# Patient Record
Sex: FEMALE | Race: WHITE | NOT HISPANIC OR LATINO | Employment: FULL TIME | URBAN - METROPOLITAN AREA
[De-identification: names, ages, dates, MRNs, and addresses within clinical notes are randomized per-mention and may not be internally consistent; named-entity substitution may affect disease eponyms.]

---

## 2017-01-09 ENCOUNTER — ALLSCRIPTS OFFICE VISIT (OUTPATIENT)
Dept: OTHER | Facility: OTHER | Age: 47
End: 2017-01-09

## 2017-01-09 DIAGNOSIS — E03.9 HYPOTHYROIDISM: ICD-10-CM

## 2017-01-09 DIAGNOSIS — R91.8 OTHER NONSPECIFIC ABNORMAL FINDING OF LUNG FIELD: ICD-10-CM

## 2017-01-09 DIAGNOSIS — L04.9 ACUTE LYMPHADENITIS: ICD-10-CM

## 2017-01-09 DIAGNOSIS — E11.9 TYPE 2 DIABETES MELLITUS WITHOUT COMPLICATIONS (HCC): ICD-10-CM

## 2017-01-17 ENCOUNTER — APPOINTMENT (OUTPATIENT)
Dept: LAB | Facility: HOSPITAL | Age: 47
End: 2017-01-17
Payer: COMMERCIAL

## 2017-01-17 DIAGNOSIS — E03.9 HYPOTHYROIDISM: ICD-10-CM

## 2017-01-17 DIAGNOSIS — L04.9 ACUTE LYMPHADENITIS: ICD-10-CM

## 2017-01-17 DIAGNOSIS — E11.9 TYPE 2 DIABETES MELLITUS WITHOUT COMPLICATIONS (HCC): ICD-10-CM

## 2017-01-17 LAB
ALBUMIN SERPL BCP-MCNC: 3.4 G/DL (ref 3.5–5)
ALP SERPL-CCNC: 70 U/L (ref 46–116)
ALT SERPL W P-5'-P-CCNC: 76 U/L (ref 12–78)
ANION GAP SERPL CALCULATED.3IONS-SCNC: 10 MMOL/L (ref 4–13)
AST SERPL W P-5'-P-CCNC: 79 U/L (ref 5–45)
BASOPHILS # BLD AUTO: 0.12 THOUSANDS/ΜL (ref 0–0.1)
BASOPHILS NFR BLD AUTO: 2 % (ref 0–1)
BILIRUB SERPL-MCNC: 0.23 MG/DL (ref 0.2–1)
BUN SERPL-MCNC: 14 MG/DL (ref 5–25)
CALCIUM SERPL-MCNC: 8.2 MG/DL (ref 8.3–10.1)
CHLORIDE SERPL-SCNC: 105 MMOL/L (ref 100–108)
CO2 SERPL-SCNC: 23 MMOL/L (ref 21–32)
CREAT SERPL-MCNC: 0.73 MG/DL (ref 0.6–1.3)
EOSINOPHIL # BLD AUTO: 0.44 THOUSAND/ΜL (ref 0–0.61)
EOSINOPHIL NFR BLD AUTO: 6 % (ref 0–6)
ERYTHROCYTE [DISTWIDTH] IN BLOOD BY AUTOMATED COUNT: 14.2 % (ref 11.6–15.1)
EST. AVERAGE GLUCOSE BLD GHB EST-MCNC: 131 MG/DL
GFR SERPL CREATININE-BSD FRML MDRD: >60 ML/MIN/1.73SQ M
GLUCOSE SERPL-MCNC: 122 MG/DL (ref 65–140)
HBA1C MFR BLD: 6.2 % (ref 4.2–6.3)
HCT VFR BLD AUTO: 37.3 % (ref 34.8–46.1)
HGB BLD-MCNC: 12.6 G/DL (ref 11.5–15.4)
LYMPHOCYTES # BLD AUTO: 1.93 THOUSANDS/ΜL (ref 0.6–4.47)
LYMPHOCYTES NFR BLD AUTO: 27 % (ref 14–44)
MCH RBC QN AUTO: 30.7 PG (ref 26.8–34.3)
MCHC RBC AUTO-ENTMCNC: 33.8 G/DL (ref 31.4–37.4)
MCV RBC AUTO: 91 FL (ref 82–98)
MONOCYTES # BLD AUTO: 0.65 THOUSAND/ΜL (ref 0.17–1.22)
MONOCYTES NFR BLD AUTO: 9 % (ref 4–12)
NEUTROPHILS # BLD AUTO: 3.91 THOUSANDS/ΜL (ref 1.85–7.62)
NEUTS SEG NFR BLD AUTO: 56 % (ref 43–75)
NRBC BLD AUTO-RTO: 0 /100 WBCS
PLATELET # BLD AUTO: 245 THOUSANDS/UL (ref 149–390)
PMV BLD AUTO: 9.8 FL (ref 8.9–12.7)
POTASSIUM SERPL-SCNC: 4.2 MMOL/L (ref 3.5–5.3)
PROT SERPL-MCNC: 6.8 G/DL (ref 6.4–8.2)
RBC # BLD AUTO: 4.1 MILLION/UL (ref 3.81–5.12)
SODIUM SERPL-SCNC: 138 MMOL/L (ref 136–145)
TSH SERPL DL<=0.05 MIU/L-ACNC: 2 UIU/ML (ref 0.36–3.74)
WBC # BLD AUTO: 7.05 THOUSAND/UL (ref 4.31–10.16)

## 2017-01-17 PROCEDURE — 85025 COMPLETE CBC W/AUTO DIFF WBC: CPT

## 2017-01-17 PROCEDURE — 83036 HEMOGLOBIN GLYCOSYLATED A1C: CPT

## 2017-01-17 PROCEDURE — 36415 COLL VENOUS BLD VENIPUNCTURE: CPT

## 2017-01-17 PROCEDURE — 80053 COMPREHEN METABOLIC PANEL: CPT

## 2017-01-17 PROCEDURE — 84443 ASSAY THYROID STIM HORMONE: CPT

## 2017-01-19 ENCOUNTER — GENERIC CONVERSION - ENCOUNTER (OUTPATIENT)
Dept: OTHER | Facility: OTHER | Age: 47
End: 2017-01-19

## 2017-01-23 ENCOUNTER — GENERIC CONVERSION - ENCOUNTER (OUTPATIENT)
Dept: OTHER | Facility: OTHER | Age: 47
End: 2017-01-23

## 2017-02-03 ENCOUNTER — HOSPITAL ENCOUNTER (OUTPATIENT)
Dept: RADIOLOGY | Facility: HOSPITAL | Age: 47
Discharge: HOME | End: 2017-02-03
Payer: COMMERCIAL

## 2017-02-03 DIAGNOSIS — L04.9 ACUTE LYMPHADENITIS: ICD-10-CM

## 2017-02-03 PROCEDURE — 70491 CT SOFT TISSUE NECK W/DYE: CPT

## 2017-02-03 RX ADMIN — IOHEXOL 85 ML: 350 INJECTION, SOLUTION INTRAVENOUS at 13:15

## 2017-02-06 ENCOUNTER — HOSPITAL ENCOUNTER (OUTPATIENT)
Dept: RADIOLOGY | Facility: HOSPITAL | Age: 47
Discharge: HOME | End: 2017-02-06
Payer: COMMERCIAL

## 2017-02-06 ENCOUNTER — GENERIC CONVERSION - ENCOUNTER (OUTPATIENT)
Dept: OTHER | Facility: OTHER | Age: 47
End: 2017-02-06

## 2017-02-06 ENCOUNTER — TRANSCRIBE ORDERS (OUTPATIENT)
Dept: ADMINISTRATIVE | Facility: HOSPITAL | Age: 47
End: 2017-02-06

## 2017-02-06 DIAGNOSIS — R91.8 OTHER NONSPECIFIC ABNORMAL FINDING OF LUNG FIELD: ICD-10-CM

## 2017-02-06 PROCEDURE — 71020 HB CHEST X-RAY 2VW FRONTAL&LATL: CPT

## 2017-02-07 ENCOUNTER — GENERIC CONVERSION - ENCOUNTER (OUTPATIENT)
Dept: OTHER | Facility: OTHER | Age: 47
End: 2017-02-07

## 2017-04-17 DIAGNOSIS — R74.01 NONSPECIFIC ELEVATION OF LEVELS OF TRANSAMINASE AND LACTIC ACID DEHYDROGENASE (LDH): ICD-10-CM

## 2017-04-17 DIAGNOSIS — R74.02 NONSPECIFIC ELEVATION OF LEVELS OF TRANSAMINASE AND LACTIC ACID DEHYDROGENASE (LDH): ICD-10-CM

## 2017-04-17 DIAGNOSIS — E11.9 TYPE 2 DIABETES MELLITUS WITHOUT COMPLICATIONS (HCC): ICD-10-CM

## 2017-07-13 ENCOUNTER — APPOINTMENT (OUTPATIENT)
Dept: LAB | Facility: HOSPITAL | Age: 47
End: 2017-07-13
Payer: COMMERCIAL

## 2017-07-13 ENCOUNTER — TRANSCRIBE ORDERS (OUTPATIENT)
Dept: LAB | Facility: HOSPITAL | Age: 47
End: 2017-07-13

## 2017-07-13 DIAGNOSIS — Z11.1 TUBERCULOSIS SCREENING: ICD-10-CM

## 2017-07-13 DIAGNOSIS — Z11.1 TUBERCULOSIS SCREENING: Primary | ICD-10-CM

## 2017-07-13 PROCEDURE — 86480 TB TEST CELL IMMUN MEASURE: CPT

## 2017-07-13 PROCEDURE — 36415 COLL VENOUS BLD VENIPUNCTURE: CPT

## 2017-07-16 LAB
ANNOTATION COMMENT IMP: NORMAL
GAMMA INTERFERON BACKGROUND BLD IA-ACNC: 0.06 IU/ML
M TB IFN-G BLD-IMP: NEGATIVE
M TB IFN-G CD4+ BCKGRND COR BLD-ACNC: <0.01 IU/ML
M TB IFN-G CD4+ T-CELLS BLD-ACNC: 0.05 IU/ML
MITOGEN IGNF BLD-ACNC: 9.21 IU/ML
QUANTIFERON-TB GOLD IN TUBE: NORMAL
SERVICE CMNT-IMP: NORMAL

## 2017-08-01 ENCOUNTER — TRANSCRIBE ORDERS (OUTPATIENT)
Dept: LAB | Facility: HOSPITAL | Age: 47
End: 2017-08-01

## 2017-08-01 ENCOUNTER — APPOINTMENT (OUTPATIENT)
Dept: LAB | Facility: HOSPITAL | Age: 47
End: 2017-08-01
Payer: COMMERCIAL

## 2017-08-01 DIAGNOSIS — Z00.8 HEALTH EXAMINATION IN POPULATION SURVEY: ICD-10-CM

## 2017-08-01 DIAGNOSIS — R74.01 NONSPECIFIC ELEVATION OF LEVELS OF TRANSAMINASE AND LACTIC ACID DEHYDROGENASE (LDH): ICD-10-CM

## 2017-08-01 DIAGNOSIS — Z00.8 HEALTH EXAMINATION IN POPULATION SURVEY: Primary | ICD-10-CM

## 2017-08-01 DIAGNOSIS — E11.9 TYPE 2 DIABETES MELLITUS WITHOUT COMPLICATIONS (HCC): ICD-10-CM

## 2017-08-01 DIAGNOSIS — R74.02 NONSPECIFIC ELEVATION OF LEVELS OF TRANSAMINASE AND LACTIC ACID DEHYDROGENASE (LDH): ICD-10-CM

## 2017-08-01 LAB
ALBUMIN SERPL BCP-MCNC: 3.3 G/DL (ref 3.5–5)
ALP SERPL-CCNC: 67 U/L (ref 46–116)
ALT SERPL W P-5'-P-CCNC: 35 U/L (ref 12–78)
ANION GAP SERPL CALCULATED.3IONS-SCNC: 8 MMOL/L (ref 4–13)
AST SERPL W P-5'-P-CCNC: 51 U/L (ref 5–45)
BILIRUB SERPL-MCNC: 0.42 MG/DL (ref 0.2–1)
BUN SERPL-MCNC: 16 MG/DL (ref 5–25)
CALCIUM SERPL-MCNC: 8.5 MG/DL (ref 8.3–10.1)
CHLORIDE SERPL-SCNC: 103 MMOL/L (ref 100–108)
CHOLEST SERPL-MCNC: 224 MG/DL (ref 50–200)
CO2 SERPL-SCNC: 24 MMOL/L (ref 21–32)
CREAT SERPL-MCNC: 0.84 MG/DL (ref 0.6–1.3)
EST. AVERAGE GLUCOSE BLD GHB EST-MCNC: 151 MG/DL
GFR SERPL CREATININE-BSD FRML MDRD: 84 ML/MIN/1.73SQ M
GLUCOSE P FAST SERPL-MCNC: 119 MG/DL (ref 65–99)
HBA1C MFR BLD: 6.9 % (ref 4.2–6.3)
HDLC SERPL-MCNC: 38 MG/DL (ref 40–60)
POTASSIUM SERPL-SCNC: 3.9 MMOL/L (ref 3.5–5.3)
PROT SERPL-MCNC: 6.8 G/DL (ref 6.4–8.2)
SODIUM SERPL-SCNC: 135 MMOL/L (ref 136–145)
TRIGL SERPL-MCNC: 557 MG/DL

## 2017-08-01 PROCEDURE — 36415 COLL VENOUS BLD VENIPUNCTURE: CPT

## 2017-08-01 PROCEDURE — 83036 HEMOGLOBIN GLYCOSYLATED A1C: CPT

## 2017-08-01 PROCEDURE — 80053 COMPREHEN METABOLIC PANEL: CPT

## 2017-08-01 PROCEDURE — 80061 LIPID PANEL: CPT

## 2017-08-30 ENCOUNTER — ALLSCRIPTS OFFICE VISIT (OUTPATIENT)
Dept: OTHER | Facility: OTHER | Age: 47
End: 2017-08-30

## 2017-08-30 DIAGNOSIS — R74.02 NONSPECIFIC ELEVATION OF LEVELS OF TRANSAMINASE AND LACTIC ACID DEHYDROGENASE (LDH): ICD-10-CM

## 2017-08-30 DIAGNOSIS — R74.01 NONSPECIFIC ELEVATION OF LEVELS OF TRANSAMINASE AND LACTIC ACID DEHYDROGENASE (LDH): ICD-10-CM

## 2017-09-08 ENCOUNTER — HOSPITAL ENCOUNTER (OUTPATIENT)
Dept: RADIOLOGY | Facility: HOSPITAL | Age: 47
Discharge: HOME | End: 2017-09-08
Payer: COMMERCIAL

## 2017-09-08 DIAGNOSIS — R74.01 NONSPECIFIC ELEVATION OF LEVELS OF TRANSAMINASE AND LACTIC ACID DEHYDROGENASE (LDH): ICD-10-CM

## 2017-09-08 DIAGNOSIS — R74.02 NONSPECIFIC ELEVATION OF LEVELS OF TRANSAMINASE AND LACTIC ACID DEHYDROGENASE (LDH): ICD-10-CM

## 2017-09-08 PROCEDURE — 76705 ECHO EXAM OF ABDOMEN: CPT

## 2017-09-10 ENCOUNTER — GENERIC CONVERSION - ENCOUNTER (OUTPATIENT)
Dept: OTHER | Facility: OTHER | Age: 47
End: 2017-09-10

## 2017-10-03 ENCOUNTER — HOSPITAL ENCOUNTER (INPATIENT)
Facility: HOSPITAL | Age: 47
LOS: 1 days | Discharge: HOME | DRG: 281 | End: 2017-10-05
Attending: EMERGENCY MEDICINE | Admitting: INTERNAL MEDICINE
Payer: COMMERCIAL

## 2017-10-03 ENCOUNTER — APPOINTMENT (EMERGENCY)
Dept: RADIOLOGY | Facility: HOSPITAL | Age: 47
DRG: 281 | End: 2017-10-03
Payer: COMMERCIAL

## 2017-10-03 DIAGNOSIS — R07.9 CHEST PAIN: Primary | ICD-10-CM

## 2017-10-03 DIAGNOSIS — R94.31 ST SEGMENT DEPRESSION: ICD-10-CM

## 2017-10-03 PROBLEM — K76.0 FATTY LIVER: Chronic | Status: ACTIVE | Noted: 2017-10-03

## 2017-10-03 PROBLEM — R73.9 HYPERGLYCEMIA: Status: ACTIVE | Noted: 2017-10-03

## 2017-10-03 PROBLEM — K27.9 PEPTIC ULCER DISEASE: Chronic | Status: ACTIVE | Noted: 2017-10-03

## 2017-10-03 PROBLEM — D72.829 LEUKOCYTOSIS: Status: ACTIVE | Noted: 2017-10-03

## 2017-10-03 PROBLEM — R00.0 SINUS TACHYCARDIA: Status: ACTIVE | Noted: 2017-10-03

## 2017-10-03 PROBLEM — I10 HYPERTENSION: Chronic | Status: ACTIVE | Noted: 2017-10-03

## 2017-10-03 PROBLEM — J32.9 CHRONIC SINUSITIS: Chronic | Status: ACTIVE | Noted: 2017-10-03

## 2017-10-03 LAB
ALBUMIN SERPL BCP-MCNC: 3.7 G/DL (ref 3.5–5)
ALP SERPL-CCNC: 91 U/L (ref 46–116)
ALT SERPL W P-5'-P-CCNC: 70 U/L (ref 12–78)
ANION GAP SERPL CALCULATED.3IONS-SCNC: 13 MMOL/L (ref 4–13)
AST SERPL W P-5'-P-CCNC: 70 U/L (ref 5–45)
BASOPHILS # BLD AUTO: 0.1 THOUSANDS/ΜL (ref 0–0.1)
BASOPHILS NFR BLD AUTO: 1 % (ref 0–1)
BILIRUB SERPL-MCNC: 0.22 MG/DL (ref 0.2–1)
BUN SERPL-MCNC: 14 MG/DL (ref 5–25)
CALCIUM SERPL-MCNC: 8.7 MG/DL (ref 8.3–10.1)
CHLORIDE SERPL-SCNC: 106 MMOL/L (ref 100–108)
CO2 SERPL-SCNC: 17 MMOL/L (ref 21–32)
CREAT SERPL-MCNC: 0.71 MG/DL (ref 0.6–1.3)
DEPRECATED D DIMER PPP: 266 NG/ML (FEU) (ref 0–424)
EOSINOPHIL # BLD AUTO: 0.52 THOUSAND/ΜL (ref 0–0.61)
EOSINOPHIL NFR BLD AUTO: 3 % (ref 0–6)
ERYTHROCYTE [DISTWIDTH] IN BLOOD BY AUTOMATED COUNT: 13.2 % (ref 11.6–15.1)
GFR SERPL CREATININE-BSD FRML MDRD: 102 ML/MIN/1.73SQ M
GLUCOSE SERPL-MCNC: 220 MG/DL (ref 65–140)
HCT VFR BLD AUTO: 40.3 % (ref 34.8–46.1)
HGB BLD-MCNC: 13.6 G/DL (ref 11.5–15.4)
LYMPHOCYTES # BLD AUTO: 4.42 THOUSANDS/ΜL (ref 0.6–4.47)
LYMPHOCYTES NFR BLD AUTO: 29 % (ref 14–44)
MCH RBC QN AUTO: 30.4 PG (ref 26.8–34.3)
MCHC RBC AUTO-ENTMCNC: 33.7 G/DL (ref 31.4–37.4)
MCV RBC AUTO: 90 FL (ref 82–98)
MONOCYTES # BLD AUTO: 0.76 THOUSAND/ΜL (ref 0.17–1.22)
MONOCYTES NFR BLD AUTO: 5 % (ref 4–12)
NEUTROPHILS # BLD AUTO: 9.4 THOUSANDS/ΜL (ref 1.85–7.62)
NEUTS SEG NFR BLD AUTO: 62 % (ref 43–75)
NRBC BLD AUTO-RTO: 0 /100 WBCS
PLATELET # BLD AUTO: 332 THOUSANDS/UL (ref 149–390)
PMV BLD AUTO: 9.1 FL (ref 8.9–12.7)
POTASSIUM SERPL-SCNC: 3.1 MMOL/L (ref 3.5–5.3)
PROT SERPL-MCNC: 7.8 G/DL (ref 6.4–8.2)
RBC # BLD AUTO: 4.48 MILLION/UL (ref 3.81–5.12)
SODIUM SERPL-SCNC: 136 MMOL/L (ref 136–145)
TROPONIN I SERPL-MCNC: 0.11 NG/ML
TROPONIN I SERPL-MCNC: <0.02 NG/ML
WBC # BLD AUTO: 15.25 THOUSAND/UL (ref 4.31–10.16)

## 2017-10-03 PROCEDURE — 85025 COMPLETE CBC W/AUTO DIFF WBC: CPT | Performed by: EMERGENCY MEDICINE

## 2017-10-03 PROCEDURE — 93005 ELECTROCARDIOGRAM TRACING: CPT | Performed by: EMERGENCY MEDICINE

## 2017-10-03 PROCEDURE — 96375 TX/PRO/DX INJ NEW DRUG ADDON: CPT

## 2017-10-03 PROCEDURE — 80053 COMPREHEN METABOLIC PANEL: CPT | Performed by: EMERGENCY MEDICINE

## 2017-10-03 PROCEDURE — 36415 COLL VENOUS BLD VENIPUNCTURE: CPT

## 2017-10-03 PROCEDURE — 99285 EMERGENCY DEPT VISIT HI MDM: CPT

## 2017-10-03 PROCEDURE — 84484 ASSAY OF TROPONIN QUANT: CPT | Performed by: FAMILY MEDICINE

## 2017-10-03 PROCEDURE — 85379 FIBRIN DEGRADATION QUANT: CPT | Performed by: EMERGENCY MEDICINE

## 2017-10-03 PROCEDURE — 71020 HB CHEST X-RAY 2VW FRONTAL&LATL: CPT

## 2017-10-03 PROCEDURE — 84484 ASSAY OF TROPONIN QUANT: CPT | Performed by: EMERGENCY MEDICINE

## 2017-10-03 PROCEDURE — 96374 THER/PROPH/DIAG INJ IV PUSH: CPT

## 2017-10-03 RX ORDER — LEVOTHYROXINE SODIUM 0.07 MG/1
75 TABLET ORAL
Status: DISCONTINUED | OUTPATIENT
Start: 2017-10-04 | End: 2017-10-05 | Stop reason: HOSPADM

## 2017-10-03 RX ORDER — ONDANSETRON 2 MG/ML
INJECTION INTRAMUSCULAR; INTRAVENOUS
Status: DISPENSED
Start: 2017-10-03 | End: 2017-10-04

## 2017-10-03 RX ORDER — PANTOPRAZOLE SODIUM 40 MG/1
40 TABLET, DELAYED RELEASE ORAL DAILY
Status: DISCONTINUED | OUTPATIENT
Start: 2017-10-04 | End: 2017-10-05 | Stop reason: HOSPADM

## 2017-10-03 RX ORDER — ACETAMINOPHEN 325 MG/1
650 TABLET ORAL EVERY 4 HOURS PRN
Status: DISCONTINUED | OUTPATIENT
Start: 2017-10-03 | End: 2017-10-05 | Stop reason: HOSPADM

## 2017-10-03 RX ORDER — NITROGLYCERIN 0.4 MG/1
0.4 TABLET SUBLINGUAL
Status: DISCONTINUED | OUTPATIENT
Start: 2017-10-03 | End: 2017-10-04

## 2017-10-03 RX ORDER — ONDANSETRON 2 MG/ML
4 INJECTION INTRAMUSCULAR; INTRAVENOUS ONCE
Status: COMPLETED | OUTPATIENT
Start: 2017-10-03 | End: 2017-10-03

## 2017-10-03 RX ORDER — ASPIRIN 81 MG/1
324 TABLET, CHEWABLE ORAL ONCE
Status: COMPLETED | OUTPATIENT
Start: 2017-10-03 | End: 2017-10-03

## 2017-10-03 RX ORDER — ONDANSETRON 2 MG/ML
4 INJECTION INTRAMUSCULAR; INTRAVENOUS EVERY 6 HOURS PRN
Status: DISCONTINUED | OUTPATIENT
Start: 2017-10-03 | End: 2017-10-05 | Stop reason: HOSPADM

## 2017-10-03 RX ORDER — LEVOTHYROXINE SODIUM 0.07 MG/1
75 TABLET ORAL DAILY
COMMUNITY
End: 2018-02-08 | Stop reason: SDUPTHER

## 2017-10-03 RX ORDER — FENTANYL CITRATE 50 UG/ML
50 INJECTION, SOLUTION INTRAMUSCULAR; INTRAVENOUS ONCE
Status: COMPLETED | OUTPATIENT
Start: 2017-10-03 | End: 2017-10-03

## 2017-10-03 RX ORDER — NITROGLYCERIN 0.4 MG/1
0.4 TABLET SUBLINGUAL ONCE
Status: COMPLETED | OUTPATIENT
Start: 2017-10-03 | End: 2017-10-03

## 2017-10-03 RX ORDER — PANTOPRAZOLE SODIUM 40 MG/1
40 TABLET, DELAYED RELEASE ORAL DAILY
COMMUNITY
End: 2018-02-08 | Stop reason: SDUPTHER

## 2017-10-03 RX ADMIN — NITROGLYCERIN 0.4 MG: 0.4 TABLET SUBLINGUAL at 19:35

## 2017-10-03 RX ADMIN — ASPIRIN 81 MG 324 MG: 81 TABLET ORAL at 19:32

## 2017-10-03 RX ADMIN — HYDROMORPHONE HYDROCHLORIDE 1 MG: 1 INJECTION, SOLUTION INTRAMUSCULAR; INTRAVENOUS; SUBCUTANEOUS at 20:40

## 2017-10-03 RX ADMIN — NITROGLYCERIN 1 INCH: 20 OINTMENT TOPICAL at 20:44

## 2017-10-03 RX ADMIN — ONDANSETRON 4 MG: 2 INJECTION INTRAMUSCULAR; INTRAVENOUS at 19:15

## 2017-10-03 RX ADMIN — SODIUM CHLORIDE 1000 ML: 0.9 INJECTION, SOLUTION INTRAVENOUS at 20:25

## 2017-10-03 RX ADMIN — FENTANYL CITRATE 50 MCG: 50 INJECTION INTRAMUSCULAR; INTRAVENOUS at 19:46

## 2017-10-03 NOTE — LETTER
October 5, 2017     Patient: Winnie Joe   YOB: 1970   Date of Visit: 10/3/2017       To Whom It May Concern: It is my medical opinion that Winnie Joe may return to full duty immediately with no restrictions on Monday 10/9/17  Please excuse Mrs Lucia Aguiar from work between 10/4/17 to 10/8/17 as her medical condition required inpatient hospitalization  If you have any questions or concerns, please don't hesitate to call           Sincerely,        Kenisha Rogers PA-C   [facility]   [phone]

## 2017-10-03 NOTE — ED PROVIDER NOTES
History  Chief Complaint   Patient presents with    Chest Pain     pt started with chest pain approx 20min ago  associated NV and heart palpitations  pt admits to [address] [place] [address] with dinner  66-year-old female presenting to the ER today with a chief complaint of chest pain  Patient was having drinks with friends at a local [employer] [facility] [employer] 0 5 hour ago when she had sudden onset of chest pressure  This was associated with diaphoresis with nausea and vomiting x1  Denied any radiation of the pain  Describes the pain as pressure  Describes it as 9/10 in onset and has been waxing and waning in intensity ever since  Patient came to the ER for further evaluation and treatment  Chest Pain   Associated symptoms: nausea and vomiting    Associated symptoms: no abdominal pain, no diaphoresis, no fatigue and no fever        Prior to Admission Medications   Prescriptions Last Dose Informant Patient Reported? Taking?   levothyroxine 75 mcg tablet   Yes Yes   Sig: Take 75 mcg by mouth daily   pantoprazole (PROTONIX) 40 mg tablet   Yes Yes   Sig: Take 40 mg by mouth daily      Facility-Administered Medications: None       Past Medical History:   Diagnosis Date    Disease of thyroid gland     History of stomach ulcers        History reviewed  No pertinent surgical history  History reviewed  No pertinent family history  I have reviewed and agree with the history as documented  Social History   Substance Use Topics    Smoking status: Never Smoker    Smokeless tobacco: Never Used    Alcohol use No      Comment: occasional        Review of Systems   Constitutional: Negative  Negative for appetite change, chills, diaphoresis, fatigue and fever  HENT: Negative  Eyes: Negative  Respiratory: Negative  Cardiovascular: Positive for chest pain  Gastrointestinal: Positive for nausea and vomiting  Negative for abdominal pain, blood in stool, constipation and diarrhea  Endocrine: Negative  Genitourinary: Negative for decreased urine volume, difficulty urinating, dyspareunia, dysuria, flank pain, frequency, hematuria, pelvic pain, urgency, vaginal bleeding, vaginal discharge and vaginal pain  Musculoskeletal: Negative  Skin: Negative  Allergic/Immunologic: Negative  Neurological: Negative  Psychiatric/Behavioral: Negative  All other systems reviewed and are negative  Physical Exam  ED Triage Vitals [10/03/17 1859]   Temperature Pulse Respirations Blood Pressure SpO2   (!) 96 6 °F (35 9 °C) (!) 134 18 (!) 176/83 97 %      Temp Source Heart Rate Source Patient Position - Orthostatic VS BP Location FiO2 (%)   Tympanic Monitor Sitting Left arm --      Pain Score       8           Physical Exam   Constitutional: She is oriented to person, place, and time  She appears well-developed and well-nourished  HENT:   Head: Normocephalic and atraumatic  Right Ear: External ear normal    Left Ear: External ear normal    Nose: Nose normal    Mouth/Throat: Oropharynx is clear and moist    Eyes: Conjunctivae and EOM are normal  Pupils are equal, round, and reactive to light  Neck: Normal range of motion  Neck supple  No JVD present  No tracheal deviation present  No thyromegaly present  Cardiovascular: Regular rhythm, normal heart sounds and intact distal pulses  Exam reveals no gallop and no friction rub  No murmur heard  Pulmonary/Chest: Effort normal and breath sounds normal  No stridor  No respiratory distress  She has no wheezes  She has no rales  She exhibits no tenderness  Abdominal: Soft  Bowel sounds are normal  She exhibits no distension and no mass  There is no tenderness  There is no rebound and no guarding  No hernia  Musculoskeletal: Normal range of motion  She exhibits no edema, tenderness or deformity  Lymphadenopathy:     She has no cervical adenopathy  Neurological: She is alert and oriented to person, place, and time  She has normal reflexes   She displays normal reflexes  No cranial nerve deficit  She exhibits normal muscle tone  Coordination normal    Skin: Skin is warm  No rash noted  No erythema  No pallor  Psychiatric: She has a normal mood and affect  Her behavior is normal  Judgment and thought content normal    Nursing note and vitals reviewed  ED Medications  Medications   levothyroxine tablet 75 mcg (not administered)   pantoprazole (PROTONIX) EC tablet 40 mg (not administered)   ondansetron (ZOFRAN) injection 4 mg (not administered)   nitroglycerin (NITROSTAT) SL tablet 0 4 mg (not administered)   enoxaparin (LOVENOX) subcutaneous injection 40 mg (not administered)   acetaminophen (TYLENOL) tablet 650 mg (not administered)   ondansetron (ZOFRAN) injection 4 mg ( Intravenous Canceled Entry 10/3/17 2218)   aspirin chewable tablet 324 mg (324 mg Oral Given 10/3/17 1932)   nitroglycerin (NITROSTAT) SL tablet 0 4 mg (0 4 mg Sublingual Given 10/3/17 1935)   fentanyl citrate (PF) 100 MCG/2ML 50 mcg (50 mcg Intravenous Given 10/3/17 1946)   sodium chloride 0 9 % bolus 1,000 mL (1,000 mL Intravenous New Bag 10/3/17 2025)   nitroglycerin (NITRO-BID) 2 % TD ointment 1 inch (1 inch Topical Given 10/3/17 2044)       Diagnostic Studies  Labs Reviewed   COMPREHENSIVE METABOLIC PANEL - Abnormal        Result Value Ref Range Status    Potassium 3 1 (*) 3 5 - 5 3 mmol/L Final    CO2 17 (*) 21 - 32 mmol/L Final    Glucose 220 (*) 65 - 140 mg/dL Final    Comment:   If the patient is fasting, the ADA then defines impaired fasting glucose as > 100 mg/dL and diabetes as > or equal to 123 mg/dL  Specimen collection should occur prior to Sulfasalazine administration due to the potential for falsely depressed results  Specimen collection should occur prior to Sulfapyridine administration due to the potential for falsely elevated results      AST 70 (*) 5 - 45 U/L Final    Comment:   Specimen collection should occur prior to Sulfasalazine administration due to the potential for falsely depressed results  Sodium 136  136 - 145 mmol/L Final    Chloride 106  100 - 108 mmol/L Final    Anion Gap 13  4 - 13 mmol/L Final    BUN 14  5 - 25 mg/dL Final    Creatinine 0 71  0 60 - 1 30 mg/dL Final    Comment: Standardized to IDMS reference method    Calcium 8 7  8 3 - 10 1 mg/dL Final    ALT 70  12 - 78 U/L Final    Comment:   Specimen collection should occur prior to Sulfasalazine and/or Sulfapyridine administration due to the potential for falsely depressed results  Alkaline Phosphatase 91  46 - 116 U/L Final    Total Protein 7 8  6 4 - 8 2 g/dL Final    Albumin 3 7  3 5 - 5 0 g/dL Final    Total Bilirubin 0 22  0 20 - 1 00 mg/dL Final    eGFR 102  ml/min/1 73sq m Final    Narrative:     National Kidney Disease Education Program recommendations are as follows:  GFR calculation is accurate only with a steady state creatinine  Chronic Kidney disease less than 60 ml/min/1 73 sq  meters  Kidney failure less than 15 ml/min/1 73 sq  meters     CBC AND DIFFERENTIAL - Abnormal     WBC 15 25 (*) 4 31 - 10 16 Thousand/uL Final    Neutrophils Absolute 9 40 (*) 1 85 - 7 62 Thousands/µL Final    RBC 4 48  3 81 - 5 12 Million/uL Final    Hemoglobin 13 6  11 5 - 15 4 g/dL Final    Hematocrit 40 3  34 8 - 46 1 % Final    MCV 90  82 - 98 fL Final    MCH 30 4  26 8 - 34 3 pg Final    MCHC 33 7  31 4 - 37 4 g/dL Final    RDW 13 2  11 6 - 15 1 % Final    MPV 9 1  8 9 - 12 7 fL Final    Platelets 782  250 - 390 Thousands/uL Final    nRBC 0  /100 WBCs Final    Neutrophils Relative 62  43 - 75 % Final    Lymphocytes Relative 29  14 - 44 % Final    Monocytes Relative 5  4 - 12 % Final    Eosinophils Relative 3  0 - 6 % Final    Basophils Relative 1  0 - 1 % Final    Lymphocytes Absolute 4 42  0 60 - 4 47 Thousands/µL Final    Monocytes Absolute 0 76  0 17 - 1 22 Thousand/µL Final    Eosinophils Absolute 0 52  0 00 - 0 61 Thousand/µL Final    Basophils Absolute 0 10  0 00 - 0 10 Thousands/µL Final   TROPONIN I - Normal    Troponin I <0 02  <=0 04 ng/mL Final    Narrative:     [employer] Chemistry analyzer 99% cutoff is > 0 04 ng/mL in network labs    o cTnI 99% cutoff is useful only when applied to patients in the clinical setting of myocardial ischemia  o cTnI 99% cutoff should be interpreted in the context of clinical history, ECG findings and possibly cardiac imaging to establish correct diagnosis  o cTnI 99% cutoff may be suggestive but clearly not indicative of a coronary event without the clinical setting of myocardial ischemia  D-DIMER, QUANTITATIVE - Normal    D-Dimer, Quant 266  0 - 424 ng/ml (FEU) Final    Comment:   Reference and upper limits to exclude DVT and PE are the same  Do not use to exclude if clinical symptoms are present  Pregnant women:  1st trimester:  <220 - 1060 ng/ml (FEU)  2nd trimester:  <220 - 1880 ng/ml (FEU)  3rd trimester:   238 - 3280 ng/ml (FEU)             X-ray chest 2 views   Final Result      No active pulmonary disease  Workstation performed: [id number]             Procedures  ECG 12 Lead Documentation  Date/Time: 10/3/2017 7:51 PM  Performed by: Conor Soares by: Ranjit Raza     ECG reviewed by me, the ED Provider: yes    Patient location:  ED  Previous ECG:     Previous ECG:  Compared to current    Similarity:  Changes noted  Interpretation:     Interpretation: abnormal    Rate:     ECG rate assessment: tachycardic    Rhythm:     Rhythm: sinus rhythm    Ectopy:     Ectopy: none    QRS:     QRS axis:  Normal  ST segments:     ST segments:  Abnormal    Elevation:  AVR    Depression:  II and aVF  T waves:     T waves: normal              Phone Consults  ED Phone Contact    ED Course  ED Course as of Oct 03 2316   Tue Oct 03, 2017   [id number] I paged the cardiology attending, awaiting reply     Cleopatra Garcia with Dr Shabana Weems who stated he was not concern for ischemia based on this patient's new EKG    He fell changes noted in AVR as well as 2 and AVF were related to the patient's heart rate  Suggested we continue our evaluation treat patient's pain and inform him of there any changes  We will proceed with an evaluation care to his ACS and possible pulmonary embolism  1943 Patient has continued pain despite nitroglycerin an ASA rated as 10 on a 10  Will give patient 50 mcqs of caleb    1950 Potassium: (!) 3 1   1950 Glucose: (!) 220   1955 D-DIMER QUANTITATIVE: 266   2023 Patient's pain is improved after fentanyl bolus  Unremarkable chest x-ray  Will admit patient to the hospital at this time for chest pain observation  MDM  Number of Diagnoses or Management Options  Chest pain: new and requires workup  Diagnosis management comments: 42-year-old female with a chief complaint of chest pain concerning for ACS, pulmonary embolism, arrhythmia  I have ordered a CBC, CMP, troponin level  Given patient's history of taking birth control pills and her tachycardia on arrival, I will obtain a D-dimer to rule out pulmonary embolism  If D-dimer is positive patient will receive a CTA of the chest   I will treat patient's pain with aspirin as well as sublingual nitroglycerin  Patient's 1st EKG showed ST segment elevation in AVR as well as upsloping ST segment depressions in lead 2 and AVF  I consulted the on-call cardiologist who stated he was not concern for ischemia at that time  Stated was likely related to the tachycardia  On re-evaluation, patient's pain is improved after a fentanyl bolus  I have reviewed patient's laboratory work which reveals mild hypokalemia at a level of 3 1, patient does have a leukocytosis at 15 but is finishing up a steroid burst for a bronchitis  Patient's D-dimer level is noted to be 226, making pulmonary embolism unlikely  1st troponin is 0 02  Patient continues to be tachycardic however  I will give patient a 1 L bolus of normal saline  Chest x-ray did not reveal any acute abnormality  Nonetheless given patient's recent onset of chest pain concern for ACS is still present  I informed patient that she should be observed overnight with serial troponins, EKGs and telemetry monitoring  Patient was amenable to this option  Amount and/or Complexity of Data Reviewed  Clinical lab tests: ordered and reviewed  Tests in the radiology section of CPT®: ordered and reviewed  Tests in the medicine section of CPT®: reviewed and ordered  Decide to obtain previous medical records or to obtain history from someone other than the patient: yes  Independent visualization of images, tracings, or specimens: yes    Patient Progress  Patient progress: stable    CritCare Time    Disposition  Final diagnoses:   Chest pain     ED Disposition     ED Disposition Condition Comment    Admit  Case was discussed with bola and the patient's admission status was agreed to be Admission Status: observation status to the service of Dr Ez Yu   Follow-up Information    None       Current Discharge Medication List      CONTINUE these medications which have NOT CHANGED    Details   levothyroxine 75 mcg tablet Take 75 mcg by mouth daily      pantoprazole (PROTONIX) 40 mg tablet Take 40 mg by mouth daily           No discharge procedures on file  ED Provider  Attending physically available and evaluated Gabriel Mccormick  I managed the patient along with the ED Attending      Electronically Signed by       Christophe Bazzi DO  Resident  10/03/17 0742

## 2017-10-03 NOTE — ED ATTENDING ATTESTATION
Richie Adhikari MD, saw and evaluated the patient  All available labs and X-rays were ordered by me or the resident and have been reviewed by myself  I discussed the patient with the resident / non-physician and agree with the resident's / non-physician practitioner's findings and plan as documented in the resident's / non-physician practicitioner's note, except where noted  At this point, I agree with the current assessment done in the ED  Chief Complaint   Patient presents with    Chest Pain     pt started with chest pain approx 20min ago  associated NV and heart palpitations  pt admits to [address] [place] [address] with dinner  This is a 51-year-old female presenting for evaluation of sudden onset of chest pain, heart palpitations, nausea, 1 episode of nonbloody nonbilious emesis  The patient states about 20 minutes prior to arrival she was at [address], "On the [employer]," she did have  [address]  She all the son without exertion started knows the severe pain across the front of her chest from the right to the left upper chest   It was 10/10 in intensity  It was not associated with dizziness or lightheadedness however she felt incredibly nauseous and weak  She felt short of breath  Had some right arm discomfort  Was diaphoretic at some point  She was recently on steroids xa few days, finishing course today for a URI  PMH:  - "Pre-diabetic"  - HTN: off of lisinopril x2 months because of normal control  - Ulcers  - Alcoholic fatty liver  PSH:  - ?  No smoking  Quit alcohol recently x2 weeks  No drugs  No cocaine  No recent travel  +OCP since age 16  PE:  Vitals:    10/03/17 2026 10/03/17 2030 10/03/17 2144 10/03/17 2352   BP: 144/76 144/76 136/73 142/61   Pulse: (!) 108 (!) 106 (!) 107 98   Resp: 18 20 20 18   Temp:    98 °F (36 7 °C)   TempSrc:    Oral   SpO2: 96% 95% 95% 94%   Weight:   77 1 kg (170 lb)    Height:   5' 3" (1 6 m)    General: VSS, NAD, awake, alert   Well-nourished, well-developed  Appears stated age  Speaking normally in full sentences  Head: Normocephalic, atraumatic, nontender  Eyes: PERRL, EOM-I  No diplopia  No hyphema  No subconjunctival hemorrhages  Symmetrical lids  ENT: Atraumatic external nose and ears  MMM  No malocclusion  No stridor  Normal phonation  No drooling  Normal swallowing  Neck: Symmetric, trachea midline  No JVD  CV: RRR  +S1/S2  No murmurs or gallops  Peripheral pulses +2 throughout  No chest wall tenderness  Lungs:   Unlabored No retractions  CTAB, lungs sounds equal bilateral    No tachypnea  Abd: +BS, soft, NT/ND    MSK:   FROM   Back:   No rashes  Skin: Dry, intact  Neuro: AAOx3, GCS 15, CN II-XII grossly intact  Motor grossly intact  Psychiatric/Behavioral: Appropriate mood and affect   Exam: deferred  A:  - CP  - SOB  - Diaphoresis  P:  - Asked resident to discuss with cardiology (Dr Annamarie Gray)  - Cardiac workup here  - Disposition pending workup  - 13 point ROS was performed and all are normal unless stated in the history above  - Nursing note reviewed  Vitals reviewed  - Orders placed by myself and/or advanced practitioner / resident     - Previous chart was reviewed  - No language barrier    - History obtained from patient  - There are no limitations to the history obtained  - Critical care time: Not applicable for this patient  HEART Score = [6]  [2] History = Highly / Moderately / Slightly Suspicious  [2] EKG = Significant STD / Non-specific repolarization / Normal  [1] Age = >65, 45-65, <45  [1] Risk Factors (0, 1-2, 3+): Cholesterol, HTN, DM, Cigarettes, FH, Obesity  [0] Troponin: 3+ x normal, 1-3x normal, <normal    Moderate Score (4-6 points), risk of MACE of 12-16 6%      PE risk, Wells score = [1 5]   (0) clinically suspected DVT - 3 points   (0) alternative diagnosis is less likely than PE - 3 0 points   (1 5) tachycardia - 1 5 points   (0) immobilization/surgery in previous four weeks - 1 5 points   (0) history of DVT or PE - 1 5 points   (0) hemoptysis - 1 0 points   (0) malignancy (treatment for within 6 months, palliative) - 1 0 points   Interpretation Del Kishor et al  2007 Radiology [id number]):   Score <2 0 - Low (probability 15% based on pooled data)     Final Diagnosis:  1  Chest pain    2  ST segment depression        ED Course as of Oct 04 0039   Tue Oct 03, 2017   1932 On acute  steroids WBC: (!) 15 25 2009 Troponin I: <0 02 2009 D-DIMER QUANTITATIVE: 266   2009 Likely steroid induced  Glucose: (!) 220   2009 Mild decrease  Potassium: (!) 3 1   2010 Procedure Note: EKG  Date/Time: 10/03/17   Performed by: Kumar Soares by: Kun Moe   Indications / Diagnosis: CP  ECG reviewed by me, the ED Provider: yes   The EKG demonstrates:  Rhythm: ST    Intervals: normal intervals  Axis: normal axis  QRS/Blocks: normal QRS  ST Changes: Has STD in inferior leads and GRICELDA in aVR  Likely rate related  Somewhat different from old provided  Medications   levothyroxine tablet 75 mcg (not administered)   pantoprazole (PROTONIX) EC tablet 40 mg (not administered)   ondansetron (ZOFRAN) injection 4 mg (not administered)   nitroglycerin (NITROSTAT) SL tablet 0 4 mg (not administered)   enoxaparin (LOVENOX) subcutaneous injection 40 mg (not administered)   acetaminophen (TYLENOL) tablet 650 mg (not administered)   ondansetron (ZOFRAN) injection 4 mg ( Intravenous Canceled Entry 10/3/17 2218)   aspirin chewable tablet 324 mg (324 mg Oral Given 10/3/17 1932)   nitroglycerin (NITROSTAT) SL tablet 0 4 mg (0 4 mg Sublingual Given 10/3/17 1935)   fentanyl citrate (PF) 100 MCG/2ML 50 mcg (50 mcg Intravenous Given 10/3/17 1946)   sodium chloride 0 9 % bolus 1,000 mL (0 mL Intravenous Stopped 10/3/17 2146)   nitroglycerin (NITRO-BID) 2 % TD ointment 1 inch (1 inch Topical Given 10/3/17 2044)     X-ray chest 2 views   Final Result      No active pulmonary disease           Workstation performed: [id number]           Orders Placed This Encounter   Procedures    ED ECG Documentation Only    X-ray chest 2 views    Comprehensive metabolic panel    CBC and differential    Troponin I    D-dimer, quantitative    Basic metabolic panel    CBC (With Platelets)    Troponin I    Hemoglobin A1c    TSH, 3rd generation    Troponin I    Troponin I    Diet Regular; Regular House; No Caffeine, No Chocolate    Cardiac monitoring    POCT troponin    Vital Signs per unit routine    Continuous ST Segment Monitoring    Nursing communcation ECG 12 lead with chest pain or ST segment change and notify MD  Place an ECG order if performed   Telemetry monitoring    Bed rest with bathroom privileges    Place sequential compression device    Level 1-Full Code: all life saving measures are indicated    Stress test only, exercise    ECG 12 lead    ECG 12 lead with 2nd troponin    Insert peripheral IV    Place in Observation     Labs Reviewed   COMPREHENSIVE METABOLIC PANEL - Abnormal        Result Value Ref Range Status    Potassium 3 1 (*) 3 5 - 5 3 mmol/L Final    CO2 17 (*) 21 - 32 mmol/L Final    Glucose 220 (*) 65 - 140 mg/dL Final    Comment:   If the patient is fasting, the ADA then defines impaired fasting glucose as > 100 mg/dL and diabetes as > or equal to 123 mg/dL  Specimen collection should occur prior to Sulfasalazine administration due to the potential for falsely depressed results  Specimen collection should occur prior to Sulfapyridine administration due to the potential for falsely elevated results  AST 70 (*) 5 - 45 U/L Final    Comment:   Specimen collection should occur prior to Sulfasalazine administration due to the potential for falsely depressed results       Sodium 136  136 - 145 mmol/L Final    Chloride 106  100 - 108 mmol/L Final    Anion Gap 13  4 - 13 mmol/L Final    BUN 14  5 - 25 mg/dL Final    Creatinine 0 71  0 60 - 1 30 mg/dL Final    Comment: Standardized to IDMS reference method    Calcium 8 7  8 3 - 10 1 mg/dL Final    ALT 70  12 - 78 U/L Final    Comment:   Specimen collection should occur prior to Sulfasalazine and/or Sulfapyridine administration due to the potential for falsely depressed results  Alkaline Phosphatase 91  46 - 116 U/L Final    Total Protein 7 8  6 4 - 8 2 g/dL Final    Albumin 3 7  3 5 - 5 0 g/dL Final    Total Bilirubin 0 22  0 20 - 1 00 mg/dL Final    eGFR 102  ml/min/1 73sq m Final    Narrative:     National Kidney Disease Education Program recommendations are as follows:  GFR calculation is accurate only with a steady state creatinine  Chronic Kidney disease less than 60 ml/min/1 73 sq  meters  Kidney failure less than 15 ml/min/1 73 sq  meters     CBC AND DIFFERENTIAL - Abnormal     WBC 15 25 (*) 4 31 - 10 16 Thousand/uL Final    Neutrophils Absolute 9 40 (*) 1 85 - 7 62 Thousands/µL Final    RBC 4 48  3 81 - 5 12 Million/uL Final    Hemoglobin 13 6  11 5 - 15 4 g/dL Final    Hematocrit 40 3  34 8 - 46 1 % Final    MCV 90  82 - 98 fL Final    MCH 30 4  26 8 - 34 3 pg Final    MCHC 33 7  31 4 - 37 4 g/dL Final    RDW 13 2  11 6 - 15 1 % Final    MPV 9 1  8 9 - 12 7 fL Final    Platelets 168  129 - 390 Thousands/uL Final    nRBC 0  /100 WBCs Final    Neutrophils Relative 62  43 - 75 % Final    Lymphocytes Relative 29  14 - 44 % Final    Monocytes Relative 5  4 - 12 % Final    Eosinophils Relative 3  0 - 6 % Final    Basophils Relative 1  0 - 1 % Final    Lymphocytes Absolute 4 42  0 60 - 4 47 Thousands/µL Final    Monocytes Absolute 0 76  0 17 - 1 22 Thousand/µL Final    Eosinophils Absolute 0 52  0 00 - 0 61 Thousand/µL Final    Basophils Absolute 0 10  0 00 - 0 10 Thousands/µL Final   TROPONIN I - Normal    Troponin I <0 02  <=0 04 ng/mL Final    Narrative:     [employer] Chemistry analyzer 99% cutoff is > 0 04 ng/mL in network labs    o cTnI 99% cutoff is useful only when applied to patients in the clinical setting of myocardial ischemia  o cTnI 99% cutoff should be interpreted in the context of clinical history, ECG findings and possibly cardiac imaging to establish correct diagnosis  o cTnI 99% cutoff may be suggestive but clearly not indicative of a coronary event without the clinical setting of myocardial ischemia  D-DIMER, QUANTITATIVE - Normal    D-Dimer, Quant 266  0 - 424 ng/ml (FEU) Final    Comment:   Reference and upper limits to exclude DVT and PE are the same  Do not use to exclude if clinical symptoms are present  Pregnant women:  1st trimester:  <220 - 1060 ng/ml (FEU)  2nd trimester:  <220 - 1880 ng/ml (FEU)  3rd trimester:   238 - 3280 ng/ml (FEU)           ED Disposition     ED Disposition Condition Comment    Admit  Case was discussed with bola and the patient's admission status was agreed to be Admission Status: observation status to the service of Dr Casper Nielsen   Follow-up Information    None       Current Discharge Medication List      CONTINUE these medications which have NOT CHANGED    Details   levothyroxine 75 mcg tablet Take 75 mcg by mouth daily      pantoprazole (PROTONIX) 40 mg tablet Take 40 mg by mouth daily           No discharge procedures on file  Prior to Admission Medications   Prescriptions Last Dose Informant Patient Reported? Taking?   levothyroxine 75 mcg tablet   Yes Yes   Sig: Take 75 mcg by mouth daily   pantoprazole (PROTONIX) 40 mg tablet   Yes Yes   Sig: Take 40 mg by mouth daily      Facility-Administered Medications: None       Portions of the record may have been created with voice recognition software  Occasional wrong word or "sound a like" substitutions may have occurred due to the inherent limitations of voice recognition software  Read the chart carefully and recognize, using context, where substitutions have occurred      Electronically signed by:  Iva Bond

## 2017-10-04 ENCOUNTER — APPOINTMENT (INPATIENT)
Dept: NON INVASIVE DIAGNOSTICS | Facility: HOSPITAL | Age: 47
DRG: 281 | End: 2017-10-04
Payer: COMMERCIAL

## 2017-10-04 ENCOUNTER — GENERIC CONVERSION - ENCOUNTER (OUTPATIENT)
Dept: OTHER | Facility: OTHER | Age: 47
End: 2017-10-04

## 2017-10-04 LAB
ANION GAP SERPL CALCULATED.3IONS-SCNC: 8 MMOL/L (ref 4–13)
APTT PPP: 25 SECONDS (ref 23–35)
ATRIAL RATE: 121 BPM
ATRIAL RATE: 86 BPM
ATRIAL RATE: 90 BPM
BUN SERPL-MCNC: 12 MG/DL (ref 5–25)
CALCIUM SERPL-MCNC: 8.2 MG/DL (ref 8.3–10.1)
CHLORIDE SERPL-SCNC: 107 MMOL/L (ref 100–108)
CO2 SERPL-SCNC: 23 MMOL/L (ref 21–32)
CREAT SERPL-MCNC: 0.72 MG/DL (ref 0.6–1.3)
ERYTHROCYTE [DISTWIDTH] IN BLOOD BY AUTOMATED COUNT: 13.3 % (ref 11.6–15.1)
ERYTHROCYTE [DISTWIDTH] IN BLOOD BY AUTOMATED COUNT: 13.4 % (ref 11.6–15.1)
EST. AVERAGE GLUCOSE BLD GHB EST-MCNC: 157 MG/DL
GFR SERPL CREATININE-BSD FRML MDRD: 100 ML/MIN/1.73SQ M
GLUCOSE SERPL-MCNC: 113 MG/DL (ref 65–140)
HBA1C MFR BLD: 7.1 % (ref 4.2–6.3)
HCT VFR BLD AUTO: 34 % (ref 34.8–46.1)
HCT VFR BLD AUTO: 35.9 % (ref 34.8–46.1)
HGB BLD-MCNC: 11.4 G/DL (ref 11.5–15.4)
HGB BLD-MCNC: 12 G/DL (ref 11.5–15.4)
INR PPP: 1.11 (ref 0.86–1.16)
MCH RBC QN AUTO: 30.2 PG (ref 26.8–34.3)
MCH RBC QN AUTO: 30.5 PG (ref 26.8–34.3)
MCHC RBC AUTO-ENTMCNC: 33.4 G/DL (ref 31.4–37.4)
MCHC RBC AUTO-ENTMCNC: 33.5 G/DL (ref 31.4–37.4)
MCV RBC AUTO: 90 FL (ref 82–98)
MCV RBC AUTO: 91 FL (ref 82–98)
P AXIS: 33 DEGREES
P AXIS: 45 DEGREES
P AXIS: 75 DEGREES
PLATELET # BLD AUTO: 278 THOUSANDS/UL (ref 149–390)
PLATELET # BLD AUTO: 289 THOUSANDS/UL (ref 149–390)
PMV BLD AUTO: 9 FL (ref 8.9–12.7)
PMV BLD AUTO: 9.4 FL (ref 8.9–12.7)
POTASSIUM SERPL-SCNC: 3.9 MMOL/L (ref 3.5–5.3)
PR INTERVAL: 142 MS
PR INTERVAL: 142 MS
PR INTERVAL: 144 MS
PROTHROMBIN TIME: 14.3 SECONDS (ref 12.1–14.4)
QRS AXIS: 10 DEGREES
QRS AXIS: 26 DEGREES
QRS AXIS: 70 DEGREES
QRSD INTERVAL: 92 MS
QRSD INTERVAL: 94 MS
QRSD INTERVAL: 98 MS
QT INTERVAL: 328 MS
QT INTERVAL: 374 MS
QT INTERVAL: 382 MS
QTC INTERVAL: 457 MS
QTC INTERVAL: 457 MS
QTC INTERVAL: 465 MS
RBC # BLD AUTO: 3.74 MILLION/UL (ref 3.81–5.12)
RBC # BLD AUTO: 3.98 MILLION/UL (ref 3.81–5.12)
SODIUM SERPL-SCNC: 138 MMOL/L (ref 136–145)
T WAVE AXIS: 31 DEGREES
T WAVE AXIS: 41 DEGREES
T WAVE AXIS: 43 DEGREES
TROPONIN I SERPL-MCNC: 0.07 NG/ML
TROPONIN I SERPL-MCNC: 0.07 NG/ML
TROPONIN I SERPL-MCNC: 0.11 NG/ML
TROPONIN I SERPL-MCNC: 0.12 NG/ML
TROPONIN I SERPL-MCNC: 0.13 NG/ML
TSH SERPL DL<=0.05 MIU/L-ACNC: 3.47 UIU/ML (ref 0.36–3.74)
VENTRICULAR RATE: 121 BPM
VENTRICULAR RATE: 86 BPM
VENTRICULAR RATE: 90 BPM
WBC # BLD AUTO: 12.05 THOUSAND/UL (ref 4.31–10.16)
WBC # BLD AUTO: 12.19 THOUSAND/UL (ref 4.31–10.16)

## 2017-10-04 PROCEDURE — 93005 ELECTROCARDIOGRAM TRACING: CPT | Performed by: INTERNAL MEDICINE

## 2017-10-04 PROCEDURE — 84443 ASSAY THYROID STIM HORMONE: CPT | Performed by: FAMILY MEDICINE

## 2017-10-04 PROCEDURE — 93005 ELECTROCARDIOGRAM TRACING: CPT | Performed by: FAMILY MEDICINE

## 2017-10-04 PROCEDURE — B2111ZZ FLUOROSCOPY OF MULTIPLE CORONARY ARTERIES USING LOW OSMOLAR CONTRAST: ICD-10-PCS | Performed by: INTERNAL MEDICINE

## 2017-10-04 PROCEDURE — 93458 L HRT ARTERY/VENTRICLE ANGIO: CPT | Performed by: NURSE PRACTITIONER

## 2017-10-04 PROCEDURE — C1769 GUIDE WIRE: HCPCS | Performed by: NURSE PRACTITIONER

## 2017-10-04 PROCEDURE — 84484 ASSAY OF TROPONIN QUANT: CPT | Performed by: FAMILY MEDICINE

## 2017-10-04 PROCEDURE — 85610 PROTHROMBIN TIME: CPT | Performed by: FAMILY MEDICINE

## 2017-10-04 PROCEDURE — B2151ZZ FLUOROSCOPY OF LEFT HEART USING LOW OSMOLAR CONTRAST: ICD-10-PCS | Performed by: INTERNAL MEDICINE

## 2017-10-04 PROCEDURE — 99152 MOD SED SAME PHYS/QHP 5/>YRS: CPT | Performed by: NURSE PRACTITIONER

## 2017-10-04 PROCEDURE — 93005 ELECTROCARDIOGRAM TRACING: CPT

## 2017-10-04 PROCEDURE — C1894 INTRO/SHEATH, NON-LASER: HCPCS | Performed by: NURSE PRACTITIONER

## 2017-10-04 PROCEDURE — 85027 COMPLETE CBC AUTOMATED: CPT | Performed by: FAMILY MEDICINE

## 2017-10-04 PROCEDURE — 93306 TTE W/DOPPLER COMPLETE: CPT

## 2017-10-04 PROCEDURE — 85730 THROMBOPLASTIN TIME PARTIAL: CPT | Performed by: FAMILY MEDICINE

## 2017-10-04 PROCEDURE — 99153 MOD SED SAME PHYS/QHP EA: CPT | Performed by: NURSE PRACTITIONER

## 2017-10-04 PROCEDURE — 80048 BASIC METABOLIC PNL TOTAL CA: CPT | Performed by: FAMILY MEDICINE

## 2017-10-04 PROCEDURE — 84484 ASSAY OF TROPONIN QUANT: CPT | Performed by: PHYSICIAN ASSISTANT

## 2017-10-04 PROCEDURE — 83036 HEMOGLOBIN GLYCOSYLATED A1C: CPT | Performed by: FAMILY MEDICINE

## 2017-10-04 PROCEDURE — 4A023N7 MEASUREMENT OF CARDIAC SAMPLING AND PRESSURE, LEFT HEART, PERCUTANEOUS APPROACH: ICD-10-PCS | Performed by: INTERNAL MEDICINE

## 2017-10-04 RX ORDER — HEPARIN SODIUM 1000 [USP'U]/ML
4000 INJECTION, SOLUTION INTRAVENOUS; SUBCUTANEOUS AS NEEDED
Status: DISCONTINUED | OUTPATIENT
Start: 2017-10-04 | End: 2017-10-04

## 2017-10-04 RX ORDER — MIDAZOLAM HYDROCHLORIDE 1 MG/ML
INJECTION INTRAMUSCULAR; INTRAVENOUS CODE/TRAUMA/SEDATION MEDICATION
Status: COMPLETED | OUTPATIENT
Start: 2017-10-04 | End: 2017-10-04

## 2017-10-04 RX ORDER — HEPARIN SODIUM 1000 [USP'U]/ML
2000 INJECTION, SOLUTION INTRAVENOUS; SUBCUTANEOUS AS NEEDED
Status: DISCONTINUED | OUTPATIENT
Start: 2017-10-04 | End: 2017-10-04

## 2017-10-04 RX ORDER — MORPHINE SULFATE 2 MG/ML
1 INJECTION, SOLUTION INTRAMUSCULAR; INTRAVENOUS EVERY 4 HOURS PRN
Status: DISCONTINUED | OUTPATIENT
Start: 2017-10-04 | End: 2017-10-05 | Stop reason: HOSPADM

## 2017-10-04 RX ORDER — HEPARIN SODIUM 1000 [USP'U]/ML
4000 INJECTION, SOLUTION INTRAVENOUS; SUBCUTANEOUS ONCE
Status: COMPLETED | OUTPATIENT
Start: 2017-10-04 | End: 2017-10-04

## 2017-10-04 RX ORDER — HEPARIN SODIUM 1000 [USP'U]/ML
INJECTION, SOLUTION INTRAVENOUS; SUBCUTANEOUS CODE/TRAUMA/SEDATION MEDICATION
Status: COMPLETED | OUTPATIENT
Start: 2017-10-04 | End: 2017-10-04

## 2017-10-04 RX ORDER — SODIUM CHLORIDE 9 MG/ML
150 INJECTION, SOLUTION INTRAVENOUS CONTINUOUS
Status: DISCONTINUED | OUTPATIENT
Start: 2017-10-04 | End: 2017-10-04

## 2017-10-04 RX ORDER — NITROGLYCERIN 0.4 MG/1
0.4 TABLET SUBLINGUAL
Status: DISCONTINUED | OUTPATIENT
Start: 2017-10-04 | End: 2017-10-05 | Stop reason: HOSPADM

## 2017-10-04 RX ORDER — ATORVASTATIN CALCIUM 20 MG/1
20 TABLET, FILM COATED ORAL
Status: DISCONTINUED | OUTPATIENT
Start: 2017-10-04 | End: 2017-10-05

## 2017-10-04 RX ORDER — FENTANYL CITRATE 50 UG/ML
INJECTION, SOLUTION INTRAMUSCULAR; INTRAVENOUS CODE/TRAUMA/SEDATION MEDICATION
Status: COMPLETED | OUTPATIENT
Start: 2017-10-04 | End: 2017-10-04

## 2017-10-04 RX ORDER — ASPIRIN 325 MG
325 TABLET ORAL DAILY
Status: DISCONTINUED | OUTPATIENT
Start: 2017-10-04 | End: 2017-10-04

## 2017-10-04 RX ORDER — NITROGLYCERIN 20 MG/100ML
INJECTION INTRAVENOUS CODE/TRAUMA/SEDATION MEDICATION
Status: COMPLETED | OUTPATIENT
Start: 2017-10-04 | End: 2017-10-04

## 2017-10-04 RX ORDER — SODIUM CHLORIDE 9 MG/ML
100 INJECTION, SOLUTION INTRAVENOUS CONTINUOUS
Status: DISCONTINUED | OUTPATIENT
Start: 2017-10-04 | End: 2017-10-04

## 2017-10-04 RX ORDER — VERAPAMIL HCL 2.5 MG/ML
AMPUL (ML) INTRAVENOUS CODE/TRAUMA/SEDATION MEDICATION
Status: COMPLETED | OUTPATIENT
Start: 2017-10-04 | End: 2017-10-04

## 2017-10-04 RX ORDER — HEPARIN SODIUM 10000 [USP'U]/100ML
3-20 INJECTION, SOLUTION INTRAVENOUS
Status: DISCONTINUED | OUTPATIENT
Start: 2017-10-04 | End: 2017-10-04

## 2017-10-04 RX ORDER — LIDOCAINE HYDROCHLORIDE 10 MG/ML
INJECTION, SOLUTION INFILTRATION; PERINEURAL CODE/TRAUMA/SEDATION MEDICATION
Status: COMPLETED | OUTPATIENT
Start: 2017-10-04 | End: 2017-10-04

## 2017-10-04 RX ORDER — FENOFIBRATE 48 MG/1
48 TABLET, COATED ORAL DAILY
Status: DISCONTINUED | OUTPATIENT
Start: 2017-10-04 | End: 2017-10-05 | Stop reason: HOSPADM

## 2017-10-04 RX ADMIN — METOPROLOL TARTRATE 25 MG: 25 TABLET ORAL at 21:14

## 2017-10-04 RX ADMIN — FENTANYL CITRATE 25 MCG: 50 INJECTION, SOLUTION INTRAMUSCULAR; INTRAVENOUS at 12:05

## 2017-10-04 RX ADMIN — MIDAZOLAM 1 MG: 1 INJECTION INTRAMUSCULAR; INTRAVENOUS at 12:05

## 2017-10-04 RX ADMIN — PANTOPRAZOLE SODIUM 40 MG: 40 TABLET, DELAYED RELEASE ORAL at 08:27

## 2017-10-04 RX ADMIN — LIDOCAINE HYDROCHLORIDE 1 ML: 10 INJECTION, SOLUTION INFILTRATION; PERINEURAL at 11:59

## 2017-10-04 RX ADMIN — IOHEXOL 112 ML: 350 INJECTION, SOLUTION INTRAVENOUS at 12:21

## 2017-10-04 RX ADMIN — LEVOTHYROXINE SODIUM 75 MCG: 75 TABLET ORAL at 05:16

## 2017-10-04 RX ADMIN — HEPARIN SODIUM 4000 UNITS: 1000 INJECTION, SOLUTION INTRAVENOUS; SUBCUTANEOUS at 08:24

## 2017-10-04 RX ADMIN — NITROGLYCERIN 0.4 MG: 0.4 TABLET SUBLINGUAL at 00:54

## 2017-10-04 RX ADMIN — METOPROLOL TARTRATE 25 MG: 25 TABLET ORAL at 08:27

## 2017-10-04 RX ADMIN — NITROGLYCERIN 1 INCH: 20 OINTMENT TOPICAL at 10:00

## 2017-10-04 RX ADMIN — FENOFIBRATE 48 MG: 48 TABLET ORAL at 15:58

## 2017-10-04 RX ADMIN — VERAPAMIL HYDROCHLORIDE 1.25 MG: 2.5 INJECTION, SOLUTION INTRAVENOUS at 12:03

## 2017-10-04 RX ADMIN — HEPARIN SODIUM AND DEXTROSE 12 UNITS/KG/HR: 10000; 5 INJECTION INTRAVENOUS at 08:24

## 2017-10-04 RX ADMIN — NITROGLYCERIN 0.4 MG: 0.4 TABLET SUBLINGUAL at 07:47

## 2017-10-04 RX ADMIN — HEPARIN SODIUM 4000 UNITS: 1000 INJECTION INTRAVENOUS; SUBCUTANEOUS at 12:03

## 2017-10-04 RX ADMIN — FENTANYL CITRATE 50 MCG: 50 INJECTION, SOLUTION INTRAMUSCULAR; INTRAVENOUS at 11:53

## 2017-10-04 RX ADMIN — ATORVASTATIN CALCIUM 20 MG: 20 TABLET, FILM COATED ORAL at 15:59

## 2017-10-04 RX ADMIN — FENTANYL CITRATE 50 MCG: 50 INJECTION, SOLUTION INTRAMUSCULAR; INTRAVENOUS at 11:58

## 2017-10-04 RX ADMIN — MIDAZOLAM 2 MG: 1 INJECTION INTRAMUSCULAR; INTRAVENOUS at 11:58

## 2017-10-04 RX ADMIN — SODIUM CHLORIDE 100 ML/HR: 0.9 INJECTION, SOLUTION INTRAVENOUS at 10:42

## 2017-10-04 RX ADMIN — MIDAZOLAM 2 MG: 1 INJECTION INTRAMUSCULAR; INTRAVENOUS at 11:53

## 2017-10-04 RX ADMIN — MORPHINE SULFATE 1 MG: 2 INJECTION, SOLUTION INTRAMUSCULAR; INTRAVENOUS at 08:28

## 2017-10-04 RX ADMIN — ACETAMINOPHEN 650 MG: 325 TABLET, FILM COATED ORAL at 00:54

## 2017-10-04 RX ADMIN — ASPIRIN 325 MG: 325 TABLET ORAL at 08:27

## 2017-10-04 RX ADMIN — NITROGLYCERIN 200 MCG: 20 INJECTION INTRAVENOUS at 12:02

## 2017-10-04 RX ADMIN — NITROGLYCERIN 0.4 MG: 0.4 TABLET SUBLINGUAL at 18:41

## 2017-10-04 RX ADMIN — MORPHINE SULFATE 1 MG: 2 INJECTION, SOLUTION INTRAMUSCULAR; INTRAVENOUS at 18:05

## 2017-10-04 RX ADMIN — ACETAMINOPHEN 650 MG: 325 TABLET, FILM COATED ORAL at 15:58

## 2017-10-04 NOTE — CASE MANAGEMENT
[facility] in the [facility] by Isaias Posey for 2017  Network Utilization Review Department  Phone: [phone]; Fax [phone]  ATTENTION: The Network Utilization Review Department is now centralized for our 7 Facilities  Make a note that we have a new phone and fax numbers for our Department  Please call with any questions or concerns to [phone] and carefully follow the prompts so that you are directed to the right person  All voicemails are confidential  Fax any determinations, approvals, denials, and requests for initial or continue stay review clinical to [phone]  Due to HIGH CALL volume, it would be easier if you could please send faxed requests to expedite your requests and in part, help us provide discharge notifications faster     =====================================================================    Initial Clinical Review    Admission: Date/Time/Statement: 10/03/2017 @ 2046  Orders Placed This Encounter   Procedures    Place in Observation     Standing Status:   Standing     Number of Occurrences:   1     Order Specific Question:   Admitting Physician     Answer:   Cyndie Goldberg     Order Specific Question:   Level of Care     Answer:   Med Surg [16]     Order Specific Question:   Bed Type     Answer:   Nancie [4]     ED: Date/Time/Mode of Arrival:   ED Arrival Information     Expected Arrival Acuity Means of Arrival Escorted By Service Admission Type    - 10/3/2017 18:56 Emergent Walk-In Self General Medicine Emergency    Arrival Complaint    CHEST PAIN        Chief Complaint:   Chief Complaint   Patient presents with    Chest Pain     pt started with chest pain approx 20min ago  associated NV and heart palpitations  pt admits to [facility] with dinner  History of Illness:   Jian Guzman is a 52 y o  female who presents with chest pain and palpitations    Patient's symptoms started just prior to arrival to the emergency room  She had been at [facility] and she had drunk some [place]  She states she felt her heart beating fast, she was diaphoretic, short of breath and she had chest pain across her entire anterior chest wall  Chest pain was about 10/10 in intensity  Chest pain was not related to exertion  She states arrival herself home on the way she vomited once  She had some associated right arm discomfort  She had been on steroids for some days now,  her last does was today she was taking it for her acute on chronic bronchitis  ED Vital Signs:   ED Triage Vitals [10/03/17 1859]   Temperature Pulse Respirations Blood Pressure SpO2   (!) 96 6 °F (35 9 °C) (!) 134 18 (!) 176/83 97 %      Temp Source Heart Rate Source Patient Position - Orthostatic VS BP Location FiO2 (%)   Tympanic Monitor Sitting Left arm --      Pain Score       8        Wt Readings from Last 1 Encounters:   10/03/17 77 1 kg (170 lb)     Abnormal Labs/Diagnostic Test Results:   WBC Thousand/uL 15 25*   HEMOGLOBIN g/dL 13 6   HEMATOCRIT % 40 3   PLATELETS Thousands/uL 332     SODIUM mmol/L 136   POTASSIUM mmol/L 3 1*   CHLORIDE mmol/L 106   CO2 mmol/L 17*   BUN mg/dL 14   CREATININE mg/dL 0 71   CALCIUM mg/dL 8 7   TOTAL PROTEIN g/dL 7 8   BILIRUBIN TOTAL mg/dL 0 22   ALK PHOS U/L 91   ALT U/L 70   AST U/L 70*   GLUCOSE RANDOM mg/dL 220*     Troponin I <0 02, 0 11, 0 12, 0 13,  <=0 04 ng/mL     D-Dimer, Quant 266 0 - 424 ng/ml (FEU)     Chest X: no active pulmonary disease    US Liver:   Hepatic steatosis  2   Status post cholecystectomy  3   Limited visualization of the pancreas secondary to adjacent bowel gas      ECG:  Sinus Tach    ED Treatment:   Medication Administration from 10/03/2017 1856 to 10/03/2017 2130    Date/Time Order Dose Route Action Action by Comments   10/03/2017 1915 ondansetron (ZOFRAN) injection 4 mg 4 mg Intravenous Given Jacinto García RN    10/03/2017 1932 aspirin chewable tablet 324 mg 324 mg Oral Given Jacinto García RN 10/03/2017 1935 nitroglycerin (NITROSTAT) SL tablet 0 4 mg 0 4 mg Sublingual Given Sabrina Salazar RN    10/03/2017 1946 fentanyl citrate (PF) 100 MCG/2ML 50 mcg 50 mcg Intravenous Given Sabrina Salazar RN    10/03/2017 2025 sodium chloride 0 9 % bolus 1,000 mL 1,000 mL Intravenous Given Sabrina Salazar RN    10/03/2017 2040 HYDROmorphone (DILAUDID) 1 mg/mL injection 1 mg 1 mg Intravenous Given Sabrina Salazar RN    10/03/2017 2044 nitroglycerin (NITRO-BID) 2 % TD ointment 1 inch 1 inch Topical Given Sabrina Salazar RN         Past Medical/Surgical History:   Past Medical History:   Diagnosis Date    Disease of thyroid gland     History of stomach ulcers        Admitting Diagnosis: Chest pain [R07 9]    Age/Sex: 52 y o  female    Assessment/Plan:    Chest pain  Active Problems:    Leukocytosis    Hypertension    Peptic ulcer disease    Fatty liver    Sinus tachycardia        Chest pain rule out acute coronary syndrome  So far workup is unremarkable  EKG shows sinus tachycardia  Troponin negative ( <0 02)  Will continue to trend troponin  Nitroglycerin as needed  Continue aspirin  Will get an exercise stress test     Sinus tachycardia  Could be related to cardiac versus anxiety versus dehydration versus steroid use versus thyroid abnormality  Monitor heart rate  Check TSH level     Leukocytosis  Likely due to steroids  Patient a febrile     Chronic sinusitis  Patient prescribed Medrol Dosepak  On her last dose of prednisone     Hyperglycemia  Likely due to steroid  Patient states she is prediabetic  Will get a hemoglobin A1c     Hypothyroidism  Continue levothyroxine  Check TSH level in view of sinus tachycardia     Hypokalemia:  Replete prn     Hx of GERD/PUD  Continue PPI     VTE Prophylaxis: Enoxaparin (Lovenox)  / sequential compression device   Anticipated Length of Stay:  Patient will be admitted on an Observation basis with an anticipated length of stay of  < 2 midnights     Justification for Hospital Stay:  Rule out acute coronary syndrome       Admission Orders:  Scheduled Meds:   aspirin 325 mg Oral Daily   levothyroxine 75 mcg Oral Early Morning   metoprolol tartrate 25 mg Oral Q12H [facility]   nitroglycerin 1 inch Topical BID   pantoprazole 40 mg Oral Daily     Continuous Infusions:   heparin (porcine) 3-20 Units/kg/hr (Order-Specific) Last Rate: 12 Units/kg/hr (10/04/17 0824)     PRN Meds:   acetaminophen    heparin (porcine)    heparin (porcine)    morphine injection    ondansetron    NPO sips with meds  Consult cardio:  ST segment depression, CP  TELM  Stress Test: pending

## 2017-10-04 NOTE — PLAN OF CARE
Problem: DISCHARGE PLANNING - CARE MANAGEMENT  Goal: Discharge to post-acute care or home with appropriate resources  INTERVENTIONS:  - Conduct assessment to determine patient/family and health care team treatment goals, and need for post-acute services based on payer coverage, community resources, and patient preferences, and barriers to discharge  - Address psychosocial, clinical, and financial barriers to discharge as identified in assessment in conjunction with the patient/family and health care team  - Arrange appropriate level of post-acute services according to patient's   needs and preference and payer coverage in collaboration with the physician and health care team  - Communicate with and update the patient/family, physician, and health care team regarding progress on the discharge plan  - Arrange appropriate transportation to post-acute venues  Assist patient and family with making referrals for any discharge needs, follow up care    Outcome: Progressing

## 2017-10-04 NOTE — PLAN OF CARE
CARDIOVASCULAR - ADULT     Maintains optimal cardiac output and hemodynamic stability Progressing     Absence of cardiac dysrhythmias or at baseline rhythm Progressing        DISCHARGE PLANNING     Discharge to home or other facility with appropriate resources Progressing        HEMATOLOGIC - ADULT     Maintains hematologic stability Progressing        INFECTION - ADULT     Absence of fever/infection during neutropenic period Progressing        Knowledge Deficit     Patient/family/caregiver demonstrates understanding of disease process, treatment plan, medications, and discharge instructions Progressing        METABOLIC, FLUID AND ELECTROLYTES - ADULT     Fluid balance maintained Progressing        PAIN - ADULT     Verbalizes/displays adequate comfort level or baseline comfort level Progressing        SAFETY ADULT     Patient will remain free of falls Progressing     Maintain or return to baseline ADL function Progressing     Maintain or return mobility status to optimal level Progressing

## 2017-10-04 NOTE — SIGNIFICANT EVENT
Patient continuued to have chest pain overnight  Night PA informed and placed orders for nitro  Troponin bumped up    Will d/c excersise stress test,   Start heparin drip, morphine prn, nitropaste, cardiology consult  Continue aspirin  Discussed above with nurse and patient

## 2017-10-04 NOTE — CONSULTS
Consultation - Cardiology Team One  Nicky Warren 52 y o  female MRN: [id number]  Unit/Bed#: CW2 218-02 Encounter: [id number]    Inpatient consult to Cardiology  Consult performed by: Kobi Figueroa  Consult ordered by: Soumya Solomon          Physician Requesting Consult: Tahir Guerin MD     Reason for Consult / Principal Problem:  Chest pain    History of Present Illness      HPI: Nicky Warren is a 52y o  year old female who has a history of hypertension, chronic sinusitis currently on Medrol Dosepak, hypothyroidism, GERD, fatty liver  She presented to ED on 10/3 with complaints of chest pain that started just prior to her arrival  She was in her normal state of health prior to yesterday when not eating [employer] [address] she had had [address] prior to developing sudden onset of palpitations, diaphoresis and nausea  She left the restaurant was driving home he did pull over and vomited once she then began to have anterior chest wall pain; initially the pain was very severe and sharp without radiation  Was in the emergency department her pain continued to worsen and became more pressure-like in nature  EKG showed sinus tachycardia with heart rate of 121 without ST elevation or depression  Systolic blood pressure 126  Initial troponin was 0 02  Her pain improved with SL nitroglycerin and paste  Overnight, she continues to have intermittent pain  Anterior chest wall, pressure like in nature without radiation, not positional  No further nausea or vomiting  Her troponin increased to 0 13 this AM; currently 0 11  She is now on IV heparin  Post SL nitroglycerin her pain level is 2/10 currently  She states, "my chest feels heavy"  EKGs were reviewed overnight that showed sinus rhythm without ST elevation or depression  She has remote hx of tobacco abuse  + family hx of CAD in her mother    + hx of DM and HTN in her father's side of family       Pt has hx of HTN, was on lisinopril in past but taken off by her PCP as her BP improved  She was recently diagnosed DM Type 2; no medication, she is attempting to increase exercise and modify her diet; Currently Aic 7 1  Recent lipid panel 8/1/17 showed total cholesterol 224; triglycerides 557, HDL 38; not on treatment  Review of Systems   Constitution: Negative for decreased appetite, fever, weakness and malaise/fatigue  Cardiovascular: Positive for chest pain (chest pressure 2/10) and palpitations  Negative for dyspnea on exertion, irregular heartbeat, leg swelling, near-syncope, orthopnea and syncope  Respiratory: Positive for shortness of breath (occasional with chest pain)  Negative for cough and wheezing  Musculoskeletal: Negative for back pain  Gastrointestinal: Negative for abdominal pain, nausea and vomiting  N/V resolved   Genitourinary: Negative for dysuria  Neurological: Negative for dizziness and light-headedness  Psychiatric/Behavioral: Negative for altered mental status  All other systems reviewed and are negative  Historical Information   Past Medical History:   Diagnosis Date    Disease of thyroid gland     History of stomach ulcers      History reviewed  No pertinent surgical history  History   Alcohol Use No     Comment: occasional     History   Drug Use No     History   Smoking Status    Never Smoker   Smokeless Tobacco    Never Used     Family History: History reviewed  No pertinent family history      Meds/Allergies   current meds:   Current Facility-Administered Medications   Medication Dose Route Frequency    acetaminophen (TYLENOL) tablet 650 mg  650 mg Oral Q4H PRN    aspirin tablet 325 mg  325 mg Oral Daily    heparin (porcine) 25,000 units in 250 mL infusion (premix)  3-20 Units/kg/hr (Order-Specific) Intravenous Titrated    heparin (porcine) injection 2,000 Units  2,000 Units Intravenous PRN    heparin (porcine) injection 4,000 Units  4,000 Units Intravenous PRN    levothyroxine tablet 75 mcg  75 mcg Oral Early Morning    metoprolol tartrate (LOPRESSOR) tablet 25 mg  25 mg Oral Q12H [facility]    morphine injection 1 mg  1 mg Intravenous Q4H PRN    nitroglycerin (NITRO-BID) 2 % TD ointment 1 inch  1 inch Topical BID    ondansetron (ZOFRAN) injection 4 mg  4 mg Intravenous Q6H PRN    pantoprazole (PROTONIX) EC tablet 40 mg  40 mg Oral Daily       heparin (porcine) 3-20 Units/kg/hr (Order-Specific) Last Rate: 12 Units/kg/hr (10/04/17 8162)       Allergies   Allergen Reactions    Sulfa Antibiotics      Objective   Vitals: Blood pressure 167/73, pulse 82, temperature 98 °F (36 7 °C), temperature source Oral, resp  rate 18, height 5' 3" (1 6 m), weight 77 1 kg (170 lb), SpO2 98 %  ,     Body mass index is 30 11 kg/m²  ,     Systolic (42UWL), LCN:367 , Min:136 , UMF:393     Diastolic (59CPY), DPX:45, Min:61, Max:85      Intake/Output Summary (Last 24 hours) at 10/04/17 0939  Last data filed at 10/04/17 0740   Gross per 24 hour   Intake             1480 ml   Output                0 ml   Net             1480 ml     Weight (last 2 days)     Date/Time   Weight    10/03/17 2144  77 1 (170)    10/03/17 1859  77 1 (170)            Invasive Devices     Peripheral Intravenous Line            Peripheral IV 10/03/17 Left Antecubital less than 1 day              Physical Exam   Constitutional: She is oriented to person, place, and time  No distress  Sitting up in bed in NAD   HENT:   Head: Atraumatic  Neck: No JVD present  Cardiovascular: Normal rate, regular rhythm, S1 normal and S2 normal     No murmur heard  No LE edema   Pulmonary/Chest: Effort normal and breath sounds normal  No respiratory distress  She has no wheezes  She has no rales  Abdominal: Soft  Musculoskeletal: She exhibits no edema  Neurological: She is alert and oriented to person, place, and time  Skin: Skin is warm and dry  She is not diaphoretic  Psychiatric: She has a normal mood and affect   Her behavior is normal    Nursing note and vitals reviewed      LABORATORY RESULTS:    Results from last 7 days  Lab Units 10/04/17  0810 10/04/17  0437 10/04/17  0106   TROPONIN I ng/mL 0 11* 0 13* 0 12*     CBC with diff:   Results from last 7 days  Lab Units 10/04/17  0810 10/04/17  0437 10/03/17  1916   WBC Thousand/uL 12 05* 12 19* 15 25*   HEMOGLOBIN g/dL 12 0 11 4* 13 6   HEMATOCRIT % 35 9 34 0* 40 3   MCV fL 90 91 90   PLATELETS Thousands/uL 278 289 332   MCH pg 30 2 30 5 30 4   MCHC g/dL 33 4 33 5 33 7   RDW % 13 3 13 4 13 2   MPV fL 9 0 9 4 9 1   NRBC AUTO /100 WBCs  --   --  0     CMP:  Results from last 7 days  Lab Units 10/04/17  0437 10/03/17  1916   SODIUM mmol/L 138 136   POTASSIUM mmol/L 3 9 3 1*   CHLORIDE mmol/L 107 106   CO2 mmol/L 23 17*   ANION GAP mmol/L 8 13   BUN mg/dL 12 14   CREATININE mg/dL 0 72 0 71   GLUCOSE RANDOM mg/dL 113 220*   CALCIUM mg/dL 8 2* 8 7   AST U/L  --  70*   ALT U/L  --  70   ALK PHOS U/L  --  91   TOTAL PROTEIN g/dL  --  7 8   ALBUMIN g/dL  --  3 7   BILIRUBIN TOTAL mg/dL  --  0 22   EGFR ml/min/1 73sq m 100 102     BMP:  Results from last 7 days  Lab Units 10/04/17  0437 10/03/17  1916   SODIUM mmol/L 138 136   POTASSIUM mmol/L 3 9 3 1*   CHLORIDE mmol/L 107 106   CO2 mmol/L 23 17*   BUN mg/dL 12 14   CREATININE mg/dL 0 72 0 71   GLUCOSE RANDOM mg/dL 113 220*   CALCIUM mg/dL 8 2* 8 7        Results from last 7 days  Lab Units 10/04/17  0437   HEMOGLOBIN A1C % 7 1*        Results from last 7 days  Lab Units 10/04/17  0437   TSH 3RD GENERATON uIU/mL 3 470       Results from last 7 days  Lab Units 10/04/17  0810   INR  1 11     Lipid Profile:   Lab Results   Component Value Date    CHOL 224 (H) 08/01/2017    CHOL 189 08/06/2016    CHOL 210 08/20/2015     Lab Results   Component Value Date    HDL 38 (L) 08/01/2017    HDL 50 08/06/2016    HDL 40 08/20/2015     Lab Results   Component Value Date    LDLCALC  08/01/2017      Comment:      Calculated LDL invalid, triglycerides >400 mg/dl LDLCALC 77 08/06/2016    [address] LDL- unable to 08/20/2015     Lab Results   Component Value Date    TRIG 557 (H) 08/01/2017    TRIG 310 (H) 08/06/2016    TRIG 517 08/20/2015     Imaging: I have personally reviewed pertinent reports  X-ray Chest 2 Views    Result Date: 10/3/2017  Narrative: CHEST INDICATION:  Chest pain COMPARISON:  Chest x-ray dated February 6, 2017  VIEWS:  Frontal and lateral projections IMAGES:  2 FINDINGS:     Cardiomediastinal silhouette appears unremarkable  The lungs are clear  No pneumothorax or pleural effusion  Visualized osseous structures appear within normal limits for the patient's age  Impression: No active pulmonary disease  Workstation performed: [id number]     Us Liver    Result Date: 9/9/2017  Narrative: RIGHT UPPER QUADRANT ULTRASOUND INDICATION:  Elevated LFTs  COMPARISON:  Abdominal ultrasound 7/20/2005 TECHNIQUE:   Real-time ultrasound of the right upper quadrant was performed with a curvilinear transducer with both volumetric sweeps and still imaging techniques  FINDINGS: PANCREAS:  Portions of the pancreas are obscured by bowel gas  Visualized portions of the pancreas are unremarkable  AORTA AND IVC:  Visualized portions are normal for patient age  LIVER: Size:  Within normal range  The liver measures 12 7 cm in the midclavicular line  Contour:  Surface contour is smooth  Parenchyma: There is mild diffuse increased echogenicity with smooth echotexture, without significant beam attenuation or loss of periportal echogenicity  Most consistent with mild hepatic steatosis  No evidence of suspicious mass  Limited imaging of the main portal vein shows it to be patent and hepatopedal   BILIARY: The gallbladder is surgically absent  No intrahepatic biliary dilatation  CBD measures 4 8 mm  No choledocholithiasis  KIDNEY: Right kidney measures 11 x 5 5 cm  Within normal limits  ASCITES:   None  Impression: 1  Hepatic steatosis  2   Status post cholecystectomy   3  Limited visualization of the pancreas secondary to adjacent bowel gas  Workstation performed: [id number]       EKG reviewed personally:  10/3/17  Sinus tachycardia  Rate 121    10/4/17  08:09  Sinus rhythm  No ST or T-wave abnormalities  Rate 86    Telemetry reviewed personally:   Sinus rhythm, no significant events    Assessment  Principal Problem:    Chest pain  Active Problems:    Leukocytosis    Hypertension    Peptic ulcer disease    Fatty liver    Sinus tachycardia    Chronic sinusitis    Hyperglycemia      Assessment/ Plan    NSTEMI: Likely Type 1; troponin peak 0 13  Recommend cardiac catheterization today  Pain control with nitro  Can initiate IV nitroglycerin infusion if needed  - Received ASA, on BB     - Renal function WNL   - Start statin, will need close monitoring of liver function  Check echo  Further plan pending results of catheterization  Hypertension: continue BB; hold off on ACE for now  Mixed HLD: start Lipitor 20mg daily; she has hx of elevated LFTs and fatty liver on recent US  AST currently 70; will need to monitor  Triglycerides > 500; start Tricor 48mg daily  Thank you for allowing us to participate in this patient's care  This pt will follow up with Dr Arnoldo Lynn once discharged  Counseling / Coordination of Care  Total floor / unit time spent today 45 minutes  Greater than 50% of total time was spent with the patient and / or family counseling and / or coordination of care  A description of the counseling / coordination of care: Review of history, current assessment, development of a plan  Code Status: Level 1 - Full Code    ** Please Note: Dragon 360 Dictation voice to text software may have been used in the creation of this document   **

## 2017-10-04 NOTE — MEDICAL STUDENT
MEDICAL STUDENT  Inpatient Progress Note for TRAINING ONLY  Not Part of Legal Medical Record       Progress Note - Yi Frost 52 y o  female MRN: [id number]    Unit/Bed#: -01 Encounter: [id number]      Assessment:  51 y/o female with chest pain  Plan:  1  Chest Pain  - Troponin peaked at 0 13, most recently 0 11   12 lead without ischemic changes  - Likely NSTEMI Type 1    - Pain improving    - Cardiac cath today did not show any occlusion   - Echo results pending   - D-dimer negative  - Repeat 12-lead if pain worsens/patient develops tachycardia  - Continue telemetry  2  Hyperlipidemia  - On 8/1/2017, cholesterol 224, triglycerides 557, HDL 38, LDL unable to be calculated due to very high triglycerides  - Recently started on fish oil by physician, will be starting atorvastatin 20 mg daily with dinner, fenofibrate 48 mg daily  Will need close monitoring of LFTs due to fatty liver disease  3  Hypothyroidism  - TSH 3 470  - Continue levothyroxine  4  T2DM  - Will start patient on Metformin 500 mg BID    - A1C 7 1    - Encourage continued diet/exercise  5  Leukocytosis  - 12 05 today, no obvious source of infection, down from 15 25 yesterday  - Possibly due to being on medrol dose pack due to chronic sinusitis  Afebrile  6  Hypokalemia  - Resolved  Will follow  7  History Gastric Ulcers  - Continue Protonix  Will likely be able to discontinue as patient is no longer taking NSAIDs which caused this several years ago  Subjective:   Patient reports she is feeling better, but still has 3/10 chest pressure on the left side of her chest, and it feels like she has to try harder to take a full, deep breath  States pain is much improved to what it was before, when pain was previously worsened by tachycardia (reports heart rate was in the 150s-160s when her pain was at its worst)  Denies any abdominal pain, nausea/vomiting, or leg swelling at this time        Objective: Vitals: Blood pressure 132/80, pulse 78, temperature 98 °F (36 7 °C), temperature source Oral, resp  rate 18, height 5' 3" (1 6 m), weight 77 1 kg (170 lb), SpO2 98 %  ,Body mass index is 30 11 kg/m²  Intake/Output Summary (Last 24 hours) at 10/04/17 1446  Last data filed at 10/04/17 1250   Gross per 24 hour   Intake          1693 33 ml   Output                0 ml   Net          1693 33 ml       Physical Exam:   Physical Exam   Constitutional: She is oriented to person, place, and time  She appears well-developed and well-nourished  No distress  HENT:   Head: Normocephalic  Neck: No JVD present  No tracheal deviation present  Cardiovascular: Normal rate, regular rhythm, normal heart sounds and intact distal pulses  Exam reveals no gallop and no friction rub  No murmur heard  Pulmonary/Chest: Effort normal and breath sounds normal  No stridor  No respiratory distress  She has no wheezes  She has no rales  Abdominal: Soft  Bowel sounds are normal  She exhibits no distension  There is no tenderness  There is no rebound and no guarding  Musculoskeletal: Normal range of motion  She exhibits no edema  Neurological: She is alert and oriented to person, place, and time  Skin: Skin is warm and dry  No rash noted  She is not diaphoretic  No erythema  No pallor  Psychiatric: She has a normal mood and affect  Her behavior is normal  Judgment and thought content normal           Invasive Devices     Peripheral Intravenous Line            Peripheral IV 10/03/17 Left Antecubital less than 1 day                Lab, Imaging and other studies: I have personally reviewed pertinent reports      VTE Pharmacologic Prophylaxis: Heparin  VTE Mechanical Prophylaxis: sequential compression device

## 2017-10-04 NOTE — PROGRESS NOTES
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

## 2017-10-04 NOTE — PROGRESS NOTES
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  Heparin gtt stopped  Cardiology foll  Echo done - pending read  Pt still complaining of chest pain - possibly musculoskeletal  Will continue tele    Leukocytosis:  Non specific - no e/o infection  Monitor    H/p PUD:  Cont protonix  No e/o GI bleeding for now  Hb stable    Hyperlipidemia:  Cont statin and fenofibrate      VTE Pharmacologic Prophylaxis:   Pharmacologic: Enoxaparin (Lovenox)  Mechanical VTE Prophylaxis in Place: Yes    Patient Centered Rounds: I have performed bedside rounds with nursing staff today  Discussions with Specialists or Other Care Team Provider: cardio    Education and Discussions with Family / Patient: patient and  at bedside    Time Spent for Care: 30 minutes  More than 50% of total time spent on counseling and coordination of care as described above  Current Length of Stay: 0 day(s)    Current Patient Status: Inpatient   Certification Statement: The patient will continue to require additional inpatient hospital stay due to chest pain    Discharge Plan / Estimated Discharge Date: possibly tomorrow    Code Status: Level 1 - Full Code      Subjective:   Pt seen and examined by me this after noon after the [address]  Pt complained of  Continued chest pain- left sided - heaviness - 3-4/10  Also complains of occasional palpitations      Objective:     Vitals:   Temp (24hrs), Av 5 °F (36 4 °C), Min:96 6 °F (35 9 °C), Max:98 °F (36 7 °C)    HR:  [] 80  Resp:  [18-20] 18  BP: (117-176)/(61-85) 150/82  SpO2:  [94 %-98 %] 96 %  Body mass index is 30 11 kg/m²  Input and Output Summary (last 24 hours): Intake/Output Summary (Last 24 hours) at 10/04/17 1510  Last data filed at 10/04/17 1250   Gross per 24 hour   Intake          2004 33 ml   Output                0 ml   Net          2004 33 ml       Physical Exam:     Physical Exam    Constitutional: Pt appears well-developed and well-nourished  Not in any acute distress  HENT:   Head: Normocephalic and atraumatic  Eyes: EOM are normal    Neck: Neck supple  Cardiovascular: Normal rate, regular rhythm, normal heart sounds and intact distal pulses  Exam reveals no gallop and no friction rub  No murmur heard  Pulmonary/Chest: Effort normal and breath sounds normal  No respiratory distress  Pt has no wheezes or rales  Abdominal: Soft  Bowel sounds are normal    Musculoskeletal: Normal range of motion  Neurological: alert and oriented to person, place, and time  Normal strength and sensations  Psychiatric: normal mood and affect  Additional Data:     Labs:      Results from last 7 days  Lab Units 10/04/17  0810  10/03/17  1916   WBC Thousand/uL 12 05*  < > 15 25*   HEMOGLOBIN g/dL 12 0  < > 13 6   HEMATOCRIT % 35 9  < > 40 3   PLATELETS Thousands/uL 278  < > 332   NEUTROS PCT %  --   --  62   LYMPHS PCT %  --   --  29   MONOS PCT %  --   --  5   EOS PCT %  --   --  3   < > = values in this interval not displayed      Results from last 7 days  Lab Units 10/04/17  0437 10/03/17  1916   SODIUM mmol/L 138 136   POTASSIUM mmol/L 3 9 3 1*   CHLORIDE mmol/L 107 106   CO2 mmol/L 23 17*   BUN mg/dL 12 14   CREATININE mg/dL 0 72 0 71   CALCIUM mg/dL 8 2* 8 7   TOTAL PROTEIN g/dL  --  7 8   BILIRUBIN TOTAL mg/dL  --  0 22   ALK PHOS U/L  --  91   ALT U/L  --  70   AST U/L  --  70*   GLUCOSE RANDOM mg/dL 113 220*       Results from last 7 days  Lab Units 10/04/17  0810   INR  1 11       * I Have Reviewed All Lab Data Listed Above  * Additional Pertinent Lab Tests Reviewed: [facility] Admission Reviewed    Imaging:    Imaging Reports Reviewed Today Include:   Imaging Personally Reviewed by Myself Includes:      Recent Cultures (last 7 days):           Last 24 Hours Medication List:     atorvastatin 20 mg Oral Daily With Dinner   fenofibrate 48 mg Oral Daily   levothyroxine 75 mcg Oral Early Morning   metoprolol tartrate 25 mg Oral Q12H [address]   pantoprazole 40 mg Oral Daily        Today, Patient Was Seen By: Sunshine Giron MD    ** Please Note: This note has been constructed using a voice recognition system   **

## 2017-10-04 NOTE — SOCIAL WORK
CM met with patient,explained CM role with introduction  Patient lives with  and 2 teenage children in [address] [facility] with basement with 2 [address] and full flight of stairs up to 2nd floor and down to basement  Patient independent PTA, including driving and working in [employer] at   [facility]  Patientahs no prior DME, HHC or inpatient rehab experience  Patient has a prescription plan and uses [address]  Patient has no POA, denies MH or drug and alcohol treatment  PCP is Dr Ottoniel Caldwell  Primary  is patient's  Madonna Emmanuel [phone]  CM reviewed d/c planning process including the following: identifying help at home, patient preference for d/c planning needs, Discharge Lounge, [facility] Meds to Bed program, availability of treatment team to discuss questions or concerns patient and/or family may have regarding understanding medications and recognizing signs and symptoms once discharged  CM also encouraged patient to follow up with all recommended appointments after discharge  Patient advised of importance for patient and family to participate in managing patients medical well being  CM will follow for discharge needs

## 2017-10-04 NOTE — H&P
History and Physical - [facility] Internal Medicine    Patient Information: Nicky Warren 52 y o  female MRN: [id number]  Unit/Bed#: [facility] Encounter: [id number]  Admitting Physician: Tahir Guerin MD  PCP: Valdo Souza DO  Date of Admission:  10/03/17    Assessment/Plan:    Hospital Problem List:     Principal Problem:    Chest pain  Active Problems:    Leukocytosis    Hypertension    Peptic ulcer disease    Fatty liver    Sinus tachycardia      Chest pain rule out acute coronary syndrome  So far workup is unremarkable  EKG shows sinus tachycardia  Troponin negative ( <0 02)  Will continue to trend troponin  Nitroglycerin as needed  Continue aspirin  Will get an exercise stress test    Sinus tachycardia  Could be related to cardiac versus anxiety versus dehydration versus steroid use versus thyroid abnormality  Monitor heart rate  Check TSH level    Leukocytosis  Likely due to steroids  Patient a febrile    Chronic sinusitis  Patient prescribed Medrol Dosepak  On her last dose of prednisone    Hyperglycemia  Likely due to steroid  Patient states she is prediabetic  Will get a hemoglobin A1c    Hypothyroidism  Continue levothyroxine  Check TSH level in view of sinus tachycardia    Hypokalemia:  Replete prn    Hx of GERD/PUD  Continue PPI    VTE Prophylaxis: Enoxaparin (Lovenox)  / sequential compression device   Code Status:  Full code  POLST: POLST form is not discussed and not completed at this time  Anticipated Length of Stay:  Patient will be admitted on an Observation basis with an anticipated length of stay of  < 2 midnights  Justification for Hospital Stay:  Rule out acute coronary syndrome    Total Time for Visit, including Counseling / Coordination of Care: 45 minutes  Greater than 50% of this total time spent on direct patient counseling and coordination of care  Chief Complaint:   Chest pain, palpitations      History of Present Illness:    Nicky Warren is a 52 y o  female who presents with chest pain and palpitations  Patient's symptoms started just prior to arrival to the emergency room  She had been at [facility] and she had drunk some [place]  She states she felt her heart beating fast, she was diaphoretic, short of breath and she had chest pain across her entire anterior chest wall  Chest pain was about 10/10 in intensity  Chest pain was not related to exertion  She states arrival herself home on the way she vomited once  She had some associated right arm discomfort  She had been on steroids for some days now,  her last does was today she was taking it for her acute on chronic bronchitis  Review of Systems:    Review of Systems   Constitutional: Positive for diaphoresis  Respiratory: Positive for shortness of breath  Cardiovascular: Positive for chest pain  Gastrointestinal: Positive for nausea and vomiting  All other systems reviewed and are negative  Past Medical and Surgical History:     Past Medical History:   Diagnosis Date    Disease of thyroid gland     History of stomach ulcers        History reviewed  No pertinent surgical history  Meds/Allergies:    Prior to Admission medications    Medication Sig Start Date End Date Taking? Authorizing Provider   levothyroxine 75 mcg tablet Take 75 mcg by mouth daily   Yes Historical Provider, MD   pantoprazole (PROTONIX) 40 mg tablet Take 40 mg by mouth daily   Yes Historical Provider, MD     I have reviewed home medications with patient personally  Allergies:    Allergies   Allergen Reactions    Sulfa Antibiotics        Social History:     Marital Status: /Civil Union   Occupation:   Patient Pre-hospital Living Situation:   Patient Pre-hospital Level of Mobility:   Patient Pre-hospital Diet Restrictions:   Substance Use History:   History   Alcohol Use No     Comment: occasional     History   Smoking Status    Never Smoker   Smokeless Tobacco    Never Used     History   Drug Use No       Family History:    History reviewed  No pertinent family history  Physical Exam:     Vitals:   Blood Pressure: 144/76 (10/03/17 2030)  Pulse: (!) 106 (10/03/17 2030)  Temperature: (!) 96 6 °F (35 9 °C) (10/03/17 1859)  Temp Source: Tympanic (10/03/17 1859)  Respirations: 20 (10/03/17 2030)  Weight - Scale: 77 1 kg (170 lb) (10/03/17 1859)  SpO2: 95 % (10/03/17 2030)    Physical Exam   Constitutional: She is oriented to person, place, and time  She appears well-developed and well-nourished  overweight   HENT:   Head: Normocephalic and atraumatic  Eyes: Conjunctivae and EOM are normal  Pupils are equal, round, and reactive to light  Neck: Normal range of motion  Neck supple  Cardiovascular: Normal rate, regular rhythm and normal heart sounds  Pulmonary/Chest: Effort normal and breath sounds normal  She exhibits no tenderness  Abdominal: Soft  Bowel sounds are normal    obese   Musculoskeletal: Normal range of motion  Neurological: She is alert and oriented to person, place, and time  She has normal reflexes  Skin: Skin is warm and dry  Psychiatric: She has a normal mood and affect  Her behavior is normal  Judgment and thought content normal    Vitals reviewed  Additional Data:     Lab Results: I have personally reviewed pertinent reports  Results from last 7 days  Lab Units 10/03/17  1916   WBC Thousand/uL 15 25*   HEMOGLOBIN g/dL 13 6   HEMATOCRIT % 40 3   PLATELETS Thousands/uL 332   NEUTROS PCT % 62   LYMPHS PCT % 29   MONOS PCT % 5   EOS PCT % 3       Results from last 7 days  Lab Units 10/03/17  1916   SODIUM mmol/L 136   POTASSIUM mmol/L 3 1*   CHLORIDE mmol/L 106   CO2 mmol/L 17*   BUN mg/dL 14   CREATININE mg/dL 0 71   CALCIUM mg/dL 8 7   TOTAL PROTEIN g/dL 7 8   BILIRUBIN TOTAL mg/dL 0 22   ALK PHOS U/L 91   ALT U/L 70   AST U/L 70*   GLUCOSE RANDOM mg/dL 220*           Imaging: I have personally reviewed pertinent reports        X-ray Chest 2 Views    Result Date: 10/3/2017  Narrative: CHEST INDICATION:  Chest pain COMPARISON:  Chest x-ray dated February 6, 2017  VIEWS:  Frontal and lateral projections IMAGES:  2 FINDINGS:     Cardiomediastinal silhouette appears unremarkable  The lungs are clear  No pneumothorax or pleural effusion  Visualized osseous structures appear within normal limits for the patient's age  Impression: No active pulmonary disease  Workstation performed: [id number]     Us Liver    Result Date: 9/9/2017  Narrative: RIGHT UPPER QUADRANT ULTRASOUND INDICATION:  Elevated LFTs  COMPARISON:  Abdominal ultrasound 7/20/2005 TECHNIQUE:   Real-time ultrasound of the right upper quadrant was performed with a curvilinear transducer with both volumetric sweeps and still imaging techniques  FINDINGS: PANCREAS:  Portions of the pancreas are obscured by bowel gas  Visualized portions of the pancreas are unremarkable  AORTA AND IVC:  Visualized portions are normal for patient age  LIVER: Size:  Within normal range  The liver measures 12 7 cm in the midclavicular line  Contour:  Surface contour is smooth  Parenchyma: There is mild diffuse increased echogenicity with smooth echotexture, without significant beam attenuation or loss of periportal echogenicity  Most consistent with mild hepatic steatosis  No evidence of suspicious mass  Limited imaging of the main portal vein shows it to be patent and hepatopedal   BILIARY: The gallbladder is surgically absent  No intrahepatic biliary dilatation  CBD measures 4 8 mm  No choledocholithiasis  KIDNEY: Right kidney measures 11 x 5 5 cm  Within normal limits  ASCITES:   None  Impression: 1  Hepatic steatosis  2   Status post cholecystectomy  3   Limited visualization of the pancreas secondary to adjacent bowel gas   Workstation performed: [id number]       EKG, Pathology, and Other Studies Reviewed on Admission:   · EKG:sinus tachycardia    Allscripts/EPIC Records Reviewed: Yes     ** Please Note: Dragon 360 Dictation voice to text software may have been used in the creation of this document   **

## 2017-10-04 NOTE — PROGRESS NOTES
Pt c/o non radiating chest pain, /73 & hr 82, Rocio LUJAN with [facility] paged & advised to call cardio to assess for interference with stress test  MD Pema Smith advised to give NTG and consult Nuc Med to inform of chest pain

## 2017-10-05 ENCOUNTER — GENERIC CONVERSION - ENCOUNTER (OUTPATIENT)
Dept: OTHER | Facility: OTHER | Age: 47
End: 2017-10-05

## 2017-10-05 VITALS
TEMPERATURE: 98.2 F | BODY MASS INDEX: 30.12 KG/M2 | HEART RATE: 72 BPM | WEIGHT: 170 LBS | SYSTOLIC BLOOD PRESSURE: 123 MMHG | HEIGHT: 63 IN | DIASTOLIC BLOOD PRESSURE: 65 MMHG | RESPIRATION RATE: 18 BRPM | OXYGEN SATURATION: 95 %

## 2017-10-05 LAB
ANION GAP SERPL CALCULATED.3IONS-SCNC: 8 MMOL/L (ref 4–13)
ATRIAL RATE: 82 BPM
BASOPHILS # BLD AUTO: 0.11 THOUSANDS/ΜL (ref 0–0.1)
BASOPHILS NFR BLD AUTO: 1 % (ref 0–1)
BUN SERPL-MCNC: 12 MG/DL (ref 5–25)
CALCIUM SERPL-MCNC: 8.2 MG/DL (ref 8.3–10.1)
CHLORIDE SERPL-SCNC: 106 MMOL/L (ref 100–108)
CO2 SERPL-SCNC: 24 MMOL/L (ref 21–32)
CREAT SERPL-MCNC: 0.72 MG/DL (ref 0.6–1.3)
EOSINOPHIL # BLD AUTO: 0.65 THOUSAND/ΜL (ref 0–0.61)
EOSINOPHIL NFR BLD AUTO: 7 % (ref 0–6)
ERYTHROCYTE [DISTWIDTH] IN BLOOD BY AUTOMATED COUNT: 13.5 % (ref 11.6–15.1)
GFR SERPL CREATININE-BSD FRML MDRD: 100 ML/MIN/1.73SQ M
GLUCOSE SERPL-MCNC: 120 MG/DL (ref 65–140)
HCT VFR BLD AUTO: 36.5 % (ref 34.8–46.1)
HGB BLD-MCNC: 12.5 G/DL (ref 11.5–15.4)
LYMPHOCYTES # BLD AUTO: 3.12 THOUSANDS/ΜL (ref 0.6–4.47)
LYMPHOCYTES NFR BLD AUTO: 35 % (ref 14–44)
MCH RBC QN AUTO: 30.9 PG (ref 26.8–34.3)
MCHC RBC AUTO-ENTMCNC: 34.2 G/DL (ref 31.4–37.4)
MCV RBC AUTO: 90 FL (ref 82–98)
MONOCYTES # BLD AUTO: 0.8 THOUSAND/ΜL (ref 0.17–1.22)
MONOCYTES NFR BLD AUTO: 9 % (ref 4–12)
NEUTROPHILS # BLD AUTO: 4.2 THOUSANDS/ΜL (ref 1.85–7.62)
NEUTS SEG NFR BLD AUTO: 48 % (ref 43–75)
NRBC BLD AUTO-RTO: 0 /100 WBCS
P AXIS: 40 DEGREES
PLATELET # BLD AUTO: 267 THOUSANDS/UL (ref 149–390)
PMV BLD AUTO: 8.8 FL (ref 8.9–12.7)
POTASSIUM SERPL-SCNC: 3.9 MMOL/L (ref 3.5–5.3)
PR INTERVAL: 142 MS
QRS AXIS: 4 DEGREES
QRSD INTERVAL: 94 MS
QT INTERVAL: 394 MS
QTC INTERVAL: 460 MS
RBC # BLD AUTO: 4.05 MILLION/UL (ref 3.81–5.12)
SODIUM SERPL-SCNC: 138 MMOL/L (ref 136–145)
T WAVE AXIS: 29 DEGREES
VENTRICULAR RATE: 82 BPM
WBC # BLD AUTO: 8.9 THOUSAND/UL (ref 4.31–10.16)

## 2017-10-05 PROCEDURE — 85025 COMPLETE CBC W/AUTO DIFF WBC: CPT | Performed by: NURSE PRACTITIONER

## 2017-10-05 PROCEDURE — 93005 ELECTROCARDIOGRAM TRACING: CPT | Performed by: PHYSICIAN ASSISTANT

## 2017-10-05 PROCEDURE — 80048 BASIC METABOLIC PNL TOTAL CA: CPT | Performed by: NURSE PRACTITIONER

## 2017-10-05 RX ORDER — NITROGLYCERIN 0.4 MG/1
0.4 TABLET SUBLINGUAL
Qty: 90 TABLET | Refills: 0
Start: 2017-10-05 | End: 2020-08-27 | Stop reason: SDUPTHER

## 2017-10-05 RX ORDER — FENOFIBRATE 48 MG/1
48 TABLET, COATED ORAL DAILY
Refills: 0
Start: 2017-10-06 | End: 2018-02-08 | Stop reason: SDUPTHER

## 2017-10-05 RX ADMIN — FENOFIBRATE 48 MG: 48 TABLET ORAL at 08:45

## 2017-10-05 RX ADMIN — LEVOTHYROXINE SODIUM 75 MCG: 75 TABLET ORAL at 05:29

## 2017-10-05 RX ADMIN — METOPROLOL TARTRATE 25 MG: 25 TABLET ORAL at 08:45

## 2017-10-05 RX ADMIN — MORPHINE SULFATE 1 MG: 2 INJECTION, SOLUTION INTRAMUSCULAR; INTRAVENOUS at 09:52

## 2017-10-05 RX ADMIN — NITROGLYCERIN 0.4 MG: 0.4 TABLET SUBLINGUAL at 09:38

## 2017-10-05 RX ADMIN — NITROGLYCERIN 0.4 MG: 0.4 TABLET SUBLINGUAL at 09:52

## 2017-10-05 RX ADMIN — PANTOPRAZOLE SODIUM 40 MG: 40 TABLET, DELAYED RELEASE ORAL at 08:45

## 2017-10-05 NOTE — DISCHARGE INSTRUCTIONS
1  Please see the post cardiac catheterization/ angioseal closure device instructions  No heavy lifting, greater than 10 lbs  or strenuous  activity for 48 hrs  See the post cardiac catheterization/ angioseal closure device instructions  2 Remove band aid tomorrow  Shower and wash area-wrist gently with soap and water- beginning tomorrow  Rinse and pat dry  Apply new water seal band aid  Repeat this process for 5 days  No powders, creams lotions or antibiotic ointments  for 5 days  No tub baths, hot tubs or swimming for 5 days  3 No driving for 3 days     Please call and schedule appointment with cardiology  Please follow up with PCP 1-2 weeks  Please stop statin and start tricor  Please take verapamil daily with prn nitro for chest pain  Please seek medical attention if chest pain dose not resolve after 3 doses of nitro  You can trial protonix twice daily dosing for 2 weeks to see if this also helps relieve your chest pain  Follow up with GI as outpatient for workup for esophageal spasm if your chest pain does not resolve after starting verapamil  Please seek medical attention immediately if yopu experience chest pain, shortness of breath, palpitations ,fever greater than 101 5 deg F, unilateral weakness, facial droop  Chest Pain   WHAT YOU NEED TO KNOW:   Chest pain can be caused by a range of conditions, from not serious to life-threatening  Chest pain can be a symptom of a digestive problem, such as acid reflux or a stomach ulcer  An anxiety attack or a strong emotion, such as anger, can also cause chest pain  Infection, inflammation, or a fracture in the bones or cartilage in your chest can cause pain or discomfort  Sometimes chest pain or pressure is caused by poor blood flow to your heart (angina)  Chest pain may also be caused by life-threatening conditions such as a heart attack or blood clot in your lungs     DISCHARGE INSTRUCTIONS:   Call 911 if:   · You have any of the following signs of a heart attack:    ? Squeezing, pressure, or pain in your chest that lasts longer than 5 minutes or returns   ? Discomfort or pain in your back, neck, jaw, stomach, or arm    ? Trouble breathing   ? Nausea or vomiting   ? Lightheadedness or a sudden cold sweat, especially with chest pain or trouble breathing     Seek care immediately if:   · You have chest discomfort that gets worse, even with medicine  · You cough or vomit blood  · Your bowel movements are black or bloody  · You cannot stop vomiting, or it hurts to swallow  Contact your healthcare provider if:   · You have questions or concerns about your condition or care  Medicines:   · Medicines may be given to treat the cause of your chest pain  Examples include pain medicine, anxiety medicine, or medicines to increase blood flow to your heart  · Do not take certain medicines without asking your healthcare provider first  These include NSAIDs, herbal or vitamin supplements, or hormones (estrogen or progestin)  · Take your medicine as directed  Contact your healthcare provider if you think your medicine is not helping or if you have side effects  Tell him or her if you are allergic to any medicine  Keep a list of the medicines, vitamins, and herbs you take  Include the amounts, and when and why you take them  Bring the list or the pill bottles to follow-up visits  Carry your medicine list with you in case of an emergency  Follow up with your healthcare provider within 72 hours, or as directed: You may need to return for more tests to find the cause of your chest pain  You may be referred to a specialist, such as a cardiologist or gastroenterologist  Write down your questions so you remember to ask them during your visits  Healthy living tips: The following are general healthy guidelines  If your chest pain is caused by a heart problem, your healthcare provider will give you specific guidelines to follow  · Do not smoke  Nicotine and other chemicals in cigarettes and cigars can cause lung and heart damage  Ask your healthcare provider for information if you currently smoke and need help to quit  E-cigarettes or smokeless tobacco still contain nicotine  Talk to your healthcare provider before you use these products  · Eat a variety of healthy, low-fat foods  Healthy foods include fruits, vegetables, whole-grain breads, low-fat dairy products, beans, lean meats, and fish  Ask for more information about a heart healthy diet  · Ask about activity  Your healthcare provider will tell you which activities to limit or avoid  Ask when you can drive, return to work, and have sex  Ask about the best exercise plan for you  · Maintain a healthy weight  Ask your healthcare provider how much you should weigh  Ask him or her to help you create a weight loss plan if you are overweight  © 2017 [employer]  Information is for End User's use only and may not be sold, redistributed or otherwise used for commercial purposes  All illustrations and images included in CareNotes® are the copyrighted property of [employer]  or [address]  The above information is an  only  It is not intended as medical advice for individual conditions or treatments  Talk to your doctor, nurse or pharmacist before following any medical regimen to see if it is safe and effective for you        Esophageal Spasm   WHAT YOU NEED TO KNOW:   What is esophageal spasm? Esophageal spasm is a sudden, painful tightening of your lower esophagus  Your esophagus is the tube that food and liquids pass through from your mouth to your stomach  What causes esophageal spasm? The cause of esophageal spasm is not clear  It may be caused by problems with the nerves that control how your esophagus moves when you swallow   Esophageal spasm may be common among family members   Foods that are too hot or too cold may increase how often your esophagus spasms  Spasms may also happen on their own  What are the signs and symptoms of esophageal spasm? You may have any of the following:  · Trouble when you swallow: Food may get stuck in your esophagus  · Chest pain: You may have chest pain or discomfort that starts behind your sternum (breastbone)  The pain may spread to your arms, jaw, or back  It may be mild or severe  It may also worsen when you eat  · Heartburn: This is a burning feeling in your chest or throat caused by stomach acid that rises into your throat  This may leave a bitter taste in your mouth, and it may be worse after meals or when you lie down  How is esophageal spasm diagnosed? You may receive the following tests:  · Manometry: Your healthcare provider will gently insert a tube into your throat and down into your stomach  The tube has sensors on it that measure the pressure in your esophagus  This pressure shows the strength of the spasms when you swallow  The test also shows how well food and fluids move down your esophagus when you swallow  · Endoscopy: Your healthcare provider will gently place a scope (long tube with a small camera on the end) into your throat to check for problems with the shape of your esophagus  Your healthcare provider may also check the thickness of your esophagus  Samples of your esophagus tissue may be taken and sent to a lab for tests  · X-ray with barium swallow: An x-ray of your abdomen is a picture of your stomach and esophagus  You will drink a thick liquid called barium to help your esophagus and stomach show up better on the x-ray  Follow the instructions from your healthcare provider before and after the x-ray test   How is esophageal spasm treated? With treatment, your spasms, pain, and trouble swallowing may improve  Ask your healthcare provider for more information about these and other treatments for esophageal spasms:  · Medicine:    ? Pain medicine:  This medicine helps take away or decrease pain caused by the spasms  ? Smooth muscle relaxants: This medicine may help your muscles and esophagus relax so it is easier for you to swallow  It may also decrease your pain and trouble swallowing  ? Proton pump inhibitors: This medicine may help reduce stomach acid and prevent heartburn  ? Botulinum toxin injections: This medicine is given as shots into your esophagus to relax the muscles  Your healthcare provider may use a scope as a guide for the injections      · Surgery: You may need surgery if other treatments do not improve your symptoms  ? Dilatation: Dilators to widen your esophagus are gently inserted through a scope into your esophagus  ? Myotomy: Muscles in your esophagus are cut to widen the area and allow food and liquids to move into the stomach more easily  What other treatments may my healthcare provider suggest? Ask for more information about the following:  · Biofeedback: Biofeedback is a type of therapy that helps you control how your body reacts to stress or pain  Your healthcare provider will use electrodes (wires) on different parts of your body, such as your chest, to monitor your body responses  This may help you learn ways to reduce your pain or spasms  · Relaxation therapy: Stress may cause pain, lead to illness, and slow healing  Relaxation therapy teaches you how to feel less physical and emotional stress  Deep breathing, muscle relaxation, and music are some forms of relaxation therapy  When should I contact my healthcare provider? · Your symptoms do not improve even with treatment  · You have severe pain when you swallow  · You lose weight without trying  · You have questions about your condition or care  When should I seek immediate care or call 911? · You are drooling or have trouble swallowing   · You are choking, gagging, or vomiting  · You have pain when you swallow  · You have new or worse chest pain and shortness of breath    CARE AGREEMENT:   You have the right to help plan your care  Learn about your health condition and how it may be treated  Discuss treatment options with your caregivers to decide what care you want to receive  You always have the right to refuse treatment  The above information is an  only  It is not intended as medical advice for individual conditions or treatments  Talk to your doctor, nurse or pharmacist before following any medical regimen to see if it is safe and effective for you  © 2017 [facility] Information is for End User's use only and may not be sold, redistributed or otherwise used for commercial purposes  All illustrations and images included in CareNotes® are the copyrighted property of [employer]  or [employer] Pose[employer]

## 2017-10-05 NOTE — DISCHARGE SUMMARY
Discharge Summary - [facility] Internal Medicine    Patient Information: Joanne Pickering 52 y o  female MRN: [id number]  Unit/Bed#: -01 Encounter: [id number]    Discharging Physician / Practitioner: Madeline Pena PA-C  PCP: Keri Ellis DO  Admission Date: 10/3/2017  Discharge Date: 10/05/17    Reason for Admission: chest pain and palpitations     Discharge Diagnoses:     Principal Problem:    Chest pain  Active Problems:    Leukocytosis    Hypertension    Peptic ulcer disease    Fatty liver    Sinus tachycardia    Chronic sinusitis    Hyperglycemia  Resolved Problems:    * No resolved hospital problems  *      Consultations During Hospital Stay:  · Cardiology     Procedures Performed:     · Cardiac cath no significant stenosis but 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  Significant Findings / Test Results:     · Chest pain, elevated troponins 0 02/0 11/0 12/0 13/0 11/0 07/0 07  · NSTEMI type II   · Cardiac negative for obstructive coronary artery disease  · Sinus tachycardia  · Leukocytosis  · Hyperglycemia, hemoglobin A1c 7 1%  · TSH 3 470    Incidental Findings:   ·  None    Test Results Pending at Discharge (will require follow up): · None     Outpatient Tests Requested:  · The patient follow-up with PCP, Cardiology, GI    New medications upon discharge:  · Verapamil   · tricor   · D/c statin     Complications:  None    Hospital Course:     Joanne Pickering is a 52 y o  female patient with past medical history significant for chronic sinusitis, prediabetes, hypothyroidism, GERD, who originally presented to the hospital on 10/3/2017 due to chest pain and palpitations  Patient states her symptoms began after drinking alcoholic beverage  She became diaphoretic, short of breath and chest pain persisted across her entire anterior chest wall and was 10/10 in intensity  Patient was admitted to rule out acute coronary syndrome    Patient found to have elevated troponins, likely NSTEMI I vs II and Cardiology was consulted and patient was started on heparin drip  There were no EKG changes however  Patient underwent cardiac catheterization which was negative for obstructive/nonobstructive coronary artery disease but 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, which per cardiology is unlikely to cause her symptoms  Patient had recurrent episodes of chest pain during her hospitalization  Chest pain episodes resolved with p r n  nitroglycerin and morphine  This was discussed with Cardiology on the day of discharge, and it is possible that patient's palpitations and chest discomfort are related to coronary vasospasms and she was started on verapamil  Another etiology of the patient's symptoms could be an esophageal spasm and patient was encouraged to follow up with GI and Cardiology as an outpatient  Patient expressed understanding and agrees with plan for discharge home with close outpatient follow up  Condition at Discharge: stable     Discharge Day Visit / Exam:     Subjective:  Patient seen and examined at bedside  Had chest pain this morning that was described as heaviness and squeezing sensation left the only few minutes associated with some shortness of breath  This occurred at rest and also occurred last night  Patient was given morphine and nitroglycerin and symptoms improved  EKGs have been unchanged  Patient feels much better now, denies nausea, vomiting, diarrhea, GERD/acid reflux, pain or discomfort in her chest or trouble breathing  She wishes to go home  Vitals: Blood Pressure: 123/65 (10/05/17 1000)  Pulse: 72 (10/05/17 1000)  Temperature: 98 2 °F (36 8 °C) (10/05/17 0731)  Temp Source: Oral (10/05/17 0731)  Respirations: 18 (10/05/17 0731)  Height: 5' 3" (160 cm) (10/03/17 2144)  Weight - Scale: 77 1 kg (170 lb) (10/03/17 2144)  SpO2: 95 % (10/05/17 0731)    Exam:   Physical Exam   Constitutional: She is oriented to person, place, and time   She appears well-developed and well-nourished  No distress  HENT:   Head: Normocephalic and atraumatic  Mouth/Throat: Oropharynx is clear and moist    Eyes: EOM are normal  No scleral icterus  Neck: Normal range of motion  Neck supple  Cardiovascular: Normal rate, regular rhythm and normal heart sounds  No murmur heard  Pulmonary/Chest: Effort normal and breath sounds normal  She exhibits no tenderness  Abdominal: Soft  Bowel sounds are normal    Musculoskeletal: Normal range of motion  She exhibits no edema  Neurological: She is alert and oriented to person, place, and time  Skin: Skin is warm and dry  Psychiatric: She has a normal mood and affect  Her behavior is normal    Nursing note and vitals reviewed  Discussion with Family: family at bedside     Discharge instructions/Information to patient and family:   See after visit summary for information provided to patient and family  Provisions for Follow-Up Care:  See after visit summary for information related to follow-up care and any pertinent home health orders  Disposition:     Home    For Discharges to [facility] SNF:   · Not Applicable to this Patient - Not Applicable to this Patient    Planned Readmission: none     Discharge Statement:  I spent 35 minutes discharging the patient  This time was spent on the day of discharge  I had direct contact with the patient on the day of discharge  Greater than 50% of the total time was spent examining patient, answering all patient questions, arranging and discussing plan of care with patient as well as directly providing post-discharge instructions  Additional time then spent on discharge activities  Discharge Medications:  See after visit summary for reconciled discharge medications provided to patient and family        ** Please Note: This note has been constructed using a voice recognition system **

## 2017-10-05 NOTE — PROGRESS NOTES
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 Esophageal spasm, GI etiology - consider work-up  3   ? Coronary spasm - change metoprolol to verapamil upon discharge  4  Continue Tricor - can stop statin  5  Follow-up in office - patient will call for appointment  6  Stable for discharge from cardiac perspective, discussed with SLIM    Subjective:   Patient seen and examined  No significant events overnight  Objective:     Vitals: Blood pressure 123/65, pulse 72, temperature 98 2 °F (36 8 °C), temperature source Oral, resp  rate 18, height 5' 3" (1 6 m), weight 77 1 kg (170 lb), SpO2 95 %  , Body mass index is 30 11 kg/m² , Orthostatic Blood Pressures    Flowsheet Row Most Recent Value   Blood Pressure  123/65 filed at 10/05/2017 1000   Patient Position - Orthostatic VS  Sitting filed at 10/05/2017 1000            Intake/Output Summary (Last 24 hours) at 10/05/17 1104  Last data filed at 10/04/17 2356   Gross per 24 hour   Intake          1919 33 ml   Output                0 ml   Net          1919 33 ml           Physical Exam:    GEN: Beni Pryor appears well, alert and oriented x 3, pleasant and cooperative   HEENT: pupils equal, round, and reactive to light; extraocular muscles intact  NECK: supple, no carotid bruits   HEART: regular rhythm, normal S1 and S2, no murmurs, clicks, gallops or rubs   LUNGS: clear to auscultation bilaterally; no wheezes, rales, or rhonchi   ABDOMEN: normal bowel sounds, soft, no tenderness, no distention  EXTREMITIES: peripheral pulses normal; no clubbing, cyanosis, or edema, 2+ right radial pulse  NEURO: no focal findings   SKIN: normal without suspicious lesions on exposed skin    Medications:      Current Facility-Administered Medications:     acetaminophen (TYLENOL) tablet 650 mg, 650 mg, Oral, Q4H PRN, Myra Serrano MD, 650 mg at 10/04/17 1558    atorvastatin (LIPITOR) tablet 20 mg, 20 mg, Oral, Daily With Dinner, DAVID Gonzalez, 20 mg at 10/04/17 1559    fenofibrate (TRICOR) tablet 48 mg, 48 mg, Oral, Daily, DAVID Gonzalez, 48 mg at 10/05/17 0845    levothyroxine tablet 75 mcg, 75 mcg, Oral, Early Morning, Myra Serrano MD, 75 mcg at 10/05/17 0529    metoprolol tartrate (LOPRESSOR) tablet 25 mg, 25 mg, Oral, Q12H [address], Gabriela Giron MD, 25 mg at 10/05/17 0845    morphine injection 1 mg, 1 mg, Intravenous, Q4H PRN, Myra Serrano MD, 1 mg at 10/05/17 0952    nitroglycerin (NITROSTAT) SL tablet 0 4 mg, 0 4 mg, Sublingual, Q5 Min PRN, Joanie Jaquez PA-C, 0 4 mg at 10/05/17 0952    ondansetron (ZOFRAN) injection 4 mg, 4 mg, Intravenous, Q6H PRN, Gabriela Giron MD    pantoprazole (PROTONIX) EC tablet 40 mg, 40 mg, Oral, Daily, Myra Serrano MD, 40 mg at 10/05/17 0845     Labs & Results:      Results from last 7 days  Lab Units 10/04/17  2213 10/04/17  1846 10/04/17  0810   TROPONIN I ng/mL 0 07* 0 07* 0 11*       Results from last 7 days  Lab Units 10/05/17  0530 10/04/17  0810 10/04/17  0437   WBC Thousand/uL 8 90 12  05* 12 19*   HEMOGLOBIN g/dL 12 5 12 0 11 4*   HEMATOCRIT % 36 5 35 9 34 0*   PLATELETS Thousands/uL 267 278 289           Results from last 7 days  Lab Units 10/05/17  0530 10/04/17  0437 10/03/17  1916   SODIUM mmol/L 138 138 136   POTASSIUM mmol/L 3 9 3 9 3 1*   CHLORIDE mmol/L 106 107 106   CO2 mmol/L 24 23 17*   BUN mg/dL 12 12 14   CREATININE mg/dL 0 72 0 72 0 71   CALCIUM mg/dL 8 2* 8 2* 8 7   TOTAL PROTEIN g/dL  --   --  7 8   BILIRUBIN TOTAL mg/dL  --   --  0 22   ALK PHOS U/L  --   --  91   ALT U/L  --   --  70   AST U/L --   --  70*   GLUCOSE RANDOM mg/dL 120 113 220*       Results from last 7 days  Lab Units 10/04/17  0810   INR  1 11   PTT seconds 25             Counseling / Coordination of Care  Total floor / unit time spent today 20 minutes  Greater than 50% of total time was spent with the patient and / or family counseling and / or coordination of care

## 2017-10-05 NOTE — PROGRESS NOTES
Pt  C/o chest pain and mild SOB, VSS  Nitro administered x2, morphine sulfate administered as well  Pt  Reported relieved after interventions  Slim aware, Cardiology paged    Will continue to monitor

## 2017-10-05 NOTE — CASE MANAGEMENT
[facility] in the [facility] by Isaias Posey for 2017  Network Utilization Review Department  Phone: [phone]; Fax [phone]  ATTENTION: The Network Utilization Review Department is now centralized for our 7 Facilities  Make a note that we have a new phone and fax numbers for our Department  Please call with any questions or concerns to [phone] and carefully follow the prompts so that you are directed to the right person  All voicemails are confidential  Fax any determinations, approvals, denials, and requests for initial or continue stay review clinical to [phone]  Due to HIGH CALL volume, it would be easier if you could please send faxed requests to expedite your requests and in part, help us provide discharge notifications faster     =====================================================================    Initial Clinical Review    Admission: Date/Time/Statement: Observation 10/3 and Changed to Inpatient on 10/4 @ 1103    Orders Placed This Encounter   Procedures    Inpatient Admission     Standing Status:   Standing     Number of Occurrences:   1     Order Specific Question:   Admitting Physician     Answer:   Patti Kaur [[id number]]     Order Specific Question:   Level of Care     Answer:   Med Surg [[id number]]     Order Specific Question:   Estimated length of stay     Answer:   More than 2 Midnights     Order Specific Question:   Certification     Answer:   I certify that inpatient services are medically necessary for this patient for a duration of greater than two midnights  See H&P and MD Progress Notes for additional information about the patient's course of treatment       ED: Date/Time/Mode of Arrival:   ED Arrival Information     Expected Arrival Acuity Means of Arrival Escorted By Service Admission Type    - 10/3/2017 18:56 Emergent Walk-In Self General Medicine Emergency    Arrival Complaint    CHEST PAIN        Chief Complaint: Chief Complaint   Patient presents with    Chest Pain     pt started with chest pain approx 20min ago  associated NV and heart palpitations  pt admits to 1 hannah with dinner  History of Illness:   Mario Hyman is a 52 y o  female who presents with chest pain and palpitations  Patient's symptoms started just prior to arrival to the emergency room  She had been at [facility] and she had drunk some Hannah  She states she felt her heart beating fast, she was diaphoretic, short of breath and she had chest pain across her entire anterior chest wall  Chest pain was about 10/10 in intensity  Chest pain was not related to exertion  She states arrival herself home on the way she vomited once  She had some associated right arm discomfort  She had been on steroids for some days now,  her last does was today she was taking it for her acute on chronic bronchitis  ED Vital Signs:   ED Triage Vitals [10/03/17 1859]   Temperature Pulse Respirations Blood Pressure SpO2   (!) 96 6 °F (35 9 °C) (!) 134 18 (!) 176/83 97 %      Temp Source Heart Rate Source Patient Position - Orthostatic VS BP Location FiO2 (%)   Tympanic Monitor Sitting Left arm --      Pain Score       8          Wt Readings from Last 1 Encounters:   10/03/17 77 1 kg (170 lb)     Abnormal Labs/Diagnostic Test Results:   WBC Thousand/uL 15 25*   HEMOGLOBIN g/dL 13 6   HEMATOCRIT % 40 3   PLATELETS Thousands/uL 332     SODIUM mmol/L 136   POTASSIUM mmol/L 3 1*   CHLORIDE mmol/L 106   CO2 mmol/L 17*   BUN mg/dL 14   CREATININE mg/dL 0 71   CALCIUM mg/dL 8 7   TOTAL PROTEIN g/dL 7 8   BILIRUBIN TOTAL mg/dL 0 22   ALK PHOS U/L 91   ALT U/L 70   AST U/L 70*   GLUCOSE RANDOM mg/dL 220*     Troponin I <0 02, 0 11, 0 12, 0 13,  <=0 04 ng/mL     D-Dimer, Quant 266 0 - 424 ng/ml (FEU)     Chest X: no active pulmonary disease    US Liver:   Hepatic steatosis  2   Status post cholecystectomy   3   Limited visualization of the pancreas secondary to adjacent bowel gas      ECG:  Sinus Tach    ED Treatment:   Medication Administration from 10/03/2017 1856 to 10/03/2017 2130    Date/Time Order Dose Route Action   10/03/2017 1915 ondansetron (ZOFRAN) injection 4 mg 4 mg Intravenous Given   10/03/2017 1932 aspirin chewable tablet 324 mg 324 mg Oral Given   10/03/2017 1935 nitroglycerin (NITROSTAT) SL tablet 0 4 mg 0 4 mg Sublingual Given   10/03/2017 1946 fentanyl citrate (PF) 100 MCG/2ML 50 mcg 50 mcg Intravenous Given   10/03/2017 2025 sodium chloride 0 9 % bolus 1,000 mL 1,000 mL Intravenous Given   10/03/2017 2040 HYDROmorphone (DILAUDID) 1 mg/mL injection 1 mg 1 mg Intravenous Given   10/03/2017 2044 nitroglycerin (NITRO-BID) 2 % TD ointment 1 inch 1 inch Topical Given     Past Medical/Surgical History:   Past Medical History:   Diagnosis Date    Disease of thyroid gland     History of stomach ulcers      Admitting Diagnosis: Chest pain [R07 9]    Age/Sex: 52 y o  female    Assessment/Plan:    Chest pain  Active Problems:    Leukocytosis    Hypertension    Peptic ulcer disease    Fatty liver    Sinus tachycardia      Chest pain rule out acute coronary syndrome  So far workup is unremarkable  EKG shows sinus tachycardia  Troponin negative ( <0 02)  Will continue to trend troponin  Nitroglycerin as needed  Continue aspirin  Will get an exercise stress test     Sinus tachycardia  Could be related to cardiac versus anxiety versus dehydration versus steroid use versus thyroid abnormality  Monitor heart rate  Check TSH level     Leukocytosis  Likely due to steroids  Patient a febrile     Chronic sinusitis  Patient prescribed Medrol Dosepak  On her last dose of prednisone     Hyperglycemia  Likely due to steroid  Patient states she is prediabetic  Will get a hemoglobin A1c     Hypothyroidism  Continue levothyroxine  Check TSH level in view of sinus tachycardia     Hypokalemia:  Replete prn     Hx of GERD/PUD  Continue PPI     VTE Prophylaxis: Enoxaparin (Lovenox)  / sequential compression device   Anticipated Length of Stay:  Patient will be admitted on an Observation basis with an anticipated length of stay of  < 2 midnights  Justification for Hospital Stay:  Rule out acute coronary syndrome       Admission Orders:  Scheduled Meds:     atorvastatin 20 mg Oral Daily With Dinner   fenofibrate 48 mg Oral Daily   levothyroxine 75 mcg Oral Early Morning   metoprolol tartrate 25 mg Oral Q12H COLEMAN   pantoprazole 40 mg Oral Daily     Continuous Infusions:    PRN Meds:   acetaminophen    morphine injection    nitroglycerin    ondansetron    NPO sips with meds  Consult cardio:  ST segment depression, CP  TELM  Stress Test: pending    -----------------------------------------------------------------------------------------------------  10/4 Physician progress notes:  Assessment:     Principal Problem:    Chest pain  Active Problems:    Leukocytosis    Hypertension    Peptic ulcer disease    Fatty liver    Sinus tachycardia    Chronic sinusitis    Hyperglycemia      Plan:     Chest pain:  Atypical  S/p LHC - no significant stenosis - has anomalous origin of the circumflex form RCA  Heparin gtt stopped  Cardiology foll  Echo done - pending read  Pt still complaining of chest pain - possibly musculoskeletal  Will continue tele     Leukocytosis:  Non specific - no e/o infection  Monitor     H/p PUD:  Cont protonix  No e/o GI bleeding for now  Hb stable     Hyperlipidemia:  Cont statin and fenofibrate      VTE Pharmacologic Prophylaxis:   Pharmacologic: Enoxaparin (Lovenox)  Mechanical VTE Prophylaxis in Place:  Yes     Current Length of Stay: 0 day(s)     Current Patient Status: Inpatient   Certification Statement: The patient will continue to require additional inpatient hospital stay due to chest pain    ----------------------------------------------------------------------------------------------------------------------------------    10/4 Additional Info   Patient complaining of chest pain, heaviness/squeezing feeling that lasted a few minutes  Felt like she was unable to take a deep breath  She was given morphine which she stated helped  NAD  Heart RRR  Lungs CTA B/L  Patient had cardiac catheterization today which showed no significant stenosis and anomalous origin of the circumflex form RCA  EKG obtained and reviewed  Unremarkable  No changes from prior  Will order nitro PRN  Check troponin now  Continue telemetry     ----------------------------------------------------------------------------------------------------------------------------    10/5 Nursing notes:  Pt  C/o chest pain and mild SOB, VSS  Nitro administered x2, morphine sulfate administered as well  Pt  Reported relieved after interventions  Slim aware, Cardiology paged    Will continue to monitor  Labs:  Updated: 10/04/17 1930       Troponin I 0 07 (H) <=0 04 ng/mL      Updated: 10/04/17 0819     WBC 12 05 (H) 4 31 - 10 16 Thousand/uL     PRN Meds Sorted by Name   for Sharmin Perez as of 10/05/17 1111       Medications 09/30 10/01 10/02 10/03 10/04 10/05   acetaminophen (TYLENOL) tablet 650 mg  Dose: 650 mg Freq: Every 4 hours PRN Route: PO  PRN Reason: headaches  Start: 10/03/17 2159        0054     1558         fentanyl citrate (PF) 100 MCG/2ML  Freq: Code/trauma/sedation medication Route: IV  Start: 10/04/17 1153 End: 10/04/17 1205        1153     1158     1205         heparin (porcine) injection  Freq: Code/trauma/sedation medication  Start: 10/04/17 1203 End: 10/04/17 1203        1203         HYDROmorphone (DILAUDID) 1 mg/mL injection 1 mg  Dose: 1 mg Freq: As needed Route: IV  PRN Reasons: moderate pain,severe pain  Start: 10/03/17 2033 End: 10/03/17 2208    Admin Instructions:   High alert medication   LOOK ALIKE SOUND ALIKE MED       2040 2208-D/C'd        iohexol (OMNIPAQUE) 350 MG/ML injection (SINGLE-DOSE)  Freq: Code/trauma/sedation medication Route: IV  Start: 10/04/17 1221 End: 10/04/17 1221 1221         lidocaine (XYLOCAINE) 1 % injection  Freq: Code/trauma/sedation medication  Start: 10/04/17 1159 End: 10/04/17 1159        1159 [C]         midazolam (VERSED) injection  Freq: Code/trauma/sedation medication  Start: 10/04/17 1153 End: 10/04/17 1205        1153     1158     1205         morphine injection 1 mg  Dose: 1 mg Freq: Every 4 hours PRN Route: IV  PRN Reason: severe pain  Start: 10/04/17 0751    Admin Instructions:   High alert medication  LOOK ALIKE SOUND ALIKE [facility]        2559     [id number]      [id number]        nitroglycerin (NITROSTAT) SL tablet 0 4 mg  Dose: 0 4 mg Freq: Every 5 minutes PRN Route: SL  PRN Reason: chest pain  Start: 10/04/17 1837    Admin Instructions:   May administer up to 3 doses, then call physician          Alexandro Barry     [id number]

## 2017-10-06 ENCOUNTER — GENERIC CONVERSION - ENCOUNTER (OUTPATIENT)
Dept: OTHER | Facility: OTHER | Age: 47
End: 2017-10-06

## 2017-10-06 LAB
ATRIAL RATE: 69 BPM
P AXIS: 30 DEGREES
PR INTERVAL: 158 MS
QRS AXIS: 3 DEGREES
QRSD INTERVAL: 90 MS
QT INTERVAL: 398 MS
QTC INTERVAL: 426 MS
T WAVE AXIS: 19 DEGREES
VENTRICULAR RATE: 69 BPM

## 2017-10-12 ENCOUNTER — GENERIC CONVERSION - ENCOUNTER (OUTPATIENT)
Dept: FAMILY MEDICINE CLINIC | Facility: CLINIC | Age: 47
End: 2017-10-12

## 2017-10-12 DIAGNOSIS — I21.4 NON-ST ELEVATION (NSTEMI) MYOCARDIAL INFARCTION (HCC): ICD-10-CM

## 2017-10-12 DIAGNOSIS — I20.1 ANGINA PECTORIS WITH DOCUMENTED SPASM (HCC): ICD-10-CM

## 2017-10-20 ENCOUNTER — ALLSCRIPTS OFFICE VISIT (OUTPATIENT)
Dept: OTHER | Facility: OTHER | Age: 47
End: 2017-10-20

## 2017-10-21 NOTE — PROGRESS NOTES
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)    Plan  Anomalous coronary artery origin    · Metoprolol Succinate ER 25 MG Oral Tablet Extended Release 24 Hour; Take 1  tablet twice daily   Rx By: Ambar Loo; Dispense: 0 Days ; #:60 Tablet Extended Release 24 Hour; Refill: 5;For: Anomalous coronary artery origin; LEONARDO = N; Verified Transmission to [facility]; Last Updated By: System, SureScripts; 10/20/2017 3:10:51 PM   · Follow-up visit in 1 month Evaluation and Treatment  Follow-up  Status: Complete  Done:  [id number]   Ordered; For: Anomalous coronary artery origin; Ordered By: Ambar Loo Performed:  Due: 69UIX5258; Last Updated By: Florian Agosto; 10/20/2017 3:10:42 PM  Coronary vasospasm    · Isosorbide Mononitrate ER 30 MG Oral Tablet Extended Release 24 Hour   Rx By: Simone Tejada; Dispense: 30 Days ; #:30 Tablet Extended Release 24 Hour; Refill: 6;For: Coronary vasospasm; LEONARDO = N; Record; Last Updated By: Ambar Loo; 10/20/2017 3:09:33 PM  NSTEMI (non-ST elevated myocardial infarction)    · Metoprolol Tartrate 50 MG Oral Tablet; Take 1 tablet, 1 hour prior to procedure   Rx By: Simone Tejada; Dispense: 0 Days ; #:1 Tablet; Refill: 0;For: NSTEMI (non-ST elevated myocardial infarction); LEONARDO = N; Verified Transmission to [facility]; Last Updated By: System, SureScripts; 10/16/2017 2:12:11 PM    Discussion/Summary  Cardiology Discussion Summary Free Text Note Form [facility]:   Admission for chest pain  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  Change Imdur to metoprolol succinate and will see how she does  Counseling Documentation With Imm: The patient was counseled regarding diagnostic results,-- instructions for management,-- risk factor reductions,-- impressions  total time of encounter was 25 minutes-- and-- 15 minutes was spent counseling        Chief Complaint  Chief Complaint Free Text Note Form: Patient is here for a followup  Patient complaints of chest pain, sob and palpitations  History of Present Illness  Cardiology [facility] Free Text Note Form [facility]: Followup for recent hospitalization  with chest discomfort and palpitations  troponin was elevated  Cardiac cath revealed anomalous left circumflex   She has had several episodes of chest pain since then  She took SL several times since then  BP and HR have been elevated  Review of Systems  Cardiology Female ROS:     Cardiac: No complaints of chest pain, no palpitations, no fainting  Skin: No complaints of nonhealing sores or skin rash  Genitourinary: No complaints of recurrent urinary tract infections, frequent urination at night, difficult urination, blood in urine, kidney stones, loss of bladder control, kidney problems, denies any birth control or hormone replacement, is not post menopausal, not currently pregnant  Psychological: No complaints of feeling depressed, anxiety, panic attacks, or difficulty concentrating  General: No complaints of trouble sleeping, lack of energy, fatigue, appetite changes, weight changes, fever, frequent infections, or night sweats  Respiratory: No complaints of shortness of breath, cough with sputum, or wheezing  HEENT: No complaints of serious problems, hearing problems, nose problems, throat problems, or snoring  Gastrointestinal: No complaints of liver problems, nausea, vomiting, heartburn, constipation, bloody stools, diarrhea, problems swallowing, adbominal pain, or rectal bleeding  Hematologic: No complaints of bleeding disorders, anemia, blood clots, or excessive brusing  Neurological: No complaints of numbness, tingling, dizziness, weakness, seizures, headaches, syncope or fainting, AM fatigue, daytime sleepiness, no witnessed apnea episodes  Musculoskeletal: No complaints of arthritis, back pain, or painfull swelling  ROS Reviewed:   ROS reviewed        Active Problems  Problems 1  Anemia (285 9) (D64 9)   2  Anxiety (300 00) (F41 9)   3  Cervical cancer screening (V76 2) (Z12 4)   4  Combined hyperlipidemia (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 2) (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)   5  Coronary vasospasm (413 1) (I20 1)   6  Diabetes mellitus (250 00) (E11 9)   7  Elevated AST (SGOT) (790 4) (R74 0)   8  Ground glass opacity present on imaging of lung (793 19) (R91 8)   9  Hypertension (401 9) (I10)   10  Hypothyroid (244 9) (E03 9)   11  NSTEMI (non-ST elevated myocardial infarction) (410 70) (I21 4)    Past Medical History  Problems    1  History of Acute lymphadenitis (683) (L04 9)   2  History of Blocked eustachian tube (381 60) (H68 109)   3  History of Blood pressure elevated (401 9) (I10)   4  History of Diphtheria-tetanus-pertussis (DTP) vaccination (V06 1) (Z23)   5  History of Encounter for routine gynecological examination (V72 31) (Z01 419)   6  History of Encounter for routine pelvic examination (V72 31) (Z01 419)   7  History of Encounter for screening mammogram for breast cancer (V76 12) (Z12 31)   8  History of Encounter for screening mammogram for malignant neoplasm of breast   (V76 12) (Z12 31)   9  History of Encounter for wound care (V58 89) (Z51 89)   10  History of Flu vaccine need (V04 81) (Z23)   11  History of acute bacterial sinusitis (V12 69) (Z87 09)   12  History of acute bronchitis (V12 69) (Z87 09)   13  History of acute sinusitis (V12 69) (Z87 09)   14  History of acute sinusitis (V12 69) (Z87 09)   15  History of coronary vasospasm (V12 59) (Z86 79)   16  History of esophagitis (V12 79) (Z87 19)   17  History of Hashimoto thyroiditis (V12 29) (Z86 39)   18  History of irritable bowel syndrome (V12 79) (Z87 19)   19  Denied: History of substance abuse   20  History of Pain of right breast (611 71) (N64 4)  Active Problems And Past Medical History Reviewed: The active problems and past medical history were reviewed and updated today  Surgical History  Problems    1   History of  Section  Surgical History Reviewed: The surgical history was reviewed and updated today  Family History  Mother    1  Family history of    2  Family history of pancreatic cancer (V16 0) (Z80 0)   3  Denied: Family history of substance abuse   4  Denied: Family history of Mental health problem  Father    5  Family history of Diabetes (250 00) (E11 9)   6  Denied: Family history of substance abuse   7  Family history of High blood pressure (401 9) (I10)   8  Denied: Family history of Mental health problem  Child    9  No pertinent family history  Family History Reviewed: The family history was reviewed and updated today  Social History  Problems    · Alcohol use (V49 89) (Z78 9)   · Being A Social Drinker   · Birth Control Method - Oral Contraceptives   · Feels safe at home   · Never A Smoker   · Social alcohol use (Z78 9)  Social History Reviewed: The social history was reviewed and updated today  Current Meds   1  Bacitracin 500 UNIT/GM External Ointment; 2% ointment   Use as directed; Therapy: 36END8260 to (Last Rx:2017)  Requested for: 29OHI5814 Ordered   2  Dicyclomine HCl - 20 MG Oral Tablet; take 1 tablet by mouth twice daily; Therapy: 86OPV1860 to (Magali Ferrell)  Requested for: 54Imt3056; Last   Rx:50Tso1861 Ordered   3  Escitalopram Oxalate 20 MG Oral Tablet; Take 1 tablet daily; Therapy: 79WQR1214 to (Last Rx:44Cdu9357)  Requested for: 68Nde7906 Ordered   4  Fenofibrate 145 MG Oral Tablet; Take 1 tablet daily; Therapy: 77SKR0586 to (Concepción Mendez)  Requested for: 10QNW6304; Last   Rx:2017 Ordered   5  Fluticasone Propionate 50 MCG/ACT Nasal Suspension; 2 sprays each nostril daily; Therapy: 02AIB7842 to (Last Rx:2017)  Requested for: 29UJK3007 Ordered   6  Isosorbide Mononitrate ER 30 MG Oral Tablet Extended Release 24 Hour; Take 1 tablet   daily; Therapy: 04UJF7177 to (Evaluate:36Gun4700); Last Rx:2017 Ordered   7   Levothyroxine Sodium 75 MCG Oral Tablet; take one tablet by mouth every day; Therapy: 71GEA9108 to (Evaluate:08Iat7207)  Requested for: 88VIN0114; Last   Rx:19Jun2017 Ordered   8  Low-Ogestrel 0 3-30 MG-MCG Oral Tablet; TAKE 1 TABLET DAILY AS DIRECTED; Therapy: 38RVM9336 to (Evaluate:15Jun2016)  Requested for: 14JCH2617; Last   Rx:33Hsp9749 Ordered   9  Nitroglycerin 0 4 MG Sublingual Tablet Sublingual; DISSOLVE 1 TABLET UNDER THE   TONGUE AS NEEDED FOR CHEST PAIN;   Therapy: 74LLM4206 to (Dayana Casanova)  Requested for: 34AXY1828; Last   Rx:05Oct2017 Ordered   10  Omega-3-acid Ethyl Esters 1 GM Oral Capsule; take 4 capsules daily; Therapy: 83Hzm1987 to (Evaluate:65Ysi6860)  Requested for: 12Jbe1155; Last    Rx:41Ocb4723 Ordered   11  Pantoprazole Sodium 40 MG Oral Tablet Delayed Release; take one tablet by mouth    every day; Therapy: 14ECJ9816 to (Samuel Kuhn)  Requested for: 93Ihy2031; Last    Rx:57Hhs4873 Ordered   12  TraMADol HCl - 50 MG Oral Tablet; TAKE 1 TABLET EVERY 12 HOURS as needed; Therapy: 98YVQ0004 to (Evaluate:11Nov2017)  Requested for: 77FDQ7446; Last    Rx:12Oct2017 Ordered   13  Verapamil HCl  MG Oral Capsule Extended Release 24 Hour; TAKE 1 CAPSULE    BY MOUTH EVERY MORNING BEFORE BREAKFAST; Therapy: 52XBZ5485 to (Guillermo Brewer)  Requested for: 83ICM6622; Last    Rx:05Oct2017 Ordered  Medication List Reviewed: The medication list was reviewed and updated today  Allergies  Medication    1  Bactrim TABS   2  Erythromycin Base TABS   3  Flagyl CAPS    Vitals  Vital Signs    Recorded: 77ZYP8838 02:28PM   Heart Rate 91   Systolic 075, RUE, Sitting   Diastolic 88, RUE, Sitting   Height 5 ft 3 in   Weight 177 lb    BMI Calculated 31 35   BSA Calculated 1 84     Physical Exam    Constitutional   General appearance: No acute distress, well appearing and well nourished  Eyes   Conjunctiva and Sclera examination: Conjunctiva pink, sclera anicteric      Ears, Nose, Mouth, and Throat - Oropharynx: Clear, nares are clear, mucous membranes are moist    Neck   Neck and thyroid: Normal, supple, trachea midline, no thyromegaly  Pulmonary   Respiratory effort: No increased work of breathing or signs of respiratory distress  Auscultation of lungs: Clear to auscultation, no rales, no rhonchi, no wheezing, good air movement  Cardiovascular   Auscultation of heart: Normal rate and rhythm, normal S1 and S2, no murmurs  Carotid pulses: Normal, 2+ bilaterally  Peripheral vascular exam: Normal pulses throughout, no tenderness, erythema or swelling  Pedal pulses: Normal, 2+ bilaterally  Examination of extremities for edema and/or varicosities: Normal     Abdomen   Abdomen: Non-tender and no distention  Liver and spleen: No hepatomegaly or splenomegaly  Musculoskeletal Gait and station: Normal gait  -- Digits and nails: Normal without clubbing or cyanosis  -- Inspection/palpation of joints, bones, and muscles: Normal, ROM normal     Skin - Skin and subcutaneous tissue: Normal without rashes or lesions  Skin is warm and well perfused, normal turgor  Neurologic - Cranial nerves: II - XII intact  -- Speech: Normal     Psychiatric - Orientation to person, place, and time: Normal -- Mood and affect: Normal       Health Management  Cervical cancer screening   (every) THINPREP PAP; every 5 years; Next Due: 18FOJ9335; Overdue  History of Diphtheria-tetanus-pertussis (DTP) vaccination   Adacel 5-2-15 5 LF-MCG/0 5 Intramuscular Suspension; every 10 years; Next Due: 02Ywn5991; Overdue  History of Flu vaccine need   Influenza; every 1 year; Next Due: 84PXI2648; Overdue    Future Appointments    Date/Time Provider Specialty Site   11/21/2017 03:00 PM LELIA Garcia   Cardiology [facility]     Signatures   Electronically signed by : LELIA Mena ; Oct 20 2017  3:50PM EST                       (Author)

## 2017-11-21 ENCOUNTER — ALLSCRIPTS OFFICE VISIT (OUTPATIENT)
Dept: OTHER | Facility: OTHER | Age: 47
End: 2017-11-21

## 2018-01-10 NOTE — CONSULTS
Chief Complaint  Patient is here for a followup  Patient complaints of chest pain, sob and palpitations  History of Present Illness  Followup for recent hospitalization    Presented with chest discomfort and palpitations  troponin was elevated  Cardiac cath revealed anomalous left circumflex   She has had several episodes of chest pain since then  She took SL several times since then  BP and HR have been elevated  Review of Systems      Cardiac: No complaints of chest pain, no palpitations, no fainting  Skin: No complaints of nonhealing sores or skin rash  Genitourinary: No complaints of recurrent urinary tract infections, frequent urination at night, difficult urination, blood in urine, kidney stones, loss of bladder control, kidney problems, denies any birth control or hormone replacement, is not post menopausal, not currently pregnant  Psychological: No complaints of feeling depressed, anxiety, panic attacks, or difficulty concentrating  General: No complaints of trouble sleeping, lack of energy, fatigue, appetite changes, weight changes, fever, frequent infections, or night sweats  Respiratory: No complaints of shortness of breath, cough with sputum, or wheezing  HEENT: No complaints of serious problems, hearing problems, nose problems, throat problems, or snoring  Gastrointestinal: No complaints of liver problems, nausea, vomiting, heartburn, constipation, bloody stools, diarrhea, problems swallowing, adbominal pain, or rectal bleeding  Hematologic: No complaints of bleeding disorders, anemia, blood clots, or excessive brusing  Neurological: No complaints of numbness, tingling, dizziness, weakness, seizures, headaches, syncope or fainting, AM fatigue, daytime sleepiness, no witnessed apnea episodes  Musculoskeletal: No complaints of arthritis, back pain, or painfull swelling  ROS reviewed  Active Problems    1  Anemia (285 9) (D64 9)   2  Anxiety (300 00) (F41 9)   3  Cervical cancer screening (V76 2) (Z12 4)   4  Combined hyperlipidemia (272 2) (E78 2)   5  Coronary vasospasm (413 1) (I20 1)   6  Diabetes mellitus (250 00) (E11 9)   7  Elevated AST (SGOT) (790 4) (R74 0)   8  Ground glass opacity present on imaging of lung (793 19) (R91 8)   9  Hypertension (401 9) (I10)   10  Hypothyroid (244 9) (E03 9)   11  NSTEMI (non-ST elevated myocardial infarction) (410 70) (I21 4)    Past Medical History    · History of Acute lymphadenitis (683) (L04 9)   · History of Blocked eustachian tube (381 60) (H68 109)   · History of Blood pressure elevated (401 9) (I10)   · History of Diphtheria-tetanus-pertussis (DTP) vaccination (V06 1) (Z23)   · History of Encounter for routine gynecological examination (V72 31) (Z01 419)   · History of Encounter for routine pelvic examination (V72 31) (Z01 419)   · History of Encounter for screening mammogram for breast cancer (V76 12) (Z12 31)   · History of Encounter for screening mammogram for malignant neoplasm of breast  (V76 12) (Z12 31)   · History of Encounter for wound care (V58 89) (Z51 89)   · History of Flu vaccine need (V04 81) (Z23)   · History of acute bacterial sinusitis (V12 69) (Z87 09)   · History of acute bronchitis (V12 69) (Z87 09)   · History of acute sinusitis (V12 69) (Z87 09)   · History of acute sinusitis (V12 69) (Z87 09)   · History of coronary vasospasm (V12 59) (Z86 79)   · History of esophagitis (V12 79) (Z87 19)   · History of Hashimoto thyroiditis (V12 29) (Z86 39)   · History of irritable bowel syndrome (V12 79) (Z87 19)   · Denied: History of substance abuse   · History of Pain of right breast (611 71) (N64 4)    The active problems and past medical history were reviewed and updated today  Surgical History    · History of  Section    The surgical history was reviewed and updated today         Family History    · Family history of    · Family history of pancreatic cancer (V16 0) (Z80 0)   · Denied: Family history of substance abuse   · Denied: Family history of Mental health problem    · Family history of Diabetes (250 00) (E11 9)   · Denied: Family history of substance abuse   · Family history of High blood pressure (401 9) (I10)   · Denied: Family history of Mental health problem    · No pertinent family history    The family history was reviewed and updated today  Social History    · Alcohol use (V49 89) (Z78 9)   · Being A Social Drinker   · Birth Control Method - Oral Contraceptives   · Feels safe at home   · Never A Smoker   · Social alcohol use (Z78 9)  The social history was reviewed and updated today  Current Meds   1  Bacitracin 500 UNIT/GM External Ointment; 2% ointment   Use as directed; Therapy: 15DYF7394 to (Last Rx:14Mar2017)  Requested for: 92IXQ9598 Ordered   2  Dicyclomine HCl - 20 MG Oral Tablet; take 1 tablet by mouth twice daily; Therapy: 46ZYY1081 to (Halle Holley)  Requested for: 17Yrw2813; Last   Rx:97Lnn5439 Ordered   3  Escitalopram Oxalate 20 MG Oral Tablet; Take 1 tablet daily; Therapy: 93UCJ7171 to (Last Rx:39Brp9991)  Requested for: 15Svx2755 Ordered   4  Fenofibrate 145 MG Oral Tablet; Take 1 tablet daily; Therapy: 64QTE7459 to (Danny Rubin)  Requested for: 43MBG3552; Last   Rx:05Oct2017 Ordered   5  Fluticasone Propionate 50 MCG/ACT Nasal Suspension; 2 sprays each nostril daily; Therapy: 09ELR2510 to (Last Rx:10Mar2017)  Requested for: 89IPA6640 Ordered   6  Isosorbide Mononitrate ER 30 MG Oral Tablet Extended Release 24 Hour; Take 1 tablet   daily; Therapy: 75NKX9513 to (Evaluate:36Okc0186); Last Rx:12Oct2017 Ordered   7  Levothyroxine Sodium 75 MCG Oral Tablet; take one tablet by mouth every day; Therapy: 31RKQ2787 to (Evaluate:86Twq4551)  Requested for: 39RGO4786; Last   Rx:19Jun2017 Ordered   8  Low-Ogestrel 0 3-30 MG-MCG Oral Tablet; TAKE 1 TABLET DAILY AS DIRECTED;    Therapy: 32MKJ8265 to (Evaluate:15Jun2016)  Requested for: 63XLK9714; Last   Rx:43Ldb3483 Ordered   9  Nitroglycerin 0 4 MG Sublingual Tablet Sublingual; DISSOLVE 1 TABLET UNDER THE   TONGUE AS NEEDED FOR CHEST PAIN;   Therapy: 34EUF4641 to (95 099819)  Requested for: 32EUM4040; Last   Rx:54Lax8228 Ordered   10  Omega-3-acid Ethyl Esters 1 GM Oral Capsule; take 4 capsules daily; Therapy: 52Yao4191 to (Evaluate:17Efh2148)  Requested for: 82Xyj6963; Last    Rx:40Swl3900 Ordered   11  Pantoprazole Sodium 40 MG Oral Tablet Delayed Release; take one tablet by mouth    every day; Therapy: 25MKN0900 to (Jude Citizen)  Requested for: 79Dnb5641; Last    Rx:47Bus0928 Ordered   12  TraMADol HCl - 50 MG Oral Tablet; TAKE 1 TABLET EVERY 12 HOURS as needed; Therapy: 75NMR4650 to (Evaluate:67Qig0327)  Requested for: 65ZSN5628; Last    Rx:07Yef7186 Ordered   13  Verapamil HCl  MG Oral Capsule Extended Release 24 Hour; TAKE 1 CAPSULE    BY MOUTH EVERY MORNING BEFORE BREAKFAST; Therapy: 61EXT7474 to (Darius Dsouza)  Requested for: 88QXH6113; Last    Rx:68Doe5966 Ordered    The medication list was reviewed and updated today  Allergies    1  Bactrim TABS   2  Erythromycin Base TABS   3  Flagyl CAPS    Vitals   Recorded: 36GQN5475 02:28PM   Heart Rate 91   Systolic 432, RUE, Sitting   Diastolic 88, RUE, Sitting   Height 5 ft 3 in   Weight 177 lb    BMI Calculated 31 35   BSA Calculated 1 84     Physical Exam    Constitutional   General appearance: No acute distress, well appearing and well nourished  Eyes   Conjunctiva and Sclera examination: Conjunctiva pink, sclera anicteric  Ears, Nose, Mouth, and Throat - Oropharynx: Clear, nares are clear, mucous membranes are moist    Neck   Neck and thyroid: Normal, supple, trachea midline, no thyromegaly  Pulmonary   Respiratory effort: No increased work of breathing or signs of respiratory distress  Auscultation of lungs: Clear to auscultation, no rales, no rhonchi, no wheezing, good air movement  Cardiovascular   Auscultation of heart: Normal rate and rhythm, normal S1 and S2, no murmurs  Carotid pulses: Normal, 2+ bilaterally  Peripheral vascular exam: Normal pulses throughout, no tenderness, erythema or swelling  Pedal pulses: Normal, 2+ bilaterally  Examination of extremities for edema and/or varicosities: Normal     Abdomen   Abdomen: Non-tender and no distention  Liver and spleen: No hepatomegaly or splenomegaly  Musculoskeletal Gait and station: Normal gait  Digits and nails: Normal without clubbing or cyanosis  Inspection/palpation of joints, bones, and muscles: Normal, ROM normal     Skin - Skin and subcutaneous tissue: Normal without rashes or lesions  Skin is warm and well perfused, normal turgor  Neurologic - Cranial nerves: II - XII intact  Speech: Normal     Psychiatric - Orientation to person, place, and time: Normal  Mood and affect: Normal       Assessment    1  Heart palpitations (785 1) (R00 2)   2  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)    Plan  Anomalous coronary artery origin    · Metoprolol Succinate ER 25 MG Oral Tablet Extended Release 24 Hour; Take 1  tablet twice daily   Rx By: Mylene Pringle; Dispense: 0 Days ; #:60 Tablet Extended Release 24 Hour; Refill: 5; For: Anomalous coronary artery origin; LEONARDO = N; Verified Transmission to [address]; Last Updated By: SystemAdvisity; 10/20/2017 3:10:51 PM   · Follow-up visit in 1 month Evaluation and Treatment  Follow-up  Status: Complete  Done:  49CMB1006   Ordered; For: Anomalous coronary artery origin; Ordered By: Mylene Pringle Performed:  Due: 31JJT2420; Last Updated By: Katlin Cordero; 10/20/2017 3:10:42 PM  Coronary vasospasm    · Isosorbide Mononitrate ER 30 MG Oral Tablet Extended Release 24 Hour   Rx By: Todd Burt; Dispense: 30 Days ; #:30 Tablet Extended Release 24 Hour; Refill: 6; For: Coronary vasospasm; LEONARDO = N; Record;  Last Updated By: Mylene Pringle; 10/20/2017 3:09:33 PM  NSTEMI (non-ST elevated myocardial infarction)    · Metoprolol Tartrate 50 MG Oral Tablet; Take 1 tablet, 1 hour prior to procedure   Rx By: Jennifer Trujillo; Dispense: 0 Days ; #:1 Tablet; Refill: 0; For: NSTEMI (non-ST elevated myocardial infarction); LEONARDO = N; Verified Transmission to [facility]; Last Updated By: System, Casmul; 10/16/2017 2:12:11 PM    Discussion/Summary    Admission for chest pain  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

## 2018-01-10 NOTE — MISCELLANEOUS
Message  i spoke w/ pt    1) will change keflex to levaquin   if fevers, cervical node persists, will send for CT neck,   2) her labs showed ALT 79, normal tsh, a1c 6 2%, was 6 8)  discuss ETOH reduction   recheck labs (cmp/a1c) in 3 mos      Signatures   Electronically signed by :  Jeannie Leonard DO; Jan 19 2017  8:07PM EST                       (Author)

## 2018-01-12 NOTE — MISCELLANEOUS
Message  i spoke w// pt regarding the results of her CT neck, which showed no soft tissue abnormalities of the neck  but did show ?groundglass opacities of b/l upper lung fields  pt is asymptomatic  Priscilla Bonds   will send for CXR for clarification  Signatures   Electronically signed by :  Jennifer Nino DO; Feb 6 2017  8:00AM EST                       (Author)

## 2018-01-12 NOTE — RESULT NOTES
Verified Results  * XR CHEST PA & LATERAL 40MBP9348 11:59AM Everardo Kumar Order Number: [id number]     Test Name Result Flag Reference   XR CHEST PA & LATERAL (Report)     CHEST - DUAL ENERGY     INDICATION: CT neck demonstrates biapical groundglass opacities     COMPARISON: CT neck February 3, 2017; chest x-ray August 16, 2005     VIEWS: PA (including soft tissue/bone algorithms) and lateral projections; 5 images     FINDINGS:        Cardiomediastinal silhouette appears unremarkable  The lungs are clear  No pneumothorax or pleural effusion  Visualized osseous structures appear within normal limits for the patient's age  Right upper quadrant clips status post cholecystectomy  IMPRESSION:     Normal examination         Workstation performed: [id number]     Signed by:   Isaiah King MD   2/7/17

## 2018-01-13 VITALS
BODY MASS INDEX: 30.29 KG/M2 | WEIGHT: 177.44 LBS | DIASTOLIC BLOOD PRESSURE: 82 MMHG | HEIGHT: 64 IN | OXYGEN SATURATION: 98 % | HEART RATE: 101 BPM | TEMPERATURE: 99.4 F | SYSTOLIC BLOOD PRESSURE: 130 MMHG | RESPIRATION RATE: 18 BRPM

## 2018-01-13 VITALS
SYSTOLIC BLOOD PRESSURE: 142 MMHG | WEIGHT: 177 LBS | HEIGHT: 63 IN | BODY MASS INDEX: 31.36 KG/M2 | HEART RATE: 91 BPM | DIASTOLIC BLOOD PRESSURE: 88 MMHG

## 2018-01-14 VITALS
TEMPERATURE: 98.5 F | HEART RATE: 94 BPM | WEIGHT: 177.56 LBS | SYSTOLIC BLOOD PRESSURE: 142 MMHG | DIASTOLIC BLOOD PRESSURE: 104 MMHG | OXYGEN SATURATION: 98 % | HEIGHT: 63 IN | BODY MASS INDEX: 31.46 KG/M2 | RESPIRATION RATE: 16 BRPM

## 2018-01-14 VITALS
HEIGHT: 63 IN | SYSTOLIC BLOOD PRESSURE: 134 MMHG | TEMPERATURE: 97.6 F | OXYGEN SATURATION: 97 % | DIASTOLIC BLOOD PRESSURE: 78 MMHG | HEART RATE: 60 BPM | RESPIRATION RATE: 17 BRPM | WEIGHT: 177 LBS | BODY MASS INDEX: 31.36 KG/M2

## 2018-01-16 NOTE — CONSULTS
Chief Complaint  Patient is here for a follow up  History of Present Illness  Followup for anomalous circumflex    doing better on metoprolol succinate  one episode of chest discomfort a week ago  She is back to working out  otherwise no dyspnea, no palpitations  Review of Systems      Cardiac: No complaints of chest pain, no palpitations, no fainting  Skin: No complaints of nonhealing sores or skin rash  Genitourinary: No complaints of recurrent urinary tract infections, frequent urination at night, difficult urination, blood in urine, kidney stones, loss of bladder control, kidney problems, denies any birth control or hormone replacement, is not post menopausal, not currently pregnant  Psychological: No complaints of feeling depressed, anxiety, panic attacks, or difficulty concentrating  General: No complaints of trouble sleeping, lack of energy, fatigue, appetite changes, weight changes, fever, frequent infections, or night sweats  Respiratory: No complaints of shortness of breath, cough with sputum, or wheezing  HEENT: No complaints of serious problems, hearing problems, nose problems, throat problems, or snoring  Gastrointestinal: No complaints of liver problems, nausea, vomiting, heartburn, constipation, bloody stools, diarrhea, problems swallowing, adbominal pain, or rectal bleeding  Hematologic: No complaints of bleeding disorders, anemia, blood clots, or excessive brusing  Neurological: No complaints of numbness, tingling, dizziness, weakness, seizures, headaches, syncope or fainting, AM fatigue, daytime sleepiness, no witnessed apnea episodes  Musculoskeletal: No complaints of arthritis, back pain, or painfull swelling  ROS reviewed  Active Problems    1  Anemia (285 9) (D64 9)   2  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)   3  Anxiety (300 00) (F41 9)   4  Cervical cancer screening (V76 2) (Z12 4)   5  Combined hyperlipidemia (272 2) (E78 2)   6   Coronary vasospasm (413 1) (I20 1)   7  Diabetes mellitus (250 00) (E11 9)   8  Elevated AST (SGOT) (790 4) (R74 0)   9  GERD without esophagitis (530 81) (K21 9)   10  Ground glass opacity present on imaging of lung (793 19) (R91 8)   11  Heart palpitations (785 1) (R00 2)   12  Hypertension (401 9) (I10)   13  Hypothyroid (244 9) (E03 9)   14  NSTEMI (non-ST elevated myocardial infarction) (410 70) (I21 4)    Past Medical History    · History of Acute lymphadenitis (683) (L04 9)   · History of Blocked eustachian tube (381 60) (H68 109)   · History of Blood pressure elevated (401 9) (I10)   · History of Diphtheria-tetanus-pertussis (DTP) vaccination (V06 1) (Z23)   · History of Encounter for routine gynecological examination (V72 31) (Z01 419)   · History of Encounter for routine pelvic examination (V72 31) (Z01 419)   · History of Encounter for screening mammogram for breast cancer (V76 12) (Z12 31)   · History of Encounter for screening mammogram for malignant neoplasm of breast  (V76 12) (Z12 31)   · History of Encounter for wound care (V58 89) (Z51 89)   · History of Flu vaccine need (V04 81) (Z23)   · History of acute bacterial sinusitis (V12 69) (Z87 09)   · History of acute bronchitis (V12 69) (Z87 09)   · History of acute sinusitis (V12 69) (Z87 09)   · History of acute sinusitis (V12 69) (Z87 09)   · History of coronary vasospasm (V12 59) (Z86 79)   · History of esophagitis (V12 79) (Z87 19)   · History of Hashimoto thyroiditis (V12 29) (Z86 39)   · History of irritable bowel syndrome (V12 79) (Z87 19)   · Denied: History of substance abuse   · History of Pain of right breast (611 71) (N64 4)    The active problems and past medical history were reviewed and updated today  Surgical History    · History of  Section    The surgical history was reviewed and updated today         Family History    · Family history of    · Family history of pancreatic cancer (V16 0) (Z80 0)   · Denied: Family history of substance abuse   · Denied: Family history of Mental health problem    · Family history of Diabetes (250 00) (E11 9)   · Denied: Family history of substance abuse   · Family history of High blood pressure (401 9) (I10)   · Denied: Family history of Mental health problem    · No pertinent family history    The family history was reviewed and updated today  Social History    · Alcohol use (V49 89) (Z78 9)   · Being A Social Drinker   · Birth Control Method - Oral Contraceptives   · Feels safe at home   · Never A Smoker   · Social alcohol use (Z78 9)  The social history was reviewed and updated today  Current Meds   1  Bacitracin 500 UNIT/GM External Ointment; 2% ointment   Use as directed; Therapy: 53GTC8605 to (Last Rx:14Mar2017)  Requested for: 06VVB9057 Ordered   2  Dicyclomine HCl - 20 MG Oral Tablet; take 1 tablet by mouth twice daily; Therapy: 02HWN5315 to (Zari Ren)  Requested for: 47Hks0195; Last   Rx:97Zbb2479 Ordered   3  Escitalopram Oxalate 20 MG Oral Tablet; Take 1 tablet daily; Therapy: 56FVY2250 to (Last Rx:51Ags5198)  Requested for: 81Lqc8593 Ordered   4  Fenofibrate 145 MG Oral Tablet; Take 1 tablet daily; Therapy: 05NLW7040 to (Pamela Go)  Requested for: 27XFL5622; Last   Rx:05Oct2017 Ordered   5  Fluticasone Propionate 50 MCG/ACT Nasal Suspension; 2 sprays each nostril daily; Therapy: 45MCZ0364 to (Last Rx:10Mar2017)  Requested for: 42JLF2312 Ordered   6  Levothyroxine Sodium 75 MCG Oral Tablet; take one tablet by mouth every day; Therapy: 56OIX6811 to (Evaluate:08Frh0145)  Requested for: 85PQY6370; Last   Rx:19Jun2017 Ordered   7  Low-Ogestrel 0 3-30 MG-MCG Oral Tablet; TAKE 1 TABLET DAILY AS DIRECTED; Therapy: 23OXF9984 to (Evaluate:15Jun2016)  Requested for: 43NWD8155; Last   Rx:63Dth1329 Ordered   8  Metoprolol Succinate ER 25 MG Oral Tablet Extended Release 24 Hour; Take 1 tablet   twice daily;    Therapy: 32EYE6282 to (Last Rx:20Oct2017)  Requested for: 37RKY3403 Ordered   9  Metoprolol Tartrate 50 MG Oral Tablet; Take 1 tablet, 1 hour prior to procedure; Therapy: 89DHO9965 to (Last Rx:16Oct2017)  Requested for: 16Oct2017 Ordered   10  Nitroglycerin 0 4 MG Sublingual Tablet Sublingual; DISSOLVE 1 TABLET UNDER THE    TONGUE AS NEEDED FOR CHEST PAIN;    Therapy: 08BVW1903 to [facility])  Requested for: 45NSX7059; Last    Rx:05Oct2017 Ordered   11  Omega-3-acid Ethyl Esters 1 GM Oral Capsule; take 4 capsules daily; Therapy: 89Qfs7233 to (Evaluate:50Spi1423)  Requested for: 76Szn3950; Last    Rx:40Jso6773 Ordered   12  Pantoprazole Sodium 40 MG Oral Tablet Delayed Release; take one tablet by mouth    every day; Therapy: 97POG1065 to (Last Rx:21Nov2017)  Requested for: 21Nov2017 Ordered   13  TraMADol HCl - 50 MG Oral Tablet; TAKE 1 TABLET EVERY 12 HOURS as needed; Therapy: 09FQL2613 to (Evaluate:11Nov2017)  Requested for: 78AWD0351; Last    Rx:12Oct2017 Ordered   15  Verapamil HCl  MG Oral Capsule Extended Release 24 Hour; TAKE 1 CAPSULE    BY MOUTH EVERY MORNING BEFORE BREAKFAST; Therapy: 11NUV7726 to ([facility])  Requested for: 48RIZ2062; Last    Rx:05Oct2017 Ordered    The medication list was reviewed and updated today  Allergies    1  Bactrim TABS   2  Erythromycin Base TABS   3  Flagyl CAPS    Vitals   Recorded: 21Nov2017 03:09PM   Heart Rate 74    Systolic 547, RUE, Sitting    Diastolic 74, RUE, Sitting    Height 5 ft 3 in    Patient Refused Weight Yes Yes     Physical Exam    Constitutional   General appearance: No acute distress, well appearing and well nourished  Eyes   Conjunctiva and Sclera examination: Conjunctiva pink, sclera anicteric  Ears, Nose, Mouth, and Throat - Oropharynx: Clear, nares are clear, mucous membranes are moist    Neck   Neck and thyroid: Normal, supple, trachea midline, no thyromegaly  Pulmonary   Respiratory effort: No increased work of breathing or signs of respiratory distress  Auscultation of lungs: Clear to auscultation, no rales, no rhonchi, no wheezing, good air movement  Cardiovascular   Auscultation of heart: Normal rate and rhythm, normal S1 and S2, no murmurs  Carotid pulses: Normal, 2+ bilaterally  Peripheral vascular exam: Normal pulses throughout, no tenderness, erythema or swelling  Pedal pulses: Normal, 2+ bilaterally  Examination of extremities for edema and/or varicosities: Normal     Abdomen   Abdomen: Non-tender and no distention  Liver and spleen: No hepatomegaly or splenomegaly  Musculoskeletal Gait and station: Normal gait  Digits and nails: Normal without clubbing or cyanosis  Inspection/palpation of joints, bones, and muscles: Normal, ROM normal     Skin - Skin and subcutaneous tissue: Normal without rashes or lesions  Skin is warm and well perfused, normal turgor  Neurologic - Cranial nerves: II - XII intact  Speech: Normal     Psychiatric - Orientation to person, place, and time: Normal  Mood and affect: Normal       Assessment    1  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)    Plan  Anomalous coronary artery origin    · Metoprolol Succinate ER 25 MG Oral Tablet Extended Release 24 Hour; Take 1  tablet twice daily   Rx By: Zoraida Campos; Dispense: 90 Days ; #:180 Tablet Extended Release 24 Hour; Refill: 3; For: Anomalous coronary artery origin; LEONARDO = N; Verified Transmission to [address]; Last Updated By: SystemAllurent; 11/21/2017 3:38:01 PM   · Follow-up visit in 6 months Evaluation and Treatment  Follow-up  Status: Complete   Done: 13NOW0739   Ordered; For: Anomalous coronary artery origin; Ordered By: Zoraida Campos Performed:  Due: 39ILD7601; Last Updated By: Neo Donis; 11/21/2017 3:37:49 PM  Combined hyperlipidemia, PMH: History of coronary vasospasm    · Fenofibrate 145 MG Oral Tablet (Tricor); Take 1 tablet daily   Rx By: Zoraida Campos; Dispense: 90 Days ; #:90 Tablet;  Refill: 3; For: Combined hyperlipidemia, PMH: History of coronary vasospasm; LEONARDO = N; Verified Transmission to [address]; Last Updated By: System, SureScripts; 11/21/2017 3:38:01 PM  PMH: History of coronary vasospasm    · Verapamil HCl  MG Oral Capsule Extended Release 24 Hour; TAKE 1  CAPSULE BY MOUTH EVERY MORNING BEFORE BREAKFAST   Rx By: Tadeo Cabrera; Dispense: 90 Days ; #:90 Capsule Extended Release 24 Hour; Refill: 3; For: PMH: History of coronary vasospasm; LEONARDO = N; Verified Transmission to [address]; Last Updated By: System, SureScript[employer]; 11/21/2017 3:38:00 PM    Discussion/Summary    Admission for chest pain  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

## 2018-01-18 NOTE — RESULT NOTES
Message  Ultrasound of abdomen showed fatty liver    Patient notified  Continue lifestyle changes  We'll continue to monitor LFTs  Verified Results  US LIVER [id number] Modality Order Number: [id number]    - Patient Instructions: To schedule this appointment, please contact Central Scheduling at [phone]  Test Name Result Flag Reference   US LIVER (Report)     RIGHT UPPER QUADRANT ULTRASOUND     INDICATION: Elevated LFTs  COMPARISON: Abdominal ultrasound 7/20/2005     TECHNIQUE:  Real-time ultrasound of the right upper quadrant was performed with a curvilinear transducer with both volumetric sweeps and still imaging techniques  FINDINGS:     PANCREAS: Portions of the pancreas are obscured by bowel gas  Visualized portions of the pancreas are unremarkable  AORTA AND IVC: Visualized portions are normal for patient age  LIVER:   Size: Within normal range  The liver measures 12 7 cm in the midclavicular line  Contour: Surface contour is smooth  Parenchyma: There is mild diffuse increased echogenicity with smooth echotexture, without significant beam attenuation or loss of periportal echogenicity  Most consistent with mild hepatic steatosis  No evidence of suspicious mass  Limited imaging of the main portal vein shows it to be patent and hepatopedal       BILIARY:   The gallbladder is surgically absent  No intrahepatic biliary dilatation  CBD measures 4 8 mm  No choledocholithiasis  KIDNEY:    Right kidney measures 11 x 5 5 cm  Within normal limits  ASCITES:  None  IMPRESSION:   1  Hepatic steatosis  2  Status post cholecystectomy  3  Limited visualization of the pancreas secondary to adjacent bowel gas  Workstation performed: [id number]     Signed by:   Ari Ramos MD   9/9/17       Signatures   Electronically signed by :  Tamela Kohler DO; Sep 10 2017 11:44AM EST                       (Author)

## 2018-01-22 VITALS — DIASTOLIC BLOOD PRESSURE: 74 MMHG | HEIGHT: 63 IN | HEART RATE: 74 BPM | SYSTOLIC BLOOD PRESSURE: 120 MMHG

## 2018-01-22 VITALS — DIASTOLIC BLOOD PRESSURE: 84 MMHG | SYSTOLIC BLOOD PRESSURE: 140 MMHG

## 2018-01-31 DIAGNOSIS — J01.90 ACUTE SINUSITIS, RECURRENCE NOT SPECIFIED, UNSPECIFIED LOCATION: Primary | ICD-10-CM

## 2018-01-31 RX ORDER — TRAMADOL HYDROCHLORIDE 50 MG/1
50 TABLET ORAL EVERY 12 HOURS PRN
Qty: 30 TABLET | Refills: 0 | OUTPATIENT
Start: 2018-01-31 | End: 2018-03-19 | Stop reason: SDUPTHER

## 2018-01-31 RX ORDER — TRAMADOL HYDROCHLORIDE 50 MG/1
1 TABLET ORAL EVERY 12 HOURS PRN
COMMUNITY
Start: 2014-07-31 | End: 2018-01-31 | Stop reason: SDUPTHER

## 2018-02-02 ENCOUNTER — TRANSCRIBE ORDERS (OUTPATIENT)
Dept: ADMINISTRATIVE | Facility: HOSPITAL | Age: 48
End: 2018-02-02

## 2018-02-02 ENCOUNTER — LAB (OUTPATIENT)
Dept: LAB | Facility: HOSPITAL | Age: 48
End: 2018-02-02
Payer: COMMERCIAL

## 2018-02-02 DIAGNOSIS — E11.9 DIABETES MELLITUS WITHOUT COMPLICATION (HCC): ICD-10-CM

## 2018-02-02 DIAGNOSIS — R74.02 NONSPECIFIC ELEVATION OF LEVELS OF TRANSAMINASE OR LACTIC ACID DEHYDROGENASE (LDH): ICD-10-CM

## 2018-02-02 DIAGNOSIS — R74.01 NONSPECIFIC ELEVATION OF LEVELS OF TRANSAMINASE OR LACTIC ACID DEHYDROGENASE (LDH): ICD-10-CM

## 2018-02-02 DIAGNOSIS — E78.2 MIXED HYPERLIPIDEMIA: Primary | ICD-10-CM

## 2018-02-02 DIAGNOSIS — E78.2 MIXED HYPERLIPIDEMIA: ICD-10-CM

## 2018-02-02 LAB
ALBUMIN SERPL BCP-MCNC: 3.7 G/DL (ref 3.5–5)
ALP SERPL-CCNC: 63 U/L (ref 46–116)
ALT SERPL W P-5'-P-CCNC: 55 U/L (ref 12–78)
ANION GAP SERPL CALCULATED.3IONS-SCNC: 10 MMOL/L (ref 4–13)
AST SERPL W P-5'-P-CCNC: 83 U/L (ref 5–45)
BILIRUB SERPL-MCNC: 0.26 MG/DL (ref 0.2–1)
BUN SERPL-MCNC: 14 MG/DL (ref 5–25)
CALCIUM SERPL-MCNC: 8.6 MG/DL (ref 8.3–10.1)
CHLORIDE SERPL-SCNC: 101 MMOL/L (ref 100–108)
CHOLEST SERPL-MCNC: 212 MG/DL (ref 50–200)
CO2 SERPL-SCNC: 25 MMOL/L (ref 21–32)
CREAT SERPL-MCNC: 0.82 MG/DL (ref 0.6–1.3)
EST. AVERAGE GLUCOSE BLD GHB EST-MCNC: 166 MG/DL
GFR SERPL CREATININE-BSD FRML MDRD: 85 ML/MIN/1.73SQ M
GLUCOSE P FAST SERPL-MCNC: 143 MG/DL (ref 65–99)
HBA1C MFR BLD: 7.4 % (ref 4.2–6.3)
HDLC SERPL-MCNC: 39 MG/DL (ref 40–60)
LDLC SERPL CALC-MCNC: 117 MG/DL (ref 0–100)
POTASSIUM SERPL-SCNC: 4.4 MMOL/L (ref 3.5–5.3)
PROT SERPL-MCNC: 7.3 G/DL (ref 6.4–8.2)
SODIUM SERPL-SCNC: 136 MMOL/L (ref 136–145)
TRIGL SERPL-MCNC: 279 MG/DL

## 2018-02-02 PROCEDURE — 36415 COLL VENOUS BLD VENIPUNCTURE: CPT

## 2018-02-02 PROCEDURE — 80053 COMPREHEN METABOLIC PANEL: CPT

## 2018-02-02 PROCEDURE — 80061 LIPID PANEL: CPT

## 2018-02-02 PROCEDURE — 83036 HEMOGLOBIN GLYCOSYLATED A1C: CPT

## 2018-02-07 PROBLEM — R91.8 GROUND GLASS OPACITY PRESENT ON IMAGING OF LUNG: Status: ACTIVE | Noted: 2017-02-06

## 2018-02-07 PROBLEM — K21.9 GERD WITHOUT ESOPHAGITIS: Status: ACTIVE | Noted: 2017-11-21

## 2018-02-07 PROBLEM — E11.9 DIABETES MELLITUS (HCC): Status: ACTIVE | Noted: 2017-01-09

## 2018-02-07 PROBLEM — Q24.5 ANOMALOUS CORONARY ARTERY ORIGIN: Status: ACTIVE | Noted: 2017-10-20

## 2018-02-07 PROBLEM — R00.2 HEART PALPITATIONS: Status: ACTIVE | Noted: 2017-10-20

## 2018-02-07 PROBLEM — R74.01 ELEVATED AST (SGOT): Status: ACTIVE | Noted: 2017-01-19

## 2018-02-07 PROBLEM — I20.1 CORONARY VASOSPASM (HCC): Status: ACTIVE | Noted: 2017-10-12

## 2018-02-07 PROBLEM — I21.4 NSTEMI (NON-ST ELEVATED MYOCARDIAL INFARCTION) (HCC): Status: ACTIVE | Noted: 2017-10-11

## 2018-02-07 RX ORDER — METOPROLOL SUCCINATE 25 MG/1
1 TABLET, EXTENDED RELEASE ORAL 2 TIMES DAILY
COMMUNITY
Start: 2017-10-20 | End: 2018-12-10 | Stop reason: SDUPTHER

## 2018-02-07 RX ORDER — DICYCLOMINE HCL 20 MG
1 TABLET ORAL EVERY 6 HOURS PRN
COMMUNITY
Start: 2012-10-15 | End: 2018-07-18 | Stop reason: SDUPTHER

## 2018-02-07 RX ORDER — ESCITALOPRAM OXALATE 20 MG/1
10 TABLET ORAL DAILY
COMMUNITY
Start: 2013-07-23 | End: 2018-11-02 | Stop reason: SDUPTHER

## 2018-02-07 RX ORDER — FLUTICASONE PROPIONATE 50 MCG
2 SPRAY, SUSPENSION (ML) NASAL 2 TIMES DAILY PRN
COMMUNITY
Start: 2014-01-05 | End: 2020-07-09 | Stop reason: ALTCHOICE

## 2018-02-07 RX ORDER — LEVOTHYROXINE SODIUM 0.07 MG/1
1 TABLET ORAL DAILY
COMMUNITY
Start: 2014-02-06 | End: 2018-05-09 | Stop reason: SDUPTHER

## 2018-02-07 RX ORDER — PANTOPRAZOLE SODIUM 40 MG/1
1 TABLET, DELAYED RELEASE ORAL DAILY
COMMUNITY
Start: 2012-11-13 | End: 2018-05-27 | Stop reason: SDUPTHER

## 2018-02-07 RX ORDER — FENOFIBRATE 145 MG/1
1 TABLET, COATED ORAL DAILY
COMMUNITY
Start: 2017-10-05 | End: 2018-03-19 | Stop reason: SDUPTHER

## 2018-02-07 RX ORDER — VERAPAMIL HYDROCHLORIDE 120 MG/1
CAPSULE, EXTENDED RELEASE ORAL
COMMUNITY
Start: 2017-10-05 | End: 2018-03-19 | Stop reason: ALTCHOICE

## 2018-02-08 ENCOUNTER — OFFICE VISIT (OUTPATIENT)
Dept: FAMILY MEDICINE CLINIC | Facility: CLINIC | Age: 48
End: 2018-02-08
Payer: COMMERCIAL

## 2018-02-08 VITALS
BODY MASS INDEX: 32.81 KG/M2 | DIASTOLIC BLOOD PRESSURE: 84 MMHG | HEART RATE: 86 BPM | RESPIRATION RATE: 17 BRPM | WEIGHT: 185.19 LBS | HEIGHT: 63 IN | OXYGEN SATURATION: 98 % | TEMPERATURE: 98.2 F | SYSTOLIC BLOOD PRESSURE: 130 MMHG

## 2018-02-08 DIAGNOSIS — E11.8 TYPE 2 DIABETES MELLITUS WITH COMPLICATION, WITHOUT LONG-TERM CURRENT USE OF INSULIN (HCC): Primary | ICD-10-CM

## 2018-02-08 DIAGNOSIS — Q24.5 ANOMALOUS CORONARY ARTERY ORIGIN: ICD-10-CM

## 2018-02-08 DIAGNOSIS — E06.3 HYPOTHYROIDISM DUE TO HASHIMOTO'S THYROIDITIS: ICD-10-CM

## 2018-02-08 DIAGNOSIS — E03.8 HYPOTHYROIDISM DUE TO HASHIMOTO'S THYROIDITIS: ICD-10-CM

## 2018-02-08 DIAGNOSIS — K76.0 FATTY LIVER: Chronic | ICD-10-CM

## 2018-02-08 DIAGNOSIS — R74.01 ELEVATED AST (SGOT): ICD-10-CM

## 2018-02-08 DIAGNOSIS — E78.2 COMBINED HYPERLIPIDEMIA: ICD-10-CM

## 2018-02-08 PROCEDURE — 99214 OFFICE O/P EST MOD 30 MIN: CPT | Performed by: FAMILY MEDICINE

## 2018-02-08 RX ORDER — NITROGLYCERIN 0.4 MG/1
1 TABLET SUBLINGUAL
COMMUNITY
Start: 2017-10-05 | End: 2018-02-08 | Stop reason: SDUPTHER

## 2018-02-08 RX ORDER — OMEGA-3-ACID ETHYL ESTERS 1 G/1
4 CAPSULE, LIQUID FILLED ORAL DAILY
Qty: 360 CAPSULE | Refills: 3 | Status: SHIPPED | OUTPATIENT
Start: 2018-02-08 | End: 2019-01-27 | Stop reason: SDUPTHER

## 2018-02-08 RX ORDER — OMEGA-3-ACID ETHYL ESTERS 1 G/1
4 CAPSULE, LIQUID FILLED ORAL DAILY
COMMUNITY
Start: 2017-08-30 | End: 2018-02-08 | Stop reason: SDUPTHER

## 2018-02-08 NOTE — ASSESSMENT & PLAN NOTE
AST currently 83, was 59  Recent ultrasound of liver showed fatty infiltration  Patient has cut down on alcohol, only drinking a few per month  Recommend continue diet, exercise, and weight loss    Will continue to follow

## 2018-02-08 NOTE — ASSESSMENT & PLAN NOTE
With fatty infiltration of liver  Cholesterol 212/117, triglycerides 279 (were 557)  Have improved since last blood draw  Continue fenofibrate 145 along with prescription Omega 3, 4 capsules daily    Will continue to monitor

## 2018-02-08 NOTE — PROGRESS NOTES
Assessment/Plan:    Diabetes mellitus (HCC)  A1c now 7 4%, was 7 1%  I would recommend starting medication at this time  Rx given for metformin 500 mg b i d   Continue diet and exercise  Repeat labs in 3 months followed by appointment    Combined hyperlipidemia  With fatty infiltration of liver  Cholesterol 212/117, triglycerides 279 (were 557)  Have improved since last blood draw  Continue fenofibrate 145 along with prescription Omega 3, 4 capsules daily  Will continue to monitor    Hypothyroid  Doing well on levothyroxine 75 mcg daily  Will recheck level in 3 months    Anomalous coronary artery origin  Cardiology consult letter is were reviewed  Continue metoprolol  Continue follow-up with Dr River Royal  Elevated AST (SGOT)  AST currently 83, was 59  Recent ultrasound of liver showed fatty infiltration  Patient has cut down on alcohol, only drinking a few per month  Recommend continue diet, exercise, and weight loss  Will continue to follow        Diagnoses and all orders for this visit:    Type 2 diabetes mellitus with complication, without long-term current use of insulin ([address] )  -     Hemoglobin A1c; Future  -     metFORMIN (GLUCOPHAGE) 500 mg tablet; 1 tab daily x 7 days, then 1 tab BID    Fatty liver  -     Comprehensive metabolic panel; Future    Elevated AST (SGOT)  -     Comprehensive metabolic panel; Future    Hypothyroidism due to Hashimoto's thyroiditis  -     TSH, 3rd generation with T4 reflex; Future    Combined hyperlipidemia  -     omega-3-acid ethyl esters (LOVAZA) 1 g capsule; Take 4 capsules (4 g total) by mouth daily    Anomalous coronary artery origin    Other orders  -     dicyclomine (BENTYL) 20 mg tablet; Take 1 tablet by mouth 2 (two) times a day  -     escitalopram (LEXAPRO) 20 mg tablet;  Take 1 tablet by mouth daily  -     fluticasone (FLONASE) 50 mcg/act nasal spray; 2 sprays into each nostril daily  -     norgestrel-ethinyl estradiol (LOW-OGESTREL) 0 3 mg-30 mcg per tablet; Take 1 tablet by mouth daily  -     metoprolol succinate (TOPROL-XL) 25 mg 24 hr tablet; Take 1 tablet by mouth 2 (two) times a day  -     verapamil (VERELAN PM) 120 MG 24 hr capsule; Take by mouth  -     fenofibrate (TRICOR) 145 mg tablet; Take 1 tablet by mouth daily  -     levothyroxine 75 mcg tablet; Take 1 tablet by mouth daily  -     pantoprazole (PROTONIX) 40 mg tablet; Take 1 tablet by mouth daily  -     Discontinue: omega-3-acid ethyl esters (LOVAZA) 1 g capsule; Take 4 capsules by mouth daily  -     Discontinue: nitroglycerin (NITROSTAT) 0 4 mg SL tablet; Place 1 tablet under the tongue          Vitals:    02/08/18 1622   BP: 130/84   Pulse: 86   Resp: 17   Temp: 98 2 °F (36 8 °C)   SpO2: 98%     Fasting blood work in 3 months followed by appointment (cmp,a1c,tsh)  Subjective:      Patient ID: Preston Jimenez is a 52 y o  female  Patient presents for recheck of chronic medical problems today  Karan Button has been trying to watch her diet  She has cut back on alcohol, and only has a few drinks per month  She is doing well on all prescribed medications without side effects  She had labs drawn on February 2nd        The following portions of the patient's history were reviewed and updated as appropriate: allergies, current medications, past family history, past medical history, past social history, past surgical history and problem list     Review of Systems   Respiratory: Negative  Cardiovascular: Negative  Gastrointestinal: Negative  Genitourinary: Negative  Objective:     Physical Exam   Cardiovascular: Normal rate, regular rhythm, normal heart sounds and intact distal pulses  Carotids: no bruits  Ext: no edema   Pulmonary/Chest: Effort normal  No respiratory distress  She has no wheezes  She has no rales  Psychiatric: She has a normal mood and affect   Her behavior is normal  Thought content normal

## 2018-02-08 NOTE — ASSESSMENT & PLAN NOTE
Cardiology consult letter is were reviewed  Continue metoprolol  Continue follow-up with Dr Keegan Dinh

## 2018-02-08 NOTE — ASSESSMENT & PLAN NOTE
A1c now 7 4%, was 7 1%  I would recommend starting medication at this time  Rx given for metformin 500 mg b i d   Continue diet and exercise    Repeat labs in 3 months followed by appointment

## 2018-03-19 DIAGNOSIS — E78.00 HYPERCHOLESTEREMIA: Primary | ICD-10-CM

## 2018-03-19 DIAGNOSIS — J01.90 ACUTE SINUSITIS, RECURRENCE NOT SPECIFIED, UNSPECIFIED LOCATION: ICD-10-CM

## 2018-03-19 DIAGNOSIS — R07.9 CHEST PAIN: Primary | ICD-10-CM

## 2018-03-19 RX ORDER — AMLODIPINE BESYLATE 2.5 MG/1
2.5 TABLET ORAL DAILY
Qty: 30 TABLET | Refills: 5 | Status: SHIPPED | OUTPATIENT
Start: 2018-03-19 | End: 2018-06-07 | Stop reason: SDUPTHER

## 2018-03-19 RX ORDER — TRAMADOL HYDROCHLORIDE 50 MG/1
50 TABLET ORAL EVERY 12 HOURS PRN
Qty: 60 TABLET | Refills: 0 | OUTPATIENT
Start: 2018-03-19 | End: 2018-04-25 | Stop reason: SDUPTHER

## 2018-03-19 RX ORDER — FENOFIBRATE 145 MG/1
145 TABLET, COATED ORAL DAILY
Qty: 90 TABLET | Refills: 0 | Status: SHIPPED | OUTPATIENT
Start: 2018-03-19 | End: 2018-06-30 | Stop reason: SDUPTHER

## 2018-04-10 ENCOUNTER — APPOINTMENT (OUTPATIENT)
Dept: LAB | Facility: HOSPITAL | Age: 48
End: 2018-04-10

## 2018-04-10 ENCOUNTER — TRANSCRIBE ORDERS (OUTPATIENT)
Dept: ADMINISTRATIVE | Facility: HOSPITAL | Age: 48
End: 2018-04-10

## 2018-04-10 DIAGNOSIS — Z11.1 SCREENING EXAMINATION FOR PULMONARY TUBERCULOSIS: ICD-10-CM

## 2018-04-10 DIAGNOSIS — Z11.1 SCREENING EXAMINATION FOR PULMONARY TUBERCULOSIS: Primary | ICD-10-CM

## 2018-04-10 PROCEDURE — 86480 TB TEST CELL IMMUN MEASURE: CPT

## 2018-04-10 PROCEDURE — 36415 COLL VENOUS BLD VENIPUNCTURE: CPT

## 2018-04-12 LAB
ANNOTATION COMMENT IMP: NORMAL
GAMMA INTERFERON BACKGROUND BLD IA-ACNC: 0.03 IU/ML
M TB IFN-G BLD-IMP: NEGATIVE
M TB IFN-G CD4+ BCKGRND COR BLD-ACNC: 0 IU/ML
M TB IFN-G CD4+ T-CELLS BLD-ACNC: 0.03 IU/ML
MITOGEN IGNF BLD-ACNC: 5.86 IU/ML
QUANTIFERON-TB GOLD IN TUBE: NORMAL
SERVICE CMNT-IMP: NORMAL

## 2018-04-25 ENCOUNTER — TELEPHONE (OUTPATIENT)
Dept: FAMILY MEDICINE CLINIC | Facility: CLINIC | Age: 48
End: 2018-04-25

## 2018-04-25 DIAGNOSIS — J01.90 ACUTE SINUSITIS, RECURRENCE NOT SPECIFIED, UNSPECIFIED LOCATION: ICD-10-CM

## 2018-04-26 RX ORDER — TRAMADOL HYDROCHLORIDE 50 MG/1
TABLET ORAL
Qty: 60 TABLET | Refills: 0 | OUTPATIENT
Start: 2018-04-26 | End: 2018-06-14 | Stop reason: SDUPTHER

## 2018-05-02 ENCOUNTER — OFFICE VISIT (OUTPATIENT)
Dept: CARDIOLOGY CLINIC | Facility: CLINIC | Age: 48
End: 2018-05-02
Payer: COMMERCIAL

## 2018-05-02 VITALS
OXYGEN SATURATION: 98 % | DIASTOLIC BLOOD PRESSURE: 76 MMHG | WEIGHT: 181 LBS | SYSTOLIC BLOOD PRESSURE: 108 MMHG | HEART RATE: 73 BPM | BODY MASS INDEX: 32.07 KG/M2 | HEIGHT: 63 IN

## 2018-05-02 DIAGNOSIS — Q24.5 ANOMALOUS CORONARY ARTERY ORIGIN: Primary | ICD-10-CM

## 2018-05-02 PROCEDURE — 99214 OFFICE O/P EST MOD 30 MIN: CPT | Performed by: INTERNAL MEDICINE

## 2018-05-02 NOTE — PROGRESS NOTES
Cardiology Follow Up    Mario Hyman  1970  [id number]  HEART & VASCULAR [facility]  [address] [place] [address] [address]    No diagnosis found  Interval History: Followup for chest pain    Doing well  No chest pain, no dyspnea, no palpitations  Doing well on amlodipine  Problem List     Chest pain    Leukocytosis    Hypertension (Chronic)    Peptic ulcer disease (Chronic)    Fatty liver (Chronic)    Sinus tachycardia    Chronic sinusitis (Chronic)    Hyperglycemia    Anemia    Anomalous coronary artery origin    Anxiety    Combined hyperlipidemia    Coronary vasospasm (HCC)    Diabetes mellitus (HCC)    Elevated AST (SGOT)    GERD without esophagitis    Ground glass opacity present on imaging of lung    Heart palpitations    Hypothyroid    NSTEMI (non-ST elevated myocardial infarction) ([facility] [address] )        Past Medical History:   Diagnosis Date    Fatty liver     History of stomach ulcers     Hypothyroidism     Pre-diabetes      Social History     Social History    Marital status: /Civil Union     Spouse name: N/A    Number of children: N/A    Years of education: N/A     Occupational History    Not on file       Social History Main Topics    Smoking status: Former Smoker     Packs/day: 0 50     Years: 4 00     Types: Cigarettes     Quit date: 02/2016    Smokeless tobacco: Never Used    Alcohol use Yes      Comment: occasional    Drug use: No    Sexual activity: Yes     Partners: Male     Other Topics Concern    Not on file     Social History Narrative    No narrative on file      Family History   Problem Relation Age of Onset    Pancreatic cancer Mother     Hypertension Father     Diabetes Maternal Grandmother     Diabetes Paternal Grandmother     Heart disease Paternal Grandmother      Past Surgical History:   Procedure Laterality Date    CHOLECYSTECTOMY      SINUS SURGERY         Current Outpatient Prescriptions:     amLODIPine (NORVASC) 2 5 mg tablet, Take 1 tablet (2 5 mg total) by mouth daily, Disp: 30 tablet, Rfl: 5    dicyclomine (BENTYL) 20 mg tablet, Take 1 tablet by mouth 2 (two) times a day, Disp: , Rfl:     escitalopram (LEXAPRO) 20 mg tablet, Take 10 mg by mouth  , Disp: , Rfl:     fenofibrate (TRICOR) 145 mg tablet, Take 1 tablet (145 mg total) by mouth daily, Disp: 90 tablet, Rfl: 0    fluticasone (FLONASE) 50 mcg/act nasal spray, 2 sprays into each nostril daily, Disp: , Rfl:     levothyroxine 75 mcg tablet, Take 1 tablet by mouth daily, Disp: , Rfl:     metFORMIN (GLUCOPHAGE) 500 mg tablet, 1 tab daily x 7 days, then 1 tab BID, Disp: 60 tablet, Rfl: 3    metoprolol succinate (TOPROL-XL) 25 mg 24 hr tablet, Take 1 tablet by mouth 2 (two) times a day, Disp: , Rfl:     nitroglycerin (NITROSTAT) 0 4 mg SL tablet, Place 1 tablet under the tongue every 5 (five) minutes as needed for chest pain, Disp: 90 tablet, Rfl: 0    norgestrel-ethinyl estradiol (LOW-OGESTREL) 0 3 mg-30 mcg per tablet, Take 1 tablet by mouth daily, Disp: , Rfl:     omega-3-acid ethyl esters (LOVAZA) 1 g capsule, Take 4 capsules (4 g total) by mouth daily, Disp: 360 capsule, Rfl: 3    pantoprazole (PROTONIX) 40 mg tablet, Take 1 tablet by mouth daily, Disp: , Rfl:     traMADol (ULTRAM) 50 mg tablet, TAKE ONE TABLET BY MOUTH EVERY 12 HOURS AS NEEDED FOR PAIN, Disp: 60 tablet, Rfl: 0  Allergies   Allergen Reactions    Sulfamethoxazole-Trimethoprim Shortness Of Breath     Reaction Date: [id number];     Erythromycin      Reaction Date: [id number];     Metronidazole     Sulfa Antibiotics        Labs:     Chemistry        Component Value Date/Time     02/02/2018 0940     09/02/2015 1020    K 4 4 02/02/2018 0940    K 3 8 09/02/2015 1020     02/02/2018 0940     09/02/2015 1020    CO2 25 02/02/2018 0940    CO2 24 3 09/02/2015 1020    BUN 14 02/02/2018 0940    BUN 13 09/02/2015 1020    CREATININE 0 82 02/02/2018 0940    CREATININE 0 79 09/02/2015 1020        Component Value Date/Time    CALCIUM 8 6 02/02/2018 0940    CALCIUM 9 1 09/02/2015 1020    ALKPHOS 63 02/02/2018 0940    ALKPHOS 78 09/02/2015 1020    AST 83 (H) 02/02/2018 0940    AST 37 09/02/2015 1020    ALT 55 02/02/2018 0940    ALT 62 09/02/2015 1020    BILITOT 0 26 02/02/2018 0940    BILITOT 0 38 09/02/2015 1020            Lab Results   Component Value Date    CHOL 212 (H) 02/02/2018    CHOL 224 (H) 08/01/2017    CHOL 189 08/06/2016     Lab Results   Component Value Date    HDL 39 (L) 02/02/2018    HDL 38 (L) 08/01/2017    HDL 50 08/06/2016     Lab Results   Component Value Date    LDLCALC 117 (H) 02/02/2018    LDLCALC  08/01/2017      Comment:      Calculated LDL invalid, triglycerides >400 mg/dl    LDLCALC 77 08/06/2016     Lab Results   Component Value Date    TRIG 279 (H) 02/02/2018    TRIG 557 (H) 08/01/2017    TRIG 310 (H) 08/06/2016     No components found for: CHOLHDL    Imaging: No results found  Review of Systems   Constitution: Negative  HENT: Negative  Eyes: Negative  Cardiovascular: Negative  Respiratory: Negative  Endocrine: Negative  Hematologic/Lymphatic: Negative  Skin: Negative  Musculoskeletal: Negative  Gastrointestinal: Negative  Genitourinary: Negative  Neurological: Negative  Psychiatric/Behavioral: Negative  Allergic/Immunologic: Negative  Vitals:    05/02/18 1522   BP: 108/76   Pulse: 73   SpO2: 98%           Physical Exam   Constitutional: She is oriented to person, place, and time  No distress  HENT:   Mouth/Throat: No oropharyngeal exudate  Eyes: No scleral icterus  Neck: No JVD present  Cardiovascular: Normal rate and regular rhythm  No murmur heard  Pulmonary/Chest: Effort normal and breath sounds normal  No respiratory distress  She has no wheezes  Abdominal: Soft  Bowel sounds are normal  She exhibits no distension  There is no tenderness   There is no rebound  Musculoskeletal: She exhibits no edema  Neurological: She is alert and oriented to person, place, and time  Skin: Skin is warm and dry  She is not diaphoretic  Psychiatric: She has a normal mood and affect  Her behavior is normal        Discussion/Summary:    Anomalous circumflex coronary artery/ Coronary microvascular disease  She is doing much better on amlodipine rather than verapamil  Continue remainder of medical therapy  The patient was counseled regarding diagnostic results, instructions for management, risk factor reductions, impressions  total time of encounter was 25 minutes and 15 minutes was spent counseling

## 2018-05-08 ENCOUNTER — APPOINTMENT (OUTPATIENT)
Dept: LAB | Facility: CLINIC | Age: 48
End: 2018-05-08
Payer: COMMERCIAL

## 2018-05-08 DIAGNOSIS — E11.8 TYPE 2 DIABETES MELLITUS WITH COMPLICATION, WITHOUT LONG-TERM CURRENT USE OF INSULIN (HCC): ICD-10-CM

## 2018-05-08 DIAGNOSIS — K76.0 FATTY LIVER: Chronic | ICD-10-CM

## 2018-05-08 DIAGNOSIS — E06.3 HYPOTHYROIDISM DUE TO HASHIMOTO'S THYROIDITIS: ICD-10-CM

## 2018-05-08 DIAGNOSIS — E03.8 HYPOTHYROIDISM DUE TO HASHIMOTO'S THYROIDITIS: ICD-10-CM

## 2018-05-08 DIAGNOSIS — R74.01 ELEVATED AST (SGOT): ICD-10-CM

## 2018-05-08 LAB
ALBUMIN SERPL BCP-MCNC: 3.6 G/DL (ref 3.5–5)
ALP SERPL-CCNC: 51 U/L (ref 46–116)
ALT SERPL W P-5'-P-CCNC: 65 U/L (ref 12–78)
ANION GAP SERPL CALCULATED.3IONS-SCNC: 10 MMOL/L (ref 4–13)
AST SERPL W P-5'-P-CCNC: 62 U/L (ref 5–45)
BILIRUB SERPL-MCNC: 0.2 MG/DL (ref 0.2–1)
BUN SERPL-MCNC: 13 MG/DL (ref 5–25)
CALCIUM SERPL-MCNC: 8.8 MG/DL (ref 8.3–10.1)
CHLORIDE SERPL-SCNC: 102 MMOL/L (ref 100–108)
CO2 SERPL-SCNC: 25 MMOL/L (ref 21–32)
CREAT SERPL-MCNC: 0.95 MG/DL (ref 0.6–1.3)
EST. AVERAGE GLUCOSE BLD GHB EST-MCNC: 154 MG/DL
GFR SERPL CREATININE-BSD FRML MDRD: 72 ML/MIN/1.73SQ M
GLUCOSE P FAST SERPL-MCNC: 114 MG/DL (ref 65–99)
HBA1C MFR BLD: 7 % (ref 4.2–6.3)
POTASSIUM SERPL-SCNC: 4.4 MMOL/L (ref 3.5–5.3)
PROT SERPL-MCNC: 7 G/DL (ref 6.4–8.2)
SODIUM SERPL-SCNC: 137 MMOL/L (ref 136–145)
T4 FREE SERPL-MCNC: 1.08 NG/DL (ref 0.76–1.46)
TSH SERPL DL<=0.05 MIU/L-ACNC: 4.86 UIU/ML (ref 0.36–3.74)

## 2018-05-08 PROCEDURE — 84443 ASSAY THYROID STIM HORMONE: CPT

## 2018-05-08 PROCEDURE — 80053 COMPREHEN METABOLIC PANEL: CPT

## 2018-05-08 PROCEDURE — 84439 ASSAY OF FREE THYROXINE: CPT

## 2018-05-08 PROCEDURE — 83036 HEMOGLOBIN GLYCOSYLATED A1C: CPT

## 2018-05-08 PROCEDURE — 36415 COLL VENOUS BLD VENIPUNCTURE: CPT

## 2018-05-09 ENCOUNTER — OFFICE VISIT (OUTPATIENT)
Dept: FAMILY MEDICINE CLINIC | Facility: CLINIC | Age: 48
End: 2018-05-09
Payer: COMMERCIAL

## 2018-05-09 VITALS
BODY MASS INDEX: 32.89 KG/M2 | RESPIRATION RATE: 18 BRPM | HEART RATE: 77 BPM | DIASTOLIC BLOOD PRESSURE: 68 MMHG | OXYGEN SATURATION: 97 % | TEMPERATURE: 98.2 F | WEIGHT: 178.7 LBS | SYSTOLIC BLOOD PRESSURE: 122 MMHG | HEIGHT: 62 IN

## 2018-05-09 DIAGNOSIS — E06.3 HYPOTHYROIDISM DUE TO HASHIMOTO'S THYROIDITIS: ICD-10-CM

## 2018-05-09 DIAGNOSIS — E03.8 HYPOTHYROIDISM DUE TO HASHIMOTO'S THYROIDITIS: ICD-10-CM

## 2018-05-09 DIAGNOSIS — E78.2 COMBINED HYPERLIPIDEMIA: ICD-10-CM

## 2018-05-09 DIAGNOSIS — Q24.5 ANOMALOUS CORONARY ARTERY ORIGIN: ICD-10-CM

## 2018-05-09 DIAGNOSIS — E11.8 TYPE 2 DIABETES MELLITUS WITH COMPLICATION, WITHOUT LONG-TERM CURRENT USE OF INSULIN (HCC): Primary | ICD-10-CM

## 2018-05-09 DIAGNOSIS — R74.01 ELEVATED AST (SGOT): ICD-10-CM

## 2018-05-09 PROCEDURE — 99214 OFFICE O/P EST MOD 30 MIN: CPT | Performed by: FAMILY MEDICINE

## 2018-05-09 PROCEDURE — 3045F PR MOST RECENT HEMOGLOBIN A1C LEVEL 7.0-9.0%: CPT | Performed by: FAMILY MEDICINE

## 2018-05-09 RX ORDER — LEVOTHYROXINE SODIUM 0.1 MG/1
TABLET ORAL
Qty: 90 TABLET | Refills: 1 | Status: SHIPPED | OUTPATIENT
Start: 2018-05-09 | End: 2018-11-11 | Stop reason: SDUPTHER

## 2018-05-09 NOTE — ASSESSMENT & PLAN NOTE
AST is now 62, was 83  Recent ultrasound confirmed fatty liver  Patient has cut down alcohol consumption to 1-2 drinks per week    Will continue to follow

## 2018-05-09 NOTE — ASSESSMENT & PLAN NOTE
Diabetic control has improved since starting metformin at last visit (500 mg b i d)  A1c is now 7 0%, was 7 4%  Patient has also been watching her diet and exercising  She has lost approximately 3 lb since last visit  Recommend continue same dosage of metformin    Will recheck labs in 3 months

## 2018-05-09 NOTE — ASSESSMENT & PLAN NOTE
Doing well on fenofibrate 145 along with Rx Omega 3    Will recheck lipids prior to next visit in 3 months

## 2018-05-09 NOTE — PROGRESS NOTES
Assessment/Plan:    Diabetes mellitus ([facility] [address] )  Diabetic control has improved since starting metformin at last visit (500 mg b i d)  A1c is now 7 0%, was 7 4%  Patient has also been watching her diet and exercising  She has lost approximately 3 lb since last visit  Recommend continue same dosage of metformin  Will recheck labs in 3 months    Elevated AST (SGOT)  AST is now 62, was 83  Recent ultrasound confirmed fatty liver  Patient has cut down alcohol consumption to 1-2 drinks per week  Will continue to follow    Hypothyroid  TSH 4 86  Will increase levothyroxine from 75 to 100 mcg daily  Will repeat level in 3 months    Combined hyperlipidemia  Doing well on fenofibrate 145 along with Rx Omega 3  Will recheck lipids prior to next visit in 3 months    Anomalous coronary artery origin  Patient is still experiencing some chest pains  Was started now on amlodipine 2 5 mg daily, along with nitroglycerin  Recommend continue regular follow-up with her cardiologist, Dr Germania Avalos       Diagnoses and all orders for this visit:    Type 2 diabetes mellitus with complication, without long-term current use of insulin (HCC)  -     metFORMIN (GLUCOPHAGE) 500 mg tablet; 1 tab BID  -     Comprehensive metabolic panel; Future  -     HEMOGLOBIN A1C W/ EAG ESTIMATION; Future    Elevated AST (SGOT)    Anomalous coronary artery origin    Hypothyroidism due to Hashimoto's thyroiditis  -     levothyroxine 100 mcg tablet; 1 tab daily  -     TSH, 3rd generation with T4 reflex; Future    Combined hyperlipidemia  -     Lipid Panel with Direct LDL reflex; Future        Rx given for fasting blood work in 3 months followed by appointment  If levels have improved, will change visits to every 6 months      Subjective:      Patient ID: Sammy Bhakta is a 52 y o  female  Patient presents for recheck of chronic medical problems today  She is feeling well since starting metformin 500 mg b i d  few months ago    She has also been watching her diet and exercising more  She has cut down her alcohol consumption to approximately 1-2 drinks per week  Still takes levothyroxine regularly for her thyroid condition  Patient had labs drawn on May 8th        The following portions of the patient's history were reviewed and updated as appropriate: allergies, current medications, past family history, past medical history, past social history, past surgical history and problem list     Review of Systems   Respiratory: Negative  Cardiovascular: Negative  Gastrointestinal: Negative  Genitourinary: Negative  Objective:      /68   Pulse 77   Temp 98 2 °F (36 8 °C) (Tympanic)   Resp 18   Ht 5' 2 21" (1 58 m)   Wt 81 1 kg (178 lb 11 2 oz)   SpO2 97%   Breastfeeding? No   BMI 32 47 kg/m²          Physical Exam   Cardiovascular: Normal rate, regular rhythm, normal heart sounds and intact distal pulses  Carotids: no bruits  Ext: no edema   Pulmonary/Chest: Effort normal  No respiratory distress  She has no wheezes  She has no rales  Psychiatric: She has a normal mood and affect   Her behavior is normal  Thought content normal

## 2018-05-09 NOTE — ASSESSMENT & PLAN NOTE
Patient is still experiencing some chest pains  Was started now on amlodipine 2 5 mg daily, along with nitroglycerin    Recommend continue regular follow-up with her cardiologist, Dr Branden Kebede

## 2018-05-27 DIAGNOSIS — K21.9 GASTROESOPHAGEAL REFLUX DISEASE WITHOUT ESOPHAGITIS: Primary | ICD-10-CM

## 2018-05-27 RX ORDER — PANTOPRAZOLE SODIUM 40 MG/1
TABLET, DELAYED RELEASE ORAL
Qty: 90 TABLET | Refills: 1 | Status: SHIPPED | OUTPATIENT
Start: 2018-05-27 | End: 2018-08-13 | Stop reason: SDUPTHER

## 2018-06-02 ENCOUNTER — APPOINTMENT (EMERGENCY)
Dept: RADIOLOGY | Facility: HOSPITAL | Age: 48
End: 2018-06-02
Payer: COMMERCIAL

## 2018-06-02 ENCOUNTER — HOSPITAL ENCOUNTER (OUTPATIENT)
Facility: HOSPITAL | Age: 48
Setting detail: OBSERVATION
Discharge: HOME | End: 2018-06-03
Attending: EMERGENCY MEDICINE | Admitting: HOSPITALIST
Payer: COMMERCIAL

## 2018-06-02 DIAGNOSIS — I20.1 CORONARY VASOSPASM (HCC): ICD-10-CM

## 2018-06-02 DIAGNOSIS — R07.9 CHEST PAIN: Primary | ICD-10-CM

## 2018-06-02 DIAGNOSIS — Q24.5 ANOMALOUS CORONARY ARTERY ORIGIN: ICD-10-CM

## 2018-06-02 LAB
ALBUMIN SERPL BCP-MCNC: 3.8 G/DL (ref 3.5–5)
ALP SERPL-CCNC: 60 U/L (ref 46–116)
ALT SERPL W P-5'-P-CCNC: 65 U/L (ref 12–78)
ANION GAP SERPL CALCULATED.3IONS-SCNC: 6 MMOL/L (ref 4–13)
AST SERPL W P-5'-P-CCNC: 72 U/L (ref 5–45)
ATRIAL RATE: 72 BPM
BASOPHILS # BLD AUTO: 0.09 THOUSANDS/ΜL (ref 0–0.1)
BASOPHILS NFR BLD AUTO: 1 % (ref 0–1)
BILIRUB SERPL-MCNC: 0.25 MG/DL (ref 0.2–1)
BUN SERPL-MCNC: 13 MG/DL (ref 5–25)
CALCIUM SERPL-MCNC: 9.6 MG/DL (ref 8.3–10.1)
CHLORIDE SERPL-SCNC: 104 MMOL/L (ref 100–108)
CO2 SERPL-SCNC: 27 MMOL/L (ref 21–32)
CREAT SERPL-MCNC: 0.83 MG/DL (ref 0.6–1.3)
EOSINOPHIL # BLD AUTO: 0.42 THOUSAND/ΜL (ref 0–0.61)
EOSINOPHIL NFR BLD AUTO: 5 % (ref 0–6)
ERYTHROCYTE [DISTWIDTH] IN BLOOD BY AUTOMATED COUNT: 13.1 % (ref 11.6–15.1)
GFR SERPL CREATININE-BSD FRML MDRD: 84 ML/MIN/1.73SQ M
GLUCOSE SERPL-MCNC: 104 MG/DL (ref 65–140)
HCT VFR BLD AUTO: 37.7 % (ref 34.8–46.1)
HGB BLD-MCNC: 12.6 G/DL (ref 11.5–15.4)
IMM GRANULOCYTES # BLD AUTO: 0.01 THOUSAND/UL (ref 0–0.2)
IMM GRANULOCYTES NFR BLD AUTO: 0 % (ref 0–2)
LYMPHOCYTES # BLD AUTO: 2.42 THOUSANDS/ΜL (ref 0.6–4.47)
LYMPHOCYTES NFR BLD AUTO: 31 % (ref 14–44)
MCH RBC QN AUTO: 30.7 PG (ref 26.8–34.3)
MCHC RBC AUTO-ENTMCNC: 33.4 G/DL (ref 31.4–37.4)
MCV RBC AUTO: 92 FL (ref 82–98)
MONOCYTES # BLD AUTO: 0.72 THOUSAND/ΜL (ref 0.17–1.22)
MONOCYTES NFR BLD AUTO: 9 % (ref 4–12)
NEUTROPHILS # BLD AUTO: 4.28 THOUSANDS/ΜL (ref 1.85–7.62)
NEUTS SEG NFR BLD AUTO: 54 % (ref 43–75)
NRBC BLD AUTO-RTO: 0 /100 WBCS
P AXIS: 51 DEGREES
PLATELET # BLD AUTO: 272 THOUSANDS/UL (ref 149–390)
PMV BLD AUTO: 9.3 FL (ref 8.9–12.7)
POTASSIUM SERPL-SCNC: 4.1 MMOL/L (ref 3.5–5.3)
PR INTERVAL: 148 MS
PROT SERPL-MCNC: 7.7 G/DL (ref 6.4–8.2)
QRS AXIS: 38 DEGREES
QRSD INTERVAL: 82 MS
QT INTERVAL: 388 MS
QTC INTERVAL: 424 MS
RBC # BLD AUTO: 4.1 MILLION/UL (ref 3.81–5.12)
SODIUM SERPL-SCNC: 137 MMOL/L (ref 136–145)
T WAVE AXIS: 52 DEGREES
TROPONIN I SERPL-MCNC: <0.02 NG/ML
TROPONIN I SERPL-MCNC: <0.02 NG/ML
VENTRICULAR RATE: 72 BPM
WBC # BLD AUTO: 7.94 THOUSAND/UL (ref 4.31–10.16)

## 2018-06-02 PROCEDURE — 93010 ELECTROCARDIOGRAM REPORT: CPT | Performed by: INTERNAL MEDICINE

## 2018-06-02 PROCEDURE — 93005 ELECTROCARDIOGRAM TRACING: CPT

## 2018-06-02 PROCEDURE — 80053 COMPREHEN METABOLIC PANEL: CPT | Performed by: EMERGENCY MEDICINE

## 2018-06-02 PROCEDURE — 71046 X-RAY EXAM CHEST 2 VIEWS: CPT

## 2018-06-02 PROCEDURE — 99285 EMERGENCY DEPT VISIT HI MDM: CPT

## 2018-06-02 PROCEDURE — 84484 ASSAY OF TROPONIN QUANT: CPT | Performed by: HOSPITALIST

## 2018-06-02 PROCEDURE — 99219 PR INITIAL OBSERVATION CARE/DAY 50 MINUTES: CPT | Performed by: HOSPITALIST

## 2018-06-02 PROCEDURE — 84484 ASSAY OF TROPONIN QUANT: CPT | Performed by: EMERGENCY MEDICINE

## 2018-06-02 PROCEDURE — 36415 COLL VENOUS BLD VENIPUNCTURE: CPT

## 2018-06-02 PROCEDURE — 96374 THER/PROPH/DIAG INJ IV PUSH: CPT

## 2018-06-02 PROCEDURE — 96375 TX/PRO/DX INJ NEW DRUG ADDON: CPT

## 2018-06-02 PROCEDURE — 85025 COMPLETE CBC W/AUTO DIFF WBC: CPT | Performed by: EMERGENCY MEDICINE

## 2018-06-02 RX ORDER — ONDANSETRON 2 MG/ML
4 INJECTION INTRAMUSCULAR; INTRAVENOUS ONCE
Status: COMPLETED | OUTPATIENT
Start: 2018-06-02 | End: 2018-06-02

## 2018-06-02 RX ORDER — LEVOTHYROXINE SODIUM 0.1 MG/1
100 TABLET ORAL
Status: DISCONTINUED | OUTPATIENT
Start: 2018-06-03 | End: 2018-06-03 | Stop reason: HOSPADM

## 2018-06-02 RX ORDER — AMLODIPINE BESYLATE 2.5 MG/1
2.5 TABLET ORAL DAILY
Status: DISCONTINUED | OUTPATIENT
Start: 2018-06-03 | End: 2018-06-03 | Stop reason: HOSPADM

## 2018-06-02 RX ORDER — PANTOPRAZOLE SODIUM 40 MG/1
40 TABLET, DELAYED RELEASE ORAL DAILY
Status: DISCONTINUED | OUTPATIENT
Start: 2018-06-03 | End: 2018-06-03 | Stop reason: HOSPADM

## 2018-06-02 RX ORDER — NITROGLYCERIN 0.4 MG/1
0.4 TABLET SUBLINGUAL
Status: DISCONTINUED | OUTPATIENT
Start: 2018-06-02 | End: 2018-06-03 | Stop reason: HOSPADM

## 2018-06-02 RX ORDER — KETOROLAC TROMETHAMINE 30 MG/ML
15 INJECTION, SOLUTION INTRAMUSCULAR; INTRAVENOUS ONCE
Status: COMPLETED | OUTPATIENT
Start: 2018-06-02 | End: 2018-06-02

## 2018-06-02 RX ORDER — FLUTICASONE PROPIONATE 50 MCG
2 SPRAY, SUSPENSION (ML) NASAL 2 TIMES DAILY PRN
Status: DISCONTINUED | OUTPATIENT
Start: 2018-06-02 | End: 2018-06-03 | Stop reason: HOSPADM

## 2018-06-02 RX ORDER — RANOLAZINE 500 MG/1
500 TABLET, EXTENDED RELEASE ORAL EVERY 12 HOURS SCHEDULED
Status: DISCONTINUED | OUTPATIENT
Start: 2018-06-02 | End: 2018-06-03

## 2018-06-02 RX ORDER — CHLORAL HYDRATE 500 MG
1000 CAPSULE ORAL DAILY
Status: DISCONTINUED | OUTPATIENT
Start: 2018-06-03 | End: 2018-06-03 | Stop reason: HOSPADM

## 2018-06-02 RX ORDER — DICYCLOMINE HCL 20 MG
20 TABLET ORAL EVERY 6 HOURS PRN
Status: DISCONTINUED | OUTPATIENT
Start: 2018-06-02 | End: 2018-06-03 | Stop reason: HOSPADM

## 2018-06-02 RX ORDER — ESCITALOPRAM OXALATE 10 MG/1
10 TABLET ORAL DAILY
Status: DISCONTINUED | OUTPATIENT
Start: 2018-06-03 | End: 2018-06-03 | Stop reason: HOSPADM

## 2018-06-02 RX ORDER — METOPROLOL SUCCINATE 25 MG/1
25 TABLET, EXTENDED RELEASE ORAL 2 TIMES DAILY
Status: DISCONTINUED | OUTPATIENT
Start: 2018-06-02 | End: 2018-06-03 | Stop reason: HOSPADM

## 2018-06-02 RX ORDER — FENOFIBRATE 145 MG/1
145 TABLET, COATED ORAL DAILY
Status: DISCONTINUED | OUTPATIENT
Start: 2018-06-03 | End: 2018-06-03 | Stop reason: HOSPADM

## 2018-06-02 RX ORDER — ONDANSETRON 2 MG/ML
4 INJECTION INTRAMUSCULAR; INTRAVENOUS EVERY 6 HOURS PRN
Status: DISCONTINUED | OUTPATIENT
Start: 2018-06-02 | End: 2018-06-03 | Stop reason: HOSPADM

## 2018-06-02 RX ORDER — ASPIRIN 81 MG/1
243 TABLET, CHEWABLE ORAL ONCE
Status: COMPLETED | OUTPATIENT
Start: 2018-06-02 | End: 2018-06-02

## 2018-06-02 RX ADMIN — ONDANSETRON 4 MG: 2 INJECTION INTRAMUSCULAR; INTRAVENOUS at 17:09

## 2018-06-02 RX ADMIN — ASPIRIN 81 MG 243 MG: 81 TABLET ORAL at 17:09

## 2018-06-02 RX ADMIN — METOPROLOL TARTRATE 25 MG: 25 TABLET, FILM COATED ORAL at 18:05

## 2018-06-02 RX ADMIN — NITROGLYCERIN 0.5 INCH: 20 OINTMENT TOPICAL at 17:11

## 2018-06-02 RX ADMIN — KETOROLAC TROMETHAMINE 15 MG: 30 INJECTION, SOLUTION INTRAMUSCULAR at 18:12

## 2018-06-02 RX ADMIN — RANOLAZINE 500 MG: 500 TABLET, FILM COATED, EXTENDED RELEASE ORAL at 22:25

## 2018-06-02 NOTE — ED PROVIDER NOTES
History  Chief Complaint   Patient presents with    Chest Pain     Pt hx of NSTEMI 10/2017  Pt states she was reading a book today and started with cp, pt took nitro as directed by cardiologist, pt states it dulls the pain but then the pain comes back  A 40-year-old female with past history of diabetes, hypothyroidism, GERD and coronary vasospasms; presents with left-sided chest pain  Pain began approximately 1 hour ago, while patient was at rest reading a book  Pain is described as sharp and squeezing in nature  Pain is nonradiating  Patient has also developed nausea but denies vomiting  Patient denies associated fever, chills, headache, cough, shortness of breath, abdominal pain, vomiting, peripheral edema and rashes  Patient states she did take several doses of sublingual nitro, the first 3 doses were every 5 minutes and the final dose was 20 minutes later which was under the instruction of Dr Gopi Bangura  Patient states that the pain will reside for a brief period of time before returning  Patient was instructed by Dr Gopi Bangura that if her symptoms did not improve to come to the ER for further evaluation  Of note, patient was admitted in October 2017 for chest pain and found to have elevated troponins  Patient did have a cardiac catheterization at that time which showed no acute blockages  Patient's pain was felt to be secondary to coronary vasospasm A/P:  Chest pain, with a history of NSTEMI and coronary vasospasms  Patient does currently complain of left-sided chest pain  Benign physical exam   EKG with nonspecific ST changes  Will obtain cardiac workup  Will provide nitro paste and Zofran  Will also complete a full dose of aspirin  History provided by:  Patient and medical records      Prior to Admission Medications   Prescriptions Last Dose Informant Patient Reported? Taking?    amLODIPine (NORVASC) 2 5 mg tablet  Self No No   Sig: Take 1 tablet (2 5 mg total) by mouth daily   dicyclomine (BENTYL) 20 mg tablet  Self Yes No   Sig: Take 1 tablet by mouth every 6 (six) hours as needed     escitalopram (LEXAPRO) 20 mg tablet  Self Yes No   Sig: Take 10 mg by mouth     fenofibrate (TRICOR) 145 mg tablet  Self No No   Sig: Take 1 tablet (145 mg total) by mouth daily   fluticasone (FLONASE) 50 mcg/act nasal spray  Self Yes No   Si sprays into each nostril 2 (two) times a day as needed     levothyroxine 100 mcg tablet  Self No No   Si tab daily   metFORMIN (GLUCOPHAGE) 500 mg tablet  Self No No   Si tab BID   metoprolol succinate (TOPROL-XL) 25 mg 24 hr tablet  Self Yes No   Sig: Take 1 tablet by mouth 2 (two) times a day   nitroglycerin (NITROSTAT) 0 4 mg SL tablet  Self No No   Sig: Place 1 tablet under the tongue every 5 (five) minutes as needed for chest pain   norgestrel-ethinyl estradiol (LOW-OGESTREL) 0 3 mg-30 mcg per tablet  Self Yes No   Sig: Take 1 tablet by mouth daily   omega-3-acid ethyl esters (LOVAZA) 1 g capsule  Self No No   Sig: Take 4 capsules (4 g total) by mouth daily   pantoprazole (PROTONIX) 40 mg tablet  Self No No   Sig: take one tablet by mouth every day   traMADol (ULTRAM) 50 mg tablet  Self No No   Sig: TAKE ONE TABLET BY MOUTH EVERY 12 HOURS AS NEEDED FOR PAIN      Facility-Administered Medications: None       Past Medical History:   Diagnosis Date    Fatty liver     History of stomach ulcers     Hypothyroidism     Pre-diabetes        Past Surgical History:   Procedure Laterality Date    CHOLECYSTECTOMY      SINUS SURGERY         Family History   Problem Relation Age of Onset    Pancreatic cancer Mother     Hypertension Father     Diabetes Maternal Grandmother     Diabetes Paternal Grandmother     Heart disease Paternal Grandmother      I have reviewed and agree with the history as documented      Social History   Substance Use Topics    Smoking status: Former Smoker     Packs/day: 0 50     Years: 4 00     Types: Cigarettes     Quit date: 2016   [facility] Smokeless tobacco: Never Used    Alcohol use Yes      Comment: occasional        Review of Systems   Cardiovascular: Positive for chest pain  Gastrointestinal: Positive for nausea  All other systems reviewed and are negative        Physical Exam  ED Triage Vitals   Temperature Pulse Respirations Blood Pressure SpO2   06/02/18 1627 06/02/18 1627 06/02/18 1627 06/02/18 1627 06/02/18 1627   98 6 °F (37 °C) 82 18 133/68 96 %      Temp Source Heart Rate Source Patient Position - Orthostatic VS BP Location FiO2 (%)   06/02/18 1627 06/02/18 1815 06/02/18 1730 06/02/18 1730 --   Oral Monitor Lying Right arm       Pain Score       06/02/18 1627       8           Orthostatic Vital Signs  Vitals:    06/02/18 1815 06/02/18 1830 06/02/18 1915 06/02/18 2007   BP: 135/65 129/63 128/58 140/73   Pulse: 70 63 72 68   Patient Position - Orthostatic VS: Lying Lying Lying Sitting       Physical Exam   General Appearance: alert and oriented, nad, non toxic appearing  Skin:  Warm, dry, intact  HEENT: atraumatic, normocephalic  Neck: Supple, trachea midline  Cardiac: RRR; no murmurs, rub, gallops  Pulmonary: lungs CTAB; no wheezes, rales, rhonchi; chest wall nontender  Gastrointestinal: abdomen soft, nontender, nondistended; no guarding or rebound tenderness; good bowel sounds, no mass or bruits  Extremities:  no pedal edema, 2+ pulses; no calf tenderness, no clubbing, no cyanosis  Neuro:  no focal motor or sensory deficits, CN 2-12 grossly intact  Psych:  Normal mood and affect, normal judgement and insight      ED Medications  Medications   ranolazine (RANEXA) 12 hr tablet 500 mg (not administered)   amLODIPine (NORVASC) tablet 2 5 mg (not administered)   dicyclomine (BENTYL) tablet 20 mg (not administered)   escitalopram (LEXAPRO) tablet 10 mg (not administered)   fenofibrate (TRICOR) tablet 145 mg (not administered)   fluticasone (FLONASE) 50 mcg/act nasal spray 2 spray (not administered)   levothyroxine tablet 100 mcg (not administered)   metFORMIN (GLUCOPHAGE) tablet 500 mg (not administered)   metoprolol succinate (TOPROL-XL) 24 hr tablet 25 mg (not administered)   nitroglycerin (NITROSTAT) SL tablet 0 4 mg (not administered)   norgestrel-ethinyl estradiol (LO/OVRAL) 0 3 mg-30 mcg per tablet 1 tablet (not administered)   fish oil capsule 1,000 mg (not administered)   pantoprazole (PROTONIX) EC tablet 40 mg (not administered)   ondansetron (ZOFRAN) injection 4 mg (not administered)   enoxaparin (LOVENOX) subcutaneous injection 40 mg (not administered)   ondansetron (ZOFRAN) injection 4 mg (4 mg Intravenous Given 6/2/18 1709)   nitroglycerin (NITRO-BID) 2 % TD ointment 0 5 inch (0 5 inches Topical Given 6/2/18 1711)   aspirin chewable tablet 243 mg (243 mg Oral Given 6/2/18 1709)   metoprolol tartrate (LOPRESSOR) tablet 25 mg (25 mg Oral Given 6/2/18 1805)   ketorolac (TORADOL) injection 15 mg (15 mg Intravenous Given 6/2/18 1812)       Diagnostic Studies  Results Reviewed     Procedure Component Value Units Date/Time    Troponin I [[id number]] Collected:  06/02/18 2043    Lab Status: In process Specimen:  Blood from Arm, Left Updated:  06/02/18 2050    Troponin I [[id number]]     Lab Status:  No result Specimen:  Blood     Troponin I [[id number]]  (Normal) Collected:  06/02/18 1643    Lab Status:  Final result Specimen:  Blood from Arm, Right Updated:  06/02/18 1717     Troponin I <0 02 ng/mL     Narrative:         [employer] Chemistry analyzer 99% cutoff is > 0 04 ng/mL in network labs    o cTnI 99% cutoff is useful only when applied to patients in the clinical setting of myocardial ischemia  o cTnI 99% cutoff should be interpreted in the context of clinical history, ECG findings and possibly cardiac imaging to establish correct diagnosis  o cTnI 99% cutoff may be suggestive but clearly not indicative of a coronary event without the clinical setting of myocardial ischemia      Comprehensive metabolic panel [[id number]]  (Abnormal) Collected: 06/02/18 1643    Lab Status:  Final result Specimen:  Blood from Arm, Right Updated:  06/02/18 1716     Sodium 137 mmol/L      Potassium 4 1 mmol/L      Chloride 104 mmol/L      CO2 27 mmol/L      Anion Gap 6 mmol/L      BUN 13 mg/dL      Creatinine 0 83 mg/dL      Glucose 104 mg/dL      Calcium 9 6 mg/dL      AST 72 (H) U/L      ALT 65 U/L      Alkaline Phosphatase 60 U/L      Total Protein 7 7 g/dL      Albumin 3 8 g/dL      Total Bilirubin 0 25 mg/dL      eGFR 84 ml/min/1 73sq m     Narrative:         National Kidney Disease Education Program recommendations are as follows:  GFR calculation is accurate only with a steady state creatinine  Chronic Kidney disease less than 60 ml/min/1 73 sq  meters  Kidney failure less than 15 ml/min/1 73 sq  meters  CBC and differential [[id number]] Collected:  06/02/18 1643    Lab Status:  Final result Specimen:  Blood from Arm, Right Updated:  06/02/18 1650     WBC 7 94 Thousand/uL      RBC 4 10 Million/uL      Hemoglobin 12 6 g/dL      Hematocrit 37 7 %      MCV 92 fL      MCH 30 7 pg      MCHC 33 4 g/dL      RDW 13 1 %      MPV 9 3 fL      Platelets 584 Thousands/uL      nRBC 0 /100 WBCs      Neutrophils Relative 54 %      Immat GRANS % 0 %      Lymphocytes Relative 31 %      Monocytes Relative 9 %      Eosinophils Relative 5 %      Basophils Relative 1 %      Neutrophils Absolute 4 28 Thousands/µL      Immature Grans Absolute 0 01 Thousand/uL      Lymphocytes Absolute 2 42 Thousands/µL      Monocytes Absolute 0 72 Thousand/µL      Eosinophils Absolute 0 42 Thousand/µL      Basophils Absolute 0 09 Thousands/µL                  X-ray chest 2 views   ED Interpretation by Yon Nieto DO (06/02 1730)   No acute disease      Final Result by Joe Mukherjee MD (06/02 1726)      No acute cardiopulmonary disease              Workstation performed: [id number]               Procedures  Procedures   ECG 12 Lead Documentation  Date/Time: today/date: 6/2/2018  Performed by: Tiesha Yoon Carlee    ECG reviewed by me, the ED Provider: yes    Patient location:  ED   Previous ECG:  Compared to current, no change   Rate:  72  ECG rate assessment: normal    Rhythm: sinus rhythm    Ectopy:  none    QRS axis:  Normal  Intervals: normal   Q waves: None   ST segments:  Nonspecific changes  T waves: normal      Impression: NSR with nonspecific ST changes          Phone Consults  ED Phone Contact    ED Course  ED Course as of Jun 02 2053   Sat Jun 02, 2018   1759 Pt complains of persistent pain  Pt does report being due for a dose of lopressor, will admit and likely admit for further evaluation  1836 Pt feeling somewhat improved following lopressor and toradol  Will admit for further evaluation of chest pain            HEART Risk Score      Most Recent Value   History  1 Filed at: 06/02/2018 1858   ECG  1 Filed at: 06/02/2018 1858   Age  1 Filed at: 06/02/2018 1858   Risk Factors  1 Filed at: 06/02/2018 1858   Troponin  0 Filed at: 06/02/2018 1858   Heart Score Risk Calculator   History  1 Filed at: 06/02/2018 1858   ECG  1 Filed at: 06/02/2018 1858   Age  1 Filed at: 06/02/2018 1858   Risk Factors  1 Filed at: 06/02/2018 1858   Troponin  0 Filed at: 06/02/2018 1858   HEART Score  4 Filed at: 06/02/2018 1858   HEART Score  4 Filed at: 06/02/2018 1858                            MDM  CritCare Time    Disposition  Final diagnoses:   Chest pain     Time reflects when diagnosis was documented in both MDM as applicable and the Disposition within this note     Time User Action Codes Description Comment    6/2/2018  6:58 PM Jennifer [address] [R07 9] Chest pain     6/2/2018  7:35 PM Kristin Chavira Add [Q24 5] Anomalous coronary artery origin     6/2/2018  7:35 PM Kristin Chavira Modify [Q24 5] Anomalous coronary artery origin     6/2/2018  7:35 PM Cait, [address] [facility] [I20 1] Coronary vasospasm ([facility] [address] )     6/2/2018  7:35 PM Kristin Chavira Modify [I20 1] Coronary vasospasm [facility])       ED Disposition ED Disposition Condition Comment    Admit  Case was discussed with Ashely Hill and the patient's admission status was agreed to be Admission Status: observation status to the service of Dr Ashely Hill           Follow-up Information    None         Current Discharge Medication List      CONTINUE these medications which have NOT CHANGED    Details   amLODIPine (NORVASC) 2 5 mg tablet Take 1 tablet (2 5 mg total) by mouth daily  Qty: 30 tablet, Refills: 5    Associated Diagnoses: Chest pain      dicyclomine (BENTYL) 20 mg tablet Take 1 tablet by mouth every 6 (six) hours as needed        escitalopram (LEXAPRO) 20 mg tablet Take 10 mg by mouth        fenofibrate (TRICOR) 145 mg tablet Take 1 tablet (145 mg total) by mouth daily  Qty: 90 tablet, Refills: 0    Associated Diagnoses: Hypercholesteremia      fluticasone (FLONASE) 50 mcg/act nasal spray 2 sprays into each nostril 2 (two) times a day as needed        levothyroxine 100 mcg tablet 1 tab daily  Qty: 90 tablet, Refills: 1    Associated Diagnoses: Hypothyroidism due to Hashimoto's thyroiditis      metFORMIN (GLUCOPHAGE) 500 mg tablet 1 tab BID  Qty: 180 tablet, Refills: 1    Associated Diagnoses: Type 2 diabetes mellitus with complication, without long-term current use of insulin (HCC)      metoprolol succinate (TOPROL-XL) 25 mg 24 hr tablet Take 1 tablet by mouth 2 (two) times a day      nitroglycerin (NITROSTAT) 0 4 mg SL tablet Place 1 tablet under the tongue every 5 (five) minutes as needed for chest pain  Qty: 90 tablet, Refills: 0      norgestrel-ethinyl estradiol (LOW-OGESTREL) 0 3 mg-30 mcg per tablet Take 1 tablet by mouth daily      omega-3-acid ethyl esters (LOVAZA) 1 g capsule Take 4 capsules (4 g total) by mouth daily  Qty: 360 capsule, Refills: 3    Associated Diagnoses: Combined hyperlipidemia      pantoprazole (PROTONIX) 40 mg tablet take one tablet by mouth every day  Qty: 90 tablet, Refills: 1    Associated Diagnoses: Gastroesophageal reflux disease without esophagitis      traMADol (ULTRAM) 50 mg tablet TAKE ONE TABLET BY MOUTH EVERY 12 HOURS AS NEEDED FOR PAIN  Qty: 60 tablet, Refills: 0    Associated Diagnoses: Acute sinusitis, recurrence not specified, unspecified location           No discharge procedures on file  ED Provider  Attending physically available and evaluated Marcelo Cardozo I managed the patient along with the ED Attending      Electronically Signed by         Harry Goel DO  06/02/18 9245

## 2018-06-02 NOTE — ED ATTENDING ATTESTATION
Lori Tate MD, saw and evaluated the patient  I have discussed the patient with the resident/non-physician practitioner and agree with the resident's/non-physician practitioner's findings, Plan of Care, and MDM as documented in the resident's/non-physician practitioner's note, except where noted  All available labs and Radiology studies were reviewed  At this point I agree with the current assessment done in the Emergency Department  I have conducted an independent evaluation of this patient a history and physical is as follows:    Pt has histoyrfo coronary vasospasm Pt was at rest started with l sided squeezing took nitro with some relief but returned Pt called cardiologist who recommended ntg 4th time It did not get better so  Came to ED  pt had neg cath in November    PE alert heart regular lungs clear abdomen soft nondistended nontender chest wall nontender extremities an 80 MDM:  Will do cardiac workup treat patient with nitro and beta-blockers admit to hospital  Critical Care Time  CritCare Time    Procedures

## 2018-06-02 NOTE — ASSESSMENT & PLAN NOTE
· A1c last month improved to 7 from from 7 4 with metformin  · Continue home metformin 500 Twice a day

## 2018-06-02 NOTE — ASSESSMENT & PLAN NOTE
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

## 2018-06-02 NOTE — H&P
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  · Follow-up troponins, 1st negative, EKG on admission normal  · S/p 4 sublingual nitroglycerins at home with recurrence of symptoms  · s/p aspirin, Toprol-XL, nitropaste and Toradol in the emergency room - with some improvement in symptoms  · For now will continue amlodipine 2 5 mg daily, since blood pressures being low normal to borderline who instead of adding CC be or oral nitrate will add Ranexa 500 mg Twice a day  · Will consult Cardiology and follow up their recommendations into the etiology and management of the pain        Hypothyroid   Assessment & Plan    · Recent TSH increased to 4 8  · Levothyroxine increased from     Recheck TSH in next 2-4 weeks        Diabetes mellitus (HCC)   Assessment & Plan    · A1c last month improved to 7 from from 7 4 with metformin  · Continue home metformin 500 Twice a day        Coronary vasospasm (HCC)   Assessment & Plan    · As above with chest pain        Combined hyperlipidemia   Assessment & Plan    · Continue fenofibrate  · Check lipid panel, not checked since February        Anomalous coronary artery origin   Assessment & Plan    · 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        Peptic ulcer disease   Assessment & Plan    History of bleeding gastric ulcer several years ago  Continue Protonix Hypertension   Assessment & Plan    Continue home Toprol XL 25 Twice a day, amlodipine 2 5 mg daily  The blood pressures in 110s to 120s            History and Physical - [facility]    Patient Information: Rafita Mendosa 52 y o  female MRN: [id number]  Unit/Bed#: ED 06 Encounter: [id number]  Admitting Physician: Cristian Gambino MD  PCP: Justen Blackwell DO  Date of Admission:  06/02/18    Assessment/Plan:    Hospital Problem List:     Principal Problem:    Chest pain  Active Problems:    Hypertension    Peptic ulcer disease    Fatty liver    Anomalous coronary artery origin    Combined hyperlipidemia    Coronary vasospasm (HCC)    Diabetes mellitus (HCC)    Elevated AST (SGOT)    Hypothyroid      VTE Prophylaxis: Enoxaparin (Lovenox)  / sequential compression device   Code Status: Full code  POLST: There is no POLST form on file for this patient (pre-hospital)    Anticipated Length of Stay:  Patient will be admitted on an Observation basis with an anticipated length of stay of  < 2 midnights  Justification for Hospital Stay: chest pain    Total Time for Visit, including Counseling / Coordination of Care: 45 minutes  Greater than 50% of this total time spent on direct patient counseling and coordination of care  Chief Complaint:   Chest pain    History of Present Illness:    Rafita Mendosa is a 52 y o  female who presents with chest pain  She describes as left-sided squeezing like sensation associated with nausea and diaphoresis, not associated shortness of breath palpitations or dizziness  It started when she was sitting and reading not related to exertion  Given her past history to this she took sublingual nitros at 5 minute intervals x3  With them the she had some improvement in symptoms with immediate recurrence  Hence she called her Cardiologist Dr Venice Tamayo - who advised her to wait for 20 min & then repeat SL nitro which she did but her pain continued hence came here   She has h/o Chest pain that happened first in Oct 2017 - when she had mild increase in troponin to 0 1 - underwent cardiac catheterization which showed normal coronaries with an was origin of left circumflex, 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  At the time the etiology of her chest pain was thought to be coronary vasospasm versus esophageal visit spasm  She was placed on oral verapamil to help with her spasms  Subsequently outpatient follow-up visits she was changed from verapamil to oral amlodipine 2 5 mg daily her after which symptoms had completely resolved until today when she had recurrence  Has history of hypertension, diabetes with A1c 7 4 that improved to 7 0 with metformin, combined hyperlipidemia, hypothyroidism for which levothyroxine was recently increased for elevated TSH    Review of Systems:    Review of Systems   Constitutional: Negative for activity change, appetite change, chills and fever  HENT: Negative for congestion, rhinorrhea and sore throat  Respiratory: Negative for cough, shortness of breath and wheezing  Cardiovascular: Positive for chest pain  Negative for palpitations and leg swelling  Gastrointestinal: Negative for abdominal pain, constipation, diarrhea, nausea and vomiting  Genitourinary: Negative for difficulty urinating and dysuria  Neurological: Negative for dizziness and light-headedness  All other systems reviewed and are negative  Past Medical and Surgical History:     Past Medical History:   Diagnosis Date    Fatty liver     History of stomach ulcers     Hypothyroidism     Pre-diabetes        Past Surgical History:   Procedure Laterality Date    CHOLECYSTECTOMY      SINUS SURGERY         Meds/Allergies:    Prior to Admission medications    Medication Sig Start Date End Date Taking?  Authorizing Provider   amLODIPine (NORVASC) 2 5 mg tablet Take 1 tablet (2 5 mg total) by mouth daily 3/19/18   Yuri Her MD   dicyclomine (BENTYL) 20 mg tablet Take 1 tablet by mouth every 6 (six) hours as needed   10/15/12   Historical Provider, MD   escitalopram (LEXAPRO) 20 mg tablet Take 10 mg by mouth   7/23/13   Historical Provider, MD   fenofibrate (TRICOR) 145 mg tablet Take 1 tablet (145 mg total) by mouth daily 3/19/18   Josesito Miranda,    fluticasone [facility]) 50 mcg/act nasal spray 2 sprays into each nostril 2 (two) times a day as needed   1/5/14   Historical Provider, MD   levothyroxine 100 mcg tablet 1 tab daily 5/9/18   Josesito Miranda, DO   metFORMIN (GLUCOPHAGE) 500 mg tablet 1 tab BID 5/9/18   Josesito Miranda, DO   metoprolol succinate (TOPROL-XL) 25 mg 24 hr tablet Take 1 tablet by mouth 2 (two) times a day 10/20/17   Historical Provider, MD   nitroglycerin (NITROSTAT) 0 4 mg SL tablet Place 1 tablet under the tongue every 5 (five) minutes as needed for chest pain 10/5/17   Nelida Finn MD   norgestrel-ethinyl estradiol (LOW-OGESTREL) 0 3 mg-30 mcg per tablet Take 1 tablet by mouth daily 7/15/15   Historical Provider, MD   omega-3-acid ethyl esters (LOVAZA) 1 g capsule Take 4 capsules (4 g total) by mouth daily 2/8/18   Josesito Miranda, DO   pantoprazole (PROTONIX) 40 mg tablet take one tablet by mouth every day 5/27/18   Josesito Miranda, DO   traMADol (ULTRAM) 50 mg tablet TAKE ONE TABLET BY MOUTH EVERY 12 HOURS AS NEEDED FOR PAIN 4/26/18   Josesito Miranda, DO     I have reviewed home medications with patient personally  Allergies:    Allergies   Allergen Reactions    Sulfamethoxazole-Trimethoprim Shortness Of Breath     Reaction Date: [id number];     Erythromycin      Reaction Date: [id number];     Metronidazole     Sulfa Antibiotics        Social History:     Marital Status: /Civil Union   Occupation: works in [employer]  Patient Pre-hospital Living Situation: family  Patient Pre-hospital Level of Mobility: active  Patient Pre-hospital Diet Restrictions: diabetic  Substance Use History:   History   Alcohol Use    Yes     Comment: occasional     History Smoking Status    Former Smoker    Packs/day: 0 50    Years: 4 00    Types: Cigarettes    Quit date: 02/2016   Smokeless Tobacco    Never Used     History   Drug Use No       Family History:    non-contributory    Physical Exam:     Vitals:   Blood Pressure: 128/58 (06/02/18 1915)  Pulse: 72 (06/02/18 1915)  Temperature: 98 6 °F (37 °C) (06/02/18 1627)  Temp Source: Oral (06/02/18 1627)  Respirations: 18 (06/02/18 1915)  Weight - Scale: 77 1 kg (170 lb) (06/02/18 1627)  SpO2: 96 % (06/02/18 1915)    Physical Exam   Constitutional: She is oriented to person, place, and time  She appears well-developed and well-nourished  No distress  HENT:   Head: Normocephalic and atraumatic  Mouth/Throat: Oropharynx is clear and moist    Eyes: Conjunctivae are normal  No scleral icterus  Cardiovascular: Normal rate, regular rhythm and normal heart sounds  Exam reveals no gallop and no friction rub  No murmur heard  Pulmonary/Chest: Effort normal and breath sounds normal  No respiratory distress  She has no wheezes  She has no rales  Abdominal: Soft  She exhibits no distension  There is no tenderness  There is no rebound and no guarding  Musculoskeletal: She exhibits no edema  Neurological: She is alert and oriented to person, place, and time  Skin: She is not diaphoretic  Additional Data:     Lab Results: I have personally reviewed pertinent reports          Results from last 7 days  Lab Units 06/02/18  1643   WBC Thousand/uL 7 94   HEMOGLOBIN g/dL 12 6   HEMATOCRIT % 37 7   PLATELETS Thousands/uL 272   NEUTROS PCT % 54   LYMPHS PCT % 31   MONOS PCT % 9   EOS PCT % 5       Results from last 7 days  Lab Units 06/02/18  1643   SODIUM mmol/L 137   POTASSIUM mmol/L 4 1   CHLORIDE mmol/L 104   CO2 mmol/L 27   BUN mg/dL 13   CREATININE mg/dL 0 83   CALCIUM mg/dL 9 6   TOTAL PROTEIN g/dL 7 7   BILIRUBIN TOTAL mg/dL 0 25   ALK PHOS U/L 60   ALT U/L 65   AST U/L 72*   GLUCOSE RANDOM mg/dL 104 Imaging: I have personally reviewed pertinent reports  X-ray Chest 2 Views    Result Date: 6/2/2018  Narrative: CHEST INDICATION:   chest pain  COMPARISON:  10/3/2017 EXAM PERFORMED/VIEWS:  XR CHEST PA & LATERAL  The frontal view was performed utilizing dual energy radiographic technique  Images: 4 FINDINGS: Cardiomediastinal silhouette appears unremarkable  The lungs are clear  No pneumothorax or pleural effusion  Mild right hemidiaphragm elevation is a stable finding  Osseous structures appear within normal limits for patient age  Impression: No acute cardiopulmonary disease  Workstation performed: [id number]       EKG, Pathology, and Other Studies Reviewed on Admission:   · EKG: NSR    Allscripts / Epic Records Reviewed: Yes     ** Please Note: This note has been constructed using a voice recognition system   **

## 2018-06-02 NOTE — ASSESSMENT & PLAN NOTE
Continue home Toprol XL 25 Twice a day, amlodipine 2 5 mg daily  The blood pressures in 110s to 120s

## 2018-06-03 VITALS
TEMPERATURE: 98.7 F | SYSTOLIC BLOOD PRESSURE: 116 MMHG | OXYGEN SATURATION: 94 % | DIASTOLIC BLOOD PRESSURE: 55 MMHG | HEIGHT: 63 IN | RESPIRATION RATE: 20 BRPM | BODY MASS INDEX: 31.91 KG/M2 | HEART RATE: 78 BPM | WEIGHT: 180.12 LBS

## 2018-06-03 LAB
CHOLEST SERPL-MCNC: 169 MG/DL (ref 50–200)
HDLC SERPL-MCNC: 30 MG/DL (ref 40–60)
LDLC SERPL CALC-MCNC: 73 MG/DL (ref 0–100)
TRIGL SERPL-MCNC: 331 MG/DL
TROPONIN I SERPL-MCNC: <0.02 NG/ML

## 2018-06-03 PROCEDURE — 99244 OFF/OP CNSLTJ NEW/EST MOD 40: CPT | Performed by: INTERNAL MEDICINE

## 2018-06-03 PROCEDURE — 80061 LIPID PANEL: CPT | Performed by: HOSPITALIST

## 2018-06-03 PROCEDURE — 99217 PR OBSERVATION CARE DISCHARGE MANAGEMENT: CPT | Performed by: PHYSICIAN ASSISTANT

## 2018-06-03 RX ORDER — ISOSORBIDE MONONITRATE 30 MG/1
30 TABLET, EXTENDED RELEASE ORAL DAILY
Status: DISCONTINUED | OUTPATIENT
Start: 2018-06-03 | End: 2018-06-03 | Stop reason: HOSPADM

## 2018-06-03 RX ORDER — ISOSORBIDE MONONITRATE 30 MG/1
30 TABLET, EXTENDED RELEASE ORAL DAILY
Qty: 30 TABLET | Refills: 0 | Status: SHIPPED | OUTPATIENT
Start: 2018-06-03 | End: 2018-06-07 | Stop reason: SINTOL

## 2018-06-03 RX ORDER — CHLORAL HYDRATE 500 MG
1000 CAPSULE ORAL 2 TIMES DAILY
COMMUNITY
End: 2018-06-07 | Stop reason: SDUPTHER

## 2018-06-03 RX ADMIN — NITROGLYCERIN 0.4 MG: 0.4 TABLET SUBLINGUAL at 00:41

## 2018-06-03 RX ADMIN — METFORMIN HYDROCHLORIDE 500 MG: 500 TABLET, FILM COATED ORAL at 08:55

## 2018-06-03 RX ADMIN — NITROGLYCERIN 0.4 MG: 0.4 TABLET SUBLINGUAL at 00:46

## 2018-06-03 RX ADMIN — RANOLAZINE 500 MG: 500 TABLET, FILM COATED, EXTENDED RELEASE ORAL at 08:55

## 2018-06-03 RX ADMIN — ESCITALOPRAM OXALATE 10 MG: 10 TABLET ORAL at 08:55

## 2018-06-03 RX ADMIN — METOPROLOL SUCCINATE 25 MG: 25 TABLET, EXTENDED RELEASE ORAL at 08:54

## 2018-06-03 RX ADMIN — ONDANSETRON 4 MG: 2 INJECTION INTRAMUSCULAR; INTRAVENOUS at 00:39

## 2018-06-03 RX ADMIN — NITROGLYCERIN 0.4 MG: 0.4 TABLET SUBLINGUAL at 00:35

## 2018-06-03 RX ADMIN — LEVOTHYROXINE SODIUM 100 MCG: 100 TABLET ORAL at 05:04

## 2018-06-03 RX ADMIN — ENOXAPARIN SODIUM 40 MG: 40 INJECTION SUBCUTANEOUS at 08:56

## 2018-06-03 RX ADMIN — NORGESTREL AND ETHINYL ESTRADIOL 1 TABLET: KIT at 08:56

## 2018-06-03 RX ADMIN — Medication 1000 MG: at 08:54

## 2018-06-03 RX ADMIN — FENOFIBRATE 145 MG: 145 TABLET ORAL at 08:56

## 2018-06-03 RX ADMIN — AMLODIPINE BESYLATE 2.5 MG: 2.5 TABLET ORAL at 08:55

## 2018-06-03 RX ADMIN — ISOSORBIDE MONONITRATE 30 MG: 30 TABLET, EXTENDED RELEASE ORAL at 11:48

## 2018-06-03 RX ADMIN — PANTOPRAZOLE SODIUM 40 MG: 40 TABLET, DELAYED RELEASE ORAL at 05:04

## 2018-06-03 NOTE — ASSESSMENT & PLAN NOTE
· Recent TSH increased to 4 856  · Levothyroxine was increased from 75 mcg to 100 mcg  · Outpatient follow-up with PCP   Obtain repeat TSH in next 2-4 weeks

## 2018-06-03 NOTE — ASSESSMENT & PLAN NOTE
· Hg A1c last month improved to 7 from from 7 4 with metformin  · Continue home metformin 500mg bid

## 2018-06-03 NOTE — CONSULTS
Cardiology   Marc Daniel 52 y o  female MRN: [id number]  Unit/Bed#: CW2 210-01 Encounter: [id number]      Reason for Consult / Principal Problem:  Chest pain    Physician Requesting Consult: Jeet Franklin MD    Cardiologist:  Dr Arturo Mcfarland  Office cardiologist:  Dr Nirali Fairbanks  PCP: Dr Ashley Ramos    Assessment/Plan:  Chest pain secondary to coronary spasm -  continue amlodipine could consider up titrating it to 5 mg daily  Currently started on Ranexa 500 mg b i d   Do  not recommend any further invasive testing  Initiate walking program   Troponins are centrally negative  No EKG changes  History of peptic ulcers-  Encouraged patient to see GI  for evaluation of possible esophageal spasm  Diabetes type 2 - continue metformin  Hypertriglyceridemia - continue fenofibrate  Hyperlipidemia- consider adding at 81 mg of aspirin as well as low-dose statin therapy    HPI: Marc Daniel 52y o  year old female with history of known coronary artery spasm,  diabetes type 2,  hypertriglyceridemia,  hyperlipidemia and history peptic ulcer  The patient had NSTEMI type 2 in October 2017 secondary to coronary spasms  She underwent coronary catheterization which revealed no true left main artery  Circumflex was an anomalous origin from the RCA  Lad originated from left sinus  The RCA was normal size and was a dominant vessel giving rise to PDA  There is no no obstructive coronary disease identified  She was initially discharged home on verapamil but continued to have who chest pain due to the coronary spasm  She was changed to amlodipine 2 5 mg in the office by Dr Nirali Fairbanks  Yesterday the patient was reading a book and developed chest pain which she described as sharp and squeezing in nature  The pain was not radiating  She did have some mild nausea associated with it  She did take 3 doses of sublingual nitroglycerin without relief    She then called Dr Nirali Fairbanks and he instructed her to wait 20 minutes and then take another nitro but decided to come to the emergency room  Her EKG shows normal sinus rhythm nonspecific T-wave changes  S transdermal nitro patch was applied  Patient has been pain-free pain-free since admission  She would has been started on Ranexa 500 mg b i d     Family History:   Family History   Problem Relation Age of Onset    Pancreatic cancer Mother     Hypertension Father     Diabetes Maternal Grandmother     Diabetes Paternal Grandmother     Heart disease Paternal Grandmother      Historical Information   Past Medical History:   Diagnosis Date    Fatty liver     History of stomach ulcers     Hypothyroidism     Pre-diabetes      Past Surgical History:   Procedure Laterality Date    CHOLECYSTECTOMY      SINUS SURGERY       Social History   History   Alcohol Use    Yes     Comment: occasional     History   Drug Use No     History   Smoking Status    Former Smoker    Packs/day: 0 50    Years: 4 00    Types: Cigarettes    Quit date: 02/2016   Smokeless Tobacco    Never Used     Family History:   Family History   Problem Relation Age of Onset    Pancreatic cancer Mother     Hypertension Father     Diabetes Maternal Grandmother     Diabetes Paternal Grandmother     Heart disease Paternal Grandmother            Review of Systems:  Review of Systems   Constitutional: Negative  HENT: Negative  Eyes: Negative  Respiratory: Positive for chest tightness  Cardiovascular: Positive for chest pain  Gastrointestinal: Negative  Endocrine: Negative  Genitourinary: Negative  Musculoskeletal: Negative  Skin: Negative  Allergic/Immunologic: Negative  Hematological: Negative  Psychiatric/Behavioral: Negative          Scheduled Meds:  Current Facility-Administered Medications:  amLODIPine 2 5 mg Oral Daily Kristin Chavira MD   dicyclomine 20 mg Oral Q6H PRN Kristin Chavira MD   enoxaparin 40 mg Subcutaneous Daily Kristin Chavira MD   escitalopram 10 mg Oral Daily Kristin Tova Brewer MD   fenofibrate 145 mg Oral Daily John Thapa MD   fish oil 1,000 mg Oral Daily Kristin Chavira MD   fluticasone 2 spray Each Nare BID PRN John Thapa MD   levothyroxine 100 mcg Oral Early Morning John Thapa MD   metFORMIN 500 mg Oral BID With Meals Kristin Chavira MD   metoprolol succinate 25 mg Oral BID John Thapa MD   nitroglycerin 0 4 mg Sublingual Q5 Min PRN John Thapa MD   norgestrel-ethinyl estradiol 1 tablet Oral Daily Kristin Chavira MD   ondansetron 4 mg Intravenous Q6H PRN John Thapa MD   pantoprazole 40 mg Oral Daily John Thapa MD   ranolazine 500 mg Oral Q12H [address] Kristin Chavira MD     Continuous Infusions:   PRN Meds: dicyclomine    fluticasone    nitroglycerin    ondansetron  PTA meds:   Prior to Admission Medications   Prescriptions Last Dose Informant Patient Reported? Taking?    Omega-3 Fatty Acids (FISH OIL) 1,000 mg   Yes Yes   Sig: Take 1,000 mg by mouth 2 (two) times a day   amLODIPine (NORVASC) 2 5 mg tablet  Self No No   Sig: Take 1 tablet (2 5 mg total) by mouth daily   dicyclomine (BENTYL) 20 mg tablet  Self Yes No   Sig: Take 1 tablet by mouth every 6 (six) hours as needed     escitalopram (LEXAPRO) 20 mg tablet  Self Yes No   Sig: Take 10 mg by mouth     fenofibrate (TRICOR) 145 mg tablet  Self No No   Sig: Take 1 tablet (145 mg total) by mouth daily   fluticasone (FLONASE) 50 mcg/act nasal spray  Self Yes No   Si sprays into each nostril 2 (two) times a day as needed     levothyroxine 100 mcg tablet  Self No No   Si tab daily   metFORMIN (GLUCOPHAGE) 500 mg tablet  Self No No   Si tab BID   metoprolol succinate (TOPROL-XL) 25 mg 24 hr tablet  Self Yes No   Sig: Take 1 tablet by mouth 2 (two) times a day   nitroglycerin (NITROSTAT) 0 4 mg SL tablet  Self No No   Sig: Place 1 tablet under the tongue every 5 (five) minutes as needed for chest pain   norgestrel-ethinyl estradiol (LOW-OGESTREL) 0 3 mg-30 mcg per tablet  Self Yes No   Sig: Take 1 tablet by mouth daily   omega-3-acid ethyl esters (LOVAZA) 1 g capsule  Self No No   Sig: Take 4 capsules (4 g total) by mouth daily   pantoprazole (PROTONIX) 40 mg tablet  Self No No   Sig: take one tablet by mouth every day   traMADol (ULTRAM) 50 mg tablet  Self No No   Sig: TAKE ONE TABLET BY MOUTH EVERY 12 HOURS AS NEEDED FOR PAIN      Facility-Administered Medications: None       Allergies   Allergen Reactions    Sulfamethoxazole-Trimethoprim Shortness Of Breath     Reaction Date: [id number];     Erythromycin      Reaction Date: [id number];     Metronidazole     Sulfa Antibiotics        Objective   Vitals: Blood pressure 126/58, pulse 69, temperature 98 7 °F (37 1 °C), temperature source Oral, resp   rate 20, height 5' 3" (1 6 m), weight 81 7 kg (180 lb 1 9 oz), last menstrual period 05/02/2018, SpO2 94 %, not currently breastfeeding , Body mass index is 31 91 kg/m² , Orthostatic Blood Pressures      Most Recent Value   Blood Pressure  126/58 filed at 06/03/2018 0700   Patient Position - Orthostatic VS  Lying filed at 06/03/2018 0700            Intake/Output Summary (Last 24 hours) at 06/03/18 0941  Last data filed at 06/03/18 0309   Gross per 24 hour   Intake              680 ml   Output                0 ml   Net              680 ml       Invasive Devices     Peripheral Intravenous Line            Peripheral IV 06/02/18 Right Antecubital less than 1 day                Physical Exam:  Physical Exam      Lab Results:      Recent Results (from the past 24 hour(s))   ECG 12 lead    Collection Time: 06/02/18  4:33 PM   Result Value Ref Range    Ventricular Rate 72 BPM    Atrial Rate 72 BPM    OK Interval 148 ms    QRSD Interval 82 ms    QT Interval 388 ms    QTC Interval 424 ms    P Axis 51 degrees    QRS Axis 38 degrees    T Wave Axis 52 degrees   Comprehensive metabolic panel    Collection Time: 06/02/18  4:43 PM   Result Value Ref Range    Sodium 137 136 - 145 mmol/L    Potassium 4 1 3 5 - 5 3 mmol/L    Chloride 104 100 - 108 mmol/L    CO2 27 21 - 32 mmol/L    Anion Gap 6 4 - 13 mmol/L    BUN 13 5 - 25 mg/dL    Creatinine 0 83 0 60 - 1 30 mg/dL    Glucose 104 65 - 140 mg/dL    Calcium 9 6 8 3 - 10 1 mg/dL    AST 72 (H) 5 - 45 U/L    ALT 65 12 - 78 U/L    Alkaline Phosphatase 60 46 - 116 U/L    Total Protein 7 7 6 4 - 8 2 g/dL    Albumin 3 8 3 5 - 5 0 g/dL    Total Bilirubin 0 25 0 20 - 1 00 mg/dL    eGFR 84 ml/min/1 73sq m   CBC and differential    Collection Time: 06/02/18  4:43 PM   Result Value Ref Range    WBC 7 94 4 31 - 10 16 Thousand/uL    RBC 4 10 3 81 - 5 12 Million/uL    Hemoglobin 12 6 11 5 - 15 4 g/dL    Hematocrit 37 7 34 8 - 46 1 %    MCV 92 82 - 98 fL    MCH 30 7 26 8 - 34 3 pg    MCHC 33 4 31 4 - 37 4 g/dL    RDW 13 1 11 6 - 15 1 %    MPV 9 3 8 9 - 12 7 fL    Platelets 921 501 - 830 Thousands/uL    nRBC 0 /100 WBCs    Neutrophils Relative 54 43 - 75 %    Immat GRANS % 0 0 - 2 %    Lymphocytes Relative 31 14 - 44 %    Monocytes Relative 9 4 - 12 %    Eosinophils Relative 5 0 - 6 %    Basophils Relative 1 0 - 1 %    Neutrophils Absolute 4 28 1 85 - 7 62 Thousands/µL    Immature Grans Absolute 0 01 0 00 - 0 20 Thousand/uL    Lymphocytes Absolute 2 42 0 60 - 4 47 Thousands/µL    Monocytes Absolute 0 72 0 17 - 1 22 Thousand/µL    Eosinophils Absolute 0 42 0 00 - 0 61 Thousand/µL    Basophils Absolute 0 09 0 00 - 0 10 Thousands/µL   Troponin I    Collection Time: 06/02/18  4:43 PM   Result Value Ref Range    Troponin I <0 02 <=0 04 ng/mL   Troponin I    Collection Time: 06/02/18  8:43 PM   Result Value Ref Range    Troponin I <0 02 <=0 04 ng/mL   Troponin I    Collection Time: 06/02/18 11:39 PM   Result Value Ref Range    Troponin I <0 02 <=0 04 ng/mL   Lipid Panel with Direct LDL reflex    Collection Time: 06/03/18  5:16 AM   Result Value Ref Range    Cholesterol 169 50 - 200 mg/dL    Triglycerides 331 (H) <=150 mg/dL    HDL, Direct 30 (L) 40 - 60 mg/dL    LDL Calculated 73 0 - 100 mg/dL   ]      Imaging: I have personally reviewed pertinent reports  EKG:  Sinus rhythm with nonspecific ST wave changes  CHEST X-RAY:  No active disease  ECHO: 10/17- ejection fraction 55%, no significant valvular heart disease    Code Status:  Full code      Counseling / Coordination of Care  Total floor / unit time spent today 45 minutes  Greater than 50% of total time was spent with the patient and / or family counseling and / or coordination of care  Patient Was Seen By: DAVID Babb, Acute Care Nurse Practitioner      ** Please Note: Fluency Direct Dictation voice to text software may have been used in the creation of this document   **

## 2018-06-03 NOTE — PLAN OF CARE
Problem: CARDIOVASCULAR - ADULT  Goal: Maintains optimal cardiac output and hemodynamic stability  INTERVENTIONS:  - Monitor I/O, vital signs and rhythm  - Monitor for S/S and trends of decreased cardiac output i e  bleeding, hypotension  - Administer and titrate ordered vasoactive medications to optimize hemodynamic stability  - Assess quality of pulses, skin color and temperature  - Assess for signs of decreased coronary artery perfusion - ex  Angina  - Instruct patient to report change in severity of symptoms   Outcome: Progressing    Goal: Absence of cardiac dysrhythmias or at baseline rhythm  INTERVENTIONS:  - Continuous cardiac monitoring, monitor vital signs, obtain 12 lead EKG if indicated  - Administer antiarrhythmic and heart rate control medications as ordered  - Monitor electrolytes and administer replacement therapy as ordered   Outcome: Progressing      Problem: PAIN - ADULT  Goal: Verbalizes/displays adequate comfort level or baseline comfort level  Interventions:  - Encourage patient to monitor pain and request assistance  - Assess pain using appropriate pain scale  - Administer analgesics based on type and severity of pain and evaluate response  - Implement non-pharmacological measures as appropriate and evaluate response  - Consider cultural and social influences on pain and pain management  - Notify physician/advanced practitioner if interventions unsuccessful or patient reports new pain   Outcome: Progressing      Problem: SAFETY ADULT  Goal: Patient will remain free of falls  INTERVENTIONS:  - Assess patient frequently for physical needs  -  Identify cognitive and physical deficits and behaviors that affect risk of falls    -  [place] fall precautions as indicated by assessment   - Educate patient/family on patient safety including physical limitations  - Instruct patient to call for assistance with activity based on assessment  - Modify environment to reduce risk of injury  - Consider OT/PT consult to assist with strengthening/mobility   Outcome: Progressing      Problem: DISCHARGE PLANNING  Goal: Discharge to home or other facility with appropriate resources  INTERVENTIONS:  - Identify barriers to discharge w/patient and caregiver  - Arrange for needed discharge resources and transportation as appropriate  - Identify discharge learning needs (meds, wound care, etc )  - Arrange for interpretive services to assist at discharge as needed  - Refer to Case Management Department for coordinating discharge planning if the patient needs post-hospital services based on physician/advanced practitioner order or complex needs related to functional status, cognitive ability, or social support system   Outcome: Progressing      Problem: Knowledge Deficit  Goal: Patient/family/caregiver demonstrates understanding of disease process, treatment plan, medications, and discharge instructions  Complete learning assessment and assess knowledge base    Interventions:  - Provide teaching at level of understanding  - Provide teaching via preferred learning methods   Outcome: Progressing

## 2018-06-03 NOTE — CASE MANAGEMENT
Initial Clinical Review    Thank you,  [address] [facility] in the [facility] by Isaias Posey for 2017  Network Utilization Review Department  Phone: [phone]; Fax [phone]  ATTENTION: The Network Utilization Review Department is now centralized for our 7 Facilities  Make a note that we have a new phone and fax numbers for our Department  Please call with any questions or concerns to [phone] and carefully follow the prompts so that you are directed to the right person  All voicemails are confidential  Fax any determinations, approvals, denials, and requests for initial or continue stay review clinical to [phone]  Due to HIGH CALL volume, it would be easier if you could please send faxed requests to expedite your requests and in part, help us provide discharge notifications faster  Admission: Date/Time/Statement: 06/02/2018 @ 1859 --OBSERVATION    Orders Placed This Encounter   Procedures    Place in Observation (expected length of stay for this patient is less than two midnights)     Standing Status:   Standing     Number of Occurrences:   1     Order Specific Question:   Admitting Physician     Answer:   Luna Mancilla [[id number]]     Order Specific Question:   Level of Care     Answer:   Med Surg [16]         ED: Date/Time/Mode of Arrival:   ED Arrival Information     Expected Arrival Acuity Means of Arrival Escorted By Service Admission Type    - 6/2/2018 16:24 Emergent Walk-In Self General Medicine Emergency    Arrival Complaint    Chest Pain, Hx of MI          Chief Complaint:   Chief Complaint   Patient presents with    Chest Pain     Pt hx of NSTEMI 10/2017  Pt states she was reading a book today and started with cp, pt took nitro as directed by cardiologist, pt states it dulls the pain but then the pain comes back  History of Illness: 52 y o  female who presents with chest pain   She describes as left-sided squeezing like sensation associated with nausea and diaphoresis, (-) SOB, palpitations, or dizziness  It started when she was sitting and reading not related to exertion  Given her past history to this she took sublingual nitros at 5 minute intervals x3, had some improvement in symptoms with immediate recurrence  She called her Cardiologist Dr Mookie Lipscomb - who advised her to wait for 20 min & then repeat SL nitro which she did but her pain continued so she came here  She has h/o Chest pain that happened first in Oct 2017 - when she had mild increase in troponin to 0 1 - underwent cardiac catheterization which showed normal coronaries with an was origin of left circumflex, 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  At the time the etiology of her chest pain was thought to be coronary vasospasm versus esophageal visit spasm  She was placed on oral verapamil to help with her spasms  Subsequently outpatient follow-up visits she was changed from verapamil to oral amlodipine 2 5 mg daily her after which symptoms had completely resolved until today when she had recurrence    ED Vital Signs:   ED Triage Vitals   Temperature Pulse Respirations Blood Pressure SpO2   06/02/18 1627 06/02/18 1627 06/02/18 1627 06/02/18 1627 06/02/18 1627   98 6 °F (37 °C) 82 18 133/68 96 %      Temp Source Heart Rate Source Patient Position - Orthostatic VS BP Location FiO2 (%)   06/02/18 1627 06/02/18 1815 06/02/18 1730 06/02/18 1730 --   Oral Monitor Lying Right arm       Pain Score       06/02/18 1627       8        Wt Readings from Last 1 Encounters:   06/02/18 81 7 kg (180 lb 1 9 oz)       Vital Signs (abnormal):  WNL    Abnormal Labs/Diagnostic Test Results: CBC, BMP -- WNL  CXR -- No acute cardiopulmonary disease  EKG -- NSR    ED Treatment:   Medication Administration from 06/02/2018 1624 to 06/02/2018 2005       Date/Time Order Dose Route Action Action by Comments     06/02/2018 1706 ondansetron (ZOFRAN) injection 4 mg 4 mg Intravenous Given Heidi Gillis, RN 06/02/2018 1711 nitroglycerin (NITRO-BID) 2 % TD ointment 0 5 inch 0 5 inch Topical Given Jennifer Jarvis RN      06/02/2018 1709 aspirin chewable tablet 243 mg 243 mg Oral Given Jennifer Jarvis RN      06/02/2018 1805 metoprolol tartrate (LOPRESSOR) tablet 25 mg 25 mg Oral Given Jennifer Jarvis RN      06/02/2018 1812 ketorolac (TORADOL) injection 15 mg 15 mg Intravenous Given Jennifer Jarvis RN           Past Medical/Surgical History:    Active Ambulatory Problems     Diagnosis Date Noted    Chest pain 10/03/2017    Leukocytosis 10/03/2017    Hypertension 10/03/2017    Peptic ulcer disease 10/03/2017    Fatty liver 10/03/2017    Sinus tachycardia 10/03/2017    Chronic sinusitis 10/03/2017    Hyperglycemia 10/03/2017    Anemia 03/25/2014    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    Anxiety 07/23/2013    Combined hyperlipidemia 08/27/2015    Coronary vasospasm ([facility] [address] ) 10/12/2017    Diabetes mellitus ([facility] [address] ) 01/09/2017    Elevated AST (SGOT) 01/19/2017    GERD without esophagitis 11/21/2017    Ground glass opacity present on imaging of lung 02/06/2017    Heart palpitations 10/20/2017    Hypothyroid 08/27/2015    NSTEMI (non-ST elevated myocardial infarction) ([facility] [address] ) 10/11/2017     Past Medical History:   Diagnosis Date    Fatty liver     History of stomach ulcers     Hypothyroidism     Pre-diabetes        Admitting Diagnosis: Chest pain [R07 9]  Anomalous coronary artery origin [Q24 5]  Coronary vasospasm (HCC) [I20 1]    Age/Sex: 52 y o  female    Assessment/Plan:          * Chest pain   Assessment & Plan     · Symptoms similar to her previous chest pain when she was suspected to have coronary vasospasm  · Current suspicion is secondary to coronary vasospasms versus possibility of esophageal spasms as well  · Status post cardiac catheterization in October 2017 which did not show any obstructive CAD but showed 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  · Follow-up troponins, 1st negative, EKG on admission normal  · S/p 4 sublingual nitroglycerins at home with recurrence of symptoms  · s/p aspirin, Toprol-XL, nitropaste and Toradol in the emergency room - with some improvement in symptoms  · For now will continue amlodipine 2 5 mg daily, since blood pressures being low normal to borderline who instead of adding CC be or oral nitrate will add Ranexa 500 mg Twice a day  · Will consult Cardiology and follow up their recommendations into the etiology and management of the pain          Hypothyroid   Assessment & Plan     · Recent TSH increased to 4 8  · Levothyroxine increased from   Recheck TSH in next 2-4 weeks          Diabetes mellitus (HCC)   Assessment & Plan     · A1c last month improved to 7 from from 7 4 with metformin  · Continue home metformin 500 Twice a day          Coronary vasospasm (HCC)   Assessment & Plan     · As above with chest pain          Combined hyperlipidemia   Assessment & Plan     · Continue fenofibrate  · Check lipid panel, not checked since February          Anomalous coronary artery origin   Assessment & Plan     · 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          Peptic ulcer disease   Assessment & Plan     History of bleeding gastric ulcer several years ago  Continue Protonix          Hypertension   Assessment & Plan     Continue home Toprol XL 25 Twice a day, amlodipine 2 5 mg daily  The blood pressures in 110s to 120s        VTE Prophylaxis: Enoxaparin (Lovenox)  / sequential compression device   Code Status: Full code  POLST: There is no POLST form on file for this patient (pre-hospital)     Anticipated Length of Stay:  Patient will be admitted on an Observation basis with an anticipated length of stay of  < 2 midnights     Justification for Hospital Stay: chest pain      Admission Orders:  M/S/Tele unit  Telem  Serial troponins  Repeat EKG  Cons carb diet  Up & oob as tolerated  SCD's  Consult cardiology    Scheduled Meds:   Current Facility-Administered Medications:  amLODIPine 2 5 mg Oral Daily Kritsin Chavira MD   dicyclomine 20 mg Oral Q6H PRN Kristin Chavira MD   enoxaparin 40 mg Subcutaneous Daily Gee Santos MD   escitalopram 10 mg Oral Daily Kristin Chavira MD   fenofibrate 145 mg Oral Daily Gee Santos MD   fish oil 1,000 mg Oral Daily Kristin Chavira MD   fluticasone 2 spray Each Nare BID PRN Gee Santos MD   levothyroxine 100 mcg Oral Early Morning Gee Santos MD   metFORMIN 500 mg Oral BID With Meals Kristin Chavira MD   metoprolol succinate 25 mg Oral BID Gee Santos MD   nitroglycerin 0 4 mg Sublingual Q5 Min PRN Gee Santos MD   norgestrel-ethinyl estradiol 1 tablet Oral Daily Kristin Chavira MD   ondansetron 4 mg Intravenous Q6H PRN Gee Santos MD   pantoprazole 40 mg Oral Daily Gee Santos MD   ranolazine 500 mg Oral Q12H [facility] Kristin Chavira MD     Continuous Infusions:    PRN Meds: dicyclomine    fluticasone    nitroglycerin    ondansetron

## 2018-06-03 NOTE — ASSESSMENT & PLAN NOTE
· History of bleeding gastric ulcer several years ago  · Continue Protonix  · Recommend outpatient follow-up with gastroenterology for possible esophageal spasms

## 2018-06-03 NOTE — DISCHARGE SUMMARY
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  · EKG on admission, normal sinus rhythm, nonspecific T wave abnormality   · Chest pain has improved since presentation  · Troponin x 3: < 0 02  · Cardiology consulted  Recommending starting patient on Imdur 30 mg daily  · Outpatient follow-up with cardiologist, Dr Germania Avalos  Hypothyroid   Assessment & Plan    · Recent TSH increased to 4 856  · Levothyroxine was increased from 75 mcg to 100 mcg  · Outpatient follow-up with PCP  Obtain repeat TSH in next 2-4 weeks        Diabetes mellitus ([facility] [address] )   Assessment & Plan    · Hg A1c last month improved to 7 from from 7 4 with metformin  · Continue home metformin 500mg bid  Coronary vasospasm [facility])   Assessment & Plan    · Cardiology consulted  Recommending Imdur 30 mg daily  · Outpatient follow-up with cardiologist          Combined hyperlipidemia   Assessment & Plan    · Continue fenofibrate and Lovaza  · , HDl 30, LDL 73    · Consider adding statin  Anomalous coronary artery origin   Assessment & Plan    · 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        Peptic ulcer disease   Assessment & Plan    · History of bleeding gastric ulcer several years ago  · Continue Protonix  · Recommend outpatient follow-up with gastroenterology for possible esophageal spasms  Hypertension   Assessment & Plan    · BP acceptable     · Continue home Toprol XL 25mg bid, amlodipine 2 5 mg daily          Discharging Physician / Practitioner: Min Angel PA-C  PCP: Aliza Rolon DO  Admission Date:   Admission Orders     Ordered        06/02/18 1859  Place in Observation (expected length of stay for this patient is less than two midnights)  Once             Discharge Date: 06/03/18    Resolved Problems  Date Reviewed: 6/3/2018    None          Consultations During Hospital Stay:  · Cardiology  Procedures Performed:     · None    Significant Findings / Test Results:     · Chest x-ray: no acute cardiopulmonary disease  · Troponin x 3: < 0 02  · Lipid panel: cholesterol 169, , HDL 30, LDL 73    Incidental Findings:   · None    Test Results Pending at Discharge (will require follow up): · None     Outpatient Tests Requested:  · Outpatient follow-up with PCP  · Outpatient follow-up with cardiologist, Dr Nolan Lopez  · Outpatient follow-up with gastroenterology  Complications:  None    Reason for Admission: chest pain    Hospital Course:     Darwin Pandya is a 52 y o  female patient with a history of coronary artery spasm, type 2 diabetes, hyperlipidemia, hypertriglyceridemia and PUD who originally presented to the hospital on 6/2/2018 due to chest pain  Patient presented with left-sided chest pain reportedly described as a squeezing sensation with associated nausea and diaphoresis which began while sitting and reading  Patient took 3 SL nitro without relief of her pain  She contacted her cardiologist who recommended waiting 20 minutes and taking another nitro but her pain persisted therefore she presented to the ED  On arrival to the hospital, patient's EKG showed normal sinus rhythm, nonspecific T-wave abnormality and chest x-ray showed no acute findings  Initial troponin was negative  Nitro patch was placed  Patient was admitted for observation and cardiology was consulted  Cardiology recommended starting the patient on Imdur 30 mg daily   Patient's chest pain improved during her hospitalization  Case was discussed with cardiology on day of discharge and the patient was cleared for discharge with outpatient follow-up with her cardiologist, Dr Promise Mustafa  Patient is to follow-up with gastroenterology as an outpatient given her history of PUD and possible esophageal spasms  She was also instructed to follow-up with her PCP  Please see above list of diagnoses and related plan for additional information  Condition at Discharge: stable     Discharge Day Visit / Exam:     Subjective:  Patient sitting up in bed,  at bedside  Reports overall improvement since her chest pain since presentation  She denies SOB,  nausea/ vomiting or dizziness/ lightheadedness  Vitals: Blood Pressure: 116/55 (06/03/18 1044)  Pulse: 78 (06/03/18 1044)  Temperature: 98 7 °F (37 1 °C) (06/03/18 0700)  Temp Source: Oral (06/03/18 0700)  Respirations: 20 (06/03/18 0700)  Height: 5' 3" (160 cm) (06/02/18 2007)  Weight - Scale: 81 7 kg (180 lb 1 9 oz) (06/02/18 2007)  SpO2: 94 % (06/03/18 0700)  Exam:   Physical Exam   Constitutional: She is oriented to person, place, and time  No distress  HENT:   Head: Normocephalic and atraumatic  Eyes: Conjunctivae are normal    Cardiovascular: Normal rate, regular rhythm and normal heart sounds  Pulmonary/Chest: Effort normal and breath sounds normal  No respiratory distress  Abdominal: Soft  Bowel sounds are normal  There is no tenderness  Musculoskeletal: She exhibits no edema  Neurological: She is alert and oriented to person, place, and time  Skin: Skin is warm  She is not diaphoretic  Psychiatric: She has a normal mood and affect  Nursing note and vitals reviewed  Discussion with Family: patient's  at bedside  Discharge instructions/Information to patient and family:   See after visit summary for information provided to patient and family        Provisions for Follow-Up Care:  See after visit summary for information related to follow-up care and any pertinent home health orders  Disposition:     Home    For Discharges to [address] [facility]:   · Not Applicable to this Patient - Not Applicable to this Patient    Planned Readmission: none     Discharge Statement:  I spent 35 minutes discharging the patient  This time was spent on the day of discharge  I had direct contact with the patient on the day of discharge  Greater than 50% of the total time was spent examining patient, answering all patient questions, arranging and discussing plan of care with patient as well as directly providing post-discharge instructions  Additional time then spent on discharge activities  Discharge Medications:  See after visit summary for reconciled discharge medications provided to patient and family        ** Please Note: This note has been constructed using a voice recognition system **

## 2018-06-03 NOTE — DISCHARGE INSTRUCTIONS
Chest Pain   WHAT YOU NEED TO KNOW:   Chest pain can be caused by a range of conditions, from not serious to life-threatening  Chest pain can be a symptom of a digestive problem, such as acid reflux or a stomach ulcer  An anxiety attack or a strong emotion, such as anger, can also cause chest pain  Infection, inflammation, or a fracture in the bones or cartilage in your chest can cause pain or discomfort  Sometimes chest pain or pressure is caused by poor blood flow to your heart (angina)  Chest pain may also be caused by life-threatening conditions such as a heart attack or blood clot in your lungs  DISCHARGE INSTRUCTIONS:   Call 911 if:   · You have any of the following signs of a heart attack:      ¨ Squeezing, pressure, or pain in your chest that lasts longer than 5 minutes or returns    ¨ Discomfort or pain in your back, neck, jaw, stomach, or arm     ¨ Trouble breathing    ¨ Nausea or vomiting    ¨ Lightheadedness or a sudden cold sweat, especially with chest pain or trouble breathing    Seek care immediately if:   · You have chest discomfort that gets worse, even with medicine  · You cough or vomit blood  · Your bowel movements are black or bloody  · You cannot stop vomiting, or it hurts to swallow  Contact your healthcare provider if:   · You have questions or concerns about your condition or care  Medicines:   · Medicines  may be given to treat the cause of your chest pain  Examples include pain medicine, anxiety medicine, or medicines to increase blood flow to your heart  · Do not take certain medicines without asking your healthcare provider first   These include NSAIDs, herbal or vitamin supplements, or hormones (estrogen or progestin)  · Take your medicine as directed  Contact your healthcare provider if you think your medicine is not helping or if you have side effects  Tell him or her if you are allergic to any medicine   Keep a list of the medicines, vitamins, and herbs you take  Include the amounts, and when and why you take them  Bring the list or the pill bottles to follow-up visits  Carry your medicine list with you in case of an emergency  Follow up with your healthcare provider within 72 hours, or as directed: You may need to return for more tests to find the cause of your chest pain  You may be referred to a specialist, such as a cardiologist or gastroenterologist  Write down your questions so you remember to ask them during your visits  Healthy living tips: The following are general healthy guidelines  If your chest pain is caused by a heart problem, your healthcare provider will give you specific guidelines to follow  · Do not smoke  Nicotine and other chemicals in cigarettes and cigars can cause lung and heart damage  Ask your healthcare provider for information if you currently smoke and need help to quit  E-cigarettes or smokeless tobacco still contain nicotine  Talk to your healthcare provider before you use these products  · Eat a variety of healthy, low-fat foods  Healthy foods include fruits, vegetables, whole-grain breads, low-fat dairy products, beans, lean meats, and fish  Ask for more information about a heart healthy diet  · Ask about activity  Your healthcare provider will tell you which activities to limit or avoid  Ask when you can drive, return to work, and have sex  Ask about the best exercise plan for you  · Maintain a healthy weight  Ask your healthcare provider how much you should weigh  Ask him or her to help you create a weight loss plan if you are overweight  © 2017 [employer] Information is for End User's use only and may not be sold, redistributed or otherwise used for commercial purposes  All illustrations and images included in CareNotes® are the copyrighted property of [employer]  or Isaias Posey  The above information is an  only   It is not intended as medical advice for individual conditions or treatments  Talk to your doctor, nurse or pharmacist before following any medical regimen to see if it is safe and effective for you

## 2018-06-03 NOTE — ASSESSMENT & PLAN NOTE
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

## 2018-06-07 ENCOUNTER — OFFICE VISIT (OUTPATIENT)
Dept: CARDIOLOGY CLINIC | Facility: CLINIC | Age: 48
End: 2018-06-07
Payer: COMMERCIAL

## 2018-06-07 VITALS
DIASTOLIC BLOOD PRESSURE: 80 MMHG | BODY MASS INDEX: 31.02 KG/M2 | HEART RATE: 82 BPM | OXYGEN SATURATION: 98 % | SYSTOLIC BLOOD PRESSURE: 110 MMHG | HEIGHT: 63 IN | WEIGHT: 175.1 LBS

## 2018-06-07 DIAGNOSIS — R07.89 OTHER CHEST PAIN: ICD-10-CM

## 2018-06-07 PROCEDURE — 1111F DSCHRG MED/CURRENT MED MERGE: CPT | Performed by: INTERNAL MEDICINE

## 2018-06-07 PROCEDURE — 99214 OFFICE O/P EST MOD 30 MIN: CPT | Performed by: INTERNAL MEDICINE

## 2018-06-07 RX ORDER — AMLODIPINE BESYLATE 5 MG/1
5 TABLET ORAL DAILY
Qty: 30 TABLET | Refills: 5 | Status: SHIPPED | OUTPATIENT
Start: 2018-06-07 | End: 2018-12-14 | Stop reason: SDUPTHER

## 2018-06-07 RX ORDER — OMEPRAZOLE 20 MG/1
20 CAPSULE, DELAYED RELEASE ORAL DAILY
Qty: 30 CAPSULE | Refills: 5 | Status: SHIPPED | OUTPATIENT
Start: 2018-06-07 | End: 2018-11-17 | Stop reason: SDUPTHER

## 2018-06-07 NOTE — PROGRESS NOTES
Cardiology Follow Up    Nicky Warren  1970  [id number]  HEART & VASCULAR [facility]  [address] [place] [address] [address]    No diagnosis found  Interval History: Followup for chest pain    Recent ER visit for chest pain  Workup unremarkable  Still has some intermittent mild chest discomfort that is random  She has not required nitroglycerin  Problem List     Chest pain    Leukocytosis    Hypertension (Chronic)    Peptic ulcer disease (Chronic)    Fatty liver (Chronic)    Sinus tachycardia    Chronic sinusitis (Chronic)    Hyperglycemia    Anemia    Anomalous coronary artery origin    Anxiety    Combined hyperlipidemia    Coronary vasospasm (HCC)    Diabetes mellitus (HCC)    Elevated AST (SGOT)    GERD without esophagitis    Ground glass opacity present on imaging of lung    Heart palpitations    Hypothyroid    NSTEMI (non-ST elevated myocardial infarction) ([facility] [address] )        Past Medical History:   Diagnosis Date    Fatty liver     History of stomach ulcers     Hypothyroidism     Pre-diabetes      Social History     Social History    Marital status: /Civil Union     Spouse name: N/A    Number of children: N/A    Years of education: N/A     Occupational History    Not on file       Social History Main Topics    Smoking status: Former Smoker     Packs/day: 0 50     Years: 4 00     Types: Cigarettes     Quit date: 02/2016    Smokeless tobacco: Never Used    Alcohol use Yes      Comment: occasional    Drug use: Yes     Types: Methylphenidate    Sexual activity: Yes     Partners: Male     Other Topics Concern    Not on file     Social History Narrative    No narrative on file      Family History   Problem Relation Age of Onset    Pancreatic cancer Mother     Hypertension Father     Diabetes Maternal Grandmother     Diabetes Paternal Grandmother     Heart disease Paternal Grandmother      Past Surgical History: Procedure Laterality Date    CHOLECYSTECTOMY      SINUS SURGERY         Current Outpatient Prescriptions:     amLODIPine (NORVASC) 2 5 mg tablet, Take 1 tablet (2 5 mg total) by mouth daily, Disp: 30 tablet, Rfl: 5    dicyclomine (BENTYL) 20 mg tablet, Take 1 tablet by mouth every 6 (six) hours as needed  , Disp: , Rfl:     escitalopram (LEXAPRO) 20 mg tablet, Take 10 mg by mouth daily  , Disp: , Rfl:     fenofibrate (TRICOR) 145 mg tablet, Take 1 tablet (145 mg total) by mouth daily, Disp: 90 tablet, Rfl: 0    fluticasone (FLONASE) 50 mcg/act nasal spray, 2 sprays into each nostril 2 (two) times a day as needed  , Disp: , Rfl:     levothyroxine 100 mcg tablet, 1 tab daily, Disp: 90 tablet, Rfl: 1    metFORMIN (GLUCOPHAGE) 500 mg tablet, 1 tab BID, Disp: 180 tablet, Rfl: 1    metoprolol succinate (TOPROL-XL) 25 mg 24 hr tablet, Take 1 tablet by mouth 2 (two) times a day, Disp: , Rfl:     nitroglycerin (NITROSTAT) 0 4 mg SL tablet, Place 1 tablet under the tongue every 5 (five) minutes as needed for chest pain, Disp: 90 tablet, Rfl: 0    norgestrel-ethinyl estradiol (LOW-OGESTREL) 0 3 mg-30 mcg per tablet, Take 1 tablet by mouth daily, Disp: , Rfl:     omega-3-acid ethyl esters (LOVAZA) 1 g capsule, Take 4 capsules (4 g total) by mouth daily, Disp: 360 capsule, Rfl: 3    pantoprazole (PROTONIX) 40 mg tablet, take one tablet by mouth every day, Disp: 90 tablet, Rfl: 1    traMADol (ULTRAM) 50 mg tablet, TAKE ONE TABLET BY MOUTH EVERY 12 HOURS AS NEEDED FOR PAIN, Disp: 60 tablet, Rfl: 0  Allergies   Allergen Reactions    Sulfamethoxazole-Trimethoprim Shortness Of Breath     Reaction Date: [id number];     Erythromycin      Reaction Date: [id number];     Metronidazole     Sulfa Antibiotics        Labs:     Chemistry        Component Value Date/Time     06/02/2018 1643     09/02/2015 1020    K 4 1 06/02/2018 1643    K 3 8 09/02/2015 1020     06/02/2018 1643     09/02/2015 1020 CO2 27 06/02/2018 1643    CO2 24 3 09/02/2015 1020    BUN 13 06/02/2018 1643    BUN 13 09/02/2015 1020    CREATININE 0 83 06/02/2018 1643    CREATININE 0 79 09/02/2015 1020        Component Value Date/Time    CALCIUM 9 6 06/02/2018 1643    CALCIUM 9 1 09/02/2015 1020    ALKPHOS 60 06/02/2018 1643    ALKPHOS 78 09/02/2015 1020    AST 72 (H) 06/02/2018 1643    AST 37 09/02/2015 1020    ALT 65 06/02/2018 1643    ALT 62 09/02/2015 1020    BILITOT 0 25 06/02/2018 1643    BILITOT 0 38 09/02/2015 1020            Lab Results   Component Value Date    CHOL 169 06/03/2018    CHOL 212 (H) 02/02/2018    CHOL 224 (H) 08/01/2017     Lab Results   Component Value Date    HDL 30 (L) 06/03/2018    HDL 39 (L) 02/02/2018    HDL 38 (L) 08/01/2017     Lab Results   Component Value Date    LDLCALC 73 06/03/2018    LDLCALC 117 (H) 02/02/2018    [address]  08/01/2017      Comment:      Calculated LDL invalid, triglycerides >400 mg/dl     Lab Results   Component Value Date    TRIG 331 (H) 06/03/2018    TRIG 279 (H) 02/02/2018    TRIG 557 (H) 08/01/2017     No components found for: CHOLHDL    Imaging: X-ray Chest 2 Views    Result Date: 6/2/2018  Narrative: CHEST INDICATION:   chest pain  COMPARISON:  10/3/2017 EXAM PERFORMED/VIEWS:  XR CHEST PA & LATERAL  The frontal view was performed utilizing dual energy radiographic technique  Images: 4 FINDINGS: Cardiomediastinal silhouette appears unremarkable  The lungs are clear  No pneumothorax or pleural effusion  Mild right hemidiaphragm elevation is a stable finding  Osseous structures appear within normal limits for patient age  Impression: No acute cardiopulmonary disease  Workstation performed: [id number]           Review of Systems   Constitution: Negative  HENT: Negative  Eyes: Negative  Cardiovascular: Positive for chest pain  Respiratory: Negative  Endocrine: Negative  Hematologic/Lymphatic: Negative  Skin: Negative  Musculoskeletal: Negative  Gastrointestinal: Negative  Genitourinary: Negative  Neurological: Negative  Psychiatric/Behavioral: Negative  Allergic/Immunologic: Negative  Vitals:    06/07/18 1032   BP: 110/80   Pulse: 82   SpO2: 98%           Physical Exam   Constitutional: She is oriented to person, place, and time  No distress  HENT:   Mouth/Throat: No oropharyngeal exudate  Eyes: No scleral icterus  Neck: No JVD present  Cardiovascular: Normal rate and regular rhythm  No murmur heard  Pulmonary/Chest: Effort normal and breath sounds normal  No respiratory distress  She has no wheezes  She has no rales  Abdominal: Soft  Bowel sounds are normal  She exhibits no distension  There is no tenderness  There is no rebound  Musculoskeletal: She exhibits no edema  Neurological: She is alert and oriented to person, place, and time  No cranial nerve deficit  Skin: Skin is warm and dry  She is not diaphoretic  Psychiatric: She has a normal mood and affect  Discussion/Summary:    Chest Discomfort: 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

## 2018-06-14 ENCOUNTER — OFFICE VISIT (OUTPATIENT)
Dept: FAMILY MEDICINE CLINIC | Facility: CLINIC | Age: 48
End: 2018-06-14
Payer: COMMERCIAL

## 2018-06-14 ENCOUNTER — TRANSITIONAL CARE MANAGEMENT (OUTPATIENT)
Dept: FAMILY MEDICINE CLINIC | Facility: CLINIC | Age: 48
End: 2018-06-14

## 2018-06-14 VITALS
BODY MASS INDEX: 31.76 KG/M2 | DIASTOLIC BLOOD PRESSURE: 68 MMHG | HEIGHT: 63 IN | WEIGHT: 179.25 LBS | TEMPERATURE: 98.8 F | OXYGEN SATURATION: 97 % | SYSTOLIC BLOOD PRESSURE: 124 MMHG | RESPIRATION RATE: 18 BRPM | HEART RATE: 72 BPM

## 2018-06-14 DIAGNOSIS — J01.90 ACUTE SINUSITIS, RECURRENCE NOT SPECIFIED, UNSPECIFIED LOCATION: ICD-10-CM

## 2018-06-14 DIAGNOSIS — K21.9 GERD WITHOUT ESOPHAGITIS: ICD-10-CM

## 2018-06-14 DIAGNOSIS — R07.9 CHEST PAIN, UNSPECIFIED TYPE: Primary | ICD-10-CM

## 2018-06-14 PROCEDURE — 99495 TRANSJ CARE MGMT MOD F2F 14D: CPT | Performed by: FAMILY MEDICINE

## 2018-06-14 RX ORDER — TRAMADOL HYDROCHLORIDE 50 MG/1
50 TABLET ORAL EVERY 12 HOURS PRN
Qty: 60 TABLET | Refills: 0 | Status: SHIPPED | OUTPATIENT
Start: 2018-06-14 | End: 2018-07-18 | Stop reason: SDUPTHER

## 2018-06-14 NOTE — ASSESSMENT & PLAN NOTE
This is a transition of care management visit for patient was admitted to [facility] from June 2nd to June 3rd due to persistent chest pains that were current at rest at home  Patient had taken 3-4 sublingual nitroglycerin without benefit  She was subsequently evaluated in the emergency room and admitted overnight as a precaution     Testing done in hospital was negative  She was diagnosed with coronary vasospasm  Her amlodipine was increased to 5 mg daily and it was recommended that she take her nitroglycerin as soon as she develops symptoms in the future  Currently she is feeling well without any chest pain or shortness of breath  Recommend follow-up with her cardiologist as directed, Dr Venice Tamayo

## 2018-06-14 NOTE — PROGRESS NOTES
Assessment/Plan:    Chest pain  This is a transition of care management visit for patient was admitted to [facility] from June 2nd to June 3rd due to persistent chest pains that were current at rest at home  Patient had taken 3-4 sublingual nitroglycerin without benefit  She was subsequently evaluated in the emergency room and admitted overnight as a precaution     Testing done in hospital was negative  She was diagnosed with coronary vasospasm  Her amlodipine was increased to 5 mg daily and it was recommended that she take her nitroglycerin as soon as she develops symptoms in the future  Currently she is feeling well without any chest pain or shortness of breath  Recommend follow-up with her cardiologist as directed, Dr Wilberto Kenney  Diagnoses and all orders for this visit:    Chest pain, unspecified type    Acute sinusitis, recurrence not specified, unspecified location  -     traMADol (ULTRAM) 50 mg tablet; Take 1 tablet (50 mg total) by mouth every 12 (twelve) hours as needed for moderate pain    GERD without esophagitis      KEEP NEXT REGULARLY SCHEDULED APPT, FBW PRIOR    Subjective:   Date and time hospital follow up call was made:  6/14/2018  4:38 PM  Hospital care reviewed:  Records reviewed  Patient was hopsitalized at:  [facility]  Date of admission:  6/2/18  Date of discharge:  6/3/18  Diagnosis:  Chest pain  Disposition:  Home  Were the patients medicaitons reviewed and updated:  Yes  Current symptoms:  None  Post hospital issues:  None  Scheduled for follow up?:  Yes  Did you obtain your prescribed medications:  Yes  Do you need help managing your perscriptions or medications:  No  Is transportation to your appointments needed:  No  I have advised the patient to call PCP with any new or worsening symptoms (please type in name along with any credentials):  Paulo Santana MA        Patient ID: Jian Guzman is a 52 y o  female       This is a transition of care management visit for patient was admitted to [facility] from June 2nd to June 3rd due to persistent chest pains that were current at rest at home  Patient had taken 3-4 sublingual nitroglycerin without benefit  She was subsequently evaluated in the emergency room and admitted overnight as a precaution     Testing done in hospital was negative  She was diagnosed with coronary vasospasm  Her amlodipine was increased to 5 mg daily and it was recommended that she take her nitroglycerin as soon as she develops symptoms in the future  Currently she is feeling well without any chest pain or shortness of breath  The following portions of the patient's history were reviewed and updated as appropriate: allergies, current medications, past family history, past medical history, past social history, past surgical history and problem list     Review of Systems   Respiratory: Negative  Cardiovascular: Negative  Gastrointestinal: Negative  Genitourinary: Negative  Objective:      /68   Pulse 72   Temp 98 8 °F (37 1 °C) (Tympanic)   Resp 18   Ht 5' 3" (1 6 m)   Wt 81 3 kg (179 lb 4 oz)   LMP 05/28/2018   SpO2 97%   Breastfeeding? No   BMI 31 75 kg/m²          Physical Exam   Cardiovascular: Normal rate, regular rhythm, normal heart sounds and intact distal pulses  Carotids: no bruits  Ext: no edema   Pulmonary/Chest: Effort normal  No respiratory distress  She has no wheezes  She has no rales  Psychiatric: She has a normal mood and affect   Her behavior is normal  Thought content normal

## 2018-06-18 ENCOUNTER — OFFICE VISIT (OUTPATIENT)
Dept: GASTROENTEROLOGY | Facility: CLINIC | Age: 48
End: 2018-06-18
Payer: COMMERCIAL

## 2018-06-18 VITALS
HEIGHT: 63 IN | HEART RATE: 83 BPM | SYSTOLIC BLOOD PRESSURE: 106 MMHG | DIASTOLIC BLOOD PRESSURE: 68 MMHG | WEIGHT: 179.6 LBS | BODY MASS INDEX: 31.82 KG/M2 | TEMPERATURE: 97.9 F

## 2018-06-18 DIAGNOSIS — R07.9 CHEST PAIN DUE TO GERD: Primary | ICD-10-CM

## 2018-06-18 DIAGNOSIS — K21.9 CHEST PAIN DUE TO GERD: Primary | ICD-10-CM

## 2018-06-18 DIAGNOSIS — R79.89 ELEVATED LFTS: ICD-10-CM

## 2018-06-18 PROCEDURE — 99244 OFF/OP CNSLTJ NEW/EST MOD 40: CPT | Performed by: INTERNAL MEDICINE

## 2018-06-18 RX ORDER — ASPIRIN 81 MG/1
81 TABLET ORAL DAILY
COMMUNITY
End: 2020-06-10 | Stop reason: ALTCHOICE

## 2018-06-18 NOTE — PROGRESS NOTES
[facility] - Outpatient Consultation  Grace Argueta 52 y o  female MRN: [id number]  Encounter: [id number]          ASSESSMENT AND PLAN:      1  Chest pain this is likely secondary to coronary spasm -she is being managed by Cardiology team currently on amlodipine and nitroglycerin  We discussed possible GI etiologies including esophageal spasm from reflux disease or esophageal motility disorder  She has no other reflux symptoms  I will schedule her for EGD to assess for esophagitis and also hiatal hernia  If her EGD is negative esophageal manometric study can be considered but since she is already on calcium channel blockers and nitroglycerin I do not think doing manometric can change her management  Because of her heavy Motrin use we will also rule out peptic ulcer disease  2   Elevated LFT-likely secondary to alcoholic liver disease and underlying GIANG  Counseled the patient on abstinence from drinking  Continue to monitor liver function test every 3-6 months  Serology ordered for hepatitis B and C  She will follow up with us on as-needed basis  ______________________________________________________________________    HPI:  68-year-old female with history of coronary artery spasm, diabetes and non ST elevation MI in October 2017 here for further evaluation of her chest pain  Patient had extensive cardiac workup including 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  She was being managed by Cardiology team   She was told her chest pain/coronary spasm is 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  She is here to discuss if pain is of GI etiology  Her pain is intermittent mostly located on the left side  She denies epigastric pain, regurgitation or other reflux symptoms  Patient is on pantoprazole 40 mg daily    She has been on pantoprazole for several years because of her history of peptic ulcer disease diagnosed several years ago  She also takes Motrin 300 mg to the 3 times a day for sinus related headache  She denies melena, hematemesis or hematochezia  She had EGD and colonoscopy several years ago     She is currently on amlodipine and also requires nitroglycerin as needed for chest pain  She reports long history of heavy drinking but recently cut down her alcohol intake to 1-2 drinks per week  She has mildly elevated AST to 72  The rest of liver function tests are normal  I reviewed her ultrasound which showed fatty liver disease  REVIEW OF SYSTEMS:    CONSTITUTIONAL: Denies any fever, chills, rigors, and weight loss  HEENT: No earache or tinnitus  Denies hearing loss or visual disturbances  CARDIOVASCULAR: No chest pain or palpitations  RESPIRATORY: Denies any cough, hemoptysis, shortness of breath or dyspnea on exertion  GASTROINTESTINAL: As noted in the History of Present Illness  GENITOURINARY: No problems with urination  Denies any hematuria or dysuria  NEUROLOGIC: No dizziness or vertigo, denies headaches  MUSCULOSKELETAL: Denies any muscle or joint pain  SKIN: Denies skin rashes or itching  ENDOCRINE: Denies excessive thirst  Denies intolerance to heat or cold  PSYCHOSOCIAL: Denies depression or anxiety  Denies any recent memory loss         Historical Information   Past Medical History:   Diagnosis Date    Fatty liver     History of stomach ulcers     Hypothyroidism     Pre-diabetes      Past Surgical History:   Procedure Laterality Date    CARDIAC CATHETERIZATION      CHOLECYSTECTOMY      COLONOSCOPY      SINUS SURGERY       Social History   History   Alcohol Use    Yes     Comment: occasional     History   Drug Use No     History   Smoking Status    Former Smoker    Packs/day: 0 50    Years: 4 00    Types: Cigarettes    Quit date: 02/2016   Smokeless Tobacco    Never Used     Family History   Problem Relation Age of Onset    Pancreatic cancer Mother  Hypertension Father     Diabetes Maternal Grandmother     Diabetes Paternal Grandmother     Heart disease Paternal Grandmother        Meds/Allergies       Current Outpatient Prescriptions:     amLODIPine (NORVASC) 5 mg tablet    aspirin (ECOTRIN LOW STRENGTH) 81 mg EC tablet    dicyclomine (BENTYL) 20 mg tablet    escitalopram (LEXAPRO) 20 mg tablet    fenofibrate (TRICOR) 145 mg tablet    fluticasone (FLONASE) 50 mcg/act nasal spray    levothyroxine 100 mcg tablet    metFORMIN (GLUCOPHAGE) 500 mg tablet    metoprolol succinate (TOPROL-XL) 25 mg 24 hr tablet    nitroglycerin (NITROSTAT) 0 4 mg SL tablet    norgestrel-ethinyl estradiol (LOW-OGESTREL) 0 3 mg-30 mcg per tablet    omega-3-acid ethyl esters (LOVAZA) 1 g capsule    omeprazole (PriLOSEC) 20 mg delayed release capsule    pantoprazole (PROTONIX) 40 mg tablet    traMADol (ULTRAM) 50 mg tablet    Allergies   Allergen Reactions    Sulfamethoxazole-Trimethoprim Shortness Of Breath     Reaction Date: [id number];     Sulfa Antibiotics     Erythromycin      Reaction Date: [id number];     Metronidazole            Objective     Blood pressure 106/68, pulse 83, temperature 97 9 °F (36 6 °C), temperature source Tympanic, height 5' 3" (1 6 m), weight 81 5 kg (179 lb 9 6 oz), last menstrual period 05/28/2018, not currently breastfeeding  Body mass index is 31 81 kg/m²  PHYSICAL EXAM:      General Appearance:   Alert, cooperative, no distress   HEENT:   Normocephalic, atraumatic, anicteric      Neck:  Supple, symmetrical, trachea midline   Lungs:   Clear to auscultation bilaterally; no rales, rhonchi or wheezing; respirations unlabored    Heart[de-identified]   Regular rate and rhythm; no murmur, rub, or gallop     Abdomen:   Soft, non-tender, non-distended; normal bowel sounds; no masses, no organomegaly    Genitalia:   Deferred    Rectal:   Deferred    Extremities:  No cyanosis, clubbing or edema    Pulses:  2+ and symmetric    Skin:  No jaundice, rashes, or lesions    Lymph nodes:  No palpable cervical lymphadenopathy        Lab Results:   No visits with results within 1 Day(s) from this visit     Latest known visit with results is:   Admission on 06/02/2018, Discharged on 06/03/2018   Component Date Value    Sodium 06/02/2018 137     Potassium 06/02/2018 4 1     Chloride 06/02/2018 104     CO2 06/02/2018 27     Anion Gap 06/02/2018 6     BUN 06/02/2018 13     Creatinine 06/02/2018 0 83     Glucose 06/02/2018 104     Calcium 06/02/2018 9 6     AST 06/02/2018 72*    ALT 06/02/2018 65     Alkaline Phosphatase 06/02/2018 60     Total Protein 06/02/2018 7 7     Albumin 06/02/2018 3 8     Total Bilirubin 06/02/2018 0 25     eGFR 06/02/2018 84     WBC 06/02/2018 7 94     RBC 06/02/2018 4 10     Hemoglobin 06/02/2018 12 6     Hematocrit 06/02/2018 37 7     MCV 06/02/2018 92     MCH 06/02/2018 30 7     MCHC 06/02/2018 33 4     RDW 06/02/2018 13 1     MPV 06/02/2018 9 3     Platelets 08/49/9280 272     nRBC 06/02/2018 0     Neutrophils Relative 06/02/2018 54     Immat GRANS % 06/02/2018 0     Lymphocytes Relative 06/02/2018 31     Monocytes Relative 06/02/2018 9     Eosinophils Relative 06/02/2018 5     Basophils Relative 06/02/2018 1     Neutrophils Absolute 06/02/2018 4 28     Immature Grans Absolute 06/02/2018 0 01     Lymphocytes Absolute 06/02/2018 2 42     Monocytes Absolute 06/02/2018 0 72     Eosinophils Absolute 06/02/2018 0 42     Basophils Absolute 06/02/2018 0 09     Troponin I 06/02/2018 <0 02     Troponin I 06/02/2018 <0 02     Ventricular Rate 06/02/2018 72     Atrial Rate 06/02/2018 72     NC Interval 06/02/2018 148     QRSD Interval 06/02/2018 82     QT Interval 06/02/2018 388     QTC Interval 06/02/2018 424     P Axis 06/02/2018 51     QRS Axis 06/02/2018 38     T Wave Axis 06/02/2018 52     Troponin I 06/02/2018 <0 02     Cholesterol 06/03/2018 169     Triglycerides 06/03/2018 331*    HDL, Direct 06/03/2018 30*    LDL Calculated 06/03/2018 73          Radiology Results:   X-ray Chest 2 Views    Result Date: 6/2/2018  Narrative: CHEST INDICATION:   chest pain  COMPARISON:  10/3/2017 EXAM PERFORMED/VIEWS:  XR CHEST PA & LATERAL  The frontal view was performed utilizing dual energy radiographic technique  Images: 4 FINDINGS: Cardiomediastinal silhouette appears unremarkable  The lungs are clear  No pneumothorax or pleural effusion  Mild right hemidiaphragm elevation is a stable finding  Osseous structures appear within normal limits for patient age  Impression: No acute cardiopulmonary disease  Workstation performed: [id number]     RIGHT UPPER QUADRANT ULTRASOUND     INDICATION:  Elevated LFTs      COMPARISON:  Abdominal ultrasound 7/20/2005     TECHNIQUE:   Real-time ultrasound of the right upper quadrant was performed with a curvilinear transducer with both volumetric sweeps and still imaging techniques      FINDINGS:     PANCREAS:  Portions of the pancreas are obscured by bowel gas  Visualized portions of the pancreas are unremarkable       AORTA AND IVC:  Visualized portions are normal for patient age      LIVER:  Size:  Within normal range  The liver measures 12 7 cm in the midclavicular line  Contour:  Surface contour is smooth  Parenchyma: There is mild diffuse increased echogenicity with smooth echotexture, without significant beam attenuation or loss of periportal echogenicity  Most consistent with mild hepatic steatosis  No evidence of suspicious mass  Limited imaging of the main portal vein shows it to be patent and hepatopedal      BILIARY:  The gallbladder is surgically absent  No intrahepatic biliary dilatation  CBD measures 4 8 mm  No choledocholithiasis      KIDNEY:   Right kidney measures 11 x 5 5 cm  Within normal limits      ASCITES:   None      IMPRESSION:  1  Hepatic steatosis  2   Status post cholecystectomy    3   Limited visualization of the pancreas secondary to adjacent bowel gas

## 2018-06-18 NOTE — LETTER
June 18, 2018     Anam Baires DO  [address]    Patient: Qiana Jennings   YOB: 1970   Date of Visit: 6/18/2018       Dear Dr Tivis Nyhan:    Thank you for referring Qiana Jennings to me for evaluation  Below are my notes for this consultation  If you have questions, please do not hesitate to call me  I look forward to following your patient along with you  Sincerely,        Eric Ortiz MD        CC: MD Eric Abraham MD  6/18/2018  3:57 PM  Sign at close encounter  [facility] - Outpatient Consultation  Qiana Jennings 52 y o  female MRN: [id number]  Encounter: [id number]          ASSESSMENT AND PLAN:      1  Chest pain this is likely secondary to coronary spasm -she is being managed by Cardiology team currently on amlodipine and nitroglycerin  We discussed possible GI etiologies including esophageal spasm from reflux disease or esophageal motility disorder  She has no other reflux symptoms  I will schedule her for EGD to assess for esophagitis and also hiatal hernia  If her EGD is negative esophageal manometric study can be considered but since she is already on calcium channel blockers and nitroglycerin I do not think doing manometric can change her management  Because of her heavy Motrin use we will also rule out peptic ulcer disease  2   Elevated LFT-likely secondary to alcoholic liver disease and underlying GIANG  Counseled the patient on abstinence from drinking  Continue to monitor liver function test every 3-6 months  Serology ordered for hepatitis B and C  She will follow up with us on as-needed basis  ______________________________________________________________________    HPI:  75-year-old female with history of coronary artery spasm, diabetes and non ST elevation MI in October 2017 here for further evaluation of her chest pain   Patient had extensive cardiac workup including Spoke with patient's son, Nani Clark, in regards to need for central catheter placement  Reviewed risks of placing catheter, including bleeding, infection and possible pneumothorax or pneumomediastinum  Discussed that these risks are slightly increased and more serious given cirrhosis and mild respiratory decompensation due to tense ascites  Discussed use of sterile technique and image guidance in assistance of placing line  Son gives consent to place catheter by available IR physician  Will update paper chart with consent      Mariajose Ornelas 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  She was being managed by Cardiology team   She was told her chest pain/coronary spasm is 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  She is here to discuss if pain is of GI etiology  Her pain is intermittent mostly located on the left side  She denies epigastric pain, regurgitation or other reflux symptoms  Patient is on pantoprazole 40 mg daily  She has been on pantoprazole for several years because of her history of peptic ulcer disease diagnosed several years ago  She also takes Motrin 300 mg to the 3 times a day for sinus related headache  She denies melena, hematemesis or hematochezia  She had EGD and colonoscopy several years ago     She is currently on amlodipine and also requires nitroglycerin as needed for chest pain  She reports long history of heavy drinking but recently cut down her alcohol intake to 1-2 drinks per week  She has mildly elevated AST to 72  The rest of liver function tests are normal  I reviewed her ultrasound which showed fatty liver disease  REVIEW OF SYSTEMS:    CONSTITUTIONAL: Denies any fever, chills, rigors, and weight loss  HEENT: No earache or tinnitus  Denies hearing loss or visual disturbances  CARDIOVASCULAR: No chest pain or palpitations  RESPIRATORY: Denies any cough, hemoptysis, shortness of breath or dyspnea on exertion  GASTROINTESTINAL: As noted in the History of Present Illness  GENITOURINARY: No problems with urination  Denies any hematuria or dysuria  NEUROLOGIC: No dizziness or vertigo, denies headaches  MUSCULOSKELETAL: Denies any muscle or joint pain  SKIN: Denies skin rashes or itching  ENDOCRINE: Denies excessive thirst  Denies intolerance to heat or cold  PSYCHOSOCIAL: Denies depression or anxiety  Denies any recent memory loss         Historical Information   Past Medical History:   Diagnosis Date    Fatty liver     History of stomach ulcers     Hypothyroidism     Pre-diabetes      Past Surgical History:   Procedure Laterality Date    CARDIAC CATHETERIZATION      CHOLECYSTECTOMY      COLONOSCOPY      SINUS SURGERY       Social History   History   Alcohol Use    Yes     Comment: occasional     History   Drug Use No     History   Smoking Status    Former Smoker    Packs/day: 0 50    Years: 4 00    Types: Cigarettes    Quit date: 02/2016   Smokeless Tobacco    Never Used     Family History   Problem Relation Age of Onset    Pancreatic cancer Mother     Hypertension Father     Diabetes Maternal Grandmother     Diabetes Paternal Grandmother     Heart disease Paternal Grandmother        Meds/Allergies       Current Outpatient Prescriptions:     amLODIPine (NORVASC) 5 mg tablet    aspirin (ECOTRIN LOW STRENGTH) 81 mg EC tablet    dicyclomine (BENTYL) 20 mg tablet    escitalopram (LEXAPRO) 20 mg tablet    fenofibrate (TRICOR) 145 mg tablet    fluticasone (FLONASE) 50 mcg/act nasal spray    levothyroxine 100 mcg tablet    metFORMIN (GLUCOPHAGE) 500 mg tablet    metoprolol succinate (TOPROL-XL) 25 mg 24 hr tablet    nitroglycerin (NITROSTAT) 0 4 mg SL tablet    norgestrel-ethinyl estradiol (LOW-OGESTREL) 0 3 mg-30 mcg per tablet    omega-3-acid ethyl esters (LOVAZA) 1 g capsule    omeprazole (PriLOSEC) 20 mg delayed release capsule    pantoprazole (PROTONIX) 40 mg tablet    traMADol (ULTRAM) 50 mg tablet    Allergies   Allergen Reactions    Sulfamethoxazole-Trimethoprim Shortness Of Breath     Reaction Date: [id number];     Sulfa Antibiotics     Erythromycin      Reaction Date: [id number];     Metronidazole            Objective     Blood pressure 106/68, pulse 83, temperature 97 9 °F (36 6 °C), temperature source Tympanic, height 5' 3" (1 6 m), weight 81 5 kg (179 lb 9 6 oz), last menstrual period 05/28/2018, not currently breastfeeding  Body mass index is 31 81 kg/m²          PHYSICAL EXAM:      General Appearance:   Alert, cooperative, no distress   HEENT:   Normocephalic, atraumatic, anicteric      Neck:  Supple, symmetrical, trachea midline   Lungs:   Clear to auscultation bilaterally; no rales, rhonchi or wheezing; respirations unlabored    Heart[de-identified]   Regular rate and rhythm; no murmur, rub, or gallop  Abdomen:   Soft, non-tender, non-distended; normal bowel sounds; no masses, no organomegaly    Genitalia:   Deferred    Rectal:   Deferred    Extremities:  No cyanosis, clubbing or edema    Pulses:  2+ and symmetric    Skin:  No jaundice, rashes, or lesions    Lymph nodes:  No palpable cervical lymphadenopathy        Lab Results:   No visits with results within 1 Day(s) from this visit     Latest known visit with results is:   Admission on 06/02/2018, Discharged on 06/03/2018   Component Date Value    Sodium 06/02/2018 137     Potassium 06/02/2018 4 1     Chloride 06/02/2018 104     CO2 06/02/2018 27     Anion Gap 06/02/2018 6     BUN 06/02/2018 13     Creatinine 06/02/2018 0 83     Glucose 06/02/2018 104     Calcium 06/02/2018 9 6     AST 06/02/2018 72*    ALT 06/02/2018 65     Alkaline Phosphatase 06/02/2018 60     Total Protein 06/02/2018 7 7     Albumin 06/02/2018 3 8     Total Bilirubin 06/02/2018 0 25     eGFR 06/02/2018 84     WBC 06/02/2018 7 94     RBC 06/02/2018 4 10     Hemoglobin 06/02/2018 12 6     Hematocrit 06/02/2018 37 7     MCV 06/02/2018 92     MCH 06/02/2018 30 7     MCHC 06/02/2018 33 4     RDW 06/02/2018 13 1     MPV 06/02/2018 9 3     Platelets 21/40/1344 272     nRBC 06/02/2018 0     Neutrophils Relative 06/02/2018 54     Immat GRANS % 06/02/2018 0     Lymphocytes Relative 06/02/2018 31     Monocytes Relative 06/02/2018 9     Eosinophils Relative 06/02/2018 5     Basophils Relative 06/02/2018 1     Neutrophils Absolute 06/02/2018 4 28     Immature Grans Absolute 06/02/2018 0 01     Lymphocytes Absolute 06/02/2018 2 42     Monocytes Absolute 06/02/2018 0 72     Eosinophils Absolute 06/02/2018 0 42     Basophils Absolute 06/02/2018 0 09     Troponin I 06/02/2018 <0 02     Troponin I 06/02/2018 <0 02     Ventricular Rate 06/02/2018 72     Atrial Rate 06/02/2018 72     MI Interval 06/02/2018 148     QRSD Interval 06/02/2018 82     QT Interval 06/02/2018 388     QTC Interval 06/02/2018 424     P Axis 06/02/2018 51     QRS Axis 06/02/2018 38     T Wave Axis 06/02/2018 52     Troponin I 06/02/2018 <0 02     Cholesterol 06/03/2018 169     Triglycerides 06/03/2018 331*    HDL, Direct 06/03/2018 30*    LDL Calculated 06/03/2018 73          Radiology Results:   X-ray Chest 2 Views    Result Date: 6/2/2018  Narrative: CHEST INDICATION:   chest pain  COMPARISON:  10/3/2017 EXAM PERFORMED/VIEWS:  XR CHEST PA & LATERAL  The frontal view was performed utilizing dual energy radiographic technique  Images: 4 FINDINGS: Cardiomediastinal silhouette appears unremarkable  The lungs are clear  No pneumothorax or pleural effusion  Mild right hemidiaphragm elevation is a stable finding  Osseous structures appear within normal limits for patient age  Impression: No acute cardiopulmonary disease  Workstation performed: [id number]     RIGHT UPPER QUADRANT ULTRASOUND     INDICATION:  Elevated LFTs      COMPARISON:  Abdominal ultrasound 7/20/2005     TECHNIQUE:   Real-time ultrasound of the right upper quadrant was performed with a curvilinear transducer with both volumetric sweeps and still imaging techniques      FINDINGS:     PANCREAS:  Portions of the pancreas are obscured by bowel gas  Visualized portions of the pancreas are unremarkable       AORTA AND IVC:  Visualized portions are normal for patient age      LIVER:  Size:  Within normal range  The liver measures 12 7 cm in the midclavicular line  Contour:  Surface contour is smooth  Parenchyma:   There is mild diffuse increased echogenicity with smooth echotexture, without significant beam attenuation or loss of periportal echogenicity  Most consistent with mild hepatic steatosis  No evidence of suspicious mass  Limited imaging of the main portal vein shows it to be patent and hepatopedal      BILIARY:  The gallbladder is surgically absent  No intrahepatic biliary dilatation  CBD measures 4 8 mm  No choledocholithiasis      KIDNEY:   Right kidney measures 11 x 5 5 cm  Within normal limits      ASCITES:   None      IMPRESSION:  1  Hepatic steatosis  2   Status post cholecystectomy  3   Limited visualization of the pancreas secondary to adjacent bowel gas

## 2018-06-22 ENCOUNTER — APPOINTMENT (OUTPATIENT)
Dept: LAB | Facility: CLINIC | Age: 48
End: 2018-06-22
Payer: COMMERCIAL

## 2018-06-22 DIAGNOSIS — R79.89 ELEVATED LFTS: ICD-10-CM

## 2018-06-22 PROCEDURE — 36415 COLL VENOUS BLD VENIPUNCTURE: CPT

## 2018-06-22 PROCEDURE — 87340 HEPATITIS B SURFACE AG IA: CPT

## 2018-06-22 PROCEDURE — 86706 HEP B SURFACE ANTIBODY: CPT

## 2018-06-22 PROCEDURE — 86704 HEP B CORE ANTIBODY TOTAL: CPT

## 2018-06-22 PROCEDURE — 86803 HEPATITIS C AB TEST: CPT

## 2018-06-23 LAB
HBV CORE AB SER QL: NORMAL
HBV SURFACE AB SER-ACNC: >1000 MIU/ML
HBV SURFACE AG SER QL: NORMAL
HCV AB SER QL: NORMAL

## 2018-06-30 DIAGNOSIS — E78.00 HYPERCHOLESTEREMIA: ICD-10-CM

## 2018-07-01 RX ORDER — FENOFIBRATE 145 MG/1
TABLET, COATED ORAL
Qty: 90 TABLET | Refills: 0 | Status: SHIPPED | OUTPATIENT
Start: 2018-07-01 | End: 2018-12-26 | Stop reason: SDUPTHER

## 2018-07-18 DIAGNOSIS — J01.90 ACUTE SINUSITIS, RECURRENCE NOT SPECIFIED, UNSPECIFIED LOCATION: ICD-10-CM

## 2018-07-18 DIAGNOSIS — K58.9 IRRITABLE BOWEL SYNDROME WITHOUT DIARRHEA: Primary | ICD-10-CM

## 2018-07-18 RX ORDER — DICYCLOMINE HCL 20 MG
20 TABLET ORAL EVERY 6 HOURS PRN
Qty: 60 TABLET | Refills: 0 | Status: SHIPPED | OUTPATIENT
Start: 2018-07-18 | End: 2018-10-23 | Stop reason: SDUPTHER

## 2018-07-18 RX ORDER — TRAMADOL HYDROCHLORIDE 50 MG/1
50 TABLET ORAL EVERY 12 HOURS PRN
Qty: 60 TABLET | Refills: 0 | Status: SHIPPED | OUTPATIENT
Start: 2018-07-18 | End: 2018-08-23 | Stop reason: SDUPTHER

## 2018-08-13 DIAGNOSIS — K21.9 GASTROESOPHAGEAL REFLUX DISEASE WITHOUT ESOPHAGITIS: ICD-10-CM

## 2018-08-13 RX ORDER — PANTOPRAZOLE SODIUM 40 MG/1
40 TABLET, DELAYED RELEASE ORAL DAILY
Qty: 90 TABLET | Refills: 1 | Status: SHIPPED | OUTPATIENT
Start: 2018-08-13 | End: 2019-02-19 | Stop reason: SDUPTHER

## 2018-08-23 ENCOUNTER — ANESTHESIA EVENT (OUTPATIENT)
Dept: GASTROENTEROLOGY | Facility: HOSPITAL | Age: 48
End: 2018-08-23
Payer: COMMERCIAL

## 2018-08-23 DIAGNOSIS — J01.90 ACUTE SINUSITIS, RECURRENCE NOT SPECIFIED, UNSPECIFIED LOCATION: ICD-10-CM

## 2018-08-23 RX ORDER — SODIUM CHLORIDE 0.9 % (FLUSH) 0.9 %
SYRINGE (ML) INJECTION
Qty: 1000 ML | Refills: 5 | Status: SHIPPED | OUTPATIENT
Start: 2018-08-23 | End: 2018-09-08

## 2018-08-23 RX ORDER — TRAMADOL HYDROCHLORIDE 50 MG/1
50 TABLET ORAL EVERY 12 HOURS PRN
Qty: 60 TABLET | Refills: 1 | Status: SHIPPED | OUTPATIENT
Start: 2018-08-23 | End: 2018-10-23 | Stop reason: SDUPTHER

## 2018-08-23 NOTE — ANESTHESIA PREPROCEDURE EVALUATION
Review of Systems/Medical History  Patient summary reviewed  Chart reviewed  No history of anesthetic complications     Cardiovascular  Hyperlipidemia, Hypertension , Past MI (Coronary vasospasm) , CAD ,    Pulmonary  Smoker ex-smoker  ,        GI/Hepatic    PUD, GERD , Liver disease (Fatty liver) ,             Endo/Other  Diabetes (Prediabetes) , History of thyroid disease (Hashimoto's) , hypothyroidism,      GYN       Hematology  Anemia ,     Musculoskeletal       Neurology   Psychology   Anxiety,              Physical Exam    Airway    Mallampati score: II  TM Distance: >3 FB       Dental   No notable dental hx     Cardiovascular  Rhythm: regular,     Pulmonary  Breath sounds clear to auscultation,     Other Findings        Anesthesia Plan  ASA Score- 2     Anesthesia Type- IV sedation with anesthesia with ASA Monitors  Additional Monitors:   Airway Plan:         Plan Factors-    Induction- intravenous  Postoperative Plan-     Informed Consent- Anesthetic plan and risks discussed with patient  I personally reviewed this patient with the CRNA  Discussed and agreed on the Anesthesia Plan with the CRNA  Tiana Mejia

## 2018-08-24 ENCOUNTER — ANESTHESIA (OUTPATIENT)
Dept: GASTROENTEROLOGY | Facility: HOSPITAL | Age: 48
End: 2018-08-24
Payer: COMMERCIAL

## 2018-08-24 ENCOUNTER — HOSPITAL ENCOUNTER (OUTPATIENT)
Facility: HOSPITAL | Age: 48
Setting detail: OUTPATIENT SURGERY
Discharge: HOME | End: 2018-08-24
Attending: INTERNAL MEDICINE | Admitting: INTERNAL MEDICINE
Payer: COMMERCIAL

## 2018-08-24 VITALS
WEIGHT: 175 LBS | RESPIRATION RATE: 16 BRPM | TEMPERATURE: 98 F | DIASTOLIC BLOOD PRESSURE: 55 MMHG | SYSTOLIC BLOOD PRESSURE: 108 MMHG | HEIGHT: 63 IN | HEART RATE: 70 BPM | BODY MASS INDEX: 31.01 KG/M2 | OXYGEN SATURATION: 98 %

## 2018-08-24 DIAGNOSIS — R07.9 CHEST PAIN DUE TO GERD: ICD-10-CM

## 2018-08-24 DIAGNOSIS — K21.9 CHEST PAIN DUE TO GERD: ICD-10-CM

## 2018-08-24 PROCEDURE — 88342 IMHCHEM/IMCYTCHM 1ST ANTB: CPT | Performed by: PATHOLOGY

## 2018-08-24 PROCEDURE — 88305 TISSUE EXAM BY PATHOLOGIST: CPT | Performed by: PATHOLOGY

## 2018-08-24 PROCEDURE — 43239 EGD BIOPSY SINGLE/MULTIPLE: CPT | Performed by: INTERNAL MEDICINE

## 2018-08-24 RX ORDER — LIDOCAINE HYDROCHLORIDE 10 MG/ML
INJECTION, SOLUTION EPIDURAL; INFILTRATION; INTRACAUDAL; PERINEURAL AS NEEDED
Status: DISCONTINUED | OUTPATIENT
Start: 2018-08-24 | End: 2018-08-24 | Stop reason: SURG

## 2018-08-24 RX ORDER — PROPOFOL 10 MG/ML
INJECTION, EMULSION INTRAVENOUS AS NEEDED
Status: DISCONTINUED | OUTPATIENT
Start: 2018-08-24 | End: 2018-08-24 | Stop reason: SURG

## 2018-08-24 RX ORDER — SODIUM CHLORIDE 9 MG/ML
50 INJECTION, SOLUTION INTRAVENOUS CONTINUOUS
Status: DISCONTINUED | OUTPATIENT
Start: 2018-08-24 | End: 2018-08-24 | Stop reason: HOSPADM

## 2018-08-24 RX ORDER — PROPOFOL 10 MG/ML
INJECTION, EMULSION INTRAVENOUS CONTINUOUS PRN
Status: DISCONTINUED | OUTPATIENT
Start: 2018-08-24 | End: 2018-08-24 | Stop reason: SURG

## 2018-08-24 RX ADMIN — PROPOFOL 180 MCG/KG/MIN: 10 INJECTION, EMULSION INTRAVENOUS at 11:13

## 2018-08-24 RX ADMIN — SODIUM CHLORIDE 50 ML/HR: 0.9 INJECTION, SOLUTION INTRAVENOUS at 10:27

## 2018-08-24 RX ADMIN — LIDOCAINE HYDROCHLORIDE 100 MG: 10 INJECTION, SOLUTION EPIDURAL; INFILTRATION; INTRACAUDAL; PERINEURAL at 11:12

## 2018-08-24 RX ADMIN — PROPOFOL 100 MG: 10 INJECTION, EMULSION INTRAVENOUS at 11:13

## 2018-08-24 NOTE — H&P
History and Physical -  [facility]  Sammy Bhakta 50 y o  female MRN: [id number]    HPI: Sammy Bhakta is a 50y o  year old female who presents for EGD  She has atypical chest pain      History of coronary spasm/MI  Review of Systems    Historical Information   Past Medical History:   Diagnosis Date    Diabetes mellitus ([place] [address] )     Borderline    Elevated blood pressure reading     Last Assessed:  8/27/15    Fatty liver     Hashimoto's thyroiditis     Last Assessed:  14    History of stomach ulcers     Hypertension     Hypothyroidism     Irritable bowel syndrome     Last Assessed:  3/25/14    Liver disease     elevated enzymes    Myocardial infarction ([place] [address] )     2017    Pre-diabetes      Past Surgical History:   Procedure Laterality Date    ANGIOPLASTY      CARDIAC CATHETERIZATION       SECTION      CHOLECYSTECTOMY      COLONOSCOPY      SINUS SURGERY       Social History   History   Alcohol Use    Yes     Comment: occasional, Social drinker     History   Drug Use No     History   Smoking Status    Former Smoker    Packs/day: 0 50    Years: 4 00    Types: Cigarettes    Quit date: 2016   Smokeless Tobacco    Never Used     Comment: Per [employer]:  Never a smoker     Family History   Problem Relation Age of Onset    Pancreatic cancer Mother     Hypertension Father     Diabetes Father     Diabetes Maternal Grandmother     Diabetes Paternal Grandmother     Heart disease Paternal Grandmother        Meds/Allergies     Prescriptions Prior to Admission   Medication    amLODIPine (NORVASC) 5 mg tablet    levothyroxine 100 mcg tablet    metoprolol succinate (TOPROL-XL) 25 mg 24 hr tablet    aspirin (ECOTRIN LOW STRENGTH) 81 mg EC tablet    dicyclomine (BENTYL) 20 mg tablet    escitalopram (LEXAPRO) 20 mg tablet    fenofibrate (TRICOR) 145 mg tablet    fluticasone (FLONASE) 50 mcg/act nasal spray    metFORMIN (GLUCOPHAGE) 500 mg tablet    mupirocin (BACTROBAN) 2 % ointment    nitroglycerin (NITROSTAT) 0 4 mg SL tablet    norgestrel-ethinyl estradiol (LOW-OGESTREL) 0 3 mg-30 mcg per tablet    omega-3-acid ethyl esters (LOVAZA) 1 g capsule    omeprazole (PriLOSEC) 20 mg delayed release capsule    pantoprazole (PROTONIX) 40 mg tablet    Sodium Chloride Flush (SALINE FLUSH) 0 9 % SOLN    traMADol (ULTRAM) 50 mg tablet       Allergies   Allergen Reactions    Sulfamethoxazole-Trimethoprim Shortness Of Breath     Reaction Date: [id number];     Sulfa Antibiotics     Erythromycin      Reaction Date: [id number];     Metronidazole        Objective     Blood pressure 117/56, pulse 83, temperature 98 °F (36 7 °C), temperature source Oral, resp  rate 18, height 5' 3" (1 6 m), weight 79 4 kg (175 lb), SpO2 95 %, not currently breastfeeding        PHYSICAL EXAM    Gen: NAD  CV: RRR  CHEST: Clear  ABD: soft, NT/ND  EXT: no edema  Neuro: AAO      ASSESSMENT/PLAN:  This is a 50y o  year old female here for EGD for evaluation of a typical chest     PLAN:   Procedure:  EGD

## 2018-08-24 NOTE — OP NOTE
**** GI/ENDOSCOPY REPORT ****     PATIENT NAME: Tristan Carvajal - VISIT ID:  Patient ID: [id number]   YOB: 1970     INTRODUCTION: Esophagogastroduodenoscopy - A 50 female patient presents   for an outpatient Esophagogastroduodenoscopy at [address] [place] [address]  INDICATIONS: Chest pain  CONSENT: The benefits, risks, and alternatives to the procedure were   discussed and informed consent was obtained from the patient  PREPARATION:  EKG, pulse, pulse oximetry and blood pressure were monitored   throughout the procedure  ASA Classification: Class 2 - Patient has mild   to moderate systemic disturbance that may or may not be related to the   disorder requiring surgery  Airway Assessment Classification: Airway class   2 - Visualization of the soft palate, fauces and uvula  Mallampati   Classification: Class 2 - Faucial pillars, soft palate visible  MEDICATIONS: Anesthesia-check records     PROCEDURE:  The endoscope was passed without difficulty through the mouth   under direct visualization and advanced to the 2nd portion of the   duodenum  The scope was withdrawn and the mucosa was carefully examined  FINDINGS:   Esophagus: The esophagus appeared to be normal  No hiatal   hernia  Stomach: Erosive gastritis was found in the antrum  Two cold   forceps biopsies was taken  A single polyp (measuring 2 mm in size) was   found in the body of the stomach  The polyp was completely removed by   polypectomy with cold biopsy forceps  The polyp was retrieved  Duodenum:   The duodenal bulb, 2nd portion of the duodenum, and major papilla appeared   to be normal      COMPLICATIONS: There were no complications  IMPRESSIONS: Normal esophagus  Erosive gastritis found in the antrum  Two   biopsies taken  A polyp found in the body of the stomach  Polypectomy was   performed  Normal duodenal bulb, 2nd portion of the duodenum, and major   papilla       RECOMMENDATIONS: Anti-reflux measures: Raise the head of the bed 4 to 6   inches  Avoid smoking  Avoid excess coffee, tea or other caffeinated   beverages  Avoid garments that fit tightly through the abdomen  Avoid   eating before bed  Follow up biopsy Continue pantoprazole 40 mg po daily  ESTIMATED BLOOD LOSS:     PATHOLOGY SPECIMENS: Two cold forceps biopsies taken  Associated finding:   Gastritis  Polypectomy performed, using, cold biopsy forceps  PROCEDURE CODES:     ICD-9 Codes: 786 50 Chest pain, unspecified 535 40 Other specified   gastritides, without mention of hemorrhage 211 1 Benign neoplasm of stomach     ICD-10 Codes: R07 9 Chest pain, unspecified K29 Gastritis and duodenitis   Y83 3 Neoplasm of uncertain behavior of stomach     PERFORMED BY: LELIA Li  on 08/24/2018  Version 1, electronically signed by LELIA Issa  on 08/24/2018   at 11:36

## 2018-08-24 NOTE — ANESTHESIA POSTPROCEDURE EVALUATION
Post-Op Assessment Note      CV Status:  Stable    Mental Status:  Alert and awake    Hydration Status:  Euvolemic    PONV Controlled:  Controlled    Airway Patency:  Patent    Post Op Vitals Reviewed: Yes          Staff: CRNA           /55 (08/24/18 1128)    Temp      Pulse 72 (08/24/18 1128)   Resp 16 (08/24/18 1128)    SpO2 95 % (08/24/18 1128)

## 2018-08-28 ENCOUNTER — APPOINTMENT (OUTPATIENT)
Dept: LAB | Facility: HOSPITAL | Age: 48
End: 2018-08-28
Payer: COMMERCIAL

## 2018-08-28 DIAGNOSIS — E11.8 TYPE 2 DIABETES MELLITUS WITH COMPLICATION, WITHOUT LONG-TERM CURRENT USE OF INSULIN (HCC): ICD-10-CM

## 2018-08-28 DIAGNOSIS — E03.8 HYPOTHYROIDISM DUE TO HASHIMOTO'S THYROIDITIS: ICD-10-CM

## 2018-08-28 DIAGNOSIS — E78.2 COMBINED HYPERLIPIDEMIA: ICD-10-CM

## 2018-08-28 DIAGNOSIS — E06.3 HYPOTHYROIDISM DUE TO HASHIMOTO'S THYROIDITIS: ICD-10-CM

## 2018-08-28 LAB
ALBUMIN SERPL BCP-MCNC: 3.7 G/DL (ref 3.5–5)
ALP SERPL-CCNC: 58 U/L (ref 46–116)
ALT SERPL W P-5'-P-CCNC: 72 U/L (ref 12–78)
ANION GAP SERPL CALCULATED.3IONS-SCNC: 9 MMOL/L (ref 4–13)
AST SERPL W P-5'-P-CCNC: 65 U/L (ref 5–45)
BILIRUB SERPL-MCNC: 0.2 MG/DL (ref 0.2–1)
BUN SERPL-MCNC: 10 MG/DL (ref 5–25)
CALCIUM SERPL-MCNC: 8.8 MG/DL (ref 8.3–10.1)
CHLORIDE SERPL-SCNC: 101 MMOL/L (ref 100–108)
CHOLEST SERPL-MCNC: 217 MG/DL (ref 50–200)
CO2 SERPL-SCNC: 26 MMOL/L (ref 21–32)
CREAT SERPL-MCNC: 0.82 MG/DL (ref 0.6–1.3)
EST. AVERAGE GLUCOSE BLD GHB EST-MCNC: 146 MG/DL
GFR SERPL CREATININE-BSD FRML MDRD: 85 ML/MIN/1.73SQ M
GLUCOSE P FAST SERPL-MCNC: 125 MG/DL (ref 65–99)
HBA1C MFR BLD: 6.7 % (ref 4.2–6.3)
HDLC SERPL-MCNC: 33 MG/DL (ref 40–60)
LDLC SERPL CALC-MCNC: 128 MG/DL (ref 0–100)
POTASSIUM SERPL-SCNC: 4.4 MMOL/L (ref 3.5–5.3)
PROT SERPL-MCNC: 7.4 G/DL (ref 6.4–8.2)
SODIUM SERPL-SCNC: 136 MMOL/L (ref 136–145)
TRIGL SERPL-MCNC: 280 MG/DL
TSH SERPL DL<=0.05 MIU/L-ACNC: 2.03 UIU/ML (ref 0.36–3.74)

## 2018-08-28 PROCEDURE — 80053 COMPREHEN METABOLIC PANEL: CPT

## 2018-08-28 PROCEDURE — 80061 LIPID PANEL: CPT

## 2018-08-28 PROCEDURE — 36415 COLL VENOUS BLD VENIPUNCTURE: CPT

## 2018-08-28 PROCEDURE — 83036 HEMOGLOBIN GLYCOSYLATED A1C: CPT

## 2018-08-28 PROCEDURE — 84443 ASSAY THYROID STIM HORMONE: CPT

## 2018-08-29 ENCOUNTER — OFFICE VISIT (OUTPATIENT)
Dept: FAMILY MEDICINE CLINIC | Facility: CLINIC | Age: 48
End: 2018-08-29
Payer: COMMERCIAL

## 2018-08-29 VITALS
DIASTOLIC BLOOD PRESSURE: 90 MMHG | HEIGHT: 63 IN | SYSTOLIC BLOOD PRESSURE: 118 MMHG | WEIGHT: 175.5 LBS | BODY MASS INDEX: 31.1 KG/M2 | RESPIRATION RATE: 16 BRPM | HEART RATE: 91 BPM | TEMPERATURE: 97 F | OXYGEN SATURATION: 98 %

## 2018-08-29 DIAGNOSIS — E11.8 TYPE 2 DIABETES MELLITUS WITH COMPLICATION, WITHOUT LONG-TERM CURRENT USE OF INSULIN (HCC): ICD-10-CM

## 2018-08-29 DIAGNOSIS — E78.2 COMBINED HYPERLIPIDEMIA: ICD-10-CM

## 2018-08-29 DIAGNOSIS — R74.01 ELEVATED AST (SGOT): ICD-10-CM

## 2018-08-29 DIAGNOSIS — Z12.39 SCREENING FOR BREAST CANCER: Primary | ICD-10-CM

## 2018-08-29 DIAGNOSIS — K21.9 GERD WITHOUT ESOPHAGITIS: ICD-10-CM

## 2018-08-29 DIAGNOSIS — E03.9 HYPOTHYROIDISM, UNSPECIFIED TYPE: ICD-10-CM

## 2018-08-29 LAB
LEFT EYE DIABETIC RETINOPATHY: NORMAL
LEFT EYE IMAGE QUALITY: NORMAL
RIGHT EYE DIABETIC RETINOPATHY: NORMAL
RIGHT EYE IMAGE QUALITY: NORMAL
SEVERITY (EYE EXAM): NORMAL

## 2018-08-29 PROCEDURE — 3072F LOW RISK FOR RETINOPATHY: CPT | Performed by: FAMILY MEDICINE

## 2018-08-29 PROCEDURE — 92250 FUNDUS PHOTOGRAPHY W/I&R: CPT | Performed by: FAMILY MEDICINE

## 2018-08-29 PROCEDURE — 99214 OFFICE O/P EST MOD 30 MIN: CPT | Performed by: FAMILY MEDICINE

## 2018-08-29 NOTE — ASSESSMENT & PLAN NOTE
Cholesterol 217/128, triglycerides 280 (was 331)  A doing reasonably well on fenofibrate 145 and prescription Omega 3  Will check recheck labs in 6 months    If LDL is not close to 70, consider low-dose statin

## 2018-08-29 NOTE — ASSESSMENT & PLAN NOTE
Lab Results   Component Value Date    HGBA1C 6 7 (H) 08/28/2018       No results for input(s): POCGLU in the last 72 hours  Blood Sugar Average: Last 72 hrs:   A1c 6 7%, was 7 0%  Doing better on metformin 500 b i d   Continue diet and exercise  Will check IRIS today    Will check microalbumin prior to next visit

## 2018-08-29 NOTE — ASSESSMENT & PLAN NOTE
AST 65, was 72  Overall improving with decreased alcohol consumption  Patient currently drinking 1-2 beers per week    Will continue to follow

## 2018-08-29 NOTE — PROGRESS NOTES
Assessment/Plan:    GERD without esophagitis  Recent EGD done  Doing well on protonix 40 mg    Combined hyperlipidemia   Cholesterol 217/128, triglycerides 280 (was 331)  A doing reasonably well on fenofibrate 145 and prescription Omega 3  Will check recheck labs in 6 months  If LDL is not close to 70, consider low-dose statin    Diabetes mellitus ([facility] [address] )  Lab Results   Component Value Date    HGBA1C 6 7 (H) 08/28/2018       No results for input(s): POCGLU in the last 72 hours  Blood Sugar Average: Last 72 hrs:   A1c 6 7%, was 7 0%  Doing better on metformin 500 b i d   Continue diet and exercise  Will check IRIS today  Will check microalbumin prior to next visit    Elevated AST (SGOT)   AST 65, was 72  Overall improving with decreased alcohol consumption  Patient currently drinking 1-2 beers per week  Will continue to follow    Hypothyroid   TSH normal   Doing well on levothyroxine 100 mcg daily  Will continue to monitor       Diagnoses and all orders for this visit:    Screening for breast cancer  -     Mammo screening bilateral w 3d & cad; Future    Type 2 diabetes mellitus with complication, without long-term current use of insulin (HCC)  -     Microalbumin / creatinine urine ratio; Future  -     Comprehensive metabolic panel; Future  -     Hemoglobin A1C; Future  -     POCT diabetic eye exam    Elevated AST (SGOT)    Combined hyperlipidemia  -     Comprehensive metabolic panel; Future  -     Lipid Panel with Direct LDL reflex; Future    Hypothyroidism, unspecified type  -     TSH, 3rd generation with Free T4 reflex; Future    GERD without esophagitis         6 MONTHS, FASTING BLOOD WORK PRIOR AT [facility]    Subjective:      Patient ID: Gabriel Mccormick is a 50 y o  female  Patient presents for recheck of chronic medical problems today  Overall she is feeling well  She continues to slowly / gradually improve her diet and exercise  She is doing well on metformin for diabetes    Doing well on levothyroxine for hypothyroid  Doing well on fenofibrate for hyperlipidemia  Patient had labs drawn on August 28th        The following portions of the patient's history were reviewed and updated as appropriate: allergies, current medications, past family history, past medical history, past social history, past surgical history and problem list     Review of Systems   Respiratory: Negative  Cardiovascular: Negative  Gastrointestinal: Negative  Genitourinary: Negative            Objective:      /90 (BP Location: Left arm, Patient Position: Sitting, Cuff Size: Adult)   Pulse 91   Temp (!) 97 °F (36 1 °C) (Tympanic)   Resp 16   Ht 5' 3" (1 6 m)   Wt 79 6 kg (175 lb 8 oz)   LMP  (LMP Unknown)   SpO2 98%   BMI 31 09 kg/m²          Physical Exam

## 2018-09-08 ENCOUNTER — HOSPITAL ENCOUNTER (EMERGENCY)
Facility: HOSPITAL | Age: 48
Discharge: HOME | End: 2018-09-08
Attending: EMERGENCY MEDICINE
Payer: COMMERCIAL

## 2018-09-08 ENCOUNTER — APPOINTMENT (EMERGENCY)
Dept: CT IMAGING | Facility: HOSPITAL | Age: 48
End: 2018-09-08
Payer: COMMERCIAL

## 2018-09-08 VITALS
DIASTOLIC BLOOD PRESSURE: 72 MMHG | TEMPERATURE: 98.2 F | WEIGHT: 175.49 LBS | HEART RATE: 83 BPM | BODY MASS INDEX: 31.09 KG/M2 | SYSTOLIC BLOOD PRESSURE: 173 MMHG | RESPIRATION RATE: 16 BRPM | OXYGEN SATURATION: 99 %

## 2018-09-08 DIAGNOSIS — T85.848A: Primary | ICD-10-CM

## 2018-09-08 LAB
ALBUMIN SERPL BCP-MCNC: 3.7 G/DL (ref 3.5–5)
ALP SERPL-CCNC: 49 U/L (ref 46–116)
ALT SERPL W P-5'-P-CCNC: 51 U/L (ref 12–78)
ANION GAP SERPL CALCULATED.3IONS-SCNC: 9 MMOL/L (ref 4–13)
AST SERPL W P-5'-P-CCNC: 44 U/L (ref 5–45)
BASOPHILS # BLD AUTO: 0.08 THOUSANDS/ΜL (ref 0–0.1)
BASOPHILS NFR BLD AUTO: 1 % (ref 0–1)
BILIRUB SERPL-MCNC: 0.13 MG/DL (ref 0.2–1)
BUN SERPL-MCNC: 10 MG/DL (ref 5–25)
CALCIUM SERPL-MCNC: 9.1 MG/DL (ref 8.3–10.1)
CHLORIDE SERPL-SCNC: 104 MMOL/L (ref 100–108)
CO2 SERPL-SCNC: 25 MMOL/L (ref 21–32)
CREAT SERPL-MCNC: 0.86 MG/DL (ref 0.6–1.3)
EOSINOPHIL # BLD AUTO: 0.31 THOUSAND/ΜL (ref 0–0.61)
EOSINOPHIL NFR BLD AUTO: 5 % (ref 0–6)
ERYTHROCYTE [DISTWIDTH] IN BLOOD BY AUTOMATED COUNT: 13.2 % (ref 11.6–15.1)
GFR SERPL CREATININE-BSD FRML MDRD: 80 ML/MIN/1.73SQ M
GLUCOSE SERPL-MCNC: 112 MG/DL (ref 65–140)
HCT VFR BLD AUTO: 35.8 % (ref 34.8–46.1)
HGB BLD-MCNC: 12 G/DL (ref 11.5–15.4)
IMM GRANULOCYTES # BLD AUTO: 0.02 THOUSAND/UL (ref 0–0.2)
IMM GRANULOCYTES NFR BLD AUTO: 0 % (ref 0–2)
LYMPHOCYTES # BLD AUTO: 1.94 THOUSANDS/ΜL (ref 0.6–4.47)
LYMPHOCYTES NFR BLD AUTO: 31 % (ref 14–44)
MCH RBC QN AUTO: 30.8 PG (ref 26.8–34.3)
MCHC RBC AUTO-ENTMCNC: 33.5 G/DL (ref 31.4–37.4)
MCV RBC AUTO: 92 FL (ref 82–98)
MONOCYTES # BLD AUTO: 0.56 THOUSAND/ΜL (ref 0.17–1.22)
MONOCYTES NFR BLD AUTO: 9 % (ref 4–12)
NEUTROPHILS # BLD AUTO: 3.44 THOUSANDS/ΜL (ref 1.85–7.62)
NEUTS SEG NFR BLD AUTO: 54 % (ref 43–75)
NRBC BLD AUTO-RTO: 0 /100 WBCS
PLATELET # BLD AUTO: 273 THOUSANDS/UL (ref 149–390)
PMV BLD AUTO: 9.4 FL (ref 8.9–12.7)
POTASSIUM SERPL-SCNC: 4.3 MMOL/L (ref 3.5–5.3)
PROT SERPL-MCNC: 7.4 G/DL (ref 6.4–8.2)
RBC # BLD AUTO: 3.9 MILLION/UL (ref 3.81–5.12)
SODIUM SERPL-SCNC: 138 MMOL/L (ref 136–145)
WBC # BLD AUTO: 6.35 THOUSAND/UL (ref 4.31–10.16)

## 2018-09-08 PROCEDURE — 36415 COLL VENOUS BLD VENIPUNCTURE: CPT | Performed by: EMERGENCY MEDICINE

## 2018-09-08 PROCEDURE — 80053 COMPREHEN METABOLIC PANEL: CPT | Performed by: EMERGENCY MEDICINE

## 2018-09-08 PROCEDURE — 85025 COMPLETE CBC W/AUTO DIFF WBC: CPT | Performed by: EMERGENCY MEDICINE

## 2018-09-08 PROCEDURE — 99284 EMERGENCY DEPT VISIT MOD MDM: CPT

## 2018-09-08 PROCEDURE — 70487 CT MAXILLOFACIAL W/DYE: CPT

## 2018-09-08 RX ORDER — AMOXICILLIN 500 MG/1
500 TABLET, FILM COATED ORAL 3 TIMES DAILY
COMMUNITY
End: 2018-09-26 | Stop reason: ALTCHOICE

## 2018-09-08 RX ADMIN — IOHEXOL 85 ML: 350 INJECTION, SOLUTION INTRAVENOUS at 11:49

## 2018-09-08 NOTE — DISCHARGE INSTRUCTIONS
Toothache   WHAT YOU NEED TO KNOW:   A toothache is pain that is caused by irritation of the nerves in the center of your tooth  The irritation may be caused by several problems, such as a cavity, an infection, a cracked tooth, or gum disease  It is very important to follow up with your dentist so the cause of your toothache can be diagnosed and treated  This can help prevent more serious problems  DISCHARGE INSTRUCTIONS:   Medicines: You may  need any of the following:  · NSAIDs  decrease swelling and pain  This medicine can be bought with or without a doctor's order  This medicine can cause stomach bleeding or kidney problems in certain people  If you take blood thinner medicine, always ask your healthcare provider if NSAIDs are safe for you  Always read the medicine label and follow the directions on it before using this medicine  · Acetaminophen  decreases pain  It is available without a doctor's order  Ask how much to take and how often to take it  Follow directions  Acetaminophen can cause liver damage if not taken correctly  · Pain medicine  may be given as a pill or as medicine that you put directly on your tooth or gums  Do not wait until the pain is severe before you take this medicine  · Antibiotics  help fight or prevent an infection caused by bacteria  Take them as directed  · Take your medicine as directed  Contact your healthcare provider if you think your medicine is not helping or if you have side effects  Tell him of her if you are allergic to any medicine  Keep a list of the medicines, vitamins, and herbs you take  Include the amounts, and when and why you take them  Bring the list or the pill bottles to follow-up visits  Carry your medicine list with you in case of an emergency  Follow up with your dentist as directed: You may be referred to a dental surgeon  Write down your questions so you remember to ask them during your visits     Self-care:   · Rinse your mouth with warm salt water 4 times a day or as directed  · You may need to eat soft foods to help relieve pain caused by chewing  Contact your dentist if:   · You have questions or concerns about your condition or care  Return to the emergency department if:   · You have trouble breathing  · You have swelling in your face or neck  · You have a fever and chills  · You have trouble speaking or swallowing  · You have trouble opening or closing your mouth  © 2017 [employer] Information is for End User's use only and may not be sold, redistributed or otherwise used for commercial purposes  All illustrations and images included in CareNotes® are the copyrighted property of A [employer]  or Isaias Pose[employer]  The above information is an  only  It is not intended as medical advice for individual conditions or treatments  Talk to your doctor, nurse or pharmacist before following any medical regimen to see if it is safe and effective for you

## 2018-09-08 NOTE — ED PROVIDER NOTES
History  Chief Complaint   Patient presents with    Facial Swelling     Has swelling of the right side of the face, on an antibiotic for dental problem  History provided by:  Patient   used: No    Dental Pain   Location:  Upper  Upper teeth location:  7/RU lateral incisor  Quality:  Localized  Severity:  Mild  Onset quality:  Gradual  Timing:  Constant  Progression:  Worsening  Chronicity:  New  Previous work-up:  Dental exam  Relieved by:  Nothing  Worsened by: Hot food/drink and cold food/drink  Ineffective treatments: abx  Associated symptoms: no fever        Prior to Admission Medications   Prescriptions Last Dose Informant Patient Reported? Taking?    amLODIPine (NORVASC) 5 mg tablet  Self No Yes   Sig: Take 1 tablet (5 mg total) by mouth daily   aspirin (ECOTRIN LOW STRENGTH) 81 mg EC tablet  Self Yes Yes   Sig: Take 81 mg by mouth daily   dicyclomine (BENTYL) 20 mg tablet   No Yes   Sig: Take 1 tablet (20 mg total) by mouth every 6 (six) hours as needed (every 6 hours)   escitalopram (LEXAPRO) 20 mg tablet  Self Yes Yes   Sig: Take 10 mg by mouth daily     fenofibrate (TRICOR) 145 mg tablet   No Yes   Sig: TAKE 1 TABLET BY MOUTH DAILY   fluticasone (FLONASE) 50 mcg/act nasal spray  Self Yes Yes   Si sprays into each nostril 2 (two) times a day as needed     levothyroxine 100 mcg tablet  Self No Yes   Si tab daily   metFORMIN (GLUCOPHAGE) 500 mg tablet  Self No Yes   Si tab BID   metoprolol succinate (TOPROL-XL) 25 mg 24 hr tablet  Self Yes Yes   Sig: Take 1 tablet by mouth 2 (two) times a day   mupirocin (BACTROBAN) 2 % ointment   No Yes   Sig: Apply topically 3 (three) times a day   nitroglycerin (NITROSTAT) 0 4 mg SL tablet  Self No Yes   Sig: Place 1 tablet under the tongue every 5 (five) minutes as needed for chest pain   omega-3-acid ethyl esters (LOVAZA) 1 g capsule  Self No Yes   Sig: Take 4 capsules (4 g total) by mouth daily   omeprazole (PriLOSEC) 20 mg delayed release capsule  Self No Yes   Sig: Take 1 capsule (20 mg total) by mouth daily   pantoprazole (PROTONIX) 40 mg tablet   No Yes   Sig: Take 1 tablet (40 mg total) by mouth daily   traMADol (ULTRAM) 50 mg tablet   No Yes   Sig: Take 1 tablet (50 mg total) by mouth every 12 (twelve) hours as needed for moderate pain      Facility-Administered Medications: None       Past Medical History:   Diagnosis Date    Diabetes mellitus ([facility] )     Borderline    Elevated blood pressure reading     Last Assessed:  8/27/15    Fatty liver     Hashimoto's thyroiditis     Last Assessed:  14    History of stomach ulcers     Hypertension     Hypothyroidism     Irritable bowel syndrome     Last Assessed:  3/25/14    Liver disease     elevated enzymes    Myocardial infarction ([facility] )     2017    Pre-diabetes        Past Surgical History:   Procedure Laterality Date    ANGIOPLASTY      CARDIAC CATHETERIZATION       SECTION      CHOLECYSTECTOMY      COLONOSCOPY      [place] ESOPHAGOGASTRODUODENOSCOPY TRANSORAL DIAGNOSTIC N/A 2018    Procedure: ESOPHAGOGASTRODUODENOSCOPY (EGD); Surgeon: Cesar Partida MD;  [facility]; Service: Gastroenterology    SINUS SURGERY         Family History   Problem Relation Age of Onset    Pancreatic cancer Mother     Hypertension Father     Diabetes Father     Diabetes Maternal Grandmother     Diabetes Paternal Grandmother     Heart disease Paternal Grandmother      I have reviewed and agree with the history as documented  Social History   Substance Use Topics    Smoking status: Former Smoker     Packs/day: 0 50     Years: 4 00     Types: Cigarettes     Quit date: 2016    Smokeless tobacco: Never Used      Comment: Per [employer]:  Never a smoker    Alcohol use Yes      Comment: occasional, Social drinker        Review of Systems   Constitutional: Negative for fatigue and fever  HENT: Negative for sore throat and voice change      Eyes: Negative for pain and visual disturbance  Respiratory: Negative for cough, chest tightness and shortness of breath  Cardiovascular: Negative for chest pain  Gastrointestinal: Negative for abdominal pain and diarrhea  Endocrine: Negative for polyphagia and polyuria  Genitourinary: Negative for difficulty urinating, dysuria, flank pain, frequency and urgency  Musculoskeletal: Negative for back pain, gait problem and neck stiffness  Skin: Negative for color change and rash  Allergic/Immunologic: Negative for immunocompromised state  Neurological: Negative for dizziness, syncope, speech difficulty, light-headedness and numbness  Hematological: Does not bruise/bleed easily  Psychiatric/Behavioral: Negative for confusion and decreased concentration  Physical Exam  Physical Exam   Constitutional: She is oriented to person, place, and time  She appears well-developed and well-nourished  HENT:   Head: Normocephalic and atraumatic  No facial swelling or erythema appreciated  Multiple dental caries, no localized fluctuance or drainage  Eyes: Pupils are equal, round, and reactive to light  Conjunctivae and EOM are normal  No scleral icterus  Neck: Normal range of motion  Neck supple  No tracheal deviation present  Cardiovascular: Normal rate and regular rhythm  Pulmonary/Chest: Effort normal and breath sounds normal  No respiratory distress  Abdominal: Soft  She exhibits no distension  There is no tenderness  There is no rebound and no guarding  Musculoskeletal: Normal range of motion  She exhibits no tenderness or deformity  Lymphadenopathy:     She has no cervical adenopathy  Neurological: She is alert and oriented to person, place, and time  No gross focal sensory or motor deficits   Skin: Skin is warm and dry  No rash noted  She is not diaphoretic  Psychiatric: She has a normal mood and affect  Vitals reviewed        Vital Signs  ED Triage Vitals   Temperature Pulse Respirations Blood Pressure SpO2   09/08/18 0935 09/08/18 0935 09/08/18 0935 09/08/18 0935 09/08/18 0935   98 2 °F (36 8 °C) 74 16 126/58 98 %      Temp Source Heart Rate Source Patient Position - Orthostatic VS BP Location FiO2 (%)   09/08/18 0935 09/08/18 1141 09/08/18 0935 09/08/18 0935 --   Oral Monitor Sitting Right arm       Pain Score       09/08/18 0935       6           Vitals:    09/08/18 0935 09/08/18 1141   BP: 126/58 (!) 173/72   Pulse: 74 83   Patient Position - Orthostatic VS: Sitting Sitting       Visual Acuity      ED Medications  Medications   iohexol (OMNIPAQUE) 350 MG/ML injection (MULTI-DOSE) 85 mL (85 mL Intravenous Given 9/8/18 1149)       Diagnostic Studies  Results Reviewed     Procedure Component Value Units Date/Time    Comprehensive metabolic panel [[id number]]  (Abnormal) Collected:  09/08/18 1049    Lab Status:  Final result Specimen:  Blood from Arm, Right Updated:  09/08/18 1123     Sodium 138 mmol/L      Potassium 4 3 mmol/L      Chloride 104 mmol/L      CO2 25 mmol/L      ANION GAP 9 mmol/L      BUN 10 mg/dL      Creatinine 0 86 mg/dL      Glucose 112 mg/dL      Calcium 9 1 mg/dL      AST 44 U/L      ALT 51 U/L      Alkaline Phosphatase 49 U/L      Total Protein 7 4 g/dL      Albumin 3 7 g/dL      Total Bilirubin 0 13 (L) mg/dL      eGFR 80 ml/min/1 73sq m     Narrative:         National Kidney Disease Education Program recommendations are as follows:  GFR calculation is accurate only with a steady state creatinine  Chronic Kidney disease less than 60 ml/min/1 73 sq  meters  Kidney failure less than 15 ml/min/1 73 sq  meters      CBC and differential [[id number]] Collected:  09/08/18 1049    Lab Status:  Final result Specimen:  Blood from Arm, Right Updated:  09/08/18 1105     WBC 6 35 Thousand/uL      RBC 3 90 Million/uL      Hemoglobin 12 0 g/dL      Hematocrit 35 8 %      MCV 92 fL      MCH 30 8 pg      MCHC 33 5 g/dL      RDW 13 2 %      MPV 9 4 fL      Platelets 087 Thousands/uL      nRBC 0 /100 WBCs      Neutrophils Relative 54 %      Immat GRANS % 0 %      Lymphocytes Relative 31 %      Monocytes Relative 9 %      Eosinophils Relative 5 %      Basophils Relative 1 %      Neutrophils Absolute 3 44 Thousands/µL      Immature Grans Absolute 0 02 Thousand/uL      Lymphocytes Absolute 1 94 Thousands/µL      Monocytes Absolute 0 56 Thousand/µL      Eosinophils Absolute 0 31 Thousand/µL      Basophils Absolute 0 08 Thousands/µL                  CT facial bones with contrast   Final Result by Jackie Latham MD (09/08 1224)      Unremarkable CT of the facial soft tissues  Workstation performed: [id number]                    Procedures  Procedures       Phone Contacts  ED Phone Contact    ED Course                               MDM  Number of Diagnoses or Management Options  Dental implant pain: new and requires workup  Diagnosis management comments: 49 yo F c/o right sided facial swelling worse over the past day or so  Is currently being treated by her dentist for suspected periapical abscess, is currently on abx  Pain has not changed and pt denies fevers/chills, n/v  She is concerned that the swelling is getting worse  Vitals are normal in ED all labs are unremarkable  I personably do not appreciate significant edema on my exam  There are no facial or intra oral cellulitic changes  CT is unremarkable  Will recommend continuation of abx therapy, f/u with dentistry          Amount and/or Complexity of Data Reviewed  Clinical lab tests: reviewed and ordered  Tests in the radiology section of CPT®: reviewed and ordered  Review and summarize past medical records: yes  Independent visualization of images, tracings, or specimens: yes      CritCare Time    Disposition  Final diagnoses:   Dental implant pain     Time reflects when diagnosis was documented in both MDM as applicable and the Disposition within this note     Time User Action Codes Description Comment    9/8/2018  1:04 PM Laura Serra Add [T41 [id number]] Dental implant pain       ED Disposition     ED Disposition Condition Comment    Discharge  Bailey Babcock discharge to home/self care  Condition at discharge: Good        Follow-up Information     Follow up With Specialties Details Why Contact Info    dentist   Please follow-up with your dentist as already scheduled  If you have new or worsening symptoms please call your doctor or return to the emergency department             Discharge Medication List as of 9/8/2018  1:05 PM      CONTINUE these medications which have NOT CHANGED    Details   amLODIPine (NORVASC) 5 mg tablet Take 1 tablet (5 mg total) by mouth daily, Starting Thu 6/7/2018, Normal      aspirin (ECOTRIN LOW STRENGTH) 81 mg EC tablet Take 81 mg by mouth daily, Historical Med      dicyclomine (BENTYL) 20 mg tablet Take 1 tablet (20 mg total) by mouth every 6 (six) hours as needed (every 6 hours), Starting Wed 7/18/2018, Normal      escitalopram (LEXAPRO) 20 mg tablet Take 10 mg by mouth daily  , Starting Tue 7/23/2013, Historical Med      fenofibrate (TRICOR) 145 mg tablet TAKE 1 TABLET BY MOUTH DAILY, Normal      fluticasone (FLONASE) 50 mcg/act nasal spray 2 sprays into each nostril 2 (two) times a day as needed  , Starting Sun 1/5/2014, Historical Med      levothyroxine 100 mcg tablet 1 tab daily, Normal      metFORMIN (GLUCOPHAGE) 500 mg tablet 1 tab BID, Normal      metoprolol succinate (TOPROL-XL) 25 mg 24 hr tablet Take 1 tablet by mouth 2 (two) times a day, Starting Fri 10/20/2017, Historical Med      mupirocin (BACTROBAN) 2 % ointment Apply topically 3 (three) times a day, Starting Thu 8/23/2018, Normal      nitroglycerin (NITROSTAT) 0 4 mg SL tablet Place 1 tablet under the tongue every 5 (five) minutes as needed for chest pain, Starting Thu 10/5/2017, No Print      omega-3-acid ethyl esters (LOVAZA) 1 g capsule Take 4 capsules (4 g total) by mouth daily, Starting Thu 2/8/2018, Normal      omeprazole (PriLOSEC) 20 mg delayed release capsule Take 1 capsule (20 mg total) by mouth daily, Starting Thu 6/7/2018, Normal      pantoprazole (PROTONIX) 40 mg tablet Take 1 tablet (40 mg total) by mouth daily, Starting Mon 8/13/2018, Normal      traMADol (ULTRAM) 50 mg tablet Take 1 tablet (50 mg total) by mouth every 12 (twelve) hours as needed for moderate pain, Starting Thu 8/23/2018, Normal      amoxicillin (AMOXIL) 500 MG tablet Take 500 mg by mouth 3 (three) times a day, Historical Med      norgestrel-ethinyl estradiol (LOW-OGESTREL) 0 3 mg-30 mcg per tablet Take 1 tablet by mouth daily, Starting Wed 7/15/2015, Historical Med           No discharge procedures on file      ED Provider  Electronically Signed by           Rory Epperson MD  10/02/18 [id number]

## 2018-09-26 ENCOUNTER — OFFICE VISIT (OUTPATIENT)
Dept: OBGYN CLINIC | Facility: MEDICAL CENTER | Age: 48
End: 2018-09-26
Payer: COMMERCIAL

## 2018-09-26 VITALS
WEIGHT: 182 LBS | BODY MASS INDEX: 32.25 KG/M2 | SYSTOLIC BLOOD PRESSURE: 118 MMHG | HEIGHT: 63 IN | DIASTOLIC BLOOD PRESSURE: 78 MMHG

## 2018-09-26 DIAGNOSIS — B37.9 CANDIDIASIS: ICD-10-CM

## 2018-09-26 DIAGNOSIS — Z11.51 SCREENING FOR HUMAN PAPILLOMAVIRUS (HPV): ICD-10-CM

## 2018-09-26 DIAGNOSIS — Z01.419 ENCOUNTER FOR ROUTINE GYNECOLOGICAL EXAMINATION WITH PAPANICOLAOU SMEAR OF CERVIX: Primary | ICD-10-CM

## 2018-09-26 PROCEDURE — 99396 PREV VISIT EST AGE 40-64: CPT | Performed by: OBSTETRICS & GYNECOLOGY

## 2018-09-26 PROCEDURE — G0145 SCR C/V CYTO,THINLAYER,RESCR: HCPCS | Performed by: OBSTETRICS & GYNECOLOGY

## 2018-09-26 PROCEDURE — 87624 HPV HI-RISK TYP POOLED RSLT: CPT | Performed by: OBSTETRICS & GYNECOLOGY

## 2018-09-26 RX ORDER — CLINDAMYCIN HYDROCHLORIDE 150 MG/1
CAPSULE ORAL
COMMUNITY
Start: 2018-09-12 | End: 2019-08-13

## 2018-09-26 RX ORDER — FLUCONAZOLE 150 MG/1
150 TABLET ORAL ONCE
Qty: 1 TABLET | Refills: 1 | Status: SHIPPED | OUTPATIENT
Start: 2018-09-26 | End: 2018-09-26

## 2018-09-26 RX ORDER — FLUCONAZOLE 150 MG/1
TABLET ORAL
COMMUNITY
Start: 2018-09-12 | End: 2018-09-26 | Stop reason: SDUPTHER

## 2018-09-26 NOTE — PROGRESS NOTES
ASSESSMENT & PLAN: Morton was seen today for gynecologic exam     Diagnoses and all orders for this visit:    Encounter for routine gynecological examination with Papanicolaou smear of cervix  -     Liquid-based pap, screening    Candidiasis  -     fluconazole (DIFLUCAN) 150 mg tablet; Take 1 tablet (150 mg total) by mouth once for 1 dose  -     norgestrel-ethinyl estradiol (LOW-OGESTREL) 0 3 mg-30 mcg per tablet; Take 1 tablet by mouth daily    Screening for human papillomavirus (HPV)  -     Liquid-based pap, screening    Discussion/Summary:  Interim history taken since last preventive exam; pt  Has had cardiovascular and G I  Issues; still on the pill with minimal menses; will heed pap and HPV    1  Routine well woman exam done today  2  Pap and HPV:  The patient's pap is not up to date  Pap and cotesting was done today  Current ASCCP Guidelines reviewed  3   Mammogram was ordered  4  The following were reviewed in today's visit: breast self exam   5  F/u 1 year      CC:  Annual Gynecologic Examination    HPI: Kumar Young is a 50 y o  No obstetric history on file  who presents for annual gynecologic examination  She has the following concerns:  none    Health Maintenance:    Patient describes her health as good  Patient has weight concerns  She exercises 7 days per week with work  She does wear her seatbelt routinely  She does perform regular monthly self breast exams  She does feel safe at home  Patients does not follow a  diet        Her pap: 2015  Last mammogram: 2017    Patient Active Problem List   Diagnosis    Chest pain    Leukocytosis    Hypertension    Peptic ulcer disease    Fatty liver    Sinus tachycardia    Chronic sinusitis    Hyperglycemia    Anemia    Anomalous coronary artery origin    Anxiety    Combined hyperlipidemia    Coronary vasospasm (HCC)    Diabetes mellitus (HCC)    Elevated AST (SGOT)    GERD without esophagitis    Ground glass opacity present on imaging of lung    Heart palpitations    Hypothyroid    [facility]STEMI [facility]non-ST elevated myocardial infarction) [facility] )    Chest pain due to GERD       Past Medical History:   Diagnosis Date    Diabetes mellitus ([facility] )     Borderline    Elevated blood pressure reading     Last Assessed:  8/27/15    Fatty liver     Hashimoto's thyroiditis     Last Assessed:  14    History of stomach ulcers     Hypertension     Hypothyroidism     Irritable bowel syndrome     Last Assessed:  3/25/14    Liver disease     elevated enzymes    Myocardial infarction ([place] [address] )     2017    Pre-diabetes        Past Surgical History:   Procedure Laterality Date    ANGIOPLASTY      CARDIAC CATHETERIZATION       SECTION      CHOLECYSTECTOMY      COLONOSCOPY      [place] ESOPHAGOGASTRODUODENOSCOPY TRANSORAL DIAGNOSTIC N/A 2018    Procedure: ESOPHAGOGASTRODUODENOSCOPY (EGD); Surgeon: Garry Carrington MD;  [facility]; Service: Gastroenterology    SINUS SURGERY         Past OB/Gyn History:  Pt does not have menstrual issues,   History of sexually transmitted infection: No   History of abnormal pap smears: No    Patient is currently sexually active  monogamous   The current method of family planning is OCP (estrogen/progesterone)  Family History   Problem Relation Age of Onset    Pancreatic cancer Mother     Hypertension Father     Diabetes Father     Diabetes Maternal Grandmother     Diabetes Paternal Grandmother     Heart disease Paternal Grandmother        Social History:  Social History     Social History    Marital status: /Civil Union     Spouse name: N/A    Number of children: N/A    Years of education: N/A     Occupational History    Not on file       Social History Main Topics    Smoking status: Former Smoker     Packs/day: 0 50     Years: 4 00     Types: Cigarettes     Quit date: 2016    Smokeless tobacco: Never Used      Comment: Per [employer]:  Never a smoker    Alcohol use Yes Comment: occasional, Social drinker    Drug use: No    Sexual activity: Yes     Partners: Male     Birth control/ protection: Pill     Other Topics Concern    Not on file     Social History Narrative    Feels safe at home     Presently lives with family    Patient is currently employed     Allergies   Allergen Reactions    Sulfa Antibiotics Shortness Of Breath    Erythromycin      Reaction Date: [id number];     Metronidazole          Current Outpatient Prescriptions:     amLODIPine (NORVASC) 5 mg tablet, Take 1 tablet (5 mg total) by mouth daily, Disp: 30 tablet, Rfl: 5    aspirin (ECOTRIN LOW STRENGTH) 81 mg EC tablet, Take 81 mg by mouth daily, Disp: , Rfl:     clindamycin (CLEOCIN) 150 mg capsule, , Disp: , Rfl:     dicyclomine (BENTYL) 20 mg tablet, Take 1 tablet (20 mg total) by mouth every 6 (six) hours as needed (every 6 hours), Disp: 60 tablet, Rfl: 0    escitalopram (LEXAPRO) 20 mg tablet, Take 10 mg by mouth daily  , Disp: , Rfl:     fenofibrate (TRICOR) 145 mg tablet, TAKE 1 TABLET BY MOUTH DAILY, Disp: 90 tablet, Rfl: 0    fluconazole (DIFLUCAN) 150 mg tablet, Take 1 tablet (150 mg total) by mouth once for 1 dose, Disp: 1 tablet, Rfl: 1    fluticasone (FLONASE) 50 mcg/act nasal spray, 2 sprays into each nostril 2 (two) times a day as needed  , Disp: , Rfl:     levothyroxine 100 mcg tablet, 1 tab daily, Disp: 90 tablet, Rfl: 1    metFORMIN (GLUCOPHAGE) 500 mg tablet, 1 tab BID, Disp: 180 tablet, Rfl: 1    metoprolol succinate (TOPROL-XL) 25 mg 24 hr tablet, Take 1 tablet by mouth 2 (two) times a day, Disp: , Rfl:     mupirocin (BACTROBAN) 2 % ointment, Apply topically 3 (three) times a day, Disp: 22 g, Rfl: 5    nitroglycerin (NITROSTAT) 0 4 mg SL tablet, Place 1 tablet under the tongue every 5 (five) minutes as needed for chest pain, Disp: 90 tablet, Rfl: 0    norgestrel-ethinyl estradiol (LOW-OGESTREL) 0 3 mg-30 mcg per tablet, Take 1 tablet by mouth daily, Disp: 90 tablet, Rfl: 3   omega-3-acid ethyl esters (LOVAZA) 1 g capsule, Take 4 capsules (4 g total) by mouth daily, Disp: 360 capsule, Rfl: 3    omeprazole (PriLOSEC) 20 mg delayed release capsule, Take 1 capsule (20 mg total) by mouth daily, Disp: 30 capsule, Rfl: 5    pantoprazole (PROTONIX) 40 mg tablet, Take 1 tablet (40 mg total) by mouth daily, Disp: 90 tablet, Rfl: 1    traMADol (ULTRAM) 50 mg tablet, Take 1 tablet (50 mg total) by mouth every 12 (twelve) hours as needed for moderate pain, Disp: 60 tablet, Rfl: 1      Review of Systems  Constitutional :no fever, feels well, no tiredness, no recent weight gain or loss  ENT: no ear ache, no loss of hearing, no nosebleeds or nasal discharge, no sore throat or hoarseness  Cardiovascular: no complaints of slow or fast heart beat, no chest pain, no palpitations, no leg claudication or lower extremity edema  Respiratory: no complaints of shortness of shortness of breath, no REAGAN  Breasts:no complaints of breast pain, breast lump, or nipple discharge  Gastrointestinal: no complaints of abdominal pain, constipation, nausea, vomiting, or diarrhea or bloody stools  Genitourinary : no complaints of dysuria, incontinence, pelvic pain, no dysmenorrhea, vaginal discharge or abnormal vaginal bleeding and as noted in HPI  Musculoskeletal: no complaints of arthralgia, no myalgia, no joint swelling or stiffness, no limb pain or swelling  Integumentary: no complaints of skin rash or lesion, itching or dry skin  Neurological: no complaints of headache, no confusion, no numbness or tingling, no dizziness or fainting    Physical Exam:     LMP  (LMP Unknown)   Breastfeeding? No     General: appears stated age, cooperative, alert normal mood and affect   Psychiatric oriented to person, place and time  Mood and affect normal   Neck: normal, supple,trachea midline, no masses    Thyroid: normal, no thyromegaly   Heart: regular rate and rhythm, S1, S2 normal, no murmur, click, rub or gallop   Lungs: clear to auscultation bilaterally, no increased work of breathing or signs of respiratory distress   Breasts: normal, no dimpling or skin changes noted   Abdomen: soft, non-tender, without masses or organomegaly   Vulva: normal , no lesions   Vagina: normal , no lesions or dryness   Urethra: normal   Urethal meatus normal   Bladder Normal, soft, non-tender and no prolapse or masses appreciated   Cervix: normal, no palpable masses ; ec and c pap and HPV culture done   Uterus: normal , non-tender, not enlarged, no palpable masses   Adnexa: normal, non-tender without fullness or masses;hemoccult neg  Lymphatic Palpation of lymph nodes in neck, axilla, groin and/or other locations: no lymphadenopathy or masses noted   Skin Normal skin turgor and no rashes    Palpation of skin and subcutaneous tissue normal

## 2018-09-28 LAB
HPV HR 12 DNA CVX QL NAA+PROBE: NEGATIVE
HPV16 DNA CVX QL NAA+PROBE: NEGATIVE
HPV18 DNA CVX QL NAA+PROBE: NEGATIVE

## 2018-10-01 LAB
LAB AP GYN PRIMARY INTERPRETATION: NORMAL
Lab: NORMAL

## 2018-10-23 DIAGNOSIS — J01.90 ACUTE SINUSITIS, RECURRENCE NOT SPECIFIED, UNSPECIFIED LOCATION: ICD-10-CM

## 2018-10-23 DIAGNOSIS — K58.9 IRRITABLE BOWEL SYNDROME WITHOUT DIARRHEA: ICD-10-CM

## 2018-10-23 RX ORDER — DICYCLOMINE HCL 20 MG
20 TABLET ORAL EVERY 6 HOURS PRN
Qty: 60 TABLET | Refills: 0 | Status: SHIPPED | OUTPATIENT
Start: 2018-10-23 | End: 2019-03-05 | Stop reason: SDUPTHER

## 2018-10-23 RX ORDER — TRAMADOL HYDROCHLORIDE 50 MG/1
50 TABLET ORAL EVERY 12 HOURS PRN
Qty: 60 TABLET | Refills: 0 | Status: SHIPPED | OUTPATIENT
Start: 2018-10-23 | End: 2018-12-13 | Stop reason: SDUPTHER

## 2018-11-02 DIAGNOSIS — F32.A DEPRESSION, UNSPECIFIED DEPRESSION TYPE: Primary | ICD-10-CM

## 2018-11-02 RX ORDER — ESCITALOPRAM OXALATE 20 MG/1
10 TABLET ORAL DAILY
Qty: 90 TABLET | Refills: 1 | Status: SHIPPED | OUTPATIENT
Start: 2018-11-02 | End: 2019-03-28 | Stop reason: SDUPTHER

## 2018-11-11 DIAGNOSIS — E03.8 HYPOTHYROIDISM DUE TO HASHIMOTO'S THYROIDITIS: ICD-10-CM

## 2018-11-11 DIAGNOSIS — E06.3 HYPOTHYROIDISM DUE TO HASHIMOTO'S THYROIDITIS: ICD-10-CM

## 2018-11-11 RX ORDER — LEVOTHYROXINE SODIUM 0.1 MG/1
TABLET ORAL
Qty: 90 TABLET | Refills: 0 | Status: SHIPPED | OUTPATIENT
Start: 2018-11-11 | End: 2019-01-27 | Stop reason: SDUPTHER

## 2018-11-13 DIAGNOSIS — E11.8 TYPE 2 DIABETES MELLITUS WITH COMPLICATION, WITHOUT LONG-TERM CURRENT USE OF INSULIN (HCC): ICD-10-CM

## 2018-11-17 DIAGNOSIS — R07.89 OTHER CHEST PAIN: ICD-10-CM

## 2018-11-19 RX ORDER — OMEPRAZOLE 20 MG/1
CAPSULE, DELAYED RELEASE ORAL
Qty: 90 CAPSULE | Refills: 0 | Status: SHIPPED | OUTPATIENT
Start: 2018-11-19 | End: 2019-06-04

## 2018-12-10 DIAGNOSIS — R07.9 CHEST PAIN: Primary | ICD-10-CM

## 2018-12-11 RX ORDER — METOPROLOL SUCCINATE 25 MG/1
TABLET, EXTENDED RELEASE ORAL
Qty: 180 TABLET | Refills: 0 | Status: SHIPPED | OUTPATIENT
Start: 2018-12-11 | End: 2019-01-27 | Stop reason: SDUPTHER

## 2018-12-13 DIAGNOSIS — J01.90 ACUTE SINUSITIS, RECURRENCE NOT SPECIFIED, UNSPECIFIED LOCATION: ICD-10-CM

## 2018-12-13 RX ORDER — TRAMADOL HYDROCHLORIDE 50 MG/1
50 TABLET ORAL EVERY 12 HOURS PRN
Qty: 60 TABLET | Refills: 0 | Status: SHIPPED | OUTPATIENT
Start: 2018-12-13 | End: 2019-02-05 | Stop reason: SDUPTHER

## 2018-12-14 DIAGNOSIS — R07.89 OTHER CHEST PAIN: ICD-10-CM

## 2018-12-16 RX ORDER — AMLODIPINE BESYLATE 5 MG/1
5 TABLET ORAL DAILY
Qty: 90 TABLET | Refills: 3 | OUTPATIENT
Start: 2018-12-16 | End: 2018-12-19 | Stop reason: SDUPTHER

## 2018-12-17 ENCOUNTER — OFFICE VISIT (OUTPATIENT)
Dept: CARDIOLOGY CLINIC | Facility: CLINIC | Age: 48
End: 2018-12-17
Payer: COMMERCIAL

## 2018-12-17 VITALS
HEART RATE: 76 BPM | DIASTOLIC BLOOD PRESSURE: 60 MMHG | SYSTOLIC BLOOD PRESSURE: 110 MMHG | BODY MASS INDEX: 32.24 KG/M2 | HEIGHT: 63 IN | OXYGEN SATURATION: 98 %

## 2018-12-17 DIAGNOSIS — R07.89 OTHER CHEST PAIN: Primary | ICD-10-CM

## 2018-12-17 PROCEDURE — 99214 OFFICE O/P EST MOD 30 MIN: CPT | Performed by: INTERNAL MEDICINE

## 2018-12-17 NOTE — PROGRESS NOTES
Cardiology Follow Up    Amanda Park  1970  [id number]  [address] [facility]  [phone]    No diagnosis found  Interval History: Followup for anomalous coronary artery  She has minor chest discomfort  No dyspnea or palps  Problem List     Chest pain    Leukocytosis    Hypertension (Chronic)    Peptic ulcer disease (Chronic)    Fatty liver (Chronic)    Sinus tachycardia    Chronic sinusitis (Chronic)    Hyperglycemia    Anemia    Anomalous coronary artery origin    Anxiety    Combined hyperlipidemia    Coronary vasospasm (HCC)    Diabetes mellitus ([facility] )    Lab Results   Component Value Date    HGBA1C 6 7 (H) 08/28/2018       No results for input(s): POCGLU in the last 72 hours  Blood Sugar Average: Last 72 hrs:          Elevated AST (SGOT)    GERD without esophagitis    Ground glass opacity present on imaging of lung    Heart palpitations    Hypothyroid    NSTEMI (non-ST elevated myocardial infarction) ([facility] )    Chest pain due to GERD    Overview Signed 6/18/2018  5:05 PM by Jeff Curiel MD     Added automatically from request for surgery [id number]             Past Medical History:   Diagnosis Date    Diabetes mellitus ([facility] )     Borderline    Elevated blood pressure reading     Last Assessed:  8/27/15    Fatty liver     Hashimoto's thyroiditis     Last Assessed:  8/19/14    History of stomach ulcers     Hypertension     Hypothyroidism     Irritable bowel syndrome     Last Assessed:  3/25/14    Liver disease     elevated enzymes    Myocardial infarction [facility])     2017    Pre-diabetes      Social History     Social History    Marital status: /Civil Union     Spouse name: N/A    Number of children: N/A    Years of education: N/A     Occupational History    Not on file       Social History Main Topics    Smoking status: Former Smoker     Packs/day: 0 50 Years: 4 00     Types: Cigarettes     Quit date: 2016    Smokeless tobacco: Never Used      Comment: Per [employer]:  Never a smoker    Alcohol use Yes      Comment: occasional, Social drinker    Drug use: No    Sexual activity: Yes     Partners: Male     Birth control/ protection: Pill     Other Topics Concern    Not on file     Social History Narrative    Feels safe at home      Family History   Problem Relation Age of Onset    Pancreatic cancer Mother     Hypertension Father     Diabetes Father     Diabetes Maternal Grandmother     Diabetes Paternal Grandmother     Heart disease Paternal Grandmother      Past Surgical History:   Procedure Laterality Date    ANGIOPLASTY      CARDIAC CATHETERIZATION       SECTION      CHOLECYSTECTOMY      COLONOSCOPY      [place] ESOPHAGOGASTRODUODENOSCOPY TRANSORAL DIAGNOSTIC N/A 2018    Procedure: ESOPHAGOGASTRODUODENOSCOPY (EGD); Surgeon: Benjamin Stallings MD;  [facility]: [facility];   Service: Gastroenterology    SINUS SURGERY         Current Outpatient Prescriptions:     amLODIPine (NORVASC) 5 mg tablet, Take 1 tablet (5 mg total) by mouth daily, Disp: 90 tablet, Rfl: 3    aspirin (ECOTRIN LOW STRENGTH) 81 mg EC tablet, Take 81 mg by mouth daily, Disp: , Rfl:     dicyclomine (BENTYL) 20 mg tablet, Take 1 tablet (20 mg total) by mouth every 6 (six) hours as needed (every 6 hours), Disp: 60 tablet, Rfl: 0    escitalopram (LEXAPRO) 20 mg tablet, Take 0 5 tablets (10 mg total) by mouth daily, Disp: 90 tablet, Rfl: 1    fenofibrate (TRICOR) 145 mg tablet, TAKE 1 TABLET BY MOUTH DAILY, Disp: 90 tablet, Rfl: 0    fluticasone (FLONASE) 50 mcg/act nasal spray, 2 sprays into each nostril 2 (two) times a day as needed  , Disp: , Rfl:     levothyroxine 100 mcg tablet, TAKE 1 TABLET DAILY, Disp: 90 tablet, Rfl: 0    metFORMIN (GLUCOPHAGE) 500 mg tablet, 1 tab BID, Disp: 180 tablet, Rfl: 0    metoprolol succinate (TOPROL-XL) 25 mg 24 hr tablet, TAKE 1 TABLET TWICE A DAY, Disp: 180 tablet, Rfl: 0    mupirocin (BACTROBAN) 2 % ointment, Apply topically 3 (three) times a day, Disp: 22 g, Rfl: 5    nitroglycerin (NITROSTAT) 0 4 mg SL tablet, Place 1 tablet under the tongue every 5 (five) minutes as needed for chest pain, Disp: 90 tablet, Rfl: 0    norgestrel-ethinyl estradiol (LOW-OGESTREL) 0 3 mg-30 mcg per tablet, Take 1 tablet by mouth daily, Disp: 90 tablet, Rfl: 3    omega-3-acid ethyl esters (LOVAZA) 1 g capsule, Take 4 capsules (4 g total) by mouth daily, Disp: 360 capsule, Rfl: 3    omeprazole (PriLOSEC) 20 mg delayed release capsule, TAKE ONE CAPSULE BY MOUTH EVERY DAY, Disp: 90 capsule, Rfl: 0    pantoprazole (PROTONIX) 40 mg tablet, Take 1 tablet (40 mg total) by mouth daily, Disp: 90 tablet, Rfl: 1    traMADol (ULTRAM) 50 mg tablet, Take 1 tablet (50 mg total) by mouth every 12 (twelve) hours as needed for moderate pain, Disp: 60 tablet, Rfl: 0    clindamycin (CLEOCIN) 150 mg capsule, , Disp: , Rfl:   Allergies   Allergen Reactions    Sulfa Antibiotics Shortness Of Breath    Erythromycin      Reaction Date: [id number];     Metronidazole        Labs:     Chemistry        Component Value Date/Time     09/02/2015 1020    K 4 3 09/08/2018 1049    K 3 8 09/02/2015 1020     09/08/2018 1049     09/02/2015 1020    CO2 25 09/08/2018 1049    CO2 24 3 09/02/2015 1020    BUN 10 09/08/2018 1049    BUN 13 09/02/2015 1020    CREATININE 0 86 09/08/2018 1049    CREATININE 0 79 09/02/2015 1020        Component Value Date/Time    CALCIUM 9 1 09/08/2018 1049    CALCIUM 9 1 09/02/2015 1020    ALKPHOS 49 09/08/2018 1049    ALKPHOS 78 09/02/2015 1020    AST 44 09/08/2018 1049    AST 37 09/02/2015 1020    ALT 51 09/08/2018 1049    ALT 62 09/02/2015 1020    BILITOT 0 38 09/02/2015 1020            Lab Results   Component Value Date    CHOL 210 08/20/2015    CHOL 179 07/19/2014    CHOL 197 12/05/2013     Lab Results   Component Value Date    HDL 33 (L) 08/28/2018    HDL 30 (L) 06/03/2018    HDL 39 (L) 02/02/2018     Lab Results   Component Value Date    LDLCALC 128 (H) 08/28/2018    LDLCALC 73 06/03/2018    LDLCALC 117 (H) 02/02/2018     Lab Results   Component Value Date    TRIG 280 (H) 08/28/2018    TRIG 331 (H) 06/03/2018    TRIG 279 (H) 02/02/2018     No results found for: CHOLHDL    Imaging: No results found  Review of Systems   Constitution: Negative  HENT: Negative  Eyes: Negative  Cardiovascular: Negative  Respiratory: Negative  Endocrine: Negative  Hematologic/Lymphatic: Negative  Skin: Negative  Musculoskeletal: Negative  Gastrointestinal: Negative  Genitourinary: Negative  Neurological: Negative  Psychiatric/Behavioral: Negative  Allergic/Immunologic: Negative  Vitals:    12/17/18 0920   BP: 110/60   Pulse: 76   SpO2: 98%           Physical Exam   Constitutional: She is oriented to person, place, and time  No distress  HENT:   Mouth/Throat: No oropharyngeal exudate  Eyes: No scleral icterus  Neck: No JVD present  Cardiovascular: Normal rate and regular rhythm  No murmur heard  Pulmonary/Chest: Effort normal and breath sounds normal  No respiratory distress  She has no wheezes  She has no rales  Abdominal: Soft  Bowel sounds are normal  She exhibits no distension  There is no tenderness  There is no rebound  Musculoskeletal: She exhibits no edema  Neurological: She is alert and oriented to person, place, and time  Skin: Skin is warm and dry  She is not diaphoretic  Psychiatric: She has a normal mood and affect  Her behavior is normal        Discussion/Summary:    Chest Discomfort: Minimal symptoms  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

## 2018-12-19 DIAGNOSIS — R07.89 OTHER CHEST PAIN: ICD-10-CM

## 2018-12-20 RX ORDER — AMLODIPINE BESYLATE 5 MG/1
5 TABLET ORAL DAILY
Qty: 90 TABLET | Refills: 3 | Status: SHIPPED | OUTPATIENT
Start: 2018-12-20 | End: 2019-06-18 | Stop reason: SDUPTHER

## 2018-12-26 DIAGNOSIS — E78.00 HYPERCHOLESTEREMIA: ICD-10-CM

## 2018-12-27 RX ORDER — FENOFIBRATE 145 MG/1
TABLET, COATED ORAL
Qty: 90 TABLET | Refills: 0 | Status: SHIPPED | OUTPATIENT
Start: 2018-12-27 | End: 2019-04-22 | Stop reason: SDUPTHER

## 2019-01-27 DIAGNOSIS — R07.9 CHEST PAIN: ICD-10-CM

## 2019-01-27 DIAGNOSIS — E06.3 HYPOTHYROIDISM DUE TO HASHIMOTO'S THYROIDITIS: ICD-10-CM

## 2019-01-27 DIAGNOSIS — E78.2 COMBINED HYPERLIPIDEMIA: ICD-10-CM

## 2019-01-27 DIAGNOSIS — E03.8 HYPOTHYROIDISM DUE TO HASHIMOTO'S THYROIDITIS: ICD-10-CM

## 2019-01-28 DIAGNOSIS — E11.8 TYPE 2 DIABETES MELLITUS WITH COMPLICATION, WITHOUT LONG-TERM CURRENT USE OF INSULIN (HCC): ICD-10-CM

## 2019-01-28 RX ORDER — METOPROLOL SUCCINATE 25 MG/1
TABLET, EXTENDED RELEASE ORAL
Qty: 180 TABLET | Refills: 0 | Status: SHIPPED | OUTPATIENT
Start: 2019-01-28 | End: 2019-02-15 | Stop reason: SDUPTHER

## 2019-01-29 RX ORDER — OMEGA-3-ACID ETHYL ESTERS 1 G/1
CAPSULE, LIQUID FILLED ORAL
Qty: 360 CAPSULE | Refills: 0 | Status: SHIPPED | OUTPATIENT
Start: 2019-01-29 | End: 2019-08-05 | Stop reason: SDUPTHER

## 2019-01-29 RX ORDER — LEVOTHYROXINE SODIUM 0.1 MG/1
TABLET ORAL
Qty: 90 TABLET | Refills: 0 | Status: SHIPPED | OUTPATIENT
Start: 2019-01-29 | End: 2019-04-02

## 2019-02-05 DIAGNOSIS — J01.90 ACUTE SINUSITIS, RECURRENCE NOT SPECIFIED, UNSPECIFIED LOCATION: ICD-10-CM

## 2019-02-05 RX ORDER — TRAMADOL HYDROCHLORIDE 50 MG/1
50 TABLET ORAL EVERY 12 HOURS PRN
Qty: 60 TABLET | Refills: 0 | Status: SHIPPED | OUTPATIENT
Start: 2019-02-05 | End: 2019-04-02 | Stop reason: SDUPTHER

## 2019-02-06 ENCOUNTER — TELEPHONE (OUTPATIENT)
Dept: FAMILY MEDICINE CLINIC | Facility: CLINIC | Age: 49
End: 2019-02-06

## 2019-02-06 NOTE — TELEPHONE ENCOUNTER
I RECEIVED A PRIOR AUTH REQUEST FOR TRAMADOL  SHE HAS MEGAN WHICH REQUIRES A NARC CONTRACT AND A URINE DRUG AND A ALCOHOL SCREENING  DO YOU WANT AN APPT?

## 2019-02-15 DIAGNOSIS — R07.9 CHEST PAIN: ICD-10-CM

## 2019-02-15 RX ORDER — METOPROLOL SUCCINATE 25 MG/1
TABLET, EXTENDED RELEASE ORAL
Qty: 36 TABLET | Refills: 0 | Status: SHIPPED | OUTPATIENT
Start: 2019-02-15 | End: 2019-05-15 | Stop reason: SDUPTHER

## 2019-02-19 ENCOUNTER — TELEPHONE (OUTPATIENT)
Dept: FAMILY MEDICINE CLINIC | Facility: CLINIC | Age: 49
End: 2019-02-19

## 2019-02-19 DIAGNOSIS — K21.9 GASTROESOPHAGEAL REFLUX DISEASE WITHOUT ESOPHAGITIS: ICD-10-CM

## 2019-02-19 RX ORDER — PANTOPRAZOLE SODIUM 40 MG/1
40 TABLET, DELAYED RELEASE ORAL DAILY
Qty: 90 TABLET | Refills: 1 | Status: SHIPPED | OUTPATIENT
Start: 2019-02-19 | End: 2019-08-05 | Stop reason: SDUPTHER

## 2019-02-19 NOTE — TELEPHONE ENCOUNTER
THE [facility] informing pt that she is due for 6m check up with FBW prior (consent ok)  Informed pt before next refill she will need to be seen

## 2019-03-03 DIAGNOSIS — K21.9 GASTROESOPHAGEAL REFLUX DISEASE WITHOUT ESOPHAGITIS: Primary | ICD-10-CM

## 2019-03-04 RX ORDER — OMEPRAZOLE 20 MG/1
CAPSULE, DELAYED RELEASE ORAL
Qty: 90 CAPSULE | Refills: 0 | Status: SHIPPED | OUTPATIENT
Start: 2019-03-04 | End: 2019-04-02 | Stop reason: SDUPTHER

## 2019-03-05 DIAGNOSIS — K58.9 IRRITABLE BOWEL SYNDROME WITHOUT DIARRHEA: ICD-10-CM

## 2019-03-05 RX ORDER — DICYCLOMINE HCL 20 MG
20 TABLET ORAL EVERY 6 HOURS PRN
Qty: 60 TABLET | Refills: 0 | Status: SHIPPED | OUTPATIENT
Start: 2019-03-05 | End: 2019-05-30 | Stop reason: SDUPTHER

## 2019-03-20 ENCOUNTER — TRANSCRIBE ORDERS (OUTPATIENT)
Dept: LAB | Facility: CLINIC | Age: 49
End: 2019-03-20

## 2019-03-20 ENCOUNTER — APPOINTMENT (OUTPATIENT)
Dept: LAB | Facility: CLINIC | Age: 49
End: 2019-03-20
Payer: COMMERCIAL

## 2019-03-20 DIAGNOSIS — E78.2 COMBINED HYPERLIPIDEMIA: ICD-10-CM

## 2019-03-20 DIAGNOSIS — E03.9 HYPOTHYROIDISM, UNSPECIFIED TYPE: ICD-10-CM

## 2019-03-20 DIAGNOSIS — E11.8 TYPE 2 DIABETES MELLITUS WITH COMPLICATION, WITHOUT LONG-TERM CURRENT USE OF INSULIN (HCC): ICD-10-CM

## 2019-03-20 LAB
ALBUMIN SERPL BCP-MCNC: 3.6 G/DL (ref 3.5–5)
ALP SERPL-CCNC: 51 U/L (ref 46–116)
ALT SERPL W P-5'-P-CCNC: 69 U/L (ref 12–78)
ANION GAP SERPL CALCULATED.3IONS-SCNC: 13 MMOL/L (ref 4–13)
AST SERPL W P-5'-P-CCNC: 86 U/L (ref 5–45)
BILIRUB SERPL-MCNC: 0.2 MG/DL (ref 0.2–1)
BUN SERPL-MCNC: 17 MG/DL (ref 5–25)
CALCIUM SERPL-MCNC: 9.1 MG/DL (ref 8.3–10.1)
CHLORIDE SERPL-SCNC: 101 MMOL/L (ref 100–108)
CHOLEST SERPL-MCNC: 206 MG/DL (ref 50–200)
CO2 SERPL-SCNC: 21 MMOL/L (ref 21–32)
CREAT SERPL-MCNC: 0.87 MG/DL (ref 0.6–1.3)
EST. AVERAGE GLUCOSE BLD GHB EST-MCNC: 154 MG/DL
GFR SERPL CREATININE-BSD FRML MDRD: 79 ML/MIN/1.73SQ M
GLUCOSE P FAST SERPL-MCNC: 137 MG/DL (ref 65–99)
HBA1C MFR BLD: 7 % (ref 4.2–6.3)
HDLC SERPL-MCNC: 28 MG/DL (ref 40–60)
LDLC SERPL CALC-MCNC: 102 MG/DL (ref 0–100)
POTASSIUM SERPL-SCNC: 4.1 MMOL/L (ref 3.5–5.3)
PROT SERPL-MCNC: 7.2 G/DL (ref 6.4–8.2)
SODIUM SERPL-SCNC: 135 MMOL/L (ref 136–145)
T4 FREE SERPL-MCNC: 1.07 NG/DL (ref 0.76–1.46)
TRIGL SERPL-MCNC: 379 MG/DL
TSH SERPL DL<=0.05 MIU/L-ACNC: 4.51 UIU/ML (ref 0.36–3.74)

## 2019-03-20 PROCEDURE — 36415 COLL VENOUS BLD VENIPUNCTURE: CPT

## 2019-03-20 PROCEDURE — 84443 ASSAY THYROID STIM HORMONE: CPT

## 2019-03-20 PROCEDURE — 80053 COMPREHEN METABOLIC PANEL: CPT

## 2019-03-20 PROCEDURE — 84439 ASSAY OF FREE THYROXINE: CPT

## 2019-03-20 PROCEDURE — 80061 LIPID PANEL: CPT

## 2019-03-20 PROCEDURE — 83036 HEMOGLOBIN GLYCOSYLATED A1C: CPT

## 2019-03-28 DIAGNOSIS — F32.A DEPRESSION, UNSPECIFIED DEPRESSION TYPE: ICD-10-CM

## 2019-03-28 RX ORDER — ESCITALOPRAM OXALATE 20 MG/1
TABLET ORAL
Qty: 45 TABLET | Refills: 0 | Status: SHIPPED | OUTPATIENT
Start: 2019-03-28 | End: 2019-05-20 | Stop reason: SDUPTHER

## 2019-04-02 ENCOUNTER — OFFICE VISIT (OUTPATIENT)
Dept: FAMILY MEDICINE CLINIC | Facility: CLINIC | Age: 49
End: 2019-04-02
Payer: COMMERCIAL

## 2019-04-02 VITALS
OXYGEN SATURATION: 97 % | HEART RATE: 88 BPM | TEMPERATURE: 98.6 F | BODY MASS INDEX: 32.61 KG/M2 | DIASTOLIC BLOOD PRESSURE: 82 MMHG | WEIGHT: 177.2 LBS | HEIGHT: 62 IN | SYSTOLIC BLOOD PRESSURE: 128 MMHG | RESPIRATION RATE: 18 BRPM

## 2019-04-02 DIAGNOSIS — E03.8 HYPOTHYROIDISM DUE TO HASHIMOTO'S THYROIDITIS: ICD-10-CM

## 2019-04-02 DIAGNOSIS — E78.2 COMBINED HYPERLIPIDEMIA: ICD-10-CM

## 2019-04-02 DIAGNOSIS — E06.3 HYPOTHYROIDISM DUE TO HASHIMOTO'S THYROIDITIS: ICD-10-CM

## 2019-04-02 DIAGNOSIS — E11.8 TYPE 2 DIABETES MELLITUS WITH COMPLICATION, WITHOUT LONG-TERM CURRENT USE OF INSULIN (HCC): Primary | ICD-10-CM

## 2019-04-02 DIAGNOSIS — R74.01 ELEVATED AST (SGOT): ICD-10-CM

## 2019-04-02 DIAGNOSIS — E03.9 HYPOTHYROIDISM, UNSPECIFIED TYPE: ICD-10-CM

## 2019-04-02 DIAGNOSIS — K21.9 GERD WITHOUT ESOPHAGITIS: ICD-10-CM

## 2019-04-02 DIAGNOSIS — J01.90 ACUTE SINUSITIS, RECURRENCE NOT SPECIFIED, UNSPECIFIED LOCATION: ICD-10-CM

## 2019-04-02 PROCEDURE — 1036F TOBACCO NON-USER: CPT | Performed by: FAMILY MEDICINE

## 2019-04-02 PROCEDURE — 3008F BODY MASS INDEX DOCD: CPT | Performed by: FAMILY MEDICINE

## 2019-04-02 PROCEDURE — 99214 OFFICE O/P EST MOD 30 MIN: CPT | Performed by: FAMILY MEDICINE

## 2019-04-02 RX ORDER — TRAMADOL HYDROCHLORIDE 50 MG/1
50 TABLET ORAL EVERY 12 HOURS PRN
Qty: 60 TABLET | Refills: 0 | Status: SHIPPED | OUTPATIENT
Start: 2019-04-02 | End: 2019-05-20 | Stop reason: SDUPTHER

## 2019-04-02 RX ORDER — LEVOTHYROXINE SODIUM 0.12 MG/1
125 TABLET ORAL DAILY
Qty: 90 TABLET | Refills: 1 | Status: SHIPPED | OUTPATIENT
Start: 2019-04-02 | End: 2019-06-15

## 2019-04-22 DIAGNOSIS — E78.00 HYPERCHOLESTEREMIA: ICD-10-CM

## 2019-04-22 RX ORDER — FENOFIBRATE 145 MG/1
TABLET, COATED ORAL
Qty: 90 TABLET | Refills: 0 | Status: SHIPPED | OUTPATIENT
Start: 2019-04-22 | End: 2019-07-22 | Stop reason: SDUPTHER

## 2019-05-15 DIAGNOSIS — R07.9 CHEST PAIN: ICD-10-CM

## 2019-05-16 RX ORDER — METOPROLOL SUCCINATE 25 MG/1
TABLET, EXTENDED RELEASE ORAL
Qty: 180 TABLET | Refills: 0 | Status: SHIPPED | OUTPATIENT
Start: 2019-05-16 | End: 2019-08-05 | Stop reason: SDUPTHER

## 2019-05-20 DIAGNOSIS — F32.A DEPRESSION, UNSPECIFIED DEPRESSION TYPE: ICD-10-CM

## 2019-05-20 DIAGNOSIS — J01.90 ACUTE SINUSITIS, RECURRENCE NOT SPECIFIED, UNSPECIFIED LOCATION: ICD-10-CM

## 2019-05-20 RX ORDER — ESCITALOPRAM OXALATE 20 MG/1
TABLET ORAL
Qty: 45 TABLET | Refills: 0 | Status: SHIPPED | OUTPATIENT
Start: 2019-05-20 | End: 2019-05-20 | Stop reason: SDUPTHER

## 2019-05-20 RX ORDER — TRAMADOL HYDROCHLORIDE 50 MG/1
50 TABLET ORAL EVERY 12 HOURS PRN
Qty: 60 TABLET | Refills: 0 | Status: SHIPPED | OUTPATIENT
Start: 2019-05-20 | End: 2019-06-18 | Stop reason: SDUPTHER

## 2019-05-20 RX ORDER — ESCITALOPRAM OXALATE 20 MG/1
20 TABLET ORAL DAILY
Qty: 90 TABLET | Refills: 1 | Status: SHIPPED | OUTPATIENT
Start: 2019-05-20 | End: 2019-11-11 | Stop reason: SDUPTHER

## 2019-05-30 DIAGNOSIS — K58.9 IRRITABLE BOWEL SYNDROME WITHOUT DIARRHEA: ICD-10-CM

## 2019-05-30 RX ORDER — DICYCLOMINE HCL 20 MG
20 TABLET ORAL EVERY 6 HOURS PRN
Qty: 60 TABLET | Refills: 0 | Status: SHIPPED | OUTPATIENT
Start: 2019-05-30 | End: 2019-08-05 | Stop reason: SDUPTHER

## 2019-06-02 DIAGNOSIS — K21.9 GERD WITHOUT ESOPHAGITIS: Primary | ICD-10-CM

## 2019-06-03 RX ORDER — OMEPRAZOLE 20 MG/1
CAPSULE, DELAYED RELEASE ORAL
Qty: 90 CAPSULE | Refills: 0 | Status: SHIPPED | OUTPATIENT
Start: 2019-06-03 | End: 2019-12-02 | Stop reason: SDUPTHER

## 2019-06-04 ENCOUNTER — OFFICE VISIT (OUTPATIENT)
Dept: OBGYN CLINIC | Facility: MEDICAL CENTER | Age: 49
End: 2019-06-04
Payer: COMMERCIAL

## 2019-06-04 VITALS
WEIGHT: 168 LBS | BODY MASS INDEX: 29.77 KG/M2 | DIASTOLIC BLOOD PRESSURE: 68 MMHG | SYSTOLIC BLOOD PRESSURE: 110 MMHG | HEIGHT: 63 IN

## 2019-06-04 DIAGNOSIS — N93.9 ABNORMAL UTERINE BLEEDING (AUB): Primary | ICD-10-CM

## 2019-06-04 LAB — SL AMB POCT HGB: 11.2

## 2019-06-04 PROCEDURE — 99213 OFFICE O/P EST LOW 20 MIN: CPT | Performed by: OBSTETRICS & GYNECOLOGY

## 2019-06-04 PROCEDURE — 85018 HEMOGLOBIN: CPT | Performed by: OBSTETRICS & GYNECOLOGY

## 2019-06-10 DIAGNOSIS — N30.00 ACUTE CYSTITIS WITHOUT HEMATURIA: Primary | ICD-10-CM

## 2019-06-10 RX ORDER — CIPROFLOXACIN 250 MG/1
TABLET, FILM COATED ORAL
Qty: 10 TABLET | Refills: 0 | Status: SHIPPED | OUTPATIENT
Start: 2019-06-10 | End: 2019-06-15

## 2019-06-11 ENCOUNTER — HOSPITAL ENCOUNTER (OUTPATIENT)
Dept: ULTRASOUND IMAGING | Facility: HOSPITAL | Age: 49
Discharge: HOME | End: 2019-06-11
Attending: OBSTETRICS & GYNECOLOGY
Payer: COMMERCIAL

## 2019-06-11 DIAGNOSIS — N93.9 ABNORMAL UTERINE BLEEDING (AUB): ICD-10-CM

## 2019-06-11 PROCEDURE — 76856 US EXAM PELVIC COMPLETE: CPT

## 2019-06-11 PROCEDURE — 76830 TRANSVAGINAL US NON-OB: CPT

## 2019-06-14 ENCOUNTER — OFFICE VISIT (OUTPATIENT)
Dept: FAMILY MEDICINE CLINIC | Facility: CLINIC | Age: 49
End: 2019-06-14
Payer: COMMERCIAL

## 2019-06-14 ENCOUNTER — APPOINTMENT (OUTPATIENT)
Dept: LAB | Facility: CLINIC | Age: 49
End: 2019-06-14
Payer: COMMERCIAL

## 2019-06-14 VITALS
DIASTOLIC BLOOD PRESSURE: 82 MMHG | OXYGEN SATURATION: 97 % | HEART RATE: 86 BPM | BODY MASS INDEX: 32.5 KG/M2 | TEMPERATURE: 98.4 F | SYSTOLIC BLOOD PRESSURE: 126 MMHG | RESPIRATION RATE: 16 BRPM | WEIGHT: 176.6 LBS | HEIGHT: 62 IN

## 2019-06-14 DIAGNOSIS — N39.0 URINARY TRACT INFECTION WITHOUT HEMATURIA, SITE UNSPECIFIED: ICD-10-CM

## 2019-06-14 DIAGNOSIS — E03.9 HYPOTHYROIDISM, UNSPECIFIED TYPE: ICD-10-CM

## 2019-06-14 DIAGNOSIS — N93.8 DYSFUNCTIONAL UTERINE BLEEDING: ICD-10-CM

## 2019-06-14 DIAGNOSIS — E03.9 HYPOTHYROIDISM, UNSPECIFIED TYPE: Primary | ICD-10-CM

## 2019-06-14 LAB
T4 FREE SERPL-MCNC: 1.46 NG/DL (ref 0.76–1.46)
TSH SERPL DL<=0.05 MIU/L-ACNC: 0.07 UIU/ML (ref 0.36–3.74)

## 2019-06-14 PROCEDURE — 1036F TOBACCO NON-USER: CPT | Performed by: FAMILY MEDICINE

## 2019-06-14 PROCEDURE — 84443 ASSAY THYROID STIM HORMONE: CPT

## 2019-06-14 PROCEDURE — 3008F BODY MASS INDEX DOCD: CPT | Performed by: FAMILY MEDICINE

## 2019-06-14 PROCEDURE — 99214 OFFICE O/P EST MOD 30 MIN: CPT | Performed by: FAMILY MEDICINE

## 2019-06-14 PROCEDURE — 36415 COLL VENOUS BLD VENIPUNCTURE: CPT

## 2019-06-14 PROCEDURE — 84439 ASSAY OF FREE THYROXINE: CPT

## 2019-06-15 DIAGNOSIS — E06.3 HYPOTHYROIDISM DUE TO HASHIMOTO'S THYROIDITIS: ICD-10-CM

## 2019-06-15 DIAGNOSIS — E03.8 HYPOTHYROIDISM DUE TO HASHIMOTO'S THYROIDITIS: ICD-10-CM

## 2019-06-15 RX ORDER — LEVOTHYROXINE SODIUM 0.07 MG/1
75 TABLET ORAL DAILY
Qty: 90 TABLET | Refills: 0 | Status: SHIPPED | OUTPATIENT
Start: 2019-06-15 | End: 2019-08-30

## 2019-06-18 ENCOUNTER — TELEPHONE (OUTPATIENT)
Dept: OBGYN CLINIC | Facility: MEDICAL CENTER | Age: 49
End: 2019-06-18

## 2019-06-18 DIAGNOSIS — J01.90 ACUTE SINUSITIS, RECURRENCE NOT SPECIFIED, UNSPECIFIED LOCATION: ICD-10-CM

## 2019-06-18 DIAGNOSIS — R07.89 OTHER CHEST PAIN: ICD-10-CM

## 2019-06-18 RX ORDER — TRAMADOL HYDROCHLORIDE 50 MG/1
50 TABLET ORAL EVERY 12 HOURS PRN
Qty: 60 TABLET | Refills: 0 | Status: SHIPPED | OUTPATIENT
Start: 2019-06-18 | End: 2019-07-22 | Stop reason: SDUPTHER

## 2019-06-19 RX ORDER — AMLODIPINE BESYLATE 5 MG/1
5 TABLET ORAL DAILY
Qty: 90 TABLET | Refills: 1 | Status: SHIPPED | OUTPATIENT
Start: 2019-06-19 | End: 2019-11-04 | Stop reason: SDUPTHER

## 2019-07-02 ENCOUNTER — TELEPHONE (OUTPATIENT)
Dept: OBGYN CLINIC | Facility: MEDICAL CENTER | Age: 49
End: 2019-07-02

## 2019-07-02 NOTE — TELEPHONE ENCOUNTER
I called and spoke with patient about recent pelvic u  s ; may need to repeat in 2-3 months if symptomatic

## 2019-07-08 DIAGNOSIS — N83.202 LEFT OVARIAN CYST: Primary | ICD-10-CM

## 2019-07-22 DIAGNOSIS — B37.9 CANDIDIASIS: ICD-10-CM

## 2019-07-22 DIAGNOSIS — J01.90 ACUTE SINUSITIS, RECURRENCE NOT SPECIFIED, UNSPECIFIED LOCATION: ICD-10-CM

## 2019-07-22 DIAGNOSIS — E78.00 HYPERCHOLESTEREMIA: ICD-10-CM

## 2019-07-22 RX ORDER — FENOFIBRATE 145 MG/1
145 TABLET, COATED ORAL DAILY
Qty: 90 TABLET | Refills: 1 | Status: SHIPPED | OUTPATIENT
Start: 2019-07-22 | End: 2019-12-02 | Stop reason: SDUPTHER

## 2019-07-22 RX ORDER — TRAMADOL HYDROCHLORIDE 50 MG/1
50 TABLET ORAL EVERY 12 HOURS PRN
Qty: 60 TABLET | Refills: 0 | Status: SHIPPED | OUTPATIENT
Start: 2019-07-22 | End: 2019-08-19 | Stop reason: SDUPTHER

## 2019-07-24 ENCOUNTER — HOSPITAL ENCOUNTER (OUTPATIENT)
Dept: RADIOLOGY | Facility: HOSPITAL | Age: 49
Discharge: HOME | End: 2019-07-24
Payer: COMMERCIAL

## 2019-07-24 DIAGNOSIS — N83.202 LEFT OVARIAN CYST: ICD-10-CM

## 2019-07-24 DIAGNOSIS — N83.202 CYST OF LEFT OVARY: Primary | ICD-10-CM

## 2019-07-24 PROCEDURE — 76856 US EXAM PELVIC COMPLETE: CPT

## 2019-07-24 PROCEDURE — 76830 TRANSVAGINAL US NON-OB: CPT

## 2019-07-26 ENCOUNTER — HOSPITAL ENCOUNTER (OUTPATIENT)
Dept: MRI IMAGING | Facility: HOSPITAL | Age: 49
Discharge: HOME | End: 2019-07-26
Payer: COMMERCIAL

## 2019-07-26 DIAGNOSIS — N83.202 CYST OF LEFT OVARY: ICD-10-CM

## 2019-07-26 DIAGNOSIS — N83.202 LEFT OVARIAN CYST: Primary | ICD-10-CM

## 2019-07-26 PROCEDURE — 72195 MRI PELVIS W/O DYE: CPT

## 2019-07-29 ENCOUNTER — CONSULT (OUTPATIENT)
Dept: GYNECOLOGIC ONCOLOGY | Facility: CLINIC | Age: 49
End: 2019-07-29
Payer: COMMERCIAL

## 2019-07-29 ENCOUNTER — PREP FOR PROCEDURE (OUTPATIENT)
Dept: GYNECOLOGIC ONCOLOGY | Facility: CLINIC | Age: 49
End: 2019-07-29

## 2019-07-29 VITALS
RESPIRATION RATE: 16 BRPM | TEMPERATURE: 98.5 F | WEIGHT: 176 LBS | DIASTOLIC BLOOD PRESSURE: 86 MMHG | HEART RATE: 75 BPM | HEIGHT: 62 IN | BODY MASS INDEX: 32.39 KG/M2 | SYSTOLIC BLOOD PRESSURE: 118 MMHG

## 2019-07-29 DIAGNOSIS — N83.202 LEFT OVARIAN CYST: ICD-10-CM

## 2019-07-29 DIAGNOSIS — N93.8 DYSFUNCTIONAL UTERINE BLEEDING: Primary | ICD-10-CM

## 2019-07-29 PROCEDURE — 99204 OFFICE O/P NEW MOD 45 MIN: CPT | Performed by: OBSTETRICS & GYNECOLOGY

## 2019-07-29 PROCEDURE — 88305 TISSUE EXAM BY PATHOLOGIST: CPT | Performed by: PATHOLOGY

## 2019-07-29 RX ORDER — ACETAMINOPHEN 325 MG/1
975 TABLET ORAL ONCE
Status: CANCELLED | OUTPATIENT
Start: 2019-07-29 | End: 2019-07-29

## 2019-07-29 RX ORDER — CEFAZOLIN SODIUM 1 G/50ML
1000 SOLUTION INTRAVENOUS ONCE
Status: CANCELLED | OUTPATIENT
Start: 2019-07-29 | End: 2019-07-29

## 2019-07-29 RX ORDER — HEPARIN SODIUM 5000 [USP'U]/ML
5000 INJECTION, SOLUTION INTRAVENOUS; SUBCUTANEOUS
Status: CANCELLED | OUTPATIENT
Start: 2019-07-30 | End: 2019-07-31

## 2019-07-29 RX ORDER — SODIUM CHLORIDE, SODIUM LACTATE, POTASSIUM CHLORIDE, CALCIUM CHLORIDE 600; 310; 30; 20 MG/100ML; MG/100ML; MG/100ML; MG/100ML
125 INJECTION, SOLUTION INTRAVENOUS CONTINUOUS
Status: CANCELLED | OUTPATIENT
Start: 2019-07-29

## 2019-07-29 NOTE — PATIENT INSTRUCTIONS
1  Nothing to eat or drink after midnight prior to the operation  You are allowed clear liquids until 3 hours prior to the scheduled start time of surgery  No milk products or solid food for 8 hours prior to surgery  2   Please avoid ibuprofen, aspirin, fish oil for 7 days prior to surgery  3  Please take your pantoprazole, metoprolol, amlodipine, levothyroxine the morning of surgery with a sip of water if you normally take these medications in the morning  4  Please hold metformin for 24 hours prior to surgery

## 2019-07-29 NOTE — LETTER
July 30, 2019     Mimi Leslie, [address] [facility] [address]  [address]    Patient: Lisette Toribio   YOB: 1970   Date of Visit: 7/29/2019       Dear Dr Katelyn Domínguez:    Thank you for referring Lisette Toribio to me for evaluation  Below are the relevant portions of my H&P  If you have questions, please do not hesitate to call me  I look forward to following Yuliana Garner along with you  Sincerely,        Lily Willard MD        CC: No Recipients  Lily Willard MD  7/30/2019  6:45 AM  Signed  Assessment/Plan:    Problem List Items Addressed This Visit        Genitourinary    Left ovarian cyst    Relevant Orders    Case request operating room: HYSTERECTOMY LAPAROSCOPIC TOTAL ([address]), left oophorectomy (Completed)    Type and screen    Comprehensive metabolic panel    CBC and differential    Protime-INR    HEMOGLOBIN A1C W/ EAG ESTIMATION    TSH    EKG 12 lead    XR chest pa & lateral    Tissue Exam    Tissue Exam       Other    Dysfunctional uterine bleeding - Primary     49-year-old with a history of NSTEMI, negative cardiac catheterization who presents with abnormal uterine bleeding while on oral contraceptive therapy, 2 8 x 3 1 cm left ovarian cyst   Her performance status is 0   1  Follow-up results of endometrial biopsy  2  I discussed the risks and benefits of total laparoscopic hysterectomy, bilateral salpingectomy, left oophorectomy with possible conversion to exploratory laparotomy and ovarian cancer staging including omentectomy, peritoneal biopsies, lymphadenectomy in the event that malignancy is identified at the time surgery  She understands the risks and benefits of the operation agrees to proceed as outlined  Consent was obtained by me  3  Preoperative cardiac risk assessment             Relevant Orders    Case request operating room: HYSTERECTOMY LAPAROSCOPIC TOTAL [facility]), left oophorectomy (Completed)    Type and screen    Comprehensive metabolic panel    CBC and differential    Protime-INR    HEMOGLOBIN A1C W/ EAG ESTIMATION    TSH    EKG 12 lead    XR chest pa & lateral    Tissue Exam    Tissue Exam              CHIEF COMPLAINT:  Left ovarian cyst, abnormal uterine bleeding        Patient ID: Jian Guzman is a 50 y o  female  17-year-old para 2 with 2 previous  sections presents with abnormal uterine bleeding and a left ovarian cyst   She states that she has been taking oral contraceptive therapy since approximately   She had a NSTEMI likely secondary to coronary vasospasm as her cardiac catheterization was clean  She blood the whole month of May  Recent TSH is 0 073  Pelvic ultrasound on 2019 revealed a 1 5 x 2 3 cm left ovarian cyst   Pelvic ultrasound performed on 2019 revealed the uterus to measure 8 3 x 4 1 cm with a 5 mm endometrial thickness and normal ovaries  As the left ovarian cyst appeared to resolve, MRI of the pelvis was ordered on 2019 and a left ovarian cyst was re-demonstrated measuring 2 8 x 3 1 cm  She is here to discuss treatment options for her abnormal uterine bleeding and left ovarian cyst         Review of Systems   Constitutional: Negative for activity change and unexpected weight change  HENT: Negative  Eyes: Negative  Respiratory: Negative  Cardiovascular: Negative  Gastrointestinal: Negative for abdominal distention and abdominal pain  Endocrine: Negative  Genitourinary: Negative for pelvic pain and vaginal bleeding  Musculoskeletal: Negative  Skin: Negative  Allergic/Immunologic: Negative  Neurological: Negative  Hematological: Negative  Psychiatric/Behavioral: Negative          Current Outpatient Medications   Medication Sig Dispense Refill    amLODIPine (NORVASC) 5 mg tablet Take 1 tablet (5 mg total) by mouth daily 90 tablet 1    aspirin (ECOTRIN LOW STRENGTH) 81 mg EC tablet Take 81 mg by mouth daily      clindamycin (CLEOCIN) 150 mg capsule       dicyclomine (BENTYL) 20 mg tablet Take 1 tablet (20 mg total) by mouth every 6 (six) hours as needed (every 6 hours) 60 tablet 0    escitalopram (LEXAPRO) 20 mg tablet Take 1 tablet (20 mg total) by mouth daily 90 tablet 1    fenofibrate (TRICOR) 145 mg tablet Take 1 tablet (145 mg total) by mouth daily 90 tablet 1    fluticasone (FLONASE) 50 mcg/act nasal spray 2 sprays into each nostril 2 (two) times a day as needed        levothyroxine 75 mcg tablet Take 1 tablet (75 mcg total) by mouth daily 90 tablet 0    metFORMIN (GLUCOPHAGE) 500 mg tablet 1 tab  tablet 2    metoprolol succinate (TOPROL-XL) 25 mg 24 hr tablet TAKE 1 TABLET TWICE A  tablet 0    mupirocin (BACTROBAN) 2 % ointment Apply topically 3 (three) times a day 22 g 5    nitroglycerin (NITROSTAT) 0 4 mg SL tablet Place 1 tablet under the tongue every 5 (five) minutes as needed for chest pain 90 tablet 0    norgestrel-ethinyl estradiol (LOW-OGESTREL) 0 3 mg-30 mcg per tablet Take 1 tablet by mouth daily 90 tablet 0    omega-3-acid ethyl esters (LOVAZA) 1 g capsule TAKE 4 CAPSULES BY MOUTH DAILY 360 capsule 0    omeprazole (PriLOSEC) 20 mg delayed release capsule TAKE ONE CAPSULE BY MOUTH EVERY DAY 90 capsule 0    pantoprazole (PROTONIX) 40 mg tablet Take 1 tablet (40 mg total) by mouth daily 90 tablet 1    traMADol (ULTRAM) 50 mg tablet Take 1 tablet (50 mg total) by mouth every 12 (twelve) hours as needed for moderate pain 60 tablet 0     No current facility-administered medications for this visit          Allergies   Allergen Reactions    Sulfa Antibiotics Shortness Of Breath    Erythromycin      Reaction Date: [id number];     Metronidazole        Past Medical History:   Diagnosis Date    [facility] mellitus ([facility] )     Borderline    Elevated blood pressure reading     Last Assessed:  8/27/15    Fatty liver     Hashimoto's thyroiditis     Last Assessed:  8/19/14    History of stomach ulcers     Hypertension     Hypothyroidism     Irritable bowel syndrome     Last Assessed:  3/25/14    Liver disease     elevated enzymes    Myocardial infarction [facility])     2017    Pre-diabetes        Past Surgical History:   Procedure Laterality Date    ANGIOPLASTY      CARDIAC CATHETERIZATION       SECTION      CHOLECYSTECTOMY      COLONOSCOPY      IN ESOPHAGOGASTRODUODENOSCOPY TRANSORAL DIAGNOSTIC N/A 2018    Procedure: ESOPHAGOGASTRODUODENOSCOPY (EGD); Surgeon: Camryn Poon MD;  [facility]; Service: Gastroenterology    SINUS SURGERY         OB History        2    Para        Term                AB        Living   2       SAB        TAB        Ectopic        Multiple        Live Births                     Family History   Problem Relation Age of Onset    Pancreatic cancer Mother     Hypertension Father     Diabetes Father     Diabetes Maternal Grandmother     Diabetes Paternal Grandmother     Heart disease Paternal Grandmother        The following portions of the patient's history were reviewed and updated as appropriate: allergies, current medications, past family history, past medical history, past social history, past surgical history and problem list       Objective:    Blood pressure 118/86, pulse 75, temperature 98 5 °F (36 9 °C), temperature source Oral, resp  rate 16, height 5' 2" (1 575 m), weight 79 8 kg (176 lb), not currently breastfeeding  Body mass index is 32 19 kg/m²  Physical Exam   Constitutional: She is oriented to person, place, and time  She appears well-developed and well-nourished  No distress  HENT:   Head: Normocephalic and atraumatic  Neck: Normal range of motion  Neck supple  No thyromegaly present  Cardiovascular: Normal rate, regular rhythm and normal heart sounds  Pulmonary/Chest: Effort normal and breath sounds normal    Abdominal: Soft  She exhibits no distension and no mass  There is no tenderness  There is no rebound and no guarding  No hernia  Genitourinary:   Genitourinary Comments: The external female genitalia is normal  The bartholin's, uretheral and skenes glands are normal  The urethral meatus is normal (midline with no lesions)  Anus without fissure or lesion  Speculum exam reveals vagina without lesion or discharge  Cervix is normal appearing without visible lesions  No bleeding  No significant cystocele or rectocele noted  Bimanual exam notes a uterus with normal contour, mobility  No tenderness  Adnexa without masses or tenderness  Bladder is without fullness, mass or tenderness  Musculoskeletal: She exhibits no edema  Lymphadenopathy:     She has no cervical adenopathy  Neurological: She is alert and oriented to person, place, and time  Skin: Skin is warm and dry  She is not diaphoretic  Psychiatric: She has a normal mood and affect  Her behavior is normal  Judgment and thought content normal      Endometrial biopsy  Date/Time: 7/30/2019 6:44 AM  Performed by: Noe Vu MD  Authorized by: Noe Vu MD     Consent:     Consent obtained:  Verbal    Consent given by:  Patient    Procedural risks discussed:  Bleeding    Patient questions answered: yes    Indication:     Indications: Other disorder of menstruation and other abnormal bleeding from female genital tract    Procedure:     Procedure: endometrial biopsy with Pipelle      A bivalve speculum was placed in the vagina: yes      The cervix was dilated: yes      Uterus sounded: yes      Uterus sound depth (cm):  9    Specimen collected: specimen collected and sent to pathology      Patient tolerated procedure well with no complications: yes    Findings:     Uterus size:  6-8 weeks    Cervix: stenotic    Comments: There was a friable layer of tissue over the cervical os which was removed and sent off as cervical biopsy in addition to the endometrial biopsy  Hemostasis was achieved using Monsel's solution

## 2019-07-29 NOTE — LETTER
July 30, 2019     [address] [facility]    Patient: Mick Sees   YOB: 1970   Date of Visit: 7/29/2019       Dear Dr Cornelio Barrera: Thank you for referring Mick Harris to me for evaluation  Below are the relevant portions of my H&P  If you have questions, please do not hesitate to call me  I look forward to following Chen Albright along with you  Sincerely,        Brayden Soni MD        CC: No Recipients  Brayden Soni MD  7/30/2019  6:45 AM  Signed  Assessment/Plan:    Problem List Items Addressed This Visit        Genitourinary    Left ovarian cyst    Relevant Orders    Case request operating room: HYSTERECTOMY LAPAROSCOPIC TOTAL ([address]), left oophorectomy (Completed)    Type and screen    Comprehensive metabolic panel    CBC and differential    Protime-INR    HEMOGLOBIN A1C W/ EAG ESTIMATION    TSH    EKG 12 lead    XR chest pa & lateral    Tissue Exam    Tissue Exam       Other    Dysfunctional uterine bleeding - Primary     51-year-old with a history of NSTEMI, negative cardiac catheterization who presents with abnormal uterine bleeding while on oral contraceptive therapy, 2 8 x 3 1 cm left ovarian cyst   Her performance status is 0   1  Follow-up results of endometrial biopsy  2  I discussed the risks and benefits of total laparoscopic hysterectomy, bilateral salpingectomy, left oophorectomy with possible conversion to exploratory laparotomy and ovarian cancer staging including omentectomy, peritoneal biopsies, lymphadenectomy in the event that malignancy is identified at the time surgery  She understands the risks and benefits of the operation agrees to proceed as outlined  Consent was obtained by me  3  Preoperative cardiac risk assessment             Relevant Orders    Case request operating room: HYSTERECTOMY LAPAROSCOPIC TOTAL [facility]), left oophorectomy (Completed)    Type and screen    Comprehensive metabolic panel    CBC and differential    Protime-INR    HEMOGLOBIN A1C W/ EAG ESTIMATION    TSH    EKG 12 lead    XR chest pa & lateral    Tissue Exam    Tissue Exam              CHIEF COMPLAINT:  Left ovarian cyst, abnormal uterine bleeding        Patient ID: Ruben Pina is a 50 y o  female  55-year-old para 2 with 2 previous  sections presents with abnormal uterine bleeding and a left ovarian cyst   She states that she has been taking oral contraceptive therapy since approximately   She had a NSTEMI likely secondary to coronary vasospasm as her cardiac catheterization was clean  She blood the whole month of May  Recent TSH is 0 073  Pelvic ultrasound on 2019 revealed a 1 5 x 2 3 cm left ovarian cyst   Pelvic ultrasound performed on 2019 revealed the uterus to measure 8 3 x 4 1 cm with a 5 mm endometrial thickness and normal ovaries  As the left ovarian cyst appeared to resolve, MRI of the pelvis was ordered on 2019 and a left ovarian cyst was re-demonstrated measuring 2 8 x 3 1 cm  She is here to discuss treatment options for her abnormal uterine bleeding and left ovarian cyst         Review of Systems   Constitutional: Negative for activity change and unexpected weight change  HENT: Negative  Eyes: Negative  Respiratory: Negative  Cardiovascular: Negative  Gastrointestinal: Negative for abdominal distention and abdominal pain  Endocrine: Negative  Genitourinary: Negative for pelvic pain and vaginal bleeding  Musculoskeletal: Negative  Skin: Negative  Allergic/Immunologic: Negative  Neurological: Negative  Hematological: Negative  Psychiatric/Behavioral: Negative          Current Outpatient Medications   Medication Sig Dispense Refill    amLODIPine (NORVASC) 5 mg tablet Take 1 tablet (5 mg total) by mouth daily 90 tablet 1    aspirin (ECOTRIN LOW STRENGTH) 81 mg EC tablet Take 81 mg by mouth daily      clindamycin (CLEOCIN) 150 mg capsule       dicyclomine (BENTYL) 20 mg tablet Take 1 tablet (20 mg total) by mouth every 6 (six) hours as needed (every 6 hours) 60 tablet 0    escitalopram (LEXAPRO) 20 mg tablet Take 1 tablet (20 mg total) by mouth daily 90 tablet 1    fenofibrate (TRICOR) 145 mg tablet Take 1 tablet (145 mg total) by mouth daily 90 tablet 1    fluticasone (FLONASE) 50 mcg/act nasal spray 2 sprays into each nostril 2 (two) times a day as needed        levothyroxine 75 mcg tablet Take 1 tablet (75 mcg total) by mouth daily 90 tablet 0    metFORMIN (GLUCOPHAGE) 500 mg tablet 1 tab  tablet 2    metoprolol succinate (TOPROL-XL) 25 mg 24 hr tablet TAKE 1 TABLET TWICE A  tablet 0    mupirocin (BACTROBAN) 2 % ointment Apply topically 3 (three) times a day 22 g 5    nitroglycerin (NITROSTAT) 0 4 mg SL tablet Place 1 tablet under the tongue every 5 (five) minutes as needed for chest pain 90 tablet 0    norgestrel-ethinyl estradiol (LOW-OGESTREL) 0 3 mg-30 mcg per tablet Take 1 tablet by mouth daily 90 tablet 0    omega-3-acid ethyl esters (LOVAZA) 1 g capsule TAKE 4 CAPSULES BY MOUTH DAILY 360 capsule 0    omeprazole (PriLOSEC) 20 mg delayed release capsule TAKE ONE CAPSULE BY MOUTH EVERY DAY 90 capsule 0    pantoprazole (PROTONIX) 40 mg tablet Take 1 tablet (40 mg total) by mouth daily 90 tablet 1    traMADol (ULTRAM) 50 mg tablet Take 1 tablet (50 mg total) by mouth every 12 (twelve) hours as needed for moderate pain 60 tablet 0     No current facility-administered medications for this visit          Allergies   Allergen Reactions    Sulfa Antibiotics Shortness Of Breath    Erythromycin      Reaction Date: [id number];     Metronidazole        Past Medical History:   Diagnosis Date    Diabetes mellitus ([facility] [address] )     Borderline    Elevated blood pressure reading     Last Assessed:  8/27/15    Fatty liver     Hashimoto's thyroiditis     Last Assessed:  8/19/14    History of stomach ulcers     Hypertension     Hypothyroidism     Irritable bowel syndrome     Last Assessed:  3/25/14    Liver disease     elevated enzymes    Myocardial infarction [facility])     2017    Pre-diabetes        Past Surgical History:   Procedure Laterality Date    ANGIOPLASTY      CARDIAC CATHETERIZATION       SECTION      CHOLECYSTECTOMY      COLONOSCOPY      SD ESOPHAGOGASTRODUODENOSCOPY TRANSORAL DIAGNOSTIC N/A 2018    Procedure: ESOPHAGOGASTRODUODENOSCOPY (EGD); Surgeon: Cleo Weaver MD;  [facility]; Service: Gastroenterology    SINUS SURGERY         OB History        2    Para        Term                AB        Living   2       SAB        TAB        Ectopic        Multiple        Live Births                     Family History   Problem Relation Age of Onset    Pancreatic cancer Mother     Hypertension Father     Diabetes Father     Diabetes Maternal Grandmother     Diabetes Paternal Grandmother     Heart disease Paternal Grandmother        The following portions of the patient's history were reviewed and updated as appropriate: allergies, current medications, past family history, past medical history, past social history, past surgical history and problem list       Objective:    Blood pressure 118/86, pulse 75, temperature 98 5 °F (36 9 °C), temperature source Oral, resp  rate 16, height 5' 2" (1 575 m), weight 79 8 kg (176 lb), not currently breastfeeding  Body mass index is 32 19 kg/m²  Physical Exam   Constitutional: She is oriented to person, place, and time  She appears well-developed and well-nourished  No distress  HENT:   Head: Normocephalic and atraumatic  Neck: Normal range of motion  Neck supple  No thyromegaly present  Cardiovascular: Normal rate, regular rhythm and normal heart sounds  Pulmonary/Chest: Effort normal and breath sounds normal    Abdominal: Soft  She exhibits no distension and no mass  There is no tenderness  There is no rebound and no guarding  No hernia  Genitourinary:   Genitourinary Comments: The external female genitalia is normal  The bartholin's, uretheral and skenes glands are normal  The urethral meatus is normal (midline with no lesions)  Anus without fissure or lesion  Speculum exam reveals vagina without lesion or discharge  Cervix is normal appearing without visible lesions  No bleeding  No significant cystocele or rectocele noted  Bimanual exam notes a uterus with normal contour, mobility  No tenderness  Adnexa without masses or tenderness  Bladder is without fullness, mass or tenderness  Musculoskeletal: She exhibits no edema  Lymphadenopathy:     She has no cervical adenopathy  Neurological: She is alert and oriented to person, place, and time  Skin: Skin is warm and dry  She is not diaphoretic  Psychiatric: She has a normal mood and affect  Her behavior is normal  Judgment and thought content normal      Endometrial biopsy  Date/Time: 7/30/2019 6:44 AM  Performed by: Noe Vu MD  Authorized by: Noe Vu MD     Consent:     Consent obtained:  Verbal    Consent given by:  Patient    Procedural risks discussed:  Bleeding    Patient questions answered: yes    Indication:     Indications: Other disorder of menstruation and other abnormal bleeding from female genital tract    Procedure:     Procedure: endometrial biopsy with Pipelle      A bivalve speculum was placed in the vagina: yes      The cervix was dilated: yes      Uterus sounded: yes      Uterus sound depth (cm):  9    Specimen collected: specimen collected and sent to pathology      Patient tolerated procedure well with no complications: yes    Findings:     Uterus size:  6-8 weeks    Cervix: stenotic    Comments: There was a friable layer of tissue over the cervical os which was removed and sent off as cervical biopsy in addition to the endometrial biopsy  Hemostasis was achieved using Monsel's solution

## 2019-07-30 PROCEDURE — 58100 BIOPSY OF UTERUS LINING: CPT | Performed by: OBSTETRICS & GYNECOLOGY

## 2019-07-30 NOTE — ASSESSMENT & PLAN NOTE
29-year-old with a history of NSTEMI, negative cardiac catheterization who presents with abnormal uterine bleeding while on oral contraceptive therapy, 2 8 x 3 1 cm left ovarian cyst   Her performance status is 0   1  Follow-up results of endometrial biopsy  2  I discussed the risks and benefits of total laparoscopic hysterectomy, bilateral salpingectomy, left oophorectomy with possible conversion to exploratory laparotomy and ovarian cancer staging including omentectomy, peritoneal biopsies, lymphadenectomy in the event that malignancy is identified at the time surgery  She understands the risks and benefits of the operation agrees to proceed as outlined  Consent was obtained by me  3  Preoperative cardiac risk assessment

## 2019-07-30 NOTE — H&P
Assessment/Plan:    Problem List Items Addressed This Visit        Genitourinary    Left ovarian cyst    Relevant Orders    Case request operating room: HYSTERECTOMY LAPAROSCOPIC TOTAL ([address]), left oophorectomy (Completed)    Type and screen    Comprehensive metabolic panel    CBC and differential    Protime-INR    HEMOGLOBIN A1C W/ EAG ESTIMATION    TSH    EKG 12 lead    XR chest pa & lateral    Tissue Exam    Tissue Exam       Other    Dysfunctional uterine bleeding - Primary     12-year-old with a history of NSTEMI, negative cardiac catheterization who presents with abnormal uterine bleeding while on oral contraceptive therapy, 2 8 x 3 1 cm left ovarian cyst   Her performance status is 0   1  Follow-up results of endometrial biopsy  2  I discussed the risks and benefits of total laparoscopic hysterectomy, bilateral salpingectomy, left oophorectomy with possible conversion to exploratory laparotomy and ovarian cancer staging including omentectomy, peritoneal biopsies, lymphadenectomy in the event that malignancy is identified at the time surgery  She understands the risks and benefits of the operation agrees to proceed as outlined  Consent was obtained by me  3  Preoperative cardiac risk assessment  Relevant Orders    Case request operating room: HYSTERECTOMY LAPAROSCOPIC TOTAL ([address]), left oophorectomy (Completed)    Type and screen    Comprehensive metabolic panel    CBC and differential    Protime-INR    HEMOGLOBIN A1C W/ EAG ESTIMATION    TSH    EKG 12 lead    XR chest pa & lateral    Tissue Exam    Tissue Exam              CHIEF COMPLAINT:  Left ovarian cyst, abnormal uterine bleeding        Patient ID: Mallory Vo is a 50 y o  female  12-year-old para 2 with 2 previous  sections presents with abnormal uterine bleeding and a left ovarian cyst   She states that she has been taking oral contraceptive therapy since approximately 2017   She had a NSTEMI likely secondary to coronary vasospasm as her cardiac catheterization was clean  She blood the whole month of May  Recent TSH is 0 073  Pelvic ultrasound on 6/11/2019 revealed a 1 5 x 2 3 cm left ovarian cyst   Pelvic ultrasound performed on 7/24/2019 revealed the uterus to measure 8 3 x 4 1 cm with a 5 mm endometrial thickness and normal ovaries  As the left ovarian cyst appeared to resolve, MRI of the pelvis was ordered on 7/26/2019 and a left ovarian cyst was re-demonstrated measuring 2 8 x 3 1 cm  She is here to discuss treatment options for her abnormal uterine bleeding and left ovarian cyst         Review of Systems   Constitutional: Negative for activity change and unexpected weight change  HENT: Negative  Eyes: Negative  Respiratory: Negative  Cardiovascular: Negative  Gastrointestinal: Negative for abdominal distention and abdominal pain  Endocrine: Negative  Genitourinary: Negative for pelvic pain and vaginal bleeding  Musculoskeletal: Negative  Skin: Negative  Allergic/Immunologic: Negative  Neurological: Negative  Hematological: Negative  Psychiatric/Behavioral: Negative          Current Outpatient Medications   Medication Sig Dispense Refill    amLODIPine (NORVASC) 5 mg tablet Take 1 tablet (5 mg total) by mouth daily 90 tablet 1    aspirin (ECOTRIN LOW STRENGTH) 81 mg EC tablet Take 81 mg by mouth daily      clindamycin (CLEOCIN) 150 mg capsule       dicyclomine (BENTYL) 20 mg tablet Take 1 tablet (20 mg total) by mouth every 6 (six) hours as needed (every 6 hours) 60 tablet 0    escitalopram (LEXAPRO) 20 mg tablet Take 1 tablet (20 mg total) by mouth daily 90 tablet 1    fenofibrate (TRICOR) 145 mg tablet Take 1 tablet (145 mg total) by mouth daily 90 tablet 1    fluticasone (FLONASE) 50 mcg/act nasal spray 2 sprays into each nostril 2 (two) times a day as needed        levothyroxine 75 mcg tablet Take 1 tablet (75 mcg total) by mouth daily 90 tablet 0    metFORMIN (GLUCOPHAGE) 500 mg tablet 1 tab  tablet 2    metoprolol succinate (TOPROL-XL) 25 mg 24 hr tablet TAKE 1 TABLET TWICE A  tablet 0    mupirocin (BACTROBAN) 2 % ointment Apply topically 3 (three) times a day 22 g 5    nitroglycerin (NITROSTAT) 0 4 mg SL tablet Place 1 tablet under the tongue every 5 (five) minutes as needed for chest pain 90 tablet 0    norgestrel-ethinyl estradiol (LOW-OGESTREL) 0 3 mg-30 mcg per tablet Take 1 tablet by mouth daily 90 tablet 0    omega-3-acid ethyl esters (LOVAZA) 1 g capsule TAKE 4 CAPSULES BY MOUTH DAILY 360 capsule 0    omeprazole (PriLOSEC) 20 mg delayed release capsule TAKE ONE CAPSULE BY MOUTH EVERY DAY 90 capsule 0    pantoprazole (PROTONIX) 40 mg tablet Take 1 tablet (40 mg total) by mouth daily 90 tablet 1    traMADol (ULTRAM) 50 mg tablet Take 1 tablet (50 mg total) by mouth every 12 (twelve) hours as needed for moderate pain 60 tablet 0     No current facility-administered medications for this visit  Allergies   Allergen Reactions    Sulfa Antibiotics Shortness Of Breath    Erythromycin      Reaction Date: ;     Metronidazole        Past Medical History:   Diagnosis Date    Diabetes mellitus ([address] )     Borderline    Elevated blood pressure reading     Last Assessed:  8/27/15    Fatty liver     Hashimoto's thyroiditis     Last Assessed:  14    History of stomach ulcers     Hypertension     Hypothyroidism     Irritable bowel syndrome     Last Assessed:  3/25/14    Liver disease     elevated enzymes    Myocardial infarction ([facility] [address] )         Pre-diabetes        Past Surgical History:   Procedure Laterality Date    ANGIOPLASTY      CARDIAC CATHETERIZATION       SECTION      CHOLECYSTECTOMY      COLONOSCOPY      AR ESOPHAGOGASTRODUODENOSCOPY TRANSORAL DIAGNOSTIC N/A 2018    Procedure: ESOPHAGOGASTRODUODENOSCOPY (EGD); Surgeon: Lester Bosch MD;  [facility];   Service: Gastroenterology    SINUS SURGERY OB History        2    Para        Term                AB        Living   2       SAB        TAB        Ectopic        Multiple        Live Births                     Family History   Problem Relation Age of Onset    Pancreatic cancer Mother     Hypertension Father     Diabetes Father     Diabetes Maternal Grandmother     Diabetes Paternal Grandmother     Heart disease Paternal Grandmother        The following portions of the patient's history were reviewed and updated as appropriate: allergies, current medications, past family history, past medical history, past social history, past surgical history and problem list       Objective:    Blood pressure 118/86, pulse 75, temperature 98 5 °F (36 9 °C), temperature source Oral, resp  rate 16, height 5' 2" (1 575 m), weight 79 8 kg (176 lb), not currently breastfeeding  Body mass index is 32 19 kg/m²  Physical Exam   Constitutional: She is oriented to person, place, and time  She appears well-developed and well-nourished  No distress  HENT:   Head: Normocephalic and atraumatic  Neck: Normal range of motion  Neck supple  No thyromegaly present  Cardiovascular: Normal rate, regular rhythm and normal heart sounds  Pulmonary/Chest: Effort normal and breath sounds normal    Abdominal: Soft  She exhibits no distension and no mass  There is no tenderness  There is no rebound and no guarding  No hernia  Genitourinary:   Genitourinary Comments: The external female genitalia is normal  The bartholin's, uretheral and skenes glands are normal  The urethral meatus is normal (midline with no lesions)  Anus without fissure or lesion  Speculum exam reveals vagina without lesion or discharge  Cervix is normal appearing without visible lesions  No bleeding  No significant cystocele or rectocele noted  Bimanual exam notes a uterus with normal contour, mobility  No tenderness  Adnexa without masses or tenderness   Bladder is without fullness, mass or tenderness  Musculoskeletal: She exhibits no edema  Lymphadenopathy:     She has no cervical adenopathy  Neurological: She is alert and oriented to person, place, and time  Skin: Skin is warm and dry  She is not diaphoretic  Psychiatric: She has a normal mood and affect  Her behavior is normal  Judgment and thought content normal      Endometrial biopsy  Date/Time: 7/30/2019 6:44 AM  Performed by: Noe Vu MD  Authorized by: Noe Vu MD     Consent:     Consent obtained:  Verbal    Consent given by:  Patient    Procedural risks discussed:  Bleeding    Patient questions answered: yes    Indication:     Indications: Other disorder of menstruation and other abnormal bleeding from female genital tract    Procedure:     Procedure: endometrial biopsy with Pipelle      A bivalve speculum was placed in the vagina: yes      The cervix was dilated: yes      Uterus sounded: yes      Uterus sound depth (cm):  9    Specimen collected: specimen collected and sent to pathology      Patient tolerated procedure well with no complications: yes    Findings:     Uterus size:  6-8 weeks    Cervix: stenotic    Comments: There was a friable layer of tissue over the cervical os which was removed and sent off as cervical biopsy in addition to the endometrial biopsy  Hemostasis was achieved using Monsel's solution

## 2019-08-01 ENCOUNTER — OFFICE VISIT (OUTPATIENT)
Dept: CARDIOLOGY CLINIC | Facility: CLINIC | Age: 49
End: 2019-08-01
Payer: COMMERCIAL

## 2019-08-01 VITALS
DIASTOLIC BLOOD PRESSURE: 92 MMHG | HEART RATE: 74 BPM | WEIGHT: 176.9 LBS | HEIGHT: 62 IN | SYSTOLIC BLOOD PRESSURE: 138 MMHG | BODY MASS INDEX: 32.55 KG/M2

## 2019-08-01 DIAGNOSIS — I21.4 NSTEMI (NON-ST ELEVATED MYOCARDIAL INFARCTION) (HCC): Primary | ICD-10-CM

## 2019-08-01 PROCEDURE — 93000 ELECTROCARDIOGRAM COMPLETE: CPT | Performed by: INTERNAL MEDICINE

## 2019-08-01 PROCEDURE — 99214 OFFICE O/P EST MOD 30 MIN: CPT | Performed by: INTERNAL MEDICINE

## 2019-08-01 NOTE — PROGRESS NOTES
Cardiology Follow Up    Newton Sees  1970  [id number]  [address] [facility] [place] [address] [address] [facility]  [phone]    1  NSTEMI (non-ST elevated myocardial infarction) ([facility] )  POCT ECG       Interval History: Followup chest pain, anomalous coronary anatomy    Doing well  No chest pain, no dyspnea no palpitations  Problem List     Chest pain    Leukocytosis    Hypertension (Chronic)    Peptic ulcer disease (Chronic)    Fatty liver (Chronic)    Sinus tachycardia    Chronic sinusitis (Chronic)    Hyperglycemia    Anemia    Anomalous coronary artery origin    Anxiety    Combined hyperlipidemia    Coronary vasospasm (HCC)    Diabetes mellitus ([facility] )    Lab Results   Component Value Date    HGBA1C 7 0 (H) 03/20/2019       No results for input(s): POCGLU in the last 72 hours      Blood Sugar Average: Last 72 hrs:          Elevated AST (SGOT)    GERD without esophagitis    Ground glass opacity present on imaging of lung    Heart palpitations    Hypothyroid    NSTEMI (non-ST elevated myocardial infarction) ([facility] )    Chest pain due to GERD    Overview Signed 6/18/2018  5:05 PM by Tameka Browne MD     Added automatically from request for surgery [id number]         Dysfunctional uterine bleeding    Urinary tract infection without hematuria    Left ovarian cyst        Past Medical History:   Diagnosis Date    Diabetes mellitus ([place] [address] )     Borderline    Elevated blood pressure reading     Last Assessed:  8/27/15    Fatty liver     Hashimoto's thyroiditis     Last Assessed:  8/19/14    History of stomach ulcers     Hypertension     Hypothyroidism     Irritable bowel syndrome     Last Assessed:  3/25/14    Liver disease     elevated enzymes    Myocardial infarction [facility])     2017    Pre-diabetes      Social History     Socioeconomic History    Marital status: /Civil Union     Spouse name: Not on file    Number of children: Not on file    Years of education: Not on file    Highest education level: Not on file   Occupational History    Not on file   Social Needs    Financial resource strain: Not on file    Food insecurity:     Worry: Not on file     Inability: Not on file    Transportation needs:     Medical: Not on file     Non-medical: Not on file   Tobacco Use    Smoking status: Former Smoker     Packs/day: 0 50     Years: 4 00     Pack years: 2 00     Types: Cigarettes     Last attempt to quit: 2016     Years since quitting: 3 4    Smokeless tobacco: Never Used    Tobacco comment: Per [employer]:  Never a smoker   Substance and Sexual Activity    Alcohol use: Yes     Comment: occasional, Social drinker    Drug use: No    Sexual activity: Yes     Partners: Male     Birth control/protection: Pill   Lifestyle    Physical activity:     Days per week: Not on file     Minutes per session: Not on file    Stress: Not on file   Relationships    Social connections:     Talks on phone: Not on file     Gets together: Not on file     Attends Anabaptist service: Not on file     Active member of club or organization: Not on file     Attends meetings of clubs or organizations: Not on file     Relationship status: Not on file    Intimate partner violence:     Fear of current or ex partner: Not on file     Emotionally abused: Not on file     Physically abused: Not on file     Forced sexual activity: Not on file   Other Topics Concern    Not on file   Social History Narrative    Feels safe at home      Family History   Problem Relation Age of Onset    Pancreatic cancer Mother     Hypertension Father     Diabetes Father     Diabetes Maternal Grandmother     Diabetes Paternal Grandmother     Heart disease Paternal Grandmother      Past Surgical History:   Procedure Laterality Date    ANGIOPLASTY      CARDIAC CATHETERIZATION       SECTION      CHOLECYSTECTOMY      COLONOSCOPY      MT ESOPHAGOGASTRODUODENOSCOPY TRANSORAL DIAGNOSTIC N/A 8/24/2018    Procedure: ESOPHAGOGASTRODUODENOSCOPY (EGD); Surgeon: Jr Osman MD;  Location: [facility];   Service: Gastroenterology    SINUS SURGERY         Current Outpatient Medications:     amLODIPine (NORVASC) 5 mg tablet, Take 1 tablet (5 mg total) by mouth daily, Disp: 90 tablet, Rfl: 1    aspirin (ECOTRIN LOW STRENGTH) 81 mg EC tablet, Take 81 mg by mouth daily, Disp: , Rfl:     escitalopram (LEXAPRO) 20 mg tablet, Take 1 tablet (20 mg total) by mouth daily, Disp: 90 tablet, Rfl: 1    fenofibrate (TRICOR) 145 mg tablet, Take 1 tablet (145 mg total) by mouth daily, Disp: 90 tablet, Rfl: 1    levothyroxine 75 mcg tablet, Take 1 tablet (75 mcg total) by mouth daily, Disp: 90 tablet, Rfl: 0    metFORMIN (GLUCOPHAGE) 500 mg tablet, 1 tab BID, Disp: 180 tablet, Rfl: 2    metoprolol succinate (TOPROL-XL) 25 mg 24 hr tablet, TAKE 1 TABLET TWICE A DAY, Disp: 180 tablet, Rfl: 0    norgestrel-ethinyl estradiol (LOW-OGESTREL) 0 3 mg-30 mcg per tablet, Take 1 tablet by mouth daily, Disp: 90 tablet, Rfl: 0    omega-3-acid ethyl esters (LOVAZA) 1 g capsule, TAKE 4 CAPSULES BY MOUTH DAILY, Disp: 360 capsule, Rfl: 0    omeprazole (PriLOSEC) 20 mg delayed release capsule, TAKE ONE CAPSULE BY MOUTH EVERY DAY, Disp: 90 capsule, Rfl: 0    pantoprazole (PROTONIX) 40 mg tablet, Take 1 tablet (40 mg total) by mouth daily, Disp: 90 tablet, Rfl: 1    clindamycin (CLEOCIN) 150 mg capsule, , Disp: , Rfl:     dicyclomine (BENTYL) 20 mg tablet, Take 1 tablet (20 mg total) by mouth every 6 (six) hours as needed (every 6 hours) (Patient not taking: Reported on 8/1/2019), Disp: 60 tablet, Rfl: 0    fluticasone (FLONASE) 50 mcg/act nasal spray, 2 sprays into each nostril 2 (two) times a day as needed  , Disp: , Rfl:     mupirocin (BACTROBAN) 2 % ointment, Apply topically 3 (three) times a day (Patient not taking: Reported on 8/1/2019), Disp: 22 g, Rfl: 5    nitroglycerin (NITROSTAT) 0 4 mg SL tablet, Place 1 tablet under the tongue every 5 (five) minutes as needed for chest pain (Patient not taking: Reported on 8/1/2019), Disp: 90 tablet, Rfl: 0    traMADol (ULTRAM) 50 mg tablet, Take 1 tablet (50 mg total) by mouth every 12 (twelve) hours as needed for moderate pain (Patient not taking: Reported on 8/1/2019), Disp: 60 tablet, Rfl: 0  Allergies   Allergen Reactions    Sulfa Antibiotics Shortness Of Breath    Erythromycin      Reaction Date: [id number];     Metronidazole        Labs:     Chemistry        Component Value Date/Time     09/02/2015 1020    K 4 1 03/20/2019 0804    K 3 8 09/02/2015 1020     03/20/2019 0804     09/02/2015 1020    CO2 21 03/20/2019 0804    CO2 24 3 09/02/2015 1020    BUN 17 03/20/2019 0804    BUN 13 09/02/2015 1020    CREATININE 0 87 03/20/2019 0804    CREATININE 0 79 09/02/2015 1020        Component Value Date/Time    CALCIUM 9 1 03/20/2019 0804    CALCIUM 9 1 09/02/2015 1020    ALKPHOS 51 03/20/2019 0804    ALKPHOS 78 09/02/2015 1020    AST 86 (H) 03/20/2019 0804    AST 37 09/02/2015 1020    ALT 69 03/20/2019 0804    ALT 62 09/02/2015 1020    BILITOT 0 38 09/02/2015 1020            Lab Results   Component Value Date    CHOL 210 08/20/2015    CHOL 179 07/19/2014    CHOL 197 12/05/2013     Lab Results   Component Value Date    HDL 28 (L) 03/20/2019    HDL 33 (L) 08/28/2018    HDL 30 (L) 06/03/2018     Lab Results   Component Value Date    LDLCALC 102 (H) 03/20/2019    LDLCALC 128 (H) 08/28/2018    LDLCALC 73 06/03/2018     Lab Results   Component Value Date    TRIG 379 (H) 03/20/2019    TRIG 280 (H) 08/28/2018    TRIG 331 (H) 06/03/2018     No results found for: CHOLHDL    Imaging: Us Pelvis Complete W Transvaginal    Result Date: 7/24/2019  Narrative: PELVIC ULTRASOUND, COMPLETE INDICATION:  50years old  [id number]: Unspecified ovarian cyst, left side   COMPARISON: June 11, 2019 TECHNIQUE:   Transabdominal pelvic ultrasound was performed in sagittal and transverse planes with a curvilinear transducer  Additional transvaginal imaging was performed to better evaluate the endometrium and ovaries  Imaging included volumetric sweeps as well as traditional still imaging technique  FINDINGS: UTERUS: The uterus is anteverted in position, measuring 8 3 x 3 3 x 4 1 cm  Volume = 59 mL  Contour and echotexture appear normal  The cervix shows no suspicious abnormality  ENDOMETRIUM:  Normal caliber of 5 mm  Homogeneous and normal in appearance  OVARIES/ADNEXA: Right ovary: Only visible transabdominally measuring 1 5 x 1 6 x 1 4 cm  No suspicious right ovarian abnormality  Doppler flow within normal limits  Left ovary:  Left ovary not identified  No suspicious adnexal mass or loculated collections  There is no free fluid  Impression: Unremarkable uterus, endometrium, and right ovary  The previously abnormal left ovary was not identified on this exam  Given the indeterminate cyst which was found on the previous exam: Recommend MRI imaging of the pelvis  Workstation performed: [id number]     Mri Pelvis Female Wo Contrast    Result Date: 7/26/2019  Narrative: MRI OF THE PELVIS WITHOUT CONTRAST (GYNECOLOGIC) INDICATION: COMPARISON: Ultrasound June 11, 2019 and July 24, 2019 TECHNIQUE: The following pulse sequences were obtained:  Axial T1, axial and sagittal T2, coronal T2 with fat saturation, pre-contrast axial T1 with fat saturation  IV Contrast:  None FINDINGS: UTERUS: Junctional zone:  Normal in thickness  Myometrium:  Normal  Endometrium: Normal thickness without mass  ADNEXA:  Right: Ovary normal in size and morphology  It to is located in the iliac fossa making transvaginal ultrasound visualization inadequate  No adnexal mass identified  No evidence of hydrosalpinx  Left: Left ovary is located in the iliac fossa along the psoas muscle which makes it visible on ultrasound only by transabdominal means    It is occupied almost entirely by a dominant cyst which is 2 8 x 2 1 x 3 1 cm  This cyst has a single thin incomplete marginal septation along its lateral wall without appreciable nodularity or wall thickening  Cannot further characterize without the benefit of contrast, but based on noncontrast appearance, is of low suspicion and can be safely followed  Suggest follow-up pelvic ultrasound in 6 months targeted specifically to transabdominal imaging in this region  No evidence of hydrosalpinx  BLADDER: Normal  PELVIC CAVITY:  No lymphadenopathy  BOWEL:  Unremarkable MRI appearance  OSSEOUS STRUCTURES:   No osseous destruction  Round well-defined nodule seen within the midline sacrum which is hyperintense on T1 and T2-weighted sequences and loses signal on fat-suppressed imaging, most consistent with hemangioma  VASCULAR STRUCTURES:  Visualized vasculature is normal  PELVIC WALL:  Normal      Impression: Minimally complex left ovarian cyst, low suspicion appearance on this unenhanced exam   Suggest ultrasound follow-up in 6 months targeted specifically to transabdominal imaging in the left iliac fossa  Workstation performed: [id number]       EKG: NSR  Normal ECG  Review of Systems   Constitution: Negative  HENT: Negative  Eyes: Negative  Cardiovascular: Negative  Respiratory: Negative  Endocrine: Negative  Hematologic/Lymphatic: Negative  Skin: Negative  Musculoskeletal: Negative  Gastrointestinal: Negative  Genitourinary: Negative  Neurological: Negative  Psychiatric/Behavioral: Negative  Allergic/Immunologic: Negative  Vitals:    08/01/19 0758   BP: 138/92   Pulse: 74           Physical Exam   Constitutional: She is oriented to person, place, and time  No distress  HENT:   Mouth/Throat: No oropharyngeal exudate  Eyes: No scleral icterus  Neck: No JVD present  Cardiovascular: Normal rate and regular rhythm  Exam reveals no gallop and no friction rub  No murmur heard    Pulmonary/Chest: Effort normal and breath sounds normal  No stridor  No respiratory distress  She has no wheezes  Abdominal: Soft  Bowel sounds are normal  She exhibits no distension  There is no tenderness  There is no guarding  Musculoskeletal: She exhibits no edema  Neurological: She is alert and oriented to person, place, and time  Skin: Skin is warm and dry  She is not diaphoretic  Discussion/Summary:    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

## 2019-08-05 DIAGNOSIS — K58.9 IRRITABLE BOWEL SYNDROME WITHOUT DIARRHEA: ICD-10-CM

## 2019-08-05 DIAGNOSIS — K21.9 GASTROESOPHAGEAL REFLUX DISEASE WITHOUT ESOPHAGITIS: ICD-10-CM

## 2019-08-05 DIAGNOSIS — R07.9 CHEST PAIN: ICD-10-CM

## 2019-08-05 DIAGNOSIS — E78.2 COMBINED HYPERLIPIDEMIA: ICD-10-CM

## 2019-08-05 RX ORDER — METOPROLOL SUCCINATE 25 MG/1
25 TABLET, EXTENDED RELEASE ORAL 2 TIMES DAILY
Qty: 180 TABLET | Refills: 3 | Status: SHIPPED | OUTPATIENT
Start: 2019-08-05 | End: 2019-11-04 | Stop reason: SDUPTHER

## 2019-08-06 RX ORDER — DICYCLOMINE HCL 20 MG
20 TABLET ORAL EVERY 6 HOURS PRN
Qty: 60 TABLET | Refills: 0 | Status: SHIPPED | OUTPATIENT
Start: 2019-08-06 | End: 2019-10-12 | Stop reason: SDUPTHER

## 2019-08-06 RX ORDER — OMEGA-3-ACID ETHYL ESTERS 1 G/1
4 CAPSULE, LIQUID FILLED ORAL DAILY
Qty: 360 CAPSULE | Refills: 0 | Status: SHIPPED | OUTPATIENT
Start: 2019-08-06 | End: 2019-12-13 | Stop reason: SDUPTHER

## 2019-08-06 RX ORDER — PANTOPRAZOLE SODIUM 40 MG/1
40 TABLET, DELAYED RELEASE ORAL DAILY
Qty: 90 TABLET | Refills: 0 | Status: SHIPPED | OUTPATIENT
Start: 2019-08-06 | End: 2019-09-07 | Stop reason: HOSPADM

## 2019-08-13 NOTE — PRE-PROCEDURE INSTRUCTIONS
Pre-Surgery Instructions:   Medication Instructions    amLODIPine (NORVASC) 5 mg tablet Instructed patient per Anesthesia Guidelines   aspirin (ECOTRIN LOW STRENGTH) 81 mg EC tablet Patient was instructed by Physician and understands   escitalopram (LEXAPRO) 20 mg tablet Instructed patient per Anesthesia Guidelines   fenofibrate (TRICOR) 145 mg tablet Instructed patient per Anesthesia Guidelines   levothyroxine 75 mcg tablet Instructed patient per Anesthesia Guidelines   metFORMIN (GLUCOPHAGE) 500 mg tablet Instructed patient per Anesthesia Guidelines   metoprolol succinate (TOPROL-XL) 25 mg 24 hr tablet Instructed patient per Anesthesia Guidelines   norgestrel-ethinyl estradiol (LOW-OGESTREL) 0 3 mg-30 mcg per tablet Instructed patient per Anesthesia Guidelines   omega-3-acid ethyl esters (LOVAZA) 1 g capsule Instructed patient per Anesthesia Guidelines   omeprazole (PriLOSEC) 20 mg delayed release capsule Instructed patient per Anesthesia Guidelines   pantoprazole (PROTONIX) 40 mg tablet Instructed patient per Anesthesia Guidelines  Antidepressant Med Class     Continue to take this medication on your normal schedule  If this is an oral medication and you take it in the morning, then you may take this medicine with a sip of water  ASA Med Class: Aspirin     Should be discontinued at least one week prior to planned operation, unless specifically stated otherwise by surgical service  Your Surgeon may have patient stop taking aspirin up to a week before surgery if having intracranial, middle ear, posterior eye, spine surgery or prostate surgery  [Patients taking aspirin for coronary stents should be reviewed by an anesthesiologist in the optimization clinic    Please do not discontinue aspirin in patients with coronary stents unless given specific permission to do so by the cardiologist who prescribed medication ]   If your surgeon approves please continue to take this medication on your normal schedule  You may take this medication on the morning of your surgery with a sip of water  Beta blocker Med Class     Continue to take this heart medication on your normal schedule  If this is an oral medication and you take it in the morning, then you may take this medicine with a sip of water  Calcium Channel Blocker Med Class     Continue to take this heart medication on your normal schedule  If this is an oral medication and you take it in the morning, then you may take this medicine with a sip of water  Insulin Med Class     Pre-Surgery/Procedure Instructions for Adult Patients who Take Medicine for Diabetes or to Control their Blood Sugar     Day Before Surgery/Procedure  Use the directions based on the type of medicine you take for your diabetes  1  If you are having a procedure that does not require a bowel prep:  ? Pre-Mixed Insulin (Intermediate Acting: Humalog 75/25, Humulin 70/30  Novolog 70/30, Regular Insulin)  § Take ½ your regular dose the evening before your procedure  ? Rapid/Fast Acting Insulin/Long Acting Insulin (Humalog U200, NovoLog, Apidra, Lantus, Levemir, [place] Labella, David)  § Take your FULL regular dose the day before procedure  ? Oral Diabetic Medicines including Glipizide/Glimepiride/Glucotrol (sulfonylurea)  § Take your regular dose with dinner the evening before your procedure  2  If you are having a procedure (e g  Colonoscopy) that requires a bowel prep and you are allowed to have at least a clear liquid diet:  ? Pre-Mixed Insulin (Intermediate Acting: Humalog 75/25, Humulin 70/30, Novolog 70/30, Regular Insulin)  § Take ½ your regular dose the evening before your procedure  ? Rapid/Fast Acting Insulin (Humalog U200, NovoLog, Apidra, Fiasp)  § Take ½ your regular dose the evening before your procedure  ? Long Acting Insulin (Lantus, Levemir, [place] Labella)  § Take your FULL regular dose the day before procedure    ? Oral Glipizide/Glimepiride/Glucotrol (sulfonylurea)  § Take ½ your regular dose the evening before your procedure  ? Oral Diabetic Medicines that are NOT Glipizide/Glimepiride/Glucotrol  § Take your regular dose with dinner in the evening before your procedure      Day of Surgery/Procedure  · Long Acting Insulin (Lantus, Levemir, Nisha Peabody)  ? If you usually take your Long-Acting Insulin in the morning, take the full dose as scheduled  · With the exception of the morning Long-Acting Insulin noted above, DO NOT take ANY diabetic medicine on the day of your procedure unless you were instructed by the doctor who manages your diabetic medicines  · Continue to check your blood sugars  · If you have an insulin pump then consult with your Endocrinologist for instructions  · If you cannot see your Endocrinologist, on the day of the procedure set your insulin pump to your basal rate only  Please bring your insulin pump supplies to the hospital      This Educational material has been approved by the Patient Education Advisory Committee  Date prepared: 1/17/2018          Expiration date: 1/17/2019        Approval Number:                     Nitroglycerin Med Class     Continue to take your nitroglyerin as directed by your prescribing Physician and notify your Physician and Anesthesiologist if you have needed to increase the frequency or dosage  NonStatin Med Class     Continue to take this medication on your normal schedule  If this is an oral medication and you take it in the morning, then you may take this medicine with a sip of water  Opioid Med Class     Continue to take this medication on your normal schedule  If this is an oral medication and you take it in the morning, then you may take this medicine with a sip of water  Thyroxine Med Class     Continue to take this medication on your normal schedule    If this is an oral medication and you take it in the morning, then you may take this medicine with a sip of water  Pre op instructions reviewed with patient at hysterectomy class on 8/13  Meds bathing and hospital instructions received with understanding at this time

## 2019-08-16 DIAGNOSIS — J01.90 ACUTE SINUSITIS, RECURRENCE NOT SPECIFIED, UNSPECIFIED LOCATION: ICD-10-CM

## 2019-08-16 RX ORDER — TRAMADOL HYDROCHLORIDE 50 MG/1
50 TABLET ORAL EVERY 12 HOURS PRN
Qty: 60 TABLET | Refills: 0 | Status: CANCELLED | OUTPATIENT
Start: 2019-08-16

## 2019-08-19 DIAGNOSIS — J01.90 ACUTE SINUSITIS, RECURRENCE NOT SPECIFIED, UNSPECIFIED LOCATION: ICD-10-CM

## 2019-08-19 RX ORDER — TRAMADOL HYDROCHLORIDE 50 MG/1
50 TABLET ORAL EVERY 12 HOURS PRN
Qty: 60 TABLET | Refills: 0 | Status: CANCELLED | OUTPATIENT
Start: 2019-08-19

## 2019-08-19 RX ORDER — TRAMADOL HYDROCHLORIDE 50 MG/1
50 TABLET ORAL EVERY 12 HOURS PRN
Qty: 60 TABLET | Refills: 0 | Status: SHIPPED | OUTPATIENT
Start: 2019-08-19 | End: 2019-09-11 | Stop reason: SDUPTHER

## 2019-08-26 ENCOUNTER — APPOINTMENT (OUTPATIENT)
Dept: LAB | Facility: CLINIC | Age: 49
End: 2019-08-26
Payer: COMMERCIAL

## 2019-08-26 DIAGNOSIS — N93.8 DYSFUNCTIONAL UTERINE BLEEDING: ICD-10-CM

## 2019-08-26 DIAGNOSIS — N83.202 LEFT OVARIAN CYST: ICD-10-CM

## 2019-08-26 LAB
ABO GROUP BLD: NORMAL
ALBUMIN SERPL BCP-MCNC: 3.7 G/DL (ref 3.5–5)
ALP SERPL-CCNC: 48 U/L (ref 46–116)
ALT SERPL W P-5'-P-CCNC: 50 U/L (ref 12–78)
ANION GAP SERPL CALCULATED.3IONS-SCNC: 12 MMOL/L (ref 4–13)
AST SERPL W P-5'-P-CCNC: 60 U/L (ref 5–45)
BASOPHILS # BLD AUTO: 0.1 THOUSANDS/ΜL (ref 0–0.1)
BASOPHILS NFR BLD AUTO: 1 % (ref 0–1)
BILIRUB SERPL-MCNC: 0.3 MG/DL (ref 0.2–1)
BLD GP AB SCN SERPL QL: NEGATIVE
BUN SERPL-MCNC: 10 MG/DL (ref 5–25)
CALCIUM SERPL-MCNC: 8.6 MG/DL (ref 8.3–10.1)
CHLORIDE SERPL-SCNC: 101 MMOL/L (ref 100–108)
CO2 SERPL-SCNC: 23 MMOL/L (ref 21–32)
CREAT SERPL-MCNC: 0.92 MG/DL (ref 0.6–1.3)
EOSINOPHIL # BLD AUTO: 0.27 THOUSAND/ΜL (ref 0–0.61)
EOSINOPHIL NFR BLD AUTO: 4 % (ref 0–6)
ERYTHROCYTE [DISTWIDTH] IN BLOOD BY AUTOMATED COUNT: 14.6 % (ref 11.6–15.1)
EST. AVERAGE GLUCOSE BLD GHB EST-MCNC: 171 MG/DL
GFR SERPL CREATININE-BSD FRML MDRD: 73 ML/MIN/1.73SQ M
GLUCOSE P FAST SERPL-MCNC: 153 MG/DL (ref 65–99)
HBA1C MFR BLD: 7.6 % (ref 4.2–6.3)
HCT VFR BLD AUTO: 35.9 % (ref 34.8–46.1)
HGB BLD-MCNC: 11.1 G/DL (ref 11.5–15.4)
IMM GRANULOCYTES # BLD AUTO: 0.01 THOUSAND/UL (ref 0–0.2)
IMM GRANULOCYTES NFR BLD AUTO: 0 % (ref 0–2)
INR PPP: 1.08 (ref 0.84–1.19)
LYMPHOCYTES # BLD AUTO: 2.52 THOUSANDS/ΜL (ref 0.6–4.47)
LYMPHOCYTES NFR BLD AUTO: 33 % (ref 14–44)
MCH RBC QN AUTO: 26.2 PG (ref 26.8–34.3)
MCHC RBC AUTO-ENTMCNC: 30.9 G/DL (ref 31.4–37.4)
MCV RBC AUTO: 85 FL (ref 82–98)
MONOCYTES # BLD AUTO: 0.64 THOUSAND/ΜL (ref 0.17–1.22)
MONOCYTES NFR BLD AUTO: 9 % (ref 4–12)
NEUTROPHILS # BLD AUTO: 4 THOUSANDS/ΜL (ref 1.85–7.62)
NEUTS SEG NFR BLD AUTO: 53 % (ref 43–75)
NRBC BLD AUTO-RTO: 0 /100 WBCS
PLATELET # BLD AUTO: 293 THOUSANDS/UL (ref 149–390)
PMV BLD AUTO: 9.5 FL (ref 8.9–12.7)
POTASSIUM SERPL-SCNC: 4.1 MMOL/L (ref 3.5–5.3)
PROT SERPL-MCNC: 7.4 G/DL (ref 6.4–8.2)
PROTHROMBIN TIME: 13.4 SECONDS (ref 11.6–14.5)
RBC # BLD AUTO: 4.23 MILLION/UL (ref 3.81–5.12)
RH BLD: POSITIVE
SODIUM SERPL-SCNC: 136 MMOL/L (ref 136–145)
SPECIMEN EXPIRATION DATE: NORMAL
TSH SERPL DL<=0.05 MIU/L-ACNC: 6.15 UIU/ML (ref 0.36–3.74)
WBC # BLD AUTO: 7.54 THOUSAND/UL (ref 4.31–10.16)

## 2019-08-26 PROCEDURE — 84443 ASSAY THYROID STIM HORMONE: CPT

## 2019-08-26 PROCEDURE — 85025 COMPLETE CBC W/AUTO DIFF WBC: CPT

## 2019-08-26 PROCEDURE — 85610 PROTHROMBIN TIME: CPT

## 2019-08-26 PROCEDURE — 86850 RBC ANTIBODY SCREEN: CPT

## 2019-08-26 PROCEDURE — 86900 BLOOD TYPING SEROLOGIC ABO: CPT

## 2019-08-26 PROCEDURE — 83036 HEMOGLOBIN GLYCOSYLATED A1C: CPT

## 2019-08-26 PROCEDURE — 36415 COLL VENOUS BLD VENIPUNCTURE: CPT

## 2019-08-26 PROCEDURE — 86901 BLOOD TYPING SEROLOGIC RH(D): CPT

## 2019-08-26 PROCEDURE — 80053 COMPREHEN METABOLIC PANEL: CPT

## 2019-08-29 PROBLEM — R73.9 HYPERGLYCEMIA: Status: RESOLVED | Noted: 2017-10-03 | Resolved: 2019-08-29

## 2019-08-30 ENCOUNTER — OFFICE VISIT (OUTPATIENT)
Dept: FAMILY MEDICINE CLINIC | Facility: CLINIC | Age: 49
End: 2019-08-30
Payer: COMMERCIAL

## 2019-08-30 VITALS
TEMPERATURE: 99.1 F | BODY MASS INDEX: 32.65 KG/M2 | DIASTOLIC BLOOD PRESSURE: 78 MMHG | RESPIRATION RATE: 17 BRPM | HEART RATE: 80 BPM | OXYGEN SATURATION: 97 % | WEIGHT: 177.4 LBS | HEIGHT: 62 IN | SYSTOLIC BLOOD PRESSURE: 124 MMHG

## 2019-08-30 DIAGNOSIS — E11.8 TYPE 2 DIABETES MELLITUS WITH COMPLICATION, WITHOUT LONG-TERM CURRENT USE OF INSULIN (HCC): Primary | ICD-10-CM

## 2019-08-30 DIAGNOSIS — E78.2 COMBINED HYPERLIPIDEMIA: ICD-10-CM

## 2019-08-30 DIAGNOSIS — E03.8 HYPOTHYROIDISM DUE TO HASHIMOTO'S THYROIDITIS: ICD-10-CM

## 2019-08-30 DIAGNOSIS — E06.3 HYPOTHYROIDISM DUE TO HASHIMOTO'S THYROIDITIS: ICD-10-CM

## 2019-08-30 DIAGNOSIS — E03.9 HYPOTHYROIDISM, UNSPECIFIED TYPE: ICD-10-CM

## 2019-08-30 DIAGNOSIS — I21.4 NSTEMI (NON-ST ELEVATED MYOCARDIAL INFARCTION) (HCC): ICD-10-CM

## 2019-08-30 DIAGNOSIS — R74.01 ELEVATED AST (SGOT): ICD-10-CM

## 2019-08-30 PROCEDURE — 99214 OFFICE O/P EST MOD 30 MIN: CPT | Performed by: FAMILY MEDICINE

## 2019-08-30 RX ORDER — LEVOTHYROXINE SODIUM 0.1 MG/1
100 TABLET ORAL DAILY
Qty: 90 TABLET | Refills: 1 | Status: SHIPPED | OUTPATIENT
Start: 2019-08-30 | End: 2019-12-02 | Stop reason: SDUPTHER

## 2019-08-30 NOTE — ASSESSMENT & PLAN NOTE
Patient is stable without chest pain or shortness of breath    Continue regular follow-up with her cardiologist, Dr Quan Landeros

## 2019-08-30 NOTE — ASSESSMENT & PLAN NOTE
AST improving, now 60 (was 86)  Ultrasound from September 2017 showed fatty liver  Patient states that she rarely drinks alcohol any more  Will continue to monitor    If levels worsen, will refer to GI

## 2019-08-30 NOTE — ASSESSMENT & PLAN NOTE
Lab Results   Component Value Date    HGBA1C 7 6 (H) 08/26/2019       No results for input(s): POCGLU in the last 72 hours  Blood Sugar Average: Last 72 hrs:   A1c 7 6%, was 7 0%  Patient states that her diet is not as good as it should be in she has not been exercising very much  She is compliant with her metformin 500 b i d     Will increase to 1500 mg daily  If still suboptimal, consider adding Januvia

## 2019-08-30 NOTE — PROGRESS NOTES
[facility]      NAME: Jian Guzman  AGE: 52 y o  SEX: female  : 1970   MRN: [id number]    DATE: 2019  TIME: 9:01 AM    Assessment and Plan     Problem List Items Addressed This Visit     Combined hyperlipidemia     Doing well on fenofibrate 145  Will check labs prior to next appointment  Patient may need low-dose statin         Relevant Orders    Lipid Panel with Direct LDL reflex    Diabetes mellitus ([facility] [address] ) - Primary     Lab Results   Component Value Date    HGBA1C 7 6 (H) 2019       No results for input(s): POCGLU in the last 72 hours  Blood Sugar Average: Last 72 hrs:   A1c 7 6%, was 7 0%  Patient states that her diet is not as good as it should be in she has not been exercising very much  She is compliant with her metformin 500 b i d     Will increase to 1500 mg daily  If still suboptimal, consider adding Januvia  Relevant Medications    metFORMIN (GLUCOPHAGE) 500 mg tablet    Other Relevant Orders    Hemoglobin A1C    Microalbumin / creatinine urine ratio    Elevated AST (SGOT)     AST improving, now 60 (was 86)  Ultrasound from 2017 showed fatty liver  Patient states that she rarely drinks alcohol any more  Will continue to monitor  If levels worsen, will refer to GI         Relevant Orders    Comprehensive metabolic panel    Hypothyroid     TSH 6 146  Recommend increasing levothyroxine from 75 to 100 mcg daily         Relevant Medications    levothyroxine 100 mcg tablet    Other Relevant Orders    TSH, 3rd generation with Free T4 reflex    NSTEMI (non-ST elevated myocardial infarction) [facility])     Patient is stable without chest pain or shortness of breath    Continue regular follow-up with her cardiologist, Dr Wilberto Kenney                   Return to office in:  4 months, fasting blood work prior    Chief Complaint     Chief Complaint   Patient presents with    Follow-up     3M & BW Review       History of Present Illness     Patient presents for recheck chronic medical problems today  Overall she is feeling well  She will be having hysterectomy done on September 26th  Otherwise  She rarely drinks alcohol any more  Her diet is not as good as it should be and she has not been exercising often  She is compliant with all her medications without side effects  The following portions of the patient's history were reviewed and updated as appropriate: allergies, current medications, past family history, past medical history, past social history, past surgical history and problem list     Review of Systems   Review of Systems   Respiratory: Negative  Cardiovascular: Negative  Gastrointestinal: Negative  Genitourinary: Negative  Active Problem List     Patient Active Problem List   Diagnosis    Chest pain    Leukocytosis    Hypertension    Peptic ulcer disease    Fatty liver    Sinus tachycardia    Chronic sinusitis    Anemia    Anomalous coronary artery origin    Anxiety    Combined hyperlipidemia    Coronary vasospasm (HCC)    Diabetes mellitus (HCC)    Elevated AST (SGOT)    GERD without esophagitis    Ground glass opacity present on imaging of lung    Heart palpitations    Hypothyroid    NSTEMI (non-ST elevated myocardial infarction) (HCC)    Chest pain due to GERD    Dysfunctional uterine bleeding    Urinary tract infection without hematuria    Left ovarian cyst       Objective   /78 (BP Location: Left arm, Patient Position: Sitting, Cuff Size: Standard)   Pulse 80   Temp 99 1 °F (37 3 °C) (Tympanic)   Resp 17   Ht 5' 2" (1 575 m)   Wt 80 5 kg (177 lb 6 4 oz)   SpO2 97%   BMI 32 45 kg/m²     Physical Exam   Cardiovascular: Normal rate, regular rhythm, normal heart sounds and intact distal pulses  Carotids: no bruits  Ext: no edema   Pulmonary/Chest: Effort normal  No respiratory distress  She has no wheezes  She has no rales  Psychiatric: She has a normal mood and affect   Her behavior is normal  Thought content normal        Pertinent Laboratory/Diagnostic Studies:  Labs from August 26th    Current Medications     Current Outpatient Medications:     amLODIPine (NORVASC) 5 mg tablet, Take 1 tablet (5 mg total) by mouth daily, Disp: 90 tablet, Rfl: 1    aspirin (ECOTRIN LOW STRENGTH) 81 mg EC tablet, Take 81 mg by mouth daily, Disp: , Rfl:     dicyclomine (BENTYL) 20 mg tablet, Take 1 tablet (20 mg total) by mouth every 6 (six) hours as needed (every 6 hours), Disp: 60 tablet, Rfl: 0    escitalopram (LEXAPRO) 20 mg tablet, Take 1 tablet (20 mg total) by mouth daily, Disp: 90 tablet, Rfl: 1    fenofibrate (TRICOR) 145 mg tablet, Take 1 tablet (145 mg total) by mouth daily, Disp: 90 tablet, Rfl: 1    fluticasone (FLONASE) 50 mcg/act nasal spray, 2 sprays into each nostril 2 (two) times a day as needed  , Disp: , Rfl:     levothyroxine 100 mcg tablet, Take 1 tablet (100 mcg total) by mouth daily, Disp: 90 tablet, Rfl: 1    metFORMIN (GLUCOPHAGE) 500 mg tablet, 1 tab AM, 2 PM, Disp: 270 tablet, Rfl: 1    metoprolol succinate (TOPROL-XL) 25 mg 24 hr tablet, Take 1 tablet (25 mg total) by mouth 2 (two) times a day, Disp: 180 tablet, Rfl: 3    mupirocin (BACTROBAN) 2 % ointment, Apply topically 3 (three) times a day, Disp: 22 g, Rfl: 5    nitroglycerin (NITROSTAT) 0 4 mg SL tablet, Place 1 tablet under the tongue every 5 (five) minutes as needed for chest pain, Disp: 90 tablet, Rfl: 0    norgestrel-ethinyl estradiol (LOW-OGESTREL) 0 3 mg-30 mcg per tablet, Take 1 tablet by mouth daily, Disp: 90 tablet, Rfl: 0    omega-3-acid ethyl esters (LOVAZA) 1 g capsule, Take 4 capsules (4 g total) by mouth daily, Disp: 360 capsule, Rfl: 0    omeprazole (PriLOSEC) 20 mg delayed release capsule, TAKE ONE CAPSULE BY MOUTH EVERY DAY, Disp: 90 capsule, Rfl: 0    pantoprazole (PROTONIX) 40 mg tablet, Take 1 tablet (40 mg total) by mouth daily, Disp: 90 tablet, Rfl: 0    traMADol (ULTRAM) 50 mg tablet, Take 1 tablet (50 mg total) by mouth every 12 (twelve) hours as needed for moderate pain, Disp: 60 tablet, Rfl: 0    Health Maintenance     Health Maintenance   Topic Date Due    Pneumococcal Vaccine: Pediatrics (0 to 5 Years) and At-Risk Patients (6 to 59 Years) (1 of 1 - PPSV23) 08/13/1976    URINE MICROALBUMIN  08/13/1980    BMI: Followup Plan  08/13/1988    HEPATITIS B VACCINES (1 of 3 - Risk 3-dose series) 08/13/1989    DTaP,Tdap,and Td Vaccines (1 - Tdap) 08/13/1991    Diabetic Foot Exam  09/30/2019 (Originally 5/9/2019)    MAMMOGRAM  09/30/2019 (Originally 7/20/2016)    DM Eye Exam  09/30/2019 (Originally 8/29/2019)   [address]  10/28/2019 (Originally 7/1/2019)    HEMOGLOBIN A1C  02/26/2020    BMI: Adult  08/30/2020    PAP SMEAR  09/26/2023    Pneumococcal Vaccine: 65+ Years (1 of 2 - PCV13) 08/13/2035    Hepatitis C Screening  Completed     Immunization History   Administered Date(s) Administered    H1N1, All Formulations 11/05/2009    Influenza TIV (IM) 09/26/2013       DO Carlos Manuel Maddox [facility]

## 2019-08-30 NOTE — ASSESSMENT & PLAN NOTE
Doing well on fenofibrate 145  Will check labs prior to next appointment    Patient may need low-dose statin

## 2019-09-02 DIAGNOSIS — K21.9 GERD WITHOUT ESOPHAGITIS: Primary | ICD-10-CM

## 2019-09-03 ENCOUNTER — TRANSCRIBE ORDERS (OUTPATIENT)
Dept: RADIOLOGY | Facility: HOSPITAL | Age: 49
End: 2019-09-03

## 2019-09-03 ENCOUNTER — HOSPITAL ENCOUNTER (OUTPATIENT)
Dept: RADIOLOGY | Facility: HOSPITAL | Age: 49
Discharge: HOME | End: 2019-09-03
Attending: OBSTETRICS & GYNECOLOGY
Payer: COMMERCIAL

## 2019-09-03 DIAGNOSIS — N93.8 DYSFUNCTIONAL UTERINE BLEEDING: ICD-10-CM

## 2019-09-03 DIAGNOSIS — N83.202 LEFT OVARIAN CYST: ICD-10-CM

## 2019-09-03 PROCEDURE — 71046 X-RAY EXAM CHEST 2 VIEWS: CPT

## 2019-09-03 RX ORDER — OMEPRAZOLE 20 MG/1
CAPSULE, DELAYED RELEASE ORAL
Qty: 90 CAPSULE | Refills: 0 | Status: ON HOLD | OUTPATIENT
Start: 2019-09-03 | End: 2019-09-06

## 2019-09-05 ENCOUNTER — ANESTHESIA EVENT (OUTPATIENT)
Dept: PERIOP | Facility: HOSPITAL | Age: 49
End: 2019-09-05
Payer: COMMERCIAL

## 2019-09-05 NOTE — ANESTHESIA PREPROCEDURE EVALUATION
Review of Systems/Medical History  Patient summary reviewed  Chart reviewed  No history of anesthetic complications     Cardiovascular  Hyperlipidemia, Hypertension , Past MI (Coronary vasospasm) , CAD ,    Pulmonary  Smoker ex-smoker  ,        GI/Hepatic    PUD, GERD , Liver disease (Fatty liver) ,             Endo/Other  Diabetes (Prediabetes, Hgb A1c 7 6%) type 2 , History of thyroid disease (Hashimoto's) , hypothyroidism,      GYN       Hematology  Anemia ,     Musculoskeletal       Neurology   Psychology   Anxiety, Depression ,              Physical Exam    Airway    Mallampati score: II  TM Distance: >3 FB  Neck ROM: full     Dental       Cardiovascular      Pulmonary      Other Findings        Anesthesia Plan  ASA Score- 2     Anesthesia Type- general with ASA Monitors  Additional Monitors:   Airway Plan: ETT  Comment: TAP Block for post op pain if conversion to open procedure        Plan Factors-    Induction- intravenous  Postoperative Plan-     Informed Consent- Anesthetic plan and risks discussed with patient  I personally reviewed this patient with the CRNA  Discussed and agreed on the Anesthesia Plan with the CRNA  Nicole Holman

## 2019-09-06 ENCOUNTER — HOSPITAL ENCOUNTER (OUTPATIENT)
Facility: HOSPITAL | Age: 49
Discharge: HOME | End: 2019-09-07
Attending: OBSTETRICS & GYNECOLOGY | Admitting: OBSTETRICS & GYNECOLOGY
Payer: COMMERCIAL

## 2019-09-06 ENCOUNTER — ANESTHESIA (OUTPATIENT)
Dept: PERIOP | Facility: HOSPITAL | Age: 49
End: 2019-09-06
Payer: COMMERCIAL

## 2019-09-06 DIAGNOSIS — N93.8 DYSFUNCTIONAL UTERINE BLEEDING: ICD-10-CM

## 2019-09-06 DIAGNOSIS — N83.202 LEFT OVARIAN CYST: Primary | ICD-10-CM

## 2019-09-06 PROBLEM — R07.9 CHEST PAIN: Status: RESOLVED | Noted: 2017-10-03 | Resolved: 2019-09-06

## 2019-09-06 PROBLEM — N39.0 URINARY TRACT INFECTION WITHOUT HEMATURIA: Status: RESOLVED | Noted: 2019-06-14 | Resolved: 2019-09-06

## 2019-09-06 PROBLEM — J32.9 CHRONIC SINUSITIS: Chronic | Status: RESOLVED | Noted: 2017-10-03 | Resolved: 2019-09-06

## 2019-09-06 PROBLEM — D72.829 LEUKOCYTOSIS: Status: RESOLVED | Noted: 2017-10-03 | Resolved: 2019-09-06

## 2019-09-06 LAB
ERYTHROCYTE [DISTWIDTH] IN BLOOD BY AUTOMATED COUNT: 14.6 % (ref 11.6–15.1)
EXT PREGNANCY TEST URINE: NEGATIVE
EXT. CONTROL: NORMAL
GLUCOSE SERPL-MCNC: 121 MG/DL (ref 65–140)
GLUCOSE SERPL-MCNC: 141 MG/DL (ref 65–140)
GLUCOSE SERPL-MCNC: 143 MG/DL (ref 65–140)
GLUCOSE SERPL-MCNC: 305 MG/DL (ref 65–140)
HCT VFR BLD AUTO: 33.3 % (ref 34.8–46.1)
HGB BLD-MCNC: 10.6 G/DL (ref 11.5–15.4)
MCH RBC QN AUTO: 26.4 PG (ref 26.8–34.3)
MCHC RBC AUTO-ENTMCNC: 31.8 G/DL (ref 31.4–37.4)
MCV RBC AUTO: 83 FL (ref 82–98)
PLATELET # BLD AUTO: 277 THOUSANDS/UL (ref 149–390)
PMV BLD AUTO: 9.3 FL (ref 8.9–12.7)
RBC # BLD AUTO: 4.02 MILLION/UL (ref 3.81–5.12)
WBC # BLD AUTO: 13.69 THOUSAND/UL (ref 4.31–10.16)

## 2019-09-06 PROCEDURE — 88307 TISSUE EXAM BY PATHOLOGIST: CPT | Performed by: PATHOLOGY

## 2019-09-06 PROCEDURE — 82948 REAGENT STRIP/BLOOD GLUCOSE: CPT

## 2019-09-06 PROCEDURE — 58571 TLH W/T/O 250 G OR LESS: CPT | Performed by: OBSTETRICS & GYNECOLOGY

## 2019-09-06 PROCEDURE — 81025 URINE PREGNANCY TEST: CPT | Performed by: OBSTETRICS & GYNECOLOGY

## 2019-09-06 PROCEDURE — NC001 PR NO CHARGE: Performed by: OBSTETRICS & GYNECOLOGY

## 2019-09-06 PROCEDURE — 93005 ELECTROCARDIOGRAM TRACING: CPT

## 2019-09-06 PROCEDURE — 85027 COMPLETE CBC AUTOMATED: CPT | Performed by: STUDENT IN AN ORGANIZED HEALTH CARE EDUCATION/TRAINING PROGRAM

## 2019-09-06 RX ORDER — PANTOPRAZOLE SODIUM 20 MG/1
20 TABLET, DELAYED RELEASE ORAL
Status: DISCONTINUED | OUTPATIENT
Start: 2019-09-07 | End: 2019-09-06

## 2019-09-06 RX ORDER — ONDANSETRON 2 MG/ML
4 INJECTION INTRAMUSCULAR; INTRAVENOUS EVERY 6 HOURS PRN
Status: DISCONTINUED | OUTPATIENT
Start: 2019-09-06 | End: 2019-09-07 | Stop reason: HOSPADM

## 2019-09-06 RX ORDER — OXYCODONE HYDROCHLORIDE 5 MG/1
5 TABLET ORAL EVERY 4 HOURS PRN
Qty: 15 TABLET | Refills: 0 | Status: SHIPPED | OUTPATIENT
Start: 2019-09-06 | End: 2019-09-16

## 2019-09-06 RX ORDER — LEVOTHYROXINE SODIUM 0.1 MG/1
100 TABLET ORAL
Status: DISCONTINUED | OUTPATIENT
Start: 2019-09-07 | End: 2019-09-07 | Stop reason: HOSPADM

## 2019-09-06 RX ORDER — NEOSTIGMINE METHYLSULFATE 1 MG/ML
INJECTION INTRAVENOUS AS NEEDED
Status: DISCONTINUED | OUTPATIENT
Start: 2019-09-06 | End: 2019-09-06 | Stop reason: SURG

## 2019-09-06 RX ORDER — OXYCODONE HYDROCHLORIDE 5 MG/1
5 TABLET ORAL EVERY 4 HOURS PRN
Status: DISCONTINUED | OUTPATIENT
Start: 2019-09-06 | End: 2019-09-07 | Stop reason: HOSPADM

## 2019-09-06 RX ORDER — HEPARIN SODIUM 5000 [USP'U]/ML
5000 INJECTION, SOLUTION INTRAVENOUS; SUBCUTANEOUS
Status: COMPLETED | OUTPATIENT
Start: 2019-09-06 | End: 2019-09-06

## 2019-09-06 RX ORDER — HYDROMORPHONE HCL/PF 1 MG/ML
0.5 SYRINGE (ML) INJECTION
Status: DISCONTINUED | OUTPATIENT
Start: 2019-09-06 | End: 2019-09-06 | Stop reason: HOSPADM

## 2019-09-06 RX ORDER — SODIUM CHLORIDE, SODIUM LACTATE, POTASSIUM CHLORIDE, CALCIUM CHLORIDE 600; 310; 30; 20 MG/100ML; MG/100ML; MG/100ML; MG/100ML
125 INJECTION, SOLUTION INTRAVENOUS CONTINUOUS
Status: DISCONTINUED | OUTPATIENT
Start: 2019-09-06 | End: 2019-09-06 | Stop reason: SDUPTHER

## 2019-09-06 RX ORDER — DICYCLOMINE HCL 20 MG
20 TABLET ORAL EVERY 6 HOURS PRN
Status: DISCONTINUED | OUTPATIENT
Start: 2019-09-06 | End: 2019-09-07 | Stop reason: HOSPADM

## 2019-09-06 RX ORDER — ESCITALOPRAM OXALATE 20 MG/1
20 TABLET ORAL DAILY
Status: DISCONTINUED | OUTPATIENT
Start: 2019-09-07 | End: 2019-09-07 | Stop reason: HOSPADM

## 2019-09-06 RX ORDER — AMLODIPINE BESYLATE 5 MG/1
5 TABLET ORAL DAILY
Status: DISCONTINUED | OUTPATIENT
Start: 2019-09-07 | End: 2019-09-07 | Stop reason: HOSPADM

## 2019-09-06 RX ORDER — ACETAMINOPHEN 325 MG/1
975 TABLET ORAL ONCE
Status: COMPLETED | OUTPATIENT
Start: 2019-09-06 | End: 2019-09-06

## 2019-09-06 RX ORDER — SODIUM CHLORIDE 9 MG/ML
INJECTION, SOLUTION INTRAVENOUS AS NEEDED
Status: DISCONTINUED | OUTPATIENT
Start: 2019-09-06 | End: 2019-09-06 | Stop reason: HOSPADM

## 2019-09-06 RX ORDER — PANTOPRAZOLE SODIUM 40 MG/1
40 TABLET, DELAYED RELEASE ORAL DAILY
Status: DISCONTINUED | OUTPATIENT
Start: 2019-09-07 | End: 2019-09-07 | Stop reason: HOSPADM

## 2019-09-06 RX ORDER — METOPROLOL SUCCINATE 25 MG/1
25 TABLET, EXTENDED RELEASE ORAL DAILY
Status: DISCONTINUED | OUTPATIENT
Start: 2019-09-07 | End: 2019-09-06

## 2019-09-06 RX ORDER — ONDANSETRON 2 MG/ML
4 INJECTION INTRAMUSCULAR; INTRAVENOUS ONCE AS NEEDED
Status: DISCONTINUED | OUTPATIENT
Start: 2019-09-06 | End: 2019-09-06 | Stop reason: HOSPADM

## 2019-09-06 RX ORDER — MIDAZOLAM HYDROCHLORIDE 1 MG/ML
INJECTION INTRAMUSCULAR; INTRAVENOUS AS NEEDED
Status: DISCONTINUED | OUTPATIENT
Start: 2019-09-06 | End: 2019-09-06 | Stop reason: SURG

## 2019-09-06 RX ORDER — METOCLOPRAMIDE HYDROCHLORIDE 5 MG/ML
10 INJECTION INTRAMUSCULAR; INTRAVENOUS ONCE
Status: COMPLETED | OUTPATIENT
Start: 2019-09-06 | End: 2019-09-06

## 2019-09-06 RX ORDER — SUCCINYLCHOLINE/SOD CL,ISO/PF 100 MG/5ML
SYRINGE (ML) INTRAVENOUS AS NEEDED
Status: DISCONTINUED | OUTPATIENT
Start: 2019-09-06 | End: 2019-09-06 | Stop reason: SURG

## 2019-09-06 RX ORDER — TRAMADOL HYDROCHLORIDE 50 MG/1
50 TABLET ORAL EVERY 12 HOURS PRN
Status: DISCONTINUED | OUTPATIENT
Start: 2019-09-06 | End: 2019-09-06

## 2019-09-06 RX ORDER — IBUPROFEN 600 MG/1
600 TABLET ORAL EVERY 6 HOURS PRN
Status: DISCONTINUED | OUTPATIENT
Start: 2019-09-06 | End: 2019-09-07 | Stop reason: HOSPADM

## 2019-09-06 RX ORDER — ROCURONIUM BROMIDE 10 MG/ML
INJECTION, SOLUTION INTRAVENOUS AS NEEDED
Status: DISCONTINUED | OUTPATIENT
Start: 2019-09-06 | End: 2019-09-06 | Stop reason: SURG

## 2019-09-06 RX ORDER — CALCIUM CARBONATE 200(500)MG
1000 TABLET,CHEWABLE ORAL DAILY PRN
Status: DISCONTINUED | OUTPATIENT
Start: 2019-09-06 | End: 2019-09-07 | Stop reason: HOSPADM

## 2019-09-06 RX ORDER — PROPOFOL 10 MG/ML
INJECTION, EMULSION INTRAVENOUS AS NEEDED
Status: DISCONTINUED | OUTPATIENT
Start: 2019-09-06 | End: 2019-09-06 | Stop reason: SURG

## 2019-09-06 RX ORDER — CEFAZOLIN SODIUM 1 G/50ML
1000 SOLUTION INTRAVENOUS ONCE
Status: COMPLETED | OUTPATIENT
Start: 2019-09-06 | End: 2019-09-06

## 2019-09-06 RX ORDER — METOCLOPRAMIDE HYDROCHLORIDE 5 MG/ML
10 INJECTION INTRAMUSCULAR; INTRAVENOUS ONCE AS NEEDED
Status: DISCONTINUED | OUTPATIENT
Start: 2019-09-06 | End: 2019-09-06 | Stop reason: HOSPADM

## 2019-09-06 RX ORDER — DEXTROSE, SODIUM CHLORIDE, AND POTASSIUM CHLORIDE 5; .45; .15 G/100ML; G/100ML; G/100ML
125 INJECTION INTRAVENOUS CONTINUOUS
Status: DISCONTINUED | OUTPATIENT
Start: 2019-09-06 | End: 2019-09-06

## 2019-09-06 RX ORDER — SODIUM CHLORIDE, SODIUM LACTATE, POTASSIUM CHLORIDE, CALCIUM CHLORIDE 600; 310; 30; 20 MG/100ML; MG/100ML; MG/100ML; MG/100ML
125 INJECTION, SOLUTION INTRAVENOUS CONTINUOUS
Status: DISCONTINUED | OUTPATIENT
Start: 2019-09-06 | End: 2019-09-06

## 2019-09-06 RX ORDER — SODIUM CHLORIDE 9 MG/ML
INJECTION, SOLUTION INTRAVENOUS CONTINUOUS PRN
Status: DISCONTINUED | OUTPATIENT
Start: 2019-09-06 | End: 2019-09-06 | Stop reason: SURG

## 2019-09-06 RX ORDER — BUPIVACAINE HYDROCHLORIDE 2.5 MG/ML
INJECTION, SOLUTION INFILTRATION; PERINEURAL AS NEEDED
Status: DISCONTINUED | OUTPATIENT
Start: 2019-09-06 | End: 2019-09-06 | Stop reason: HOSPADM

## 2019-09-06 RX ORDER — HYDROMORPHONE HCL/PF 1 MG/ML
0.5 SYRINGE (ML) INJECTION EVERY 4 HOURS PRN
Status: DISCONTINUED | OUTPATIENT
Start: 2019-09-06 | End: 2019-09-07 | Stop reason: HOSPADM

## 2019-09-06 RX ORDER — HYDROMORPHONE HYDROCHLORIDE 2 MG/ML
INJECTION, SOLUTION INTRAMUSCULAR; INTRAVENOUS; SUBCUTANEOUS AS NEEDED
Status: DISCONTINUED | OUTPATIENT
Start: 2019-09-06 | End: 2019-09-06 | Stop reason: SURG

## 2019-09-06 RX ORDER — ACETAMINOPHEN 325 MG/1
975 TABLET ORAL EVERY 8 HOURS SCHEDULED
Status: DISCONTINUED | OUTPATIENT
Start: 2019-09-06 | End: 2019-09-07 | Stop reason: HOSPADM

## 2019-09-06 RX ORDER — GLYCOPYRROLATE 0.2 MG/ML
INJECTION INTRAMUSCULAR; INTRAVENOUS AS NEEDED
Status: DISCONTINUED | OUTPATIENT
Start: 2019-09-06 | End: 2019-09-06 | Stop reason: SURG

## 2019-09-06 RX ORDER — FENTANYL CITRATE/PF 50 MCG/ML
50 SYRINGE (ML) INJECTION
Status: DISCONTINUED | OUTPATIENT
Start: 2019-09-06 | End: 2019-09-06 | Stop reason: HOSPADM

## 2019-09-06 RX ORDER — ONDANSETRON 2 MG/ML
INJECTION INTRAMUSCULAR; INTRAVENOUS AS NEEDED
Status: DISCONTINUED | OUTPATIENT
Start: 2019-09-06 | End: 2019-09-06 | Stop reason: SURG

## 2019-09-06 RX ORDER — LIDOCAINE HYDROCHLORIDE 10 MG/ML
INJECTION, SOLUTION INFILTRATION; PERINEURAL AS NEEDED
Status: DISCONTINUED | OUTPATIENT
Start: 2019-09-06 | End: 2019-09-06 | Stop reason: SURG

## 2019-09-06 RX ORDER — FENTANYL CITRATE 50 UG/ML
INJECTION, SOLUTION INTRAMUSCULAR; INTRAVENOUS AS NEEDED
Status: DISCONTINUED | OUTPATIENT
Start: 2019-09-06 | End: 2019-09-06 | Stop reason: SURG

## 2019-09-06 RX ADMIN — MIDAZOLAM 2 MG: 1 INJECTION INTRAMUSCULAR; INTRAVENOUS at 08:04

## 2019-09-06 RX ADMIN — HEPARIN SODIUM 5000 UNITS: 5000 INJECTION INTRAVENOUS; SUBCUTANEOUS at 07:40

## 2019-09-06 RX ADMIN — HYDROMORPHONE HYDROCHLORIDE 0.5 MG: 1 INJECTION, SOLUTION INTRAMUSCULAR; INTRAVENOUS; SUBCUTANEOUS at 23:23

## 2019-09-06 RX ADMIN — ROCURONIUM BROMIDE 10 MG: 10 INJECTION INTRAVENOUS at 09:01

## 2019-09-06 RX ADMIN — ACETAMINOPHEN 975 MG: 325 TABLET ORAL at 16:03

## 2019-09-06 RX ADMIN — ACETAMINOPHEN 975 MG: 325 TABLET ORAL at 21:08

## 2019-09-06 RX ADMIN — GLYCOPYRROLATE 0.3 MG: 0.2 INJECTION, SOLUTION INTRAMUSCULAR; INTRAVENOUS at 11:27

## 2019-09-06 RX ADMIN — CEFAZOLIN SODIUM 2000 MG: 1 SOLUTION INTRAVENOUS at 08:14

## 2019-09-06 RX ADMIN — ROCURONIUM BROMIDE 20 MG: 10 INJECTION INTRAVENOUS at 10:12

## 2019-09-06 RX ADMIN — METOPROLOL TARTRATE 25 MG: 25 TABLET, FILM COATED ORAL at 21:08

## 2019-09-06 RX ADMIN — OXYCODONE HYDROCHLORIDE 5 MG: 5 TABLET ORAL at 21:08

## 2019-09-06 RX ADMIN — SODIUM CHLORIDE: 0.9 INJECTION, SOLUTION INTRAVENOUS at 09:53

## 2019-09-06 RX ADMIN — FENTANYL CITRATE 50 MCG: 50 INJECTION INTRAMUSCULAR; INTRAVENOUS at 11:56

## 2019-09-06 RX ADMIN — PHENYLEPHRINE HYDROCHLORIDE 200 MCG: 10 INJECTION INTRAVENOUS at 08:44

## 2019-09-06 RX ADMIN — DEXTROSE, SODIUM CHLORIDE, AND POTASSIUM CHLORIDE 125 ML/HR: 5; .45; .15 INJECTION INTRAVENOUS at 12:31

## 2019-09-06 RX ADMIN — FENTANYL CITRATE 50 MCG: 50 INJECTION INTRAMUSCULAR; INTRAVENOUS at 12:01

## 2019-09-06 RX ADMIN — FENTANYL CITRATE 50 MCG: 50 INJECTION, SOLUTION INTRAMUSCULAR; INTRAVENOUS at 08:14

## 2019-09-06 RX ADMIN — LIDOCAINE HYDROCHLORIDE 100 MG: 10 INJECTION, SOLUTION INFILTRATION; PERINEURAL at 08:17

## 2019-09-06 RX ADMIN — METOCLOPRAMIDE 10 MG: 5 INJECTION, SOLUTION INTRAMUSCULAR; INTRAVENOUS at 18:47

## 2019-09-06 RX ADMIN — ACETAMINOPHEN 975 MG: 325 TABLET ORAL at 06:42

## 2019-09-06 RX ADMIN — GLYCOPYRROLATE 0.3 MG: 0.2 INJECTION, SOLUTION INTRAMUSCULAR; INTRAVENOUS at 11:28

## 2019-09-06 RX ADMIN — PHENYLEPHRINE HYDROCHLORIDE 100 MCG: 10 INJECTION INTRAVENOUS at 08:21

## 2019-09-06 RX ADMIN — HYDROMORPHONE HYDROCHLORIDE 0.5 MG: 1 INJECTION, SOLUTION INTRAMUSCULAR; INTRAVENOUS; SUBCUTANEOUS at 12:23

## 2019-09-06 RX ADMIN — METFORMIN HYDROCHLORIDE 1000 MG: 500 TABLET ORAL at 17:39

## 2019-09-06 RX ADMIN — PHENYLEPHRINE HYDROCHLORIDE 100 MCG: 10 INJECTION INTRAVENOUS at 08:36

## 2019-09-06 RX ADMIN — TRAMADOL HYDROCHLORIDE 50 MG: 50 TABLET, FILM COATED ORAL at 14:19

## 2019-09-06 RX ADMIN — SODIUM CHLORIDE, SODIUM LACTATE, POTASSIUM CHLORIDE, AND CALCIUM CHLORIDE: .6; .31; .03; .02 INJECTION, SOLUTION INTRAVENOUS at 07:10

## 2019-09-06 RX ADMIN — ONDANSETRON 4 MG: 2 INJECTION INTRAMUSCULAR; INTRAVENOUS at 16:37

## 2019-09-06 RX ADMIN — PHENYLEPHRINE HYDROCHLORIDE 200 MCG: 10 INJECTION INTRAVENOUS at 08:33

## 2019-09-06 RX ADMIN — ROCURONIUM BROMIDE 10 MG: 10 INJECTION INTRAVENOUS at 08:17

## 2019-09-06 RX ADMIN — HYDROMORPHONE HYDROCHLORIDE 0.5 MG: 2 INJECTION, SOLUTION INTRAMUSCULAR; INTRAVENOUS; SUBCUTANEOUS at 10:20

## 2019-09-06 RX ADMIN — INSULIN HUMAN 5 UNITS: 100 INJECTION, SOLUTION PARENTERAL at 09:04

## 2019-09-06 RX ADMIN — HYDROMORPHONE HYDROCHLORIDE 0.5 MG: 1 INJECTION, SOLUTION INTRAMUSCULAR; INTRAVENOUS; SUBCUTANEOUS at 13:04

## 2019-09-06 RX ADMIN — NEOSTIGMINE METHYLSULFATE 1.5 MG: 1 INJECTION, SOLUTION INTRAVENOUS at 11:28

## 2019-09-06 RX ADMIN — METFORMIN HYDROCHLORIDE 500 MG: 500 TABLET ORAL at 15:16

## 2019-09-06 RX ADMIN — ONDANSETRON 4 MG: 2 INJECTION INTRAMUSCULAR; INTRAVENOUS at 18:36

## 2019-09-06 RX ADMIN — Medication 220 MG: at 08:17

## 2019-09-06 RX ADMIN — SODIUM CHLORIDE: 0.9 INJECTION, SOLUTION INTRAVENOUS at 08:21

## 2019-09-06 RX ADMIN — NEOSTIGMINE METHYLSULFATE 1.5 MG: 1 INJECTION, SOLUTION INTRAVENOUS at 11:27

## 2019-09-06 RX ADMIN — PHENYLEPHRINE HYDROCHLORIDE 100 MCG: 10 INJECTION INTRAVENOUS at 09:32

## 2019-09-06 RX ADMIN — FENTANYL CITRATE 50 MCG: 50 INJECTION, SOLUTION INTRAMUSCULAR; INTRAVENOUS at 08:17

## 2019-09-06 RX ADMIN — PHENYLEPHRINE HYDROCHLORIDE 100 MCG: 10 INJECTION INTRAVENOUS at 11:19

## 2019-09-06 RX ADMIN — PROPOFOL 200 MG: 10 INJECTION, EMULSION INTRAVENOUS at 08:17

## 2019-09-06 RX ADMIN — HYDROMORPHONE HYDROCHLORIDE 0.5 MG: 1 INJECTION, SOLUTION INTRAMUSCULAR; INTRAVENOUS; SUBCUTANEOUS at 12:36

## 2019-09-06 RX ADMIN — ONDANSETRON 4 MG: 2 INJECTION INTRAMUSCULAR; INTRAVENOUS at 11:22

## 2019-09-06 RX ADMIN — OXYCODONE HYDROCHLORIDE 5 MG: 5 TABLET ORAL at 16:03

## 2019-09-06 NOTE — PERIOPERATIVE NURSING NOTE
Resps unlabored  VSS  Medicating for pain   in to see patient  Tolerating ice chips  3 trochar sites intact with hystocryl  No vag drainage noted at this time

## 2019-09-06 NOTE — DISCHARGE INSTRUCTIONS
[facility]  Pardeep Palomo, and Tamia Waldrop   [phone]    Hysterectomy Discharge Instructions    WHAT YOU NEED TO KNOW:   A hysterectomy is surgery to remove your uterus  Your ovaries, fallopian tubes, cervix, or part of your vagina may also need to be removed  The organs and tissue that will be removed depends on your medical condition  After a hysterectomy, you will not be able to become pregnant  If your ovaries are removed, you will go through menopause if you have not already  DISCHARGE INSTRUCTIONS:   Contact your doctor at the number above if:   · You have a fever over 101o  · You have nausea or are vomiting that does not improve after a light meal    · Your pain is getting worse, even after you take medicine  · You feel pain or burning when you urinate, or you have trouble urinating  · You have pus or a foul-smelling odor coming from your vagina  · Your wound is red, swollen, or draining pus  · You see new or an increased amount of bright red blood coming from your vagina or your incisions  · You have questions or concerns about your condition or care  Seek care immediately:   · Your arm or leg feels warm, tender, and painful  It may look swollen and red  · You have increasing abdominal or pelvic pain  · You have heavy vaginal bleeding that fills 1 or more sanitary pads in 1 hour  Call 911 for any of the following:   · You feel lightheaded, short of breath, and have chest pain  · You cough up blood  Medicines: You may need any of the following:  · Prescription medicine may be given  You may receive a prescription for pain medication or be advised to use over the counter (OTC) pain medication such as acetaminophen (Tylenol) or ibuprofen (Advil, Motrin)  Ask your healthcare provider how to take this medicine safely  · NSAIDs , such as ibuprofen, help decrease swelling, pain, and fever   NSAIDs can cause stomach bleeding or kidney problems in certain people  If you take blood thinner medicine, always ask your healthcare provider if NSAIDs are safe for you  Always read the medicine label and follow directions  · Stool softeners help treat or prevent constipation  · Take your medicine as directed  Contact your healthcare provider if you think your medicine is not helping or if you have side effects  Tell him or her if you are allergic to any medicine  Keep a list of the medicines, vitamins, and herbs you take  Include the amounts, and when and why you take them  Bring the list or the pill bottles to follow-up visits  Carry your medicine list with you in case of an emergency  Activity:   · Rest as needed  Get up and move around as directed to help prevent blood clots  Start with short walks and slowly increase the distance every day  Limit the number of times you climb stairs to 2 times each day for the first week  Plan most of your daily activities on one level of your home  · Do not lift objects heavier than 10 pounds for 6 weeks  Avoid strenuous activity for 2 weeks  · Do not strain during bowel movements  High-fiber foods and extra liquids can help you prevent constipation  Examples of high-fiber foods are fruit and bran  Prune juice and water are good liquids to drink  · Do not have sex, use tampons, or douche for up to 8 weeks  You may shower as soon as the day after surgery  Tub baths can be taken starting 2 weeks after surgery  Do not go into pools or hot tubs until cleared by your doctor  · Ask when it is safe for you to drive  It is generally safe to drive after 2 weeks and when no longer taking prescription pain medication  · Ask when you may return to work and to other regular activities  Wound care: Care for your abdominal incisions as directed  Carefully wash around the wound with soap and water   If you have Hibiclens or medicated soap that you were instructed to use before surgery, you may use that to wash with for up to 2 days after surgery  If not, any mild non-scented, non-abrasive soap is safe  It is okay to let the soap and water run over your incision  Do not scrub your incision  Dry the area and put on new, clean bandages as directed  Change your bandages when they get wet or dirty  If you have strips of medical tape, let them fall off on their own  It may take 7 to 14 days for them to fall off  Check your incision every day for redness, swelling, or pus  Deep breathing: Take deep breaths and cough 10 times each hour  This will decrease your risk for a lung infection  Take a deep breath and hold it for as long as you can  Let the air out and then cough strongly  Deep breaths help open your airway  You may be given an incentive spirometer to help you take deep breaths  Put the plastic piece in your mouth and take a slow, deep breath, then let the air out and cough  Repeat these steps 10 times every hour  Get support: This surgery may be life-changing for you and your family  You will no longer be able to get pregnant  Sudden changes in the levels of your hormones may occur and cause mood swings and depression  You may feel angry, sad, or frightened, or cry frequently and unexpectedly  These feelings are normal  Talk to your healthcare provider about where you can get support  You can also ask if hormone replacement medicine is right for you  Follow up with your healthcare provider or gynecologist as directed: You may need to return to have stitches removed, and for other tests  Write down your questions so you remember to ask them during your visits  © 2017 [employer] Information is for End User's use only and may not be sold, redistributed or otherwise used for commercial purposes  All illustrations and images included in CareNotes® are the copyrighted property of A D A M [employer] Inc  or Isai[employer]ose[employer]    The above information is an educational aid only  It is not intended as medical advice for individual conditions or treatments  Talk to your doctor, nurse or pharmacist before following any medical regimen to see if it is safe and effective for you

## 2019-09-06 NOTE — DISCHARGE SUMMARY
Discharge Summary   Lalo Arenas MRN: [id number]  Unit/Bed#: OR POOL Encounter: [id number]      Admission Date: 9/6/2019     Discharge Date: 09/06/19    Attending: Valente Trevino MD    Principal Diagnosis: Dysfunctional uterine bleeding [N93 8]  Left ovarian cyst [N83 202]    Secondary Diagnosis: ***    Procedures: {delivery outcome:61408}    Hospital course: Patient ***    Lab Results:   Lab Results   Component Value Date    WBC 7 54 08/26/2019    HGB 11 1 (L) 08/26/2019    HCT 35 9 08/26/2019    MCV 85 08/26/2019     08/26/2019     Lab Results   Component Value Date    GLUCOSE 107 09/02/2015    CALCIUM 8 6 08/26/2019     09/02/2015    K 4 1 08/26/2019    CO2 23 08/26/2019     08/26/2019    BUN 10 08/26/2019    CREATININE 0 92 08/26/2019     Lab Results   Component Value Date/Time    POCGLU 141 (H) 09/06/2019 10:22 AM    POCGLU 305 (H) 09/06/2019 07:00 AM     Lab Results   Component Value Date    PTT 25 10/04/2017     Lab Results   Component Value Date    INR 1 08 08/26/2019    INR 1 11 10/04/2017    PROTIME 13 4 08/26/2019    PROTIME 14 3 70/34/0867       Complications: none apparent    Condition at discharge: stable     Discharge instructions/Information to patient and family:   See After Visit Summary for information provided to patient and family  Provisions for Follow-Up Care:  See After Visit Summary for information related to follow-up care and any pertinent home health orders  Disposition: See After Visit Summary for discharge disposition information  Planned Readmission: No    Discharge Medications: For a complete list of the patient's medications, please refer to her med rec      Margaret Cuellar DO  OB/GYN, PGY3  9/6/2019, 11:41 AM

## 2019-09-06 NOTE — OP NOTE
OPERATIVE REPORT  PATIENT NAME: Mallory Vo    :  1970  MRN: [id number]  Pt Location: BE OR ROOM 09    SURGERY DATE: 2019    Surgeon(s) and Role:     * Donna Mcneil MD - Primary     * Ebonie Valero DO - Lavinia Dsouza MD - Martinez Youngblood MD - Assisting    Preop Diagnosis:  Dysfunctional uterine bleeding [N93 8]  Left ovarian cyst [N83 202]    Post-Op Diagnosis Codes: * Dysfunctional uterine bleeding [N93 8]     * Left ovarian cyst [N83 202]    Procedure(s) (LRB):  TOTAL LAPAROSCOPIC HYSTERECTOMY, BILATERAL SALPINGECTOMY, LEFT OOPHORECTOMY (N/A)  CYSTOSCOPY (N/A)    Specimen(s):  ID Type Source Tests Collected by Time Destination   1 : with cervix,fallopian tubes, and left ovary Tissue Uterus TISSUE EXAM Donna Mcneil MD 2019 1018        Estimated Blood Loss:   Minimal    Drains:  Urethral Catheter Non-latex 16 Fr  (Active)   Number of days: 0       Anesthesia Type:   General    Operative Indications:  Dysfunctional uterine bleeding [N93 8]  Left ovarian cyst [G60886]  43-year-old with abnormal uterine bleeding despite oral contraceptive therapy, 3 cm left ovarian cyst   She has a surgical history notable for 2 previous  sections  She presented for definitive operative management  Operative Findings:  1  Exam under anesthesia revealed a grossly normal cervix  The uterus was markedly anteverted  2  On laparoscopy, there was no evidence of upper abdominal disease  There were minimal adhesions present from the omentum to the anterior abdominal wall and the omentum to the vesicouterine fold  3  Cystoscopy with bilateral jets and no evidence of bladder injury  Complications:   None    Procedure and Technique:  After informed consent was obtained, the patient was taken to the operating room where general endotracheal anesthesia was administered without incident    She was then prepped and draped in normal sterile fashion in the low dorsal lithotomy position  Exam under anesthesia was then performed with findings noted as above  A speculum was placed in patient's vagina  The anterior lip of the cervix was grasped with single-tooth tenaculum  The uterus sounded to 8 cm  The cervix was dilated with Thao Peek dilators  A 0 Vicryl stay suture was placed at 9:00 a m  On the cervix  Attempt was made to place the TRAN with koh cup but due to the location of the cervix, it was difficult to place the cup therefore, the cup was removed and a TRAN was placed  It was secured using the 0 Vicryl suture  A Gonsalez catheter was then placed  Attention was then turned to the abdomen  0 25% Marcaine was used to infiltrate the skin prior to placement of any trocar  The 1st insertion site was at the supraumbilical fold  A 5 mm skin incision was made using 11 blade scalpel and through this incision was placed a 5 mm trocar under direct visualization into the abdominal cavity  The abdomen is then insufflated to 15 mmHg using CO2 gas  Findings on laparoscopy are noted as above  Additional trocars then placed under direct visualization  A 5 mm trocar was placed in left lower quadrant and a 5 mm trocar was placed in the right lower quadrant  Adhesions between the omentum and the anterior abdominal wall as well as the omentum and the vesicouterine fold on the right side were taken down  The ureters were able to be identified coursing normally within the medial leaf of the broad ligaments bilaterally  The left fallopian tube was grasped and elevated  The infundibulopelvic ligament on left side was then cauterized and transected using EnSeal   The round ligament on left side was then cauterized and transected and the vesicovaginal space was opened  On the right side, the fallopian tube was grasped distally and elevated  The EnSeal bipolar cautery device was used to cauterize and transect the mesosalpinx    The fallopian tube was ultimately removed and placed in the cul-de-sac  The right round ligament was cauterized and transected  The retroperitoneal space was opened  The utero-ovarian ligament on the right side was then skeletonized, cauterized and transected  The vesicovaginal space was then developed further and it was noted that the bladder was densely adherent to the isthmus of the cervix  Using monopolar cautery and blunt dissection, a plane was identified on the isthmus of the cervix and the bladder was able to be taken down below the level of the external os of the cervix  The bilateral uterine vessels were then skeletonized  They were cauterized and transected with EnSeal   The cardinal ligaments were then cauterized and transected  A circumferential colpotomy was then performed using monopolar cautery  The uterus, cervix, left tube and ovary, right fallopian tube were then removed transvaginally  The vaginal apex was then closed using running 2-0 stratafix suture with good hemostasis noted  An additional stitch was necessary in the midline  There was a small amount of bleeding noted in the right parametrial which was controlled using a FloSeal application  The pelvis was irrigated  The pressure in the pelvis was brought down to 3 mmHg without evidence of bleeding from any site  The gas was then increased and the ports were then removed under direct visualization  The gas was removed  The skin was then closed at all sites using 4-0 Monocryl followed by histoacryl  The Gonsalez catheter was removed  The bladder was insufflated with 300 cc of normal saline using the suction irrigation device  The 5 mm laparoscoped was then used as a cystoscope with findings noted as above  The Gonsalez catheter was then replaced  The vagina was inspected  There was no evidence of bleeding identified  The patient was then awakened and transferred to the recovery room in stable condition  All instrument counts correct x2 for procedure  No complications  Estimated blood loss is less than 50 mL      I was present for the entire procedure    Patient Disposition:  PACU     SIGNATURE: Jeanine Cherry MD  DATE: September 6, 2019  TIME: 11:37 AM

## 2019-09-06 NOTE — H&P
Assessment/Plan:  59-year-old with a history of NSTEMI, negative cardiac catheterization with abnormal uterine bleeding while on oral contraceptive therapy, 3 1 cm left ovarian cyst   Preoperative endometrial biopsy on 2019 was benign  Cervical biopsy was also benign  Her performance status is 0   1  Plan for total laparoscopic hysterectomy, bilateral salpingectomy, left oophorectomy with possible exploratory laparotomy and all other indicated procedures  2  Will plan to admit for observation postoperatively due to her history of NSTEMI and obtain postoperative EKG  Patient ID: Mallory Vo is a 52 y o  female  59-year-old presents for surgery  No interval change in her medications or medical history        Review of Systems    Current Facility-Administered Medications   Medication Dose Route Frequency Provider Last Rate Last Dose    ceFAZolin (ANCEF) IVPB (premix) 1,000 mg  1,000 mg Intravenous Once Donna Mcneil MD        lactated ringers infusion  125 mL/hr Intravenous Continuous Lisa Gibson MD           Allergies   Allergen Reactions    Sulfa Antibiotics Shortness Of Breath    Erythromycin      Reaction Date: ;     Metronidazole        Past Medical History:   Diagnosis Date    [facility]     Borderline    DUB (dysfunctional uterine bleeding)     Elevated blood pressure reading     Last Assessed:  8/27/15    Fatty liver     Hashimoto's thyroiditis     Last Assessed:  14    History of stomach ulcers     Hypertension     Hypothyroidism     Irritable bowel syndrome     Last Assessed:  3/25/14    Liver disease     elevated enzymes    [facility]     2017    Ovarian cyst     left    Pre-diabetes        Past Surgical History:   Procedure Laterality Date    ANGIOPLASTY      CARDIAC CATHETERIZATION       SECTION      CHOLECYSTECTOMY      COLONOSCOPY      MT ESOPHAGOGASTRODUODENOSCOPY TRANSORAL DIAGNOSTIC N/A 2018    Procedure: ESOPHAGOGASTRODUODENOSCOPY (EGD); Surgeon: Camryn Poon MD;  Location: [facility]; Service: Gastroenterology    SINUS SURGERY         OB History        2    Para        Term                AB        Living   2       SAB        TAB        Ectopic        Multiple        Live Births                     Family History   Problem Relation Age of Onset    Pancreatic cancer Mother     Hypertension Father     Diabetes Father     Diabetes Maternal Grandmother     Diabetes Paternal Grandmother     Heart disease Paternal Grandmother        The following portions of the patient's history were reviewed and updated as appropriate: allergies, current medications, past family history, past medical history, past social history, past surgical history and problem list       Objective:    Blood pressure 132/63, pulse 83, temperature 98 7 °F (37 1 °C), temperature source Tympanic, resp  rate 18, height 5' 2" (1 575 m), weight 79 8 kg (176 lb), SpO2 97 %, not currently breastfeeding  Body mass index is 32 19 kg/m²  Physical Exam   Constitutional: She is oriented to person, place, and time  She appears well-developed and well-nourished  No distress  Cardiovascular: Normal rate, regular rhythm and normal heart sounds  Pulmonary/Chest: Effort normal and breath sounds normal    Neurological: She is alert and oriented to person, place, and time  Skin: She is not diaphoretic  Psychiatric: She has a normal mood and affect   Her behavior is normal  Judgment and thought content normal          No results found for:   Lab Results   Component Value Date    WBC 7 54 2019    HGB 11 1 (L) 2019    HCT 35 9 2019    MCV 85 2019     2019     Lab Results   Component Value Date     2015    K 4 1 2019     2019    CO2 23 2019    ANIONGAP 11 2015    BUN 10 2019    CREATININE 0 92 2019    GLUCOSE 107 2015 GLUF 153 (H) 08/26/2019    CALCIUM 8 6 08/26/2019    AST 60 (H) 08/26/2019    ALT 50 08/26/2019    ALKPHOS 48 08/26/2019    PROT 7 2 09/02/2015    BILITOT 0 38 09/02/2015    EGFR 73 08/26/2019

## 2019-09-06 NOTE — ANESTHESIA POSTPROCEDURE EVALUATION
Post-Op Assessment Note    CV Status:  Stable  Pain Score: 2    Pain management: adequate     Mental Status:  Alert, awake and sleepy   Hydration Status:  Euvolemic and stable   PONV Controlled:  None   Airway Patency:  Patent   Post Op Vitals Reviewed: Yes      Staff: CRNA           /60 (09/06/19 1145)    Temp      Pulse (!) 113(Simultaneous filing   User may not have seen previous data ) (09/06/19 1145)   Resp 22 (09/06/19 1145)    SpO2 97 % (09/06/19 1145)

## 2019-09-07 VITALS
HEIGHT: 62 IN | DIASTOLIC BLOOD PRESSURE: 66 MMHG | WEIGHT: 176 LBS | SYSTOLIC BLOOD PRESSURE: 114 MMHG | OXYGEN SATURATION: 95 % | RESPIRATION RATE: 16 BRPM | BODY MASS INDEX: 32.39 KG/M2 | HEART RATE: 78 BPM | TEMPERATURE: 98.8 F

## 2019-09-07 LAB
ANION GAP SERPL CALCULATED.3IONS-SCNC: 8 MMOL/L (ref 4–13)
BASOPHILS # BLD AUTO: 0.09 THOUSANDS/ΜL (ref 0–0.1)
BASOPHILS NFR BLD AUTO: 1 % (ref 0–1)
BUN SERPL-MCNC: 6 MG/DL (ref 5–25)
CALCIUM SERPL-MCNC: 8.6 MG/DL (ref 8.3–10.1)
CHLORIDE SERPL-SCNC: 106 MMOL/L (ref 100–108)
CO2 SERPL-SCNC: 26 MMOL/L (ref 21–32)
CREAT SERPL-MCNC: 0.81 MG/DL (ref 0.6–1.3)
EOSINOPHIL # BLD AUTO: 0.33 THOUSAND/ΜL (ref 0–0.61)
EOSINOPHIL NFR BLD AUTO: 3 % (ref 0–6)
ERYTHROCYTE [DISTWIDTH] IN BLOOD BY AUTOMATED COUNT: 14.5 % (ref 11.6–15.1)
GFR SERPL CREATININE-BSD FRML MDRD: 86 ML/MIN/1.73SQ M
GLUCOSE SERPL-MCNC: 123 MG/DL (ref 65–140)
GLUCOSE SERPL-MCNC: 137 MG/DL (ref 65–140)
GLUCOSE SERPL-MCNC: 140 MG/DL (ref 65–140)
HCT VFR BLD AUTO: 31.5 % (ref 34.8–46.1)
HGB BLD-MCNC: 10 G/DL (ref 11.5–15.4)
IMM GRANULOCYTES # BLD AUTO: 0.02 THOUSAND/UL (ref 0–0.2)
IMM GRANULOCYTES NFR BLD AUTO: 0 % (ref 0–2)
LYMPHOCYTES # BLD AUTO: 3.05 THOUSANDS/ΜL (ref 0.6–4.47)
LYMPHOCYTES NFR BLD AUTO: 31 % (ref 14–44)
MCH RBC QN AUTO: 26.2 PG (ref 26.8–34.3)
MCHC RBC AUTO-ENTMCNC: 31.7 G/DL (ref 31.4–37.4)
MCV RBC AUTO: 83 FL (ref 82–98)
MONOCYTES # BLD AUTO: 0.69 THOUSAND/ΜL (ref 0.17–1.22)
MONOCYTES NFR BLD AUTO: 7 % (ref 4–12)
NEUTROPHILS # BLD AUTO: 5.52 THOUSANDS/ΜL (ref 1.85–7.62)
NEUTS SEG NFR BLD AUTO: 58 % (ref 43–75)
NRBC BLD AUTO-RTO: 0 /100 WBCS
PLATELET # BLD AUTO: 252 THOUSANDS/UL (ref 149–390)
PMV BLD AUTO: 9.3 FL (ref 8.9–12.7)
POTASSIUM SERPL-SCNC: 4 MMOL/L (ref 3.5–5.3)
RBC # BLD AUTO: 3.81 MILLION/UL (ref 3.81–5.12)
SODIUM SERPL-SCNC: 140 MMOL/L (ref 136–145)
WBC # BLD AUTO: 9.7 THOUSAND/UL (ref 4.31–10.16)

## 2019-09-07 PROCEDURE — 80048 BASIC METABOLIC PNL TOTAL CA: CPT | Performed by: OBSTETRICS & GYNECOLOGY

## 2019-09-07 PROCEDURE — 82948 REAGENT STRIP/BLOOD GLUCOSE: CPT

## 2019-09-07 PROCEDURE — 85025 COMPLETE CBC W/AUTO DIFF WBC: CPT | Performed by: OBSTETRICS & GYNECOLOGY

## 2019-09-07 PROCEDURE — 99024 POSTOP FOLLOW-UP VISIT: CPT | Performed by: OBSTETRICS & GYNECOLOGY

## 2019-09-07 RX ORDER — IBUPROFEN 600 MG/1
600 TABLET ORAL EVERY 6 HOURS PRN
Qty: 30 TABLET | Refills: 0 | Status: SHIPPED | OUTPATIENT
Start: 2019-09-07 | End: 2020-03-04 | Stop reason: ALTCHOICE

## 2019-09-07 RX ORDER — ACETAMINOPHEN 325 MG/1
975 TABLET ORAL EVERY 8 HOURS SCHEDULED
Qty: 30 TABLET | Refills: 0 | Status: SHIPPED | OUTPATIENT
Start: 2019-09-07 | End: 2020-06-10 | Stop reason: ALTCHOICE

## 2019-09-07 RX ADMIN — OXYCODONE HYDROCHLORIDE 5 MG: 5 TABLET ORAL at 10:02

## 2019-09-07 RX ADMIN — ACETAMINOPHEN 975 MG: 325 TABLET ORAL at 06:03

## 2019-09-07 RX ADMIN — HYDROMORPHONE HYDROCHLORIDE 0.5 MG: 1 INJECTION, SOLUTION INTRAMUSCULAR; INTRAVENOUS; SUBCUTANEOUS at 04:42

## 2019-09-07 RX ADMIN — OXYCODONE HYDROCHLORIDE 5 MG: 5 TABLET ORAL at 06:06

## 2019-09-07 RX ADMIN — LEVOTHYROXINE SODIUM 100 MCG: 100 TABLET ORAL at 06:03

## 2019-09-07 RX ADMIN — ESCITALOPRAM OXALATE 20 MG: 20 TABLET ORAL at 09:57

## 2019-09-07 RX ADMIN — METFORMIN HYDROCHLORIDE 500 MG: 500 TABLET ORAL at 09:56

## 2019-09-07 RX ADMIN — AMLODIPINE BESYLATE 5 MG: 5 TABLET ORAL at 09:57

## 2019-09-07 RX ADMIN — PANTOPRAZOLE SODIUM 40 MG: 40 TABLET, DELAYED RELEASE ORAL at 06:03

## 2019-09-07 RX ADMIN — METOPROLOL TARTRATE 25 MG: 25 TABLET, FILM COATED ORAL at 09:56

## 2019-09-07 RX ADMIN — OXYCODONE HYDROCHLORIDE 5 MG: 5 TABLET ORAL at 02:10

## 2019-09-07 NOTE — PLAN OF CARE
Problem: PAIN - ADULT  Goal: Verbalizes/displays adequate comfort level or baseline comfort level  Description  Interventions:  - Encourage patient to monitor pain and request assistance  - Assess pain using appropriate pain scale  - Administer analgesics based on type and severity of pain and evaluate response  - Implement non-pharmacological measures as appropriate and evaluate response  - Consider cultural and social influences on pain and pain management  - Notify physician/advanced practitioner if interventions unsuccessful or patient reports new pain  Outcome: Progressing     Problem: INFECTION - ADULT  Goal: Absence or prevention of progression during hospitalization  Description  INTERVENTIONS:  - Assess and monitor for signs and symptoms of infection  - Monitor lab/diagnostic results  - Monitor all insertion sites, i e  indwelling lines, tubes, and drains  - Monitor endotracheal if appropriate and nasal secretions for changes in amount and color  - [place] appropriate cooling/warming therapies per order  - Administer medications as ordered  - Instruct and encourage patient and family to use good hand hygiene technique  - Identify and instruct in appropriate isolation precautions for identified infection/condition  Outcome: Progressing  Goal: Absence of fever/infection during neutropenic period  Description  INTERVENTIONS:  - Monitor WBC    Outcome: Progressing     Problem: SAFETY ADULT  Goal: Patient will remain free of falls  Description  INTERVENTIONS:  - Assess patient frequently for physical needs  -  Identify cognitive and physical deficits and behaviors that affect risk of falls    -  [place] fall precautions as indicated by assessment   - Educate patient/family on patient safety including physical limitations  - Instruct patient to call for assistance with activity based on assessment  - Modify environment to reduce risk of injury  - Consider OT/PT consult to assist with strengthening/mobility  Outcome: Progressing  Goal: Maintain or return to baseline ADL function  Description  INTERVENTIONS:  -  Assess patient's ability to carry out ADLs; assess patient's baseline for ADL function and identify physical deficits which impact ability to perform ADLs (bathing, care of mouth/teeth, toileting, grooming, dressing, etc )  - Assess/evaluate cause of self-care deficits   - Assess range of motion  - Assess patient's mobility; develop plan if impaired  - Assess patient's need for assistive devices and provide as appropriate  - Encourage maximum independence but intervene and supervise when necessary  - Involve family in performance of ADLs  - Assess for home care needs following discharge   - Consider OT consult to assist with ADL evaluation and planning for discharge  - Provide patient education as appropriate  Outcome: Progressing  Goal: Maintain or return mobility status to optimal level  Description  INTERVENTIONS:  - Assess patient's baseline mobility status (ambulation, transfers, stairs, etc )    - Identify cognitive and physical deficits and behaviors that affect mobility  - Identify mobility aids required to assist with transfers and/or ambulation (gait belt, sit-to-stand, lift, walker, cane, etc )  - [place] fall precautions as indicated by assessment  - Record patient progress and toleration of activity level on Mobility SBAR; progress patient to next Phase/Stage  - Instruct patient to call for assistance with activity based on assessment  - Consider rehabilitation consult to assist with strengthening/weightbearing, etc   Outcome: Progressing     Problem: DISCHARGE PLANNING  Goal: Discharge to home or other facility with appropriate resources  Description  INTERVENTIONS:  - Identify barriers to discharge w/patient and caregiver  - Arrange for needed discharge resources and transportation as appropriate  - Identify discharge learning needs (meds, wound care, etc )  - Arrange for interpretive services to assist at discharge as needed  - Refer to Case Management Department for coordinating discharge planning if the patient needs post-hospital services based on physician/advanced practitioner order or complex needs related to functional status, cognitive ability, or social support system  Outcome: Progressing     Problem: Knowledge Deficit  Goal: Patient/family/caregiver demonstrates understanding of disease process, treatment plan, medications, and discharge instructions  Description  Complete learning assessment and assess knowledge base    Interventions:  - Provide teaching at level of understanding  - Provide teaching via preferred learning methods  Outcome: Progressing     Problem: GENITOURINARY - ADULT  Goal: Maintains or returns to baseline urinary function  Description  INTERVENTIONS:  - Assess urinary function  - Encourage oral fluids to ensure adequate hydration if ordered  - Administer IV fluids as ordered to ensure adequate hydration  - Administer ordered medications as needed  - Offer frequent toileting  - Follow urinary retention protocol if ordered  Outcome: Progressing  Goal: Absence of urinary retention  Description  INTERVENTIONS:  - Assess patients ability to void and empty bladder  - Monitor I/O  - Bladder scan as needed  - Discuss with physician/AP medications to alleviate retention as needed  - Discuss catheterization for long term situations as appropriate  Outcome: Progressing  Goal: Urinary catheter remains patent  Description  INTERVENTIONS:  - Assess patency of urinary catheter  - If patient has a chronic caro, consider changing catheter if non-functioning  - Follow guidelines for intermittent irrigation of non-functioning urinary catheter  Outcome: Progressing     Problem: METABOLIC, FLUID AND ELECTROLYTES - ADULT  Goal: Electrolytes maintained within normal limits  Description  INTERVENTIONS:  - Monitor labs and assess patient for signs and symptoms of electrolyte imbalances  - Administer electrolyte replacement as ordered  - Monitor response to electrolyte replacements, including repeat lab results as appropriate  - Instruct patient on fluid and nutrition as appropriate  Outcome: Progressing  Goal: Fluid balance maintained  Description  INTERVENTIONS:  - Monitor labs   - Monitor I/O and WT  - Instruct patient on fluid and nutrition as appropriate  - Assess for signs & symptoms of volume excess or deficit  Outcome: Progressing  Goal: Glucose maintained within target range  Description  INTERVENTIONS:  - Monitor Blood Glucose as ordered  - Assess for signs and symptoms of hyperglycemia and hypoglycemia  - Administer ordered medications to maintain glucose within target range  - Assess nutritional intake and initiate nutrition service referral as needed  Outcome: Progressing

## 2019-09-07 NOTE — PROGRESS NOTES
Reviewed discharge  Instructions and medications with pt  Pt  Does take Protonix and Prilosec daily per order of Dr Edson Dominguez  She is going to question that at her next visit  Scripts given to pt  For Tylenol, Motrin and Roxycodone  D/c'd pt's  IV sites  Constance removed  Pt  Left via ambulatory with

## 2019-09-07 NOTE — PROGRESS NOTES
GYN-ONC Progress Note  [facility]  [id number]  PPHP [id number]/PPHP [phone]  9/7/2019  6:46 AM    ASSESS: 52year old with abnormal uterine bleeding and left ovarian cyst, s/p TLH, LSO, RS    PLAN:    1  AUB, Left Ovarian Cyst: s/p surgery, follow-up final pathology, follow-up voiding trial     2  Hx NSTEMI, Tachycardia: EKG sinus tachycardia postop, continue home metoprolol 25 mg BID    3  HTN: home amlodipine and metoprolol    4  Hashimotos: home levothyroxine    5  T2DM: metformin 500 mg AM, 1000 mg PM     Dispo: anticipate discharge later today    PPx: SCDs  FEN: regular diet, replete lytes prn, D5 1/2NS + 20KCL  Pain mgmnt: Tylenol, home tramandol, oxy, dilaudid BKT  Invasive lines:   Code status: full   ____________________    SUBJECTIVE  History of Present Illness:  Overnight: intermittent nausea overnight, improved with zofran and reglan  Pain: 7/10 discomfort at abdominal incision sites  Tolerating Oral Intake: yes   Voiding: adequate urine output overnight  Flatus: no  Bowel Movement: no  Ambulating: yes  Vaginal Bleeding: no  Pelvic Pain: no  Chest Pain: no  Shortness of Breath: no  Leg Pain/Discomfort: no    OBJECTIVE  Vitals  Temp:  [98 3 °F (36 8 °C)-100 7 °F (38 2 °C)] 98 3 °F (36 8 °C)  HR:  [] 88  Resp:  [10-22] 18  BP: (111-130)/(46-67) 111/67       Intake/Output Summary (Last 24 hours) at 9/7/2019 0646  Last data filed at 9/7/2019 0458  Gross per 24 hour   Intake 2620 ml   Output 3350 ml   Net -730 ml       Physical Exam:   Physical Exam   Constitutional: She appears well-developed and well-nourished  No distress  Cardiovascular: Normal rate, regular rhythm, normal heart sounds and intact distal pulses  Pulmonary/Chest: Effort normal and breath sounds normal  No stridor  No respiratory distress  Abdominal: Soft  Bowel sounds are normal  She exhibits no distension  There is no tenderness  Abdominal trocar incisions clean, dry, and intact    Skin: Skin is warm and dry   She is not diaphoretic  Psychiatric: She has a normal mood and affect   Her behavior is normal            Labs:   Recent Results (from the past 72 hour(s))   Fingerstick Glucose (POCT)    Collection Time: 09/06/19  7:00 AM   Result Value Ref Range    POC Glucose 305 (H) 65 - 140 mg/dl   Urine pregnancy test-Holding area only    Collection Time: 09/06/19  7:04 AM   Result Value Ref Range    EXT Preg Test, Ur Negative Negative    Control Valid Valid   Fingerstick Glucose (POCT)    Collection Time: 09/06/19 10:22 AM   Result Value Ref Range    POC Glucose 141 (H) 65 - 140 mg/dl   Fingerstick Glucose (POCT)    Collection Time: 09/06/19 11:45 AM   Result Value Ref Range    POC Glucose 143 (H) 65 - 140 mg/dl   Fingerstick Glucose (POCT)    Collection Time: 09/06/19  5:38 PM   Result Value Ref Range    POC Glucose 121 65 - 140 mg/dl   CBC    Collection Time: 09/06/19  5:43 PM   Result Value Ref Range    WBC 13 69 (H) 4 31 - 10 16 Thousand/uL    RBC 4 02 3 81 - 5 12 Million/uL    Hemoglobin 10 6 (L) 11 5 - 15 4 g/dL    Hematocrit 33 3 (L) 34 8 - 46 1 %    MCV 83 82 - 98 fL    MCH 26 4 (L) 26 8 - 34 3 pg    MCHC 31 8 31 4 - 37 4 g/dL    RDW 14 6 11 6 - 15 1 %    Platelets 738 535 - 080 Thousands/uL    MPV 9 3 8 9 - 12 7 fL   CBC and differential    Collection Time: 09/07/19  4:56 AM   Result Value Ref Range    WBC 9 70 4 31 - 10 16 Thousand/uL    RBC 3 81 3 81 - 5 12 Million/uL    Hemoglobin 10 0 (L) 11 5 - 15 4 g/dL    Hematocrit 31 5 (L) 34 8 - 46 1 %    MCV 83 82 - 98 fL    MCH 26 2 (L) 26 8 - 34 3 pg    MCHC 31 7 31 4 - 37 4 g/dL    RDW 14 5 11 6 - 15 1 %    MPV 9 3 8 9 - 12 7 fL    Platelets 750 538 - 342 Thousands/uL    nRBC 0 /100 WBCs    Neutrophils Relative 58 43 - 75 %    Immat GRANS % 0 0 - 2 %    Lymphocytes Relative 31 14 - 44 %    Monocytes Relative 7 4 - 12 %    Eosinophils Relative 3 0 - 6 %    Basophils Relative 1 0 - 1 %    Neutrophils Absolute 5 52 1 85 - 7 62 Thousands/µL    Immature Grans Absolute 0 02 0 00 - 0 20 Thousand/uL    Lymphocytes Absolute 3 05 0 60 - 4 47 Thousands/µL    Monocytes Absolute 0 69 0 17 - 1 22 Thousand/µL    Eosinophils Absolute 0 33 0 00 - 0 61 Thousand/µL    Basophils Absolute 0 09 0 00 - 0 10 Thousands/µL   Basic metabolic panel    Collection Time: 09/07/19  4:56 AM   Result Value Ref Range    Sodium 140 136 - 145 mmol/L    Potassium 4 0 3 5 - 5 3 mmol/L    Chloride 106 100 - 108 mmol/L    CO2 26 21 - 32 mmol/L    ANION GAP 8 4 - 13 mmol/L    BUN 6 5 - 25 mg/dL    Creatinine 0 81 0 60 - 1 30 mg/dL    Glucose 137 65 - 140 mg/dL    Calcium 8 6 8 3 - 10 1 mg/dL    eGFR 86 ml/min/1 73sq m       Meds:  Scheduled Meds:  Current Facility-Administered Medications:  acetaminophen 975 mg Oral Q8H [address] Arpita Taylor MD   amLODIPine 5 mg Oral Daily [facility], DO   calcium carbonate 1,000 mg Oral Daily PRN [facility], DO   dicyclomine 20 mg Oral Q6H PRN [facility], DO   escitalopram 20 mg Oral Daily [facility], DO   heparin 5000 units in sodium chloride 0 9% 500 mL irrigation  Irrigation Once Lily Willard MD   HYDROmorphone 0 5 mg Intravenous Q4H PRN Arpita Taylor MD   ibuprofen 600 mg Oral Q6H PRN Arpita Taylor MD   levothyroxine 100 mcg Oral Early Morning [facility], DO   metFORMIN 1,000 mg Oral Daily With Dinner [facility], DO   metFORMIN 500 mg Oral Daily With Breakfast [facility], DO   metoprolol tartrate 25 mg Oral Q12H [address] [facility], DO   ondansetron 4 mg Intravenous Q6H PRN Arpita Taylor MD   oxyCODONE 5 mg Oral Q4H PRN Arpita Taylor MD   pantoprazole 40 mg Oral Daily [facility], DO     Continuous Infusions:   PRN Meds: calcium carbonate    dicyclomine    HYDROmorphone    ibuprofen    ondansetron    oxyCODONE    Imaging Studies: I have personally reviewed pertinent reports  EKG, Pathology, Microbiology, and Other Studies: I have personally reviewed pertinent reports        __________________    Signature / Title: Fanny Robertson DO, Ob/Gyn, PGY-3  Date: 9/7/2019  Time: 6:46 AM

## 2019-09-09 ENCOUNTER — TELEPHONE (OUTPATIENT)
Dept: GYNECOLOGIC ONCOLOGY | Facility: CLINIC | Age: 49
End: 2019-09-09

## 2019-09-09 LAB
ATRIAL RATE: 104 BPM
P AXIS: 67 DEGREES
PR INTERVAL: 167 MS
QRS AXIS: 65 DEGREES
QRSD INTERVAL: 96 MS
QT INTERVAL: 371 MS
QTC INTERVAL: 488 MS
T WAVE AXIS: 65 DEGREES
VENTRICULAR RATE: 104 BPM

## 2019-09-09 PROCEDURE — 93010 ELECTROCARDIOGRAM REPORT: CPT | Performed by: INTERNAL MEDICINE

## 2019-09-11 DIAGNOSIS — J01.90 ACUTE SINUSITIS, RECURRENCE NOT SPECIFIED, UNSPECIFIED LOCATION: ICD-10-CM

## 2019-09-11 RX ORDER — TRAMADOL HYDROCHLORIDE 50 MG/1
TABLET ORAL
Qty: 90 TABLET | Refills: 0 | Status: SHIPPED | OUTPATIENT
Start: 2019-09-11 | End: 2019-10-07 | Stop reason: SDUPTHER

## 2019-09-12 ENCOUNTER — TELEPHONE (OUTPATIENT)
Dept: OTHER | Facility: HOSPITAL | Age: 49
End: 2019-09-12

## 2019-09-24 ENCOUNTER — TELEPHONE (OUTPATIENT)
Dept: OBGYN CLINIC | Facility: MEDICAL CENTER | Age: 49
End: 2019-09-24

## 2019-09-24 ENCOUNTER — OFFICE VISIT (OUTPATIENT)
Dept: GYNECOLOGIC ONCOLOGY | Facility: CLINIC | Age: 49
End: 2019-09-24

## 2019-09-24 VITALS
TEMPERATURE: 98.4 F | BODY MASS INDEX: 32.02 KG/M2 | DIASTOLIC BLOOD PRESSURE: 76 MMHG | HEIGHT: 62 IN | SYSTOLIC BLOOD PRESSURE: 130 MMHG | WEIGHT: 174 LBS | HEART RATE: 83 BPM

## 2019-09-24 DIAGNOSIS — N83.202 LEFT OVARIAN CYST: Primary | ICD-10-CM

## 2019-09-24 PROCEDURE — 99024 POSTOP FOLLOW-UP VISIT: CPT | Performed by: OBSTETRICS & GYNECOLOGY

## 2019-09-24 NOTE — LETTER
September 24, 2019     [address]    Patient: Brodie Alonzo   YOB: 1970   Date of Visit: 9/24/2019       Dear Dr Geoffrey Rivas: Thank you for referring Brodie Alonzo to me for evaluation  Below are my notes for this consultation  If you have questions, please do not hesitate to call me  I look forward to following your patient along with you  Sincerely,        Arelis Vo MD        CC: DO Arelis Scott MD  9/24/2019 10:59 AM  Sign at close encounter  Assessment/Plan:    Problem List Items Addressed This Visit        Endocrine    Left ovarian cyst - Primary     49-year-old status post total laparoscopic hysterectomy, bilateral salpingectomy, left oophorectomy for a mucinous cystadenoma of the left ovary  There was no evidence of malignancy or dysplasia  She is recovering well from surgery  Her performance status is 0   1  I discussed ongoing activity limitations  2  Return in 4 weeks for postoperative pelvic examination                  CHIEF COMPLAINT:  Postoperative evaluation           Patient ID: Brodie Alonzo is a 52 y o  female  49-year-old returns for postoperative discussion  She underwent total laparoscopic hysterectomy, bilateral salpingectomy and left oophorectomy for a left ovarian mass that was consistent with a mucinous cystadenoma  There is no other evidence of malignancy or dysplasia  The procedure was performed on 9/6/2019  She is ambulatory  She is voiding spontaneously  She has a vaginal discharge with occasional vaginal spotting  No fevers  She does not require narcotics        The following portions of the patient's history were reviewed and updated as appropriate: allergies, current medications, past family history, past medical history, past social history, past surgical history and problem list     Review of Systems      Current Outpatient Medications:     acetaminophen (TYLENOL) 325 mg tablet, Take 3 tablets (975 mg total) by mouth every 8 (eight) hours, Disp: 30 tablet, Rfl: 0    amLODIPine (NORVASC) 5 mg tablet, Take 1 tablet (5 mg total) by mouth daily, Disp: 90 tablet, Rfl: 1    aspirin (ECOTRIN LOW STRENGTH) 81 mg EC tablet, Take 81 mg by mouth daily, Disp: , Rfl:     dicyclomine (BENTYL) 20 mg tablet, Take 1 tablet (20 mg total) by mouth every 6 (six) hours as needed (every 6 hours), Disp: 60 tablet, Rfl: 0    escitalopram (LEXAPRO) 20 mg tablet, Take 1 tablet (20 mg total) by mouth daily, Disp: 90 tablet, Rfl: 1    fenofibrate (TRICOR) 145 mg tablet, Take 1 tablet (145 mg total) by mouth daily, Disp: 90 tablet, Rfl: 1    fluticasone (FLONASE) 50 mcg/act nasal spray, 2 sprays into each nostril 2 (two) times a day as needed  , Disp: , Rfl:     ibuprofen (MOTRIN) 600 mg tablet, Take 1 tablet (600 mg total) by mouth every 6 (six) hours as needed for mild pain, moderate pain, fever or headaches, Disp: 30 tablet, Rfl: 0    levothyroxine 100 mcg tablet, Take 1 tablet (100 mcg total) by mouth daily, Disp: 90 tablet, Rfl: 1    metFORMIN (GLUCOPHAGE) 500 mg tablet, 1 tab AM, 2 PM, Disp: 270 tablet, Rfl: 1    metoprolol succinate (TOPROL-XL) 25 mg 24 hr tablet, Take 1 tablet (25 mg total) by mouth 2 (two) times a day, Disp: 180 tablet, Rfl: 3    omega-3-acid ethyl esters (LOVAZA) 1 g capsule, Take 4 capsules (4 g total) by mouth daily, Disp: 360 capsule, Rfl: 0    omeprazole (PriLOSEC) 20 mg delayed release capsule, TAKE ONE CAPSULE BY MOUTH EVERY DAY, Disp: 90 capsule, Rfl: 0    traMADol (ULTRAM) 50 mg tablet, 1-1 1/2 tabs q 8 prn, Disp: 90 tablet, Rfl: 0    mupirocin (BACTROBAN) 2 % ointment, Apply topically 3 (three) times a day (Patient not taking: Reported on 9/24/2019), Disp: 22 g, Rfl: 5    nitroglycerin (NITROSTAT) 0 4 mg SL tablet, Place 1 tablet under the tongue every 5 (five) minutes as needed for chest pain (Patient not taking: Reported on 9/24/2019), Disp: 90 tablet, Rfl: 0    Objective:    Blood pressure 130/76, pulse 83, temperature 98 4 °F (36 9 °C), temperature source Oral, height 5' 2" (1 575 m), weight 78 9 kg (174 lb), not currently breastfeeding  Body mass index is 31 83 kg/m²  Body surface area is 1 8 meters squared  Physical Exam   Constitutional: She appears well-developed and well-nourished  Abdominal: Soft  She exhibits no distension  There is no tenderness  There is no rebound and no guarding     Skin:   Incisions are clean, dry, and intact

## 2019-09-24 NOTE — ASSESSMENT & PLAN NOTE
44-year-old status post total laparoscopic hysterectomy, bilateral salpingectomy, left oophorectomy for a mucinous cystadenoma of the left ovary  There was no evidence of malignancy or dysplasia  She is recovering well from surgery  Her performance status is 0   1  I discussed ongoing activity limitations  2   Return in 4 weeks for postoperative pelvic examination

## 2019-09-24 NOTE — PROGRESS NOTES
Assessment/Plan:    Problem List Items Addressed This Visit        Endocrine    Left ovarian cyst - Primary     51-year-old status post total laparoscopic hysterectomy, bilateral salpingectomy, left oophorectomy for a mucinous cystadenoma of the left ovary  There was no evidence of malignancy or dysplasia  She is recovering well from surgery  Her performance status is 0   1  I discussed ongoing activity limitations  2  Return in 4 weeks for postoperative pelvic examination                  CHIEF COMPLAINT:  Postoperative evaluation           Patient ID: Edgar Mcdowell is a 52 y o  female  51-year-old returns for postoperative discussion  She underwent total laparoscopic hysterectomy, bilateral salpingectomy and left oophorectomy for a left ovarian mass that was consistent with a mucinous cystadenoma  There is no other evidence of malignancy or dysplasia  The procedure was performed on 9/6/2019  She is ambulatory  She is voiding spontaneously  She has a vaginal discharge with occasional vaginal spotting  No fevers  She does not require narcotics        The following portions of the patient's history were reviewed and updated as appropriate: allergies, current medications, past family history, past medical history, past social history, past surgical history and problem list     Review of Systems      Current Outpatient Medications:     acetaminophen (TYLENOL) 325 mg tablet, Take 3 tablets (975 mg total) by mouth every 8 (eight) hours, Disp: 30 tablet, Rfl: 0    amLODIPine (NORVASC) 5 mg tablet, Take 1 tablet (5 mg total) by mouth daily, Disp: 90 tablet, Rfl: 1    aspirin (ECOTRIN LOW STRENGTH) 81 mg EC tablet, Take 81 mg by mouth daily, Disp: , Rfl:     dicyclomine (BENTYL) 20 mg tablet, Take 1 tablet (20 mg total) by mouth every 6 (six) hours as needed (every 6 hours), Disp: 60 tablet, Rfl: 0    escitalopram (LEXAPRO) 20 mg tablet, Take 1 tablet (20 mg total) by mouth daily, Disp: 90 tablet, Rfl: 1    fenofibrate (TRICOR) 145 mg tablet, Take 1 tablet (145 mg total) by mouth daily, Disp: 90 tablet, Rfl: 1    fluticasone (FLONASE) 50 mcg/act nasal spray, 2 sprays into each nostril 2 (two) times a day as needed  , Disp: , Rfl:     ibuprofen (MOTRIN) 600 mg tablet, Take 1 tablet (600 mg total) by mouth every 6 (six) hours as needed for mild pain, moderate pain, fever or headaches, Disp: 30 tablet, Rfl: 0    levothyroxine 100 mcg tablet, Take 1 tablet (100 mcg total) by mouth daily, Disp: 90 tablet, Rfl: 1    metFORMIN (GLUCOPHAGE) 500 mg tablet, 1 tab AM, 2 PM, Disp: 270 tablet, Rfl: 1    metoprolol succinate (TOPROL-XL) 25 mg 24 hr tablet, Take 1 tablet (25 mg total) by mouth 2 (two) times a day, Disp: 180 tablet, Rfl: 3    omega-3-acid ethyl esters (LOVAZA) 1 g capsule, Take 4 capsules (4 g total) by mouth daily, Disp: 360 capsule, Rfl: 0    omeprazole (PriLOSEC) 20 mg delayed release capsule, TAKE ONE CAPSULE BY MOUTH EVERY DAY, Disp: 90 capsule, Rfl: 0    traMADol (ULTRAM) 50 mg tablet, 1-1 1/2 tabs q 8 prn, Disp: 90 tablet, Rfl: 0    mupirocin (BACTROBAN) 2 % ointment, Apply topically 3 (three) times a day (Patient not taking: Reported on 9/24/2019), Disp: 22 g, Rfl: 5    nitroglycerin (NITROSTAT) 0 4 mg SL tablet, Place 1 tablet under the tongue every 5 (five) minutes as needed for chest pain (Patient not taking: Reported on 9/24/2019), Disp: 90 tablet, Rfl: 0    Objective:    Blood pressure 130/76, pulse 83, temperature 98 4 °F (36 9 °C), temperature source Oral, height 5' 2" (1 575 m), weight 78 9 kg (174 lb), not currently breastfeeding  Body mass index is 31 83 kg/m²  Body surface area is 1 8 meters squared  Physical Exam   Constitutional: She appears well-developed and well-nourished  Abdominal: Soft  She exhibits no distension  There is no tenderness  There is no rebound and no guarding     Skin:   Incisions are clean, dry, and intact

## 2019-10-07 DIAGNOSIS — J01.90 ACUTE SINUSITIS, RECURRENCE NOT SPECIFIED, UNSPECIFIED LOCATION: ICD-10-CM

## 2019-10-07 RX ORDER — TRAMADOL HYDROCHLORIDE 50 MG/1
50 TABLET ORAL EVERY 8 HOURS PRN
Qty: 60 TABLET | Refills: 0 | Status: SHIPPED | OUTPATIENT
Start: 2019-10-07 | End: 2019-11-04 | Stop reason: SDUPTHER

## 2019-10-07 RX ORDER — TRAMADOL HYDROCHLORIDE 50 MG/1
TABLET ORAL
Qty: 90 TABLET | Refills: 0 | Status: CANCELLED | OUTPATIENT
Start: 2019-10-07

## 2019-10-12 DIAGNOSIS — K58.9 IRRITABLE BOWEL SYNDROME WITHOUT DIARRHEA: ICD-10-CM

## 2019-10-12 RX ORDER — DICYCLOMINE HCL 20 MG
20 TABLET ORAL EVERY 6 HOURS PRN
Qty: 60 TABLET | Refills: 0 | Status: SHIPPED | OUTPATIENT
Start: 2019-10-12 | End: 2019-12-27 | Stop reason: SDUPTHER

## 2019-10-22 ENCOUNTER — OFFICE VISIT (OUTPATIENT)
Dept: GYNECOLOGIC ONCOLOGY | Facility: CLINIC | Age: 49
End: 2019-10-22

## 2019-10-22 VITALS
SYSTOLIC BLOOD PRESSURE: 124 MMHG | TEMPERATURE: 98.4 F | BODY MASS INDEX: 32.65 KG/M2 | DIASTOLIC BLOOD PRESSURE: 82 MMHG | HEIGHT: 62 IN | HEART RATE: 74 BPM | WEIGHT: 177.4 LBS

## 2019-10-22 DIAGNOSIS — N95.1 MENOPAUSAL SYMPTOMS: Primary | ICD-10-CM

## 2019-10-22 DIAGNOSIS — Z90.710 S/P LAPAROSCOPIC HYSTERECTOMY: ICD-10-CM

## 2019-10-22 PROCEDURE — 99024 POSTOP FOLLOW-UP VISIT: CPT | Performed by: OBSTETRICS & GYNECOLOGY

## 2019-10-22 NOTE — ASSESSMENT & PLAN NOTE
Hot flashes, some emotional lability  I discussed some of the risks and benefits of hormone replacement therapy  She will hold off for now

## 2019-10-22 NOTE — PROGRESS NOTES
Assessment/Plan:    Problem List Items Addressed This Visit        Other    Menopausal symptoms - Primary     Hot flashes, some emotional lability  I discussed some of the risks and benefits of hormone replacement therapy  She will hold off for now  S/P laparoscopic hysterectomy     52year-old status post total laparoscopic hysterectomy, bilateral salpingectomy, left oophorectomy on 9/6/2019  She is recovering well from surgery  She has delayed healing at the vaginal apex  Performance status is 0   1  Return in 3 weeks for vaginal apex examination  2  I discussed ongoing activity limitations  CHIEF COMPLAINT:  Postoperative evaluation, hot flashes      Problem:  Cancer Staging  No matching staging information was found for the patient  Previous therapy:   No history exists  Patient ID: Sunshine Lane is a 52 y o  female  26-year-old returns for postoperative evaluation  She has vaginal spotting with activity  She does not require narcotic pain medication  She is ambulatory  She is tolerating regular diet  She has normal bowel and bladder function  Occasional crampy pains  She has hot flashes and some emotional lability  No other interval change in her medications or medical history since her last visit        The following portions of the patient's history were reviewed and updated as appropriate: allergies, current medications, past family history, past medical history, past social history, past surgical history and problem list     Review of Systems    Current Outpatient Medications   Medication Sig Dispense Refill    acetaminophen (TYLENOL) 325 mg tablet Take 3 tablets (975 mg total) by mouth every 8 (eight) hours 30 tablet 0    amLODIPine (NORVASC) 5 mg tablet Take 1 tablet (5 mg total) by mouth daily 90 tablet 1    aspirin (ECOTRIN LOW STRENGTH) 81 mg EC tablet Take 81 mg by mouth daily      dicyclomine (BENTYL) 20 mg tablet Take 1 tablet (20 mg total) by mouth every 6 (six) hours as needed (every 6 hours) 60 tablet 0    escitalopram (LEXAPRO) 20 mg tablet Take 1 tablet (20 mg total) by mouth daily 90 tablet 1    fenofibrate (TRICOR) 145 mg tablet Take 1 tablet (145 mg total) by mouth daily 90 tablet 1    fluticasone (FLONASE) 50 mcg/act nasal spray 2 sprays into each nostril 2 (two) times a day as needed        ibuprofen (MOTRIN) 600 mg tablet Take 1 tablet (600 mg total) by mouth every 6 (six) hours as needed for mild pain, moderate pain, fever or headaches 30 tablet 0    levothyroxine 100 mcg tablet Take 1 tablet (100 mcg total) by mouth daily 90 tablet 1    metFORMIN (GLUCOPHAGE) 500 mg tablet 1 tab AM, 2  tablet 1    metoprolol succinate (TOPROL-XL) 25 mg 24 hr tablet Take 1 tablet (25 mg total) by mouth 2 (two) times a day 180 tablet 3    mupirocin (BACTROBAN) 2 % ointment Apply topically 3 (three) times a day (Patient not taking: Reported on 9/24/2019) 22 g 5    nitroglycerin (NITROSTAT) 0 4 mg SL tablet Place 1 tablet under the tongue every 5 (five) minutes as needed for chest pain (Patient not taking: Reported on 9/24/2019) 90 tablet 0    omega-3-acid ethyl esters (LOVAZA) 1 g capsule Take 4 capsules (4 g total) by mouth daily 360 capsule 0    omeprazole (PriLOSEC) 20 mg delayed release capsule TAKE ONE CAPSULE BY MOUTH EVERY DAY 90 capsule 0    traMADol (ULTRAM) 50 mg tablet Take 1 tablet (50 mg total) by mouth every 8 (eight) hours as needed for moderate pain 60 tablet 0     No current facility-administered medications for this visit  Objective:    Blood pressure 124/82, pulse 74, temperature 98 4 °F (36 9 °C), temperature source Oral, height 5' 2" (1 575 m), weight 80 5 kg (177 lb 6 4 oz), not currently breastfeeding  Body mass index is 32 45 kg/m²  Body surface area is 1 82 meters squared  Physical Exam   Constitutional: She appears well-developed and well-nourished  Abdominal: Soft  She exhibits no distension   There is no tenderness  There is no rebound and no guarding  Genitourinary:   Genitourinary Comments: Vaginal apex healing well  Mobile  There was a small amount of dark blood present at the apex  No active bleeding  No obvious granulation tissue present     Skin:   Incisions clean, dry, intact

## 2019-10-22 NOTE — ASSESSMENT & PLAN NOTE
70-year-old status post total laparoscopic hysterectomy, bilateral salpingectomy, left oophorectomy on 9/6/2019  She is recovering well from surgery  She has delayed healing at the vaginal apex  Performance status is 0   1  Return in 3 weeks for vaginal apex examination  2  I discussed ongoing activity limitations

## 2019-11-01 DIAGNOSIS — N95.1 SYMPTOMATIC MENOPAUSAL OR FEMALE CLIMACTERIC STATES: Primary | ICD-10-CM

## 2019-11-04 DIAGNOSIS — R07.89 OTHER CHEST PAIN: ICD-10-CM

## 2019-11-04 DIAGNOSIS — J01.90 ACUTE SINUSITIS, RECURRENCE NOT SPECIFIED, UNSPECIFIED LOCATION: ICD-10-CM

## 2019-11-04 DIAGNOSIS — R07.9 CHEST PAIN: ICD-10-CM

## 2019-11-04 RX ORDER — TRAMADOL HYDROCHLORIDE 50 MG/1
50 TABLET ORAL EVERY 8 HOURS PRN
Qty: 60 TABLET | Refills: 0 | Status: CANCELLED | OUTPATIENT
Start: 2019-11-04

## 2019-11-04 RX ORDER — AMLODIPINE BESYLATE 5 MG/1
5 TABLET ORAL DAILY
Qty: 90 TABLET | Refills: 2 | Status: SHIPPED | OUTPATIENT
Start: 2019-11-04 | End: 2019-12-02 | Stop reason: SDUPTHER

## 2019-11-04 RX ORDER — METOPROLOL SUCCINATE 25 MG/1
25 TABLET, EXTENDED RELEASE ORAL 2 TIMES DAILY
Qty: 180 TABLET | Refills: 2 | Status: SHIPPED | OUTPATIENT
Start: 2019-11-04 | End: 2020-01-30 | Stop reason: SDUPTHER

## 2019-11-04 RX ORDER — TRAMADOL HYDROCHLORIDE 50 MG/1
50 TABLET ORAL EVERY 8 HOURS PRN
Qty: 60 TABLET | Refills: 0 | Status: SHIPPED | OUTPATIENT
Start: 2019-11-04 | End: 2019-12-02 | Stop reason: SDUPTHER

## 2019-11-11 ENCOUNTER — TELEPHONE (OUTPATIENT)
Dept: FAMILY MEDICINE CLINIC | Facility: CLINIC | Age: 49
End: 2019-11-11

## 2019-11-11 DIAGNOSIS — F32.A DEPRESSION, UNSPECIFIED DEPRESSION TYPE: ICD-10-CM

## 2019-11-11 RX ORDER — ESCITALOPRAM OXALATE 20 MG/1
20 TABLET ORAL DAILY
Qty: 90 TABLET | Refills: 1 | Status: SHIPPED | OUTPATIENT
Start: 2019-11-11 | End: 2020-01-06 | Stop reason: SDUPTHER

## 2019-11-11 NOTE — TELEPHONE ENCOUNTER
[facility] came thru [employer]    Pantoprazole sod dr 40 mg tab  Take 1 tab daily   #90  [facility]    Escitalopram 20 mg tab  Take 1 tab daily  #90  [facility]

## 2019-11-14 ENCOUNTER — OFFICE VISIT (OUTPATIENT)
Dept: GYNECOLOGIC ONCOLOGY | Facility: CLINIC | Age: 49
End: 2019-11-14

## 2019-11-14 VITALS
SYSTOLIC BLOOD PRESSURE: 138 MMHG | HEIGHT: 62 IN | HEART RATE: 74 BPM | DIASTOLIC BLOOD PRESSURE: 90 MMHG | TEMPERATURE: 97.7 F | BODY MASS INDEX: 32.57 KG/M2 | WEIGHT: 177 LBS

## 2019-11-14 DIAGNOSIS — Z90.710 S/P LAPAROSCOPIC HYSTERECTOMY: Primary | ICD-10-CM

## 2019-11-14 PROCEDURE — 99024 POSTOP FOLLOW-UP VISIT: CPT | Performed by: OBSTETRICS & GYNECOLOGY

## 2019-11-14 NOTE — LETTER
November 14, 2019     Patient: Raman Macias   YOB: 1970   Date of Visit: 11/14/2019       To Whom it May Concern:    Raman Macias is under my professional care  She was seen in my office on 11/14/2019  She may return to work on 12/01/19 with no restrictions       If you have any questions or concerns, please don't hesitate to call           Sincerely,          Wily Yo MD

## 2019-11-14 NOTE — PROGRESS NOTES
Assessment/Plan:    Problem List Items Addressed This Visit        Other    S/P laparoscopic hysterectomy - Primary       42-year-old status post total laparoscopic hysterectomy, bilateral salpingectomy, left oophorectomy for benign disease  She is recovering well from surgery  Performance status is 0     1  I discussed ongoing activity limitations  2  She will continue follow-up with her gynecologist as scheduled  She was encouraged to call the office with any additional questions or concerns  CHIEF COMPLAINT:  Postoperative evaluation           Patient ID: Svitlana Lehman is a 52 y o  female   42-year-old returns for postoperative evaluation  She has no complaints  No interval change in her medications or medical history since her last visit  She is able to perform her activities of daily living without difficulty        The following portions of the patient's history were reviewed and updated as appropriate: allergies, current medications, past family history, past medical history, past social history, past surgical history and problem list     Review of Systems      Current Outpatient Medications:     acetaminophen (TYLENOL) 325 mg tablet, Take 3 tablets (975 mg total) by mouth every 8 (eight) hours, Disp: 30 tablet, Rfl: 0    amLODIPine (NORVASC) 5 mg tablet, Take 1 tablet (5 mg total) by mouth daily, Disp: 90 tablet, Rfl: 2    aspirin (ECOTRIN LOW STRENGTH) 81 mg EC tablet, Take 81 mg by mouth daily, Disp: , Rfl:     dicyclomine (BENTYL) 20 mg tablet, Take 1 tablet (20 mg total) by mouth every 6 (six) hours as needed (every 6 hours), Disp: 60 tablet, Rfl: 0    escitalopram (LEXAPRO) 20 mg tablet, Take 1 tablet (20 mg total) by mouth daily, Disp: 90 tablet, Rfl: 1    fenofibrate (TRICOR) 145 mg tablet, Take 1 tablet (145 mg total) by mouth daily, Disp: 90 tablet, Rfl: 1    fluticasone (FLONASE) 50 mcg/act nasal spray, 2 sprays into each nostril 2 (two) times a day as needed  , Disp: , Rfl:     ibuprofen (MOTRIN) 600 mg tablet, Take 1 tablet (600 mg total) by mouth every 6 (six) hours as needed for mild pain, moderate pain, fever or headaches, Disp: 30 tablet, Rfl: 0    levothyroxine 100 mcg tablet, Take 1 tablet (100 mcg total) by mouth daily, Disp: 90 tablet, Rfl: 1    metFORMIN (GLUCOPHAGE) 500 mg tablet, 1 tab AM, 2 PM, Disp: 270 tablet, Rfl: 1    metoprolol succinate (TOPROL-XL) 25 mg 24 hr tablet, Take 1 tablet (25 mg total) by mouth 2 (two) times a day, Disp: 180 tablet, Rfl: 2    mupirocin (BACTROBAN) 2 % ointment, Apply topically 3 (three) times a day (Patient not taking: Reported on 9/24/2019), Disp: 22 g, Rfl: 5    nitroglycerin (NITROSTAT) 0 4 mg SL tablet, Place 1 tablet under the tongue every 5 (five) minutes as needed for chest pain (Patient not taking: Reported on 9/24/2019), Disp: 90 tablet, Rfl: 0    omega-3-acid ethyl esters (LOVAZA) 1 g capsule, Take 4 capsules (4 g total) by mouth daily, Disp: 360 capsule, Rfl: 0    omeprazole (PriLOSEC) 20 mg delayed release capsule, TAKE ONE CAPSULE BY MOUTH EVERY DAY, Disp: 90 capsule, Rfl: 0    traMADol (ULTRAM) 50 mg tablet, Take 1 tablet (50 mg total) by mouth every 8 (eight) hours as needed for moderate pain, Disp: 60 tablet, Rfl: 0    Objective:    Blood pressure 138/90, pulse 74, temperature 97 7 °F (36 5 °C), temperature source Tympanic, height 5' 2" (1 575 m), weight 80 3 kg (177 lb), not currently breastfeeding  Body mass index is 32 37 kg/m²  Body surface area is 1 81 meters squared  Physical Exam   Constitutional: She appears well-developed and well-nourished  Abdominal: Soft  She exhibits no distension  There is no tenderness  There is no rebound and no guarding  Genitourinary:   Genitourinary Comments: Vaginal apex healing well  Mobile      Skin:   Incisions clean, dry, intact

## 2019-11-14 NOTE — ASSESSMENT & PLAN NOTE
51-year-old status post total laparoscopic hysterectomy, bilateral salpingectomy, left oophorectomy for benign disease  She is recovering well from surgery  Performance status is 0     1  I discussed ongoing activity limitations  2  She will continue follow-up with her gynecologist as scheduled  She was encouraged to call the office with any additional questions or concerns

## 2019-11-14 NOTE — LETTER
November 14, 2019 Sunny Lo, [address] [facility] [address]  [address]    Patient: Naif Christensen   YOB: 1970   Date of Visit: 11/14/2019       Dear Dr Elijah Soni:    Thank you for referring Naif Christensen to me for evaluation  Below are my notes for this consultation  If you have questions, please do not hesitate to call me  I look forward to following your patient along with you  Sincerely,        Chapin Yoon MD        CC: Worley Hamman, DO Herschel Penton, MD  11/14/2019  8:59 AM  Sign at close encounter  Assessment/Plan:    Problem List Items Addressed This Visit        Other    S/P laparoscopic hysterectomy - Primary       31-year-old status post total laparoscopic hysterectomy, bilateral salpingectomy, left oophorectomy for benign disease  She is recovering well from surgery  Performance status is 0     1  I discussed ongoing activity limitations  2  She will continue follow-up with her gynecologist as scheduled  She was encouraged to call the office with any additional questions or concerns  CHIEF COMPLAINT:  Postoperative evaluation           Patient ID: Naif Christensen is a 52 y o  female   31-year-old returns for postoperative evaluation  She has no complaints  No interval change in her medications or medical history since her last visit  She is able to perform her activities of daily living without difficulty        The following portions of the patient's history were reviewed and updated as appropriate: allergies, current medications, past family history, past medical history, past social history, past surgical history and problem list     Review of Systems      Current Outpatient Medications:     acetaminophen (TYLENOL) 325 mg tablet, Take 3 tablets (975 mg total) by mouth every 8 (eight) hours, Disp: 30 tablet, Rfl: 0    amLODIPine (NORVASC) 5 mg tablet, Take 1 tablet (5 mg total) by mouth daily, Disp: 90 tablet, Rfl: 2    aspirin (ECOTRIN LOW STRENGTH) 81 mg EC tablet, Take 81 mg by mouth daily, Disp: , Rfl:     dicyclomine (BENTYL) 20 mg tablet, Take 1 tablet (20 mg total) by mouth every 6 (six) hours as needed (every 6 hours), Disp: 60 tablet, Rfl: 0    escitalopram (LEXAPRO) 20 mg tablet, Take 1 tablet (20 mg total) by mouth daily, Disp: 90 tablet, Rfl: 1    fenofibrate (TRICOR) 145 mg tablet, Take 1 tablet (145 mg total) by mouth daily, Disp: 90 tablet, Rfl: 1    fluticasone (FLONASE) 50 mcg/act nasal spray, 2 sprays into each nostril 2 (two) times a day as needed  , Disp: , Rfl:     ibuprofen (MOTRIN) 600 mg tablet, Take 1 tablet (600 mg total) by mouth every 6 (six) hours as needed for mild pain, moderate pain, fever or headaches, Disp: 30 tablet, Rfl: 0    levothyroxine 100 mcg tablet, Take 1 tablet (100 mcg total) by mouth daily, Disp: 90 tablet, Rfl: 1    metFORMIN (GLUCOPHAGE) 500 mg tablet, 1 tab AM, 2 PM, Disp: 270 tablet, Rfl: 1    metoprolol succinate (TOPROL-XL) 25 mg 24 hr tablet, Take 1 tablet (25 mg total) by mouth 2 (two) times a day, Disp: 180 tablet, Rfl: 2    mupirocin (BACTROBAN) 2 % ointment, Apply topically 3 (three) times a day (Patient not taking: Reported on 9/24/2019), Disp: 22 g, Rfl: 5    nitroglycerin (NITROSTAT) 0 4 mg SL tablet, Place 1 tablet under the tongue every 5 (five) minutes as needed for chest pain (Patient not taking: Reported on 9/24/2019), Disp: 90 tablet, Rfl: 0    omega-3-acid ethyl esters (LOVAZA) 1 g capsule, Take 4 capsules (4 g total) by mouth daily, Disp: 360 capsule, Rfl: 0    omeprazole (PriLOSEC) 20 mg delayed release capsule, TAKE ONE CAPSULE BY MOUTH EVERY DAY, Disp: 90 capsule, Rfl: 0    traMADol (ULTRAM) 50 mg tablet, Take 1 tablet (50 mg total) by mouth every 8 (eight) hours as needed for moderate pain, Disp: 60 tablet, Rfl: 0    Objective:    Blood pressure 138/90, pulse 74, temperature 97 7 °F (36 5 °C), temperature source Tympanic, height 5' 2" (1 575 m), weight 80 3 kg (177 lb), not currently breastfeeding  Body mass index is 32 37 kg/m²  Body surface area is 1 81 meters squared  Physical Exam   Constitutional: She appears well-developed and well-nourished  Abdominal: Soft  She exhibits no distension  There is no tenderness  There is no rebound and no guarding  Genitourinary:   Genitourinary Comments: Vaginal apex healing well  Mobile      Skin:   Incisions clean, dry, intact

## 2019-12-02 DIAGNOSIS — J01.90 ACUTE SINUSITIS, RECURRENCE NOT SPECIFIED, UNSPECIFIED LOCATION: ICD-10-CM

## 2019-12-02 DIAGNOSIS — E11.8 TYPE 2 DIABETES MELLITUS WITH COMPLICATION, WITHOUT LONG-TERM CURRENT USE OF INSULIN (HCC): ICD-10-CM

## 2019-12-02 DIAGNOSIS — K21.9 GERD WITHOUT ESOPHAGITIS: ICD-10-CM

## 2019-12-02 DIAGNOSIS — E78.00 HYPERCHOLESTEREMIA: ICD-10-CM

## 2019-12-02 DIAGNOSIS — E03.8 HYPOTHYROIDISM DUE TO HASHIMOTO'S THYROIDITIS: ICD-10-CM

## 2019-12-02 DIAGNOSIS — R07.89 OTHER CHEST PAIN: ICD-10-CM

## 2019-12-02 DIAGNOSIS — E06.3 HYPOTHYROIDISM DUE TO HASHIMOTO'S THYROIDITIS: ICD-10-CM

## 2019-12-02 RX ORDER — TRAMADOL HYDROCHLORIDE 50 MG/1
50 TABLET ORAL EVERY 8 HOURS PRN
Qty: 60 TABLET | Refills: 0 | Status: CANCELLED | OUTPATIENT
Start: 2019-12-02

## 2019-12-02 RX ORDER — LEVOTHYROXINE SODIUM 0.1 MG/1
100 TABLET ORAL DAILY
Qty: 90 TABLET | Refills: 1 | Status: SHIPPED | OUTPATIENT
Start: 2019-12-02 | End: 2020-02-24 | Stop reason: SDUPTHER

## 2019-12-02 RX ORDER — LEVOTHYROXINE SODIUM 0.1 MG/1
100 TABLET ORAL DAILY
Qty: 90 TABLET | Refills: 0 | Status: CANCELLED | OUTPATIENT
Start: 2019-12-02

## 2019-12-02 RX ORDER — FENOFIBRATE 145 MG/1
145 TABLET, COATED ORAL DAILY
Qty: 90 TABLET | Refills: 0 | Status: CANCELLED | OUTPATIENT
Start: 2019-12-02

## 2019-12-02 RX ORDER — FENOFIBRATE 145 MG/1
145 TABLET, COATED ORAL DAILY
Qty: 90 TABLET | Refills: 1 | Status: SHIPPED | OUTPATIENT
Start: 2019-12-02 | End: 2020-02-24 | Stop reason: SDUPTHER

## 2019-12-02 RX ORDER — TRAMADOL HYDROCHLORIDE 50 MG/1
50 TABLET ORAL EVERY 8 HOURS PRN
Qty: 60 TABLET | Refills: 0 | Status: SHIPPED | OUTPATIENT
Start: 2019-12-02 | End: 2019-12-27 | Stop reason: SDUPTHER

## 2019-12-03 RX ORDER — OMEPRAZOLE 20 MG/1
20 CAPSULE, DELAYED RELEASE ORAL DAILY
Qty: 90 CAPSULE | Refills: 0 | Status: SHIPPED | OUTPATIENT
Start: 2019-12-03 | End: 2020-02-24 | Stop reason: SDUPTHER

## 2019-12-03 RX ORDER — AMLODIPINE BESYLATE 5 MG/1
5 TABLET ORAL DAILY
Qty: 90 TABLET | Refills: 0 | Status: SHIPPED | OUTPATIENT
Start: 2019-12-03 | End: 2020-02-24 | Stop reason: SDUPTHER

## 2019-12-13 DIAGNOSIS — E78.2 COMBINED HYPERLIPIDEMIA: ICD-10-CM

## 2019-12-16 RX ORDER — OMEGA-3-ACID ETHYL ESTERS 1 G/1
4 CAPSULE, LIQUID FILLED ORAL DAILY
Qty: 360 CAPSULE | Refills: 1 | Status: SHIPPED | OUTPATIENT
Start: 2019-12-16 | End: 2020-03-19 | Stop reason: SDUPTHER

## 2019-12-27 DIAGNOSIS — J01.90 ACUTE SINUSITIS, RECURRENCE NOT SPECIFIED, UNSPECIFIED LOCATION: ICD-10-CM

## 2019-12-27 DIAGNOSIS — K58.9 IRRITABLE BOWEL SYNDROME WITHOUT DIARRHEA: ICD-10-CM

## 2019-12-28 RX ORDER — TRAMADOL HYDROCHLORIDE 50 MG/1
50 TABLET ORAL EVERY 8 HOURS PRN
Qty: 60 TABLET | Refills: 0 | Status: SHIPPED | OUTPATIENT
Start: 2019-12-28 | End: 2020-01-30 | Stop reason: SDUPTHER

## 2019-12-30 RX ORDER — DICYCLOMINE HCL 20 MG
20 TABLET ORAL EVERY 6 HOURS PRN
Qty: 60 TABLET | Refills: 0 | Status: SHIPPED | OUTPATIENT
Start: 2019-12-30 | End: 2020-03-19 | Stop reason: SDUPTHER

## 2019-12-30 RX ORDER — TRAMADOL HYDROCHLORIDE 50 MG/1
50 TABLET ORAL EVERY 8 HOURS PRN
Qty: 60 TABLET | Refills: 0 | Status: SHIPPED | OUTPATIENT
Start: 2019-12-30 | End: 2020-02-24 | Stop reason: SDUPTHER

## 2020-01-06 DIAGNOSIS — F32.A DEPRESSION, UNSPECIFIED DEPRESSION TYPE: ICD-10-CM

## 2020-01-06 RX ORDER — ESCITALOPRAM OXALATE 20 MG/1
20 TABLET ORAL DAILY
Qty: 90 TABLET | Refills: 0 | Status: SHIPPED | OUTPATIENT
Start: 2020-01-06 | End: 2020-03-19 | Stop reason: SDUPTHER

## 2020-01-16 ENCOUNTER — TELEPHONE (OUTPATIENT)
Dept: FAMILY MEDICINE CLINIC | Facility: CLINIC | Age: 50
End: 2020-01-16

## 2020-01-17 ENCOUNTER — TELEPHONE (OUTPATIENT)
Dept: FAMILY MEDICINE CLINIC | Facility: CLINIC | Age: 50
End: 2020-01-17

## 2020-01-29 DIAGNOSIS — R07.9 CHEST PAIN: ICD-10-CM

## 2020-01-30 DIAGNOSIS — J01.90 ACUTE SINUSITIS, RECURRENCE NOT SPECIFIED, UNSPECIFIED LOCATION: ICD-10-CM

## 2020-01-30 DIAGNOSIS — R07.9 CHEST PAIN: ICD-10-CM

## 2020-01-30 RX ORDER — METOPROLOL SUCCINATE 25 MG/1
25 TABLET, EXTENDED RELEASE ORAL 2 TIMES DAILY
Qty: 180 TABLET | Refills: 2 | Status: SHIPPED | OUTPATIENT
Start: 2020-01-30 | End: 2020-03-04

## 2020-01-30 RX ORDER — METOPROLOL SUCCINATE 25 MG/1
25 TABLET, EXTENDED RELEASE ORAL 2 TIMES DAILY
Qty: 180 TABLET | Refills: 2 | Status: SHIPPED | OUTPATIENT
Start: 2020-01-30 | End: 2020-02-24 | Stop reason: SDUPTHER

## 2020-01-31 RX ORDER — TRAMADOL HYDROCHLORIDE 50 MG/1
50 TABLET ORAL EVERY 8 HOURS PRN
Qty: 60 TABLET | Refills: 0 | Status: SHIPPED | OUTPATIENT
Start: 2020-01-31 | End: 2020-02-24 | Stop reason: SDUPTHER

## 2020-02-24 ENCOUNTER — OFFICE VISIT (OUTPATIENT)
Dept: CARDIOLOGY CLINIC | Facility: CLINIC | Age: 50
End: 2020-02-24
Payer: COMMERCIAL

## 2020-02-24 VITALS
HEIGHT: 62 IN | SYSTOLIC BLOOD PRESSURE: 110 MMHG | WEIGHT: 169 LBS | DIASTOLIC BLOOD PRESSURE: 70 MMHG | HEART RATE: 74 BPM | BODY MASS INDEX: 31.1 KG/M2

## 2020-02-24 DIAGNOSIS — E78.00 HYPERCHOLESTEREMIA: ICD-10-CM

## 2020-02-24 DIAGNOSIS — E03.8 HYPOTHYROIDISM DUE TO HASHIMOTO'S THYROIDITIS: ICD-10-CM

## 2020-02-24 DIAGNOSIS — K21.9 GERD WITHOUT ESOPHAGITIS: ICD-10-CM

## 2020-02-24 DIAGNOSIS — R07.89 OTHER CHEST PAIN: ICD-10-CM

## 2020-02-24 DIAGNOSIS — E11.8 TYPE 2 DIABETES MELLITUS WITH COMPLICATION, WITHOUT LONG-TERM CURRENT USE OF INSULIN (HCC): ICD-10-CM

## 2020-02-24 DIAGNOSIS — E06.3 HYPOTHYROIDISM DUE TO HASHIMOTO'S THYROIDITIS: ICD-10-CM

## 2020-02-24 DIAGNOSIS — Q24.5 ANOMALOUS CORONARY ARTERY ORIGIN: Primary | ICD-10-CM

## 2020-02-24 DIAGNOSIS — J01.90 ACUTE SINUSITIS, RECURRENCE NOT SPECIFIED, UNSPECIFIED LOCATION: ICD-10-CM

## 2020-02-24 PROCEDURE — 1036F TOBACCO NON-USER: CPT | Performed by: INTERNAL MEDICINE

## 2020-02-24 PROCEDURE — 3078F DIAST BP <80 MM HG: CPT | Performed by: INTERNAL MEDICINE

## 2020-02-24 PROCEDURE — 99214 OFFICE O/P EST MOD 30 MIN: CPT | Performed by: INTERNAL MEDICINE

## 2020-02-24 PROCEDURE — 3008F BODY MASS INDEX DOCD: CPT | Performed by: INTERNAL MEDICINE

## 2020-02-24 PROCEDURE — 3074F SYST BP LT 130 MM HG: CPT | Performed by: INTERNAL MEDICINE

## 2020-02-24 RX ORDER — FENOFIBRATE 145 MG/1
145 TABLET, COATED ORAL DAILY
Qty: 90 TABLET | Refills: 0 | Status: SHIPPED | OUTPATIENT
Start: 2020-02-24 | End: 2020-05-03 | Stop reason: SDUPTHER

## 2020-02-24 RX ORDER — OMEPRAZOLE 20 MG/1
20 CAPSULE, DELAYED RELEASE ORAL DAILY
Qty: 90 CAPSULE | Refills: 3 | Status: SHIPPED | OUTPATIENT
Start: 2020-02-24 | End: 2020-03-04

## 2020-02-24 RX ORDER — TRAMADOL HYDROCHLORIDE 50 MG/1
50 TABLET ORAL EVERY 8 HOURS PRN
Qty: 60 TABLET | Refills: 0 | Status: SHIPPED | OUTPATIENT
Start: 2020-02-24 | End: 2020-03-19 | Stop reason: SDUPTHER

## 2020-02-24 RX ORDER — LEVOTHYROXINE SODIUM 0.1 MG/1
100 TABLET ORAL DAILY
Qty: 90 TABLET | Refills: 0 | Status: SHIPPED | OUTPATIENT
Start: 2020-02-24 | End: 2020-03-04

## 2020-02-24 RX ORDER — AMLODIPINE BESYLATE 5 MG/1
5 TABLET ORAL DAILY
Qty: 90 TABLET | Refills: 3 | Status: SHIPPED | OUTPATIENT
Start: 2020-02-24 | End: 2020-05-03 | Stop reason: SDUPTHER

## 2020-02-24 NOTE — PROGRESS NOTES
Cardiology Follow Up    Cassie Cuevas  1970  [id number]  [address] [facility] [place] [id number] [facility]  [phone]    1  Anomalous coronary artery origin         Interval History: Followup for anomalous coronary artery  She is doing well       Problem List     Hypertension (Chronic)    Peptic ulcer disease (Chronic)    Fatty liver (Chronic)    Sinus tachycardia    Chronic sinusitis (Chronic)    Anemia    Anomalous coronary artery origin    Anxiety    Combined hyperlipidemia    Coronary vasospasm (HCC)    Diabetes mellitus (HCC)      Lab Results   Component Value Date    HGBA1C 7 6 (H) 08/26/2019         Elevated AST (SGOT)    GERD without esophagitis    Ground glass opacity present on imaging of lung    Heart palpitations    Hypothyroid    NSTEMI (non-ST elevated myocardial infarction) ([facility] [address] )    Chest pain due to GERD    Overview Signed 6/18/2018  5:05 PM by Dora Blair MD     Added automatically from request for surgery [id number]         Dysfunctional uterine bleeding    Left ovarian cyst    Menopausal symptoms    S/P laparoscopic hysterectomy        Past Medical History:   Diagnosis Date    Diabetes mellitus ([facility] [address] )     Borderline    DUB (dysfunctional uterine bleeding)     Elevated blood pressure reading     Last Assessed:  8/27/15    Fatty liver     Hashimoto's thyroiditis     Last Assessed:  8/19/14    History of stomach ulcers     Hypertension     Hypothyroidism     Irritable bowel syndrome     Last Assessed:  3/25/14    Liver disease     elevated enzymes    Myocardial infarction [facility])     2017    Ovarian cyst     left    Pre-diabetes      Social History     Socioeconomic History    Marital status: /Civil Union     Spouse name: Not on file    Number of children: Not on file    Years of education: Not on file    Highest education level: Not on file   Occupational History    Not on file Social Needs    Financial resource strain: Not on file    Food insecurity:     Worry: Not on file     Inability: Not on file    Transportation needs:     Medical: Not on file     Non-medical: Not on file   Tobacco Use    Smoking status: Former Smoker     Packs/day: 0 50     Years: 4 00     Pack years: 2 00     Types: Cigarettes     Last attempt to quit: 2016     Years since quittin 0    Smokeless tobacco: Never Used    Tobacco comment: 2016   Substance and Sexual Activity    Alcohol use: Yes     Frequency: Monthly or less     Comment: occasional, Social drinker    Drug use: No    Sexual activity: Yes     Partners: Male     Birth control/protection: Pill   Lifestyle    Physical activity:     Days per week: Not on file     Minutes per session: Not on file    Stress: Not on file   Relationships    Social connections:     Talks on phone: Not on file     Gets together: Not on file     Attends Jewish service: Not on file     Active member of club or organization: Not on file     Attends meetings of clubs or organizations: Not on file     Relationship status: Not on file    Intimate partner violence:     Fear of current or ex partner: Not on file     Emotionally abused: Not on file     Physically abused: Not on file     Forced sexual activity: Not on file   Other Topics Concern    Not on file   Social History Narrative    Feels safe at home      Family History   Problem Relation Age of Onset    Pancreatic cancer Mother     Hypertension Father     Diabetes Father     Diabetes Maternal Grandmother     Diabetes Paternal Grandmother     Heart disease Paternal Grandmother      Past Surgical History:   Procedure Laterality Date    ANGIOPLASTY      CARDIAC CATHETERIZATION       SECTION      CHOLECYSTECTOMY      COLONOSCOPY      CYSTOSCOPY N/A 2019    Procedure: CYSTOSCOPY;  Surgeon: Ford Levy MD;  [facility];  Service: Gynecology Oncology    IA ESOPHAGOGASTRODUODENOSCOPY TRANSORAL DIAGNOSTIC N/A 8/24/2018    Procedure: ESOPHAGOGASTRODUODENOSCOPY (EGD); Surgeon: Dana Garcia MD;  Location: [facility];   Service: Gastroenterology    ME LAPAROSCOPY W TOT HYSTERECTUTERUS <=[address] TUBE/OVARY N/A 9/6/2019    Procedure: TOTAL LAPAROSCOPIC HYSTERECTOMY, BILATERAL SALPINGECTOMY, LEFT OOPHORECTOMY;  Surgeon: Karina Wagner MD;  [facility];  Service: Gynecology Oncology    SINUS SURGERY         Current Outpatient Medications:     acetaminophen (TYLENOL) 325 mg tablet, Take 3 tablets (975 mg total) by mouth every 8 (eight) hours, Disp: 30 tablet, Rfl: 0    amLODIPine (NORVASC) 5 mg tablet, Take 1 tablet (5 mg total) by mouth daily, Disp: 90 tablet, Rfl: 0    aspirin (ECOTRIN LOW STRENGTH) 81 mg EC tablet, Take 81 mg by mouth daily, Disp: , Rfl:     dicyclomine (BENTYL) 20 mg tablet, Take 1 tablet (20 mg total) by mouth every 6 (six) hours as needed (every 6 hours), Disp: 60 tablet, Rfl: 0    escitalopram (LEXAPRO) 20 mg tablet, Take 1 tablet (20 mg total) by mouth daily, Disp: 90 tablet, Rfl: 0    fenofibrate (TRICOR) 145 mg tablet, Take 1 tablet (145 mg total) by mouth daily, Disp: 90 tablet, Rfl: 1    fluticasone (FLONASE) 50 mcg/act nasal spray, 2 sprays into each nostril 2 (two) times a day as needed  , Disp: , Rfl:     levothyroxine 100 mcg tablet, Take 1 tablet (100 mcg total) by mouth daily, Disp: 90 tablet, Rfl: 1    metFORMIN (GLUCOPHAGE) 500 mg tablet, 1 tab AM, 2 PM, Disp: 270 tablet, Rfl: 1    metoprolol succinate (TOPROL-XL) 25 mg 24 hr tablet, Take 1 tablet (25 mg total) by mouth 2 (two) times a day, Disp: 180 tablet, Rfl: 2    mupirocin (BACTROBAN) 2 % ointment, Apply topically 3 (three) times a day, Disp: 22 g, Rfl: 5    nitroglycerin (NITROSTAT) 0 4 mg SL tablet, Place 1 tablet under the tongue every 5 (five) minutes as needed for chest pain, Disp: 90 tablet, Rfl: 0    omega-3-acid ethyl esters (LOVAZA) 1 g capsule, Take 4 capsules (4 g total) by mouth daily, Disp: 360 capsule, Rfl: 1    omeprazole (PriLOSEC) 20 mg delayed release capsule, Take 1 capsule (20 mg total) by mouth daily, Disp: 90 capsule, Rfl: 0    traMADol (ULTRAM) 50 mg tablet, Take 1 tablet (50 mg total) by mouth every 8 (eight) hours as needed for moderate pain, Disp: 60 tablet, Rfl: 0    ibuprofen (MOTRIN) 600 mg tablet, Take 1 tablet (600 mg total) by mouth every 6 (six) hours as needed for mild pain, moderate pain, fever or headaches (Patient not taking: Reported on 2/24/2020), Disp: 30 tablet, Rfl: 0  Allergies   Allergen Reactions    Sulfa Antibiotics Shortness Of Breath    Erythromycin      Reaction Date: [id number];     Metronidazole        Labs:     Chemistry        Component Value Date/Time     09/02/2015 1020    K 4 0 09/07/2019 0456    K 3 8 09/02/2015 1020     09/07/2019 0456     09/02/2015 1020    CO2 26 09/07/2019 0456    CO2 24 3 09/02/2015 1020    BUN 6 09/07/2019 0456    BUN 13 09/02/2015 1020    CREATININE 0 81 09/07/2019 0456    CREATININE 0 79 09/02/2015 1020        Component Value Date/Time    CALCIUM 8 6 09/07/2019 0456    CALCIUM 9 1 09/02/2015 1020    ALKPHOS 48 08/26/2019 0829    ALKPHOS 78 09/02/2015 1020    AST 60 (H) 08/26/2019 0829    AST 37 09/02/2015 1020    ALT 50 08/26/2019 0829    ALT 62 09/02/2015 1020    BILITOT 0 38 09/02/2015 1020            Lab Results   Component Value Date    CHOL 210 08/20/2015    CHOL 179 07/19/2014    CHOL 197 12/05/2013     Lab Results   Component Value Date    HDL 28 (L) 03/20/2019    HDL 33 (L) 08/28/2018    HDL 30 (L) 06/03/2018     Lab Results   Component Value Date    LDLCALC 102 (H) 03/20/2019    LDLCALC 128 (H) 08/28/2018    LDLCALC 73 06/03/2018     Lab Results   Component Value Date    TRIG 379 (H) 03/20/2019    TRIG 280 (H) 08/28/2018    TRIG 331 (H) 06/03/2018     No results found for: CHOLHDL    Imaging: No results found          Review of Systems   Constitution: Negative  HENT: Negative  Eyes: Negative  Cardiovascular: Negative  Respiratory: Negative  Endocrine: Negative  Hematologic/Lymphatic: Negative  Skin: Negative  Musculoskeletal: Negative  Gastrointestinal: Negative  Genitourinary: Negative  Neurological: Negative  Psychiatric/Behavioral: Negative  Allergic/Immunologic: Negative  Vitals:    02/24/20 0931   BP: 110/70   Pulse: 74           Physical Exam   Constitutional: She is oriented to person, place, and time  No distress  HENT:   Mouth/Throat: No oropharyngeal exudate  Eyes: No scleral icterus  Cardiovascular: Normal rate and regular rhythm  Exam reveals no friction rub  No murmur heard  Pulmonary/Chest: Effort normal and breath sounds normal  No stridor  No respiratory distress  She has no wheezes  Abdominal: Soft  Bowel sounds are normal  She exhibits no distension  There is no tenderness  There is no guarding  Musculoskeletal: She exhibits no edema  Neurological: She is alert and oriented to person, place, and time  Skin: Skin is warm and dry  She is not diaphoretic  Psychiatric: She has a normal mood and affect  Her behavior is normal        Discussion/Summary:    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

## 2020-02-29 ENCOUNTER — APPOINTMENT (OUTPATIENT)
Dept: LAB | Facility: MEDICAL CENTER | Age: 50
End: 2020-02-29
Payer: COMMERCIAL

## 2020-02-29 DIAGNOSIS — R74.01 ELEVATED AST (SGOT): ICD-10-CM

## 2020-02-29 DIAGNOSIS — E78.2 COMBINED HYPERLIPIDEMIA: ICD-10-CM

## 2020-02-29 DIAGNOSIS — E11.8 TYPE 2 DIABETES MELLITUS WITH COMPLICATION, WITHOUT LONG-TERM CURRENT USE OF INSULIN (HCC): ICD-10-CM

## 2020-02-29 DIAGNOSIS — E03.9 HYPOTHYROIDISM, UNSPECIFIED TYPE: ICD-10-CM

## 2020-02-29 DIAGNOSIS — N95.1 SYMPTOMATIC MENOPAUSAL OR FEMALE CLIMACTERIC STATES: ICD-10-CM

## 2020-02-29 LAB
ALBUMIN SERPL BCP-MCNC: 4.2 G/DL (ref 3.5–5)
ALP SERPL-CCNC: 97 U/L (ref 46–116)
ALT SERPL W P-5'-P-CCNC: 110 U/L (ref 12–78)
ANION GAP SERPL CALCULATED.3IONS-SCNC: 6 MMOL/L (ref 4–13)
AST SERPL W P-5'-P-CCNC: 140 U/L (ref 5–45)
BILIRUB SERPL-MCNC: 0.32 MG/DL (ref 0.2–1)
BUN SERPL-MCNC: 12 MG/DL (ref 5–25)
CALCIUM SERPL-MCNC: 9.3 MG/DL (ref 8.3–10.1)
CHLORIDE SERPL-SCNC: 101 MMOL/L (ref 100–108)
CHOLEST SERPL-MCNC: 206 MG/DL (ref 50–200)
CO2 SERPL-SCNC: 27 MMOL/L (ref 21–32)
CREAT SERPL-MCNC: 0.77 MG/DL (ref 0.6–1.3)
CREAT UR-MCNC: 67.2 MG/DL
EST. AVERAGE GLUCOSE BLD GHB EST-MCNC: 194 MG/DL
FSH SERPL-ACNC: 28.3 MIU/ML
GFR SERPL CREATININE-BSD FRML MDRD: 91 ML/MIN/1.73SQ M
GLUCOSE P FAST SERPL-MCNC: 149 MG/DL (ref 65–99)
HBA1C MFR BLD: 8.4 %
HDLC SERPL-MCNC: 28 MG/DL
LDLC SERPL CALC-MCNC: 132 MG/DL (ref 0–100)
MICROALBUMIN UR-MCNC: <5 MG/L (ref 0–20)
MICROALBUMIN/CREAT 24H UR: <7 MG/G CREATININE (ref 0–30)
POTASSIUM SERPL-SCNC: 4.2 MMOL/L (ref 3.5–5.3)
PROT SERPL-MCNC: 7.7 G/DL (ref 6.4–8.2)
SODIUM SERPL-SCNC: 134 MMOL/L (ref 136–145)
T4 FREE SERPL-MCNC: 1.26 NG/DL (ref 0.76–1.46)
TRIGL SERPL-MCNC: 232 MG/DL
TSH SERPL DL<=0.05 MIU/L-ACNC: 5.66 UIU/ML (ref 0.36–3.74)

## 2020-02-29 PROCEDURE — 80061 LIPID PANEL: CPT

## 2020-02-29 PROCEDURE — 80053 COMPREHEN METABOLIC PANEL: CPT

## 2020-02-29 PROCEDURE — 84443 ASSAY THYROID STIM HORMONE: CPT

## 2020-02-29 PROCEDURE — 84439 ASSAY OF FREE THYROXINE: CPT

## 2020-02-29 PROCEDURE — 83001 ASSAY OF GONADOTROPIN (FSH): CPT

## 2020-02-29 PROCEDURE — 82570 ASSAY OF URINE CREATININE: CPT

## 2020-02-29 PROCEDURE — 82043 UR ALBUMIN QUANTITATIVE: CPT

## 2020-02-29 PROCEDURE — 36415 COLL VENOUS BLD VENIPUNCTURE: CPT

## 2020-02-29 PROCEDURE — 83036 HEMOGLOBIN GLYCOSYLATED A1C: CPT

## 2020-03-04 ENCOUNTER — OFFICE VISIT (OUTPATIENT)
Dept: FAMILY MEDICINE CLINIC | Facility: CLINIC | Age: 50
End: 2020-03-04
Payer: COMMERCIAL

## 2020-03-04 VITALS
RESPIRATION RATE: 17 BRPM | SYSTOLIC BLOOD PRESSURE: 132 MMHG | OXYGEN SATURATION: 98 % | WEIGHT: 170.5 LBS | TEMPERATURE: 97 F | BODY MASS INDEX: 31.18 KG/M2 | DIASTOLIC BLOOD PRESSURE: 80 MMHG | HEART RATE: 84 BPM

## 2020-03-04 DIAGNOSIS — E06.3 HYPOTHYROIDISM DUE TO HASHIMOTO'S THYROIDITIS: ICD-10-CM

## 2020-03-04 DIAGNOSIS — E78.2 COMBINED HYPERLIPIDEMIA: ICD-10-CM

## 2020-03-04 DIAGNOSIS — Z12.31 ENCOUNTER FOR SCREENING MAMMOGRAM FOR BREAST CANCER: ICD-10-CM

## 2020-03-04 DIAGNOSIS — E03.8 HYPOTHYROIDISM DUE TO HASHIMOTO'S THYROIDITIS: ICD-10-CM

## 2020-03-04 DIAGNOSIS — E11.9 TYPE 2 DIABETES MELLITUS WITHOUT COMPLICATION, WITHOUT LONG-TERM CURRENT USE OF INSULIN (HCC): Primary | ICD-10-CM

## 2020-03-04 DIAGNOSIS — K21.9 GERD WITHOUT ESOPHAGITIS: ICD-10-CM

## 2020-03-04 DIAGNOSIS — R74.01 ELEVATED AST (SGOT): ICD-10-CM

## 2020-03-04 DIAGNOSIS — E03.9 HYPOTHYROIDISM, UNSPECIFIED TYPE: ICD-10-CM

## 2020-03-04 PROCEDURE — 99214 OFFICE O/P EST MOD 30 MIN: CPT | Performed by: FAMILY MEDICINE

## 2020-03-04 PROCEDURE — 3052F HG A1C>EQUAL 8.0%<EQUAL 9.0%: CPT | Performed by: FAMILY MEDICINE

## 2020-03-04 PROCEDURE — 3075F SYST BP GE 130 - 139MM HG: CPT | Performed by: FAMILY MEDICINE

## 2020-03-04 PROCEDURE — 1036F TOBACCO NON-USER: CPT | Performed by: FAMILY MEDICINE

## 2020-03-04 PROCEDURE — 3079F DIAST BP 80-89 MM HG: CPT | Performed by: FAMILY MEDICINE

## 2020-03-04 RX ORDER — LEVOTHYROXINE SODIUM 112 UG/1
112 TABLET ORAL DAILY
Qty: 90 TABLET | Refills: 1 | Status: SHIPPED | OUTPATIENT
Start: 2020-03-04 | End: 2020-05-26 | Stop reason: SDUPTHER

## 2020-03-04 RX ORDER — PANTOPRAZOLE SODIUM 40 MG/1
40 TABLET, DELAYED RELEASE ORAL
Qty: 90 TABLET | Refills: 1 | Status: SHIPPED | OUTPATIENT
Start: 2020-03-04 | End: 2020-05-26 | Stop reason: SDUPTHER

## 2020-03-04 NOTE — PROGRESS NOTES
[facility]      NAME: Fahad Gomez  AGE: 52 y o  SEX: female  : 1970   MRN: [id number]    DATE: 3/4/2020  TIME: 4:10 PM    Assessment and Plan     Problem List Items Addressed This Visit     Combined hyperlipidemia      Cholesterol 206/132  Continue fenofibrate 145  Will hold off on statin for now due to patient's recent improvement in lifestyle  She has been exercising regularly and watching her diet  She has lost approximately 15 lb  Relevant Orders    Lipid Panel with Direct LDL reflex    Diabetes mellitus ([facility] [address] ) - Primary       Lab Results   Component Value Date    HGBA1C 8 4 (H) 2020     A1c now 8 4%, was 7 6%  Patient is compliant with metformin 1500 mg daily  She has been watching her diet and exercising  She has lost approximately 15 lb  Unfortunately her diabetic control has worsened  Will change to Janumet  b i d     Will also check fasting insulin level in near future  Will call with results  Possible refer to endocrinology  Recheck labs in 3 months followed by appointment         Relevant Medications    sitaGLIPtin-metFORMIN (JANUMET)  MG per tablet    Other Relevant Orders    Comprehensive metabolic panel    Hemoglobin A1C    TSH, 3rd generation with Free T4 reflex    Insulin, fasting    Elevated AST (SGOT)      History of elevated transaminases  Currently ,   These are higher than usual   Patient has been watching her diet  She has lost weight  Hepatitis serologies were negative  May be iatrogenic ( due to tramadol or fenofibrate)  But in light of increasing values, recommend referral to Gastroenterology ( patient sees Dr Danette Ospina  Next month)         Relevant Orders    Comprehensive metabolic panel    GERD without esophagitis      Will switch omeprazole back to pantoprazole  Patient does better on this particular medication           Relevant Medications    pantoprazole (PROTONIX) 40 mg tablet    Hypothyroid TSH has improved  Was 6 14, now 5 6  Will again increase levothyroxine to 112 mcg daily  Will recheck labs prior to next appointment         Relevant Medications    levothyroxine 112 mcg tablet      Other Visit Diagnoses     Encounter for screening mammogram for breast cancer        Relevant Orders    Mammo screening bilateral w 3d & cad              Return to office in:  Will call with fasting insulin level  otherwise, recheck labs 3 months ( CMP, A1c, TSH ) followed by appointment    Chief Complaint     Chief Complaint   Patient presents with    Follow-up     6m check up to chronic conditions and to discuss blood work from 2/29/20       History of Present Illness       Patient presents for recheck of chronic medical problems today  Overall she is feeling well  She has been exercising, watching her diet, not drinking  She has lost approximately 15 lb in the past few months  Patient is compliant with all prescribed medications without side effects  Patient had labs drawn on February 29th      The following portions of the patient's history were reviewed and updated as appropriate: allergies, current medications, past family history, past medical history, past social history, past surgical history and problem list     Review of Systems   Review of Systems   Respiratory: Negative  Cardiovascular: Negative  Gastrointestinal: Negative  Genitourinary: Negative          Active Problem List     Patient Active Problem List   Diagnosis    Hypertension    Peptic ulcer disease    Fatty liver    Sinus tachycardia    Chronic sinusitis    Anemia    Anomalous coronary artery origin    Anxiety    Combined hyperlipidemia    Coronary vasospasm (HCC)    Diabetes mellitus (HCC)    Elevated AST (SGOT)    GERD without esophagitis    Ground glass opacity present on imaging of lung    Heart palpitations    Hypothyroid    NSTEMI (non-ST elevated myocardial infarction) ([facility] [address] )    Chest pain due to GERD  Dysfunctional uterine bleeding    Left ovarian cyst    Menopausal symptoms    S/P laparoscopic hysterectomy       Objective   /80 (BP Location: Left arm, Patient Position: Sitting, Cuff Size: Adult)   Pulse 84   Temp (!) 97 °F (36 1 °C) (Tympanic)   Resp 17   Wt 77 3 kg (170 lb 8 oz)   LMP  (LMP Unknown)   SpO2 98%   Breastfeeding No   BMI 31 18 kg/m²     Physical Exam   Cardiovascular: Normal rate, regular rhythm, normal heart sounds and intact distal pulses  Carotids: no bruits  Ext: no edema   Pulmonary/Chest: Effort normal  No respiratory distress  She has no wheezes  She has no rales  Psychiatric: She has a normal mood and affect   Her behavior is normal  Thought content normal        Pertinent Laboratory/Diagnostic Studies:   lab results reviewed    Current Medications     Current Outpatient Medications:     acetaminophen (TYLENOL) 325 mg tablet, Take 3 tablets (975 mg total) by mouth every 8 (eight) hours, Disp: 30 tablet, Rfl: 0    amLODIPine (NORVASC) 5 mg tablet, Take 1 tablet (5 mg total) by mouth daily, Disp: 90 tablet, Rfl: 3    aspirin (ECOTRIN LOW STRENGTH) 81 mg EC tablet, Take 81 mg by mouth daily, Disp: , Rfl:     dicyclomine (BENTYL) 20 mg tablet, Take 1 tablet (20 mg total) by mouth every 6 (six) hours as needed (every 6 hours), Disp: 60 tablet, Rfl: 0    escitalopram (LEXAPRO) 20 mg tablet, Take 1 tablet (20 mg total) by mouth daily, Disp: 90 tablet, Rfl: 0    fenofibrate (TRICOR) 145 mg tablet, Take 1 tablet (145 mg total) by mouth daily, Disp: 90 tablet, Rfl: 0    fluticasone (FLONASE) 50 mcg/act nasal spray, 2 sprays into each nostril 2 (two) times a day as needed  , Disp: , Rfl:     levothyroxine 112 mcg tablet, Take 1 tablet (112 mcg total) by mouth daily, Disp: 90 tablet, Rfl: 1    metFORMIN (GLUCOPHAGE) 500 mg tablet, 1 tab AM, 2 PM, Disp: 270 tablet, Rfl: 0    mupirocin (BACTROBAN) 2 % ointment, Apply topically 3 (three) times a day, Disp: 22 g, Rfl: 5    nitroglycerin (NITROSTAT) 0 4 mg SL tablet, Place 1 tablet under the tongue every 5 (five) minutes as needed for chest pain, Disp: 90 tablet, Rfl: 0    omega-3-acid ethyl esters (LOVAZA) 1 g capsule, Take 4 capsules (4 g total) by mouth daily, Disp: 360 capsule, Rfl: 1    traMADol (ULTRAM) 50 mg tablet, Take 1 tablet (50 mg total) by mouth every 8 (eight) hours as needed for moderate pain, Disp: 60 tablet, Rfl: 0    pantoprazole (PROTONIX) 40 mg tablet, Take 1 tablet (40 mg total) by mouth daily before breakfast, Disp: 90 tablet, Rfl: 1    sitaGLIPtin-metFORMIN (JANUMET)  MG per tablet, Take 1 tablet by mouth 2 (two) times a day with meals, Disp: 180 tablet, Rfl: 1    Health Maintenance     Health Maintenance   Topic Date Due    Pneumococcal Vaccine: Pediatrics (0 to 5 Years) and At-Risk Patients (6 to 59 Years) (1 of 1 - PPSV23) 08/13/1976    BMI: Followup Plan  08/13/1988    MAMMOGRAM  07/20/2016    Diabetic Foot Exam  05/09/2019    DM Eye Exam  08/29/2019    Annual Physical  09/26/2019    HIV Screening  03/05/2020 (Originally 8/13/1985)    HEMOGLOBIN A1C  08/29/2020    URINE MICROALBUMIN  02/28/2021    Depression Screening PHQ  03/04/2021    BMI: Adult  03/04/2021    DTaP,Tdap,and Td Vaccines (2 - Td) 03/04/2025    Pneumococcal Vaccine: 65+ Years (1 of 2 - PCV13) 08/13/2035    Hepatitis C Screening  Completed    Influenza Vaccine  Completed    HIB Vaccine  Aged Out    Hepatitis B Vaccine  Aged Out    IPV Vaccine  Aged Out    Hepatitis A Vaccine  Aged Out    Meningococcal ACWY Vaccine  Aged Out    HPV Vaccine  Aged Out     Immunization History   Administered Date(s) Administered    H1N1, All Formulations 11/05/2009    INFLUENZA 11/01/2019    Influenza TIV (IM) 09/26/2013    Tdap 03/04/2015       Theresa Silveira DO  [facility]

## 2020-03-04 NOTE — ASSESSMENT & PLAN NOTE
Cholesterol 206/132  Continue fenofibrate 145  Will hold off on statin for now due to patient's recent improvement in lifestyle  She has been exercising regularly and watching her diet  She has lost approximately 15 lb

## 2020-03-04 NOTE — ASSESSMENT & PLAN NOTE
History of elevated transaminases  Currently ,   These are higher than usual   Patient has been watching her diet  She has lost weight  Hepatitis serologies were negative  May be iatrogenic ( due to tramadol or fenofibrate)    But in light of increasing values, recommend referral to Gastroenterology ( patient sees Dr Boston Cummings  Next month)

## 2020-03-04 NOTE — ASSESSMENT & PLAN NOTE
Lab Results   Component Value Date    HGBA1C 8 4 (H) 02/29/2020     A1c now 8 4%, was 7 6%  Patient is compliant with metformin 1500 mg daily  She has been watching her diet and exercising  She has lost approximately 15 lb  Unfortunately her diabetic control has worsened  Will change to Janumet 50/1000 b i d     Will also check fasting insulin level in near future  Will call with results  Possible refer to endocrinology    Recheck labs in 3 months followed by appointment

## 2020-03-18 ENCOUNTER — TELEPHONE (OUTPATIENT)
Dept: FAMILY MEDICINE CLINIC | Facility: CLINIC | Age: 50
End: 2020-03-18

## 2020-03-18 DIAGNOSIS — E11.9 TYPE 2 DIABETES MELLITUS WITHOUT COMPLICATION, WITHOUT LONG-TERM CURRENT USE OF INSULIN (HCC): Primary | ICD-10-CM

## 2020-03-18 NOTE — TELEPHONE ENCOUNTER
I DID SEE THERE WAS A PRIOR AUTH REQUEST THAT IS JUST SCANNED IN HER CHART, WHICH I NEVER RECEIVED  I LOOKED UP THE [address] [place] [address] AND IT SAY'S INVOKAMET IS A TIER 3 AND IT DOES NOT SAY IT NEEDS A PA,  IT IS FORMULARY  IF YOU WANT TO SEND THAT YOU CAN  IF IT NEEDS AN APPROVAL WE CAN DO IT THEN   I DON'T THINK IT WILL NEED A PA  Andrae León IS NOT EVEN LISTED

## 2020-03-18 NOTE — PROGRESS NOTES
Janumet not covered under patient's formulary  Will switch to invokamet 50/1000 bid    Patient instructed to call if further problems

## 2020-03-19 DIAGNOSIS — F32.A DEPRESSION, UNSPECIFIED DEPRESSION TYPE: ICD-10-CM

## 2020-03-19 DIAGNOSIS — K58.9 IRRITABLE BOWEL SYNDROME WITHOUT DIARRHEA: ICD-10-CM

## 2020-03-19 DIAGNOSIS — E78.2 COMBINED HYPERLIPIDEMIA: ICD-10-CM

## 2020-03-19 DIAGNOSIS — J01.90 ACUTE SINUSITIS, RECURRENCE NOT SPECIFIED, UNSPECIFIED LOCATION: ICD-10-CM

## 2020-03-19 RX ORDER — TRAMADOL HYDROCHLORIDE 50 MG/1
50 TABLET ORAL EVERY 8 HOURS PRN
Qty: 60 TABLET | Refills: 0 | Status: SHIPPED | OUTPATIENT
Start: 2020-03-19 | End: 2020-04-10 | Stop reason: SDUPTHER

## 2020-03-19 RX ORDER — OMEGA-3-ACID ETHYL ESTERS 1 G/1
4 CAPSULE, LIQUID FILLED ORAL DAILY
Qty: 360 CAPSULE | Refills: 0 | Status: SHIPPED | OUTPATIENT
Start: 2020-03-19 | End: 2020-07-04 | Stop reason: SDUPTHER

## 2020-03-19 RX ORDER — DICYCLOMINE HCL 20 MG
20 TABLET ORAL EVERY 6 HOURS PRN
Qty: 60 TABLET | Refills: 0 | Status: SHIPPED | OUTPATIENT
Start: 2020-03-19 | End: 2020-06-14 | Stop reason: SDUPTHER

## 2020-03-19 RX ORDER — ESCITALOPRAM OXALATE 20 MG/1
20 TABLET ORAL DAILY
Qty: 90 TABLET | Refills: 0 | Status: SHIPPED | OUTPATIENT
Start: 2020-03-19 | End: 2020-07-01 | Stop reason: SDUPTHER

## 2020-04-08 ENCOUNTER — TELEMEDICINE (OUTPATIENT)
Dept: FAMILY MEDICINE CLINIC | Facility: CLINIC | Age: 50
End: 2020-04-08
Payer: COMMERCIAL

## 2020-04-08 DIAGNOSIS — T88.7XXA MEDICATION SIDE EFFECT: ICD-10-CM

## 2020-04-08 DIAGNOSIS — B37.3 VAGINA, CANDIDIASIS: ICD-10-CM

## 2020-04-08 DIAGNOSIS — E11.9 TYPE 2 DIABETES MELLITUS WITHOUT COMPLICATION, WITHOUT LONG-TERM CURRENT USE OF INSULIN (HCC): Primary | ICD-10-CM

## 2020-04-08 PROCEDURE — 99213 OFFICE O/P EST LOW 20 MIN: CPT | Performed by: FAMILY MEDICINE

## 2020-04-08 RX ORDER — FLUCONAZOLE 150 MG/1
150 TABLET ORAL ONCE
Qty: 1 TABLET | Refills: 1 | Status: SHIPPED | OUTPATIENT
Start: 2020-04-08 | End: 2020-04-08

## 2020-04-09 ENCOUNTER — TELEPHONE (OUTPATIENT)
Dept: FAMILY MEDICINE CLINIC | Facility: CLINIC | Age: 50
End: 2020-04-09

## 2020-04-09 DIAGNOSIS — E11.9 TYPE 2 DIABETES MELLITUS WITHOUT COMPLICATION, WITHOUT LONG-TERM CURRENT USE OF INSULIN (HCC): Primary | ICD-10-CM

## 2020-04-10 DIAGNOSIS — J01.90 ACUTE SINUSITIS, RECURRENCE NOT SPECIFIED, UNSPECIFIED LOCATION: ICD-10-CM

## 2020-04-10 RX ORDER — TRAMADOL HYDROCHLORIDE 50 MG/1
50 TABLET ORAL EVERY 8 HOURS PRN
Qty: 60 TABLET | Refills: 0 | Status: SHIPPED | OUTPATIENT
Start: 2020-04-10 | End: 2020-05-03 | Stop reason: SDUPTHER

## 2020-04-28 ENCOUNTER — TELEMEDICINE (OUTPATIENT)
Dept: GASTROENTEROLOGY | Facility: CLINIC | Age: 50
End: 2020-04-28
Payer: COMMERCIAL

## 2020-04-28 VITALS — BODY MASS INDEX: 30.18 KG/M2 | WEIGHT: 164 LBS | HEIGHT: 62 IN

## 2020-04-28 DIAGNOSIS — K21.9 GASTROESOPHAGEAL REFLUX DISEASE WITHOUT ESOPHAGITIS: ICD-10-CM

## 2020-04-28 DIAGNOSIS — K21.9 CHEST PAIN DUE TO GERD: ICD-10-CM

## 2020-04-28 DIAGNOSIS — R07.9 CHEST PAIN DUE TO GERD: ICD-10-CM

## 2020-04-28 DIAGNOSIS — K76.0 FATTY LIVER: Primary | Chronic | ICD-10-CM

## 2020-04-28 PROCEDURE — 3008F BODY MASS INDEX DOCD: CPT | Performed by: INTERNAL MEDICINE

## 2020-04-28 PROCEDURE — 1036F TOBACCO NON-USER: CPT | Performed by: INTERNAL MEDICINE

## 2020-04-28 PROCEDURE — 3052F HG A1C>EQUAL 8.0%<EQUAL 9.0%: CPT | Performed by: INTERNAL MEDICINE

## 2020-04-28 PROCEDURE — 99214 OFFICE O/P EST MOD 30 MIN: CPT | Performed by: INTERNAL MEDICINE

## 2020-04-28 PROCEDURE — 3079F DIAST BP 80-89 MM HG: CPT | Performed by: INTERNAL MEDICINE

## 2020-04-28 PROCEDURE — 3075F SYST BP GE 130 - 139MM HG: CPT | Performed by: INTERNAL MEDICINE

## 2020-04-29 ENCOUNTER — APPOINTMENT (OUTPATIENT)
Dept: LAB | Facility: CLINIC | Age: 50
End: 2020-04-29
Payer: COMMERCIAL

## 2020-04-29 DIAGNOSIS — K76.0 FATTY LIVER: ICD-10-CM

## 2020-04-29 LAB
ALBUMIN SERPL BCP-MCNC: 4.1 G/DL (ref 3.5–5)
ALP SERPL-CCNC: 97 U/L (ref 46–116)
ALT SERPL W P-5'-P-CCNC: 118 U/L (ref 12–78)
ANION GAP SERPL CALCULATED.3IONS-SCNC: 9 MMOL/L (ref 4–13)
AST SERPL W P-5'-P-CCNC: 128 U/L (ref 5–45)
BILIRUB SERPL-MCNC: 0.15 MG/DL (ref 0.2–1)
BUN SERPL-MCNC: 9 MG/DL (ref 5–25)
CALCIUM SERPL-MCNC: 9 MG/DL (ref 8.3–10.1)
CHLORIDE SERPL-SCNC: 101 MMOL/L (ref 100–108)
CO2 SERPL-SCNC: 26 MMOL/L (ref 21–32)
CREAT SERPL-MCNC: 0.87 MG/DL (ref 0.6–1.3)
FERRITIN SERPL-MCNC: 29 NG/ML (ref 8–388)
GFR SERPL CREATININE-BSD FRML MDRD: 78 ML/MIN/1.73SQ M
GLUCOSE SERPL-MCNC: 175 MG/DL (ref 65–140)
HAV AB SER QL IA: NORMAL
HBV CORE AB SER QL: NORMAL
HBV CORE IGM SER QL: NORMAL
HBV SURFACE AB SER-ACNC: >1000 MIU/ML
HBV SURFACE AG SER QL: NORMAL
HCV AB SER QL: NORMAL
IGA SERPL-MCNC: 293 MG/DL (ref 70–400)
IGG SERPL-MCNC: 683 MG/DL (ref 700–1600)
IGM SERPL-MCNC: 185 MG/DL (ref 40–230)
INR PPP: 1.18 (ref 0.84–1.19)
IRON SATN MFR SERPL: 5 %
IRON SERPL-MCNC: 33 UG/DL (ref 50–170)
POTASSIUM SERPL-SCNC: 4.5 MMOL/L (ref 3.5–5.3)
PROT SERPL-MCNC: 7.7 G/DL (ref 6.4–8.2)
PROTHROMBIN TIME: 14.4 SECONDS (ref 11.6–14.5)
SODIUM SERPL-SCNC: 136 MMOL/L (ref 136–145)
TIBC SERPL-MCNC: 648 UG/DL (ref 250–450)

## 2020-04-29 PROCEDURE — 86255 FLUORESCENT ANTIBODY SCREEN: CPT

## 2020-04-29 PROCEDURE — 86706 HEP B SURFACE ANTIBODY: CPT

## 2020-04-29 PROCEDURE — 86256 FLUORESCENT ANTIBODY TITER: CPT

## 2020-04-29 PROCEDURE — 83540 ASSAY OF IRON: CPT

## 2020-04-29 PROCEDURE — 83550 IRON BINDING TEST: CPT

## 2020-04-29 PROCEDURE — 86235 NUCLEAR ANTIGEN ANTIBODY: CPT

## 2020-04-29 PROCEDURE — 82390 ASSAY OF CERULOPLASMIN: CPT

## 2020-04-29 PROCEDURE — 86038 ANTINUCLEAR ANTIBODIES: CPT

## 2020-04-29 PROCEDURE — 86803 HEPATITIS C AB TEST: CPT

## 2020-04-29 PROCEDURE — 86376 MICROSOMAL ANTIBODY EACH: CPT

## 2020-04-29 PROCEDURE — 83516 IMMUNOASSAY NONANTIBODY: CPT

## 2020-04-29 PROCEDURE — 85610 PROTHROMBIN TIME: CPT

## 2020-04-29 PROCEDURE — 86708 HEPATITIS A ANTIBODY: CPT

## 2020-04-29 PROCEDURE — 80053 COMPREHEN METABOLIC PANEL: CPT

## 2020-04-29 PROCEDURE — 82103 ALPHA-1-ANTITRYPSIN TOTAL: CPT

## 2020-04-29 PROCEDURE — 82784 ASSAY IGA/IGD/IGG/IGM EACH: CPT

## 2020-04-29 PROCEDURE — 86704 HEP B CORE ANTIBODY TOTAL: CPT

## 2020-04-29 PROCEDURE — 87340 HEPATITIS B SURFACE AG IA: CPT

## 2020-04-29 PROCEDURE — 36415 COLL VENOUS BLD VENIPUNCTURE: CPT

## 2020-04-29 PROCEDURE — 86705 HEP B CORE ANTIBODY IGM: CPT

## 2020-04-29 PROCEDURE — 82728 ASSAY OF FERRITIN: CPT

## 2020-04-30 LAB
A1AT SERPL-MCNC: 100 MG/DL (ref 101–187)
ACTIN IGG SERPL-ACNC: 3 UNITS (ref 0–19)
CERULOPLASMIN SERPL-MCNC: 23.9 MG/DL (ref 19–39)
ENDOMYSIUM IGA SER QL: NEGATIVE
GLIADIN PEPTIDE IGA SER-ACNC: 7 UNITS (ref 0–19)
GLIADIN PEPTIDE IGG SER-ACNC: 2 UNITS (ref 0–19)
IGA SERPL-MCNC: 343 MG/DL (ref 87–352)
LKM-1 AB SER-ACNC: <20.1 UNITS (ref 0–20)
MITOCHONDRIA M2 IGG SER-ACNC: <20 UNITS (ref 0–20)
TTG IGA SER-ACNC: <2 U/ML (ref 0–3)
TTG IGG SER-ACNC: <2 U/ML (ref 0–5)

## 2020-05-01 LAB — RYE IGE QN: NEGATIVE

## 2020-05-03 DIAGNOSIS — E78.00 HYPERCHOLESTEREMIA: ICD-10-CM

## 2020-05-03 DIAGNOSIS — J01.90 ACUTE SINUSITIS, RECURRENCE NOT SPECIFIED, UNSPECIFIED LOCATION: ICD-10-CM

## 2020-05-03 DIAGNOSIS — R07.89 OTHER CHEST PAIN: ICD-10-CM

## 2020-05-04 RX ORDER — TRAMADOL HYDROCHLORIDE 50 MG/1
50 TABLET ORAL EVERY 8 HOURS PRN
Qty: 60 TABLET | Refills: 0 | Status: SHIPPED | OUTPATIENT
Start: 2020-05-04 | End: 2020-05-26 | Stop reason: SDUPTHER

## 2020-05-04 RX ORDER — AMLODIPINE BESYLATE 5 MG/1
5 TABLET ORAL DAILY
Qty: 90 TABLET | Refills: 2 | Status: SHIPPED | OUTPATIENT
Start: 2020-05-04 | End: 2020-08-18 | Stop reason: SDUPTHER

## 2020-05-04 RX ORDER — FENOFIBRATE 145 MG/1
145 TABLET, COATED ORAL DAILY
Qty: 90 TABLET | Refills: 0 | Status: SHIPPED | OUTPATIENT
Start: 2020-05-04 | End: 2020-08-08 | Stop reason: SDUPTHER

## 2020-05-05 ENCOUNTER — TELEPHONE (OUTPATIENT)
Dept: GASTROENTEROLOGY | Facility: CLINIC | Age: 50
End: 2020-05-05

## 2020-05-13 ENCOUNTER — HOSPITAL ENCOUNTER (OUTPATIENT)
Dept: RADIOLOGY | Facility: HOSPITAL | Age: 50
Discharge: HOME | End: 2020-05-13
Attending: INTERNAL MEDICINE
Payer: COMMERCIAL

## 2020-05-13 DIAGNOSIS — K76.0 FATTY LIVER: Chronic | ICD-10-CM

## 2020-05-13 DIAGNOSIS — K76.0 FATTY LIVER: ICD-10-CM

## 2020-05-13 PROCEDURE — 76705 ECHO EXAM OF ABDOMEN: CPT

## 2020-05-13 PROCEDURE — 76981 USE PARENCHYMA: CPT

## 2020-05-14 DIAGNOSIS — E11.9 TYPE 2 DIABETES MELLITUS WITHOUT COMPLICATION, WITHOUT LONG-TERM CURRENT USE OF INSULIN (HCC): ICD-10-CM

## 2020-05-20 ENCOUNTER — TELEMEDICINE (OUTPATIENT)
Dept: GASTROENTEROLOGY | Facility: CLINIC | Age: 50
End: 2020-05-20
Payer: COMMERCIAL

## 2020-05-20 DIAGNOSIS — K21.9 GASTROESOPHAGEAL REFLUX DISEASE WITHOUT ESOPHAGITIS: ICD-10-CM

## 2020-05-20 DIAGNOSIS — Z12.11 COLON CANCER SCREENING: ICD-10-CM

## 2020-05-20 DIAGNOSIS — Z20.822 ENCOUNTER FOR LABORATORY TESTING FOR COVID-19 VIRUS: ICD-10-CM

## 2020-05-20 DIAGNOSIS — K21.9 CHEST PAIN DUE TO GERD: Primary | ICD-10-CM

## 2020-05-20 DIAGNOSIS — R07.9 CHEST PAIN DUE TO GERD: Primary | ICD-10-CM

## 2020-05-20 DIAGNOSIS — K76.0 FATTY LIVER: Chronic | ICD-10-CM

## 2020-05-20 PROBLEM — K27.9 PEPTIC ULCER DISEASE: Chronic | Status: RESOLVED | Noted: 2017-10-03 | Resolved: 2020-05-20

## 2020-05-20 PROCEDURE — 99442 PR PHYS/QHP TELEPHONE EVALUATION 11-20 MIN: CPT | Performed by: INTERNAL MEDICINE

## 2020-05-21 ENCOUNTER — TELEPHONE (OUTPATIENT)
Dept: GASTROENTEROLOGY | Facility: CLINIC | Age: 50
End: 2020-05-21

## 2020-05-26 ENCOUNTER — TELEPHONE (OUTPATIENT)
Dept: RADIOLOGY | Facility: HOSPITAL | Age: 50
End: 2020-05-26

## 2020-05-26 DIAGNOSIS — K21.9 GERD WITHOUT ESOPHAGITIS: ICD-10-CM

## 2020-05-26 DIAGNOSIS — J01.90 ACUTE SINUSITIS, RECURRENCE NOT SPECIFIED, UNSPECIFIED LOCATION: ICD-10-CM

## 2020-05-26 DIAGNOSIS — E03.8 HYPOTHYROIDISM DUE TO HASHIMOTO'S THYROIDITIS: ICD-10-CM

## 2020-05-26 DIAGNOSIS — E06.3 HYPOTHYROIDISM DUE TO HASHIMOTO'S THYROIDITIS: ICD-10-CM

## 2020-05-26 RX ORDER — TRAMADOL HYDROCHLORIDE 50 MG/1
50 TABLET ORAL EVERY 8 HOURS PRN
Qty: 60 TABLET | Refills: 0 | Status: SHIPPED | OUTPATIENT
Start: 2020-05-26 | End: 2020-06-15 | Stop reason: SDUPTHER

## 2020-05-26 RX ORDER — PANTOPRAZOLE SODIUM 40 MG/1
40 TABLET, DELAYED RELEASE ORAL
Qty: 90 TABLET | Refills: 0 | Status: SHIPPED | OUTPATIENT
Start: 2020-05-26 | End: 2020-12-15 | Stop reason: SDUPTHER

## 2020-05-26 RX ORDER — LEVOTHYROXINE SODIUM 112 UG/1
112 TABLET ORAL DAILY
Qty: 90 TABLET | Refills: 0 | Status: SHIPPED | OUTPATIENT
Start: 2020-05-26 | End: 2020-08-18 | Stop reason: SDUPTHER

## 2020-05-26 RX ORDER — SODIUM CHLORIDE 9 MG/ML
75 INJECTION, SOLUTION INTRAVENOUS CONTINUOUS
Status: CANCELLED | OUTPATIENT
Start: 2020-05-26

## 2020-05-28 ENCOUNTER — TELEPHONE (OUTPATIENT)
Dept: INPATIENT UNIT | Facility: HOSPITAL | Age: 50
End: 2020-05-28

## 2020-05-29 ENCOUNTER — HOSPITAL ENCOUNTER (OUTPATIENT)
Dept: RADIOLOGY | Facility: HOSPITAL | Age: 50
Discharge: HOME | End: 2020-05-29
Attending: RADIOLOGY | Admitting: RADIOLOGY
Payer: COMMERCIAL

## 2020-05-29 VITALS
WEIGHT: 162 LBS | RESPIRATION RATE: 16 BRPM | HEART RATE: 80 BPM | BODY MASS INDEX: 28.7 KG/M2 | OXYGEN SATURATION: 94 % | TEMPERATURE: 98.7 F | DIASTOLIC BLOOD PRESSURE: 50 MMHG | SYSTOLIC BLOOD PRESSURE: 92 MMHG | HEIGHT: 63 IN

## 2020-05-29 DIAGNOSIS — K76.0 FATTY LIVER: ICD-10-CM

## 2020-05-29 PROCEDURE — 76942 ECHO GUIDE FOR BIOPSY: CPT | Performed by: RADIOLOGY

## 2020-05-29 PROCEDURE — 88307 TISSUE EXAM BY PATHOLOGIST: CPT | Performed by: PATHOLOGY

## 2020-05-29 PROCEDURE — 99152 MOD SED SAME PHYS/QHP 5/>YRS: CPT | Performed by: RADIOLOGY

## 2020-05-29 PROCEDURE — 88313 SPECIAL STAINS GROUP 2: CPT | Performed by: PATHOLOGY

## 2020-05-29 PROCEDURE — 47000 NEEDLE BIOPSY OF LIVER PERQ: CPT

## 2020-05-29 PROCEDURE — 47000 NEEDLE BIOPSY OF LIVER PERQ: CPT | Performed by: RADIOLOGY

## 2020-05-29 PROCEDURE — 76942 ECHO GUIDE FOR BIOPSY: CPT

## 2020-05-29 RX ORDER — FENTANYL CITRATE 50 UG/ML
INJECTION, SOLUTION INTRAMUSCULAR; INTRAVENOUS CODE/TRAUMA/SEDATION MEDICATION
Status: COMPLETED | OUTPATIENT
Start: 2020-05-29 | End: 2020-05-29

## 2020-05-29 RX ORDER — SODIUM CHLORIDE 9 MG/ML
75 INJECTION, SOLUTION INTRAVENOUS CONTINUOUS
Status: DISCONTINUED | OUTPATIENT
Start: 2020-05-29 | End: 2020-05-29 | Stop reason: HOSPADM

## 2020-05-29 RX ORDER — MIDAZOLAM HYDROCHLORIDE 2 MG/2ML
INJECTION, SOLUTION INTRAMUSCULAR; INTRAVENOUS CODE/TRAUMA/SEDATION MEDICATION
Status: COMPLETED | OUTPATIENT
Start: 2020-05-29 | End: 2020-05-29

## 2020-05-29 RX ORDER — OXYCODONE HYDROCHLORIDE AND ACETAMINOPHEN 5; 325 MG/1; MG/1
1 TABLET ORAL EVERY 4 HOURS PRN
Status: DISCONTINUED | OUTPATIENT
Start: 2020-05-29 | End: 2020-05-29 | Stop reason: HOSPADM

## 2020-05-29 RX ADMIN — FENTANYL CITRATE 50 MCG: 50 INJECTION, SOLUTION INTRAMUSCULAR; INTRAVENOUS at 08:34

## 2020-05-29 RX ADMIN — MIDAZOLAM 2 MG: 1 INJECTION INTRAMUSCULAR; INTRAVENOUS at 08:12

## 2020-05-29 RX ADMIN — FENTANYL CITRATE 50 MCG: 50 INJECTION, SOLUTION INTRAMUSCULAR; INTRAVENOUS at 08:12

## 2020-05-29 RX ADMIN — SODIUM CHLORIDE 75 ML/HR: 0.9 INJECTION, SOLUTION INTRAVENOUS at 07:31

## 2020-05-29 RX ADMIN — MIDAZOLAM 1 MG: 1 INJECTION INTRAMUSCULAR; INTRAVENOUS at 08:34

## 2020-05-29 RX ADMIN — FENTANYL CITRATE 50 MCG: 50 INJECTION, SOLUTION INTRAMUSCULAR; INTRAVENOUS at 08:26

## 2020-05-29 RX ADMIN — MIDAZOLAM 1 MG: 1 INJECTION INTRAMUSCULAR; INTRAVENOUS at 08:26

## 2020-06-04 ENCOUNTER — APPOINTMENT (OUTPATIENT)
Dept: LAB | Facility: CLINIC | Age: 50
End: 2020-06-04
Payer: COMMERCIAL

## 2020-06-04 DIAGNOSIS — E11.9 TYPE 2 DIABETES MELLITUS WITHOUT COMPLICATION, WITHOUT LONG-TERM CURRENT USE OF INSULIN (HCC): Primary | ICD-10-CM

## 2020-06-04 DIAGNOSIS — E78.2 COMBINED HYPERLIPIDEMIA: ICD-10-CM

## 2020-06-04 DIAGNOSIS — E11.9 TYPE 2 DIABETES MELLITUS WITHOUT COMPLICATION, WITHOUT LONG-TERM CURRENT USE OF INSULIN (HCC): ICD-10-CM

## 2020-06-04 DIAGNOSIS — K76.0 FATTY LIVER: Primary | Chronic | ICD-10-CM

## 2020-06-04 DIAGNOSIS — R74.01 ELEVATED AST (SGOT): ICD-10-CM

## 2020-06-04 LAB
ALBUMIN SERPL BCP-MCNC: 4 G/DL (ref 3.5–5)
ALP SERPL-CCNC: 89 U/L (ref 46–116)
ALT SERPL W P-5'-P-CCNC: 136 U/L (ref 12–78)
ANION GAP SERPL CALCULATED.3IONS-SCNC: 10 MMOL/L (ref 4–13)
AST SERPL W P-5'-P-CCNC: 131 U/L (ref 5–45)
BILIRUB SERPL-MCNC: 0.16 MG/DL (ref 0.2–1)
BUN SERPL-MCNC: 12 MG/DL (ref 5–25)
CALCIUM SERPL-MCNC: 9.1 MG/DL (ref 8.3–10.1)
CHLORIDE SERPL-SCNC: 102 MMOL/L (ref 100–108)
CHOLEST SERPL-MCNC: 185 MG/DL (ref 50–200)
CO2 SERPL-SCNC: 24 MMOL/L (ref 21–32)
CREAT SERPL-MCNC: 0.84 MG/DL (ref 0.6–1.3)
EST. AVERAGE GLUCOSE BLD GHB EST-MCNC: 183 MG/DL
GFR SERPL CREATININE-BSD FRML MDRD: 82 ML/MIN/1.73SQ M
GLUCOSE P FAST SERPL-MCNC: 178 MG/DL (ref 65–99)
HBA1C MFR BLD: 8 %
HDLC SERPL-MCNC: 36 MG/DL
LDLC SERPL CALC-MCNC: 115 MG/DL (ref 0–100)
POTASSIUM SERPL-SCNC: 4.5 MMOL/L (ref 3.5–5.3)
PROT SERPL-MCNC: 7.7 G/DL (ref 6.4–8.2)
SODIUM SERPL-SCNC: 136 MMOL/L (ref 136–145)
TRIGL SERPL-MCNC: 168 MG/DL
TSH SERPL DL<=0.05 MIU/L-ACNC: 1.12 UIU/ML (ref 0.36–3.74)

## 2020-06-04 PROCEDURE — 36415 COLL VENOUS BLD VENIPUNCTURE: CPT

## 2020-06-04 PROCEDURE — 84443 ASSAY THYROID STIM HORMONE: CPT

## 2020-06-04 PROCEDURE — 83036 HEMOGLOBIN GLYCOSYLATED A1C: CPT

## 2020-06-04 PROCEDURE — 80061 LIPID PANEL: CPT

## 2020-06-04 PROCEDURE — 80053 COMPREHEN METABOLIC PANEL: CPT

## 2020-06-05 ENCOUNTER — TELEMEDICINE (OUTPATIENT)
Dept: FAMILY MEDICINE CLINIC | Facility: CLINIC | Age: 50
End: 2020-06-05
Payer: COMMERCIAL

## 2020-06-05 ENCOUNTER — CLINICAL SUPPORT (OUTPATIENT)
Dept: FAMILY MEDICINE CLINIC | Facility: CLINIC | Age: 50
End: 2020-06-05
Payer: COMMERCIAL

## 2020-06-05 DIAGNOSIS — I21.4 NSTEMI (NON-ST ELEVATED MYOCARDIAL INFARCTION) (HCC): ICD-10-CM

## 2020-06-05 DIAGNOSIS — E03.9 HYPOTHYROIDISM, UNSPECIFIED TYPE: ICD-10-CM

## 2020-06-05 DIAGNOSIS — E11.9 TYPE 2 DIABETES MELLITUS WITHOUT COMPLICATION, WITHOUT LONG-TERM CURRENT USE OF INSULIN (HCC): Primary | ICD-10-CM

## 2020-06-05 DIAGNOSIS — K75.81 NASH (NONALCOHOLIC STEATOHEPATITIS): ICD-10-CM

## 2020-06-05 DIAGNOSIS — K21.9 GASTROESOPHAGEAL REFLUX DISEASE WITHOUT ESOPHAGITIS: ICD-10-CM

## 2020-06-05 DIAGNOSIS — E78.2 COMBINED HYPERLIPIDEMIA: ICD-10-CM

## 2020-06-05 DIAGNOSIS — R74.01 ELEVATED AST (SGOT): ICD-10-CM

## 2020-06-05 DIAGNOSIS — Z23 NEED FOR VACCINATION: Primary | ICD-10-CM

## 2020-06-05 PROCEDURE — 99214 OFFICE O/P EST MOD 30 MIN: CPT | Performed by: FAMILY MEDICINE

## 2020-06-05 PROCEDURE — 90632 HEPA VACCINE ADULT IM: CPT | Performed by: FAMILY MEDICINE

## 2020-06-05 PROCEDURE — 90471 IMMUNIZATION ADMIN: CPT | Performed by: FAMILY MEDICINE

## 2020-06-10 ENCOUNTER — TELEPHONE (OUTPATIENT)
Dept: ENDOCRINOLOGY | Facility: HOSPITAL | Age: 50
End: 2020-06-10

## 2020-06-10 ENCOUNTER — TELEMEDICINE (OUTPATIENT)
Dept: ENDOCRINOLOGY | Facility: HOSPITAL | Age: 50
End: 2020-06-10
Payer: COMMERCIAL

## 2020-06-10 DIAGNOSIS — E11.65 TYPE 2 DIABETES MELLITUS WITH HYPERGLYCEMIA, WITHOUT LONG-TERM CURRENT USE OF INSULIN (HCC): ICD-10-CM

## 2020-06-10 DIAGNOSIS — E11.9 TYPE 2 DIABETES MELLITUS WITHOUT COMPLICATION, WITHOUT LONG-TERM CURRENT USE OF INSULIN (HCC): Primary | ICD-10-CM

## 2020-06-10 DIAGNOSIS — I10 ESSENTIAL HYPERTENSION: Chronic | ICD-10-CM

## 2020-06-10 DIAGNOSIS — E03.9 ACQUIRED HYPOTHYROIDISM: ICD-10-CM

## 2020-06-10 DIAGNOSIS — E78.2 COMBINED HYPERLIPIDEMIA: ICD-10-CM

## 2020-06-10 DIAGNOSIS — E11.65 TYPE 2 DIABETES MELLITUS WITH HYPERGLYCEMIA, WITHOUT LONG-TERM CURRENT USE OF INSULIN (HCC): Primary | ICD-10-CM

## 2020-06-10 PROBLEM — T88.7XXA MEDICATION SIDE EFFECT: Status: RESOLVED | Noted: 2020-04-08 | Resolved: 2020-06-10

## 2020-06-10 PROBLEM — R00.2 HEART PALPITATIONS: Status: RESOLVED | Noted: 2017-10-20 | Resolved: 2020-06-10

## 2020-06-10 PROCEDURE — 99202 OFFICE O/P NEW SF 15 MIN: CPT | Performed by: INTERNAL MEDICINE

## 2020-06-10 RX ORDER — METOPROLOL SUCCINATE 25 MG/1
25 TABLET, EXTENDED RELEASE ORAL 2 TIMES DAILY
COMMUNITY
Start: 2020-05-15 | End: 2020-11-19 | Stop reason: SDUPTHER

## 2020-06-10 RX ORDER — LANCETS 33 GAUGE
EACH MISCELLANEOUS
Qty: 200 EACH | Refills: 6 | Status: SHIPPED | OUTPATIENT
Start: 2020-06-10

## 2020-06-10 RX ORDER — MELATONIN/PYRIDOXINE HCL (B6) 10 MG-10MG
TABLET,IMMED, EXTENDED RELEASE, BIPHASIC ORAL
COMMUNITY
End: 2021-07-01

## 2020-06-10 RX ORDER — BLOOD SUGAR DIAGNOSTIC
STRIP MISCELLANEOUS
Qty: 200 EACH | Refills: 6 | Status: SHIPPED | OUTPATIENT
Start: 2020-06-10 | End: 2021-08-19 | Stop reason: SDUPTHER

## 2020-06-10 RX ORDER — MAGNESIUM OXIDE/MAG AA CHELATE 300 MG
600 CAPSULE ORAL DAILY
COMMUNITY

## 2020-06-10 RX ORDER — BLOOD-GLUCOSE METER
EACH MISCELLANEOUS
Qty: 1 KIT | Refills: 0 | Status: SHIPPED | OUTPATIENT
Start: 2020-06-10

## 2020-06-14 DIAGNOSIS — K58.9 IRRITABLE BOWEL SYNDROME WITHOUT DIARRHEA: ICD-10-CM

## 2020-06-14 DIAGNOSIS — E11.9 TYPE 2 DIABETES MELLITUS WITHOUT COMPLICATION, WITHOUT LONG-TERM CURRENT USE OF INSULIN (HCC): ICD-10-CM

## 2020-06-15 DIAGNOSIS — J01.90 ACUTE SINUSITIS, RECURRENCE NOT SPECIFIED, UNSPECIFIED LOCATION: ICD-10-CM

## 2020-06-15 RX ORDER — TRAMADOL HYDROCHLORIDE 50 MG/1
50 TABLET ORAL EVERY 8 HOURS PRN
Qty: 60 TABLET | Refills: 0 | Status: SHIPPED | OUTPATIENT
Start: 2020-06-15 | End: 2020-07-04 | Stop reason: SDUPTHER

## 2020-06-15 RX ORDER — TRAMADOL HYDROCHLORIDE 50 MG/1
50 TABLET ORAL EVERY 8 HOURS PRN
Qty: 60 TABLET | Refills: 0 | Status: CANCELLED | OUTPATIENT
Start: 2020-06-15

## 2020-06-15 RX ORDER — DICYCLOMINE HCL 20 MG
20 TABLET ORAL EVERY 6 HOURS PRN
Qty: 60 TABLET | Refills: 0 | Status: SHIPPED | OUTPATIENT
Start: 2020-06-15 | End: 2020-07-28 | Stop reason: SDUPTHER

## 2020-06-22 ENCOUNTER — PREP FOR PROCEDURE (OUTPATIENT)
Dept: GASTROENTEROLOGY | Facility: CLINIC | Age: 50
End: 2020-06-22

## 2020-06-22 DIAGNOSIS — Z20.822 ENCOUNTER FOR LABORATORY TESTING FOR COVID-19 VIRUS: Primary | ICD-10-CM

## 2020-07-01 DIAGNOSIS — F32.A DEPRESSION, UNSPECIFIED DEPRESSION TYPE: ICD-10-CM

## 2020-07-02 RX ORDER — ESCITALOPRAM OXALATE 20 MG/1
20 TABLET ORAL DAILY
Qty: 90 TABLET | Refills: 0 | Status: SHIPPED | OUTPATIENT
Start: 2020-07-02 | End: 2020-09-30 | Stop reason: SDUPTHER

## 2020-07-03 DIAGNOSIS — Z20.822 ENCOUNTER FOR LABORATORY TESTING FOR COVID-19 VIRUS: ICD-10-CM

## 2020-07-03 PROCEDURE — U0003 INFECTIOUS AGENT DETECTION BY NUCLEIC ACID (DNA OR RNA); SEVERE ACUTE RESPIRATORY SYNDROME CORONAVIRUS 2 (SARS-COV-2) (CORONAVIRUS DISEASE [COVID-19]), AMPLIFIED PROBE TECHNIQUE, MAKING USE OF HIGH THROUGHPUT TECHNOLOGIES AS DESCRIBED BY CMS-2020-01-R: HCPCS | Performed by: INTERNAL MEDICINE

## 2020-07-04 DIAGNOSIS — J01.90 ACUTE SINUSITIS, RECURRENCE NOT SPECIFIED, UNSPECIFIED LOCATION: ICD-10-CM

## 2020-07-04 DIAGNOSIS — E78.2 COMBINED HYPERLIPIDEMIA: ICD-10-CM

## 2020-07-04 LAB — INPATIENT: NORMAL

## 2020-07-06 RX ORDER — TRAMADOL HYDROCHLORIDE 50 MG/1
50 TABLET ORAL EVERY 8 HOURS PRN
Qty: 60 TABLET | Refills: 0 | Status: SHIPPED | OUTPATIENT
Start: 2020-07-06 | End: 2020-07-28 | Stop reason: SDUPTHER

## 2020-07-06 RX ORDER — OMEGA-3-ACID ETHYL ESTERS 1 G/1
4 CAPSULE, LIQUID FILLED ORAL DAILY
Qty: 360 CAPSULE | Refills: 0 | Status: SHIPPED | OUTPATIENT
Start: 2020-07-06 | End: 2020-10-12 | Stop reason: SDUPTHER

## 2020-07-09 ENCOUNTER — HOSPITAL ENCOUNTER (OUTPATIENT)
Dept: GASTROENTEROLOGY | Facility: HOSPITAL | Age: 50
Setting detail: OUTPATIENT SURGERY
Discharge: HOME | End: 2020-07-09
Attending: INTERNAL MEDICINE | Admitting: INTERNAL MEDICINE
Payer: COMMERCIAL

## 2020-07-09 ENCOUNTER — ANESTHESIA (OUTPATIENT)
Dept: GASTROENTEROLOGY | Facility: HOSPITAL | Age: 50
End: 2020-07-09

## 2020-07-09 ENCOUNTER — ANESTHESIA EVENT (OUTPATIENT)
Dept: GASTROENTEROLOGY | Facility: HOSPITAL | Age: 50
End: 2020-07-09

## 2020-07-09 VITALS
DIASTOLIC BLOOD PRESSURE: 51 MMHG | WEIGHT: 162 LBS | SYSTOLIC BLOOD PRESSURE: 97 MMHG | TEMPERATURE: 98.5 F | HEART RATE: 80 BPM | OXYGEN SATURATION: 96 % | HEIGHT: 63 IN | BODY MASS INDEX: 28.7 KG/M2 | RESPIRATION RATE: 18 BRPM

## 2020-07-09 DIAGNOSIS — Z12.11 COLON CANCER SCREENING: ICD-10-CM

## 2020-07-09 PROCEDURE — G0121 COLON CA SCRN NOT HI RSK IND: HCPCS | Performed by: INTERNAL MEDICINE

## 2020-07-09 RX ORDER — SODIUM CHLORIDE 9 MG/ML
125 INJECTION, SOLUTION INTRAVENOUS CONTINUOUS
Status: CANCELLED | OUTPATIENT
Start: 2020-07-09

## 2020-07-09 RX ORDER — SODIUM CHLORIDE 9 MG/ML
INJECTION, SOLUTION INTRAVENOUS CONTINUOUS PRN
Status: DISCONTINUED | OUTPATIENT
Start: 2020-07-09 | End: 2020-07-09 | Stop reason: SURG

## 2020-07-09 RX ORDER — PROPOFOL 10 MG/ML
INJECTION, EMULSION INTRAVENOUS CONTINUOUS PRN
Status: DISCONTINUED | OUTPATIENT
Start: 2020-07-09 | End: 2020-07-09 | Stop reason: SURG

## 2020-07-09 RX ORDER — PROPOFOL 10 MG/ML
INJECTION, EMULSION INTRAVENOUS AS NEEDED
Status: DISCONTINUED | OUTPATIENT
Start: 2020-07-09 | End: 2020-07-09 | Stop reason: SURG

## 2020-07-09 RX ADMIN — PROPOFOL 80 MG: 10 INJECTION, EMULSION INTRAVENOUS at 08:54

## 2020-07-09 RX ADMIN — SODIUM CHLORIDE: 0.9 INJECTION, SOLUTION INTRAVENOUS at 08:50

## 2020-07-09 RX ADMIN — PROPOFOL 120 MCG/KG/MIN: 10 INJECTION, EMULSION INTRAVENOUS at 08:54

## 2020-07-09 RX ADMIN — PROPOFOL 20 MG: 10 INJECTION, EMULSION INTRAVENOUS at 08:56

## 2020-07-09 RX ADMIN — PROPOFOL 20 MG: 10 INJECTION, EMULSION INTRAVENOUS at 09:04

## 2020-07-09 NOTE — ANESTHESIA POSTPROCEDURE EVALUATION
Post-Op Assessment Note    CV Status:  Stable    Pain management: adequate     Mental Status:  Lethargic and sleepy   Hydration Status:  Stable   PONV Controlled:  None   Airway Patency:  Patent   Post Op Vitals Reviewed: Yes      Staff: CRNA           BP 95/59 (07/09/20 0922)    Temp      Pulse 76 (07/09/20 0922)   Resp 18 (07/09/20 0922)    SpO2 99 % (07/09/20 0922)

## 2020-07-09 NOTE — ANESTHESIA PREPROCEDURE EVALUATION
Review of Systems/Medical History  Patient summary reviewed  Chart reviewed      Cardiovascular  EKG reviewed, Exercise tolerance (METS): >4,  Hyperlipidemia, Hypertension controlled, Past MI > 6 months,   Comment: Sinus tachycardia  Nonspecific T wave abnormality  Abnormal ECG  No previous ECGs available  Confirmed by Pop Han ([id number]) on 2019 5:11:51 PM,  Pulmonary  Negative pulmonary ROS        GI/Hepatic    Liver disease , Bowel prep            Endo/Other  Diabetes well controlled type 2 Oral agent,      GYN       Hematology  Anemia ,     Musculoskeletal  Negative musculoskeletal ROS        Neurology  Negative neurology ROS      Psychology   Anxiety,              Physical Exam    Airway    Mallampati score: II  TM Distance: >3 FB  Neck ROM: full     Dental   No notable dental hx     Cardiovascular  Cardiovascular exam normal    Pulmonary  Pulmonary exam normal     Other Findings        Anesthesia Plan  ASA Score- 3     Anesthesia Type- IV sedation with anesthesia with ASA Monitors  Additional Monitors:   Airway Plan:     Comment: [address]  [facility], [address] [facility]  [phone]     Transthoracic Echocardiogram  2D, M-mode, Doppler, and Color Doppler     Study date:  04-Oct-2017     Patient: Whitney Lehman  MR number: [id number]  Account number: [de-identified]  : 1970  Age: 52 years  Gender: Female  Status: Inpatient  Location: Bedside  Height: 63 in  Weight: 170 lb  BP: 144/ 76 mmHg     Indications: MI     Diagnoses: I21 3 - ST elevation (STEMI) myocardial infarction of unspecified site     Sonographer:  ARIEL Luke  Primary Physician:  Ariadna Michel DO  Referring Physician: DAVID Roche  Group:  [facility]  Interpreting Physician:  Gerda Isaac MD     SUMMARY     LEFT VENTRICLE:  Size was normal   Systolic function was normal  Ejection fraction was estimated to be 55 %    Wall thickness was normal   Left ventricular diastolic function parameters were normal      RIGHT VENTRICLE:  The size was normal   Systolic function was normal      MITRAL VALVE:  There was trace regurgitation      TRICUSPID VALVE:  There was trace regurgitation      HISTORY: PRIOR HISTORY: Chest pain, Leukocytosis, Tachycardia     PROCEDURE: The procedure was performed at the bedside  This was a routine study  The transthoracic approach was used  The study included complete 2D imaging, M-mode, complete spectral Doppler, and color Doppler  The heart rate was 86 bpm,  at the start of the study  Images were obtained from the parasternal, apical, subcostal, and suprasternal notch acoustic windows  Image quality was adequate      LEFT VENTRICLE: Size was normal  Systolic function was normal  Ejection fraction was estimated to be 55 %  There were no regional wall motion abnormalities  Wall thickness was normal  DOPPLER: Left ventricular diastolic function parameters  were normal      RIGHT VENTRICLE: The size was normal  Systolic function was normal  Wall thickness was normal      LEFT ATRIUM: Size was normal      RIGHT ATRIUM: Size was normal      MITRAL VALVE: Valve structure was normal  There was normal leaflet separation  DOPPLER: The transmitral velocity was within the normal range  There was no evidence for stenosis  There was trace regurgitation      AORTIC VALVE: The valve was trileaflet  Leaflets exhibited normal thickness and normal cuspal separation  DOPPLER: Transaortic velocity was within the normal range  There was no evidence for stenosis  There was no regurgitation      TRICUSPID VALVE: The valve structure was normal  There was normal leaflet separation  DOPPLER: The transtricuspid velocity was within the normal range  There was no evidence for stenosis  There was trace regurgitation  Pulmonary artery  systolic pressure was within the normal range  Estimated peak PA pressure was 27 mmHg   The tricuspid jet envelope definition was inadequate for estimation of RV systolic pressure  There are no indirect findings (abnormal RV volume or  geometry, altered pulmonary flow velocity profile, or leftward septal displacement) which would suggest moderate or severe pulmonary hypertension      PULMONIC VALVE: Leaflets exhibited normal thickness, no calcification, and normal cuspal separation  DOPPLER: The transpulmonic velocity was within the normal range  There was no regurgitation      PERICARDIUM: There was no pericardial effusion  The pericardium was normal in appearance      AORTA: The root exhibited normal size      SYSTEMIC VEINS: IVC: The inferior vena cava was normal in size and course  Respirophasic changes were normal      SYSTEM MEASUREMENT TABLES     2D  %FS: 44 15 %  Ao Diam: 2 77 cm  EDV(Teich): 70 75 ml  EF(Teich): 75 92 %  ESV(Teich): 17 03 ml  IVSd: 0 99 cm  LA Area: 12 08 cm2  LA Diam: 3 14 cm  LVEDV MOD A4C: 63 46 ml  LVEF MOD A4C: 62 23 %  LVESV MOD A4C: 23 97 ml  LVIDd: 4 02 cm  LVIDs: 2 24 cm  LVLd A4C: 7 65 cm  LVLs A4C: 6 57 cm  LVPWd: 0 94 cm  RA Area: 10 94 cm2  RVIDd: 2 45 cm  SV MOD A4C: 39 49 ml  SV(Teich): 53 71 ml     CW  TR Vmax: 2 23 m/s  TR maxP 97 mmHg     MM  TAPSE: 2 05 cm     PW  MV A Bahman: 0 64 m/s  MV Dec Burnet: 4 28 m/s2  MV DecT: 192 9 ms  MV E Bahman: 0 83 m/s  MV E/A Ratio: 1 29  MV PHT: 55 94 ms  MVA By PHT: 3 93 cm2     [employer] Accredited Echocardiography Laboratory     Prepared and electronically signed by     Gerda Isaac MD      Plan Factors-  Patient did not smoke on day of surgery  Induction- intravenous  Postoperative Plan-     Informed Consent- Anesthetic plan and risks discussed with patient  I personally reviewed this patient with the CRNA  Discussed and agreed on the Anesthesia Plan with the CRNA  Beto Avina

## 2020-07-09 NOTE — H&P
History and Physical - [facility]  Reyna Guerin 52 y o  female MRN: [id number]    HPI: Reyna Guerin is a 52y o  year old female who presents for colon cancer screening      Review of Systems    Historical Information   Past Medical History:   Diagnosis Date    Diabetes mellitus ([address] )     Borderline    DUB (dysfunctional uterine bleeding)     Fatty liver     Hashimoto's thyroiditis     Last Assessed:  14    History of stomach ulcers     Hypertension     Hypothyroidism     Irritable bowel syndrome     Last Assessed:  3/25/14    Liver disease     elevated enzymes    [facility])     2017    Ovarian cyst     left     Past Surgical History:   Procedure Laterality Date    ANGIOPLASTY      CARDIAC CATHETERIZATION       SECTION      X 2    CHOLECYSTECTOMY      COLONOSCOPY      CYSTOSCOPY N/A 2019    Procedure: CYSTOSCOPY;  Surgeon: Marilou Valenzuela MD;  [facility];  Service: Gynecology Oncology    IR IMAGE GUIDED BIOPSY/ASPIRATION LIVER  2020    [place] ESOPHAGOGASTRODUODENOSCOPY TRANSORAL DIAGNOSTIC N/A 2018    Procedure: ESOPHAGOGASTRODUODENOSCOPY (EGD); Surgeon: Cullen Artis MD;  Location[facility];   Service: Gastroenterology    [place] LAPAROSCOPY W TOT HYSTERECTUTERUS <=[address] TUBE/OVARY N/A 2019    Procedure: TOTAL LAPAROSCOPIC HYSTERECTOMY, BILATERAL SALPINGECTOMY, LEFT OOPHORECTOMY;  Surgeon: Marilou Valenzuela MD;  [facility];  Service: Gynecology Oncology    SINUS SURGERY      TONSILLECTOMY      UPPER GASTROINTESTINAL ENDOSCOPY       Social History   Social History     Substance and Sexual Activity   Alcohol Use Not Currently    Frequency: Monthly or less    Comment: occasional, Social drinker     Social History     Substance and Sexual Activity   Drug Use No     Social History     Tobacco Use   Smoking Status Former Smoker    Packs/day: 0 50    Years: 4 00    Pack years: 2 00    Types: Cigarettes  Last attempt to quit: 2016    Years since quittin 4   Smokeless Tobacco Never Used   Tobacco Comment    2016     Family History   Problem Relation Age of Onset    Pancreatic cancer Mother     Hypertension Father     Diabetes type II Father     Diabetes Maternal Grandmother     Diabetes Paternal Grandmother     Heart disease Paternal Grandmother     Hypothyroidism Paternal Grandmother     Diabetes type II Brother     Hypothyroidism Paternal Uncle     Lung disease Daughter         asthma tendencies    No Known Problems Brother     No Known Problems Son        Meds/Allergies       (Not in a hospital admission)    Allergies   Allergen Reactions    Sulfa Antibiotics Shortness Of Breath    Invokana [Canagliflozin]      Yeast infection    Erythromycin      Reaction Date: ;     Metronidazole        Objective     /67   Pulse 82   Temp 98 5 °F (36 9 °C) (Oral)   Resp 18   Ht 5' 3" (1 6 m)   Wt 73 5 kg (162 lb)   LMP  (LMP Unknown)   SpO2 98%   BMI 28 70 kg/m²       PHYSICAL EXAM    Gen: NAD  CV: RRR  CHEST: Clear  ABD: soft, NT/ND  EXT: no edema  Neuro: AAO      ASSESSMENT/PLAN:  This is a 52y o  year old female here for colonoscopy for colon cancer screening  Risks and benefit discussed  Risks include but not limited to infection of bleeding call before, missed lesion      PLAN:   Procedure:  Colonoscopy with biopsy and possible polypectomy

## 2020-07-12 LAB — SARS-COV-2 RNA SPEC QL NAA+PROBE: NOT DETECTED

## 2020-07-15 DIAGNOSIS — E11.9 TYPE 2 DIABETES MELLITUS WITHOUT COMPLICATION, WITHOUT LONG-TERM CURRENT USE OF INSULIN (HCC): ICD-10-CM

## 2020-07-28 DIAGNOSIS — K58.9 IRRITABLE BOWEL SYNDROME WITHOUT DIARRHEA: ICD-10-CM

## 2020-07-28 DIAGNOSIS — J01.90 ACUTE SINUSITIS, RECURRENCE NOT SPECIFIED, UNSPECIFIED LOCATION: ICD-10-CM

## 2020-07-29 DIAGNOSIS — K58.9 IRRITABLE BOWEL SYNDROME WITHOUT DIARRHEA: ICD-10-CM

## 2020-07-29 DIAGNOSIS — J01.90 ACUTE SINUSITIS, RECURRENCE NOT SPECIFIED, UNSPECIFIED LOCATION: ICD-10-CM

## 2020-07-29 RX ORDER — TRAMADOL HYDROCHLORIDE 50 MG/1
50 TABLET ORAL EVERY 8 HOURS PRN
Qty: 60 TABLET | Refills: 0 | Status: SHIPPED | OUTPATIENT
Start: 2020-07-29 | End: 2020-09-14 | Stop reason: ALTCHOICE

## 2020-07-29 RX ORDER — TRAMADOL HYDROCHLORIDE 50 MG/1
50 TABLET ORAL EVERY 8 HOURS PRN
Qty: 60 TABLET | Refills: 0 | Status: SHIPPED | OUTPATIENT
Start: 2020-07-29 | End: 2020-08-18 | Stop reason: SDUPTHER

## 2020-07-29 RX ORDER — DICYCLOMINE HCL 20 MG
20 TABLET ORAL EVERY 6 HOURS PRN
Qty: 60 TABLET | Refills: 0 | Status: SHIPPED | OUTPATIENT
Start: 2020-07-29 | End: 2020-09-14 | Stop reason: ALTCHOICE

## 2020-07-29 RX ORDER — DICYCLOMINE HCL 20 MG
20 TABLET ORAL EVERY 6 HOURS PRN
Qty: 60 TABLET | Refills: 0 | Status: SHIPPED | OUTPATIENT
Start: 2020-07-29 | End: 2020-11-13 | Stop reason: SDUPTHER

## 2020-07-29 NOTE — TELEPHONE ENCOUNTER
Last OV 6/5/20 (virtual)  Per PDMP Tramadol last filled 7/6/20 #60  Prescription states we are giving this to her for sinusitis? She normally receives #60 for a 20 day supply  Please adjust if you would like to change this to a 30 day supply    Next OV 9/16/20

## 2020-08-08 DIAGNOSIS — E11.9 TYPE 2 DIABETES MELLITUS WITHOUT COMPLICATION, WITHOUT LONG-TERM CURRENT USE OF INSULIN (HCC): ICD-10-CM

## 2020-08-08 DIAGNOSIS — E78.00 HYPERCHOLESTEREMIA: ICD-10-CM

## 2020-08-10 RX ORDER — LINAGLIPTIN AND METFORMIN HYDROCHLORIDE 2.5; 85 MG/1; MG/1
1 TABLET, FILM COATED ORAL 2 TIMES DAILY
Qty: 180 TABLET | Refills: 0 | Status: SHIPPED | OUTPATIENT
Start: 2020-08-10 | End: 2020-09-08 | Stop reason: SDUPTHER

## 2020-08-10 RX ORDER — FENOFIBRATE 145 MG/1
145 TABLET, COATED ORAL DAILY
Qty: 90 TABLET | Refills: 0 | Status: SHIPPED | OUTPATIENT
Start: 2020-08-10 | End: 2020-09-10 | Stop reason: ALTCHOICE

## 2020-08-18 DIAGNOSIS — E06.3 HYPOTHYROIDISM DUE TO HASHIMOTO'S THYROIDITIS: ICD-10-CM

## 2020-08-18 DIAGNOSIS — J01.90 ACUTE SINUSITIS, RECURRENCE NOT SPECIFIED, UNSPECIFIED LOCATION: ICD-10-CM

## 2020-08-18 DIAGNOSIS — R07.89 OTHER CHEST PAIN: ICD-10-CM

## 2020-08-18 DIAGNOSIS — E03.8 HYPOTHYROIDISM DUE TO HASHIMOTO'S THYROIDITIS: ICD-10-CM

## 2020-08-18 RX ORDER — AMLODIPINE BESYLATE 5 MG/1
5 TABLET ORAL DAILY
Qty: 90 TABLET | Refills: 1 | Status: SHIPPED | OUTPATIENT
Start: 2020-08-18 | End: 2020-11-27 | Stop reason: SDUPTHER

## 2020-08-18 RX ORDER — LEVOTHYROXINE SODIUM 112 UG/1
112 TABLET ORAL DAILY
Qty: 90 TABLET | Refills: 0 | Status: SHIPPED | OUTPATIENT
Start: 2020-08-18 | End: 2020-11-13 | Stop reason: SDUPTHER

## 2020-08-18 RX ORDER — TRAMADOL HYDROCHLORIDE 50 MG/1
50 TABLET ORAL EVERY 8 HOURS PRN
Qty: 60 TABLET | Refills: 0 | Status: SHIPPED | OUTPATIENT
Start: 2020-08-18 | End: 2020-09-08 | Stop reason: SDUPTHER

## 2020-08-27 DIAGNOSIS — R07.9 CHEST PAIN, UNSPECIFIED TYPE: Primary | ICD-10-CM

## 2020-08-27 RX ORDER — NITROGLYCERIN 0.4 MG/1
0.4 TABLET SUBLINGUAL
Qty: 90 TABLET | Refills: 0 | Status: SHIPPED | OUTPATIENT
Start: 2020-08-27 | End: 2021-08-30 | Stop reason: SDUPTHER

## 2020-08-27 NOTE — TELEPHONE ENCOUNTER
Under the direction/instructions of a tiger text from Dr Von Law, pt is to have a refill of Nitro       90 tablets being sent to [address] [place], [place] [address]

## 2020-09-08 DIAGNOSIS — J01.90 ACUTE SINUSITIS, RECURRENCE NOT SPECIFIED, UNSPECIFIED LOCATION: ICD-10-CM

## 2020-09-08 DIAGNOSIS — E11.9 TYPE 2 DIABETES MELLITUS WITHOUT COMPLICATION, WITHOUT LONG-TERM CURRENT USE OF INSULIN (HCC): ICD-10-CM

## 2020-09-09 DIAGNOSIS — J01.90 ACUTE SINUSITIS, RECURRENCE NOT SPECIFIED, UNSPECIFIED LOCATION: ICD-10-CM

## 2020-09-09 DIAGNOSIS — E11.9 TYPE 2 DIABETES MELLITUS WITHOUT COMPLICATION, WITHOUT LONG-TERM CURRENT USE OF INSULIN (HCC): ICD-10-CM

## 2020-09-09 RX ORDER — TRAMADOL HYDROCHLORIDE 50 MG/1
50 TABLET ORAL EVERY 8 HOURS PRN
Qty: 60 TABLET | Refills: 0 | Status: SHIPPED | OUTPATIENT
Start: 2020-09-09 | End: 2020-10-12 | Stop reason: SDUPTHER

## 2020-09-09 RX ORDER — TRAMADOL HYDROCHLORIDE 50 MG/1
50 TABLET ORAL EVERY 8 HOURS PRN
Qty: 60 TABLET | Refills: 0 | OUTPATIENT
Start: 2020-09-09

## 2020-09-09 RX ORDER — LINAGLIPTIN AND METFORMIN HYDROCHLORIDE 2.5; 85 MG/1; MG/1
1 TABLET, FILM COATED ORAL 2 TIMES DAILY
Qty: 180 TABLET | Refills: 0 | Status: SHIPPED | OUTPATIENT
Start: 2020-09-09 | End: 2020-09-14 | Stop reason: SDUPTHER

## 2020-09-09 RX ORDER — LINAGLIPTIN AND METFORMIN HYDROCHLORIDE 2.5; 85 MG/1; MG/1
1 TABLET, FILM COATED ORAL 2 TIMES DAILY
Qty: 180 TABLET | Refills: 0 | Status: SHIPPED | OUTPATIENT
Start: 2020-09-09 | End: 2020-10-12 | Stop reason: SDUPTHER

## 2020-09-10 ENCOUNTER — TELEMEDICINE (OUTPATIENT)
Dept: CARDIOLOGY CLINIC | Facility: CLINIC | Age: 50
End: 2020-09-10
Payer: COMMERCIAL

## 2020-09-10 DIAGNOSIS — Q24.5 ANOMALOUS CORONARY ARTERY ORIGIN: Primary | ICD-10-CM

## 2020-09-10 DIAGNOSIS — E78.5 DYSLIPIDEMIA: ICD-10-CM

## 2020-09-10 PROCEDURE — 99213 OFFICE O/P EST LOW 20 MIN: CPT | Performed by: INTERNAL MEDICINE

## 2020-09-10 NOTE — PROGRESS NOTES
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  Chest Pain Hypertriglyceridemia HTN    She is doing well  Continue with current medical therapy  Her BP is well controlled  Problem List Items Addressed This Visit     None                Reason for visit is   Chief Complaint   Patient presents with    Virtual Brief Visit        Encounter provider Fermin Sierra MD    Provider located at [address] [place] [address] [place] [address]    Recent Visits  No visits were found meeting these conditions  Showing recent visits within past 7 days and meeting all other requirements     Today's Visits  Date Type Provider Dept   09/10/20 Telemedicine Fermin Sierra, [facility] today's visits and meeting all other requirements     Future Appointments  No visits were found meeting these conditions  Showing future appointments within next 150 days and meeting all other requirements        After connecting through telephone and patient was informed that this is not a secure, HIPAA-complaint platform  She agrees to proceed  , the patient was identified by name and date of birth  Jesus Olivas was informed that this is a telemedicine visit and that the visit is being conducted through telephone and patient was informed that this is not a secure, HIPAA-complaint platform  She agrees to proceed     My office door was closed  No one else was in the room  She acknowledged consent and understanding of privacy and security of the platform  The patient has agreed to participate and understands she can discontinue the visit at any time  Patient is aware this is a billable service  Subjective    Jesus Olivas is a 48 y o  female followup dyslipidemia, anomalous coronary     She had one episode of CP  Otherwise she has had no symptoms  She has no dyspnea and she has a healthy diet and continues to lose weight   Her medications were reviewed  We discontinued fenofibrate  Past Medical History:   Diagnosis Date    Diabetes mellitus ([address] )     Borderline    DUB (dysfunctional uterine bleeding)     Fatty liver     Hashimoto's thyroiditis     Last Assessed:  14    History of stomach ulcers     Hypertension     Hypothyroidism     Irritable bowel syndrome     Last Assessed:  3/25/14    Liver disease     elevated enzymes    [facility] infarction [facility])     2017    Ovarian cyst     left       Past Surgical History:   Procedure Laterality Date    ANGIOPLASTY      CARDIAC CATHETERIZATION       SECTION      X 2    CHOLECYSTECTOMY      COLONOSCOPY      CYSTOSCOPY N/A 2019    Procedure: CYSTOSCOPY;  Surgeon: Мария Rojas MD;  [facility];  Service: Gynecology Oncology    IR BIOPSY LIVER MASS  2020    [place] ESOPHAGOGASTRODUODENOSCOPY TRANSORAL DIAGNOSTIC N/A 2018    Procedure: ESOPHAGOGASTRODUODENOSCOPY (EGD); Surgeon: Maryam Gee MD;  [facility];   Service: Gastroenterology    [place] LAPAROSCOPY W TOT HYSTERECTUTERUS <=[address] TUBE/OVARY N/A 2019    Procedure: TOTAL LAPAROSCOPIC HYSTERECTOMY, BILATERAL SALPINGECTOMY, LEFT OOPHORECTOMY;  Surgeon: Мария Rojas MD;  Location:  MAIN OR;  Service: Gynecology Oncology    SINUS SURGERY      TONSILLECTOMY      UPPER GASTROINTESTINAL ENDOSCOPY         Current Outpatient Medications   Medication Sig Dispense Refill    amLODIPine (NORVASC) 5 mg tablet Take 1 tablet (5 mg total) by mouth daily 90 tablet 1    Blood Glucose Monitoring Suppl (Agustin Almaraz IQ SYSTEM) w/Device KIT Use to test blood sugars twice a day 1 kit 0    dicyclomine (BENTYL) 20 mg tablet Take 1 tablet (20 mg total) by mouth every 6 (six) hours as needed (every 6 hours) 60 tablet 0    dicyclomine (BENTYL) 20 mg tablet Take 1 tablet (20 mg total) by mouth every 6 (six) hours as needed (every 6 hours) 60 tablet 0    escitalopram (LEXAPRO) 20 mg tablet Take 1 tablet (20 mg total) by mouth daily 90 tablet 0    fenofibrate (TRICOR) 145 mg tablet Take 1 tablet (145 mg total) by mouth daily 90 tablet 0    Lactobacillus (PROBIOTIC ACIDOPHILUS PO) Take by mouth daily      levothyroxine 112 mcg tablet Take 1 tablet (112 mcg total) by mouth daily 90 tablet 0    linaGLIPtin-metFORMIN HCl (Jentadueto) 2 5-850 MG TABS Take 1 tablet by mouth 2 (two) times a day 180 tablet 0    linaGLIPtin-metFORMIN HCl (Jentadueto) 2 5-850 MG TABS Take 1 tablet by mouth 2 (two) times a day 180 tablet 0    Magnesium 300 MG CAPS Take 600 mg by mouth daily      Melatonin 10-10 MG TBCR Take by mouth daily at bedtime as needed      metoprolol succinate (TOPROL-XL) 25 mg 24 hr tablet 25 mg 2 (two) times a day       mupirocin (BACTROBAN) 2 % ointment Apply topically 3 (three) times a day 22 g 5    nitroglycerin (NITROSTAT) 0 4 mg SL tablet Place 1 tablet (0 4 mg total) under the tongue every 5 (five) minutes as needed for chest pain 90 tablet 0    omega-3-acid ethyl esters (LOVAZA) 1 g capsule Take 4 capsules (4 g total) by mouth daily 360 capsule 0    OneTouch Delica Lancets 35A MISC Use to test blood sugars twice a day 200 each 6    ONETOUCH VERIO test strip Use as instructed to test blood sugars twice a day 200 each 6    pantoprazole (PROTONIX) 40 mg tablet Take 1 tablet (40 mg total) by mouth daily before breakfast 90 tablet 0    traMADol (ULTRAM) 50 mg tablet Take 1 tablet (50 mg total) by mouth every 8 (eight) hours as needed for moderate pain 60 tablet 0    traMADol (ULTRAM) 50 mg tablet Take 1 tablet (50 mg total) by mouth every 8 (eight) hours as needed for moderate pain 60 tablet 0     No current facility-administered medications for this visit           Allergies   Allergen Reactions    Sulfa Antibiotics Shortness Of Breath    Invokana [Canagliflozin]      Yeast infection    Erythromycin      Reaction Date: [id number];     Metronidazole        Review of Systems Constitutional: Negative  HENT: Negative  Eyes: Negative  Respiratory: Negative  Cardiovascular: Positive for chest pain  Gastrointestinal: Negative  Endocrine: Negative  Genitourinary: Negative  Musculoskeletal: Negative  Skin: Negative  Allergic/Immunologic: Negative  Neurological: Negative  Hematological: Negative  Psychiatric/Behavioral: Negative  There were no vitals filed for this visit  I spent 25 minutes directly with the patient during this visit    VIRTUAL VISIT DISCLAIMER    Sharona Cordero acknowledges that she has consented to an online visit or consultation  She understands that the online visit is based solely on information provided by her, and that, in the absence of a face-to-face physical evaluation by the physician, the diagnosis she receives is both limited and provisional in terms of accuracy and completeness  This is not intended to replace a full medical face-to-face evaluation by the physician  Sharona Cordero understands and accepts these terms

## 2020-09-12 ENCOUNTER — APPOINTMENT (OUTPATIENT)
Dept: LAB | Facility: MEDICAL CENTER | Age: 50
End: 2020-09-12
Payer: COMMERCIAL

## 2020-09-12 DIAGNOSIS — E11.9 TYPE 2 DIABETES MELLITUS WITHOUT COMPLICATION, WITHOUT LONG-TERM CURRENT USE OF INSULIN (HCC): ICD-10-CM

## 2020-09-12 DIAGNOSIS — E03.9 ACQUIRED HYPOTHYROIDISM: ICD-10-CM

## 2020-09-12 LAB
ALBUMIN SERPL BCP-MCNC: 3.9 G/DL (ref 3.5–5)
ALP SERPL-CCNC: 68 U/L (ref 46–116)
ALT SERPL W P-5'-P-CCNC: 80 U/L (ref 12–78)
ANION GAP SERPL CALCULATED.3IONS-SCNC: 8 MMOL/L (ref 4–13)
AST SERPL W P-5'-P-CCNC: 65 U/L (ref 5–45)
BILIRUB SERPL-MCNC: 0.25 MG/DL (ref 0.2–1)
BUN SERPL-MCNC: 13 MG/DL (ref 5–25)
CALCIUM SERPL-MCNC: 9.6 MG/DL (ref 8.3–10.1)
CHLORIDE SERPL-SCNC: 105 MMOL/L (ref 100–108)
CO2 SERPL-SCNC: 26 MMOL/L (ref 21–32)
CREAT SERPL-MCNC: 0.76 MG/DL (ref 0.6–1.3)
EST. AVERAGE GLUCOSE BLD GHB EST-MCNC: 160 MG/DL
GFR SERPL CREATININE-BSD FRML MDRD: 92 ML/MIN/1.73SQ M
GLUCOSE P FAST SERPL-MCNC: 125 MG/DL (ref 65–99)
HBA1C MFR BLD: 7.2 %
POTASSIUM SERPL-SCNC: 4.7 MMOL/L (ref 3.5–5.3)
PROT SERPL-MCNC: 7.8 G/DL (ref 6.4–8.2)
SODIUM SERPL-SCNC: 139 MMOL/L (ref 136–145)
T4 FREE SERPL-MCNC: 1.21 NG/DL (ref 0.76–1.46)
TSH SERPL DL<=0.05 MIU/L-ACNC: 0.28 UIU/ML (ref 0.36–3.74)

## 2020-09-12 PROCEDURE — 84443 ASSAY THYROID STIM HORMONE: CPT

## 2020-09-12 PROCEDURE — 80053 COMPREHEN METABOLIC PANEL: CPT

## 2020-09-12 PROCEDURE — 83036 HEMOGLOBIN GLYCOSYLATED A1C: CPT

## 2020-09-12 PROCEDURE — 36415 COLL VENOUS BLD VENIPUNCTURE: CPT

## 2020-09-12 PROCEDURE — 86800 THYROGLOBULIN ANTIBODY: CPT

## 2020-09-12 PROCEDURE — 86376 MICROSOMAL ANTIBODY EACH: CPT

## 2020-09-12 PROCEDURE — 84439 ASSAY OF FREE THYROXINE: CPT

## 2020-09-13 LAB — THYROPEROXIDASE AB SERPL-ACNC: 166 IU/ML (ref 0–34)

## 2020-09-14 ENCOUNTER — OFFICE VISIT (OUTPATIENT)
Dept: ENDOCRINOLOGY | Facility: HOSPITAL | Age: 50
End: 2020-09-14
Payer: COMMERCIAL

## 2020-09-14 VITALS
HEIGHT: 63 IN | SYSTOLIC BLOOD PRESSURE: 120 MMHG | HEART RATE: 82 BPM | BODY MASS INDEX: 27.78 KG/M2 | WEIGHT: 156.8 LBS | TEMPERATURE: 98 F | DIASTOLIC BLOOD PRESSURE: 80 MMHG

## 2020-09-14 DIAGNOSIS — E03.8 HYPOTHYROIDISM DUE TO HASHIMOTO'S THYROIDITIS: ICD-10-CM

## 2020-09-14 DIAGNOSIS — E06.3 HYPOTHYROIDISM DUE TO HASHIMOTO'S THYROIDITIS: ICD-10-CM

## 2020-09-14 DIAGNOSIS — E78.2 COMBINED HYPERLIPIDEMIA: ICD-10-CM

## 2020-09-14 DIAGNOSIS — E11.65 TYPE 2 DIABETES MELLITUS WITH HYPERGLYCEMIA, WITHOUT LONG-TERM CURRENT USE OF INSULIN (HCC): Primary | ICD-10-CM

## 2020-09-14 DIAGNOSIS — I10 ESSENTIAL HYPERTENSION: Chronic | ICD-10-CM

## 2020-09-14 PROCEDURE — 99215 OFFICE O/P EST HI 40 MIN: CPT | Performed by: INTERNAL MEDICINE

## 2020-09-14 NOTE — PATIENT INSTRUCTIONS
The hgba1c is 7 2%  Continue the same jentadueto  Continue to test blood sugars up to once a day  The thyroid is ok, possible going overactive, we;'ll follow  Follow up in 3 months with blood work

## 2020-09-14 NOTE — PROGRESS NOTES
9/14/2020    Assessment/Plan      Diagnoses and all orders for this visit:    Type 2 diabetes mellitus with hyperglycemia, without long-term current use of insulin ([facility] [address] )  -     HEMOGLOBIN A1C W/ EAG ESTIMATION Lab Collect; Future  -     Comprehensive metabolic panel Lab Collect; Future  -     TSH, 3rd generation Lab Collect; Future  -     T4, free Lab Collect; Future    Hypothyroidism due to Hashimoto's thyroiditis  -     HEMOGLOBIN A1C W/ EAG ESTIMATION Lab Collect; Future  -     Comprehensive metabolic panel Lab Collect; Future  -     TSH, 3rd generation Lab Collect; Future  -     T4, free Lab Collect; Future    Essential hypertension  -     HEMOGLOBIN A1C W/ EAG ESTIMATION Lab Collect; Future  -     Comprehensive metabolic panel Lab Collect; Future  -     TSH, 3rd generation Lab Collect; Future  -     T4, free Lab Collect; Future    Combined hyperlipidemia  -     HEMOGLOBIN A1C W/ EAG ESTIMATION Lab Collect; Future  -     Comprehensive metabolic panel Lab Collect; Future  -     TSH, 3rd generation Lab Collect; Future  -     T4, free Lab Collect; Future        Assessment/Plan:  1  Type 2 diabetes  Most recent hemoglobin A1c is 7 2%  This has under pretty decent control  For now, she will continue the same jentadueto 2 5/850 mg twice a day  She will continue to work on dietary changes  She will continue to test her blood sugars at least once a day  2  Hypothyroidism due to Hashimoto's thyroiditis  Most recent thyroid function tests do show a slightly low TSH  She is asymptomatic and her T4 is normal   For now, she will continue the same levothyroxine 112 mcg daily  I will recheck her thyroid function tests next visit and if TSH is still low, then I will have to adjust her levothyroxine dosage  3  Hypertension  Blood pressure is under good control on her current dose of amlodipine and metoprolol  4  Hyperlipidemia    Fenofibrate was discontinued by her cardiologist   Lipids will be followed over time     I have asked her to follow up in 3 months with preceding hemoglobin A1c, CMP, TSH, and free T4       CC: Diabetes type 2, thyroid, blood pressure follow-up    History of Present Illness     HPI: Aby Hankins is a 48y o  year old female with type 2 diabetes  for 3 years, hypothyroidism, hypertension for follow-up visit  She was diagnosed with type 2 diabetes after a non STEMI  She was unable to tolerate Invokana in the past due to frequent and significant yeast infections  She is on oral agents at home and takes Jentadueto 2 5/850 mg twice a day  She denies any polyuria, polydipsia, polyphagia, and blurry vision  She has occasional once a night nocturia  She denies numbness or tingling of the feet  She denies chest pain or shortness of breath  She denies neuropathy, nephropathy, retinopathy, stroke and claudication but does admit to heart attack  Hypoglycemic episodes: No never  The patient's last eye exam was 2-3 years ago  The patient's last foot exam was in May 2019  She does not see Podiatry    Last A1C was   Lab Results   Component Value Date    HGBA1C 7 2 (H) 09/12/2020     Blood Sugar/Glucometer/Pump/CGM review: checks blood sugars twice a day when rememers before and after meals  Blood sugars mostly less than 150 with occasional high 100s  She has hypothyroidism and takes levothyroxine 112 mcg daily  She denies heat or cold intolerance, palpitation, tremors, fatigue, or weight changes  She has actually been working on losing weight and has lost another 5-7 lb  She has some variable sleep when she is on call nights  She has chronic constipation  She denies diarrhea, anxiety or depression  She denies dry skin, brittle nails, or hair loss  She denies diplopia  She denies compressive thyroid symptoms or difficulties with swallowing    She has hypertension and takes amlodipine 5 mg daily and metoprolol XL 25 mg twice a day    She denies headache or stroke-like symptoms  She has hyperlipidemia  She stopped fenofibrate per cardiology and to recheck lipids in 1 month  She denies chest pain or shortness of breath  Review of Systems   Constitutional: Negative for fatigue and unexpected weight change  Fatigue after call nights  Weight down another 5-7 lbs  HENT: Negative for trouble swallowing  Eyes: Negative for visual disturbance  No diplopia  Respiratory: Negative for chest tightness and shortness of breath  Cardiovascular: Negative for chest pain and palpitations  Gastrointestinal: Positive for constipation  Negative for abdominal pain, diarrhea and nausea  Occasional constipation  Endocrine: Negative for cold intolerance, heat intolerance, polydipsia, polyphagia and polyuria  Occasional once a night nocturia  Skin: Negative for rash and wound  No dry skin  No brittle nails  No hair loss  Neurological: Negative for dizziness, tremors, weakness, light-headedness, numbness and headaches  Psychiatric/Behavioral: Positive for sleep disturbance  Negative for dysphoric mood  The patient is not nervous/anxious  Sleep variable when on call         Historical Information   Past Medical History:   Diagnosis Date    Diabetes mellitus ([address] )     Borderline    DUB (dysfunctional uterine bleeding)     Fatty liver     Hashimoto's thyroiditis     Last Assessed:  14    History of stomach ulcers     Hypertension     Hypothyroidism     Irritable bowel syndrome     Last Assessed:  3/25/14    Liver disease     elevated enzymes    Myocardial infarction [facility])     2017    Ovarian cyst     left     Past Surgical History:   Procedure Laterality Date    ANGIOPLASTY      CARDIAC CATHETERIZATION       SECTION      X 2    CHOLECYSTECTOMY      COLONOSCOPY      CYSTOSCOPY N/A 2019    Procedure: CYSTOSCOPY;  Surgeon: Soren Carrera MD;  [facility]: [facility];  Service: Gynecology Oncology    IR BIOPSY LIVER MASS  2020    MD ESOPHAGOGASTRODUODENOSCOPY TRANSORAL DIAGNOSTIC N/A 2018    Procedure: ESOPHAGOGASTRODUODENOSCOPY (EGD); Surgeon: Mikaela Mina MD;  Location:  GI LAB;   Service: Gastroenterology    MD LAPAROSCOPY W TOT HYSTERECTUTERUS <=[address] TUBE/OVARY N/A 2019    Procedure: TOTAL LAPAROSCOPIC HYSTERECTOMY, BILATERAL SALPINGECTOMY, LEFT OOPHORECTOMY;  Surgeon: Sigrid Edge MD;  Location:  MAIN OR;  Service: Gynecology Oncology    SINUS SURGERY      TONSILLECTOMY      UPPER GASTROINTESTINAL ENDOSCOPY       Social History   Social History     Substance and Sexual Activity   Alcohol Use Not Currently    Frequency: Monthly or less    Comment: occasional, Social drinker     Social History     Substance and Sexual Activity   Drug Use No     Social History     Tobacco Use   Smoking Status Former Smoker    Packs/day: 0 50    Years: 4 00    Pack years: 2 00    Types: Cigarettes    Last attempt to quit: 2016    Years since quittin 6   Smokeless Tobacco Never Used   Tobacco Comment    2016     Family History:   Family History   Problem Relation Age of Onset    Pancreatic cancer Mother     Hypertension Father     Diabetes type II Father     Diabetes Maternal Grandmother     Diabetes Paternal Grandmother     Heart disease Paternal Grandmother     Hypothyroidism Paternal Grandmother     Diabetes type II Brother     Hypothyroidism Paternal Uncle     Lung disease Daughter         asthma tendencies    No Known Problems Brother     No Known Problems Son        Meds/Allergies   Current Outpatient Medications   Medication Sig Dispense Refill    amLODIPine (NORVASC) 5 mg tablet Take 1 tablet (5 mg total) by mouth daily 90 tablet 1    Blood Glucose Monitoring Suppl (MyLikes SYSTEM) w/Device KIT Use to test blood sugars twice a day 1 kit 0    dicyclomine (BENTYL) 20 mg tablet Take 1 tablet (20 mg total) by mouth every 6 (six) hours as needed (every 6 hours) 60 tablet 0    escitalopram (LEXAPRO) 20 mg tablet Take 1 tablet (20 mg total) by mouth daily 90 tablet 0    Lactobacillus (PROBIOTIC ACIDOPHILUS PO) Take by mouth daily      levothyroxine 112 mcg tablet Take 1 tablet (112 mcg total) by mouth daily 90 tablet 0    linaGLIPtin-metFORMIN HCl (Jentadueto) 2 5-850 MG TABS Take 1 tablet by mouth 2 (two) times a day 180 tablet 0    Magnesium 300 MG CAPS Take 600 mg by mouth daily      Melatonin 10-10 MG TBCR Take by mouth daily at bedtime as needed      metoprolol succinate (TOPROL-XL) 25 mg 24 hr tablet 25 mg 2 (two) times a day       mupirocin (BACTROBAN) 2 % ointment Apply topically 3 (three) times a day 22 g 5    nitroglycerin (NITROSTAT) 0 4 mg SL tablet Place 1 tablet (0 4 mg total) under the tongue every 5 (five) minutes as needed for chest pain 90 tablet 0    omega-3-acid ethyl esters (LOVAZA) 1 g capsule Take 4 capsules (4 g total) by mouth daily 360 capsule 0    OneTouch Delica Lancets 95Q MISC Use to test blood sugars twice a day 200 each 6    ONETOUCH VERIO test strip Use as instructed to test blood sugars twice a day 200 each 6    pantoprazole (PROTONIX) 40 mg tablet Take 1 tablet (40 mg total) by mouth daily before breakfast 90 tablet 0    traMADol (ULTRAM) 50 mg tablet Take 1 tablet (50 mg total) by mouth every 8 (eight) hours as needed for moderate pain 60 tablet 0     No current facility-administered medications for this visit  Allergies   Allergen Reactions    Sulfa Antibiotics Shortness Of Breath    Invokana [Canagliflozin]      Yeast infection    Erythromycin      Reaction Date: [id number];     Metronidazole        Objective   Vitals: Blood pressure 120/80, pulse 82, temperature 98 °F (36 7 °C), height 5' 3" (1 6 m), weight 71 1 kg (156 lb 12 8 oz), not currently breastfeeding  Invasive Devices     None                 Physical Exam  Vitals signs reviewed  Constitutional:       Appearance: Normal appearance   She is well-developed  HENT:      Head: Normocephalic and atraumatic  Eyes:      Extraocular Movements: Extraocular movements intact  Conjunctiva/sclera: Conjunctivae normal       Comments: No lid lag, stare, proptosis, or periorbital edema  Neck:      Musculoskeletal: Normal range of motion and neck supple  Thyroid: No thyromegaly  Vascular: No carotid bruit  Comments: Thyroid normal in size  No palpable thyroid nodules  Cardiovascular:      Rate and Rhythm: Normal rate and regular rhythm  Pulses: Normal pulses  no weak pulses          Dorsalis pedis pulses are 2+ on the right side and 2+ on the left side  Posterior tibial pulses are 2+ on the right side and 2+ on the left side  Heart sounds: Normal heart sounds  No murmur  Pulmonary:      Effort: Pulmonary effort is normal       Breath sounds: Normal breath sounds  No wheezing  Abdominal:      Palpations: Abdomen is soft  Musculoskeletal: Normal range of motion  General: No deformity  Right lower leg: No edema  Left lower leg: No edema  Comments: Bunion left 1st MP joint  Callus medial 1st right toe  No ulcerations of the feet  No tremor of the outstretched hands  Feet:      Right foot:      Skin integrity: Callus present  No ulcer, skin breakdown, erythema, warmth or dry skin  Left foot:      Skin integrity: No ulcer, skin breakdown, erythema, warmth, callus or dry skin  Lymphadenopathy:      Cervical: No cervical adenopathy  Skin:     General: Skin is warm and dry  Findings: No rash  Neurological:      Mental Status: She is alert and oriented to person, place, and time  Deep Tendon Reflexes: Reflexes are normal and symmetric  Comments: Vibration sensation intact bilaterally  Microfilament sensation intact bilaterally  Deep tendon reflexes normal      Patient's shoes and socks removed  Right Foot/Ankle   Right Foot Inspection  Skin Exam: skin normal, skin intact, callus and callus no dry skin, no warmth, no erythema, no maceration, no abnormal color, no pre-ulcer and no ulcer                          Toe Exam: ROM and strength within normal limits  Sensory   Vibration: intact    Monofilament testing: intact  Vascular  Capillary refills: < 3 seconds  The right DP pulse is 2+  The right PT pulse is 2+  Left Foot/Ankle  Left Foot Inspection  Skin Exam: skin normal and skin intactno dry skin, no warmth, no erythema, no maceration, normal color, no pre-ulcer, no ulcer and no callus                         Toe Exam: ROM and strength within normal limits and left toe deformity                   Sensory   Vibration: intact    Monofilament: intact  Vascular  Capillary refills: < 3 seconds  The left DP pulse is 2+  The left PT pulse is 2+  Assign Risk Category:  Deformity present; No loss of protective sensation; No weak pulses       Risk: 1        The history was obtained from the review of the chart and from the patient  Lab Results:    Most recent Alc is  Lab Results   Component Value Date    HGBA1C 7 2 (H) 09/12/2020           Blood work done on 09/12/2020 showed a glucose of 125 fasting, AST 65, ALT 80, but was otherwise normal   Lab Results   Component Value Date    CREATININE 0 76 09/12/2020    CREATININE 0 84 06/04/2020    CREATININE 0 87 04/29/2020    BUN 13 09/12/2020     09/02/2015    K 4 7 09/12/2020     09/12/2020    CO2 26 09/12/2020     eGFR   Date Value Ref Range Status   09/12/2020 92 ml/min/1 73sq m Final       Lab Results   Component Value Date    CHOL 210 08/20/2015    HDL 36 (L) 06/04/2020    TRIG 168 (H) 06/04/2020       Lab Results   Component Value Date    ALT 80 (H) 09/12/2020    AST 65 (H) 09/12/2020    ALKPHOS 68 09/12/2020    BILITOT 0 38 09/02/2015     TSH is 0 277 with a free T4 of 1 21  Thyroid microsomal antibodies are 166         Future Appointments   Date Time Provider Carlos Manuel Payton   9/16/2020  9:30 AM DO [facility]ER[facility]   12/22/2020  8:20 AM Luann Michael MD [facility]

## 2020-09-15 LAB — THYROGLOB AB SERPL-ACNC: <1 IU/ML (ref 0–0.9)

## 2020-09-16 ENCOUNTER — OFFICE VISIT (OUTPATIENT)
Dept: FAMILY MEDICINE CLINIC | Facility: CLINIC | Age: 50
End: 2020-09-16
Payer: COMMERCIAL

## 2020-09-16 VITALS
DIASTOLIC BLOOD PRESSURE: 78 MMHG | BODY MASS INDEX: 27.82 KG/M2 | HEIGHT: 63 IN | SYSTOLIC BLOOD PRESSURE: 118 MMHG | TEMPERATURE: 97.6 F | OXYGEN SATURATION: 97 % | HEART RATE: 94 BPM | RESPIRATION RATE: 16 BRPM | WEIGHT: 157 LBS

## 2020-09-16 DIAGNOSIS — K21.9 GASTROESOPHAGEAL REFLUX DISEASE WITHOUT ESOPHAGITIS: ICD-10-CM

## 2020-09-16 DIAGNOSIS — G89.29 CHRONIC BACK PAIN, UNSPECIFIED BACK LOCATION, UNSPECIFIED BACK PAIN LATERALITY: ICD-10-CM

## 2020-09-16 DIAGNOSIS — J01.90 ACUTE SINUSITIS, RECURRENCE NOT SPECIFIED, UNSPECIFIED LOCATION: ICD-10-CM

## 2020-09-16 DIAGNOSIS — Q24.5 ANOMALOUS CORONARY ARTERY ORIGIN: ICD-10-CM

## 2020-09-16 DIAGNOSIS — M54.9 CHRONIC BACK PAIN, UNSPECIFIED BACK LOCATION, UNSPECIFIED BACK PAIN LATERALITY: ICD-10-CM

## 2020-09-16 DIAGNOSIS — E03.8 HYPOTHYROIDISM DUE TO HASHIMOTO'S THYROIDITIS: ICD-10-CM

## 2020-09-16 DIAGNOSIS — E78.2 COMBINED HYPERLIPIDEMIA: ICD-10-CM

## 2020-09-16 DIAGNOSIS — E06.3 HYPOTHYROIDISM DUE TO HASHIMOTO'S THYROIDITIS: ICD-10-CM

## 2020-09-16 DIAGNOSIS — E11.65 TYPE 2 DIABETES MELLITUS WITH HYPERGLYCEMIA, WITHOUT LONG-TERM CURRENT USE OF INSULIN (HCC): Primary | ICD-10-CM

## 2020-09-16 DIAGNOSIS — K75.81 NASH (NONALCOHOLIC STEATOHEPATITIS): ICD-10-CM

## 2020-09-16 PROCEDURE — 99214 OFFICE O/P EST MOD 30 MIN: CPT | Performed by: FAMILY MEDICINE

## 2020-09-16 RX ORDER — MELOXICAM 15 MG/1
15 TABLET ORAL DAILY
Qty: 30 TABLET | Refills: 2 | Status: SHIPPED | OUTPATIENT
Start: 2020-09-16 | End: 2020-10-05

## 2020-09-16 NOTE — ASSESSMENT & PLAN NOTE
Liver biopsy showed fibrosis  LFTs have been gradually improving with patient's improved lifestyle changes  She no longer drinks alcohol  She has lost approximately 30 lb in the past few months  Labs on September 12th showed ALT of 80 (previously 136) and AST was 65 (previously 131)    Continue follow-up with GI as directed

## 2020-09-16 NOTE — ASSESSMENT & PLAN NOTE
Cholesterol has improved with lifestyle modification and weight loss  Patient is a candidate for statin therapy due to diabetes, but Cardiology has been reluctant to start med at this time due to elevated LFTs  Fenofibrate has been discontinued recently

## 2020-09-16 NOTE — PROGRESS NOTES
[facility]      NAME: Isael Chao  AGE: 48 y o  SEX: female  : 1970   MRN: [id number]    DATE: 2020  TIME: 10:09 AM    Assessment and Plan     Problem List Items Addressed This Visit     GIANG (nonalcoholic steatohepatitis)      Liver biopsy showed fibrosis  LFTs have been gradually improving with patient's improved lifestyle changes  She no longer drinks alcohol  She has lost approximately 30 lb in the past few months  Labs on  showed ALT of 80 (previously 136) and AST was 65 (previously 131)  Continue follow-up with GI as directed         Anomalous coronary artery origin      Being followed regularly by Cardiology (Dr Simon Sanches)         Combined hyperlipidemia      Cholesterol has improved with lifestyle modification and weight loss  Patient is a candidate for statin therapy due to diabetes, but Cardiology has been reluctant to start med at this time due to elevated LFTs  Fenofibrate has been discontinued recently  Type 2 diabetes mellitus with hyperglycemia, without long-term current use of insulin ([facility]) - Primary       Lab Results   Component Value Date    HGBA1C 7 2 (H) 2020    A1c from  was 7 2% ( previously 8 0%)  Patient continues to show improvement since improved lifestyle changes  She no longer drinks alcohol and has lost approximately 30 lb in the past few months  Doing well on Jentadueto 2  b i d   Continue regular follow-up with her endocrinologist, Dr Fabienne Knight         Gastroesophageal reflux disease without esophagitis       Stable on Protonix 40         Hypothyroidism due to Hashimoto's thyroiditis      History of Hashimoto's thyroiditis  Doing well on levothyroxine 112 mcg daily  Being followed by endocrinology         Relevant Medications    meloxicam (MOBIC) 15 mg tablet    Chronic back pain      Patient wears a lead vest every day at work  She is currently looking for a new job    She is currently taking tramadol 50 mg b i d  She regionally started this medication due to chronic sinus congestion and other various arthralgias, but mostly for back pain recently  We discussed gradually weaning down office medication  Will give Rx for meloxicam 15 mg daily  I asked her to attempt to wean down to once daily dosing of tramadol between now and her next appointment  Call if further problems         Relevant Medications    meloxicam (MOBIC) 15 mg tablet      Other Visit Diagnoses     Acute sinusitis, recurrence not specified, unspecified location               patient will be getting flu shot at work    Return to office in:  Recheck  4 months ( upcoming labs ordered by endocrine)    Chief Complaint     Chief Complaint   Patient presents with    Follow-up     3 months check up       History of Present Illness      Patient presents for recheck chronic medical problems today  Overall she is feeling well  She has lost approximately 30 lb and past few months  Doing well on Jentadueto for diabetes  Still seeing endocrinology  Still sees Cardiology regularly due to anomalous  Coronary artery  Still sees gastroenterology regularly due to elevated LFTs  Patient had labs done on September 12th      The following portions of the patient's history were reviewed and updated as appropriate: allergies, current medications, past family history, past medical history, past social history, past surgical history and problem list     Review of Systems   Review of Systems   Respiratory: Negative  Cardiovascular: Negative  Gastrointestinal: Negative  Genitourinary: Negative          Active Problem List     Patient Active Problem List   Diagnosis    Hypertension    GIANG (nonalcoholic steatohepatitis)    Sinus tachycardia    Chronic sinusitis    Anemia    Anomalous coronary artery origin    Anxiety    Combined hyperlipidemia    Coronary vasospasm (HCC)    Type 2 diabetes mellitus with hyperglycemia, without long-term current use of insulin (HCC)    Elevated AST (SGOT)    Gastroesophageal reflux disease without esophagitis    Ground glass opacity present on imaging of lung    Hypothyroidism due to Hashimoto's thyroiditis    NSTEMI (non-ST elevated myocardial infarction) (HCC)    Chest pain due to GERD    Dysfunctional uterine bleeding    Left ovarian cyst    Menopausal symptoms    S/P laparoscopic hysterectomy    Colon cancer screening    Chronic back pain       Objective   /78 (BP Location: Left arm, Patient Position: Sitting, Cuff Size: Adult)   Pulse 94   Temp 97 6 °F (36 4 °C) (Tympanic)   Resp 16   Ht 5' 2 99" (1 6 m)   Wt 71 2 kg (157 lb)   LMP  (LMP Unknown)   SpO2 97%   BMI 27 82 kg/m²     Physical Exam  Cardiovascular:      Rate and Rhythm: Normal rate and regular rhythm  Heart sounds: Normal heart sounds  Comments: Carotids: no bruits  Ext: no edema  Pulmonary:      Effort: Pulmonary effort is normal  No respiratory distress  Breath sounds: No wheezing or rales  Psychiatric:         Behavior: Behavior normal          Thought Content:  Thought content normal          Pertinent Laboratory/Diagnostic Studies:   labs from September 12th reviewed    Current Medications     Current Outpatient Medications:     amLODIPine (NORVASC) 5 mg tablet, Take 1 tablet (5 mg total) by mouth daily, Disp: 90 tablet, Rfl: 1    Blood Glucose Monitoring Suppl (Boxever SYSTEM) w/Device KIT, Use to test blood sugars twice a day, Disp: 1 kit, Rfl: 0    dicyclomine (BENTYL) 20 mg tablet, Take 1 tablet (20 mg total) by mouth every 6 (six) hours as needed (every 6 hours), Disp: 60 tablet, Rfl: 0    escitalopram (LEXAPRO) 20 mg tablet, Take 1 tablet (20 mg total) by mouth daily, Disp: 90 tablet, Rfl: 0    Lactobacillus (PROBIOTIC ACIDOPHILUS PO), Take by mouth daily, Disp: , Rfl:     levothyroxine 112 mcg tablet, Take 1 tablet (112 mcg total) by mouth daily, Disp: 90 tablet, Rfl: 0   linaGLIPtin-metFORMIN HCl (Jentadueto) 2 5-850 MG TABS, Take 1 tablet by mouth 2 (two) times a day, Disp: 180 tablet, Rfl: 0    Magnesium 300 MG CAPS, Take 600 mg by mouth daily, Disp: , Rfl:     Melatonin 10-10 MG TBCR, Take by mouth daily at bedtime as needed, Disp: , Rfl:     metoprolol succinate (TOPROL-XL) 25 mg 24 hr tablet, 25 mg 2 (two) times a day , Disp: , Rfl:     mupirocin (BACTROBAN) 2 % ointment, Apply topically 3 (three) times a day, Disp: 22 g, Rfl: 5    nitroglycerin (NITROSTAT) 0 4 mg SL tablet, Place 1 tablet (0 4 mg total) under the tongue every 5 (five) minutes as needed for chest pain, Disp: 90 tablet, Rfl: 0    omega-3-acid ethyl esters (LOVAZA) 1 g capsule, Take 4 capsules (4 g total) by mouth daily, Disp: 360 capsule, Rfl: 0    OneTouch Delica Lancets 99S MISC, Use to test blood sugars twice a day, Disp: 200 each, Rfl: 6    ONETOUCH VERIO test strip, Use as instructed to test blood sugars twice a day, Disp: 200 each, Rfl: 6    pantoprazole (PROTONIX) 40 mg tablet, Take 1 tablet (40 mg total) by mouth daily before breakfast, Disp: 90 tablet, Rfl: 0    traMADol (ULTRAM) 50 mg tablet, Take 1 tablet (50 mg total) by mouth every 8 (eight) hours as needed for moderate pain, Disp: 60 tablet, Rfl: 0    meloxicam (MOBIC) 15 mg tablet, Take 1 tablet (15 mg total) by mouth daily, Disp: 30 tablet, Rfl: 2    Health Maintenance     Health Maintenance   Topic Date Due    Pneumococcal Vaccine: Pediatrics (0 to 5 Years) and At-Risk Patients (6 to 59 Years) (1 of 1 - PPSV23) 08/13/1976    HIV Screening  08/13/1985    BMI: Followup Plan  08/13/1988    MAMMOGRAM  07/20/2016    DM Eye Exam  08/29/2019    Annual Physical  09/26/2019    Influenza Vaccine  07/01/2020    Depression Screening PHQ  03/04/2021    URINE MICROALBUMIN  02/28/2021    HEMOGLOBIN A1C  03/12/2021    Diabetic Foot Exam  09/14/2021    BMI: Adult  09/16/2021    DTaP,Tdap,and Td Vaccines (2 - Td) 03/04/2025    Colorectal Cancer Screening  07/09/2030    Hepatitis C Screening  Completed    HIB Vaccine  Aged Out    Hepatitis B Vaccine  Aged Out    IPV Vaccine  Aged Out    Hepatitis A Vaccine  Aged Out    Meningococcal ACWY Vaccine  Aged Out    HPV Vaccine  Aged Out     Immunization History   Administered Date(s) Administered    H1N1, All Formulations 11/05/2009    Hep A, adult 06/05/2020    INFLUENZA 11/01/2019    Influenza TIV (IM) 09/26/2013    Tdap 03/04/2015       Abbie Holcomb DO  [facility]

## 2020-09-16 NOTE — ASSESSMENT & PLAN NOTE
History of Hashimoto's thyroiditis  Doing well on levothyroxine 112 mcg daily    Being followed by endocrinology

## 2020-09-16 NOTE — ASSESSMENT & PLAN NOTE
Lab Results   Component Value Date    HGBA1C 7 2 (H) 09/12/2020    A1c from September 12th was 7 2% ( previously 8 0%)  Patient continues to show improvement since improved lifestyle changes  She no longer drinks alcohol and has lost approximately 30 lb in the past few months    Doing well on Jentadueto 2 5/850 b i d   Continue regular follow-up with her endocrinologist, Dr Brittni Martinez

## 2020-09-16 NOTE — ASSESSMENT & PLAN NOTE
Patient wears a lead vest every day at work  She is currently looking for a new job  She is currently taking tramadol 50 mg b i d  She regionally started this medication due to chronic sinus congestion and other various arthralgias, but mostly for back pain recently  We discussed gradually weaning down office medication  Will give Rx for meloxicam 15 mg daily  I asked her to attempt to wean down to once daily dosing of tramadol between now and her next appointment    Call if further problems

## 2020-09-30 DIAGNOSIS — F32.A DEPRESSION, UNSPECIFIED DEPRESSION TYPE: ICD-10-CM

## 2020-09-30 RX ORDER — ESCITALOPRAM OXALATE 20 MG/1
20 TABLET ORAL DAILY
Qty: 90 TABLET | Refills: 0 | Status: SHIPPED | OUTPATIENT
Start: 2020-09-30 | End: 2020-12-15 | Stop reason: SDUPTHER

## 2020-10-05 DIAGNOSIS — G89.29 CHRONIC BACK PAIN, UNSPECIFIED BACK LOCATION, UNSPECIFIED BACK PAIN LATERALITY: Primary | ICD-10-CM

## 2020-10-05 DIAGNOSIS — M54.9 CHRONIC BACK PAIN, UNSPECIFIED BACK LOCATION, UNSPECIFIED BACK PAIN LATERALITY: Primary | ICD-10-CM

## 2020-10-05 RX ORDER — METHOCARBAMOL 750 MG/1
TABLET, FILM COATED ORAL
Qty: 30 TABLET | Refills: 0 | Status: SHIPPED | OUTPATIENT
Start: 2020-10-05 | End: 2020-11-13 | Stop reason: SDUPTHER

## 2020-10-12 DIAGNOSIS — E78.2 COMBINED HYPERLIPIDEMIA: ICD-10-CM

## 2020-10-12 DIAGNOSIS — E11.9 TYPE 2 DIABETES MELLITUS WITHOUT COMPLICATION, WITHOUT LONG-TERM CURRENT USE OF INSULIN (HCC): ICD-10-CM

## 2020-10-12 DIAGNOSIS — J01.90 ACUTE SINUSITIS, RECURRENCE NOT SPECIFIED, UNSPECIFIED LOCATION: ICD-10-CM

## 2020-10-13 RX ORDER — TRAMADOL HYDROCHLORIDE 50 MG/1
50 TABLET ORAL EVERY 8 HOURS PRN
Qty: 60 TABLET | Refills: 0 | Status: SHIPPED | OUTPATIENT
Start: 2020-10-13 | End: 2021-02-08 | Stop reason: SDUPTHER

## 2020-10-13 RX ORDER — LINAGLIPTIN AND METFORMIN HYDROCHLORIDE 2.5; 85 MG/1; MG/1
1 TABLET, FILM COATED ORAL 2 TIMES DAILY
Qty: 180 TABLET | Refills: 0 | Status: SHIPPED | OUTPATIENT
Start: 2020-10-13 | End: 2020-11-13 | Stop reason: SDUPTHER

## 2020-10-13 RX ORDER — OMEGA-3-ACID ETHYL ESTERS 1 G/1
4 CAPSULE, LIQUID FILLED ORAL DAILY
Qty: 360 CAPSULE | Refills: 0 | Status: SHIPPED | OUTPATIENT
Start: 2020-10-13 | End: 2020-12-15 | Stop reason: SDUPTHER

## 2020-11-05 ENCOUNTER — TELEPHONE (OUTPATIENT)
Dept: GASTROENTEROLOGY | Facility: AMBULARY SURGERY CENTER | Age: 50
End: 2020-11-05

## 2020-11-11 ENCOUNTER — TELEMEDICINE (OUTPATIENT)
Dept: GASTROENTEROLOGY | Facility: CLINIC | Age: 50
End: 2020-11-11
Payer: COMMERCIAL

## 2020-11-11 VITALS — HEIGHT: 63 IN | BODY MASS INDEX: 27.82 KG/M2 | WEIGHT: 157 LBS

## 2020-11-11 DIAGNOSIS — K75.81 NASH (NONALCOHOLIC STEATOHEPATITIS): Primary | ICD-10-CM

## 2020-11-11 DIAGNOSIS — E06.3 HYPOTHYROIDISM DUE TO HASHIMOTO'S THYROIDITIS: ICD-10-CM

## 2020-11-11 DIAGNOSIS — E03.8 HYPOTHYROIDISM DUE TO HASHIMOTO'S THYROIDITIS: ICD-10-CM

## 2020-11-11 DIAGNOSIS — K21.9 GASTROESOPHAGEAL REFLUX DISEASE WITHOUT ESOPHAGITIS: ICD-10-CM

## 2020-11-11 DIAGNOSIS — E11.65 TYPE 2 DIABETES MELLITUS WITH HYPERGLYCEMIA, WITHOUT LONG-TERM CURRENT USE OF INSULIN (HCC): ICD-10-CM

## 2020-11-11 PROCEDURE — 99214 OFFICE O/P EST MOD 30 MIN: CPT | Performed by: INTERNAL MEDICINE

## 2020-11-13 DIAGNOSIS — E03.8 HYPOTHYROIDISM DUE TO HASHIMOTO'S THYROIDITIS: ICD-10-CM

## 2020-11-13 DIAGNOSIS — E06.3 HYPOTHYROIDISM DUE TO HASHIMOTO'S THYROIDITIS: ICD-10-CM

## 2020-11-13 DIAGNOSIS — E11.9 TYPE 2 DIABETES MELLITUS WITHOUT COMPLICATION, WITHOUT LONG-TERM CURRENT USE OF INSULIN (HCC): ICD-10-CM

## 2020-11-13 DIAGNOSIS — K58.9 IRRITABLE BOWEL SYNDROME WITHOUT DIARRHEA: ICD-10-CM

## 2020-11-13 DIAGNOSIS — G89.29 CHRONIC BACK PAIN, UNSPECIFIED BACK LOCATION, UNSPECIFIED BACK PAIN LATERALITY: ICD-10-CM

## 2020-11-13 DIAGNOSIS — M54.9 CHRONIC BACK PAIN, UNSPECIFIED BACK LOCATION, UNSPECIFIED BACK PAIN LATERALITY: ICD-10-CM

## 2020-11-15 RX ORDER — LEVOTHYROXINE SODIUM 112 UG/1
112 TABLET ORAL DAILY
Qty: 90 TABLET | Refills: 0 | Status: SHIPPED | OUTPATIENT
Start: 2020-11-15 | End: 2021-03-01 | Stop reason: SDUPTHER

## 2020-11-15 RX ORDER — LINAGLIPTIN AND METFORMIN HYDROCHLORIDE 2.5; 85 MG/1; MG/1
1 TABLET, FILM COATED ORAL 2 TIMES DAILY
Qty: 180 TABLET | Refills: 0 | Status: SHIPPED | OUTPATIENT
Start: 2020-11-15 | End: 2021-01-15 | Stop reason: SDUPTHER

## 2020-11-15 RX ORDER — METHOCARBAMOL 750 MG/1
TABLET, FILM COATED ORAL
Qty: 30 TABLET | Refills: 0 | Status: SHIPPED | OUTPATIENT
Start: 2020-11-15 | End: 2020-12-15 | Stop reason: SDUPTHER

## 2020-11-15 RX ORDER — DICYCLOMINE HCL 20 MG
20 TABLET ORAL EVERY 6 HOURS PRN
Qty: 60 TABLET | Refills: 0 | Status: SHIPPED | OUTPATIENT
Start: 2020-11-15 | End: 2021-03-01 | Stop reason: SDUPTHER

## 2020-11-19 DIAGNOSIS — I10 ESSENTIAL HYPERTENSION: Primary | ICD-10-CM

## 2020-11-19 LAB
LEFT EYE DIABETIC RETINOPATHY: NORMAL
RIGHT EYE DIABETIC RETINOPATHY: NORMAL

## 2020-11-19 RX ORDER — METOPROLOL SUCCINATE 25 MG/1
25 TABLET, EXTENDED RELEASE ORAL DAILY
Qty: 180 TABLET | Refills: 2 | Status: SHIPPED | OUTPATIENT
Start: 2020-11-19 | End: 2021-02-18 | Stop reason: SDUPTHER

## 2020-11-27 ENCOUNTER — OFFICE VISIT (OUTPATIENT)
Dept: URGENT CARE | Facility: CLINIC | Age: 50
End: 2020-11-27
Payer: COMMERCIAL

## 2020-11-27 VITALS — OXYGEN SATURATION: 97 % | TEMPERATURE: 98.4 F | RESPIRATION RATE: 20 BRPM | HEART RATE: 78 BPM

## 2020-11-27 DIAGNOSIS — R07.89 OTHER CHEST PAIN: ICD-10-CM

## 2020-11-27 DIAGNOSIS — J06.9 UPPER RESPIRATORY TRACT INFECTION, UNSPECIFIED TYPE: Primary | ICD-10-CM

## 2020-11-27 DIAGNOSIS — Z11.59 SCREENING EXAMINATION FOR ARTHROPOD-BORNE VIRAL DISEASE: ICD-10-CM

## 2020-11-27 PROCEDURE — G0382 LEV 3 HOSP TYPE B ED VISIT: HCPCS | Performed by: FAMILY MEDICINE

## 2020-11-27 PROCEDURE — 87637 SARSCOV2&INF A&B&RSV AMP PRB: CPT | Performed by: FAMILY MEDICINE

## 2020-11-30 RX ORDER — AMLODIPINE BESYLATE 5 MG/1
5 TABLET ORAL DAILY
Qty: 90 TABLET | Refills: 3 | Status: SHIPPED | OUTPATIENT
Start: 2020-11-30 | End: 2021-03-01 | Stop reason: SDUPTHER

## 2020-12-01 LAB
FLUAV RNA NPH QL NAA+PROBE: NOT DETECTED
FLUBV RNA NPH QL NAA+PROBE: NOT DETECTED
RSV RNA NPH QL NAA+PROBE: NOT DETECTED
SARS-COV-2 RNA NPH QL NAA+PROBE: NOT DETECTED

## 2020-12-15 DIAGNOSIS — F32.A DEPRESSION, UNSPECIFIED DEPRESSION TYPE: ICD-10-CM

## 2020-12-15 DIAGNOSIS — G89.29 CHRONIC BACK PAIN, UNSPECIFIED BACK LOCATION, UNSPECIFIED BACK PAIN LATERALITY: ICD-10-CM

## 2020-12-15 DIAGNOSIS — E78.2 COMBINED HYPERLIPIDEMIA: ICD-10-CM

## 2020-12-15 DIAGNOSIS — K21.9 GERD WITHOUT ESOPHAGITIS: ICD-10-CM

## 2020-12-15 DIAGNOSIS — M54.9 CHRONIC BACK PAIN, UNSPECIFIED BACK LOCATION, UNSPECIFIED BACK PAIN LATERALITY: ICD-10-CM

## 2020-12-15 RX ORDER — PANTOPRAZOLE SODIUM 40 MG/1
40 TABLET, DELAYED RELEASE ORAL
Qty: 90 TABLET | Refills: 0 | Status: SHIPPED | OUTPATIENT
Start: 2020-12-15 | End: 2021-03-01 | Stop reason: SDUPTHER

## 2020-12-15 RX ORDER — METHOCARBAMOL 750 MG/1
TABLET, FILM COATED ORAL
Qty: 30 TABLET | Refills: 0 | Status: SHIPPED | OUTPATIENT
Start: 2020-12-15 | End: 2021-02-04 | Stop reason: SDUPTHER

## 2020-12-15 RX ORDER — ESCITALOPRAM OXALATE 20 MG/1
20 TABLET ORAL DAILY
Qty: 90 TABLET | Refills: 0 | Status: SHIPPED | OUTPATIENT
Start: 2020-12-15 | End: 2021-04-01 | Stop reason: SDUPTHER

## 2020-12-15 RX ORDER — OMEGA-3-ACID ETHYL ESTERS 1 G/1
4 CAPSULE, LIQUID FILLED ORAL DAILY
Qty: 360 CAPSULE | Refills: 0 | Status: SHIPPED | OUTPATIENT
Start: 2020-12-15 | End: 2021-04-01 | Stop reason: SDUPTHER

## 2020-12-16 ENCOUNTER — TELEMEDICINE (OUTPATIENT)
Dept: FAMILY MEDICINE CLINIC | Facility: CLINIC | Age: 50
End: 2020-12-16
Payer: COMMERCIAL

## 2020-12-16 DIAGNOSIS — G89.29 CHRONIC BACK PAIN, UNSPECIFIED BACK LOCATION, UNSPECIFIED BACK PAIN LATERALITY: ICD-10-CM

## 2020-12-16 DIAGNOSIS — E11.65 TYPE 2 DIABETES MELLITUS WITH HYPERGLYCEMIA, WITHOUT LONG-TERM CURRENT USE OF INSULIN (HCC): Primary | ICD-10-CM

## 2020-12-16 DIAGNOSIS — E06.3 HYPOTHYROIDISM DUE TO HASHIMOTO'S THYROIDITIS: ICD-10-CM

## 2020-12-16 DIAGNOSIS — E78.2 COMBINED HYPERLIPIDEMIA: ICD-10-CM

## 2020-12-16 DIAGNOSIS — E03.8 HYPOTHYROIDISM DUE TO HASHIMOTO'S THYROIDITIS: ICD-10-CM

## 2020-12-16 DIAGNOSIS — Q24.5 ANOMALOUS CORONARY ARTERY ORIGIN: ICD-10-CM

## 2020-12-16 DIAGNOSIS — K75.81 NASH (NONALCOHOLIC STEATOHEPATITIS): ICD-10-CM

## 2020-12-16 DIAGNOSIS — M54.9 CHRONIC BACK PAIN, UNSPECIFIED BACK LOCATION, UNSPECIFIED BACK PAIN LATERALITY: ICD-10-CM

## 2020-12-16 DIAGNOSIS — K21.9 GASTROESOPHAGEAL REFLUX DISEASE WITHOUT ESOPHAGITIS: ICD-10-CM

## 2020-12-16 PROCEDURE — 99214 OFFICE O/P EST MOD 30 MIN: CPT | Performed by: FAMILY MEDICINE

## 2020-12-21 ENCOUNTER — IMMUNIZATIONS (OUTPATIENT)
Dept: FAMILY MEDICINE CLINIC | Facility: HOSPITAL | Age: 50
End: 2020-12-21
Payer: COMMERCIAL

## 2020-12-21 DIAGNOSIS — Z23 ENCOUNTER FOR IMMUNIZATION: ICD-10-CM

## 2020-12-21 PROCEDURE — 91300 SARS-COV-2 / COVID-19 MRNA VACCINE (PFIZER-BIONTECH) 30 MCG: CPT

## 2020-12-21 PROCEDURE — 0001A SARS-COV-2 / COVID-19 MRNA VACCINE (PFIZER-BIONTECH) 30 MCG: CPT

## 2021-01-07 ENCOUNTER — TELEPHONE (OUTPATIENT)
Dept: CARDIOLOGY CLINIC | Facility: CLINIC | Age: 51
End: 2021-01-07

## 2021-01-07 NOTE — TELEPHONE ENCOUNTER
Pt left msg on refill line requesting Lopressor to [facility] \[facility] [place] [facility]\""  Per pt's chart, pt is on Metoprolol Succinate (toprol xl) which was renewed by FB on 11/19/20 with refills  Have attempted to reach pt to have her clarify her request - VM message states "full"

## 2021-01-11 ENCOUNTER — IMMUNIZATIONS (OUTPATIENT)
Dept: FAMILY MEDICINE CLINIC | Facility: HOSPITAL | Age: 51
End: 2021-01-11

## 2021-01-11 DIAGNOSIS — Z23 ENCOUNTER FOR IMMUNIZATION: ICD-10-CM

## 2021-01-11 PROCEDURE — 91300 SARS-COV-2 / COVID-19 MRNA VACCINE (PFIZER-BIONTECH) 30 MCG: CPT

## 2021-01-11 PROCEDURE — 0002A SARS-COV-2 / COVID-19 MRNA VACCINE (PFIZER-BIONTECH) 30 MCG: CPT

## 2021-01-15 DIAGNOSIS — E11.9 TYPE 2 DIABETES MELLITUS WITHOUT COMPLICATION, WITHOUT LONG-TERM CURRENT USE OF INSULIN (HCC): ICD-10-CM

## 2021-01-15 RX ORDER — LINAGLIPTIN AND METFORMIN HYDROCHLORIDE 2.5; 85 MG/1; MG/1
1 TABLET, FILM COATED ORAL 2 TIMES DAILY
Qty: 180 TABLET | Refills: 0 | Status: SHIPPED | OUTPATIENT
Start: 2021-01-15 | End: 2021-02-08 | Stop reason: SDUPTHER

## 2021-02-04 DIAGNOSIS — M54.9 CHRONIC BACK PAIN, UNSPECIFIED BACK LOCATION, UNSPECIFIED BACK PAIN LATERALITY: ICD-10-CM

## 2021-02-04 DIAGNOSIS — G89.29 CHRONIC BACK PAIN, UNSPECIFIED BACK LOCATION, UNSPECIFIED BACK PAIN LATERALITY: ICD-10-CM

## 2021-02-05 RX ORDER — METHOCARBAMOL 750 MG/1
TABLET, FILM COATED ORAL
Qty: 30 TABLET | Refills: 0 | Status: SHIPPED | OUTPATIENT
Start: 2021-02-05 | End: 2021-04-01 | Stop reason: SDUPTHER

## 2021-02-06 ENCOUNTER — LAB (OUTPATIENT)
Dept: LAB | Facility: MEDICAL CENTER | Age: 51
End: 2021-02-06
Payer: COMMERCIAL

## 2021-02-06 DIAGNOSIS — E11.65 TYPE 2 DIABETES MELLITUS WITH HYPERGLYCEMIA, WITHOUT LONG-TERM CURRENT USE OF INSULIN (HCC): ICD-10-CM

## 2021-02-06 DIAGNOSIS — I10 ESSENTIAL HYPERTENSION: Chronic | ICD-10-CM

## 2021-02-06 DIAGNOSIS — E03.8 HYPOTHYROIDISM DUE TO HASHIMOTO'S THYROIDITIS: ICD-10-CM

## 2021-02-06 DIAGNOSIS — E06.3 HYPOTHYROIDISM DUE TO HASHIMOTO'S THYROIDITIS: ICD-10-CM

## 2021-02-06 DIAGNOSIS — E78.2 COMBINED HYPERLIPIDEMIA: ICD-10-CM

## 2021-02-06 LAB
ALBUMIN SERPL BCP-MCNC: 3.9 G/DL (ref 3.5–5)
ALP SERPL-CCNC: 126 U/L (ref 46–116)
ALT SERPL W P-5'-P-CCNC: 112 U/L (ref 12–78)
ANION GAP SERPL CALCULATED.3IONS-SCNC: 4 MMOL/L (ref 4–13)
AST SERPL W P-5'-P-CCNC: 87 U/L (ref 5–45)
BILIRUB SERPL-MCNC: 0.36 MG/DL (ref 0.2–1)
BUN SERPL-MCNC: 10 MG/DL (ref 5–25)
CALCIUM SERPL-MCNC: 9.7 MG/DL (ref 8.3–10.1)
CHLORIDE SERPL-SCNC: 105 MMOL/L (ref 100–108)
CHOLEST SERPL-MCNC: 212 MG/DL (ref 50–200)
CO2 SERPL-SCNC: 29 MMOL/L (ref 21–32)
CREAT SERPL-MCNC: 0.7 MG/DL (ref 0.6–1.3)
EST. AVERAGE GLUCOSE BLD GHB EST-MCNC: 183 MG/DL
GFR SERPL CREATININE-BSD FRML MDRD: 101 ML/MIN/1.73SQ M
GLUCOSE P FAST SERPL-MCNC: 129 MG/DL (ref 65–99)
HBA1C MFR BLD: 8 %
HDLC SERPL-MCNC: 44 MG/DL
LDLC SERPL CALC-MCNC: 134 MG/DL (ref 0–100)
POTASSIUM SERPL-SCNC: 4.3 MMOL/L (ref 3.5–5.3)
PROT SERPL-MCNC: 7.8 G/DL (ref 6.4–8.2)
SODIUM SERPL-SCNC: 138 MMOL/L (ref 136–145)
T4 FREE SERPL-MCNC: 1.16 NG/DL (ref 0.76–1.46)
TRIGL SERPL-MCNC: 169 MG/DL
TSH SERPL DL<=0.05 MIU/L-ACNC: 0.82 UIU/ML (ref 0.36–3.74)

## 2021-02-06 PROCEDURE — 36415 COLL VENOUS BLD VENIPUNCTURE: CPT

## 2021-02-06 PROCEDURE — 80061 LIPID PANEL: CPT

## 2021-02-06 PROCEDURE — 80053 COMPREHEN METABOLIC PANEL: CPT

## 2021-02-06 PROCEDURE — 83036 HEMOGLOBIN GLYCOSYLATED A1C: CPT

## 2021-02-06 PROCEDURE — 84443 ASSAY THYROID STIM HORMONE: CPT

## 2021-02-06 PROCEDURE — 84439 ASSAY OF FREE THYROXINE: CPT

## 2021-02-08 DIAGNOSIS — E11.9 TYPE 2 DIABETES MELLITUS WITHOUT COMPLICATION, WITHOUT LONG-TERM CURRENT USE OF INSULIN (HCC): ICD-10-CM

## 2021-02-08 DIAGNOSIS — J01.90 ACUTE SINUSITIS, RECURRENCE NOT SPECIFIED, UNSPECIFIED LOCATION: ICD-10-CM

## 2021-02-08 RX ORDER — TRAMADOL HYDROCHLORIDE 50 MG/1
50 TABLET ORAL EVERY 8 HOURS PRN
Qty: 60 TABLET | Refills: 0 | Status: SHIPPED | OUTPATIENT
Start: 2021-02-08 | End: 2021-03-11 | Stop reason: SDUPTHER

## 2021-02-08 RX ORDER — LINAGLIPTIN AND METFORMIN HYDROCHLORIDE 2.5; 85 MG/1; MG/1
1 TABLET, FILM COATED ORAL 2 TIMES DAILY
Qty: 180 TABLET | Refills: 0 | Status: SHIPPED | OUTPATIENT
Start: 2021-02-08 | End: 2021-02-22 | Stop reason: ALTCHOICE

## 2021-02-18 DIAGNOSIS — I10 ESSENTIAL HYPERTENSION: ICD-10-CM

## 2021-02-18 RX ORDER — METOPROLOL SUCCINATE 25 MG/1
25 TABLET, EXTENDED RELEASE ORAL 2 TIMES DAILY
Qty: 180 TABLET | Refills: 2 | Status: SHIPPED | OUTPATIENT
Start: 2021-02-18 | End: 2021-05-05 | Stop reason: SDUPTHER

## 2021-02-19 ENCOUNTER — TELEPHONE (OUTPATIENT)
Dept: GASTROENTEROLOGY | Facility: CLINIC | Age: 51
End: 2021-02-19

## 2021-02-19 DIAGNOSIS — K75.81 NASH (NONALCOHOLIC STEATOHEPATITIS): Primary | ICD-10-CM

## 2021-02-19 NOTE — TELEPHONE ENCOUNTER
Patients GI provider:  Dr Mardi Halsted    Number to return call: [phone]    Reason for call: Pt calling wanting to speak with someone about her lab results      Scheduled procedure/appointment date if applicable: N/A

## 2021-02-19 NOTE — TELEPHONE ENCOUNTER
See attached- patient states she wants to review lab work- liver enzymes, alkaline phos with Dr Deni Macdonald  Discussed that she may need to schedule office visit  Dr Deni Macdonald do you want patient to schedule office visit?

## 2021-02-22 ENCOUNTER — TELEMEDICINE (OUTPATIENT)
Dept: ENDOCRINOLOGY | Facility: HOSPITAL | Age: 51
End: 2021-02-22
Payer: COMMERCIAL

## 2021-02-22 DIAGNOSIS — I10 ESSENTIAL HYPERTENSION: Chronic | ICD-10-CM

## 2021-02-22 DIAGNOSIS — E78.2 COMBINED HYPERLIPIDEMIA: ICD-10-CM

## 2021-02-22 DIAGNOSIS — E03.8 HYPOTHYROIDISM DUE TO HASHIMOTO'S THYROIDITIS: ICD-10-CM

## 2021-02-22 DIAGNOSIS — E11.65 TYPE 2 DIABETES MELLITUS WITH HYPERGLYCEMIA, WITHOUT LONG-TERM CURRENT USE OF INSULIN (HCC): Primary | ICD-10-CM

## 2021-02-22 DIAGNOSIS — E06.3 HYPOTHYROIDISM DUE TO HASHIMOTO'S THYROIDITIS: ICD-10-CM

## 2021-02-22 PROCEDURE — 99215 OFFICE O/P EST HI 40 MIN: CPT | Performed by: INTERNAL MEDICINE

## 2021-02-22 NOTE — PATIENT INSTRUCTIONS
Hemoglobin A1c is 8%  This has gone up quite a bit and may be due to the diet indiscretion  I will switch your Jentadueto to Janumet XR 50/1000 mg twice a day due to insurance recommendations  Continue to check her blood sugars once a day  I have reordered diabetic Education for medical nutrition therapy to try to help you bring these blood sugars down  Your thyroid blood work is normal   Continue the same levothyroxine 112 mcg daily  Follow-up in 3 months with blood work

## 2021-02-22 NOTE — PROGRESS NOTES
Virtual Regular Visit      Assessment/Plan:    Problem List Items Addressed This Visit        Endocrine    Type 2 diabetes mellitus with hyperglycemia, without long-term current use of insulin (HCC) - Primary    Relevant Medications    SITagliptin-metFORMIN HCl ER  MG TB24    Other Relevant Orders    Comprehensive metabolic panel Lab Collect    HEMOGLOBIN A1C W/ EAG ESTIMATION Lab Collect    TSH, 3rd generation Lab Collect    T4, free Lab Collect    Microalbumin / creatinine urine ratio Lab Collect    Ambulatory referral to Diabetic Education    Hypothyroidism due to Hashimoto's thyroiditis    Relevant Orders    Comprehensive metabolic panel Lab Collect    HEMOGLOBIN A1C W/ EAG ESTIMATION Lab Collect    TSH, 3rd generation Lab Collect    T4, free Lab Collect    Microalbumin / creatinine urine ratio Lab Collect       Cardiovascular and Mediastinum    Hypertension (Chronic)    Relevant Orders    Comprehensive metabolic panel Lab Collect    HEMOGLOBIN A1C W/ EAG ESTIMATION Lab Collect    TSH, 3rd generation Lab Collect    T4, free Lab Collect    Microalbumin / creatinine urine ratio Lab Collect       Other    Combined hyperlipidemia    Relevant Orders    Comprehensive metabolic panel Lab Collect    HEMOGLOBIN A1C W/ EAG ESTIMATION Lab Collect    TSH, 3rd generation Lab Collect    T4, free Lab Collect    Microalbumin / creatinine urine ratio Lab Collect        Assessment and plan:    1  Type 2 diabetes  Recent hemoglobin A1c has gone up to 8%  This may be partly due to dietary indiscretion  She has not seen a diabetic educator for medical nutrition therapy so I have put in and referral for that  Due to insurance issues, she has to switch her Jentadueto to [place] and I will change to Janumet XR 50/1000 mg twice a day  This will give her a slightly higher dose of metformin which may also help her blood sugars  She will continue to check her blood sugars at least once a day       2  Hypothyroidism due to Hashimoto's thyroiditis  Thyroid function tests are normal   She is both biochemically and clinically euthyroid  She will continue the same levothyroxine 112 mcg daily  3  Hypertension  Blood pressure could not be evaluated for this visit due to this being a telemedicine visit  She will continue the same amlodipine and metoprolol for now  4  Hyperlipidemia  She reports that her fenofibrate was recently discontinued by her cardiologist likely due to elevated LFTs  Her cholesterol profile is still somewhat elevated but I will leave that up to her cardiologist to treat  For now, she will continue Lovaza 4 g per day  I have asked her to follow up in 3 months with preceding hemoglobin A1c, CMP,  TSH, free T4,and urine microalbumin to creatinine ratio  Reason for visit is   Chief Complaint   Patient presents with    Virtual Regular Visit     And type 2 diabetes, thyroid, blood pressure, lipid follow-up    Encounter provider Anmol Dhillon MD    Provider located at [address] [place] [address], [address] [place] [address]      Recent Visits  No visits were found meeting these conditions  Showing recent visits within past 7 days and meeting all other requirements     Today's Visits  Date Type Provider Dept   02/22/21 Telemedicine Anmol Dhillon MD [facility]   Showing today's visits and meeting all other requirements     Future Appointments  No visits were found meeting these conditions  Showing future appointments within next 150 days and meeting all other requirements        The patient was identified by name and date of birth  [address] was informed that this is a telemedicine visit and that the visit is being conducted through [address] [place] [address] and patient was informed that this is not a secure, HIPAA-compliant platform  She agrees to proceed     My office door was closed  No one else was in the room  She acknowledged consent and understanding of privacy and security of the video platform  The patient has agreed to participate and understands they can discontinue the visit at any time  Patient is aware this is a billable service  Subjective  Cole Velázquez is a 48 y o  female with a history of type 2 diabetes, hypothyroidism due to Hashimoto's thyroiditis, hypertension, hyperlipidemia for follow-up visit via video telemedicine   She was diagnosed with type 2 diabetes about 3 years ago  This occurred after a non STEMI  She was unable to tolerate Invokana that in the past due to significant yeast infections  She is currently treated with oral agents and takes Jentadueto 2 5/850 mg twice a day  She reports that she will have to change this due to insurance issues soon  She denies polyuria, polydipsia, or polyphagia  She has once a night nocturia  She has numbness and tingling of her feet if she sits too long  She denies blurry vision  She denies chest pain or shortness of breath  Diabetic complications include heart attack  She denies diabetic neuropathy, nephropathy, retinopathy, stroke, or claudication  Last diabetic foot exam was performed in the endocrine office in September 2020  She does not follow with Podiatry  Last diabetic eye exam was in November 2020 with no retinopathy  She does have some bilateral cataracts forming  Blood sugars are checked once a day at various times of the day  They tend to be in the low 100s up to 160 in the morning  She can have some blood sugars in the upper 100s to 200s at times  Over the last several days, blood sugars have been 200s in the mid morning post meals  She has hypothyroidism due to Hashimoto's thyroiditis and takes levothyroxine 112 mcg daily  She is fatigued  She has some sleeping issues in that her mind will race at night at times    She denies heat or cold intolerance, palpitation, tremors, diarrhea or constipation, anxiety or depression, or weight changes  She denies dry skin, brittle nails, or hair loss  She denies diplopia  She has hypertension and takes amlodipine 5 mg daily and metoprolol XL 25 mg twice a day  She denies headache or stroke-like symptoms  She has hyperlipidemia and takes Lovaza 4 g daily  She was on fenofibrate which was discontinued by her cardiologist within the last several months  She denies chest pain or shortness of breath  HPI     Past Medical History:   Diagnosis Date    Diabetes mellitus ([address] )     Borderline    DUB (dysfunctional uterine bleeding)     Fatty liver     Hashimoto's thyroiditis     Last Assessed:  14    History of stomach ulcers     Hypertension     Hypothyroidism     Irritable bowel syndrome     Last Assessed:  3/25/14    Liver disease     elevated enzymes    Myocardial infarction [facility])     2017    Ovarian cyst     left    Psychogenic polydipsia     Has had hyponatremia from drinking too much water in the past        Past Surgical History:   Procedure Laterality Date    ANGIOPLASTY      CARDIAC CATHETERIZATION       SECTION      X 2    CHOLECYSTECTOMY      COLONOSCOPY      CYSTOSCOPY N/A 2019    Procedure: CYSTOSCOPY;  Surgeon: Johny Velazquez MD;  [facility];  Service: Gynecology Oncology    IR BIOPSY LIVER MASS  2020    CA ESOPHAGOGASTRODUODENOSCOPY TRANSORAL DIAGNOSTIC N/A 2018    Procedure: ESOPHAGOGASTRODUODENOSCOPY (EGD); Surgeon: Gunner Palmer MD;  [facility];   Service: Gastroenterology    CA LAPAROSCOPY W TOT HYSTERECTUTERUS <=[address] TUBE/OVARY N/A 2019    Procedure: TOTAL LAPAROSCOPIC HYSTERECTOMY, BILATERAL SALPINGECTOMY, LEFT OOPHORECTOMY;  Surgeon: Johny Velazquez MD;  [facility];  Service: Gynecology Oncology    SINUS SURGERY      TONSILLECTOMY      UPPER GASTROINTESTINAL ENDOSCOPY         Current Outpatient Medications Medication Sig Dispense Refill    amLODIPine (NORVASC) 5 mg tablet Take 1 tablet (5 mg total) by mouth daily 90 tablet 3    Blood Glucose Monitoring Suppl ([employer] IQ SYSTEM) w/Device KIT Use to test blood sugars twice a day 1 kit 0    dicyclomine (BENTYL) 20 mg tablet Take 1 tablet (20 mg total) by mouth every 6 (six) hours as needed (every 6 hours) 60 tablet 0    escitalopram (LEXAPRO) 20 mg tablet Take 1 tablet (20 mg total) by mouth daily 90 tablet 0    Lactobacillus (PROBIOTIC ACIDOPHILUS PO) Take by mouth daily      levothyroxine 112 mcg tablet Take 1 tablet (112 mcg total) by mouth daily 90 tablet 0    Magnesium 300 MG CAPS Take 600 mg by mouth daily      Melatonin 10-10 MG TBCR Take by mouth daily at bedtime as needed      methocarbamol (ROBAXIN) 750 mg tablet 1 tab BID PRN 30 tablet 0    metoprolol succinate (TOPROL-XL) 25 mg 24 hr tablet Take 1 tablet (25 mg total) by mouth 2 (two) times a day 180 tablet 2    mupirocin (BACTROBAN) 2 % ointment Apply topically 3 (three) times a day 22 g 5    nitroglycerin (NITROSTAT) 0 4 mg SL tablet Place 1 tablet (0 4 mg total) under the tongue every 5 (five) minutes as needed for chest pain 90 tablet 0    omega-3-acid ethyl esters (LOVAZA) 1 g capsule Take 4 capsules (4 g total) by mouth daily 360 capsule 0    OneTouch Delica Lancets 70C MISC Use to test blood sugars twice a day 200 each 6    ONETOUCH VERIO test strip Use as instructed to test blood sugars twice a day 200 each 6    pantoprazole (PROTONIX) 40 mg tablet Take 1 tablet (40 mg total) by mouth daily before breakfast 90 tablet 0    traMADol (ULTRAM) 50 mg tablet Take 1 tablet (50 mg total) by mouth every 8 (eight) hours as needed for moderate pain 60 tablet 0    SITagliptin-metFORMIN HCl ER  MG TB24 Take 1 tablet by mouth 2 (two) times a day 180 tablet 3     No current facility-administered medications for this visit           Allergies   Allergen Reactions    Sulfa Antibiotics Shortness Of Breath    Invokana [Canagliflozin]      Yeast infection    Erythromycin      Reaction Date: [id number];     Metronidazole        Review of Systems   Constitutional: Positive for fatigue  Negative for unexpected weight change  Will have some days with fatigue  HENT: Negative for trouble swallowing  Eyes: Negative for visual disturbance  No diplopia  Wears reading glasses  Respiratory: Negative for chest tightness and shortness of breath  Cardiovascular: Negative for chest pain and palpitations  Gastrointestinal: Positive for nausea  Negative for abdominal pain, constipation and diarrhea  Slight nausea with some pain over the right side over her liver  Endocrine: Negative for cold intolerance, heat intolerance, polydipsia, polyphagia and polyuria  Nocturia once a night  Skin: Negative for rash and wound  No dry skin  No brittle nails  No hair loss  Neurological: Negative for dizziness, tremors, weakness, light-headedness, numbness and headaches  Psychiatric/Behavioral: Positive for sleep disturbance  Negative for dysphoric mood  The patient is not nervous/anxious  Some difficulty sleeping when her mind races at night  Video Exam    There were no vitals filed for this visit  Physical Exam     Physical Exam   Constitutional: Shee is oriented to person, place, and time  She appears well-developed and well-nourished  No distress  HENT:   No lid lag, stare, proptosis, or periorbital edema  Head: Normocephalic and atraumatic  Neck: Normal range of motion  No obvious neck enlargement  Pulmonary/Chest: Effort normal    No audible wheezing  Musculoskeletal: Normal range of motion  Neurological: She is alert and oriented to person, place, and time  Skin: She is not diaphoretic  Psychiatric: She has a normal mood and affect  Her behavior is normal        Blood work:     Blood work done on 02/06/2021 showed hemoglobin A1c of 8%  Total cholesterol 212, triglyceride 169, HDL 44,   CMP showed a glucose of 129 fasting, AST 87, , alkaline phosphatase 126, but was otherwise normal     TSH is 0 825 with a free T4 of 1 16  I spent 22 minutes directly with the patient during this visit      VIRTUAL VISIT DISCLAIMER    Nicole York acknowledges that she has consented to an online visit or consultation  She understands that the online visit is based solely on information provided by her, and that, in the absence of a face-to-face physical evaluation by the physician, the diagnosis she receives is both limited and provisional in terms of accuracy and completeness  This is not intended to replace a full medical face-to-face evaluation by the physician  Nicole York understands and accepts these terms

## 2021-02-23 NOTE — TELEPHONE ENCOUNTER
4:16 PM  February 22, 2021  Tiffanie Wu MD    10:26 AM  [facility] let her know this is biopsy confirmed nonalcoholic steatohepatitis    It is expected to see fluctuation of her transaminases and alkaline phosphatase  I recommend continued abstinence from drinking   Repeat LFTs in 3-6 months        Yearly office visit  **spoke to patient and reviewed Dr Foster Daily recommendations with patient  She will repeat LFTs in 3-6 months and office visit in November  Avoid alcohol

## 2021-03-01 DIAGNOSIS — E03.8 HYPOTHYROIDISM DUE TO HASHIMOTO'S THYROIDITIS: ICD-10-CM

## 2021-03-01 DIAGNOSIS — K58.9 IRRITABLE BOWEL SYNDROME WITHOUT DIARRHEA: ICD-10-CM

## 2021-03-01 DIAGNOSIS — E06.3 HYPOTHYROIDISM DUE TO HASHIMOTO'S THYROIDITIS: ICD-10-CM

## 2021-03-01 DIAGNOSIS — R07.89 OTHER CHEST PAIN: ICD-10-CM

## 2021-03-01 DIAGNOSIS — K21.9 GERD WITHOUT ESOPHAGITIS: ICD-10-CM

## 2021-03-01 RX ORDER — DICYCLOMINE HCL 20 MG
20 TABLET ORAL EVERY 6 HOURS PRN
Qty: 60 TABLET | Refills: 0 | Status: SHIPPED | OUTPATIENT
Start: 2021-03-01 | End: 2021-05-05 | Stop reason: SDUPTHER

## 2021-03-01 RX ORDER — PANTOPRAZOLE SODIUM 40 MG/1
40 TABLET, DELAYED RELEASE ORAL
Qty: 90 TABLET | Refills: 0 | Status: SHIPPED | OUTPATIENT
Start: 2021-03-01 | End: 2021-06-02 | Stop reason: SDUPTHER

## 2021-03-01 RX ORDER — LEVOTHYROXINE SODIUM 112 UG/1
112 TABLET ORAL DAILY
Qty: 90 TABLET | Refills: 0 | Status: SHIPPED | OUTPATIENT
Start: 2021-03-01 | End: 2021-06-02 | Stop reason: SDUPTHER

## 2021-03-02 ENCOUNTER — TELEPHONE (OUTPATIENT)
Dept: OTHER | Facility: OTHER | Age: 51
End: 2021-03-02

## 2021-03-02 RX ORDER — AMLODIPINE BESYLATE 5 MG/1
5 TABLET ORAL DAILY
Qty: 90 TABLET | Refills: 0 | Status: SHIPPED | OUTPATIENT
Start: 2021-03-02 | End: 2021-06-02 | Stop reason: SDUPTHER

## 2021-03-11 DIAGNOSIS — J01.90 ACUTE SINUSITIS, RECURRENCE NOT SPECIFIED, UNSPECIFIED LOCATION: ICD-10-CM

## 2021-03-11 RX ORDER — TRAMADOL HYDROCHLORIDE 50 MG/1
50 TABLET ORAL EVERY 8 HOURS PRN
Qty: 60 TABLET | Refills: 0 | Status: SHIPPED | OUTPATIENT
Start: 2021-03-11 | End: 2021-04-12 | Stop reason: SDUPTHER

## 2021-04-01 ENCOUNTER — TELEPHONE (OUTPATIENT)
Dept: FAMILY MEDICINE CLINIC | Facility: CLINIC | Age: 51
End: 2021-04-01

## 2021-04-01 DIAGNOSIS — G89.29 CHRONIC BACK PAIN, UNSPECIFIED BACK LOCATION, UNSPECIFIED BACK PAIN LATERALITY: ICD-10-CM

## 2021-04-01 DIAGNOSIS — M54.9 CHRONIC BACK PAIN, UNSPECIFIED BACK LOCATION, UNSPECIFIED BACK PAIN LATERALITY: ICD-10-CM

## 2021-04-01 DIAGNOSIS — F32.A DEPRESSION, UNSPECIFIED DEPRESSION TYPE: ICD-10-CM

## 2021-04-01 DIAGNOSIS — E78.2 COMBINED HYPERLIPIDEMIA: ICD-10-CM

## 2021-04-01 RX ORDER — ESCITALOPRAM OXALATE 20 MG/1
20 TABLET ORAL DAILY
Qty: 90 TABLET | Refills: 0 | Status: SHIPPED | OUTPATIENT
Start: 2021-04-01 | End: 2021-06-24 | Stop reason: SDUPTHER

## 2021-04-01 RX ORDER — OMEGA-3-ACID ETHYL ESTERS 1 G/1
4 CAPSULE, LIQUID FILLED ORAL DAILY
Qty: 360 CAPSULE | Refills: 0 | Status: SHIPPED | OUTPATIENT
Start: 2021-04-01 | End: 2021-10-01 | Stop reason: SDUPTHER

## 2021-04-01 RX ORDER — METHOCARBAMOL 750 MG/1
TABLET, FILM COATED ORAL
Qty: 30 TABLET | Refills: 0 | Status: SHIPPED | OUTPATIENT
Start: 2021-04-01 | End: 2021-05-05 | Stop reason: SDUPTHER

## 2021-04-12 DIAGNOSIS — E11.65 TYPE 2 DIABETES MELLITUS WITH HYPERGLYCEMIA, WITHOUT LONG-TERM CURRENT USE OF INSULIN (HCC): ICD-10-CM

## 2021-04-12 DIAGNOSIS — J01.90 ACUTE SINUSITIS, RECURRENCE NOT SPECIFIED, UNSPECIFIED LOCATION: ICD-10-CM

## 2021-04-12 RX ORDER — TRAMADOL HYDROCHLORIDE 50 MG/1
50 TABLET ORAL EVERY 8 HOURS PRN
Qty: 60 TABLET | Refills: 0 | Status: SHIPPED | OUTPATIENT
Start: 2021-04-12 | End: 2021-05-05 | Stop reason: SDUPTHER

## 2021-04-13 RX ORDER — TRAMADOL HYDROCHLORIDE 50 MG/1
50 TABLET ORAL EVERY 8 HOURS PRN
Qty: 60 TABLET | Refills: 0 | OUTPATIENT
Start: 2021-04-13

## 2021-05-05 DIAGNOSIS — I10 ESSENTIAL HYPERTENSION: ICD-10-CM

## 2021-05-05 DIAGNOSIS — M54.9 CHRONIC BACK PAIN, UNSPECIFIED BACK LOCATION, UNSPECIFIED BACK PAIN LATERALITY: ICD-10-CM

## 2021-05-05 DIAGNOSIS — K58.9 IRRITABLE BOWEL SYNDROME WITHOUT DIARRHEA: ICD-10-CM

## 2021-05-05 DIAGNOSIS — G89.29 CHRONIC BACK PAIN, UNSPECIFIED BACK LOCATION, UNSPECIFIED BACK PAIN LATERALITY: ICD-10-CM

## 2021-05-05 DIAGNOSIS — J01.90 ACUTE SINUSITIS, RECURRENCE NOT SPECIFIED, UNSPECIFIED LOCATION: ICD-10-CM

## 2021-05-05 DIAGNOSIS — E11.65 TYPE 2 DIABETES MELLITUS WITH HYPERGLYCEMIA, WITHOUT LONG-TERM CURRENT USE OF INSULIN (HCC): ICD-10-CM

## 2021-05-05 RX ORDER — METOPROLOL SUCCINATE 25 MG/1
25 TABLET, EXTENDED RELEASE ORAL 2 TIMES DAILY
Qty: 180 TABLET | Refills: 3 | Status: SHIPPED | OUTPATIENT
Start: 2021-05-05 | End: 2021-08-19 | Stop reason: SDUPTHER

## 2021-05-05 RX ORDER — METHOCARBAMOL 750 MG/1
TABLET, FILM COATED ORAL
Qty: 30 TABLET | Refills: 0 | Status: SHIPPED | OUTPATIENT
Start: 2021-05-05 | End: 2021-06-24 | Stop reason: SDUPTHER

## 2021-05-05 RX ORDER — DICYCLOMINE HCL 20 MG
20 TABLET ORAL EVERY 6 HOURS PRN
Qty: 60 TABLET | Refills: 0 | Status: SHIPPED | OUTPATIENT
Start: 2021-05-05 | End: 2021-06-02 | Stop reason: SDUPTHER

## 2021-05-05 RX ORDER — TRAMADOL HYDROCHLORIDE 50 MG/1
50 TABLET ORAL EVERY 8 HOURS PRN
Qty: 60 TABLET | Refills: 0 | Status: SHIPPED | OUTPATIENT
Start: 2021-05-05 | End: 2021-06-02 | Stop reason: SDUPTHER

## 2021-06-02 DIAGNOSIS — R07.89 OTHER CHEST PAIN: ICD-10-CM

## 2021-06-02 DIAGNOSIS — E03.8 HYPOTHYROIDISM DUE TO HASHIMOTO'S THYROIDITIS: ICD-10-CM

## 2021-06-02 DIAGNOSIS — K21.9 GERD WITHOUT ESOPHAGITIS: ICD-10-CM

## 2021-06-02 DIAGNOSIS — K58.9 IRRITABLE BOWEL SYNDROME WITHOUT DIARRHEA: ICD-10-CM

## 2021-06-02 DIAGNOSIS — J01.90 ACUTE SINUSITIS, RECURRENCE NOT SPECIFIED, UNSPECIFIED LOCATION: ICD-10-CM

## 2021-06-02 DIAGNOSIS — E06.3 HYPOTHYROIDISM DUE TO HASHIMOTO'S THYROIDITIS: ICD-10-CM

## 2021-06-02 RX ORDER — PANTOPRAZOLE SODIUM 40 MG/1
40 TABLET, DELAYED RELEASE ORAL
Qty: 90 TABLET | Refills: 0 | Status: SHIPPED | OUTPATIENT
Start: 2021-06-02 | End: 2021-08-19 | Stop reason: SDUPTHER

## 2021-06-02 RX ORDER — LEVOTHYROXINE SODIUM 112 UG/1
112 TABLET ORAL DAILY
Qty: 90 TABLET | Refills: 0 | Status: SHIPPED | OUTPATIENT
Start: 2021-06-02 | End: 2021-08-19 | Stop reason: SDUPTHER

## 2021-06-02 RX ORDER — AMLODIPINE BESYLATE 5 MG/1
5 TABLET ORAL DAILY
Qty: 90 TABLET | Refills: 0 | Status: SHIPPED | OUTPATIENT
Start: 2021-06-02 | End: 2021-08-19 | Stop reason: SDUPTHER

## 2021-06-02 RX ORDER — DICYCLOMINE HCL 20 MG
20 TABLET ORAL EVERY 6 HOURS PRN
Qty: 60 TABLET | Refills: 0 | Status: SHIPPED | OUTPATIENT
Start: 2021-06-02 | End: 2021-08-19 | Stop reason: SDUPTHER

## 2021-06-02 RX ORDER — TRAMADOL HYDROCHLORIDE 50 MG/1
50 TABLET ORAL EVERY 8 HOURS PRN
Qty: 60 TABLET | Refills: 0 | Status: SHIPPED | OUTPATIENT
Start: 2021-06-02 | End: 2021-06-30 | Stop reason: SDUPTHER

## 2021-06-09 ENCOUNTER — OFFICE VISIT (OUTPATIENT)
Dept: FAMILY MEDICINE CLINIC | Facility: CLINIC | Age: 51
End: 2021-06-09
Payer: COMMERCIAL

## 2021-06-09 VITALS
WEIGHT: 162 LBS | SYSTOLIC BLOOD PRESSURE: 126 MMHG | TEMPERATURE: 98.8 F | HEIGHT: 63 IN | HEART RATE: 76 BPM | DIASTOLIC BLOOD PRESSURE: 88 MMHG | BODY MASS INDEX: 28.7 KG/M2 | OXYGEN SATURATION: 97 %

## 2021-06-09 DIAGNOSIS — M77.11 LATERAL EPICONDYLITIS OF RIGHT ELBOW: Primary | ICD-10-CM

## 2021-06-09 PROCEDURE — 99214 OFFICE O/P EST MOD 30 MIN: CPT | Performed by: FAMILY MEDICINE

## 2021-06-09 NOTE — PROGRESS NOTES
Assessment/Plan:    1  Lateral epicondylitis of right elbow  Assessment & Plan:  Patient's symptoms are most consistent with lateral epicondylitis  Patient was instructed to apply cold compress such as ice pack to the affected area for 20 minutes at a time  Do not apply ice directly to skin  Wear a cock up wrist splint every night for up to 4 weeks, and a tennis elbow brace during the daytime  Take Tylenol for pain relief as needed  Follow up in 4 week if symptoms do not improve with treatment  Consider referral to Orthopedic Surgery at that time  Subjective:      Patient ID: Aby Hankins is a 48 y o  female  HPI    Patient states that 2 weeks ago she fell forward and braced herself with both arms  She developed pain in her right lateral forearm that has been intermittent  She works as RN in the interventional radiology department at [employer]  She does assist in moving patients and does a lot of pulling movements at work  This makes the pain worse as well  Pain is worst with wrist movement and pronation of her wrist     She applied a tens unit on her right forearm for pain relief with little help  She has GIANG so she tries to avoid taking NSAIDs  She takes Tylenol instead        The following portions of the patient's history were reviewed and updated as appropriate: allergies, current medications, past family history, past medical history, past social history, past surgical history, and problem list       Current Outpatient Medications:     amLODIPine (NORVASC) 5 mg tablet, Take 1 tablet (5 mg total) by mouth daily, Disp: 90 tablet, Rfl: 0    Blood Glucose Monitoring Suppl ([employer] Touch[employer] SYSTEM) w/Device KIT, Use to test blood sugars twice a day, Disp: 1 kit, Rfl: 0    dicyclomine (BENTYL) 20 mg tablet, Take 1 tablet (20 mg total) by mouth every 6 (six) hours as needed (every 6 hours), Disp: 60 tablet, Rfl: 0    escitalopram (LEXAPRO) 20 mg tablet, Take 1 tablet (20 mg total) by mouth daily, Disp: 90 tablet, Rfl: 0    Lactobacillus (PROBIOTIC ACIDOPHILUS PO), Take by mouth daily, Disp: , Rfl:     levothyroxine 112 mcg tablet, Take 1 tablet (112 mcg total) by mouth daily, Disp: 90 tablet, Rfl: 0    Magnesium 300 MG CAPS, Take 600 mg by mouth daily, Disp: , Rfl:     metoprolol succinate (TOPROL-XL) 25 mg 24 hr tablet, Take 1 tablet (25 mg total) by mouth 2 (two) times a day, Disp: 180 tablet, Rfl: 3    mupirocin (BACTROBAN) 2 % ointment, Apply topically 3 (three) times a day, Disp: 22 g, Rfl: 5    nitroglycerin (NITROSTAT) 0 4 mg SL tablet, Place 1 tablet (0 4 mg total) under the tongue every 5 (five) minutes as needed for chest pain, Disp: 90 tablet, Rfl: 0    omega-3-acid ethyl esters (LOVAZA) 1 g capsule, Take 4 capsules (4 g total) by mouth daily, Disp: 360 capsule, Rfl: 0    OneTouch Delica Lancets 03Y MISC, Use to test blood sugars twice a day, Disp: 200 each, Rfl: 6    ONETOUCH VERIO test strip, Use as instructed to test blood sugars twice a day, Disp: 200 each, Rfl: 6    pantoprazole (PROTONIX) 40 mg tablet, Take 1 tablet (40 mg total) by mouth daily before breakfast, Disp: 90 tablet, Rfl: 0    SITagliptin-metFORMIN HCl ER  MG TB24, Take 1 tablet by mouth 2 (two) times a day, Disp: 180 tablet, Rfl: 0    traMADol (ULTRAM) 50 mg tablet, Take 1 tablet (50 mg total) by mouth every 8 (eight) hours as needed for moderate pain, Disp: 60 tablet, Rfl: 0    Melatonin 10-10 MG TBCR, Take by mouth daily at bedtime as needed, Disp: , Rfl:     methocarbamol (ROBAXIN) 750 mg tablet, 1 tab BID PRN (Patient not taking: Reported on 6/9/2021), Disp: 30 tablet, Rfl: 0      Review of Systems   Constitutional: Negative for chills and fever  HENT: Negative for ear pain and sore throat  Eyes: Negative for pain and visual disturbance  Respiratory: Negative for cough and shortness of breath  Cardiovascular: Negative for chest pain and palpitations  Gastrointestinal: Negative for abdominal pain and vomiting  Genitourinary: Negative for dysuria and hematuria  Musculoskeletal: Positive for myalgias (right forearm)  Negative for arthralgias and back pain  Skin: Negative for color change and rash  Neurological: Negative for seizures and syncope  All other systems reviewed and are negative  Objective:      /88 (BP Location: Left arm, Patient Position: Sitting, Cuff Size: Large)   Pulse 76   Temp 98 8 °F (37 1 °C)   Ht 5' 2 99" (1 6 m)   Wt 73 5 kg (162 lb)   LMP  (LMP Unknown)   SpO2 97%   BMI 28 71 kg/m²          Physical Exam  Vitals signs and nursing note reviewed  Constitutional:       General: She is not in acute distress  Appearance: Normal appearance  She is not ill-appearing  HENT:      Head: Normocephalic and atraumatic  Eyes:      Extraocular Movements: Extraocular movements intact  Conjunctiva/sclera: Conjunctivae normal    Cardiovascular:      Rate and Rhythm: Normal rate and regular rhythm  Heart sounds: Normal heart sounds  No murmur  Pulmonary:      Effort: Pulmonary effort is normal  No respiratory distress  Breath sounds: Normal breath sounds  No wheezing  Musculoskeletal:      Right elbow: She exhibits normal range of motion, no swelling and no deformity  Tenderness found  Lateral epicondyle tenderness noted  No medial epicondyle tenderness noted  Left elbow: Normal  She exhibits normal range of motion, no deformity and no laceration  Right wrist: She exhibits normal range of motion, no tenderness, no bony tenderness and no deformity  Comments: Pain with resistance to pronation    Skin:     General: Skin is warm  Neurological:      General: No focal deficit present  Mental Status: She is alert and oriented to person, place, and time  Psychiatric:         Mood and Affect: Mood normal          Behavior: Behavior normal          Thought Content:  Thought content normal          Judgment: Judgment normal

## 2021-06-09 NOTE — ASSESSMENT & PLAN NOTE
Patient's symptoms are most consistent with lateral epicondylitis  Patient was instructed to apply cold compress such as ice pack to the affected area for 20 minutes at a time  Do not apply ice directly to skin  Wear a cock up wrist splint every night for up to 4 weeks, and a tennis elbow brace during the daytime  Take Tylenol for pain relief as needed  Follow up in 4 week if symptoms do not improve with treatment  Consider referral to Orthopedic Surgery at that time

## 2021-06-24 DIAGNOSIS — M54.9 CHRONIC BACK PAIN, UNSPECIFIED BACK LOCATION, UNSPECIFIED BACK PAIN LATERALITY: ICD-10-CM

## 2021-06-24 DIAGNOSIS — F32.A DEPRESSION, UNSPECIFIED DEPRESSION TYPE: ICD-10-CM

## 2021-06-24 DIAGNOSIS — G89.29 CHRONIC BACK PAIN, UNSPECIFIED BACK LOCATION, UNSPECIFIED BACK PAIN LATERALITY: ICD-10-CM

## 2021-06-24 RX ORDER — ESCITALOPRAM OXALATE 20 MG/1
20 TABLET ORAL DAILY
Qty: 90 TABLET | Refills: 0 | Status: SHIPPED | OUTPATIENT
Start: 2021-06-24 | End: 2021-09-26 | Stop reason: SDUPTHER

## 2021-06-24 RX ORDER — METHOCARBAMOL 750 MG/1
TABLET, FILM COATED ORAL
Qty: 30 TABLET | Refills: 0 | Status: SHIPPED | OUTPATIENT
Start: 2021-06-24 | End: 2021-08-09 | Stop reason: SDUPTHER

## 2021-06-30 ENCOUNTER — APPOINTMENT (OUTPATIENT)
Dept: LAB | Facility: HOSPITAL | Age: 51
End: 2021-06-30
Attending: INTERNAL MEDICINE
Payer: COMMERCIAL

## 2021-06-30 ENCOUNTER — TELEPHONE (OUTPATIENT)
Dept: PAIN MEDICINE | Facility: CLINIC | Age: 51
End: 2021-06-30

## 2021-06-30 DIAGNOSIS — I10 ESSENTIAL HYPERTENSION: Chronic | ICD-10-CM

## 2021-06-30 DIAGNOSIS — E78.2 COMBINED HYPERLIPIDEMIA: ICD-10-CM

## 2021-06-30 DIAGNOSIS — E11.65 TYPE 2 DIABETES MELLITUS WITH HYPERGLYCEMIA, WITHOUT LONG-TERM CURRENT USE OF INSULIN (HCC): ICD-10-CM

## 2021-06-30 DIAGNOSIS — J01.90 ACUTE SINUSITIS, RECURRENCE NOT SPECIFIED, UNSPECIFIED LOCATION: ICD-10-CM

## 2021-06-30 DIAGNOSIS — E03.8 HYPOTHYROIDISM DUE TO HASHIMOTO'S THYROIDITIS: ICD-10-CM

## 2021-06-30 DIAGNOSIS — E06.3 HYPOTHYROIDISM DUE TO HASHIMOTO'S THYROIDITIS: ICD-10-CM

## 2021-06-30 LAB
ALBUMIN SERPL BCP-MCNC: 3.8 G/DL (ref 3.5–5)
ALP SERPL-CCNC: 113 U/L (ref 46–116)
ALT SERPL W P-5'-P-CCNC: 104 U/L (ref 12–78)
ANION GAP SERPL CALCULATED.3IONS-SCNC: 12 MMOL/L (ref 4–13)
AST SERPL W P-5'-P-CCNC: 95 U/L (ref 5–45)
BILIRUB SERPL-MCNC: 0.3 MG/DL (ref 0.2–1)
BUN SERPL-MCNC: 9 MG/DL (ref 5–25)
CALCIUM SERPL-MCNC: 9.1 MG/DL (ref 8.3–10.1)
CHLORIDE SERPL-SCNC: 104 MMOL/L (ref 100–108)
CO2 SERPL-SCNC: 25 MMOL/L (ref 21–32)
CREAT SERPL-MCNC: 0.66 MG/DL (ref 0.6–1.3)
CREAT UR-MCNC: 98.3 MG/DL
EST. AVERAGE GLUCOSE BLD GHB EST-MCNC: 177 MG/DL
GFR SERPL CREATININE-BSD FRML MDRD: 103 ML/MIN/1.73SQ M
GLUCOSE P FAST SERPL-MCNC: 160 MG/DL (ref 65–99)
HBA1C MFR BLD: 7.8 %
MICROALBUMIN UR-MCNC: 6.3 MG/L (ref 0–20)
MICROALBUMIN/CREAT 24H UR: 6 MG/G CREATININE (ref 0–30)
POTASSIUM SERPL-SCNC: 4.5 MMOL/L (ref 3.5–5.3)
PROT SERPL-MCNC: 7.9 G/DL (ref 6.4–8.2)
SODIUM SERPL-SCNC: 141 MMOL/L (ref 136–145)
T4 FREE SERPL-MCNC: 1.14 NG/DL (ref 0.76–1.46)
TSH SERPL DL<=0.05 MIU/L-ACNC: 1.55 UIU/ML (ref 0.36–3.74)

## 2021-06-30 PROCEDURE — 82570 ASSAY OF URINE CREATININE: CPT

## 2021-06-30 PROCEDURE — 36415 COLL VENOUS BLD VENIPUNCTURE: CPT

## 2021-06-30 PROCEDURE — 84443 ASSAY THYROID STIM HORMONE: CPT

## 2021-06-30 PROCEDURE — 80053 COMPREHEN METABOLIC PANEL: CPT

## 2021-06-30 PROCEDURE — 83036 HEMOGLOBIN GLYCOSYLATED A1C: CPT

## 2021-06-30 PROCEDURE — 84439 ASSAY OF FREE THYROXINE: CPT

## 2021-06-30 PROCEDURE — 82043 UR ALBUMIN QUANTITATIVE: CPT

## 2021-06-30 NOTE — TELEPHONE ENCOUNTER
New pt, questioning if there are any records from Dr Juan A Brower in her chart  Advised pt, the writer looked in media back to 2018 with no results - will confirm with the clerical staff and advise if records are found  Please confirm - no records from Dr Juan A Brower available in the chart  Thanks

## 2021-07-01 ENCOUNTER — OFFICE VISIT (OUTPATIENT)
Dept: ENDOCRINOLOGY | Facility: HOSPITAL | Age: 51
End: 2021-07-01
Payer: COMMERCIAL

## 2021-07-01 VITALS
HEIGHT: 63 IN | DIASTOLIC BLOOD PRESSURE: 72 MMHG | SYSTOLIC BLOOD PRESSURE: 116 MMHG | HEART RATE: 82 BPM | WEIGHT: 165.4 LBS | BODY MASS INDEX: 29.3 KG/M2

## 2021-07-01 DIAGNOSIS — E03.8 HYPOTHYROIDISM DUE TO HASHIMOTO'S THYROIDITIS: ICD-10-CM

## 2021-07-01 DIAGNOSIS — E11.65 TYPE 2 DIABETES MELLITUS WITH HYPERGLYCEMIA, WITHOUT LONG-TERM CURRENT USE OF INSULIN (HCC): Primary | ICD-10-CM

## 2021-07-01 DIAGNOSIS — I10 ESSENTIAL HYPERTENSION: Chronic | ICD-10-CM

## 2021-07-01 DIAGNOSIS — E06.3 HYPOTHYROIDISM DUE TO HASHIMOTO'S THYROIDITIS: ICD-10-CM

## 2021-07-01 DIAGNOSIS — E78.2 COMBINED HYPERLIPIDEMIA: ICD-10-CM

## 2021-07-01 PROCEDURE — 99215 OFFICE O/P EST HI 40 MIN: CPT | Performed by: INTERNAL MEDICINE

## 2021-07-01 RX ORDER — MULTIVIT-MIN/IRON/FOLIC ACID/K 18-600-40
4000 CAPSULE ORAL DAILY
COMMUNITY

## 2021-07-01 RX ORDER — TRAMADOL HYDROCHLORIDE 50 MG/1
50 TABLET ORAL EVERY 8 HOURS PRN
Qty: 60 TABLET | Refills: 0 | Status: SHIPPED | OUTPATIENT
Start: 2021-07-01 | End: 2021-07-28 | Stop reason: SDUPTHER

## 2021-07-01 NOTE — PATIENT INSTRUCTIONS
Hgba1c is 7 8%  this is improve some but still too high  Continue the janumet    work on diet  Work on diabetes education visit  Work on checking sugars once a day  Follow up in 3 months with blood work

## 2021-07-01 NOTE — PROGRESS NOTES
7/3/2021    Assessment/Plan      Diagnoses and all orders for this visit:    Type 2 diabetes mellitus with hyperglycemia, without long-term current use of insulin ([facility] [address] )  -     HEMOGLOBIN A1C W/ EAG ESTIMATION Lab Collect; Future  -     Comprehensive metabolic panel Lab Collect; Future  -     CBC and differential Lab Collect; Future  -     Ambulatory referral to Diabetic Education; Future    Hypothyroidism due to Hashimoto's thyroiditis  -     HEMOGLOBIN A1C W/ EAG ESTIMATION Lab Collect; Future  -     Comprehensive metabolic panel Lab Collect; Future  -     CBC and differential Lab Collect; Future    Essential hypertension  -     HEMOGLOBIN A1C W/ EAG ESTIMATION Lab Collect; Future  -     Comprehensive metabolic panel Lab Collect; Future  -     CBC and differential Lab Collect; Future    Combined hyperlipidemia  -     HEMOGLOBIN A1C W/ EAG ESTIMATION Lab Collect; Future  -     Comprehensive metabolic panel Lab Collect; Future  -     CBC and differential Lab Collect; Future    Other orders  -     Vitamin D, Cholecalciferol, 50 MCG (2000 UT) CAPS; Take 4,000 Units by mouth daily        Assessment/Plan:    1  Type 2 diabetes  Hemoglobin A1c is 7 8%  This is improved but still demonstrates poorly controlled diabetes for her age  For now, she will continue the same Janumet 50/1000 mg twice a day  She is going to work on her  Diabetic, low-carbohydrate diet  She has been unable to follow-up with diabetic Education due to some scheduling complex but I have again put in a referral to for Diabetes Education for medical nutrition therapy  She will continue to work on checking her sugars at least once a day  2  Hypothyroidism due to Hashimoto's thyroiditis  Most recent thyroid function tests are normal   She is both biochemically and clinically euthyroid  She will continue the same levothyroxine 112 mcg daily  3  Hypertension      Blood pressure is under good control on her current dose of amlodipine and metoprolol  4  Hyperlipidemia  She is currently utilizing only over-the-counter fish oil tablets  She had did have her fenofibrate discontinued by her cardiologist due to elevated liver function studies  She will be following with her cardiologist regarding this issue for further treatment  I have asked her to follow up in 3 months with preceding hemoglobin A1c, CMP, and CBC  CC: Diabetes  Type 2, thyroid, blood pressure, lipid follow-up    History of Present Illness     HPI: Nitin Oneal is a 48y o  year old female with type 2 diabetes for 4 years, hypothyroidism, hyperlipidemia, hypertension for follow-up visit  She is on oral agents at home and takes  Janumet ER 50/1000 mg twice a day  She was not able to tolerate Invokana in the past due to frequent yeast infections  She denies any polyuria, polydipsia, polyphagia and blurry vision  She has nocturia up to once a night  She will get numbness and tingling of the feet if she stands too long  She denies chest pain or shortness of breath  She denies neuropathy, nephropathy, retinopathy, stroke and claudication but does admit to heart attack  She has not yet seen Diabetes Education  Hypoglycemic episodes: No rare  The patient's last eye exam was in  November 2020  The patient's last foot exam was in   September 2020 the endocrine office visit  She does not follow with Podiatry  Last A1C was   Lab Results   Component Value Date    HGBA1C 7 8 (H) 06/30/2021     Blood Sugar/Glucometer/Pump/CGM review: checks blood sugars lewss than once a day  Blood sugars average 148 mg/dL but she has only taken 16 readings in the last 3 months  She has hypothyroidism due to Hashimoto's thyroiditis and takes levothyroxine 112 mcg daily  She has some moments of fatigue given long work hours  She has some hot flashes but no actual heat or cold intolerance  She denies weight changes, palpitation, or tremors    She sleeps generally well but it can be interrupted by her dogs  She denies anxiety or depression  She has occasional diarrhea  She denies dry skin, brittle nails, hair loss, or constipation  She denies diplopia  She denies compressive thyroid symptoms or difficulties with swallowing  She has hypertension and takes amlodipine 5 mg daily and metoprolol XL 25 mg twice a day  She denies headache or stroke-like symptoms  She has hyperlipidemia and takes OTC fish oil 1 g 4 capsules daily   She was on fenofibrate but this was discontinued by her cardiologist for elevated LFTs  To see cardiology to discuss cholesterol lowering therapy  She denies chest pain or shortness of breath  Review of Systems   Constitutional: Positive for fatigue  Negative for unexpected weight change  Some moments of fatigue given so busy at work, work long hours at work  HENT: Negative for trouble swallowing  Eyes: Negative for visual disturbance  Respiratory: Negative for chest tightness and shortness of breath  Cardiovascular: Negative for chest pain and palpitations  Gastrointestinal: Positive for diarrhea  Negative for abdominal pain, constipation and nausea  Occasional diarrhea  Endocrine: Negative for cold intolerance, heat intolerance, polydipsia, polyphagia and polyuria  Nocturia up to once a night  Still some hot flashes  Genitourinary:        Menses gone post hysterectomy  Skin: Negative for rash and wound  No dry skin  No brittle nails  No hair loss  Neurological: Positive for numbness  Negative for dizziness, tremors, weakness, light-headedness and headaches  Numbness and tingling of the feet if stands too long  Psychiatric/Behavioral: Positive for sleep disturbance  Negative for dysphoric mood  The patient is not nervous/anxious           Sleeping interrupted by dogs but better in general        Historical Information   Past Medical History:   Diagnosis Date    [facility] Border[facility]    DUB (dysfunctional uterine bleeding)     Fatty liver     Hashimoto's thyroiditis     Last Assessed:  14    History of stomach ulcers     Hypertension     Hypothyroidism     Irritable bowel syndrome     Last Assessed:  3/25/14    Liver disease     elevated enzymes    My[facility] infarction [facility])     2017    Ovarian cyst     left    Psychogenic polydipsia     Has had hyponatremia from drinking too much water in the past      Past Surgical History:   Procedure Laterality Date    ANGIOPLASTY      CARDIAC CATHETERIZATION       SECTION      X 2    CHOLECYSTECTOMY      COLONOSCOPY      CYSTOSCOPY N/A 2019    Procedure: CYSTOSCOPY;  Surgeon: Emerita Gonzalez MD;  [facility];  Service: Gynecology Oncology    IR BIOPSY LIVER MASS  2020    SC ESOPHAGOGASTRODUODENOSCOPY TRANSORAL DIAGNOSTIC N/A 2018    Procedure: ESOPHAGOGASTRODUODENOSCOPY (EGD); Surgeon: Lester Bosch MD;  Location: [facility];   Service: Gastroenterology    SC LAPAROSCOPY W TOT HYSTERECTUTERUS <=250 Dylan Roxann TUBE/OVARY N/A 2019    Procedure: TOTAL LAPAROSCOPIC HYSTERECTOMY, BILATERAL SALPINGECTOMY, LEFT OOPHORECTOMY;  Surgeon: Emerita Gonzalez MD;  [facility];  Service: Gynecology Oncology    SINUS SURGERY      TONSILLECTOMY      UPPER GASTROINTESTINAL ENDOSCOPY       Social History   Social History     Substance and Sexual Activity   Alcohol Use Not Currently    Comment: occasional, Social drinker     Social History     Substance and Sexual Activity   Drug Use No     Social History     Tobacco Use   Smoking Status Former Smoker    Packs/day: 0 50    Years: 4 00    Pack years: 2 00    Types: Cigarettes    Quit date: 2016    Years since quittin 4   Smokeless Tobacco Never Used   Tobacco Comment    2016     Family History:   Family History   Problem Relation Age of Onset    Pancreatic cancer Mother     Hypertension Father     Diabetes type II Father    Mariama Ann Diabetes Maternal Grandmother     Diabetes Paternal Grandmother     Heart disease Paternal Grandmother     Hypothyroidism Paternal Grandmother     Diabetes type II Brother     Hypothyroidism Paternal Uncle     Lung disease Daughter         asthma tendencies    No Known Problems Brother     No Known Problems Son        Meds/Allergies   Current Outpatient Medications   Medication Sig Dispense Refill    amLODIPine (NORVASC) 5 mg tablet Take 1 tablet (5 mg total) by mouth daily 90 tablet 0    Blood Glucose Monitoring Suppl (eSellerPro IQ SYSTEM) w/Device KIT Use to test blood sugars twice a day 1 kit 0    dicyclomine (BENTYL) 20 mg tablet Take 1 tablet (20 mg total) by mouth every 6 (six) hours as needed (every 6 hours) 60 tablet 0    escitalopram (LEXAPRO) 20 mg tablet Take 1 tablet (20 mg total) by mouth daily 90 tablet 0    Lactobacillus (PROBIOTIC ACIDOPHILUS PO) Take by mouth daily      levothyroxine 112 mcg tablet Take 1 tablet (112 mcg total) by mouth daily 90 tablet 0    Magnesium 300 MG CAPS Take 600 mg by mouth daily      methocarbamol (ROBAXIN) 750 mg tablet 1 tab BID PRN 30 tablet 0    metoprolol succinate (TOPROL-XL) 25 mg 24 hr tablet Take 1 tablet (25 mg total) by mouth 2 (two) times a day 180 tablet 3    mupirocin (BACTROBAN) 2 % ointment Apply topically 3 (three) times a day 22 g 5    nitroglycerin (NITROSTAT) 0 4 mg SL tablet Place 1 tablet (0 4 mg total) under the tongue every 5 (five) minutes as needed for chest pain 90 tablet 0    omega-3-acid ethyl esters (LOVAZA) 1 g capsule Take 4 capsules (4 g total) by mouth daily 360 capsule 0    OneTouch Delica Lancets 83V MISC Use to test blood sugars twice a day 200 each 6    ONETOUCH VERIO test strip Use as instructed to test blood sugars twice a day 200 each 6    pantoprazole (PROTONIX) 40 mg tablet Take 1 tablet (40 mg total) by mouth daily before breakfast 90 tablet 0    SITagliptin-metFORMIN HCl ER  MG TB24 Take 1 tablet by mouth 2 (two) times a day 180 tablet 0    traMADol (ULTRAM) 50 mg tablet Take 1 tablet (50 mg total) by mouth every 8 (eight) hours as needed for moderate pain 60 tablet 0    Vitamin D, Cholecalciferol, 50 MCG (2000 UT) CAPS Take 4,000 Units by mouth daily       No current facility-administered medications for this visit  Allergies   Allergen Reactions    Sulfa Antibiotics Shortness Of Breath    Invokana [Canagliflozin]      Yeast infection    Erythromycin      Reaction Date: [id number];     Metronidazole        Objective   Vitals: Blood pressure 116/72, pulse 82, height 5' 2 99" (1 6 m), weight 75 kg (165 lb 6 4 oz), not currently breastfeeding  Invasive Devices     None                 Physical Exam  Constitutional:       Appearance: Normal appearance  She is well-developed  HENT:      Head: Normocephalic and atraumatic  Eyes:      Extraocular Movements: Extraocular movements intact  Conjunctiva/sclera: Conjunctivae normal       Comments: No lid lag, stare, proptosis, or periorbital edema  Neck:      Thyroid: No thyromegaly  Vascular: No carotid bruit  Comments: Thyroid normal in size  No palpable thyroid nodules  Cardiovascular:      Rate and Rhythm: Normal rate and regular rhythm  Pulses: Normal pulses  Heart sounds: Normal heart sounds  No murmur heard  Pulmonary:      Effort: Pulmonary effort is normal       Breath sounds: Normal breath sounds  No wheezing  Abdominal:      Palpations: Abdomen is soft  Musculoskeletal:         General: No deformity  Normal range of motion  Cervical back: Normal range of motion and neck supple  Right lower leg: No edema  Left lower leg: No edema  Comments: No ulcerations of the feet  No tremor of the outstretched hands  Lymphadenopathy:      Cervical: No cervical adenopathy  Skin:     General: Skin is warm and dry  Findings: No rash  Neurological:      General: No focal deficit present  Mental Status: She is alert and oriented to person, place, and time  Deep Tendon Reflexes: Reflexes are normal and symmetric  Comments: Deep tendon reflexes normal          The history was obtained from the review of the chart and from the patient  Lab Results:    Most recent Alc is  Lab Results   Component Value Date    HGBA1C 7 8 (H) 06/30/2021            Blood work done on 06/30/2021 showed a CMP with a glucose of 160 fasting, AST 95, , but was otherwise normal   Lab Results   Component Value Date    CREATININE 0 66 06/30/2021    CREATININE 0 70 02/06/2021    CREATININE 0 76 09/12/2020    BUN 9 06/30/2021     09/02/2015    K 4 5 06/30/2021     06/30/2021    CO2 25 06/30/2021     eGFR   Date Value Ref Range Status   06/30/2021 103 ml/min/1 73sq m Final       Lab Results   Component Value Date    CHOL 210 08/20/2015    HDL 44 02/06/2021    TRIG 169 (H) 02/06/2021       Lab Results   Component Value Date     (H) 06/30/2021    AST 95 (H) 06/30/2021    ALKPHOS 113 06/30/2021    BILITOT 0 38 09/02/2015       TSH is 1 555 with a free T4 of 1 14  Urine microalbumin to creatinine ratio is 6        Future Appointments   Date Time Provider Carlos Manuel Payton   7/20/2021  4:15 PM Mona Weston RD DIAB ED [facility]   8/9/2021  8:00 AM DAVID Marinelli [facility]   8/12/2021  4:15 PM Aliza Rolon DO MAC MED [facility]   10/13/2021  3:20 PM Linda Grover MD ENDO [facility]

## 2021-07-06 ENCOUNTER — TELEPHONE (OUTPATIENT)
Dept: CARDIOLOGY CLINIC | Facility: CLINIC | Age: 51
End: 2021-07-06

## 2021-07-06 NOTE — TELEPHONE ENCOUNTER
Returned patient's call to schedule a follow up with Dr Nirav Alvarenga - left a message for her to call back to schedule

## 2021-07-14 DIAGNOSIS — E11.65 TYPE 2 DIABETES MELLITUS WITH HYPERGLYCEMIA, WITHOUT LONG-TERM CURRENT USE OF INSULIN (HCC): ICD-10-CM

## 2021-07-23 ENCOUNTER — TELEPHONE (OUTPATIENT)
Dept: FAMILY MEDICINE CLINIC | Facility: CLINIC | Age: 51
End: 2021-07-23

## 2021-07-23 NOTE — TELEPHONE ENCOUNTER
Called pt to offer assistance in scheduling  Mammo  Pt stated she will be calling herself to get scheduled soon

## 2021-07-27 ENCOUNTER — TELEPHONE (OUTPATIENT)
Dept: HEMATOLOGY ONCOLOGY | Facility: CLINIC | Age: 51
End: 2021-07-27

## 2021-07-27 NOTE — TELEPHONE ENCOUNTER
Patient is calling in to advise she has been experiencing painful intercourse  She states she is not sure if it is the incision from her hysterectomy with Dr Marcel Calabrese   Patient would like a call back to discuss [phone]

## 2021-07-28 DIAGNOSIS — J01.90 ACUTE SINUSITIS, RECURRENCE NOT SPECIFIED, UNSPECIFIED LOCATION: ICD-10-CM

## 2021-07-28 RX ORDER — TRAMADOL HYDROCHLORIDE 50 MG/1
50 TABLET ORAL EVERY 8 HOURS PRN
Qty: 60 TABLET | Refills: 0 | Status: SHIPPED | OUTPATIENT
Start: 2021-07-28 | End: 2021-08-30 | Stop reason: SDUPTHER

## 2021-07-28 NOTE — TELEPHONE ENCOUNTER
Return call placed to patient  Overall, she has been well since hysterectomy in 2019  Encouraged her to follow-up with routine gyn to address dyspareunia

## 2021-08-06 ENCOUNTER — HOSPITAL ENCOUNTER (OUTPATIENT)
Dept: MAMMOGRAPHY | Facility: CLINIC | Age: 51
Discharge: HOME | End: 2021-08-06
Payer: COMMERCIAL

## 2021-08-06 VITALS — BODY MASS INDEX: 28 KG/M2 | HEIGHT: 63 IN | WEIGHT: 158 LBS

## 2021-08-06 DIAGNOSIS — Z12.31 ENCOUNTER FOR SCREENING MAMMOGRAM FOR BREAST CANCER: ICD-10-CM

## 2021-08-06 PROCEDURE — 77063 BREAST TOMOSYNTHESIS BI: CPT

## 2021-08-06 PROCEDURE — 77067 SCR MAMMO BI INCL CAD: CPT

## 2021-08-09 ENCOUNTER — CONSULT (OUTPATIENT)
Dept: PAIN MEDICINE | Facility: CLINIC | Age: 51
End: 2021-08-09
Payer: COMMERCIAL

## 2021-08-09 VITALS
HEART RATE: 73 BPM | WEIGHT: 160 LBS | HEIGHT: 63 IN | BODY MASS INDEX: 28.35 KG/M2 | DIASTOLIC BLOOD PRESSURE: 74 MMHG | SYSTOLIC BLOOD PRESSURE: 120 MMHG | TEMPERATURE: 98.7 F

## 2021-08-09 DIAGNOSIS — K75.81 NASH (NONALCOHOLIC STEATOHEPATITIS): ICD-10-CM

## 2021-08-09 DIAGNOSIS — M47.816 LUMBAR SPONDYLOSIS: ICD-10-CM

## 2021-08-09 DIAGNOSIS — M77.8 TENDONITIS OF ELBOW, RIGHT: ICD-10-CM

## 2021-08-09 DIAGNOSIS — M54.16 LUMBAR RADICULOPATHY: ICD-10-CM

## 2021-08-09 DIAGNOSIS — G89.4 CHRONIC PAIN SYNDROME: Primary | ICD-10-CM

## 2021-08-09 DIAGNOSIS — M54.50 CHRONIC LOW BACK PAIN WITHOUT SCIATICA, UNSPECIFIED BACK PAIN LATERALITY: ICD-10-CM

## 2021-08-09 DIAGNOSIS — M79.18 MYOFASCIAL PAIN SYNDROME: ICD-10-CM

## 2021-08-09 DIAGNOSIS — G89.29 CHRONIC LOW BACK PAIN WITHOUT SCIATICA, UNSPECIFIED BACK PAIN LATERALITY: ICD-10-CM

## 2021-08-09 PROCEDURE — 99244 OFF/OP CNSLTJ NEW/EST MOD 40: CPT | Performed by: NURSE PRACTITIONER

## 2021-08-09 RX ORDER — METHOCARBAMOL 750 MG/1
TABLET, FILM COATED ORAL
Qty: 30 TABLET | Refills: 1 | Status: SHIPPED | OUTPATIENT
Start: 2021-08-09 | End: 2021-10-07 | Stop reason: SDUPTHER

## 2021-08-09 RX ORDER — LANOLIN ALCOHOL/MO/W.PET/CERES
CREAM (GRAM) TOPICAL DAILY
COMMUNITY

## 2021-08-09 NOTE — PROGRESS NOTES
Assessment:  1  Chronic pain syndrome    2  Chronic low back pain without sciatica, unspecified back pain laterality    3  Lumbar radiculopathy    4  Myofascial pain syndrome    5  Lumbar spondylosis    6  Tendonitis of elbow, right    7  GIANG (nonalcoholic steatohepatitis)        Plan:    While the patient was in the office today, I did have a thorough conversation with the patient regarding their chronic pain syndrome, symptoms, medication regimen, and treatment plan  I discussed with the patient that at this point time since her pain is more intermittent a, is not constant, and is not severe consistently and her exam was relatively normal, I do not see the need for any significant her updated imaging at this time  However, explained to the patient that if her pain symptoms would start to worsen or change over time, we would then consider proceeding with an updated x-ray and MRI of the lumbosacral spine  The patient was agreeable and verbalized an understanding  I discussed with the patient that in the meantime, since I feel there is a significant myofascial component to her pain symptoms, for now, I feel it is in her best interest to start taking the methocarbamol 750 mg every night at bedtime to try to help with the myofascial component and that way she can wake up with less tension and spasms and hopefully her pain will be better longer throughout the day as well  She can continue to use the ibuprofen 600 mg b i d  p r n  for pain, with the hope that she will start using it more as needed and only for flare ups  If that is not the case over the next 3-4 weeks, she is to call our office as we would then consider having her discontinue the ibuprofen and try Celebrex 100 mg b i d  p r n  for pain  I advised the patient that if they experience any side effects or issues with the changes in their medication regiment, they should give our office a call to discuss   I also advised the patient not to drive or operate machinery until they see how the changes in the medication regimen affects them  The patient was agreeable and verbalized an understanding  I encouraged the patient continue with a home exercise and stretching program and to use 20 minutes of low heat prior to her home exercises and stretching as I reminded the patient that warm muscles and tendons stretch better than cold muscles and tendons  The patient was agreeable and verbalized an understanding  The patient is schedule follow-up office visit in 8 weeks and at that point time we will re-evaluate her medication regimen and treatment plan options  The patient was agreeable and verbalized an understanding  The above plan of care was reviewed and agreed upon by Dr Bola Earl  History of Present Illness: The patient is a 48 y o  female who presents for consultation in regards to Back Pain and Arm Pain  Symptoms have been present for several years  Symptoms began without any precipitating injury or trauma  The patient reports that she has been dealing with chronic low back pain for many years, however, currently she feels that her work as an x-ray tech, 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 and in general working in short staffed conditions has just increased or added to her chronic pain symptoms although there has not been any significant or notable trauma or injury  She also reports she continues with right arm and elbow pain which is relatively consistent with a tendinitis , consistent with repetitive pushing and pulling motions  Pain is reported to be 1 on the numeric rating scale  Symptoms are felt intermittently and worst in the evening, nighttime  Symptoms are characterized as burning, cramping, shooting, dull/aching, pressure-like and throbbing  Symptoms are associated with right arm weakness  Aggravating factors include standing, bending, leaning forward and walking    Relieving factors include lying down, sitting and relaxation  No change in symptoms with coughing/sneezing and bowel movements  Treatments that have been helpful include TENS unit and heat/ice  Medications to relieve symptoms include Ibuprofen 600 mg b i d  p r n  for pain, which the patient reports does provide moderate stable overall relief, however, she is concerned because she does have a history of stomach ulcers secondary to overuse of ibuprofen for pain in the past and also cannot take Tylenol products because of her GIANG  The patient has also been using methocarbamol 750 mg at bedtime as needed as prescribed by her primary care provider and does have tramadol as needed but at most typically just takes the tramadol at bedtime as well  The patient reports that although her pain is not constant or severe consistently, there are some days where the pain can get worse, especially as the day goes on and she is just concerned that with the GIANG that she is not sure what she can or what she should be taking  The patient presents today to discuss her treatment plan options  Review of Systems:    Review of Systems   Constitutional: Negative for fever and unexpected weight change  HENT: Negative for trouble swallowing  Eyes: Negative for visual disturbance  Respiratory: Negative for shortness of breath and wheezing  Cardiovascular: Negative for chest pain and palpitations  Gastrointestinal: Negative for constipation, diarrhea, nausea and vomiting  Endocrine: Negative for cold intolerance, heat intolerance and polydipsia  Genitourinary: Negative for difficulty urinating and frequency  Musculoskeletal: Negative for arthralgias, gait problem, joint swelling and myalgias  Skin: Negative for rash  Neurological: Negative for dizziness, seizures, syncope, weakness and headaches  Hematological: Does not bruise/bleed easily  Psychiatric/Behavioral: Negative for dysphoric mood     All other systems reviewed and are negative  Past Medical History:   Diagnosis Date    Coronary artery disease     Diabetes mellitus ([facility] [address] )     Borderline    DUB (dysfunctional uterine bleeding)     Fatty liver     Hashimoto's thyroiditis     Last Assessed:  14    History of stomach ulcers     Hypertension     Hypothyroidism     Irritable bowel syndrome     Last Assessed:  3/25/14    Liver disease     elevated enzymes    Myocardial infarction ([facility] [address] )     2017    GIANG (nonalcoholic steatohepatitis)     Ovarian cyst     left    Psychogenic polydipsia     Has had hyponatremia from drinking too much water in the past        Past Surgical History:   Procedure Laterality Date    ANGIOPLASTY      CARDIAC CATHETERIZATION       SECTION      X 2    CHOLECYSTECTOMY      COLONOSCOPY      CYSTOSCOPY N/A 2019    Procedure: CYSTOSCOPY;  Surgeon: Shelby Gonzales MD;  [facility];  Service: Gynecology Oncology    HYSTERECTOMY      IR BIOPSY LIVER MASS  2020    OOPHORECTOMY Right     DE ESOPHAGOGASTRODUODENOSCOPY TRANSORAL DIAGNOSTIC N/A 2018    Procedure: ESOPHAGOGASTRODUODENOSCOPY (EGD); Surgeon: Rebecca Serrano MD;  [facility];   Service: Gastroenterology    DE LAPAROSCOPY W TOT HYSTERECTUTERUS <=250 Donna Fusi TUBE/OVARY N/A 2019    Procedure: TOTAL LAPAROSCOPIC HYSTERECTOMY, BILATERAL SALPINGECTOMY, LEFT OOPHORECTOMY;  Surgeon: Shelby Gonzales MD;  [facility];  Service: Gynecology Oncology    SINUS SURGERY      TONSILLECTOMY      UPPER GASTROINTESTINAL ENDOSCOPY         Family History   Problem Relation Age of Onset    Pancreatic cancer Mother     Hypertension Father     Diabetes type II Father     Diabetes Maternal Grandmother     Diabetes Paternal Grandmother     Heart disease Paternal Grandmother     Hypothyroidism Paternal Grandmother     Diabetes type II Brother     Hypothyroidism Paternal Uncle     Lung disease Daughter         asthma tendencies    No Known Problems Brother     No Known Problems Son     Liver cancer Maternal Aunt        Social History     Occupational History    Not on file   Tobacco Use    Smoking status: Former Smoker     Packs/day: 0 50     Years: 4 00     Pack years: 2 00     Types: Cigarettes     Quit date: 2016     Years since quittin 5    Smokeless tobacco: Never Used    Tobacco comment: 2016   Vaping Use    Vaping Use: Never used   Substance and Sexual Activity    Alcohol use: Not Currently     Comment: occasional, Social drinker    Drug use: No    Sexual activity: Yes     Partners: Male     Birth control/protection: Pill         Current Outpatient Medications:     amLODIPine (NORVASC) 5 mg tablet, Take 1 tablet (5 mg total) by mouth daily, Disp: 90 tablet, Rfl: 0    dicyclomine (BENTYL) 20 mg tablet, Take 1 tablet (20 mg total) by mouth every 6 (six) hours as needed (every 6 hours), Disp: 60 tablet, Rfl: 0    escitalopram (LEXAPRO) 20 mg tablet, Take 1 tablet (20 mg total) by mouth daily, Disp: 90 tablet, Rfl: 0    Lactobacillus (PROBIOTIC ACIDOPHILUS PO), Take by mouth daily, Disp: , Rfl:     levothyroxine 112 mcg tablet, Take 1 tablet (112 mcg total) by mouth daily, Disp: 90 tablet, Rfl: 0    Magnesium 300 MG CAPS, Take 600 mg by mouth daily, Disp: , Rfl:     methocarbamol (ROBAXIN) 750 mg tablet, 1 tab PO HS , Disp: 30 tablet, Rfl: 1    metoprolol succinate (TOPROL-XL) 25 mg 24 hr tablet, Take 1 tablet (25 mg total) by mouth 2 (two) times a day, Disp: 180 tablet, Rfl: 3    nitroglycerin (NITROSTAT) 0 4 mg SL tablet, Place 1 tablet (0 4 mg total) under the tongue every 5 (five) minutes as needed for chest pain, Disp: 90 tablet, Rfl: 0    omega-3-acid ethyl esters (LOVAZA) 1 g capsule, Take 4 capsules (4 g total) by mouth daily, Disp: 360 capsule, Rfl: 0    pantoprazole (PROTONIX) 40 mg tablet, Take 1 tablet (40 mg total) by mouth daily before breakfast, Disp: 90 tablet, Rfl: 0   SITagliptin-metFORMIN HCl ER  MG TB24, Take 1 tablet by mouth 2 (two) times a day, Disp: 180 tablet, Rfl: 1    traMADol (ULTRAM) 50 mg tablet, Take 1 tablet (50 mg total) by mouth every 8 (eight) hours as needed for moderate pain, Disp: 60 tablet, Rfl: 0    vitamin B-12 (VITAMIN B-12) 1,000 mcg tablet, Take by mouth daily, Disp: , Rfl:     Vitamin D, Cholecalciferol, 50 MCG (2000 UT) CAPS, Take 4,000 Units by mouth daily, Disp: , Rfl:     Blood Glucose Monitoring Suppl (Pa-Go Mobile SYSTEM) w/Device KIT, Use to test blood sugars twice a day, Disp: 1 kit, Rfl: 0    mupirocin (BACTROBAN) 2 % ointment, Apply topically 3 (three) times a day, Disp: 22 g, Rfl: 5    OneTouch Delica Lancets 27R MISC, Use to test blood sugars twice a day, Disp: 200 each, Rfl: 6    ONETOUCH VERIO test strip, Use as instructed to test blood sugars twice a day, Disp: 200 each, Rfl: 6    Allergies   Allergen Reactions    Sulfa Antibiotics Shortness Of Breath    Invokana [Canagliflozin]      Yeast infection    Erythromycin      Reaction Date: [id number];     Metronidazole        Physical Exam:    /74 (BP Location: Left arm, Patient Position: Sitting, Cuff Size: Standard)   Pulse 73   Temp 98 7 °F (37 1 °C)   Ht 5' 3" (1 6 m)   Wt 72 6 kg (160 lb)   LMP  (LMP Unknown)   BMI 28 34 kg/m²     Constitutional: normal, well developed, well nourished, alert, in no distress and non-toxic and no overt pain behavior  Eyes: anicteric  HEENT: grossly intact  Neck: supple, symmetric, trachea midline and no masses   Pulmonary:even and unlabored  Cardiovascular:No edema or pitting edema present  Skin:Normal without rashes or lesions and well hydrated  Psychiatric:Mood and affect appropriate  Neurologic:Cranial Nerves II-XII grossly intact  Musculoskeletal:The patient's gait was slightly antalgic, but steady without the use of any assistive devices      Lumbar Spine Exam    Appearance:  Normal lordosis  Palpation/Tenderness:  left lumbar paraspinal tenderness  right lumbar paraspinal tenderness  left sacroiliac joint tenderness  right sacroiliac joint tenderness  Sensory:  no sensory deficits noted  Range of Motion:  Flexion:  Minimally limited  with pain  Extension:  No limitation  without pain  Rotation - Left:  No limitation  without pain  Rotation - Right:  No limitation  without pain  Motor Strength:  Left hip flexion:  5/5  Left hip extension:  5/5  Right hip flexion:  5/5  Right hip extension:  5/5  Left knee flexion:  5/5  Left knee extension:  5/5  Right knee flexion:  5/5  Right knee extension:  5/5  Left foot dorsiflexion:  5/5  Left foot plantar flexion:  5/5  Right foot dorsiflexion:  5/5  Right foot plantar flexion:  5/5  Reflexes:  Left Patellar:  3+   Right Patellar:  3+   Left Achilles:  1+   Right Achilles:  1+   Special Tests:  Left Straight Leg Test:  negative  Right Straight Leg Test:  negative  Left Ryan's Maneuver:  negative  Right Ryan's Maneuver:  negative  Left Gaenslen's Test:  negative  Right Gaenslen's Test:  negative      Imaging  No orders to display       No orders of the defined types were placed in this encounter

## 2021-08-09 NOTE — PATIENT INSTRUCTIONS
Diclofenac (On the skin)   Diclofenac (dye-KLOE-fen-ak)  Treats actinic keratoses  Also treats pain and swelling caused by arthritis  This is an NSAID  Brand Name(s): Diclo Gel, Diclofono, Diclozor, Klofensaid II, Leader Arthritis Pain Reliever, Lexixryl, Pennsaid, Solaravix, Solaraze, Voltaren Gel, Xrylix   There may be other brand names for this medicine  When This Medicine Should Not Be Used: This medicine is not right for everyone  Do not use it if you had an allergic reaction to diclofenac, aspirin or another NSAID medication  How to Use This Medicine:   Gel/Jelly, Liquid  · Use your medicine as directed  There are several brands of this medicine  Make sure you understand how to use the brand you have been prescribed  Ask your doctor if you have any questions  · The diclofenac 1% gel comes with a dosing card to measure the correct dose  If you do not receive or misplace your dosing card, call your pharmacist to ask for a new one  · Voltaren® gel: Follow the instructions on the medicine label if you are using this medicine without a prescription  · Use this medicine only on your skin  Rinse it off right away if it gets on a cut or scrape  Do not get the medicine in your eyes, nose, or mouth  · Wash your hands with soap and water before and after you use this medicine  · Apply a thin layer of the medicine to the affected area  Rub it in gently  · Do not shower, bathe, or wash the affected area for at least 30 minutes after you apply Pennsaid® or Solaraze® or 1 hour after you apply Voltaren®  · Wait until the medicine dries before you cover the treated skin with gloves or clothing  Do not let the treated skin touch any other person's skin until the medicine is completely dry  · Do not use external heat or bandages on the treated skin or joint  · This medicine should come with a Medication Guide  Ask your pharmacist for a copy if you do not have one  · Missed dose: Apply a dose as soon as you can  If it is almost time for your next dose, wait until then and apply a regular dose  Do not apply extra medicine to make up for a missed dose  · Store the medicine in a closed container at room temperature, away from heat, moisture, and direct light  Drugs and Foods to Avoid:   Ask your doctor or pharmacist before using any other medicine, including over-the-counter medicines, vitamins, and herbal products  · Do not use any other NSAID unless your doctor says it is okay  Some other NSAIDs are aspirin, diflunisal, ibuprofen, naproxen, or salsalate  · Some medicines can affect how diclofenac works  Tell your doctor if you are using any of the following:  ? Acetaminophen, cyclosporine, digoxin, lithium, methotrexate, pemetrexed  ? Blood pressure medicine  ? Blood thinner (including warfarin)  ? Diuretic (water pill)  ? Medicine to treat depression  ? Steroids (including dexamethasone, hydrocortisone, methylprednisolone, prednisolone, prednisone)  · Do not put cosmetics or skin care products on the treated skin  You may use sunscreen, insect repellant, lotion, or other topical medicines after using Pennsaid®  However, wait until the medicine is completely dry before you apply anything else  Warnings While Using This Medicine:   · Tell your doctor if you are pregnant  It is not safe to use this medicine during the later part of pregnancy  · Tell your doctor if you are breastfeeding, or if you have kidney disease, liver disease, asthma, bleeding problems, heart failure, high blood pressure, other heart or blood vessel problems, a recent heart attack, or a history of stomach ulcers or bleeding  Tell your doctor if you drink alcohol  · This medicine may cause the following problems:  ? Higher risk of blood clot, heart attack, heart failure, or stroke  ? Bleeding in your stomach or intestines  ? Liver problems  ? Kidney problems and high potassium levels  ?  Serious skin reactions  · This medicine may make your skin more sensitive to sunlight  Wear sunscreen  Do not use sunlamps or tanning beds  · Your doctor will do lab tests at regular visits to check on the effects of this medicine  Keep all appointments  · Keep all medicine out of the reach of children  Never share your medicine with anyone  Possible Side Effects While Using This Medicine:   Call your doctor right away if you notice any of these side effects:  · Allergic reaction: Itching or hives, swelling in your face or hands, swelling or tingling in your mouth or throat, chest tightness, trouble breathing  · Blistering, peeling, or red skin rash  · Bloody or black, tarry stools, severe stomach pain, vomiting blood or material that looks like coffee grounds  · Change in how much or how often you urinate  · Chest pain that may spread to your arms, jaw, back, or neck, trouble breathing, unusual sweating, faintness  · Dark urine or pale stools, nausea, vomiting, loss of appetite, stomach pain, yellow skin or eyes  · Numbness or weakness in your arm or leg, or on one side of your body, pain in your lower leg, sudden or severe headache, or problems with vision, speech, or walking  · Rapid weight gain, swelling in your hands, ankles, or feet  · Unusual bleeding, bruising, or weakness  If you notice these less serious side effects, talk with your doctor:   · Dry, flaky, or scaly skin  · Mild headache  · Mild skin rash, itching, or redness  If you notice other side effects that you think are caused by this medicine, tell your doctor  Call your doctor for medical advice about side effects  You may report side effects to FDA at 0-490-FDA-4903  © Copyright [employer] 2021 Information is for End User's use only and may not be sold, redistributed or otherwise used for commercial purposes  The above information is an  only  It is not intended as medical advice for individual conditions or treatments   Talk to your doctor, nurse or pharmacist before following any medical regimen to see if it is safe and effective for you

## 2021-08-13 DIAGNOSIS — E11.65 TYPE 2 DIABETES MELLITUS WITH HYPERGLYCEMIA, WITHOUT LONG-TERM CURRENT USE OF INSULIN (HCC): ICD-10-CM

## 2021-08-19 DIAGNOSIS — K58.9 IRRITABLE BOWEL SYNDROME WITHOUT DIARRHEA: ICD-10-CM

## 2021-08-19 DIAGNOSIS — I10 ESSENTIAL HYPERTENSION: ICD-10-CM

## 2021-08-19 DIAGNOSIS — E06.3 HYPOTHYROIDISM DUE TO HASHIMOTO'S THYROIDITIS: ICD-10-CM

## 2021-08-19 DIAGNOSIS — K21.9 GERD WITHOUT ESOPHAGITIS: ICD-10-CM

## 2021-08-19 DIAGNOSIS — R07.89 OTHER CHEST PAIN: ICD-10-CM

## 2021-08-19 DIAGNOSIS — E11.65 TYPE 2 DIABETES MELLITUS WITH HYPERGLYCEMIA, WITHOUT LONG-TERM CURRENT USE OF INSULIN (HCC): ICD-10-CM

## 2021-08-19 DIAGNOSIS — E03.8 HYPOTHYROIDISM DUE TO HASHIMOTO'S THYROIDITIS: ICD-10-CM

## 2021-08-20 RX ORDER — METOPROLOL SUCCINATE 25 MG/1
25 TABLET, EXTENDED RELEASE ORAL 2 TIMES DAILY
Qty: 180 TABLET | Refills: 0 | Status: SHIPPED | OUTPATIENT
Start: 2021-08-20 | End: 2021-11-13 | Stop reason: SDUPTHER

## 2021-08-20 RX ORDER — LEVOTHYROXINE SODIUM 112 UG/1
112 TABLET ORAL DAILY
Qty: 90 TABLET | Refills: 0 | Status: SHIPPED | OUTPATIENT
Start: 2021-08-20 | End: 2021-11-23 | Stop reason: SDUPTHER

## 2021-08-20 RX ORDER — BLOOD SUGAR DIAGNOSTIC
STRIP MISCELLANEOUS
Qty: 200 EACH | Refills: 0 | Status: SHIPPED | OUTPATIENT
Start: 2021-08-20

## 2021-08-20 RX ORDER — DICYCLOMINE HCL 20 MG
20 TABLET ORAL EVERY 6 HOURS PRN
Qty: 60 TABLET | Refills: 0 | Status: SHIPPED | OUTPATIENT
Start: 2021-08-20 | End: 2021-10-14 | Stop reason: SDUPTHER

## 2021-08-20 RX ORDER — PANTOPRAZOLE SODIUM 40 MG/1
40 TABLET, DELAYED RELEASE ORAL
Qty: 90 TABLET | Refills: 0 | Status: SHIPPED | OUTPATIENT
Start: 2021-08-20 | End: 2021-11-23 | Stop reason: SDUPTHER

## 2021-08-25 RX ORDER — AMLODIPINE BESYLATE 5 MG/1
5 TABLET ORAL DAILY
Qty: 90 TABLET | Refills: 0 | Status: SHIPPED | OUTPATIENT
Start: 2021-08-25 | End: 2021-11-23 | Stop reason: SDUPTHER

## 2021-08-26 ENCOUNTER — OFFICE VISIT (OUTPATIENT)
Dept: DIABETES SERVICES | Facility: HOSPITAL | Age: 51
End: 2021-08-26
Payer: COMMERCIAL

## 2021-08-26 VITALS — BODY MASS INDEX: 28.35 KG/M2 | HEIGHT: 63 IN | WEIGHT: 160 LBS

## 2021-08-26 DIAGNOSIS — E11.65 TYPE 2 DIABETES MELLITUS WITH HYPERGLYCEMIA, WITHOUT LONG-TERM CURRENT USE OF INSULIN (HCC): ICD-10-CM

## 2021-08-26 PROCEDURE — 97802 MEDICAL NUTRITION INDIV IN: CPT | Performed by: DIETITIAN, REGISTERED

## 2021-08-26 NOTE — PROGRESS NOTES
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  Testing once a day, altering the times  Yesterday after work at 7:30pm 121  Fasting 150  A1C 7 8%  Food recall shows primary issues: Eats lot of fruit, has inconsistency in the timing of her meals due to hectic work schedule, works at [employer]  Set goals to start pair testing, before a meal and 2 hours after a meal, rotating between meals  Make dietary adjustments based on individual numbers  Always partner a protein with a fruit, and make sure to not be eating multiple times close together  [place] with having a bedtime snack to see if that improves fasting blood sugars  Work on starting to get some kind of exercise, even if just on days off, now that the kids are leaving for college  Together we discussed what foods contain CHO, reading a food label, serving sizes, and set a carb goal of 15-30g CHO/meal to promote weight loss with 15g snacks  Put together sample meals for Raymundo's reference and evaluated Raymundo's current eating plan  Good understanding, Alexsandra Henderson will call with questions or for more education  Follow up as needed if not improving  Ht Readings from Last 1 Encounters:   08/26/21 5' 3" (1 6 m)     Wt Readings from Last 3 Encounters:   08/26/21 72 6 kg (160 lb)   08/09/21 72 6 kg (160 lb)   08/06/21 71 7 kg (158 lb)        Body mass index is 28 34 kg/m²       Lab Results   Component Value Date    HGBA1C 7 8 (H) 06/30/2021    HGBA1C 8 0 (H) 02/06/2021    HGBA1C 7 2 (H) 09/12/2020       Lab Results   Component Value Date    CHOL 210 08/20/2015    CHOL 179 07/19/2014    CHOL 197 12/05/2013     Lab Results   Component Value Date    HDL 44 02/06/2021    HDL 36 (L) 06/04/2020    HDL 28 (L) 02/29/2020     Lab Results   Component Value Date    LDLCALC 134 (H) 02/06/2021    LDLCALC 115 (H) 06/04/2020    LDLCALC 132 (H) 02/29/2020     Lab Results   Component Value Date    TRIG 169 (H) 02/06/2021    TRIG 168 (H) 06/04/2020    TRIG 232 (H) 02/29/2020     No results found for: CHOLHDL    Weight Change: No    Medical Diagnosis/ICD 10 Code:  E11 65    Barriers to Learning: no barriers    Do you follow any special diet presently?: No  Who shops: patient and spouse  Who cooks: patient and spouse    Food Log: Completed via the method of food recall- 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     Breakfast: up around 7am, packs 2 fruit and rene bars  At work fruit and the bar  Morning Snack:the other fruit   Lunch: 1-2pm: cereal with fat free milk, rice crispie treat, fruit brings an extra or buys one with the cereal or the whole lunch  Yogurt choabni  Afternoon Snack: fruit   Dinner: once home around 6:30pm: chicken and veggie spaghetti some noodles sauce with no meat, fish  Not as much starch as side dish  Evening Snack: used to love ice cream but doesn't care for it now  Grapes is a weakness, pretzels, stress non eater  Used to be ritas ice  Bed 10pm   Beverages: water, unsweet tea with lemon, black coffee sometimes, hot tea unsweet will do some honey if feeling sick, lemonade home made,   Eating out/Take out: once a week take out with working late  Exercise ADL, wanting to get back to it       Nutrition Diagnosis:  Food and nutrition related knowledge deficit  related to Lack of prior exposure to accurate nutrition related information as evidenced by Verbalizes inaccurate or incomplete information    Intervention: behavior modification strategies, carbohydrate counting, individualized meal plan and monitoring portion control     Treatment Goals: Patient understands education and recommendations    Education Material Given  Meliza Wells was provided the Portion Booklet and Planning Healthy Meals     Monitoring and evaluation:    Term code indicator  FH 1 6 3 Carbohydrate Intake Criteria: 15-30g CHO per meal, 15g CHO snacks    Patients Response to Instruction:  Rea Nicely  Expected Compliancegood    Thank you for coming to the [facility] for education today  Please feel free to call with any questions or concerns      Peg Salvador, [address] [place] [address]  [address] [facility] [address]  [address] [facility] [id number]

## 2021-08-30 DIAGNOSIS — R07.9 CHEST PAIN, UNSPECIFIED TYPE: ICD-10-CM

## 2021-08-30 DIAGNOSIS — J01.90 ACUTE SINUSITIS, RECURRENCE NOT SPECIFIED, UNSPECIFIED LOCATION: ICD-10-CM

## 2021-08-30 RX ORDER — NITROGLYCERIN 0.4 MG/1
0.4 TABLET SUBLINGUAL
Qty: 90 TABLET | Refills: 0 | Status: SHIPPED | OUTPATIENT
Start: 2021-08-30 | End: 2022-06-07 | Stop reason: SDUPTHER

## 2021-08-31 RX ORDER — TRAMADOL HYDROCHLORIDE 50 MG/1
50 TABLET ORAL EVERY 8 HOURS PRN
Qty: 60 TABLET | Refills: 0 | Status: SHIPPED | OUTPATIENT
Start: 2021-08-31 | End: 2021-09-28 | Stop reason: SDUPTHER

## 2021-09-14 DIAGNOSIS — E11.65 TYPE 2 DIABETES MELLITUS WITH HYPERGLYCEMIA, WITHOUT LONG-TERM CURRENT USE OF INSULIN (HCC): ICD-10-CM

## 2021-09-26 DIAGNOSIS — F32.A DEPRESSION, UNSPECIFIED DEPRESSION TYPE: ICD-10-CM

## 2021-09-27 RX ORDER — ESCITALOPRAM OXALATE 20 MG/1
20 TABLET ORAL DAILY
Qty: 90 TABLET | Refills: 1 | Status: SHIPPED | OUTPATIENT
Start: 2021-09-27 | End: 2021-11-23 | Stop reason: SDUPTHER

## 2021-09-28 DIAGNOSIS — J01.90 ACUTE SINUSITIS, RECURRENCE NOT SPECIFIED, UNSPECIFIED LOCATION: ICD-10-CM

## 2021-09-28 RX ORDER — TRAMADOL HYDROCHLORIDE 50 MG/1
50 TABLET ORAL EVERY 8 HOURS PRN
Qty: 60 TABLET | Refills: 0 | Status: SHIPPED | OUTPATIENT
Start: 2021-09-28 | End: 2021-11-01 | Stop reason: SDUPTHER

## 2021-10-01 ENCOUNTER — OFFICE VISIT (OUTPATIENT)
Dept: CARDIOLOGY CLINIC | Facility: CLINIC | Age: 51
End: 2021-10-01
Payer: COMMERCIAL

## 2021-10-01 VITALS
HEART RATE: 71 BPM | HEIGHT: 63 IN | BODY MASS INDEX: 28.95 KG/M2 | WEIGHT: 163.4 LBS | SYSTOLIC BLOOD PRESSURE: 104 MMHG | DIASTOLIC BLOOD PRESSURE: 56 MMHG

## 2021-10-01 DIAGNOSIS — Q24.5 ANOMALOUS CORONARY ARTERY ORIGIN: Primary | ICD-10-CM

## 2021-10-01 DIAGNOSIS — E78.2 COMBINED HYPERLIPIDEMIA: ICD-10-CM

## 2021-10-01 PROCEDURE — 99214 OFFICE O/P EST MOD 30 MIN: CPT | Performed by: INTERNAL MEDICINE

## 2021-10-01 RX ORDER — OMEGA-3-ACID ETHYL ESTERS 1 G/1
4 CAPSULE, LIQUID FILLED ORAL DAILY
Qty: 360 CAPSULE | Refills: 3 | Status: SHIPPED | OUTPATIENT
Start: 2021-10-01 | End: 2021-10-18 | Stop reason: ALTCHOICE

## 2021-10-04 ENCOUNTER — TELEPHONE (OUTPATIENT)
Dept: CARDIOLOGY CLINIC | Facility: CLINIC | Age: 51
End: 2021-10-04

## 2021-10-04 DIAGNOSIS — E78.2 COMBINED HYPERLIPIDEMIA: ICD-10-CM

## 2021-10-06 ENCOUNTER — IMMUNIZATIONS (OUTPATIENT)
Dept: FAMILY MEDICINE CLINIC | Facility: HOSPITAL | Age: 51
End: 2021-10-06

## 2021-10-06 DIAGNOSIS — Z23 ENCOUNTER FOR IMMUNIZATION: Primary | ICD-10-CM

## 2021-10-06 PROCEDURE — 0001A SARS-COV-2 / COVID-19 MRNA VACCINE (PFIZER-BIONTECH) 30 MCG: CPT

## 2021-10-06 PROCEDURE — 91300 SARS-COV-2 / COVID-19 MRNA VACCINE (PFIZER-BIONTECH) 30 MCG: CPT

## 2021-10-07 ENCOUNTER — TELEPHONE (OUTPATIENT)
Dept: CARDIOLOGY CLINIC | Facility: CLINIC | Age: 51
End: 2021-10-07

## 2021-10-07 ENCOUNTER — TELEPHONE (OUTPATIENT)
Dept: PAIN MEDICINE | Facility: MEDICAL CENTER | Age: 51
End: 2021-10-07

## 2021-10-07 DIAGNOSIS — G89.29 CHRONIC LOW BACK PAIN WITHOUT SCIATICA, UNSPECIFIED BACK PAIN LATERALITY: ICD-10-CM

## 2021-10-07 DIAGNOSIS — M54.50 CHRONIC LOW BACK PAIN WITHOUT SCIATICA, UNSPECIFIED BACK PAIN LATERALITY: ICD-10-CM

## 2021-10-07 RX ORDER — CELECOXIB 100 MG/1
CAPSULE ORAL
Qty: 60 CAPSULE | Refills: 1 | Status: SHIPPED | OUTPATIENT
Start: 2021-10-07 | End: 2021-11-16

## 2021-10-07 RX ORDER — METHOCARBAMOL 750 MG/1
TABLET, FILM COATED ORAL
Qty: 30 TABLET | Refills: 1 | Status: SHIPPED | OUTPATIENT
Start: 2021-10-07 | End: 2021-11-16 | Stop reason: SDUPTHER

## 2021-10-12 ENCOUNTER — APPOINTMENT (OUTPATIENT)
Dept: LAB | Facility: HOSPITAL | Age: 51
End: 2021-10-12
Payer: COMMERCIAL

## 2021-10-12 DIAGNOSIS — K75.81 NASH (NONALCOHOLIC STEATOHEPATITIS): ICD-10-CM

## 2021-10-12 DIAGNOSIS — E78.2 COMBINED HYPERLIPIDEMIA: ICD-10-CM

## 2021-10-12 DIAGNOSIS — E06.3 HYPOTHYROIDISM DUE TO HASHIMOTO'S THYROIDITIS: ICD-10-CM

## 2021-10-12 DIAGNOSIS — E03.8 HYPOTHYROIDISM DUE TO HASHIMOTO'S THYROIDITIS: ICD-10-CM

## 2021-10-12 DIAGNOSIS — E11.65 TYPE 2 DIABETES MELLITUS WITH HYPERGLYCEMIA, WITHOUT LONG-TERM CURRENT USE OF INSULIN (HCC): ICD-10-CM

## 2021-10-12 DIAGNOSIS — I10 ESSENTIAL HYPERTENSION: Chronic | ICD-10-CM

## 2021-10-12 DIAGNOSIS — D50.8 OTHER IRON DEFICIENCY ANEMIA: ICD-10-CM

## 2021-10-12 LAB
ALBUMIN SERPL BCP-MCNC: 3.7 G/DL (ref 3.5–5)
ALP SERPL-CCNC: 108 U/L (ref 46–116)
ALT SERPL W P-5'-P-CCNC: 86 U/L (ref 12–78)
ANION GAP SERPL CALCULATED.3IONS-SCNC: 7 MMOL/L (ref 4–13)
AST SERPL W P-5'-P-CCNC: 94 U/L (ref 5–45)
BASOPHILS # BLD AUTO: 0.08 THOUSANDS/ΜL (ref 0–0.1)
BASOPHILS NFR BLD AUTO: 1 % (ref 0–1)
BILIRUB SERPL-MCNC: 0.2 MG/DL (ref 0.2–1)
BUN SERPL-MCNC: 9 MG/DL (ref 5–25)
CALCIUM SERPL-MCNC: 8.4 MG/DL (ref 8.3–10.1)
CHLORIDE SERPL-SCNC: 103 MMOL/L (ref 100–108)
CO2 SERPL-SCNC: 28 MMOL/L (ref 21–32)
CREAT SERPL-MCNC: 0.73 MG/DL (ref 0.6–1.3)
EOSINOPHIL # BLD AUTO: 0.23 THOUSAND/ΜL (ref 0–0.61)
EOSINOPHIL NFR BLD AUTO: 3 % (ref 0–6)
ERYTHROCYTE [DISTWIDTH] IN BLOOD BY AUTOMATED COUNT: 16.4 % (ref 11.6–15.1)
EST. AVERAGE GLUCOSE BLD GHB EST-MCNC: 183 MG/DL
GFR SERPL CREATININE-BSD FRML MDRD: 96 ML/MIN/1.73SQ M
GLUCOSE P FAST SERPL-MCNC: 138 MG/DL (ref 65–99)
HBA1C MFR BLD: 8 %
HCT VFR BLD AUTO: 30.6 % (ref 34.8–46.1)
HGB BLD-MCNC: 8.7 G/DL (ref 11.5–15.4)
IMM GRANULOCYTES # BLD AUTO: 0.02 THOUSAND/UL (ref 0–0.2)
IMM GRANULOCYTES NFR BLD AUTO: 0 % (ref 0–2)
LYMPHOCYTES # BLD AUTO: 2.11 THOUSANDS/ΜL (ref 0.6–4.47)
LYMPHOCYTES NFR BLD AUTO: 30 % (ref 14–44)
MCH RBC QN AUTO: 20.8 PG (ref 26.8–34.3)
MCHC RBC AUTO-ENTMCNC: 28.4 G/DL (ref 31.4–37.4)
MCV RBC AUTO: 73 FL (ref 82–98)
MONOCYTES # BLD AUTO: 0.61 THOUSAND/ΜL (ref 0.17–1.22)
MONOCYTES NFR BLD AUTO: 9 % (ref 4–12)
NEUTROPHILS # BLD AUTO: 3.89 THOUSANDS/ΜL (ref 1.85–7.62)
NEUTS SEG NFR BLD AUTO: 57 % (ref 43–75)
NRBC BLD AUTO-RTO: 0 /100 WBCS
PLATELET # BLD AUTO: 286 THOUSANDS/UL (ref 149–390)
PMV BLD AUTO: 9.6 FL (ref 8.9–12.7)
POTASSIUM SERPL-SCNC: 4.6 MMOL/L (ref 3.5–5.3)
PROT SERPL-MCNC: 7.5 G/DL (ref 6.4–8.2)
RBC # BLD AUTO: 4.18 MILLION/UL (ref 3.81–5.12)
SODIUM SERPL-SCNC: 138 MMOL/L (ref 136–145)
WBC # BLD AUTO: 6.94 THOUSAND/UL (ref 4.31–10.16)

## 2021-10-12 PROCEDURE — 82728 ASSAY OF FERRITIN: CPT

## 2021-10-12 PROCEDURE — 83550 IRON BINDING TEST: CPT

## 2021-10-12 PROCEDURE — 85025 COMPLETE CBC W/AUTO DIFF WBC: CPT

## 2021-10-12 PROCEDURE — 83036 HEMOGLOBIN GLYCOSYLATED A1C: CPT

## 2021-10-12 PROCEDURE — 80053 COMPREHEN METABOLIC PANEL: CPT

## 2021-10-12 PROCEDURE — 36415 COLL VENOUS BLD VENIPUNCTURE: CPT

## 2021-10-12 PROCEDURE — 83540 ASSAY OF IRON: CPT

## 2021-10-13 ENCOUNTER — OFFICE VISIT (OUTPATIENT)
Dept: GASTROENTEROLOGY | Facility: CLINIC | Age: 51
End: 2021-10-13
Payer: COMMERCIAL

## 2021-10-13 ENCOUNTER — TELEPHONE (OUTPATIENT)
Dept: GASTROENTEROLOGY | Facility: AMBULARY SURGERY CENTER | Age: 51
End: 2021-10-13

## 2021-10-13 VITALS
HEIGHT: 63 IN | DIASTOLIC BLOOD PRESSURE: 70 MMHG | WEIGHT: 162.8 LBS | SYSTOLIC BLOOD PRESSURE: 112 MMHG | HEART RATE: 83 BPM | TEMPERATURE: 98.5 F | BODY MASS INDEX: 28.84 KG/M2

## 2021-10-13 DIAGNOSIS — D50.8 OTHER IRON DEFICIENCY ANEMIA: ICD-10-CM

## 2021-10-13 DIAGNOSIS — K75.81 NASH (NONALCOHOLIC STEATOHEPATITIS): Primary | ICD-10-CM

## 2021-10-13 DIAGNOSIS — K21.9 GASTROESOPHAGEAL REFLUX DISEASE WITHOUT ESOPHAGITIS: ICD-10-CM

## 2021-10-13 LAB
FERRITIN SERPL-MCNC: 15 NG/ML (ref 8–388)
IRON SATN MFR SERPL: 4 % (ref 15–50)
IRON SERPL-MCNC: 25 UG/DL (ref 50–170)
TIBC SERPL-MCNC: 563 UG/DL (ref 250–450)

## 2021-10-13 PROCEDURE — 99214 OFFICE O/P EST MOD 30 MIN: CPT | Performed by: INTERNAL MEDICINE

## 2021-10-14 ENCOUNTER — TELEPHONE (OUTPATIENT)
Dept: HEMATOLOGY ONCOLOGY | Facility: CLINIC | Age: 51
End: 2021-10-14

## 2021-10-14 ENCOUNTER — ANESTHESIA (OUTPATIENT)
Dept: GASTROENTEROLOGY | Facility: AMBULARY SURGERY CENTER | Age: 51
End: 2021-10-14

## 2021-10-14 ENCOUNTER — HOSPITAL ENCOUNTER (OUTPATIENT)
Dept: GASTROENTEROLOGY | Facility: AMBULARY SURGERY CENTER | Age: 51
Setting detail: OUTPATIENT SURGERY
Discharge: HOME | End: 2021-10-14
Attending: INTERNAL MEDICINE | Admitting: INTERNAL MEDICINE
Payer: COMMERCIAL

## 2021-10-14 ENCOUNTER — ANESTHESIA EVENT (OUTPATIENT)
Dept: GASTROENTEROLOGY | Facility: AMBULARY SURGERY CENTER | Age: 51
End: 2021-10-14

## 2021-10-14 VITALS
BODY MASS INDEX: 28.35 KG/M2 | HEIGHT: 63 IN | DIASTOLIC BLOOD PRESSURE: 58 MMHG | SYSTOLIC BLOOD PRESSURE: 125 MMHG | OXYGEN SATURATION: 97 % | TEMPERATURE: 98.1 F | RESPIRATION RATE: 16 BRPM | HEART RATE: 80 BPM | WEIGHT: 160 LBS

## 2021-10-14 DIAGNOSIS — D50.0 IRON DEFICIENCY ANEMIA DUE TO CHRONIC BLOOD LOSS: ICD-10-CM

## 2021-10-14 DIAGNOSIS — K75.81 NASH (NONALCOHOLIC STEATOHEPATITIS): Primary | ICD-10-CM

## 2021-10-14 DIAGNOSIS — D50.8 OTHER IRON DEFICIENCY ANEMIA: ICD-10-CM

## 2021-10-14 DIAGNOSIS — K58.9 IRRITABLE BOWEL SYNDROME WITHOUT DIARRHEA: ICD-10-CM

## 2021-10-14 LAB — GLUCOSE SERPL-MCNC: 129 MG/DL (ref 65–140)

## 2021-10-14 PROCEDURE — 82948 REAGENT STRIP/BLOOD GLUCOSE: CPT

## 2021-10-14 PROCEDURE — 88305 TISSUE EXAM BY PATHOLOGIST: CPT | Performed by: PATHOLOGY

## 2021-10-14 PROCEDURE — 43239 EGD BIOPSY SINGLE/MULTIPLE: CPT | Performed by: INTERNAL MEDICINE

## 2021-10-14 RX ORDER — PROPOFOL 10 MG/ML
INJECTION, EMULSION INTRAVENOUS AS NEEDED
Status: DISCONTINUED | OUTPATIENT
Start: 2021-10-14 | End: 2021-10-14

## 2021-10-14 RX ORDER — LIDOCAINE HYDROCHLORIDE 10 MG/ML
INJECTION, SOLUTION EPIDURAL; INFILTRATION; INTRACAUDAL; PERINEURAL AS NEEDED
Status: DISCONTINUED | OUTPATIENT
Start: 2021-10-14 | End: 2021-10-14

## 2021-10-14 RX ORDER — LIDOCAINE HYDROCHLORIDE 10 MG/ML
0.5 INJECTION, SOLUTION EPIDURAL; INFILTRATION; INTRACAUDAL; PERINEURAL ONCE AS NEEDED
Status: DISCONTINUED | OUTPATIENT
Start: 2021-10-14 | End: 2021-10-18 | Stop reason: HOSPADM

## 2021-10-14 RX ORDER — LIDOCAINE HYDROCHLORIDE 10 MG/ML
0.5 INJECTION, SOLUTION EPIDURAL; INFILTRATION; INTRACAUDAL; PERINEURAL ONCE AS NEEDED
Status: CANCELLED | OUTPATIENT
Start: 2021-10-14

## 2021-10-14 RX ORDER — DICYCLOMINE HCL 20 MG
20 TABLET ORAL EVERY 6 HOURS PRN
Qty: 60 TABLET | Refills: 0 | Status: SHIPPED | OUTPATIENT
Start: 2021-10-14 | End: 2021-11-23 | Stop reason: SDUPTHER

## 2021-10-14 RX ORDER — DIPHENHYDRAMINE HYDROCHLORIDE 50 MG/ML
12.5 INJECTION INTRAMUSCULAR; INTRAVENOUS ONCE AS NEEDED
Status: CANCELLED | OUTPATIENT
Start: 2021-10-14

## 2021-10-14 RX ORDER — SODIUM CHLORIDE, SODIUM LACTATE, POTASSIUM CHLORIDE, CALCIUM CHLORIDE 600; 310; 30; 20 MG/100ML; MG/100ML; MG/100ML; MG/100ML
125 INJECTION, SOLUTION INTRAVENOUS CONTINUOUS
Status: CANCELLED | OUTPATIENT
Start: 2021-10-14

## 2021-10-14 RX ORDER — METOCLOPRAMIDE HYDROCHLORIDE 5 MG/ML
10 INJECTION INTRAMUSCULAR; INTRAVENOUS ONCE AS NEEDED
Status: CANCELLED | OUTPATIENT
Start: 2021-10-14

## 2021-10-14 RX ORDER — SODIUM CHLORIDE, SODIUM LACTATE, POTASSIUM CHLORIDE, CALCIUM CHLORIDE 600; 310; 30; 20 MG/100ML; MG/100ML; MG/100ML; MG/100ML
125 INJECTION, SOLUTION INTRAVENOUS CONTINUOUS
Status: DISCONTINUED | OUTPATIENT
Start: 2021-10-14 | End: 2021-10-18 | Stop reason: HOSPADM

## 2021-10-14 RX ORDER — ONDANSETRON 2 MG/ML
4 INJECTION INTRAMUSCULAR; INTRAVENOUS ONCE AS NEEDED
Status: CANCELLED | OUTPATIENT
Start: 2021-10-14

## 2021-10-14 RX ADMIN — PROPOFOL 30 MG: 10 INJECTION, EMULSION INTRAVENOUS at 11:45

## 2021-10-14 RX ADMIN — LIDOCAINE HYDROCHLORIDE 50 MG: 10 INJECTION, SOLUTION EPIDURAL; INFILTRATION; INTRACAUDAL at 11:40

## 2021-10-14 RX ADMIN — SODIUM CHLORIDE, SODIUM LACTATE, POTASSIUM CHLORIDE, AND CALCIUM CHLORIDE 125 ML/HR: .6; .31; .03; .02 INJECTION, SOLUTION INTRAVENOUS at 10:43

## 2021-10-14 RX ADMIN — PROPOFOL 30 MG: 10 INJECTION, EMULSION INTRAVENOUS at 11:42

## 2021-10-14 RX ADMIN — PROPOFOL 130 MG: 10 INJECTION, EMULSION INTRAVENOUS at 11:40

## 2021-10-18 ENCOUNTER — TELEMEDICINE (OUTPATIENT)
Dept: FAMILY MEDICINE CLINIC | Facility: CLINIC | Age: 51
End: 2021-10-18
Payer: COMMERCIAL

## 2021-10-18 ENCOUNTER — TELEMEDICINE (OUTPATIENT)
Dept: ENDOCRINOLOGY | Facility: HOSPITAL | Age: 51
End: 2021-10-18
Payer: COMMERCIAL

## 2021-10-18 DIAGNOSIS — E78.2 COMBINED HYPERLIPIDEMIA: ICD-10-CM

## 2021-10-18 DIAGNOSIS — M54.50 CHRONIC LOW BACK PAIN WITHOUT SCIATICA, UNSPECIFIED BACK PAIN LATERALITY: ICD-10-CM

## 2021-10-18 DIAGNOSIS — E11.65 TYPE 2 DIABETES MELLITUS WITH HYPERGLYCEMIA, WITHOUT LONG-TERM CURRENT USE OF INSULIN (HCC): Primary | ICD-10-CM

## 2021-10-18 DIAGNOSIS — K75.81 NASH (NONALCOHOLIC STEATOHEPATITIS): ICD-10-CM

## 2021-10-18 DIAGNOSIS — D64.9 ANEMIA, UNSPECIFIED TYPE: ICD-10-CM

## 2021-10-18 DIAGNOSIS — E03.8 HYPOTHYROIDISM DUE TO HASHIMOTO'S THYROIDITIS: ICD-10-CM

## 2021-10-18 DIAGNOSIS — Q24.5 ANOMALOUS CORONARY ARTERY ORIGIN: ICD-10-CM

## 2021-10-18 DIAGNOSIS — G89.29 CHRONIC LOW BACK PAIN WITHOUT SCIATICA, UNSPECIFIED BACK PAIN LATERALITY: ICD-10-CM

## 2021-10-18 DIAGNOSIS — K21.9 GASTROESOPHAGEAL REFLUX DISEASE WITHOUT ESOPHAGITIS: ICD-10-CM

## 2021-10-18 DIAGNOSIS — E06.3 HYPOTHYROIDISM DUE TO HASHIMOTO'S THYROIDITIS: ICD-10-CM

## 2021-10-18 DIAGNOSIS — I10 PRIMARY HYPERTENSION: Chronic | ICD-10-CM

## 2021-10-18 DIAGNOSIS — E78.2 COMBINED HYPERLIPIDEMIA: Primary | ICD-10-CM

## 2021-10-18 PROCEDURE — 99214 OFFICE O/P EST MOD 30 MIN: CPT | Performed by: PHYSICIAN ASSISTANT

## 2021-10-18 PROCEDURE — 99214 OFFICE O/P EST MOD 30 MIN: CPT | Performed by: FAMILY MEDICINE

## 2021-10-18 RX ORDER — ICOSAPENT ETHYL 1000 MG/1
CAPSULE ORAL
Qty: 360 CAPSULE | Refills: 3 | Status: SHIPPED | OUTPATIENT
Start: 2021-10-18 | End: 2021-12-27 | Stop reason: SDUPTHER

## 2021-10-18 RX ORDER — GLIMEPIRIDE 1 MG/1
1 TABLET ORAL
Qty: 90 TABLET | Refills: 1 | Status: SHIPPED | OUTPATIENT
Start: 2021-10-18 | End: 2021-12-27 | Stop reason: SDUPTHER

## 2021-11-01 ENCOUNTER — HOSPITAL ENCOUNTER (OUTPATIENT)
Dept: GASTROENTEROLOGY | Facility: HOSPITAL | Age: 51
Discharge: HOME | End: 2021-11-01
Attending: INTERNAL MEDICINE
Payer: COMMERCIAL

## 2021-11-01 DIAGNOSIS — J01.90 ACUTE SINUSITIS, RECURRENCE NOT SPECIFIED, UNSPECIFIED LOCATION: ICD-10-CM

## 2021-11-01 DIAGNOSIS — D50.0 IRON DEFICIENCY ANEMIA DUE TO CHRONIC BLOOD LOSS: ICD-10-CM

## 2021-11-01 PROCEDURE — 91110 GI TRC IMG INTRAL ESOPH-ILE: CPT

## 2021-11-01 RX ORDER — TRAMADOL HYDROCHLORIDE 50 MG/1
50 TABLET ORAL EVERY 8 HOURS PRN
Qty: 60 TABLET | Refills: 0 | Status: SHIPPED | OUTPATIENT
Start: 2021-11-01 | End: 2021-11-29 | Stop reason: SDUPTHER

## 2021-11-08 ENCOUNTER — CONSULT (OUTPATIENT)
Dept: HEMATOLOGY ONCOLOGY | Facility: HOSPITAL | Age: 51
End: 2021-11-08
Payer: COMMERCIAL

## 2021-11-08 VITALS
RESPIRATION RATE: 14 BRPM | BODY MASS INDEX: 28.7 KG/M2 | OXYGEN SATURATION: 98 % | HEIGHT: 63 IN | TEMPERATURE: 99 F | WEIGHT: 162 LBS | HEART RATE: 82 BPM | SYSTOLIC BLOOD PRESSURE: 110 MMHG | DIASTOLIC BLOOD PRESSURE: 70 MMHG

## 2021-11-08 DIAGNOSIS — D50.8 IRON DEFICIENCY ANEMIA SECONDARY TO INADEQUATE DIETARY IRON INTAKE: Primary | ICD-10-CM

## 2021-11-08 DIAGNOSIS — K75.81 NASH (NONALCOHOLIC STEATOHEPATITIS): ICD-10-CM

## 2021-11-08 PROCEDURE — 99245 OFF/OP CONSLTJ NEW/EST HI 55: CPT | Performed by: INTERNAL MEDICINE

## 2021-11-08 RX ORDER — SODIUM CHLORIDE 9 MG/ML
20 INJECTION, SOLUTION INTRAVENOUS ONCE
Status: CANCELLED | OUTPATIENT
Start: 2021-11-16

## 2021-11-11 PROCEDURE — 91110 GI TRC IMG INTRAL ESOPH-ILE: CPT | Performed by: INTERNAL MEDICINE

## 2021-11-13 DIAGNOSIS — I10 ESSENTIAL HYPERTENSION: ICD-10-CM

## 2021-11-13 DIAGNOSIS — E11.65 TYPE 2 DIABETES MELLITUS WITH HYPERGLYCEMIA, WITHOUT LONG-TERM CURRENT USE OF INSULIN (HCC): ICD-10-CM

## 2021-11-16 ENCOUNTER — HOSPITAL ENCOUNTER (OUTPATIENT)
Dept: INFUSION CENTER | Facility: HOSPITAL | Age: 51
Discharge: HOME | End: 2021-11-16
Attending: INTERNAL MEDICINE
Payer: COMMERCIAL

## 2021-11-16 ENCOUNTER — OFFICE VISIT (OUTPATIENT)
Dept: PAIN MEDICINE | Facility: CLINIC | Age: 51
End: 2021-11-16
Payer: COMMERCIAL

## 2021-11-16 VITALS
DIASTOLIC BLOOD PRESSURE: 82 MMHG | BODY MASS INDEX: 29.06 KG/M2 | HEIGHT: 63 IN | WEIGHT: 164 LBS | TEMPERATURE: 98.3 F | HEART RATE: 84 BPM | SYSTOLIC BLOOD PRESSURE: 126 MMHG

## 2021-11-16 VITALS
SYSTOLIC BLOOD PRESSURE: 110 MMHG | HEART RATE: 84 BPM | TEMPERATURE: 98.5 F | RESPIRATION RATE: 14 BRPM | DIASTOLIC BLOOD PRESSURE: 53 MMHG | OXYGEN SATURATION: 96 %

## 2021-11-16 DIAGNOSIS — M79.18 MYOFASCIAL PAIN SYNDROME: ICD-10-CM

## 2021-11-16 DIAGNOSIS — M54.16 LUMBAR RADICULOPATHY: ICD-10-CM

## 2021-11-16 DIAGNOSIS — G89.29 CHRONIC LOW BACK PAIN WITHOUT SCIATICA, UNSPECIFIED BACK PAIN LATERALITY: ICD-10-CM

## 2021-11-16 DIAGNOSIS — M54.50 CHRONIC LOW BACK PAIN WITHOUT SCIATICA, UNSPECIFIED BACK PAIN LATERALITY: ICD-10-CM

## 2021-11-16 DIAGNOSIS — D50.8 IRON DEFICIENCY ANEMIA SECONDARY TO INADEQUATE DIETARY IRON INTAKE: Primary | ICD-10-CM

## 2021-11-16 DIAGNOSIS — G89.4 CHRONIC PAIN SYNDROME: Primary | ICD-10-CM

## 2021-11-16 DIAGNOSIS — M47.816 LUMBAR SPONDYLOSIS: ICD-10-CM

## 2021-11-16 PROCEDURE — 99214 OFFICE O/P EST MOD 30 MIN: CPT | Performed by: NURSE PRACTITIONER

## 2021-11-16 PROCEDURE — 96365 THER/PROPH/DIAG IV INF INIT: CPT

## 2021-11-16 RX ORDER — SODIUM CHLORIDE 9 MG/ML
20 INJECTION, SOLUTION INTRAVENOUS ONCE
Status: CANCELLED | OUTPATIENT
Start: 2021-11-19

## 2021-11-16 RX ORDER — METOPROLOL SUCCINATE 25 MG/1
25 TABLET, EXTENDED RELEASE ORAL 2 TIMES DAILY
Qty: 180 TABLET | Refills: 1 | Status: SHIPPED | OUTPATIENT
Start: 2021-11-16 | End: 2021-11-22 | Stop reason: SDUPTHER

## 2021-11-16 RX ORDER — CELECOXIB 100 MG/1
CAPSULE ORAL
Qty: 60 CAPSULE | Refills: 3 | Status: CANCELLED | OUTPATIENT
Start: 2021-11-16

## 2021-11-16 RX ORDER — METHOCARBAMOL 750 MG/1
TABLET, FILM COATED ORAL
Qty: 90 TABLET | Refills: 1 | Status: SHIPPED | OUTPATIENT
Start: 2021-11-16 | End: 2022-03-08 | Stop reason: SDUPTHER

## 2021-11-16 RX ORDER — SODIUM CHLORIDE 9 MG/ML
20 INJECTION, SOLUTION INTRAVENOUS ONCE
Status: COMPLETED | OUTPATIENT
Start: 2021-11-16 | End: 2021-11-16

## 2021-11-16 RX ORDER — IBUPROFEN 600 MG/1
TABLET ORAL
Qty: 180 TABLET | Refills: 1 | Status: SHIPPED | OUTPATIENT
Start: 2021-11-16 | End: 2022-03-08 | Stop reason: SDUPTHER

## 2021-11-16 RX ADMIN — SODIUM CHLORIDE 20 ML/HR: 9 INJECTION, SOLUTION INTRAVENOUS at 12:00

## 2021-11-16 RX ADMIN — IRON SUCROSE 200 MG: 20 INJECTION, SOLUTION INTRAVENOUS at 12:00

## 2021-11-19 ENCOUNTER — HOSPITAL ENCOUNTER (OUTPATIENT)
Dept: INFUSION CENTER | Facility: HOSPITAL | Age: 51
Discharge: HOME | End: 2021-11-19
Attending: INTERNAL MEDICINE
Payer: COMMERCIAL

## 2021-11-19 VITALS
OXYGEN SATURATION: 99 % | DIASTOLIC BLOOD PRESSURE: 56 MMHG | TEMPERATURE: 97.2 F | SYSTOLIC BLOOD PRESSURE: 115 MMHG | HEART RATE: 80 BPM

## 2021-11-19 DIAGNOSIS — D50.8 IRON DEFICIENCY ANEMIA SECONDARY TO INADEQUATE DIETARY IRON INTAKE: Primary | ICD-10-CM

## 2021-11-19 PROCEDURE — 96365 THER/PROPH/DIAG IV INF INIT: CPT

## 2021-11-19 RX ORDER — SODIUM CHLORIDE 9 MG/ML
20 INJECTION, SOLUTION INTRAVENOUS ONCE
Status: COMPLETED | OUTPATIENT
Start: 2021-11-19 | End: 2021-11-19

## 2021-11-19 RX ORDER — SODIUM CHLORIDE 9 MG/ML
20 INJECTION, SOLUTION INTRAVENOUS ONCE
Status: CANCELLED | OUTPATIENT
Start: 2021-11-23

## 2021-11-19 RX ADMIN — IRON SUCROSE 200 MG: 20 INJECTION, SOLUTION INTRAVENOUS at 11:44

## 2021-11-19 RX ADMIN — SODIUM CHLORIDE 20 ML/HR: 9 INJECTION, SOLUTION INTRAVENOUS at 11:44

## 2021-11-22 DIAGNOSIS — I10 ESSENTIAL HYPERTENSION: ICD-10-CM

## 2021-11-23 ENCOUNTER — HOSPITAL ENCOUNTER (OUTPATIENT)
Dept: INFUSION CENTER | Facility: HOSPITAL | Age: 51
Discharge: HOME | End: 2021-11-23
Attending: INTERNAL MEDICINE
Payer: COMMERCIAL

## 2021-11-23 VITALS
TEMPERATURE: 97.8 F | OXYGEN SATURATION: 98 % | DIASTOLIC BLOOD PRESSURE: 60 MMHG | RESPIRATION RATE: 14 BRPM | SYSTOLIC BLOOD PRESSURE: 115 MMHG | HEART RATE: 76 BPM

## 2021-11-23 DIAGNOSIS — E03.8 HYPOTHYROIDISM DUE TO HASHIMOTO'S THYROIDITIS: ICD-10-CM

## 2021-11-23 DIAGNOSIS — F32.A DEPRESSION, UNSPECIFIED DEPRESSION TYPE: ICD-10-CM

## 2021-11-23 DIAGNOSIS — K58.9 IRRITABLE BOWEL SYNDROME WITHOUT DIARRHEA: ICD-10-CM

## 2021-11-23 DIAGNOSIS — R07.89 OTHER CHEST PAIN: ICD-10-CM

## 2021-11-23 DIAGNOSIS — E06.3 HYPOTHYROIDISM DUE TO HASHIMOTO'S THYROIDITIS: ICD-10-CM

## 2021-11-23 DIAGNOSIS — K21.9 GERD WITHOUT ESOPHAGITIS: ICD-10-CM

## 2021-11-23 DIAGNOSIS — D50.8 IRON DEFICIENCY ANEMIA SECONDARY TO INADEQUATE DIETARY IRON INTAKE: Primary | ICD-10-CM

## 2021-11-23 PROCEDURE — 96365 THER/PROPH/DIAG IV INF INIT: CPT

## 2021-11-23 RX ORDER — PANTOPRAZOLE SODIUM 40 MG/1
40 TABLET, DELAYED RELEASE ORAL
Qty: 90 TABLET | Refills: 0 | Status: SHIPPED | OUTPATIENT
Start: 2021-11-23 | End: 2022-02-11 | Stop reason: SDUPTHER

## 2021-11-23 RX ORDER — SODIUM CHLORIDE 9 MG/ML
20 INJECTION, SOLUTION INTRAVENOUS ONCE
Status: COMPLETED | OUTPATIENT
Start: 2021-11-23 | End: 2021-11-23

## 2021-11-23 RX ORDER — ESCITALOPRAM OXALATE 20 MG/1
20 TABLET ORAL DAILY
Qty: 90 TABLET | Refills: 0 | Status: SHIPPED | OUTPATIENT
Start: 2021-11-23 | End: 2021-12-26 | Stop reason: SDUPTHER

## 2021-11-23 RX ORDER — DICYCLOMINE HCL 20 MG
20 TABLET ORAL EVERY 6 HOURS PRN
Qty: 60 TABLET | Refills: 0 | Status: SHIPPED | OUTPATIENT
Start: 2021-11-23 | End: 2021-12-26 | Stop reason: SDUPTHER

## 2021-11-23 RX ORDER — SODIUM CHLORIDE 9 MG/ML
20 INJECTION, SOLUTION INTRAVENOUS ONCE
Status: CANCELLED | OUTPATIENT
Start: 2021-11-26

## 2021-11-23 RX ORDER — LEVOTHYROXINE SODIUM 112 UG/1
112 TABLET ORAL DAILY
Qty: 90 TABLET | Refills: 0 | Status: SHIPPED | OUTPATIENT
Start: 2021-11-23 | End: 2022-02-11 | Stop reason: SDUPTHER

## 2021-11-23 RX ORDER — METOPROLOL SUCCINATE 25 MG/1
25 TABLET, EXTENDED RELEASE ORAL 2 TIMES DAILY
Qty: 180 TABLET | Refills: 1 | Status: SHIPPED | OUTPATIENT
Start: 2021-11-23 | End: 2022-02-10 | Stop reason: SDUPTHER

## 2021-11-23 RX ADMIN — SODIUM CHLORIDE 20 ML/HR: 9 INJECTION, SOLUTION INTRAVENOUS at 12:15

## 2021-11-23 RX ADMIN — IRON SUCROSE 200 MG: 20 INJECTION, SOLUTION INTRAVENOUS at 12:14

## 2021-11-24 RX ORDER — AMLODIPINE BESYLATE 5 MG/1
5 TABLET ORAL DAILY
Qty: 90 TABLET | Refills: 3 | Status: SHIPPED | OUTPATIENT
Start: 2021-11-24 | End: 2022-02-10 | Stop reason: SDUPTHER

## 2021-11-26 ENCOUNTER — HOSPITAL ENCOUNTER (OUTPATIENT)
Dept: INFUSION CENTER | Facility: HOSPITAL | Age: 51
Discharge: HOME | End: 2021-11-26
Attending: INTERNAL MEDICINE
Payer: COMMERCIAL

## 2021-11-26 DIAGNOSIS — D50.8 IRON DEFICIENCY ANEMIA SECONDARY TO INADEQUATE DIETARY IRON INTAKE: Primary | ICD-10-CM

## 2021-11-26 PROCEDURE — 96365 THER/PROPH/DIAG IV INF INIT: CPT

## 2021-11-26 RX ORDER — SODIUM CHLORIDE 9 MG/ML
20 INJECTION, SOLUTION INTRAVENOUS ONCE
Status: COMPLETED | OUTPATIENT
Start: 2021-11-26 | End: 2021-11-26

## 2021-11-26 RX ORDER — SODIUM CHLORIDE 9 MG/ML
20 INJECTION, SOLUTION INTRAVENOUS ONCE
Status: CANCELLED | OUTPATIENT
Start: 2021-11-30

## 2021-11-26 RX ADMIN — SODIUM CHLORIDE 20 ML/HR: 9 INJECTION, SOLUTION INTRAVENOUS at 11:51

## 2021-11-26 RX ADMIN — IRON SUCROSE 200 MG: 20 INJECTION, SOLUTION INTRAVENOUS at 11:51

## 2021-11-29 DIAGNOSIS — J01.90 ACUTE SINUSITIS, RECURRENCE NOT SPECIFIED, UNSPECIFIED LOCATION: ICD-10-CM

## 2021-11-29 RX ORDER — TRAMADOL HYDROCHLORIDE 50 MG/1
50 TABLET ORAL EVERY 8 HOURS PRN
Qty: 60 TABLET | Refills: 0 | Status: SHIPPED | OUTPATIENT
Start: 2021-11-29 | End: 2021-12-26 | Stop reason: SDUPTHER

## 2021-11-30 ENCOUNTER — HOSPITAL ENCOUNTER (OUTPATIENT)
Dept: INFUSION CENTER | Facility: HOSPITAL | Age: 51
Discharge: HOME | End: 2021-11-30
Attending: INTERNAL MEDICINE
Payer: COMMERCIAL

## 2021-11-30 VITALS
HEART RATE: 77 BPM | DIASTOLIC BLOOD PRESSURE: 61 MMHG | OXYGEN SATURATION: 98 % | SYSTOLIC BLOOD PRESSURE: 120 MMHG | TEMPERATURE: 97.8 F | RESPIRATION RATE: 14 BRPM

## 2021-11-30 DIAGNOSIS — D50.8 IRON DEFICIENCY ANEMIA SECONDARY TO INADEQUATE DIETARY IRON INTAKE: Primary | ICD-10-CM

## 2021-11-30 PROCEDURE — 96365 THER/PROPH/DIAG IV INF INIT: CPT

## 2021-11-30 RX ORDER — SODIUM CHLORIDE 9 MG/ML
20 INJECTION, SOLUTION INTRAVENOUS ONCE
Status: COMPLETED | OUTPATIENT
Start: 2021-11-30 | End: 2021-11-30

## 2021-11-30 RX ORDER — SODIUM CHLORIDE 9 MG/ML
20 INJECTION, SOLUTION INTRAVENOUS ONCE
Status: CANCELLED | OUTPATIENT
Start: 2021-12-03

## 2021-11-30 RX ADMIN — SODIUM CHLORIDE 20 ML/HR: 9 INJECTION, SOLUTION INTRAVENOUS at 12:40

## 2021-11-30 RX ADMIN — IRON SUCROSE 200 MG: 20 INJECTION, SOLUTION INTRAVENOUS at 12:40

## 2021-12-02 ENCOUNTER — TELEPHONE (OUTPATIENT)
Dept: GASTROENTEROLOGY | Facility: CLINIC | Age: 51
End: 2021-12-02

## 2021-12-03 ENCOUNTER — HOSPITAL ENCOUNTER (OUTPATIENT)
Dept: INFUSION CENTER | Facility: HOSPITAL | Age: 51
Discharge: HOME | End: 2021-12-03
Attending: INTERNAL MEDICINE
Payer: COMMERCIAL

## 2021-12-03 VITALS
RESPIRATION RATE: 16 BRPM | HEART RATE: 75 BPM | DIASTOLIC BLOOD PRESSURE: 58 MMHG | TEMPERATURE: 98 F | OXYGEN SATURATION: 97 % | SYSTOLIC BLOOD PRESSURE: 115 MMHG

## 2021-12-03 DIAGNOSIS — D50.8 IRON DEFICIENCY ANEMIA SECONDARY TO INADEQUATE DIETARY IRON INTAKE: Primary | ICD-10-CM

## 2021-12-03 PROCEDURE — 96365 THER/PROPH/DIAG IV INF INIT: CPT

## 2021-12-03 RX ORDER — SODIUM CHLORIDE 9 MG/ML
20 INJECTION, SOLUTION INTRAVENOUS ONCE
Status: COMPLETED | OUTPATIENT
Start: 2021-12-03 | End: 2021-12-03

## 2021-12-03 RX ORDER — SODIUM CHLORIDE 9 MG/ML
20 INJECTION, SOLUTION INTRAVENOUS ONCE
Status: CANCELLED | OUTPATIENT
Start: 2021-12-07

## 2021-12-03 RX ADMIN — IRON SUCROSE 200 MG: 20 INJECTION, SOLUTION INTRAVENOUS at 12:22

## 2021-12-03 RX ADMIN — SODIUM CHLORIDE 20 ML/HR: 9 INJECTION, SOLUTION INTRAVENOUS at 12:21

## 2021-12-07 ENCOUNTER — HOSPITAL ENCOUNTER (OUTPATIENT)
Dept: INFUSION CENTER | Facility: HOSPITAL | Age: 51
Discharge: HOME | End: 2021-12-07
Attending: INTERNAL MEDICINE
Payer: COMMERCIAL

## 2021-12-07 VITALS
HEART RATE: 78 BPM | SYSTOLIC BLOOD PRESSURE: 108 MMHG | RESPIRATION RATE: 16 BRPM | OXYGEN SATURATION: 98 % | DIASTOLIC BLOOD PRESSURE: 58 MMHG | TEMPERATURE: 98 F

## 2021-12-07 DIAGNOSIS — D50.8 IRON DEFICIENCY ANEMIA SECONDARY TO INADEQUATE DIETARY IRON INTAKE: Primary | ICD-10-CM

## 2021-12-07 PROCEDURE — 96365 THER/PROPH/DIAG IV INF INIT: CPT

## 2021-12-07 RX ORDER — SODIUM CHLORIDE 9 MG/ML
20 INJECTION, SOLUTION INTRAVENOUS ONCE
Status: COMPLETED | OUTPATIENT
Start: 2021-12-07 | End: 2021-12-07

## 2021-12-07 RX ORDER — SODIUM CHLORIDE 9 MG/ML
20 INJECTION, SOLUTION INTRAVENOUS ONCE
Status: CANCELLED | OUTPATIENT
Start: 2021-12-10

## 2021-12-07 RX ADMIN — IRON SUCROSE 200 MG: 20 INJECTION, SOLUTION INTRAVENOUS at 12:38

## 2021-12-07 RX ADMIN — SODIUM CHLORIDE 20 ML/HR: 9 INJECTION, SOLUTION INTRAVENOUS at 12:39

## 2021-12-09 ENCOUNTER — ANNUAL EXAM (OUTPATIENT)
Dept: OBGYN CLINIC | Facility: MEDICAL CENTER | Age: 51
End: 2021-12-09
Payer: COMMERCIAL

## 2021-12-09 VITALS
HEIGHT: 63 IN | DIASTOLIC BLOOD PRESSURE: 70 MMHG | BODY MASS INDEX: 29.06 KG/M2 | SYSTOLIC BLOOD PRESSURE: 100 MMHG | WEIGHT: 164 LBS

## 2021-12-09 DIAGNOSIS — R10.2 VULVAR PAIN: ICD-10-CM

## 2021-12-09 DIAGNOSIS — Z12.31 ENCOUNTER FOR SCREENING MAMMOGRAM FOR MALIGNANT NEOPLASM OF BREAST: ICD-10-CM

## 2021-12-09 DIAGNOSIS — Z01.419 ENCOUNTER FOR WELL WOMAN EXAM WITH ROUTINE GYNECOLOGICAL EXAM: Primary | ICD-10-CM

## 2021-12-09 PROCEDURE — 99396 PREV VISIT EST AGE 40-64: CPT | Performed by: OBSTETRICS & GYNECOLOGY

## 2021-12-09 RX ORDER — ESTRADIOL 0.1 MG/G
CREAM VAGINAL
Qty: 42.5 G | Refills: 2 | Status: SHIPPED | OUTPATIENT
Start: 2021-12-09 | End: 2022-07-28

## 2021-12-09 RX ORDER — LIDOCAINE HYDROCHLORIDE 20 MG/ML
JELLY TOPICAL AS NEEDED
Qty: 30 ML | Refills: 1 | Status: SHIPPED | OUTPATIENT
Start: 2021-12-09 | End: 2022-03-08

## 2021-12-10 ENCOUNTER — HOSPITAL ENCOUNTER (OUTPATIENT)
Dept: INFUSION CENTER | Facility: HOSPITAL | Age: 51
Discharge: HOME | End: 2021-12-10
Attending: INTERNAL MEDICINE
Payer: COMMERCIAL

## 2021-12-10 VITALS
SYSTOLIC BLOOD PRESSURE: 113 MMHG | HEART RATE: 78 BPM | TEMPERATURE: 97.9 F | RESPIRATION RATE: 16 BRPM | OXYGEN SATURATION: 98 % | DIASTOLIC BLOOD PRESSURE: 58 MMHG

## 2021-12-10 DIAGNOSIS — D50.8 IRON DEFICIENCY ANEMIA SECONDARY TO INADEQUATE DIETARY IRON INTAKE: Primary | ICD-10-CM

## 2021-12-10 PROCEDURE — 96365 THER/PROPH/DIAG IV INF INIT: CPT

## 2021-12-10 RX ORDER — SODIUM CHLORIDE 9 MG/ML
20 INJECTION, SOLUTION INTRAVENOUS ONCE
Status: COMPLETED | OUTPATIENT
Start: 2021-12-10 | End: 2021-12-10

## 2021-12-10 RX ORDER — SODIUM CHLORIDE 9 MG/ML
20 INJECTION, SOLUTION INTRAVENOUS ONCE
Status: CANCELLED | OUTPATIENT
Start: 2021-12-14

## 2021-12-10 RX ADMIN — IRON SUCROSE 200 MG: 20 INJECTION, SOLUTION INTRAVENOUS at 12:14

## 2021-12-10 RX ADMIN — SODIUM CHLORIDE 20 ML/HR: 9 INJECTION, SOLUTION INTRAVENOUS at 12:11

## 2021-12-14 ENCOUNTER — HOSPITAL ENCOUNTER (OUTPATIENT)
Dept: INFUSION CENTER | Facility: HOSPITAL | Age: 51
Discharge: HOME | End: 2021-12-14
Attending: INTERNAL MEDICINE
Payer: COMMERCIAL

## 2021-12-14 VITALS
SYSTOLIC BLOOD PRESSURE: 116 MMHG | DIASTOLIC BLOOD PRESSURE: 60 MMHG | HEART RATE: 79 BPM | RESPIRATION RATE: 16 BRPM | OXYGEN SATURATION: 96 % | TEMPERATURE: 97.6 F

## 2021-12-14 DIAGNOSIS — D50.8 IRON DEFICIENCY ANEMIA SECONDARY TO INADEQUATE DIETARY IRON INTAKE: Primary | ICD-10-CM

## 2021-12-14 PROCEDURE — 96365 THER/PROPH/DIAG IV INF INIT: CPT

## 2021-12-14 RX ORDER — SODIUM CHLORIDE 9 MG/ML
20 INJECTION, SOLUTION INTRAVENOUS ONCE
Status: COMPLETED | OUTPATIENT
Start: 2021-12-14 | End: 2021-12-14

## 2021-12-14 RX ORDER — SODIUM CHLORIDE 9 MG/ML
20 INJECTION, SOLUTION INTRAVENOUS ONCE
Status: CANCELLED | OUTPATIENT
Start: 2021-12-17

## 2021-12-14 RX ADMIN — SODIUM CHLORIDE 20 ML/HR: 9 INJECTION, SOLUTION INTRAVENOUS at 13:06

## 2021-12-14 RX ADMIN — IRON SUCROSE 200 MG: 20 INJECTION, SOLUTION INTRAVENOUS at 13:06

## 2021-12-17 ENCOUNTER — HOSPITAL ENCOUNTER (OUTPATIENT)
Dept: INFUSION CENTER | Facility: HOSPITAL | Age: 51
Discharge: HOME | End: 2021-12-17
Attending: INTERNAL MEDICINE
Payer: COMMERCIAL

## 2021-12-17 VITALS
RESPIRATION RATE: 14 BRPM | HEART RATE: 80 BPM | SYSTOLIC BLOOD PRESSURE: 125 MMHG | DIASTOLIC BLOOD PRESSURE: 60 MMHG | TEMPERATURE: 97 F | OXYGEN SATURATION: 97 %

## 2021-12-17 DIAGNOSIS — D50.8 IRON DEFICIENCY ANEMIA SECONDARY TO INADEQUATE DIETARY IRON INTAKE: Primary | ICD-10-CM

## 2021-12-17 PROCEDURE — 96365 THER/PROPH/DIAG IV INF INIT: CPT

## 2021-12-17 RX ORDER — SODIUM CHLORIDE 9 MG/ML
20 INJECTION, SOLUTION INTRAVENOUS ONCE
Status: CANCELLED | OUTPATIENT
Start: 2021-12-17

## 2021-12-17 RX ORDER — SODIUM CHLORIDE 9 MG/ML
20 INJECTION, SOLUTION INTRAVENOUS ONCE
Status: COMPLETED | OUTPATIENT
Start: 2021-12-17 | End: 2021-12-17

## 2021-12-17 RX ADMIN — IRON SUCROSE 200 MG: 20 INJECTION, SOLUTION INTRAVENOUS at 12:56

## 2021-12-17 RX ADMIN — SODIUM CHLORIDE 20 ML/HR: 9 INJECTION, SOLUTION INTRAVENOUS at 12:56

## 2021-12-26 DIAGNOSIS — K58.9 IRRITABLE BOWEL SYNDROME WITHOUT DIARRHEA: ICD-10-CM

## 2021-12-26 DIAGNOSIS — J01.90 ACUTE SINUSITIS, RECURRENCE NOT SPECIFIED, UNSPECIFIED LOCATION: ICD-10-CM

## 2021-12-26 DIAGNOSIS — F32.A DEPRESSION, UNSPECIFIED DEPRESSION TYPE: ICD-10-CM

## 2021-12-27 DIAGNOSIS — E11.65 TYPE 2 DIABETES MELLITUS WITH HYPERGLYCEMIA, WITHOUT LONG-TERM CURRENT USE OF INSULIN (HCC): ICD-10-CM

## 2021-12-27 DIAGNOSIS — E78.2 COMBINED HYPERLIPIDEMIA: ICD-10-CM

## 2021-12-27 RX ORDER — GLIMEPIRIDE 1 MG/1
1 TABLET ORAL
Qty: 90 TABLET | Refills: 0 | Status: SHIPPED | OUTPATIENT
Start: 2021-12-27 | End: 2022-04-21 | Stop reason: SDUPTHER

## 2021-12-27 RX ORDER — DICYCLOMINE HCL 20 MG
20 TABLET ORAL EVERY 6 HOURS PRN
Qty: 60 TABLET | Refills: 0 | Status: SHIPPED | OUTPATIENT
Start: 2021-12-27 | End: 2022-03-08 | Stop reason: SDUPTHER

## 2021-12-27 RX ORDER — TRAMADOL HYDROCHLORIDE 50 MG/1
50 TABLET ORAL EVERY 8 HOURS PRN
Qty: 60 TABLET | Refills: 0 | Status: SHIPPED | OUTPATIENT
Start: 2021-12-27 | End: 2022-01-17 | Stop reason: SDUPTHER

## 2021-12-27 RX ORDER — ESCITALOPRAM OXALATE 20 MG/1
20 TABLET ORAL DAILY
Qty: 90 TABLET | Refills: 0 | Status: SHIPPED | OUTPATIENT
Start: 2021-12-27 | End: 2022-03-08 | Stop reason: SDUPTHER

## 2021-12-28 RX ORDER — ICOSAPENT ETHYL 1000 MG/1
CAPSULE ORAL
Qty: 360 CAPSULE | Refills: 3 | Status: SHIPPED | OUTPATIENT
Start: 2021-12-28 | End: 2022-05-12 | Stop reason: SDUPTHER

## 2022-01-17 DIAGNOSIS — J01.90 ACUTE SINUSITIS, RECURRENCE NOT SPECIFIED, UNSPECIFIED LOCATION: ICD-10-CM

## 2022-01-17 RX ORDER — TRAMADOL HYDROCHLORIDE 50 MG/1
50 TABLET ORAL EVERY 8 HOURS PRN
Qty: 60 TABLET | Refills: 0 | Status: SHIPPED | OUTPATIENT
Start: 2022-01-17 | End: 2022-02-10 | Stop reason: SDUPTHER

## 2022-01-24 ENCOUNTER — APPOINTMENT (OUTPATIENT)
Dept: LAB | Facility: HOSPITAL | Age: 52
End: 2022-01-24
Payer: COMMERCIAL

## 2022-01-24 DIAGNOSIS — E06.3 HYPOTHYROIDISM DUE TO HASHIMOTO'S THYROIDITIS: ICD-10-CM

## 2022-01-24 DIAGNOSIS — E03.8 HYPOTHYROIDISM DUE TO HASHIMOTO'S THYROIDITIS: ICD-10-CM

## 2022-01-24 DIAGNOSIS — E11.65 TYPE 2 DIABETES MELLITUS WITH HYPERGLYCEMIA, WITHOUT LONG-TERM CURRENT USE OF INSULIN (HCC): ICD-10-CM

## 2022-01-24 LAB
ALBUMIN SERPL BCP-MCNC: 3.9 G/DL (ref 3.5–5)
ALP SERPL-CCNC: 102 U/L (ref 46–116)
ALT SERPL W P-5'-P-CCNC: 99 U/L (ref 12–78)
ANION GAP SERPL CALCULATED.3IONS-SCNC: 9 MMOL/L (ref 4–13)
AST SERPL W P-5'-P-CCNC: 47 U/L (ref 5–45)
BILIRUB SERPL-MCNC: 0.2 MG/DL (ref 0.2–1)
BUN SERPL-MCNC: 13 MG/DL (ref 5–25)
CALCIUM SERPL-MCNC: 9 MG/DL (ref 8.3–10.1)
CHLORIDE SERPL-SCNC: 102 MMOL/L (ref 100–108)
CO2 SERPL-SCNC: 27 MMOL/L (ref 21–32)
CREAT SERPL-MCNC: 0.84 MG/DL (ref 0.6–1.3)
EST. AVERAGE GLUCOSE BLD GHB EST-MCNC: 137 MG/DL
GFR SERPL CREATININE-BSD FRML MDRD: 80 ML/MIN/1.73SQ M
GLUCOSE P FAST SERPL-MCNC: 141 MG/DL (ref 65–99)
HBA1C MFR BLD: 6.4 %
POTASSIUM SERPL-SCNC: 4.5 MMOL/L (ref 3.5–5.3)
PROT SERPL-MCNC: 7.4 G/DL (ref 6.4–8.2)
SODIUM SERPL-SCNC: 138 MMOL/L (ref 136–145)
T4 FREE SERPL-MCNC: 1.13 NG/DL (ref 0.76–1.46)
TSH SERPL DL<=0.05 MIU/L-ACNC: 0.63 UIU/ML (ref 0.36–3.74)

## 2022-01-24 PROCEDURE — 80053 COMPREHEN METABOLIC PANEL: CPT

## 2022-01-24 PROCEDURE — 84443 ASSAY THYROID STIM HORMONE: CPT

## 2022-01-24 PROCEDURE — 36415 COLL VENOUS BLD VENIPUNCTURE: CPT

## 2022-01-24 PROCEDURE — 83036 HEMOGLOBIN GLYCOSYLATED A1C: CPT

## 2022-01-24 PROCEDURE — 84439 ASSAY OF FREE THYROXINE: CPT

## 2022-01-25 ENCOUNTER — TELEMEDICINE (OUTPATIENT)
Dept: ENDOCRINOLOGY | Facility: HOSPITAL | Age: 52
End: 2022-01-25
Payer: COMMERCIAL

## 2022-01-25 DIAGNOSIS — E78.2 COMBINED HYPERLIPIDEMIA: ICD-10-CM

## 2022-01-25 DIAGNOSIS — E06.3 HYPOTHYROIDISM DUE TO HASHIMOTO'S THYROIDITIS: ICD-10-CM

## 2022-01-25 DIAGNOSIS — I10 PRIMARY HYPERTENSION: ICD-10-CM

## 2022-01-25 DIAGNOSIS — E03.8 HYPOTHYROIDISM DUE TO HASHIMOTO'S THYROIDITIS: ICD-10-CM

## 2022-01-25 DIAGNOSIS — E11.65 TYPE 2 DIABETES MELLITUS WITH HYPERGLYCEMIA, WITHOUT LONG-TERM CURRENT USE OF INSULIN (HCC): Primary | ICD-10-CM

## 2022-01-25 PROCEDURE — 99214 OFFICE O/P EST MOD 30 MIN: CPT | Performed by: PHYSICIAN ASSISTANT

## 2022-01-25 NOTE — PROGRESS NOTES
Virtual Regular Visit    Verification of patient location:    Patient is located in the following state in which I hold an active license [place]      Assessment/Plan:    Problem List Items Addressed This Visit     None      1  Type 2 diabetes:  most recent hemoglobin A1c has improved significantly to 6 4 which is now within goal   Due to concerns of episodes of hypoglycemia, we will stop her glimepiride at this time  She will continue with the same dose of Janumet  Continue to monitor blood sugar 2 times a day  Contact office if there is any concern for continued hypoglycemia, or if glucose levels continue to trend upward  Follow-up in 3 months with lab work completed prior to visit      2  Hypothyroidism due to Hashimoto's thyroiditis:  Most recent thyroid lab work was normal   Clinically and biochemically euthyroid  Continue with levothyroxine 112 mcg daily  Repeat thyroid function prior to next office visit  Contact the office if there is any change in symptoms      3  Hypertension:  Unable to check due to virtual visit today  Kidney function remains stable  Continue with current medications      4  Hyperlipidemia:  Previous lipid panel did show elevated LDL and total cholesterol  Continue current medication at this time  Will recheck prior to next office visit  Reason for visit is type 2 diabetes and hypothyroidism follow-up  Chief Complaint   Patient presents with    Virtual Regular Visit        Encounter provider Jeremias Mays PA-C    Provider located at [address] [place] [address] [address] [place] [id number]      Recent Visits  No visits were found meeting these conditions  Showing recent visits within past 7 days and meeting all other requirements  Future Appointments  No visits were found meeting these conditions    Showing future appointments within next 150 days and meeting all other requirements The patient was identified by name and date of birth  Chris Armstrong was informed that this is a telemedicine visit and that the visit is being conducted through [facility] and patient was informed this is a secure, HIPAA-complaint platform  She agrees to proceed     My office door was closed  No one else was in the room  She acknowledged consent and understanding of privacy and security of the video platform  The patient has agreed to participate and understands they can discontinue the visit at any time  Patient is aware this is a billable service  Subjective  Chris Armstrong is a 46 y o  female  with type 2 diabetes for 4 years, hypothyroidism, hyperlipidemia, hypertension for follow-up visit   She is on oral agents at home and takes [facility] ER 50/1000 mg twice a day  She was not able to tolerate Invokana in the past due to frequent yeast infections  She denies any polyuria, polydipsia, polyphagia and blurry vision    She has nocturia up to once a night   She will get numbness and tingling of the feet if she stands too long   She denies chest pain or shortness of breath  She denies neuropathy, nephropathy, retinopathy, stroke and claudication but does admit to heart attack  Has been making lifestyle modifications to help with blood sugar  Has been following up with Hematology for her anemia, and states that symptoms have been improving  Has been making changes in her diet, and has noticed episodes of hypoglycemia or later in the day      She has seen diabetes education in August 2021       Hypoglycemic episodes:  Her current when more frequently and happening in the afternoon      The patient's last eye exam was in  November 2020   The patient's last foot exam was in   September 2020 the endocrine office visit  Ashok Titus does not follow with Podiatry       Blood Sugar/Glucometer/Pump/CGM review:  Chronic tremor but sugars about once or twice a day, and based on symptoms    No blood sugar logs present for office visit  States blood sugars have improved in the morning, and occasionally she will have blood sugars in the 50s in the afternoon  Most recent hemoglobin A1c completed January 24, 2022 was 6 4      She has hypothyroidism due to Hashimoto's thyroiditis and takes levothyroxine 112 mcg daily  She has fatigue, but is improving   She has some hot flashes but no actual heat or cold intolerance, which may be related to menopause   She denies weight changes, palpitation, or tremors   She sleeps generally well but it can be interrupted by her dogs   She denies anxiety or depression   She has occasional diarrhea   She denies dry skin, brittle nails, hair loss, or constipation   She denies diplopia   She denies compressive thyroid symptoms or difficulties with swallowing      She has hypertension and takes amlodipine 5 mg daily and metoprolol XL 25 mg twice a day     She denies headache or stroke-like symptoms      She has hyperlipidemia and takes OTC fish oil 1 g 4 capsules daily   She was on fenofibrate but this was discontinued by her cardiologist for elevated LFTs  She has been diagnosed with GIANG  To see cardiology to discuss cholesterol lowering therapy  She denies chest pain or shortness of breath  Has been having some right upper quadrant pain, and does plan on following up with GI        Past Medical History:   Diagnosis Date    Coronary artery disease     Diabetes mellitus ([address] )     Borderline    Disease of thyroid gland     DUB (dysfunctional uterine bleeding)     Fatty liver     Hashimoto's thyroiditis     Last Assessed:  8/19/14    History of stomach ulcers     Hypertension     Hypothyroidism     Irritable bowel syndrome     Last Assessed:  3/25/14    Liver disease     elevated enzymes    Myocardial infarction ([address] )     2017    GIANG (nonalcoholic steatohepatitis)     Ovarian cyst     left    Psychogenic polydipsia     Has had hyponatremia from drinking too much water in the past  Past Surgical History:   Procedure Laterality Date    ANGIOPLASTY      CARDIAC CATHETERIZATION       SECTION      X 2    CHOLECYSTECTOMY      COLONOSCOPY      CYSTOSCOPY N/A 2019    Procedure: CYSTOSCOPY;  Surgeon: Christophe Brewer MD;  [facility];  Service: Gynecology Oncology    HYSTERECTOMY      IR BIOPSY LIVER MASS  2020    OOPHORECTOMY Right     LA ESOPHAGOGASTRODUODENOSCOPY TRANSORAL DIAGNOSTIC N/A 2018    Procedure: ESOPHAGOGASTRODUODENOSCOPY (EGD); Surgeon: Davi Gloria MD;  [facility]; Service: Gastroenterology    LA LAPAROSCOPY W TOT HYSTERECTUTERUS <=250 [address] TUBE/OVARY N/A 2019    Procedure: TOTAL LAPAROSCOPIC HYSTERECTOMY, BILATERAL SALPINGECTOMY, LEFT OOPHORECTOMY;  Surgeon: Christophe Brewer MD;  [facility];  Service: Gynecology Oncology    SINUS SURGERY      TONSILLECTOMY      UPPER GASTROINTESTINAL ENDOSCOPY         Current Outpatient Medications   Medication Sig Dispense Refill    amLODIPine (NORVASC) 5 mg tablet Take 1 tablet (5 mg total) by mouth daily 90 tablet 3    Blood Glucose Monitoring Suppl ([employer] SYSTEM) w/Device KIT Use to test blood sugars twice a day 1 kit 0    dicyclomine (BENTYL) 20 mg tablet Take 1 tablet (20 mg total) by mouth every 6 (six) hours as needed (every 6 hours) 60 tablet 0    escitalopram (LEXAPRO) 20 mg tablet Take 1 tablet (20 mg total) by mouth daily 90 tablet 0    estradiol (ESTRACE VAGINAL) 0 1 mg/g vaginal cream Apply pea sized amount to area daily 42 5 g 2    glimepiride (AMARYL) 1 mg tablet Take 1 tablet (1 mg total) by mouth daily with breakfast 90 tablet 0    ibuprofen (MOTRIN) 600 mg tablet Take 1 PO BID with food  NO OTHER NSAIDS while on this medication   180 tablet 1    Icosapent Ethyl (Vascepa) 1 g CAPS 2 capsules twice a day 360 capsule 3    Lactobacillus (PROBIOTIC ACIDOPHILUS PO) Take by mouth daily      levothyroxine 112 mcg tablet Take 1 tablet (112 mcg total) by mouth daily 90 tablet 0    lidocaine (XYLOCAINE) 2 % topical gel Apply topically as needed for mild pain 30 mL 1    Magnesium 300 MG CAPS Take 600 mg by mouth daily      methocarbamol (ROBAXIN) 750 mg tablet 1 tab PO HS  90 tablet 1    metoprolol succinate (TOPROL-XL) 25 mg 24 hr tablet Take 1 tablet (25 mg total) by mouth 2 (two) times a day 180 tablet 1    mupirocin (BACTROBAN) 2 % ointment Apply topically 3 (three) times a day 22 g 5    nitroglycerin (NITROSTAT) 0 4 mg SL tablet Place 1 tablet (0 4 mg total) under the tongue every 5 (five) minutes as needed for chest pain 90 tablet 0    OneTouch Delica Lancets 48K MISC Use to test blood sugars twice a day 200 each 6    OneTouch Verio test strip Use as instructed to test blood sugars twice a day 200 each 0    pantoprazole (PROTONIX) 40 mg tablet Take 1 tablet (40 mg total) by mouth daily before breakfast 90 tablet 0    SITagliptin-metFORMIN HCl ER  MG TB24 Take 1 tablet by mouth 2 (two) times a day 180 tablet 0    traMADol (ULTRAM) 50 mg tablet Take 1 tablet (50 mg total) by mouth every 8 (eight) hours as needed for moderate pain 60 tablet 0    vitamin B-12 (VITAMIN B-12) 1,000 mcg tablet Take by mouth daily      Vitamin D, Cholecalciferol, 50 MCG (2000 UT) CAPS Take 4,000 Units by mouth daily       No current facility-administered medications for this visit  Allergies   Allergen Reactions    Sulfa Antibiotics Shortness Of Breath    Invokana [Canagliflozin]      Yeast infection    Erythromycin      Reaction Date: [id number];     Metronidazole        Review of Systems   Constitutional: Positive for fatigue  Negative for activity change, appetite change, diaphoresis and unexpected weight change  HENT: Negative for sore throat, trouble swallowing and voice change  Eyes: Negative for visual disturbance  Respiratory: Positive for shortness of breath  Negative for chest tightness      Cardiovascular: Negative for chest pain, palpitations and leg swelling  Gastrointestinal: Positive for abdominal pain (Right upper quadrant)  Negative for constipation, diarrhea, nausea and vomiting  Endocrine: Negative for cold intolerance, heat intolerance, polydipsia, polyphagia and polyuria  Genitourinary: Negative for frequency  Skin: Negative for rash and wound  Neurological: Negative for dizziness, tremors, weakness, light-headedness, numbness and headaches  Hematological: Negative for adenopathy  Psychiatric/Behavioral: Negative for dysphoric mood and sleep disturbance  The patient is not nervous/anxious  Video Exam    There were no vitals filed for this visit  Physical Exam  Constitutional:       General: She is not in acute distress  Appearance: Normal appearance  She is not diaphoretic  Eyes:      General: No scleral icterus  Extraocular Movements: Extraocular movements intact  Conjunctiva/sclera: Conjunctivae normal    Pulmonary:      Effort: Pulmonary effort is normal  No respiratory distress  Breath sounds: Normal breath sounds  No wheezing (No audible wheezes)  Musculoskeletal:      Cervical back: Normal range of motion  Neurological:      Mental Status: She is alert and oriented to person, place, and time  Mental status is at baseline  Psychiatric:         Mood and Affect: Mood normal          Behavior: Behavior normal          Thought Content: Thought content normal           I spent 14 minutes directly with the patient during this visit    VIRTUAL VISIT 141 [facility] verbally agrees to participate in [employer]  Pt is aware that [employer] could be limited without vital signs or the ability to perform a full hands-on physical exam  Raymundo Benton understands she or the provider may request at any time to terminate the video visit and request the patient to seek care or treatment in person

## 2022-01-25 NOTE — PATIENT INSTRUCTIONS
Continue with lifestyle modifications to help improve blood sugar levels  Keep up the great work      Continue with Janumet 50/1000 mg twice a day  Stop Glimepiride at this time      Check blood sugars 1 to 2 times a day  Would recommend sending in blood sugar logs in 2 weeks for review      Continue with levothyroxine 112 mcg daily        Follow-up in 3 months with lab work completed prior to visit

## 2022-02-10 DIAGNOSIS — J01.90 ACUTE SINUSITIS, RECURRENCE NOT SPECIFIED, UNSPECIFIED LOCATION: ICD-10-CM

## 2022-02-10 DIAGNOSIS — K21.9 GERD WITHOUT ESOPHAGITIS: ICD-10-CM

## 2022-02-10 DIAGNOSIS — I10 ESSENTIAL HYPERTENSION: ICD-10-CM

## 2022-02-10 DIAGNOSIS — R07.89 OTHER CHEST PAIN: ICD-10-CM

## 2022-02-11 DIAGNOSIS — E06.3 HYPOTHYROIDISM DUE TO HASHIMOTO'S THYROIDITIS: ICD-10-CM

## 2022-02-11 DIAGNOSIS — E03.8 HYPOTHYROIDISM DUE TO HASHIMOTO'S THYROIDITIS: ICD-10-CM

## 2022-02-11 DIAGNOSIS — K21.9 GERD WITHOUT ESOPHAGITIS: ICD-10-CM

## 2022-02-11 DIAGNOSIS — E11.65 TYPE 2 DIABETES MELLITUS WITH HYPERGLYCEMIA, WITHOUT LONG-TERM CURRENT USE OF INSULIN (HCC): ICD-10-CM

## 2022-02-11 RX ORDER — LEVOTHYROXINE SODIUM 112 UG/1
112 TABLET ORAL DAILY
Qty: 90 TABLET | Refills: 0 | Status: SHIPPED | OUTPATIENT
Start: 2022-02-11 | End: 2022-05-12 | Stop reason: SDUPTHER

## 2022-02-11 RX ORDER — PANTOPRAZOLE SODIUM 40 MG/1
40 TABLET, DELAYED RELEASE ORAL
Qty: 90 TABLET | Refills: 0 | Status: SHIPPED | OUTPATIENT
Start: 2022-02-11 | End: 2022-03-08

## 2022-02-11 RX ORDER — AMLODIPINE BESYLATE 5 MG/1
5 TABLET ORAL DAILY
Qty: 90 TABLET | Refills: 3 | Status: SHIPPED | OUTPATIENT
Start: 2022-02-11 | End: 2022-05-12 | Stop reason: SDUPTHER

## 2022-02-11 RX ORDER — METOPROLOL SUCCINATE 25 MG/1
25 TABLET, EXTENDED RELEASE ORAL 2 TIMES DAILY
Qty: 180 TABLET | Refills: 0 | Status: SHIPPED | OUTPATIENT
Start: 2022-02-11 | End: 2022-05-12 | Stop reason: SDUPTHER

## 2022-02-12 RX ORDER — PANTOPRAZOLE SODIUM 40 MG/1
40 TABLET, DELAYED RELEASE ORAL
Qty: 90 TABLET | Refills: 0 | Status: SHIPPED | OUTPATIENT
Start: 2022-02-12 | End: 2022-04-21 | Stop reason: SDUPTHER

## 2022-02-12 RX ORDER — TRAMADOL HYDROCHLORIDE 50 MG/1
50 TABLET ORAL EVERY 8 HOURS PRN
Qty: 60 TABLET | Refills: 0 | Status: SHIPPED | OUTPATIENT
Start: 2022-02-12 | End: 2022-03-08 | Stop reason: SDUPTHER

## 2022-02-14 ENCOUNTER — NEW PATIENT (OUTPATIENT)
Dept: URBAN - METROPOLITAN AREA CLINIC 6 | Facility: CLINIC | Age: 52
End: 2022-02-14

## 2022-02-14 DIAGNOSIS — H25.13: ICD-10-CM

## 2022-02-14 DIAGNOSIS — E11.9: ICD-10-CM

## 2022-02-14 PROCEDURE — 92004 COMPRE OPH EXAM NEW PT 1/>: CPT

## 2022-02-14 ASSESSMENT — VISUAL ACUITY
OS_CC: J1+
OS_SC: 20/25-2
OD_SC: 20/30-2
OD_CC: J1

## 2022-02-14 ASSESSMENT — TONOMETRY
OS_IOP_MMHG: 16
OD_IOP_MMHG: 16

## 2022-02-23 DIAGNOSIS — J01.90 ACUTE SINUSITIS, RECURRENCE NOT SPECIFIED, UNSPECIFIED LOCATION: ICD-10-CM

## 2022-02-25 ENCOUNTER — OFFICE VISIT (OUTPATIENT)
Dept: URGENT CARE | Facility: CLINIC | Age: 52
End: 2022-02-25
Payer: COMMERCIAL

## 2022-02-25 VITALS
HEART RATE: 87 BPM | HEIGHT: 63 IN | TEMPERATURE: 97.5 F | OXYGEN SATURATION: 98 % | SYSTOLIC BLOOD PRESSURE: 121 MMHG | WEIGHT: 168 LBS | RESPIRATION RATE: 16 BRPM | BODY MASS INDEX: 29.77 KG/M2 | DIASTOLIC BLOOD PRESSURE: 59 MMHG

## 2022-02-25 DIAGNOSIS — J32.9 BACTERIAL SINUSITIS: Primary | ICD-10-CM

## 2022-02-25 DIAGNOSIS — B96.89 BACTERIAL SINUSITIS: Primary | ICD-10-CM

## 2022-02-25 PROCEDURE — S9083 URGENT CARE CENTER GLOBAL: HCPCS | Performed by: PHYSICIAN ASSISTANT

## 2022-02-25 PROCEDURE — G0382 LEV 3 HOSP TYPE B ED VISIT: HCPCS | Performed by: PHYSICIAN ASSISTANT

## 2022-02-25 RX ORDER — AMOXICILLIN AND CLAVULANATE POTASSIUM 875; 125 MG/1; MG/1
1 TABLET, FILM COATED ORAL EVERY 12 HOURS SCHEDULED
Qty: 14 TABLET | Refills: 0 | Status: SHIPPED | OUTPATIENT
Start: 2022-02-25 | End: 2022-02-28

## 2022-02-25 RX ORDER — PREDNISONE 10 MG/1
TABLET ORAL
Qty: 21 TABLET | Refills: 0 | Status: SHIPPED | OUTPATIENT
Start: 2022-02-25 | End: 2022-04-25 | Stop reason: ALTCHOICE

## 2022-02-25 NOTE — PROGRESS NOTES
[facility]        NAME: Shikha Boyce is a 46 y o  female  : 1970    MRN: [id number]  DATE: 2022  TIME: 1:44 PM    Assessment and Plan   Bacterial sinusitis [J32 9, B96 89]  1  Bacterial sinusitis  predniSONE 10 mg tablet    amoxicillin-clavulanate (AUGMENTIN) 875-125 mg per tablet         Patient Instructions     Take antibiotic as directed with food  Recommend probiotics for side effects  Continue with over-the-counter symptom relief  Monitor for worsening symptoms  Take prednisone as directed, if no improvement in 3 days start abx   stop sudafed & NSAID  Follow up with PCP in 3-5 days  Proceed to  ER if symptoms worsen  Chief Complaint     Chief Complaint   Patient presents with    Sore Throat     Pt c/o sore throat, sinus congestion, and chest congestion that started 4 days pta  Pt took a rapid covid test this morning that was negative  Pt denies sick contacts  No fevers reported  History of Present Illness       Patient presents with complaint cold-like symptoms for the past 4 days  She describes sinus congestion, sore throat, postnasal drip, and cough  She denies fever, chills, sweats, chest tightness, dyspnea, abdominal pain, nausea vomiting, and diarrhea  She denies known recent sick contacts  She states that she had a negative at home COVID test this morning  She reports a history of 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  She states that she uses an antibiotic nasal lavage which has not been helping  She states that she has started Advil, Sudafed, and other over-the-counter measures without improvement  Pt reports taking clindamycin recently for a dental problem  Review of Systems   Review of Systems   Constitutional: Negative for chills and fever  HENT: Positive for congestion, postnasal drip, sinus pressure and sinus pain  Negative for ear pain and sore throat  Eyes: Negative for pain and visual disturbance  Respiratory: Positive for cough  Negative for shortness of breath  Cardiovascular: Negative for chest pain and palpitations  Gastrointestinal: Negative for abdominal pain, diarrhea and vomiting  Genitourinary: Negative for dysuria and hematuria  Musculoskeletal: Negative for arthralgias and back pain  Skin: Negative for color change and rash  Neurological: Negative for seizures and syncope  All other systems reviewed and are negative  Current Medications       Current Outpatient Medications:     amLODIPine (NORVASC) 5 mg tablet, Take 1 tablet (5 mg total) by mouth daily, Disp: 90 tablet, Rfl: 3    amoxicillin-clavulanate (AUGMENTIN) 875-125 mg per tablet, Take 1 tablet by mouth every 12 (twelve) hours for 7 days, Disp: 14 tablet, Rfl: 0    Blood Glucose Monitoring Suppl ([employer] SYSTEM) w/Device KIT, Use to test blood sugars twice a day, Disp: 1 kit, Rfl: 0    dicyclomine (BENTYL) 20 mg tablet, Take 1 tablet (20 mg total) by mouth every 6 (six) hours as needed (every 6 hours), Disp: 60 tablet, Rfl: 0    escitalopram (LEXAPRO) 20 mg tablet, Take 1 tablet (20 mg total) by mouth daily, Disp: 90 tablet, Rfl: 0    estradiol (ESTRACE VAGINAL) 0 1 mg/g vaginal cream, Apply pea sized amount to area daily, Disp: 42 5 g, Rfl: 2    glimepiride (AMARYL) 1 mg tablet, Take 1 tablet (1 mg total) by mouth daily with breakfast, Disp: 90 tablet, Rfl: 0    ibuprofen (MOTRIN) 600 mg tablet, Take 1 PO BID with food  NO OTHER NSAIDS while on this medication  , Disp: 180 tablet, Rfl: 1    Icosapent Ethyl (Vascepa) 1 g CAPS, 2 capsules twice a day, Disp: 360 capsule, Rfl: 3    Lactobacillus (PROBIOTIC ACIDOPHILUS PO), Take by mouth daily, Disp: , Rfl:     levothyroxine 112 mcg tablet, Take 1 tablet (112 mcg total) by mouth daily, Disp: 90 tablet, Rfl: 0    lidocaine (XYLOCAINE) 2 % topical gel, Apply topically as needed for mild pain, Disp: 30 mL, Rfl: 1    Magnesium 300 MG CAPS, Take 600 mg by mouth daily, Disp: , Rfl:     methocarbamol (ROBAXIN) 750 mg tablet, 1 tab PO HS , Disp: 90 tablet, Rfl: 1    metoprolol succinate (TOPROL-XL) 25 mg 24 hr tablet, Take 1 tablet (25 mg total) by mouth 2 (two) times a day, Disp: 180 tablet, Rfl: 0    mupirocin (BACTROBAN) 2 % ointment, Apply topically 3 (three) times a day, Disp: 22 g, Rfl: 0    nitroglycerin (NITROSTAT) 0 4 mg SL tablet, Place 1 tablet (0 4 mg total) under the tongue every 5 (five) minutes as needed for chest pain, Disp: 90 tablet, Rfl: 0    OneTouch Delica Lancets 81R MISC, Use to test blood sugars twice a day, Disp: 200 each, Rfl: 6    OneTouch Verio test strip, Use as instructed to test blood sugars twice a day, Disp: 200 each, Rfl: 0    pantoprazole (PROTONIX) 40 mg tablet, Take 1 tablet (40 mg total) by mouth daily before breakfast, Disp: 90 tablet, Rfl: 0    pantoprazole (PROTONIX) 40 mg tablet, Take 1 tablet (40 mg total) by mouth daily before breakfast, Disp: 90 tablet, Rfl: 0    predniSONE 10 mg tablet, Take 6 tabs on day 1, 5 tabs on day 2, 4 tabs on day 3, 3 tabs on day 4, 2 tabs on day 5, and 1 tab on day 6, Disp: 21 tablet, Rfl: 0    SITagliptin-metFORMIN HCl ER  MG TB24, Take 1 tablet by mouth 2 (two) times a day, Disp: 180 tablet, Rfl: 0    traMADol (ULTRAM) 50 mg tablet, Take 1 tablet (50 mg total) by mouth every 8 (eight) hours as needed for moderate pain, Disp: 60 tablet, Rfl: 0    vitamin B-12 (VITAMIN B-12) 1,000 mcg tablet, Take by mouth daily, Disp: , Rfl:     Vitamin D, Cholecalciferol, 50 MCG (2000 UT) CAPS, Take 4,000 Units by mouth daily, Disp: , Rfl:     Current Allergies     Allergies as of 02/25/2022 - Reviewed 02/25/2022   Allergen Reaction Noted    Sulfa antibiotics Shortness Of Breath 10/03/2017    Invokana [canagliflozin]  04/08/2020    Erythromycin  04/21/2012    Metronidazole  10/09/2012            The following portions of the patient's history were reviewed and updated as appropriate: allergies, current medications, past family history, past medical history, past social history, past surgical history and problem list      Past Medical History:   Diagnosis Date    Coronary artery disease     Diabetes mellitus ([facility] )     Borderline    Disease of thyroid gland     DUB (dysfunctional uterine bleeding)     Fatty liver     Hashimoto's thyroiditis     Last Assessed:  14    History of stomach ulcers     Hypertension     Hypothyroidism     Irritable bowel syndrome     Last Assessed:  3/25/14    Liver disease     elevated enzymes    Myocardial infarction ([facility] )     2017    GIANG (nonalcoholic steatohepatitis)     Ovarian cyst     left    Psychogenic polydipsia     Has had hyponatremia from drinking too much water in the past        Past Surgical History:   Procedure Laterality Date    ANGIOPLASTY      CARDIAC CATHETERIZATION       SECTION      X 2    CHOLECYSTECTOMY      COLONOSCOPY      CYSTOSCOPY N/A 2019    Procedure: CYSTOSCOPY;  Surgeon: Ameya Troy MD;  [facility];  Service: Gynecology Oncology    HYSTERECTOMY      IR BIOPSY LIVER MASS  2020    OOPHORECTOMY Right     [place] ESOPHAGOGASTRODUODENOSCOPY TRANSORAL DIAGNOSTIC N/A 2018    Procedure: ESOPHAGOGASTRODUODENOSCOPY (EGD); Surgeon: Marelyn Hammans, MD;  [facility];   Service: Gastroenterology    FL LAPAROSCOPY W TOT HYSTERECTUTERUS <=250 [address]i [address] TUBE/OVARY N/A 2019    Procedure: TOTAL LAPAROSCOPIC HYSTERECTOMY, BILATERAL SALPINGECTOMY, LEFT OOPHORECTOMY;  Surgeon: Ameya Troy MD;  [facility];  Service: Gynecology Oncology    SINUS SURGERY      TONSILLECTOMY      UPPER GASTROINTESTINAL ENDOSCOPY         Family History   Problem Relation Age of Onset    Pancreatic cancer Mother     Hypertension Father     Diabetes type II Father     Diabetes Maternal Grandmother     Diabetes Paternal Grandmother     Heart disease Paternal Grandmother     Hypothyroidism Paternal Grandmother     Diabetes type II Brother     Hypothyroidism Paternal Uncle     Lung disease Daughter         asthma tendencies    No Known Problems Brother     No Known Problems Son     Liver cancer Maternal Aunt          Medications have been verified  Objective   /59   Pulse 87   Temp 97 5 °F (36 4 °C)   Resp 16   Ht 5' 3" (1 6 m)   Wt 76 2 kg (168 lb)   LMP  (LMP Unknown)   SpO2 98%   BMI 29 76 kg/m²   No LMP recorded (lmp unknown)  Patient has had a hysterectomy  Physical Exam     Physical Exam  Vitals and nursing note reviewed  Constitutional:       General: She is not in acute distress  Appearance: Normal appearance  She is well-developed  She is not ill-appearing or diaphoretic  HENT:      Head: Normocephalic and atraumatic  Right Ear: Tympanic membrane and ear canal normal  Tympanic membrane is not erythematous  Left Ear: Tympanic membrane and ear canal normal  Tympanic membrane is not erythematous  Nose: Congestion present  Mouth/Throat:      Mouth: Mucous membranes are moist       Pharynx: Oropharynx is clear  No oropharyngeal exudate or posterior oropharyngeal erythema  Tonsils: No tonsillar exudate  0 on the right  0 on the left  Eyes:      Conjunctiva/sclera: Conjunctivae normal       Pupils: Pupils are equal, round, and reactive to light  Cardiovascular:      Rate and Rhythm: Normal rate and regular rhythm  Heart sounds: Normal heart sounds  Pulmonary:      Effort: Pulmonary effort is normal  No respiratory distress  Breath sounds: Normal breath sounds  No stridor  No wheezing, rhonchi or rales  Musculoskeletal:      Cervical back: Normal range of motion and neck supple  Lymphadenopathy:      Cervical: No cervical adenopathy  Skin:     General: Skin is warm and dry  Capillary Refill: Capillary refill takes less than 2 seconds  Findings: No rash     Neurological:      Mental Status: She is alert and oriented to person, place, and time  Cranial Nerves: No cranial nerve deficit  Sensory: No sensory deficit  Psychiatric:         Behavior: Behavior normal          Thought Content:  Thought content normal

## 2022-02-26 ENCOUNTER — PATIENT MESSAGE (OUTPATIENT)
Dept: FAMILY MEDICINE CLINIC | Facility: CLINIC | Age: 52
End: 2022-02-26

## 2022-02-28 DIAGNOSIS — J01.00 ACUTE NON-RECURRENT MAXILLARY SINUSITIS: Primary | ICD-10-CM

## 2022-02-28 RX ORDER — CEFDINIR 300 MG/1
300 CAPSULE ORAL EVERY 12 HOURS SCHEDULED
Qty: 20 CAPSULE | Refills: 0 | Status: SHIPPED | OUTPATIENT
Start: 2022-02-28 | End: 2022-03-10

## 2022-03-07 DIAGNOSIS — B37.3 VAGINA, CANDIDIASIS: Primary | ICD-10-CM

## 2022-03-07 RX ORDER — FLUCONAZOLE 150 MG/1
150 TABLET ORAL ONCE
Qty: 1 TABLET | Refills: 1 | Status: SHIPPED | OUTPATIENT
Start: 2022-03-07 | End: 2022-03-07

## 2022-03-08 ENCOUNTER — OFFICE VISIT (OUTPATIENT)
Dept: PAIN MEDICINE | Facility: CLINIC | Age: 52
End: 2022-03-08
Payer: COMMERCIAL

## 2022-03-08 VITALS
HEIGHT: 63 IN | WEIGHT: 166 LBS | SYSTOLIC BLOOD PRESSURE: 110 MMHG | DIASTOLIC BLOOD PRESSURE: 70 MMHG | TEMPERATURE: 98 F | BODY MASS INDEX: 29.41 KG/M2 | HEART RATE: 105 BPM

## 2022-03-08 DIAGNOSIS — M54.50 CHRONIC LOW BACK PAIN WITHOUT SCIATICA, UNSPECIFIED BACK PAIN LATERALITY: ICD-10-CM

## 2022-03-08 DIAGNOSIS — J01.90 ACUTE SINUSITIS, RECURRENCE NOT SPECIFIED, UNSPECIFIED LOCATION: ICD-10-CM

## 2022-03-08 DIAGNOSIS — M54.16 LUMBAR RADICULOPATHY: ICD-10-CM

## 2022-03-08 DIAGNOSIS — G89.4 CHRONIC PAIN SYNDROME: Primary | ICD-10-CM

## 2022-03-08 DIAGNOSIS — K58.9 IRRITABLE BOWEL SYNDROME WITHOUT DIARRHEA: ICD-10-CM

## 2022-03-08 DIAGNOSIS — M79.18 MYOFASCIAL PAIN SYNDROME: ICD-10-CM

## 2022-03-08 DIAGNOSIS — G89.29 CHRONIC LOW BACK PAIN WITHOUT SCIATICA, UNSPECIFIED BACK PAIN LATERALITY: ICD-10-CM

## 2022-03-08 DIAGNOSIS — F32.A DEPRESSION, UNSPECIFIED DEPRESSION TYPE: ICD-10-CM

## 2022-03-08 DIAGNOSIS — M47.816 LUMBAR SPONDYLOSIS: ICD-10-CM

## 2022-03-08 PROCEDURE — 99214 OFFICE O/P EST MOD 30 MIN: CPT | Performed by: NURSE PRACTITIONER

## 2022-03-08 RX ORDER — TRAMADOL HYDROCHLORIDE 50 MG/1
50 TABLET ORAL EVERY 8 HOURS PRN
Qty: 60 TABLET | Refills: 0 | Status: SHIPPED | OUTPATIENT
Start: 2022-03-08 | End: 2022-04-04 | Stop reason: SDUPTHER

## 2022-03-08 RX ORDER — ESCITALOPRAM OXALATE 20 MG/1
20 TABLET ORAL DAILY
Qty: 90 TABLET | Refills: 0 | Status: SHIPPED | OUTPATIENT
Start: 2022-03-08 | End: 2022-06-13 | Stop reason: SDUPTHER

## 2022-03-08 RX ORDER — METHOCARBAMOL 750 MG/1
TABLET, FILM COATED ORAL
Qty: 90 TABLET | Refills: 1 | Status: SHIPPED | OUTPATIENT
Start: 2022-03-08 | End: 2022-06-28 | Stop reason: SDUPTHER

## 2022-03-08 RX ORDER — IBUPROFEN 600 MG/1
TABLET ORAL
Qty: 180 TABLET | Refills: 1 | Status: SHIPPED | OUTPATIENT
Start: 2022-03-08 | End: 2022-06-28 | Stop reason: SDUPTHER

## 2022-03-08 RX ORDER — DICYCLOMINE HCL 20 MG
20 TABLET ORAL EVERY 6 HOURS PRN
Qty: 60 TABLET | Refills: 0 | Status: SHIPPED | OUTPATIENT
Start: 2022-03-08 | End: 2022-04-21 | Stop reason: SDUPTHER

## 2022-03-08 NOTE — PROGRESS NOTES
Assessment:  1  Chronic pain syndrome    2  Chronic low back pain without sciatica, unspecified back pain laterality    3  Lumbar radiculopathy    4  Lumbar spondylosis    5  Myofascial pain syndrome        Plan:  While the patient was in the office today, I did have a thorough conversation with the patient regarding their chronic pain syndrome, symptoms, medication regimen, and treatment plan  I discussed with the patient that at this point time since she is noting significant and stable relief with her current medication regimen and I feel it is reasonable and appropriate with her underlying etiology we will continue with the methocarbamol and the p r n  ibuprofen as prescribed  The patient was agreeable and verbalized an understanding  She can continue the p r n  tramadol as prescribed by her primary care provider  The patient was agreeable and verbalized an understanding  The patient will follow-up in 6 months for medication prescription refill and reevaluation  The patient was advised to contact the office should their symptoms worsen in the interim  The patient was agreeable and verbalized an understanding  History of Present Illness: The patient is a 46 y o  female last seen on 11-16-21 who presents for a follow up office visit in regards to chronic pain syndrome, as the patient's pain has been ongoing for greater than a year, secondary to lumbar spondylosis and radiculopathy with myofascial pain  The patient currently reports that since her last office visit overall her pain symptoms have remained significantly stable and manageable and has even seen improvement as she had also followed up with Hematology and had proceeded with 10 rounds of iron infusions and is feeling much better  Patient presents today for regular medication follow-up visit  Current pain medications includes:  Ibuprofen 600 mg b i d  p r n  for pain and methocarbamol 750 mg at bedtime    She also continues with occasional p r n  tramadol as prescribed by her primary care provider  The patient reports that this regimen is providing 80-90% pain relief  The patient is reporting no side effects from this pain medication regimen  I have personally reviewed and/or updated the patient's past medical history, past surgical history, family history, social history, current medications, allergies, and vital signs today  Review of Systems:    Review of Systems   Constitutional: Negative for fever and unexpected weight change  HENT: Negative for trouble swallowing  Eyes: Negative for visual disturbance  Respiratory: Negative for shortness of breath and wheezing  Cardiovascular: Negative for chest pain and palpitations  Gastrointestinal: Negative for constipation, diarrhea, nausea and vomiting  Endocrine: Negative for cold intolerance, heat intolerance and polydipsia  Genitourinary: Negative for difficulty urinating and frequency  Musculoskeletal: Negative for arthralgias, gait problem, joint swelling (Joint Stiffness) and myalgias  Skin: Negative for rash  Neurological: Negative for dizziness, seizures, syncope, weakness and headaches  Hematological: Does not bruise/bleed easily  Psychiatric/Behavioral: Negative for dysphoric mood  All other systems reviewed and are negative          Past Medical History:   Diagnosis Date    Coronary artery disease     [facility] mellitus ([facility] [address] )     Borderline    Disease of thyroid gland     DUB (dysfunctional uterine bleeding)     Fatty liver     Hashimoto's thyroiditis     Last Assessed:  8/19/14    History of stomach ulcers     Hypertension     Hypothyroidism     Irritable bowel syndrome     Last Assessed:  3/25/14    Liver disease     elevated enzymes    Myocardial infarction ([facility] [address] )     2017    GIANG (nonalcoholic steatohepatitis)     Ovarian cyst     left    Psychogenic polydipsia     Has had hyponatremia from drinking too much water in the past  Past Surgical History:   Procedure Laterality Date    ANGIOPLASTY      CARDIAC CATHETERIZATION       SECTION      X 2    CHOLECYSTECTOMY      COLONOSCOPY      CYSTOSCOPY N/A 2019    Procedure: CYSTOSCOPY;  Surgeon: Robert Ayala MD;  [facility];  Service: Gynecology Oncology    HYSTERECTOMY      IR BIOPSY LIVER MASS  2020    OOPHORECTOMY Right     WY ESOPHAGOGASTRODUODENOSCOPY TRANSORAL DIAGNOSTIC N/A 2018    Procedure: ESOPHAGOGASTRODUODENOSCOPY (EGD); Surgeon: Wander Perea MD;  [facility]: [facility];   Service: Gastroenterology    WY LAPAROSCOPY W TOT HYSTERECTUTERUS <=250 [address] Fellers TUBE/OVARY N/A 2019    Procedure: TOTAL LAPAROSCOPIC HYSTERECTOMY, BILATERAL SALPINGECTOMY, LEFT OOPHORECTOMY;  Surgeon: Robert Ayala MD;  [facility];  Service: Gynecology Oncology    SINUS SURGERY      TONSILLECTOMY      UPPER GASTROINTESTINAL ENDOSCOPY         Family History   Problem Relation Age of Onset    Pancreatic cancer Mother     Hypertension Father     Diabetes type II Father     Diabetes Maternal Grandmother     Diabetes Paternal Grandmother     Heart disease Paternal Grandmother     Hypothyroidism Paternal Grandmother     Diabetes type II Brother     Hypothyroidism Paternal Uncle     Lung disease Daughter         asthma tendencies    No Known Problems Brother     No Known Problems Son     Liver cancer Maternal Aunt        Social History     Occupational History    Not on file   Tobacco Use    Smoking status: Former Smoker     Packs/day: 0 50     Years: 4 00     Pack years: 2 00     Types: Cigarettes     Quit date: 2016     Years since quittin 1    Smokeless tobacco: Never Used    Tobacco comment: 2016   Vaping Use    Vaping Use: Never used   Substance and Sexual Activity    Alcohol use: Never     Comment: occasional, Social drinker    Drug use: No    Sexual activity: Yes     Partners: Male     Birth control/protection: None Current Outpatient Medications:     amLODIPine (NORVASC) 5 mg tablet, Take 1 tablet (5 mg total) by mouth daily, Disp: 90 tablet, Rfl: 3    Blood Glucose Monitoring Suppl (PhoneTell SYSTEM) w/Device KIT, Use to test blood sugars twice a day, Disp: 1 kit, Rfl: 0    cefdinir (OMNICEF) 300 mg capsule, Take 1 capsule (300 mg total) by mouth every 12 (twelve) hours for 10 days, Disp: 20 capsule, Rfl: 0    estradiol (ESTRACE VAGINAL) 0 1 mg/g vaginal cream, Apply pea sized amount to area daily, Disp: 42 5 g, Rfl: 2    glimepiride (AMARYL) 1 mg tablet, Take 1 tablet (1 mg total) by mouth daily with breakfast, Disp: 90 tablet, Rfl: 0    Icosapent Ethyl (Vascepa) 1 g CAPS, 2 capsules twice a day, Disp: 360 capsule, Rfl: 3    Lactobacillus (PROBIOTIC ACIDOPHILUS PO), Take by mouth daily, Disp: , Rfl:     levothyroxine 112 mcg tablet, Take 1 tablet (112 mcg total) by mouth daily, Disp: 90 tablet, Rfl: 0    Magnesium 300 MG CAPS, Take 600 mg by mouth daily, Disp: , Rfl:     metoprolol succinate (TOPROL-XL) 25 mg 24 hr tablet, Take 1 tablet (25 mg total) by mouth 2 (two) times a day, Disp: 180 tablet, Rfl: 0    mupirocin (BACTROBAN) 2 % ointment, Apply topically 3 (three) times a day, Disp: 22 g, Rfl: 0    nitroglycerin (NITROSTAT) 0 4 mg SL tablet, Place 1 tablet (0 4 mg total) under the tongue every 5 (five) minutes as needed for chest pain, Disp: 90 tablet, Rfl: 0    OneTouch Delica Lancets 78E MISC, Use to test blood sugars twice a day, Disp: 200 each, Rfl: 6    OneTouch Verio test strip, Use as instructed to test blood sugars twice a day, Disp: 200 each, Rfl: 0    pantoprazole (PROTONIX) 40 mg tablet, Take 1 tablet (40 mg total) by mouth daily before breakfast, Disp: 90 tablet, Rfl: 0    predniSONE 10 mg tablet, Take 6 tabs on day 1, 5 tabs on day 2, 4 tabs on day 3, 3 tabs on day 4, 2 tabs on day 5, and 1 tab on day 6, Disp: 21 tablet, Rfl: 0    SITagliptin-metFORMIN HCl ER  MG TB24, Take 1 tablet by mouth 2 (two) times a day, Disp: 180 tablet, Rfl: 0    vitamin B-12 (VITAMIN B-12) 1,000 mcg tablet, Take by mouth daily, Disp: , Rfl:     Vitamin D, Cholecalciferol, 50 MCG (2000 UT) CAPS, Take 4,000 Units by mouth daily, Disp: , Rfl:     dicyclomine (BENTYL) 20 mg tablet, Take 1 tablet (20 mg total) by mouth every 6 (six) hours as needed (every 6 hours), Disp: 60 tablet, Rfl: 0    escitalopram (LEXAPRO) 20 mg tablet, Take 1 tablet (20 mg total) by mouth daily, Disp: 90 tablet, Rfl: 0    ibuprofen (MOTRIN) 600 mg tablet, Take 1 PO BID with food  NO OTHER NSAIDS while on this medication  , Disp: 180 tablet, Rfl: 1    methocarbamol (ROBAXIN) 750 mg tablet, 1 tab PO HS , Disp: 90 tablet, Rfl: 1    traMADol (ULTRAM) 50 mg tablet, Take 1 tablet (50 mg total) by mouth every 8 (eight) hours as needed for moderate pain, Disp: 60 tablet, Rfl: 0    Allergies   Allergen Reactions    Sulfa Antibiotics Shortness Of Breath    Invokana [Canagliflozin]      Yeast infection    Erythromycin      Reaction Date: [id number];     Metronidazole        Physical Exam:    /70 (BP Location: Left arm, Patient Position: Sitting, Cuff Size: Standard)   Pulse 105   Temp 98 °F (36 7 °C)   Ht 5' 3" (1 6 m)   Wt 75 3 kg (166 lb)   LMP  (LMP Unknown)   BMI 29 41 kg/m²     Constitutional:normal, well developed, well nourished, alert, in no distress and non-toxic and no overt pain behavior  Eyes:anicteric  HEENT:grossly intact  Neck:supple, symmetric, trachea midline and no masses   Pulmonary:even and unlabored  Cardiovascular:No edema or pitting edema present  Skin:Normal without rashes or lesions and well hydrated  Psychiatric:Mood and affect appropriate  Neurologic:Cranial Nerves II-XII grossly intact  Musculoskeletal:normal      Imaging  No orders to display         No orders of the defined types were placed in this encounter

## 2022-03-21 ENCOUNTER — TELEPHONE (OUTPATIENT)
Dept: PAIN MEDICINE | Facility: CLINIC | Age: 52
End: 2022-03-21

## 2022-03-21 NOTE — TELEPHONE ENCOUNTER
JINGM to let Pt know that I cancelled her Appt with Mark on 8/23/22  Mark will not be in the office that week  Please reschedule Pt a week before      Thank You

## 2022-03-30 DIAGNOSIS — N39.0 URINARY TRACT INFECTION WITHOUT HEMATURIA, SITE UNSPECIFIED: Primary | ICD-10-CM

## 2022-03-30 RX ORDER — CIPROFLOXACIN 250 MG/1
TABLET, FILM COATED ORAL
Qty: 10 TABLET | Refills: 0 | Status: SHIPPED | OUTPATIENT
Start: 2022-03-30 | End: 2022-04-04

## 2022-03-30 NOTE — PROGRESS NOTES
Pt was complaining of mild chest pain 4/10  She was given nitroglycerin tabs and tylenol at 0054  Her second troponin was collected and an EKG was performed as well  [facility] provider was notified as the pain was occurring  The pt reported improvement after only one dose of nitroglycerin  Call bell is within reach  Will continue to monitor pt  Last echo Right ventricular pressure 53.  Continue statin and ASA.

## 2022-04-04 DIAGNOSIS — J01.90 ACUTE SINUSITIS, RECURRENCE NOT SPECIFIED, UNSPECIFIED LOCATION: ICD-10-CM

## 2022-04-04 RX ORDER — TRAMADOL HYDROCHLORIDE 50 MG/1
50 TABLET ORAL EVERY 8 HOURS PRN
Qty: 60 TABLET | Refills: 0 | Status: SHIPPED | OUTPATIENT
Start: 2022-04-04 | End: 2022-04-27 | Stop reason: SDUPTHER

## 2022-04-20 ENCOUNTER — APPOINTMENT (OUTPATIENT)
Dept: LAB | Facility: HOSPITAL | Age: 52
End: 2022-04-20
Payer: COMMERCIAL

## 2022-04-20 DIAGNOSIS — E11.65 TYPE 2 DIABETES MELLITUS WITH HYPERGLYCEMIA, WITHOUT LONG-TERM CURRENT USE OF INSULIN (HCC): ICD-10-CM

## 2022-04-20 DIAGNOSIS — E06.3 HYPOTHYROIDISM DUE TO HASHIMOTO'S THYROIDITIS: ICD-10-CM

## 2022-04-20 DIAGNOSIS — D50.8 IRON DEFICIENCY ANEMIA SECONDARY TO INADEQUATE DIETARY IRON INTAKE: ICD-10-CM

## 2022-04-20 DIAGNOSIS — E03.8 HYPOTHYROIDISM DUE TO HASHIMOTO'S THYROIDITIS: ICD-10-CM

## 2022-04-20 DIAGNOSIS — E78.2 COMBINED HYPERLIPIDEMIA: ICD-10-CM

## 2022-04-20 DIAGNOSIS — K75.81 NASH (NONALCOHOLIC STEATOHEPATITIS): ICD-10-CM

## 2022-04-20 LAB
ALBUMIN SERPL BCP-MCNC: 4.1 G/DL (ref 3.5–5)
ALP SERPL-CCNC: 124 U/L (ref 46–116)
ALT SERPL W P-5'-P-CCNC: 130 U/L (ref 12–78)
ANION GAP SERPL CALCULATED.3IONS-SCNC: 9 MMOL/L (ref 4–13)
AST SERPL W P-5'-P-CCNC: 71 U/L (ref 5–45)
BASOPHILS # BLD AUTO: 0.11 THOUSANDS/ΜL (ref 0–0.1)
BASOPHILS NFR BLD AUTO: 2 % (ref 0–1)
BILIRUB SERPL-MCNC: 0.4 MG/DL (ref 0.2–1)
BUN SERPL-MCNC: 12 MG/DL (ref 5–25)
CALCIUM SERPL-MCNC: 9.8 MG/DL (ref 8.3–10.1)
CHLORIDE SERPL-SCNC: 100 MMOL/L (ref 100–108)
CHOLEST SERPL-MCNC: 193 MG/DL
CO2 SERPL-SCNC: 28 MMOL/L (ref 21–32)
CREAT SERPL-MCNC: 0.72 MG/DL (ref 0.6–1.3)
EOSINOPHIL # BLD AUTO: 0.25 THOUSAND/ΜL (ref 0–0.61)
EOSINOPHIL NFR BLD AUTO: 4 % (ref 0–6)
ERYTHROCYTE [DISTWIDTH] IN BLOOD BY AUTOMATED COUNT: 12.4 % (ref 11.6–15.1)
EST. AVERAGE GLUCOSE BLD GHB EST-MCNC: 123 MG/DL
FERRITIN SERPL-MCNC: 539 NG/ML (ref 8–388)
GFR SERPL CREATININE-BSD FRML MDRD: 97 ML/MIN/1.73SQ M
GLUCOSE P FAST SERPL-MCNC: 111 MG/DL (ref 65–99)
HBA1C MFR BLD: 5.9 %
HCT VFR BLD AUTO: 37.1 % (ref 34.8–46.1)
HDLC SERPL-MCNC: 39 MG/DL
HGB BLD-MCNC: 12.9 G/DL (ref 11.5–15.4)
IMM GRANULOCYTES # BLD AUTO: 0.02 THOUSAND/UL (ref 0–0.2)
IMM GRANULOCYTES NFR BLD AUTO: 0 % (ref 0–2)
IRON SATN MFR SERPL: 29 % (ref 15–50)
IRON SERPL-MCNC: 115 UG/DL (ref 50–170)
LDLC SERPL CALC-MCNC: 112 MG/DL (ref 0–100)
LYMPHOCYTES # BLD AUTO: 2.26 THOUSANDS/ΜL (ref 0.6–4.47)
LYMPHOCYTES NFR BLD AUTO: 32 % (ref 14–44)
MCH RBC QN AUTO: 33.8 PG (ref 26.8–34.3)
MCHC RBC AUTO-ENTMCNC: 34.8 G/DL (ref 31.4–37.4)
MCV RBC AUTO: 97 FL (ref 82–98)
MONOCYTES # BLD AUTO: 0.47 THOUSAND/ΜL (ref 0.17–1.22)
MONOCYTES NFR BLD AUTO: 7 % (ref 4–12)
NEUTROPHILS # BLD AUTO: 4.03 THOUSANDS/ΜL (ref 1.85–7.62)
NEUTS SEG NFR BLD AUTO: 55 % (ref 43–75)
NONHDLC SERPL-MCNC: 154 MG/DL
NRBC BLD AUTO-RTO: 0 /100 WBCS
PLATELET # BLD AUTO: 228 THOUSANDS/UL (ref 149–390)
PMV BLD AUTO: 9.8 FL (ref 8.9–12.7)
POTASSIUM SERPL-SCNC: 4.2 MMOL/L (ref 3.5–5.3)
PROT SERPL-MCNC: 7.5 G/DL (ref 6.4–8.2)
RBC # BLD AUTO: 3.82 MILLION/UL (ref 3.81–5.12)
SODIUM SERPL-SCNC: 137 MMOL/L (ref 136–145)
T4 FREE SERPL-MCNC: 1.2 NG/DL (ref 0.76–1.46)
TIBC SERPL-MCNC: 391 UG/DL (ref 250–450)
TRIGL SERPL-MCNC: 211 MG/DL
TSH SERPL DL<=0.05 MIU/L-ACNC: 0.87 UIU/ML (ref 0.45–4.5)
WBC # BLD AUTO: 7.14 THOUSAND/UL (ref 4.31–10.16)

## 2022-04-20 PROCEDURE — 80061 LIPID PANEL: CPT

## 2022-04-20 PROCEDURE — 83036 HEMOGLOBIN GLYCOSYLATED A1C: CPT

## 2022-04-20 PROCEDURE — 84439 ASSAY OF FREE THYROXINE: CPT

## 2022-04-20 PROCEDURE — 82728 ASSAY OF FERRITIN: CPT

## 2022-04-20 PROCEDURE — 83918 ORGANIC ACIDS TOTAL QUANT: CPT

## 2022-04-20 PROCEDURE — 85025 COMPLETE CBC W/AUTO DIFF WBC: CPT

## 2022-04-20 PROCEDURE — 84443 ASSAY THYROID STIM HORMONE: CPT

## 2022-04-20 PROCEDURE — 83550 IRON BINDING TEST: CPT

## 2022-04-20 PROCEDURE — 83540 ASSAY OF IRON: CPT

## 2022-04-20 PROCEDURE — 36415 COLL VENOUS BLD VENIPUNCTURE: CPT

## 2022-04-20 PROCEDURE — 80053 COMPREHEN METABOLIC PANEL: CPT

## 2022-04-21 ENCOUNTER — TELEPHONE (OUTPATIENT)
Dept: OTHER | Facility: OTHER | Age: 52
End: 2022-04-21

## 2022-04-21 DIAGNOSIS — K21.9 GERD WITHOUT ESOPHAGITIS: ICD-10-CM

## 2022-04-21 DIAGNOSIS — K58.9 IRRITABLE BOWEL SYNDROME WITHOUT DIARRHEA: ICD-10-CM

## 2022-04-21 RX ORDER — DICYCLOMINE HCL 20 MG
20 TABLET ORAL EVERY 6 HOURS PRN
Qty: 60 TABLET | Refills: 0 | Status: SHIPPED | OUTPATIENT
Start: 2022-04-21 | End: 2022-06-07 | Stop reason: SDUPTHER

## 2022-04-21 RX ORDER — PANTOPRAZOLE SODIUM 40 MG/1
40 TABLET, DELAYED RELEASE ORAL
Qty: 90 TABLET | Refills: 0 | Status: SHIPPED | OUTPATIENT
Start: 2022-04-21 | End: 2022-07-27 | Stop reason: SDUPTHER

## 2022-04-22 ENCOUNTER — TELEPHONE (OUTPATIENT)
Dept: GASTROENTEROLOGY | Facility: CLINIC | Age: 52
End: 2022-04-22

## 2022-04-22 NOTE — TELEPHONE ENCOUNTER
Patients GI provider:  Dr Ananth Max    Number to return call: ([phone])    Reason for call: Pt calling to schedule her colonoscopy per Dr Ananth Max  Please call to schedule  Thank you!     Scheduled procedure/appointment date if applicable: Apt/procedure  Appointment 5/6/22 with Dr Hermes Daniel

## 2022-04-25 ENCOUNTER — OFFICE VISIT (OUTPATIENT)
Dept: ENDOCRINOLOGY | Facility: HOSPITAL | Age: 52
End: 2022-04-25
Payer: COMMERCIAL

## 2022-04-25 VITALS
HEART RATE: 89 BPM | HEIGHT: 63 IN | DIASTOLIC BLOOD PRESSURE: 70 MMHG | SYSTOLIC BLOOD PRESSURE: 124 MMHG | WEIGHT: 162.6 LBS | OXYGEN SATURATION: 98 % | BODY MASS INDEX: 28.81 KG/M2

## 2022-04-25 DIAGNOSIS — E78.2 COMBINED HYPERLIPIDEMIA: ICD-10-CM

## 2022-04-25 DIAGNOSIS — E06.3 HYPOTHYROIDISM DUE TO HASHIMOTO'S THYROIDITIS: ICD-10-CM

## 2022-04-25 DIAGNOSIS — E03.8 HYPOTHYROIDISM DUE TO HASHIMOTO'S THYROIDITIS: ICD-10-CM

## 2022-04-25 DIAGNOSIS — E11.65 TYPE 2 DIABETES MELLITUS WITH HYPERGLYCEMIA, WITHOUT LONG-TERM CURRENT USE OF INSULIN (HCC): Primary | ICD-10-CM

## 2022-04-25 DIAGNOSIS — I10 PRIMARY HYPERTENSION: Chronic | ICD-10-CM

## 2022-04-25 PROCEDURE — 99214 OFFICE O/P EST MOD 30 MIN: CPT | Performed by: INTERNAL MEDICINE

## 2022-04-25 RX ORDER — PROPRANOLOL/HYDROCHLOROTHIAZID 40 MG-25MG
TABLET ORAL
COMMUNITY

## 2022-04-25 NOTE — PROGRESS NOTES
4/25/2022    Assessment/Plan      Diagnoses and all orders for this visit:    Type 2 diabetes mellitus with hyperglycemia, without long-term current use of insulin ([facility] [address] )  -     HEMOGLOBIN A1C W/ EAG ESTIMATION Lab Collect; Future  -     Comprehensive metabolic panel Lab Collect; Future  -     TSH, 3rd generation Lab Collect; Future  -     CBC and differential Lab Collect; Future  -     T4, free Lab Collect; Future  -     Microalbumin / creatinine urine ratio Lab Collect; Future    Hypothyroidism due to Hashimoto's thyroiditis  -     HEMOGLOBIN A1C W/ EAG ESTIMATION Lab Collect; Future  -     Comprehensive metabolic panel Lab Collect; Future  -     TSH, 3rd generation Lab Collect; Future  -     CBC and differential Lab Collect; Future  -     T4, free Lab Collect; Future  -     Microalbumin / creatinine urine ratio Lab Collect; Future    Primary hypertension  -     HEMOGLOBIN A1C W/ EAG ESTIMATION Lab Collect; Future  -     Comprehensive metabolic panel Lab Collect; Future  -     TSH, 3rd generation Lab Collect; Future  -     CBC and differential Lab Collect; Future  -     T4, free Lab Collect; Future  -     Microalbumin / creatinine urine ratio Lab Collect; Future    Combined hyperlipidemia  -     HEMOGLOBIN A1C W/ EAG ESTIMATION Lab Collect; Future  -     Comprehensive metabolic panel Lab Collect; Future  -     TSH, 3rd generation Lab Collect; Future  -     CBC and differential Lab Collect; Future  -     T4, free Lab Collect; Future  -     Microalbumin / creatinine urine ratio Lab Collect; Future    Other orders  -     Turmeric 500 MG CAPS; Take by mouth        Assessment/Plan:  1  Type 2 diabetes  Hemoglobin A1c is 5 9%  This does demonstrate excellent control  She denied decrease her medications last visit like requested by discontinuing the glimepiride as she feels better on both glimepiride and Janumet    She is not having hypoglycemia so for now she will continue the same glimepiride and Janumet but she has been asked to call if she starts having hypoglycemia  She will continue to test her blood sugars at least once a day  She will continue to work on dietary changes  2  Hypothyroidism due to Hashimoto's thyroiditis  Most recent thyroid function tests are normal   She is biochemically and clinically euthyroid  She will continue the same levothyroxine 112 mcg daily  3  Hypertension  She is normotensive in the office on her current dose of amlodipine and the Toprol  4  Hyperlipidemia  She will continue the same fish oil daily  She is following with Gastroenterology and Cardiology regarding her cholesterol  Cholesterol medicines are on hold as result of her liver disease  Medications and treatments will be adjusted if indicated based on her blood work and blood sugars  I have asked her to follow up in 3 months with preceding hemoglobin A1c, CMP, CBC, TSH, free T4, and urine microalbumin to creatinine ratio  CC: Diabetes type 2, thyroid, blood pressure, lipid follow-up    History of Present Illness     HPI: Carmen Otoole is a 46y o  year old female with type 2 diabetes for 4 years, hypothyroidism, hypertension, hyperlipidemia for follow-up visit  She is on oral agents at home and takes Janumet ER 50/1000 mg twice a day and glimepiride 1 mg daily  Did not stop it as feels good on it  She denies any polyuria, polydipsia, polyphagia, and blurry vision  She has once or twice a night nocturia  She denies numbness or tingling of the feet  She denies chest pain or shortness of breath  She denies neuropathy, nephropathy, retinopathy, stroke and claudication but does admit to heart attack  Hypoglycemic episodes: Yes occasional  Will be at 2-3 pm if not eating enough at lunch  The patient's last eye exam was in February 2022 with no retinopathy  The patient's last foot exam was in September 2020 the endocrine office visit  She does not follow with Podiatry   Last A1C was   Lab Results   Component Value Date    HGBA1C 5 9 (H) 04/20/2022     Blood Sugar/Glucometer/Pump/CGM review: checks blood sugars once a day at least, when not as consistent, less often  mostly in the 100s  Up to 190 after pasta, as low as the 70s if not eating  She has hypothyroidism due to Hashimoto's thyroiditis and takes levothyroxine 112 mcg daily  She denies heat or cold intolerance, palpitation, tremors, or weight changes  She has some anxiety and depression as she has felt a little overwhelmed with her health issues and work stress  She denies insomnia, diarrhea, or constipation  She is fatigued  She has dry skin in the winter but no brittle nails or hair loss  She denies diplopia  She has hypertension and takes amlodipine 5 mg daily metoprolol XL 25 mg twice a day  She denies headache or stroke-like symptoms  She has hyperlipidemia and takes fish oil 1 g for capsule daily  She was on fenofibrate in the past but this was discontinued by her cardiologist due to elevated LFTs and has been diagnosed with GIANG  GI doc wants to hold off on this  She denies chest pain or shortness of breath  Review of Systems   Constitutional: Positive for fatigue  Negative for unexpected weight change  HENT: Negative for trouble swallowing  Eyes: Negative for visual disturbance  Respiratory: Negative for chest tightness and shortness of breath  Cardiovascular: Negative for chest pain and palpitations  Gastrointestinal: Negative for abdominal pain, constipation, diarrhea and nausea  Going to see hepatologist for evaluation  Endocrine: Negative for cold intolerance, heat intolerance, polydipsia, polyphagia and polyuria  Nocturia 1-2 times  A night  Skin: Negative for wound  Has dry skin in the winter  No brittle nails  No hair loss  Neurological: Negative for dizziness, tremors, weakness, light-headedness, numbness and headaches     Hematological:        Had IV iron for low Hgb and was feeling well, now starting to feel fatigued again  EGD and capsule EGD was negative except bleeding in the colon  Psychiatric/Behavioral: Positive for dysphoric mood  Negative for sleep disturbance  The patient is nervous/anxious  Anxiety and depression not good, overwhelming at work and with health  Historical Information   Past Medical History:   Diagnosis Date    Coronary artery disease     Diabetes mellitus ([facility] )     Borderline    Disease of thyroid gland     DUB (dysfunctional uterine bleeding)     Fatty liver     Hashimoto's thyroiditis     Last Assessed:  14    History of stomach ulcers     Hypertension     Hypothyroidism     Irritable bowel syndrome     Last Assessed:  3/25/14    Liver disease     elevated enzymes    Myocardial infarction ([facility] )         GIANG (nonalcoholic steatohepatitis)     Ovarian cyst     left    Psychogenic polydipsia     Has had hyponatremia from drinking too much water in the past      Past Surgical History:   Procedure Laterality Date    ANGIOPLASTY      CARDIAC CATHETERIZATION       SECTION      X 2    CHOLECYSTECTOMY      COLONOSCOPY      CYSTOSCOPY N/A 2019    Procedure: CYSTOSCOPY;  Surgeon: Ramy Valladares MD;  [facility];  Service: Gynecology Oncology    HYSTERECTOMY      IR BIOPSY LIVER MASS  2020    OOPHORECTOMY Right     LA ESOPHAGOGASTRODUODENOSCOPY TRANSORAL DIAGNOSTIC N/A 2018    Procedure: ESOPHAGOGASTRODUODENOSCOPY (EGD); Surgeon: Shelia Whitaker MD;  [facility];   Service: Gastroenterology    LA LAPAROSCOPY W TOT HYSTERECTUTERUS <=250 [address] TUBE/OVARY N/A 2019    Procedure: TOTAL LAPAROSCOPIC HYSTERECTOMY, BILATERAL SALPINGECTOMY, LEFT OOPHORECTOMY;  Surgeon: Ramy Valladares MD;  [facility];  Service: Gynecology Oncology    SINUS SURGERY      TONSILLECTOMY      UPPER GASTROINTESTINAL ENDOSCOPY       Social History   Social History Substance and Sexual Activity   Alcohol Use Never    Comment: occasional, Social drinker     Social History     Substance and Sexual Activity   Drug Use No     Social History     Tobacco Use   Smoking Status Former Smoker    Packs/day: 0 50    Years: 4 00    Pack years: 2 00    Types: Cigarettes    Quit date: 2016    Years since quittin 2   Smokeless Tobacco Never Used   Tobacco Comment    2016     Family History:   Family History   Problem Relation Age of Onset    Pancreatic cancer Mother     Hypertension Father     Diabetes type II Father     Diabetes Maternal Grandmother     Diabetes Paternal Grandmother     Heart disease Paternal Grandmother     Hypothyroidism Paternal Grandmother     Diabetes type II Brother     Hypothyroidism Paternal Uncle     Lung disease Daughter         asthma tendencies    No Known Problems Brother     No Known Problems Son     Liver cancer Maternal Aunt        Meds/Allergies   Current Outpatient Medications   Medication Sig Dispense Refill    amLODIPine (NORVASC) 5 mg tablet Take 1 tablet (5 mg total) by mouth daily 90 tablet 3    Blood Glucose Monitoring Suppl ([employer] SYSTEM) w/Device KIT Use to test blood sugars twice a day 1 kit 0    dicyclomine (BENTYL) 20 mg tablet Take 1 tablet (20 mg total) by mouth every 6 (six) hours as needed (every 6 hours) 60 tablet 0    escitalopram (LEXAPRO) 20 mg tablet Take 1 tablet (20 mg total) by mouth daily 90 tablet 0    estradiol (ESTRACE VAGINAL) 0 1 mg/g vaginal cream Apply pea sized amount to area daily 42 5 g 2    glimepiride (AMARYL) 1 mg tablet Take 1 tablet (1 mg total) by mouth daily with breakfast 90 tablet 1    ibuprofen (MOTRIN) 600 mg tablet Take 1 PO BID with food  NO OTHER NSAIDS while on this medication   180 tablet 1    Icosapent Ethyl (Vascepa) 1 g CAPS 2 capsules twice a day 360 capsule 3    Lactobacillus (PROBIOTIC ACIDOPHILUS PO) Take by mouth daily      levothyroxine 112 mcg tablet Take 1 tablet (112 mcg total) by mouth daily 90 tablet 0    Magnesium 300 MG CAPS Take 600 mg by mouth daily      methocarbamol (ROBAXIN) 750 mg tablet 1 tab PO HS  90 tablet 1    metoprolol succinate (TOPROL-XL) 25 mg 24 hr tablet Take 1 tablet (25 mg total) by mouth 2 (two) times a day 180 tablet 0    mupirocin (BACTROBAN) 2 % ointment Apply topically 3 (three) times a day 22 g 0    nitroglycerin (NITROSTAT) 0 4 mg SL tablet Place 1 tablet (0 4 mg total) under the tongue every 5 (five) minutes as needed for chest pain 90 tablet 0    OneTouch Delica Lancets 00I MISC Use to test blood sugars twice a day 200 each 6    OneTouch Verio test strip Use as instructed to test blood sugars twice a day 200 each 0    pantoprazole (PROTONIX) 40 mg tablet Take 1 tablet (40 mg total) by mouth daily before breakfast 90 tablet 0    SITagliptin-metFORMIN HCl ER  MG TB24 Take 1 tablet by mouth 2 (two) times a day 180 tablet 0    traMADol (ULTRAM) 50 mg tablet Take 1 tablet (50 mg total) by mouth every 8 (eight) hours as needed for moderate pain 60 tablet 0    Turmeric 500 MG CAPS Take by mouth      vitamin B-12 (VITAMIN B-12) 1,000 mcg tablet Take by mouth daily      Vitamin D, Cholecalciferol, 50 MCG (2000 UT) CAPS Take 4,000 Units by mouth daily       No current facility-administered medications for this visit  Allergies   Allergen Reactions    Sulfa Antibiotics Shortness Of Breath    Amoxicillin-Pot Clavulanate Other (See Comments)    Invokana [Canagliflozin]      Yeast infection    Erythromycin      Reaction Date: [id number];     Metronidazole        Objective   Vitals: Blood pressure 124/70, pulse 89, height 5' 3" (1 6 m), weight 73 8 kg (162 lb 9 6 oz), SpO2 98 %, not currently breastfeeding  Invasive Devices  Report    None                 Physical Exam  Vitals reviewed  Constitutional:       Appearance: Normal appearance  She is well-developed     HENT:      Head: Normocephalic and atraumatic  Eyes:      Extraocular Movements: Extraocular movements intact  Conjunctiva/sclera: Conjunctivae normal       Comments: No lid lag, stare, proptosis, or periorbital edema  Neck:      Thyroid: No thyromegaly  Vascular: No carotid bruit  Comments: Thyroid normal in size  No palpable thyroid nodules  Cardiovascular:      Rate and Rhythm: Normal rate and regular rhythm  Pulses: no weak pulses          Dorsalis pedis pulses are 2+ on the right side and 2+ on the left side  Posterior tibial pulses are 2+ on the right side and 2+ on the left side  Heart sounds: Normal heart sounds  No murmur heard  Pulmonary:      Effort: Pulmonary effort is normal       Breath sounds: Normal breath sounds  No wheezing  Abdominal:      Palpations: Abdomen is soft  Musculoskeletal:         General: No deformity  Normal range of motion  Cervical back: Normal range of motion and neck supple  Right lower leg: No edema  Left lower leg: No edema  Comments: Bunion medial 1st toe right  No ulcerations of the feet  No tremor of the outstretched hands  Feet:      Right foot:      Skin integrity: No ulcer, skin breakdown, erythema, warmth, callus or dry skin  Left foot:      Skin integrity: No ulcer, skin breakdown, erythema, warmth, callus or dry skin  Lymphadenopathy:      Cervical: No cervical adenopathy  Skin:     General: Skin is warm and dry  Findings: No rash  Neurological:      Mental Status: She is alert and oriented to person, place, and time  Deep Tendon Reflexes: Reflexes are normal and symmetric  Comments: Vibration sensation intact to the 1st toe DIP joint bilaterally  microfilament sensation intact bilaterally  Patellar deep tendon reflexes are normal        Patient's shoes and socks removed  Right Foot/Ankle   Right Foot Inspection  Skin Exam: skin normal and skin intact   No dry skin, no warmth, no callus, no erythema, no maceration, no abnormal color, no pre-ulcer, no ulcer and no callus  Toe Exam: ROM and strength within normal limits and right toe deformity  No swelling    Sensory   Vibration: intact  Monofilament testing: intact    Vascular  Capillary refills: < 3 seconds  The right DP pulse is 2+  The right PT pulse is 2+  Left Foot/Ankle  Left Foot Inspection  Skin Exam: skin normal and skin intact  No dry skin, no warmth, no erythema, no maceration, normal color, no pre-ulcer, no ulcer and no callus  Toe Exam: ROM and strength within normal limits  No swelling and no left toe deformity  Sensory   Vibration: intact  Monofilament testing: intact    Vascular  Capillary refills: < 3 seconds  The left DP pulse is 2+  The left PT pulse is 2+  Assign Risk Category  Deformity present  No loss of protective sensation  No weak pulses  Risk: 0        The history was obtained from the review of the chart and from the patient  Lab Results:    Most recent Alc is  Lab Results   Component Value Date    HGBA1C 5 9 (H) 04/20/2022           Blood work done on 04/20/2022 showed a CMP with a glucose of 111 fasting, AST 71, , , but was otherwise normal   Lab Results   Component Value Date    CREATININE 0 72 04/20/2022    CREATININE 0 84 01/24/2022    CREATININE 0 73 10/12/2021    BUN 12 04/20/2022     09/02/2015    K 4 2 04/20/2022     04/20/2022    CO2 28 04/20/2022     eGFR   Date Value Ref Range Status   04/20/2022 97 ml/min/1 73sq m Final     Blood work done on 04/20/2022 showed a total cholesterol 193, LDL cholesterol 112  Lab Results   Component Value Date    CHOL 210 08/20/2015    HDL 39 (L) 04/20/2022    TRIG 211 (H) 04/20/2022       Lab Results   Component Value Date     (H) 04/20/2022    AST 71 (H) 04/20/2022    ALKPHOS 124 (H) 04/20/2022    BILITOT 0 38 09/02/2015       Lab Results   Component Value Date    FREET4 1 20 04/20/2022     TSH is 0 867      CBC is normal     Future Appointments   Date Time Provider Carlos Manuel Joni   5/6/2022  8:00 AM Shawna Mcdermott  [facility]   5/13/2022  8:00 AM Bernabe Camargo MD HEM ONC [facility]   7/28/2022  3:30 PM Libertad Fitzpatrick DO [facility]   8/10/2022  2:20 PM Ann Benavidez MD ENDO [facility]   12/13/2022  8:45 AM MD MAGDALENA Matos Dr [facility]

## 2022-04-25 NOTE — PATIENT INSTRUCTIONS
hgba1c is 5 9%  This is excellent  Continue the same glimepiride and janumet  Call if more frequent low blood sugars  Continue to test blood sugars up to once daily  Follow up in 3 months with blood work

## 2022-04-26 NOTE — TELEPHONE ENCOUNTER
Scheduled date of colonoscopy (as of today): 7/11/22  Physician performing colonoscopy: Dr Frias  Location of colonoscopy: [facility]  Bowel prep reviewed with patient: Miralax/Dulcolax  Instructions reviewed with patient by: Mariela/debbie  Clearances: N/A

## 2022-04-27 DIAGNOSIS — J01.90 ACUTE SINUSITIS, RECURRENCE NOT SPECIFIED, UNSPECIFIED LOCATION: ICD-10-CM

## 2022-04-27 RX ORDER — TRAMADOL HYDROCHLORIDE 50 MG/1
50 TABLET ORAL EVERY 8 HOURS PRN
Qty: 60 TABLET | Refills: 0 | Status: SHIPPED | OUTPATIENT
Start: 2022-04-27 | End: 2022-05-20 | Stop reason: SDUPTHER

## 2022-04-29 LAB — METHYLMALONATE SERPL-SCNC: 168 NMOL/L (ref 0–378)

## 2022-05-06 ENCOUNTER — OFFICE VISIT (OUTPATIENT)
Dept: GASTROENTEROLOGY | Facility: CLINIC | Age: 52
End: 2022-05-06
Payer: COMMERCIAL

## 2022-05-06 VITALS
SYSTOLIC BLOOD PRESSURE: 122 MMHG | BODY MASS INDEX: 28.6 KG/M2 | DIASTOLIC BLOOD PRESSURE: 68 MMHG | TEMPERATURE: 98.2 F | HEIGHT: 63 IN | WEIGHT: 161.4 LBS

## 2022-05-06 DIAGNOSIS — K75.81 NASH (NONALCOHOLIC STEATOHEPATITIS): ICD-10-CM

## 2022-05-06 DIAGNOSIS — K21.9 GASTROESOPHAGEAL REFLUX DISEASE WITHOUT ESOPHAGITIS: ICD-10-CM

## 2022-05-06 DIAGNOSIS — D50.0 IRON DEFICIENCY ANEMIA DUE TO CHRONIC BLOOD LOSS: Primary | ICD-10-CM

## 2022-05-06 PROCEDURE — 99215 OFFICE O/P EST HI 40 MIN: CPT | Performed by: INTERNAL MEDICINE

## 2022-05-06 RX ORDER — CLINDAMYCIN HYDROCHLORIDE 150 MG/1
150 CAPSULE ORAL EVERY 6 HOURS
COMMUNITY
Start: 2022-04-28 | End: 2022-07-21

## 2022-05-06 NOTE — H&P (VIEW-ONLY)
[facility] - Outpatient Follow-up Note  Helen Pastor 46 y o  female MRN: [id number]  Encounter: [id number]          ASSESSMENT AND PLAN:      Ms Salome Martin has the full metabolic syndrome and resulting GIANG  I discussed with Ms White the natural history of fatty liver disease, including risks for development, and risk for progression to cirrhosis and its complications  We reviewed that there are no current medications that are FDA approved for the specific management of fatty liver disease  I explained that there are many, many compounds (both new and medications currently used for other indications) that are currently being studied to treat several aspects of fatty liver disease, including decreasing hepatic fat, hepatic inflammation, hepatic fibrosis, as well reducing risk factors for the development of fatty liver (primarily through weight loss)  We do not currently have active clinical trials through my office, but I will notify Ms Salome Martin when we do if she meets criteria for inclusion  I explained that even if liver enzymes are normal, it does not rule out low levels of active fatty inflammation in the liver  However, she does have persistently elevated transaminases at 3-4 X ULN  Ms Salome Martin fortunately, does not have physical or laboratory evidence of cirrhosis/portal HTN  Elastography and liver biopsy in May, 2020 showed F2 fibrosis  NAFLD Fibrosis Score is a non-invasive calculation based on patient characteristics and basic routine labs (Age, BMI, AST, ALT, Platelet count, Albumin and presence/absence of insulin resistance)  Ms Cintia Moise current NAFLD Fibrosis score is -2  23  Scores less than -1 455 correlate well with absence of advanced fibrosis, with a high negative predictive value  I have requested abdominal ultrasound elastography with fat quantification (UGAP) to update hepatic fibrosis and steatosis in a non-invasive manner      I counseled Ms Bruno Palomares on the importance of weight loss through lifestyle modification, primary diet and exercise, and the benefits of seeking input of a dietician/nutritionist as well as consultation with a medical weight loss specialist or bariatric surgery team     She is scheduled for a colonoscopy on 7/11/22 for further evaluation of MIKA as capsule endoscopy showed blood in the proximal colon  GERD currently well controlled with protonix  Ms Bruno Palomares will have the testing done as outlined above and follow-up in 3 months  FOLLOW-UP:  Return in about 3 months (around 8/6/2022)  VISIT DIAGNOSES AND ORDERS:      1  Iron deficiency anemia due to chronic blood loss    2  GIANG (nonalcoholic steatohepatitis)    3  Gastroesophageal reflux disease without esophagitis      Orders Placed This Encounter   Procedures    US elastography    US abdomen complete    CBC and differential    Protime-INR    Comprehensive metabolic panel    Ambulatory Referral to Weight Management     ______________________________________________________________________    SUBJECTIVE:           Ms Carmen Otoole is a 46 y o  female with medical history as outlined below, including HTN, HLD, DM, CAD, MIKA, GERD and biopsy proven GIANG, who returns for follow-up after last being seen by my colleague, Dr Janak Downs in October, 2021  At that time, an EGD was scheduled for further eval of MIKA  Protonic 40 mg daily was recommended to be continued, and she was noted to have F2 hepatic fibrosis within improving transaminases  Recommendation was given to continue to abstain from alcohol intake, and check labs every 6 months  She had the EGD done on 10/14/21 showing mild antral erythema  Biopsies were negative for H  Pylori and Celiac disease  Capsule endoscopy showed some blood in the proximal colon (possibly from distal small bowel AVM)  Repeat colonoscopy schedule for 7/11/22      Most recent labs from 4/20/22 show Hgb 12 9, with Iron 115, %sat 29, Ferritin 539  LFTs with elevated AST (71), ALT (130) and mildly elevated alk phos (124)  In the office today, she states she feels well  Her only complain is occasional RUQ pain, non-radiating, intermittent, no contributing factors, lasting few minutes to hours, no associated symptoms  Ms Gisela Cortes denies recent or history of yellow eyes/skin, dark urine, GI bleeding, abdominal distention with fluid, lower extremity swelling, easy bruising, excessive bleeding, pruritus or confusion  she denies nausea, vomiting, heartburn, reflux, difficulty swallowing, early satiety, bloating, diarrhea, constipation or straining with passing stools  She had a course of cindamycin for a tooth abscess and is back on it now preceding planned dental work  LFTs were elevated before she started taking it  She denies any new medications  REVIEW OF SYSTEMS     Review of Systems   Musculoskeletal: Positive for back pain  All other systems reviewed and are negative        Historical Information   Patient Active Problem List   Diagnosis    Hypertension    GIANG (nonalcoholic steatohepatitis)    Sinus tachycardia    Chronic sinusitis    Anemia    Anomalous coronary artery origin    Anxiety    Combined hyperlipidemia    Coronary vasospasm (HCC)    Type 2 diabetes mellitus with hyperglycemia, without long-term current use of insulin (HCC)    Gastroesophageal reflux disease without esophagitis    Ground glass opacity present on imaging of lung    Hypothyroidism due to Hashimoto's thyroiditis    NSTEMI (non-ST elevated myocardial infarction) (HCC)    Chest pain due to GERD    Dysfunctional uterine bleeding    Left ovarian cyst    Menopausal symptoms    S/P laparoscopic hysterectomy    Colon cancer screening    Chronic back pain    Screening examination for arthropod-borne viral disease    Lateral epicondylitis of right elbow    Lumbar spondylosis    Myofascial pain syndrome    Lumbar radiculopathy    Chronic pain syndrome    Tendonitis of elbow, right    Chronic low back pain without sciatica     Social History     Substance and Sexual Activity   Alcohol Use Never    Comment: occasional, Social drinker     Social History     Substance and Sexual Activity   Drug Use No     Social History     Tobacco Use   Smoking Status Former Smoker    Packs/day: 0 50    Years: 4 00    Pack years: 2 00    Types: Cigarettes    Quit date: 2016    Years since quittin 2   Smokeless Tobacco Never Used   Tobacco Comment    2016       Meds/Allergies       Current Outpatient Medications:     amLODIPine (NORVASC) 5 mg tablet    Blood Glucose Monitoring Suppl (ONETOUCH VERIO IQ SYSTEM) w/Device KIT    clindamycin (CLEOCIN) 150 mg capsule    dicyclomine (BENTYL) 20 mg tablet    escitalopram (LEXAPRO) 20 mg tablet    estradiol (ESTRACE VAGINAL) 0 1 mg/g vaginal cream    glimepiride (AMARYL) 1 mg tablet    ibuprofen (MOTRIN) 600 mg tablet    Icosapent Ethyl (Vascepa) 1 g CAPS    Lactobacillus (PROBIOTIC ACIDOPHILUS PO)    levothyroxine 112 mcg tablet    Magnesium 300 MG CAPS    methocarbamol (ROBAXIN) 750 mg tablet    metoprolol succinate (TOPROL-XL) 25 mg 24 hr tablet    mupirocin (BACTROBAN) 2 % ointment    nitroglycerin (NITROSTAT) 0 4 mg SL tablet    OneTouch Delica Lancets 44C MISC    OneTouch Verio test strip    pantoprazole (PROTONIX) 40 mg tablet    SITagliptin-metFORMIN HCl ER  MG TB24    traMADol (ULTRAM) 50 mg tablet    Turmeric 500 MG CAPS    vitamin B-12 (VITAMIN B-12) 1,000 mcg tablet    Vitamin D, Cholecalciferol, 50 MCG ( [place]) CAPS    Allergies   Allergen Reactions    Sulfa Antibiotics Shortness Of Breath    Amoxicillin-Pot Clavulanate Other (See Comments)    Invokana [Canagliflozin]      Yeast infection    Erythromycin      Reaction Date: ;     Metronidazole            Objective     Blood pressure 122/68, temperature 98 2 °F (36 8 °C), temperature source Tympanic, height 5' 3" (1 6 m), weight 73 2 kg (161 lb 6 4 oz), not currently breastfeeding  Body mass index is 28 59 kg/m²  PHYSICAL EXAM:      Physical Exam  Vitals reviewed  Constitutional:       General: She is not in acute distress  Appearance: Normal appearance  She is not ill-appearing  HENT:      Head: Normocephalic and atraumatic  Nose: Nose normal       Mouth/Throat:      Mouth: Mucous membranes are moist       Pharynx: Oropharynx is clear  Eyes:      General: No scleral icterus  Extraocular Movements: Extraocular movements intact  Cardiovascular:      Rate and Rhythm: Normal rate and regular rhythm  Heart sounds: No murmur heard  Pulmonary:      Effort: Pulmonary effort is normal  No respiratory distress  Breath sounds: Normal breath sounds  Abdominal:      General: Abdomen is flat  Palpations: Abdomen is soft  There is no shifting dullness, fluid wave, hepatomegaly or splenomegaly  Tenderness: There is no abdominal tenderness  Hernia: No hernia is present  Genitourinary:     Comments: deferred  Musculoskeletal:         General: No swelling  Normal range of motion  Cervical back: Normal range of motion and neck supple  No tenderness  Skin:     General: Skin is warm  Coloration: Skin is not jaundiced  Findings: No bruising or rash  Neurological:      General: No focal deficit present  Mental Status: She is alert and oriented to person, place, and time     Psychiatric:         Mood and Affect: Mood normal          Lab Results:   Lab Results   Component Value Date     09/02/2015    K 4 2 04/20/2022    CO2 28 04/20/2022     04/20/2022    BUN 12 04/20/2022    CREATININE 0 72 04/20/2022    GLUCOSE 107 09/02/2015     Lab Results   Component Value Date    WBC 7 14 04/20/2022    HGB 12 9 04/20/2022    HCT 37 1 04/20/2022    MCV 97 04/20/2022     04/20/2022     Lab Results Component Value Date    TP 7 5 04/20/2022    AST 71 (H) 04/20/2022     (H) 04/20/2022    BILITOT 0 38 09/02/2015    INR 1 18 04/29/2020      Lab Results   Component Value Date    IRON 115 04/20/2022    FERRITIN 539 (H) 04/20/2022     Lab Results   Component Value Date    CHOL 210 08/20/2015    HDL 39 (L) 04/20/2022    TRIG 211 (H) 04/20/2022         Radiology Results:   No results found        Jacob Ibanez MD

## 2022-05-06 NOTE — PATIENT INSTRUCTIONS
As discussed today:    1  Please have your blood work done in mid July  2  Please schedule an abdominal ultrasound and a separate abdominal ultrasound with elastography, both of which can be done in the same session  3  Please schedule a visit with our Ambulatory weight management team, after discussion with the endocrinologist   4   Continue with your weight loss efforts

## 2022-05-06 NOTE — PROGRESS NOTES
[facility] - Outpatient Follow-up Note  Franc Wilson 46 y o  female MRN: [id number]  Encounter: [id number]          ASSESSMENT AND PLAN:      Ms Asha Alcantara has the full metabolic syndrome and resulting GIANG  I discussed with Ms White the natural history of fatty liver disease, including risks for development, and risk for progression to cirrhosis and its complications  We reviewed that there are no current medications that are FDA approved for the specific management of fatty liver disease  I explained that there are many, many compounds (both new and medications currently used for other indications) that are currently being studied to treat several aspects of fatty liver disease, including decreasing hepatic fat, hepatic inflammation, hepatic fibrosis, as well reducing risk factors for the development of fatty liver (primarily through weight loss)  We do not currently have active clinical trials through my office, but I will notify Ms Asha Alcantara when we do if she meets criteria for inclusion  I explained that even if liver enzymes are normal, it does not rule out low levels of active fatty inflammation in the liver  However, she does have persistently elevated transaminases at 3-4 X ULN  Ms Asha Alcantara fortunately, does not have physical or laboratory evidence of cirrhosis/portal HTN  Elastography and liver biopsy in May, 2020 showed F2 fibrosis  NAFLD Fibrosis Score is a non-invasive calculation based on patient characteristics and basic routine labs (Age, BMI, AST, ALT, Platelet count, Albumin and presence/absence of insulin resistance)  Ms Aparna Clancy current NAFLD Fibrosis score is -2  23  Scores less than -1 455 correlate well with absence of advanced fibrosis, with a high negative predictive value  I have requested abdominal ultrasound elastography with fat quantification (UGAP) to update hepatic fibrosis and steatosis in a non-invasive manner      I counseled Ms Marisa Moses on the importance of weight loss through lifestyle modification, primary diet and exercise, and the benefits of seeking input of a dietician/nutritionist as well as consultation with a medical weight loss specialist or bariatric surgery team     She is scheduled for a colonoscopy on 7/11/22 for further evaluation of MIKA as capsule endoscopy showed blood in the proximal colon  GERD currently well controlled with protonix  Ms Marisa Moses will have the testing done as outlined above and follow-up in 3 months  FOLLOW-UP:  Return in about 3 months (around 8/6/2022)  VISIT DIAGNOSES AND ORDERS:      1  Iron deficiency anemia due to chronic blood loss    2  GIANG (nonalcoholic steatohepatitis)    3  Gastroesophageal reflux disease without esophagitis      Orders Placed This Encounter   Procedures    US elastography    US abdomen complete    CBC and differential    Protime-INR    Comprehensive metabolic panel    Ambulatory Referral to Weight Management     ______________________________________________________________________    SUBJECTIVE:           Ms Yi Blanco is a 46 y o  female with medical history as outlined below, including HTN, HLD, DM, CAD, MIKA, GERD and biopsy proven GIANG, who returns for follow-up after last being seen by my colleague, Dr Mardi Halsted in October, 2021  At that time, an EGD was scheduled for further eval of MIKA  Protonic 40 mg daily was recommended to be continued, and she was noted to have F2 hepatic fibrosis within improving transaminases  Recommendation was given to continue to abstain from alcohol intake, and check labs every 6 months  She had the EGD done on 10/14/21 showing mild antral erythema  Biopsies were negative for H  Pylori and Celiac disease  Capsule endoscopy showed some blood in the proximal colon (possibly from distal small bowel AVM)  Repeat colonoscopy schedule for 7/11/22      Most recent labs from 4/20/22 show Hgb 12 9, with Iron 115, %sat 29, Ferritin 539  LFTs with elevated AST (71), ALT (130) and mildly elevated alk phos (124)  In the office today, she states she feels well  Her only complain is occasional RUQ pain, non-radiating, intermittent, no contributing factors, lasting few minutes to hours, no associated symptoms  Ms Patience Birch denies recent or history of yellow eyes/skin, dark urine, GI bleeding, abdominal distention with fluid, lower extremity swelling, easy bruising, excessive bleeding, pruritus or confusion  she denies nausea, vomiting, heartburn, reflux, difficulty swallowing, early satiety, bloating, diarrhea, constipation or straining with passing stools  She had a course of cindamycin for a tooth abscess and is back on it now preceding planned dental work  LFTs were elevated before she started taking it  She denies any new medications  REVIEW OF SYSTEMS     Review of Systems   Musculoskeletal: Positive for back pain  All other systems reviewed and are negative        Historical Information   Patient Active Problem List   Diagnosis    Hypertension    GIANG (nonalcoholic steatohepatitis)    Sinus tachycardia    Chronic sinusitis    Anemia    Anomalous coronary artery origin    Anxiety    Combined hyperlipidemia    Coronary vasospasm (HCC)    Type 2 diabetes mellitus with hyperglycemia, without long-term current use of insulin (HCC)    Gastroesophageal reflux disease without esophagitis    Ground glass opacity present on imaging of lung    Hypothyroidism due to Hashimoto's thyroiditis    NSTEMI (non-ST elevated myocardial infarction) (HCC)    Chest pain due to GERD    Dysfunctional uterine bleeding    Left ovarian cyst    Menopausal symptoms    S/P laparoscopic hysterectomy    Colon cancer screening    Chronic back pain    Screening examination for arthropod-borne viral disease    Lateral epicondylitis of right elbow    Lumbar spondylosis    Myofascial pain syndrome    Lumbar radiculopathy    Chronic pain syndrome    Tendonitis of elbow, right    Chronic low back pain without sciatica     Social History     Substance and Sexual Activity   Alcohol Use Never    Comment: occasional, Social drinker     Social History     Substance and Sexual Activity   Drug Use No     Social History     Tobacco Use   Smoking Status Former Smoker    Packs/day: 0 50    Years: 4 00    Pack years: 2 00    Types: Cigarettes    Quit date: 2016    Years since quittin 2   Smokeless Tobacco Never Used   Tobacco Comment    2016       Meds/Allergies       Current Outpatient Medications:     amLODIPine (NORVASC) 5 mg tablet    Blood Glucose Monitoring Suppl (ONETOUCH VERIO IQ SYSTEM) w/Device KIT    clindamycin (CLEOCIN) 150 mg capsule    dicyclomine (BENTYL) 20 mg tablet    escitalopram (LEXAPRO) 20 mg tablet    estradiol (ESTRACE VAGINAL) 0 1 mg/g vaginal cream    glimepiride (AMARYL) 1 mg tablet    ibuprofen (MOTRIN) 600 mg tablet    Icosapent Ethyl (Vascepa) 1 g CAPS    Lactobacillus (PROBIOTIC ACIDOPHILUS PO)    levothyroxine 112 mcg tablet    Magnesium 300 MG CAPS    methocarbamol (ROBAXIN) 750 mg tablet    metoprolol succinate (TOPROL-XL) 25 mg 24 hr tablet    mupirocin (BACTROBAN) 2 % ointment    nitroglycerin (NITROSTAT) 0 4 mg SL tablet    OneTouch Delica Lancets 13L MISC    OneTouch Verio test strip    pantoprazole (PROTONIX) 40 mg tablet    SITagliptin-metFORMIN HCl ER  MG TB24    traMADol (ULTRAM) 50 mg tablet    Turmeric 500 MG CAPS    vitamin B-12 (VITAMIN B-12) 1,000 mcg tablet    Vitamin D, Cholecalciferol, 50 MCG (2000) CAPS    Allergies   Allergen Reactions    Sulfa Antibiotics Shortness Of Breath    Amoxicillin-Pot Clavulanate Other (See Comments)    Invokana [Canagliflozin]      Yeast infection    Erythromycin      Reaction Date: ;     Metronidazole            Objective     Blood pressure 122/68, temperature 98 2 °F (36 8 °C), temperature source Tympanic, height 5' 3" (1 6 m), weight 73 2 kg (161 lb 6 4 oz), not currently breastfeeding  Body mass index is 28 59 kg/m²  PHYSICAL EXAM:      Physical Exam  Vitals reviewed  Constitutional:       General: She is not in acute distress  Appearance: Normal appearance  She is not ill-appearing  HENT:      Head: Normocephalic and atraumatic  Nose: Nose normal       Mouth/Throat:      Mouth: Mucous membranes are moist       Pharynx: Oropharynx is clear  Eyes:      General: No scleral icterus  Extraocular Movements: Extraocular movements intact  Cardiovascular:      Rate and Rhythm: Normal rate and regular rhythm  Heart sounds: No murmur heard  Pulmonary:      Effort: Pulmonary effort is normal  No respiratory distress  Breath sounds: Normal breath sounds  Abdominal:      General: Abdomen is flat  Palpations: Abdomen is soft  There is no shifting dullness, fluid wave, hepatomegaly or splenomegaly  Tenderness: There is no abdominal tenderness  Hernia: No hernia is present  Genitourinary:     Comments: deferred  Musculoskeletal:         General: No swelling  Normal range of motion  Cervical back: Normal range of motion and neck supple  No tenderness  Skin:     General: Skin is warm  Coloration: Skin is not jaundiced  Findings: No bruising or rash  Neurological:      General: No focal deficit present  Mental Status: She is alert and oriented to person, place, and time     Psychiatric:         Mood and Affect: Mood normal          Lab Results:   Lab Results   Component Value Date     09/02/2015    K 4 2 04/20/2022    CO2 28 04/20/2022     04/20/2022    BUN 12 04/20/2022    CREATININE 0 72 04/20/2022    GLUCOSE 107 09/02/2015     Lab Results   Component Value Date    WBC 7 14 04/20/2022    HGB 12 9 04/20/2022    HCT 37 1 04/20/2022    MCV 97 04/20/2022     04/20/2022     Lab Results Component Value Date    TP 7 5 04/20/2022    AST 71 (H) 04/20/2022     (H) 04/20/2022    BILITOT 0 38 09/02/2015    INR 1 18 04/29/2020      Lab Results   Component Value Date    IRON 115 04/20/2022    FERRITIN 539 (H) 04/20/2022     Lab Results   Component Value Date    CHOL 210 08/20/2015    HDL 39 (L) 04/20/2022    TRIG 211 (H) 04/20/2022         Radiology Results:   No results found        Vanessa Jesus MD

## 2022-05-09 ENCOUNTER — TELEPHONE (OUTPATIENT)
Dept: HEMATOLOGY ONCOLOGY | Facility: HOSPITAL | Age: 52
End: 2022-05-09

## 2022-05-09 NOTE — TELEPHONE ENCOUNTER
05/09/22    LMOM informing I will r/s her appt with Dr Suzanna Guzmán to [facility] @ 930AM d/t schedule conflict  [employer] number provided for callback if the new date/ time doesn't work for her

## 2022-05-11 NOTE — PROGRESS NOTES
Hematology/Oncology Outpatient Follow- up Note  Mio Reaves 1970, [id number]  2022        Chief Complaint   Patient presents with    Follow-up       HPI:  Kellennory Kori Sood is a 46year old female with a history of GIANG that is progressing, iron deficiency anemia, hypothyroidism, CAD, and diabetes mellitus  She was initially seen in 2021 for iron deficiency anemia  Patient had anemia dating back to 2019 with a decrease in 10/2021  She was treated with IV Venofer 200 mg x 10 doses  Her hemoglobin and iron panel normalized  She had GI workup completed in  which showed bleeding in the proximal colon due to an AVM  She has followed closely with GI and is now seeing Dr Joe Pino, the liver specialist        Previous Hematologic/ Oncologic History:    IV Venofer 200 mg x 10 doses (completed 2021)    Current Hematologic/ Oncologic Treatment:    Observation     ECO - Symptomatic but completely ambulatory    Interval History:   The patient presents for routine follow up  Most recent blood work completed on  was reviewed  CBC normalized  Hemoglobin 12 9, white count 7 14, platelets 085, normal indices  Ferritin elevated 539  Iron panel: Serum iron 115, iron sat 29, TIBC 391  MMA normal     Patient finished IV Venofer treatments in December  She says that they helped and that she noticed a difference  However, she endorses that currently she feels like "crap " She has some fatigue, but states her SOB has improved  She denies lightheadedness and palpitations  Capsule endoscopy in 2021 revealed a bleeding AVM in the proximal colon, which may have caused her hemoglobin to drop to 8 7  She notes occasional bright red blood in the toilet but this has not happened recently  She is scheduled for a colonoscopy in July to assess this bleeding      Patient has been having progression of her GIANG and now sees a liver specialist  She will complete a repeat ultrasound with the recent elevation in her LFT's  She states she has some RUQ pain  Denies nausea, vomiting, diarrhea, and constipation  Cancer Staging:  Cancer Staging  No matching staging information was found for the patient  Molecular Testing:             Test Results:    Imaging: No results found  Labs:   Lab Results   Component Value Date    WBC 7 14 04/20/2022    HGB 12 9 04/20/2022    HCT 37 1 04/20/2022    MCV 97 04/20/2022     04/20/2022     Lab Results   Component Value Date     09/02/2015    K 4 2 04/20/2022     04/20/2022    CO2 28 04/20/2022    ANIONGAP 11 09/02/2015    BUN 12 04/20/2022    CREATININE 0 72 04/20/2022    GLUCOSE 107 09/02/2015    GLUF 111 (H) 04/20/2022    CALCIUM 9 8 04/20/2022    AST 71 (H) 04/20/2022     (H) 04/20/2022    ALKPHOS 124 (H) 04/20/2022    PROT 7 2 09/02/2015    BILITOT 0 38 09/02/2015    EGFR 97 04/20/2022           Review of Systems   Constitutional: Positive for fatigue  Negative for activity change  Respiratory: Negative  Negative for chest tightness and shortness of breath  Cardiovascular: Negative  Negative for chest pain and palpitations  Gastrointestinal: Positive for abdominal pain (RUQ, chronic) and blood in stool (occasional)  Negative for constipation, diarrhea, nausea and vomiting  Musculoskeletal: Negative  Neurological: Negative  Negative for dizziness and light-headedness  Psychiatric/Behavioral: Negative  All other systems reviewed and are negative          Active Problems:   Patient Active Problem List   Diagnosis    Hypertension    GIANG (nonalcoholic steatohepatitis)    Sinus tachycardia    Chronic sinusitis    Anemia    Anomalous coronary artery origin    Anxiety    Combined hyperlipidemia    Coronary vasospasm (HCC)    Type 2 diabetes mellitus with hyperglycemia, without long-term current use of insulin (HCC)    Gastroesophageal reflux disease without esophagitis    Ground glass opacity present on imaging of lung    Hypothyroidism due to Hashimoto's thyroiditis    NSTEMI (non-ST elevated [facility] infarction) [facility]    Chest pain due to GERD    Dysfunctional uterine bleeding    Left ovarian cyst    Menopausal symptoms    S/P laparoscopic hysterectomy    Colon cancer screening    Chronic back pain    Screening examination for arthropod-borne viral disease    Lateral epicondylitis of right elbow    Lumbar spondylosis    Myofascial pain syndrome    Lumbar radiculopathy    Chronic pain syndrome    Tendonitis of elbow, right    Chronic low back pain without sciatica       Past Medical History:   Past Medical History:   Diagnosis Date    Coronary artery disease     Diabetes mellitus ([facility] )     Borderline    Disease of thyroid gland     DUB (dysfunctional uterine bleeding)     Fatty liver     Hashimoto's thyroiditis     Last Assessed:  14    History of stomach ulcers     Hypertension     Hypothyroidism     Irritable bowel syndrome     Last Assessed:  3/25/14    Liver disease     elevated enzymes    [facility]     2017    GIANG (nonalcoholic steatohepatitis)     Ovarian cyst     left    Psychogenic polydipsia     Has had hyponatremia from drinking too much water in the past        Surgical History:   Past Surgical History:   Procedure Laterality Date    ANGIOPLASTY      CARDIAC CATHETERIZATION       SECTION      X 2    CHOLECYSTECTOMY      COLONOSCOPY      CYSTOSCOPY N/A 2019    Procedure: CYSTOSCOPY;  Surgeon: Iván Holley MD;  [facility];  Service: Gynecology Oncology    HYSTERECTOMY      IR BIOPSY LIVER MASS  2020    OOPHORECTOMY Right     NH ESOPHAGOGASTRODUODENOSCOPY TRANSORAL DIAGNOSTIC N/A 2018    Procedure: ESOPHAGOGASTRODUODENOSCOPY (EGD); Surgeon: Jorge Luis Sanchez MD;  Location[facility];   Service: Gastroenterology    NH LAPAROSCOPY W TOT HYSTERECTUTERUS <=[address] TUBE/OVARY N/A 2019    Procedure: TOTAL LAPAROSCOPIC HYSTERECTOMY, BILATERAL SALPINGECTOMY, LEFT OOPHORECTOMY;  Surgeon: Susana Min MD;  [facility];  Service: Gynecology Oncology    SINUS SURGERY      TONSILLECTOMY      UPPER GASTROINTESTINAL ENDOSCOPY         Family History:    Family History   Problem Relation Age of Onset    Pancreatic cancer Mother     Hypertension Father     Diabetes type II Father     Diabetes Maternal Grandmother     Diabetes Paternal Grandmother     Heart disease Paternal Grandmother     Hypothyroidism Paternal Grandmother     Diabetes type II Brother     Hypothyroidism Paternal Uncle     Lung disease Daughter         asthma tendencies    No Known Problems Brother     No Known Problems Son     Liver cancer Maternal Aunt        Cancer-related family history includes Liver cancer in her maternal aunt; Pancreatic cancer in her mother      Social History:   Social History     Socioeconomic History    Marital status: /Civil Union     Spouse name: Not on file    Number of children: Not on file    Years of education: Not on file    Highest education level: Not on file   Occupational History    Not on file   Tobacco Use    Smoking status: Former Smoker     Packs/day: 0 50     Years: 4 00     Pack years: 2 00     Types: Cigarettes     Quit date: 2016     Years since quittin 2    Smokeless tobacco: Never Used    Tobacco comment: 2016   Vaping Use    Vaping Use: Never used   Substance and Sexual Activity    Alcohol use: Never     Comment: occasional, Social drinker    Drug use: No    Sexual activity: Yes     Partners: Male     Birth control/protection: None   Other Topics Concern    Not on file   Social History Narrative    Feels safe at home     Social Determinants of Health     Financial Resource Strain: Not on file   Food Insecurity: Not on file   Transportation Needs: Not on file   Physical Activity: Not on file   Stress: Not on file   Social Connections: Not on file   Intimate Partner Violence: Not on file   Housing Stability: Not on file       Current Medications:   Current Outpatient Medications   Medication Sig Dispense Refill    Blood Glucose Monitoring Suppl (UNYQ SYSTEM) w/Device KIT Use to test blood sugars twice a day 1 kit 0    dicyclomine (BENTYL) 20 mg tablet Take 1 tablet (20 mg total) by mouth every 6 (six) hours as needed (every 6 hours) 60 tablet 0    escitalopram (LEXAPRO) 20 mg tablet Take 1 tablet (20 mg total) by mouth daily 90 tablet 0    estradiol (ESTRACE VAGINAL) 0 1 mg/g vaginal cream Apply pea sized amount to area daily 42 5 g 2    glimepiride (AMARYL) 1 mg tablet Take 1 tablet (1 mg total) by mouth daily with breakfast 90 tablet 1    ibuprofen (MOTRIN) 600 mg tablet Take 1 PO BID with food  NO OTHER NSAIDS while on this medication  180 tablet 1    Lactobacillus (PROBIOTIC ACIDOPHILUS PO) Take by mouth daily      levothyroxine 112 mcg tablet Take 1 tablet (112 mcg total) by mouth in the morning  90 tablet 0    Magnesium 300 MG CAPS Take 600 mg by mouth daily      methocarbamol (ROBAXIN) 750 mg tablet 1 tab PO HS  90 tablet 1    metoprolol succinate (TOPROL-XL) 25 mg 24 hr tablet Take 1 tablet (25 mg total) by mouth in the morning and 1 tablet (25 mg total) before bedtime   180 tablet 0    mupirocin (BACTROBAN) 2 % ointment Apply topically 3 (three) times a day 22 g 0    nitroglycerin (NITROSTAT) 0 4 mg SL tablet Place 1 tablet (0 4 mg total) under the tongue every 5 (five) minutes as needed for chest pain 90 tablet 0    OneTouch Delica Lancets 17U MISC Use to test blood sugars twice a day 200 each 6    OneTouch Verio test strip Use as instructed to test blood sugars twice a day 200 each 0    pantoprazole (PROTONIX) 40 mg tablet Take 1 tablet (40 mg total) by mouth daily before breakfast 90 tablet 0    SITagliptin-metFORMIN HCl ER  MG TB24 Take 1 tablet by mouth 2 (two) times a day 180 tablet 0    traMADol (ULTRAM) 50 mg tablet Take 1 tablet (50 mg total) by mouth every 8 (eight) hours as needed for moderate pain 60 tablet 0    Turmeric 500 MG CAPS Take by mouth      vitamin B-12 (VITAMIN B-12) 1,000 mcg tablet Take by mouth daily      Vitamin D, Cholecalciferol, 50 MCG (2000 UT) CAPS Take 4,000 Units by mouth daily      amLODIPine (NORVASC) 5 mg tablet Take 1 tablet (5 mg total) by mouth in the morning  90 tablet 0    clindamycin (CLEOCIN) 150 mg capsule Take 150 mg by mouth every 6 (six) hours (Patient not taking: Reported on 5/13/2022)      Icosapent Ethyl (Vascepa) 1 g CAPS 2 capsules twice a day 360 capsule 0     No current facility-administered medications for this visit  Allergies: Allergies   Allergen Reactions    Sulfa Antibiotics Shortness Of Breath    Amoxicillin-Pot Clavulanate Other (See Comments)    Invokana [Canagliflozin]      Yeast infection    Erythromycin      Reaction Date: [id number];     Metronidazole        Physical Exam:  /80 (BP Location: Left arm, Patient Position: Sitting, Cuff Size: Adult)   Pulse 89   Temp 98 2 °F (36 8 °C) (Tympanic)   Resp 16   Ht 5' 3" (1 6 m)   Wt 73 9 kg (163 lb)   LMP  (LMP Unknown)   SpO2 94%   BMI 28 87 kg/m²   Body surface area is 1 77 meters squared  Wt Readings from Last 3 Encounters:   05/13/22 73 9 kg (163 lb)   05/06/22 73 2 kg (161 lb 6 4 oz)   04/25/22 73 8 kg (162 lb 9 6 oz)           Physical Exam  Constitutional:       Appearance: Normal appearance  She is not ill-appearing or toxic-appearing  HENT:      Head: Normocephalic and atraumatic  Eyes:      General: No scleral icterus  Extraocular Movements: Extraocular movements intact  Conjunctiva/sclera: Conjunctivae normal    Cardiovascular:      Rate and Rhythm: Normal rate and regular rhythm  Heart sounds: Normal heart sounds  Pulmonary:      Effort: Pulmonary effort is normal       Breath sounds: Normal breath sounds     Abdominal:      General: Bowel sounds are normal  There is no distension  Palpations: Abdomen is soft  Tenderness: There is no abdominal tenderness  Musculoskeletal:         General: Normal range of motion  Skin:     General: Skin is warm and dry  Neurological:      General: No focal deficit present  Mental Status: She is alert and oriented to person, place, and time  Psychiatric:         Mood and Affect: Mood normal          Behavior: Behavior normal          Assessment / Plan:    1  Iron deficiency anemia secondary to inadequate dietary iron intake    2  GIANG (nonalcoholic steatohepatitis)      46year old female with a history of GIANG that is progressing, iron deficiency anemia, hypothyroidism, CAD, and diabetes mellitus  She was initially seen in 11/2021 for iron deficiency anemia  Patient had anemia dating back to 2019 with a decrease in 10/2021  She was treated with IV Venofer 200 mg x 10 doses  Her hemoglobin and iron panel normalized  She had GI workup completed in 2021 which showed bleeding in the proximal colon due to an AVM  She will have a repeat colonoscopy in July to assess the bleeding  She has followed closely with GI and is now seeing Dr Chu Sparrow, the liver specialist  She will follow-up with him in August     Most recent labs demonstrate no evidence of anemia or iron deficiency  Patient is somewhat fatigued, but reports no concerning symptoms  She will stay on observation  We will treat as needed with iron replacement  She will repeat labs in 3 months and again in 6 months to include CBC, CMP, iron panel, and MMA  Patient verbalized understanding  She will return for a follow-up visit in 6 months  She is in agreement with the plan of care  She knows to call with any concerning symptoms or questions    Goals and Barriers:  Current Goal:     Barriers: None  Patient's Capacity to Self Care:  Patient able to self care  Portions of the record may have been created with voice recognition software    Occasional wrong word or "sound a like" substitutions may have occurred due to the inherent limitations of voice recognition software  Read the chart carefully and recognize, using context, where substitutions have occurred

## 2022-05-12 DIAGNOSIS — E03.8 HYPOTHYROIDISM DUE TO HASHIMOTO'S THYROIDITIS: ICD-10-CM

## 2022-05-12 DIAGNOSIS — E06.3 HYPOTHYROIDISM DUE TO HASHIMOTO'S THYROIDITIS: ICD-10-CM

## 2022-05-12 DIAGNOSIS — I10 ESSENTIAL HYPERTENSION: ICD-10-CM

## 2022-05-12 DIAGNOSIS — E78.2 COMBINED HYPERLIPIDEMIA: ICD-10-CM

## 2022-05-12 DIAGNOSIS — R07.89 OTHER CHEST PAIN: ICD-10-CM

## 2022-05-12 RX ORDER — METOPROLOL SUCCINATE 25 MG/1
25 TABLET, EXTENDED RELEASE ORAL 2 TIMES DAILY
Qty: 180 TABLET | Refills: 0 | Status: SHIPPED | OUTPATIENT
Start: 2022-05-12

## 2022-05-12 RX ORDER — LEVOTHYROXINE SODIUM 112 UG/1
112 TABLET ORAL DAILY
Qty: 90 TABLET | Refills: 0 | Status: SHIPPED | OUTPATIENT
Start: 2022-05-12 | End: 2022-07-27 | Stop reason: SDUPTHER

## 2022-05-12 NOTE — TELEPHONE ENCOUNTER
Requested medication(s) are due for refill today: Yes  Patient has already received a courtesy refill: No  Other reason request has been forwarded to provider: no valid encounter in the last 6 months

## 2022-05-13 ENCOUNTER — OFFICE VISIT (OUTPATIENT)
Dept: HEMATOLOGY ONCOLOGY | Facility: HOSPITAL | Age: 52
End: 2022-05-13
Payer: COMMERCIAL

## 2022-05-13 VITALS
BODY MASS INDEX: 28.88 KG/M2 | TEMPERATURE: 98.2 F | DIASTOLIC BLOOD PRESSURE: 80 MMHG | HEIGHT: 63 IN | SYSTOLIC BLOOD PRESSURE: 110 MMHG | OXYGEN SATURATION: 94 % | WEIGHT: 163 LBS | RESPIRATION RATE: 16 BRPM | HEART RATE: 89 BPM

## 2022-05-13 DIAGNOSIS — D50.8 IRON DEFICIENCY ANEMIA SECONDARY TO INADEQUATE DIETARY IRON INTAKE: Primary | ICD-10-CM

## 2022-05-13 DIAGNOSIS — K75.81 NASH (NONALCOHOLIC STEATOHEPATITIS): ICD-10-CM

## 2022-05-13 PROCEDURE — 99214 OFFICE O/P EST MOD 30 MIN: CPT | Performed by: NURSE PRACTITIONER

## 2022-05-13 RX ORDER — AMLODIPINE BESYLATE 5 MG/1
5 TABLET ORAL DAILY
Qty: 90 TABLET | Refills: 0 | Status: SHIPPED | OUTPATIENT
Start: 2022-05-13

## 2022-05-13 RX ORDER — ICOSAPENT ETHYL 1000 MG/1
CAPSULE ORAL
Qty: 360 CAPSULE | Refills: 0 | Status: SHIPPED | OUTPATIENT
Start: 2022-05-13

## 2022-05-16 ENCOUNTER — HOSPITAL ENCOUNTER (OUTPATIENT)
Dept: ULTRASOUND IMAGING | Facility: HOSPITAL | Age: 52
Discharge: HOME | End: 2022-05-16
Attending: INTERNAL MEDICINE
Payer: COMMERCIAL

## 2022-05-16 DIAGNOSIS — K21.9 GASTROESOPHAGEAL REFLUX DISEASE WITHOUT ESOPHAGITIS: ICD-10-CM

## 2022-05-16 DIAGNOSIS — K75.81 NASH (NONALCOHOLIC STEATOHEPATITIS): ICD-10-CM

## 2022-05-16 DIAGNOSIS — D50.0 IRON DEFICIENCY ANEMIA DUE TO CHRONIC BLOOD LOSS: ICD-10-CM

## 2022-05-16 PROCEDURE — 76700 US EXAM ABDOM COMPLETE: CPT

## 2022-05-19 NOTE — TELEPHONE ENCOUNTER
I would recommend OTC Omega 3,  5 capsules daily
Notified pt
Pt's [place] 3 was denied by the insurance How would you like me to proceed?
Started prior auth for [place]-3 through CoverMyMeds  Waiting on determination 
[facility]

## 2022-05-20 ENCOUNTER — TELEPHONE (OUTPATIENT)
Dept: GASTROENTEROLOGY | Facility: CLINIC | Age: 52
End: 2022-05-20

## 2022-05-20 DIAGNOSIS — J01.90 ACUTE SINUSITIS, RECURRENCE NOT SPECIFIED, UNSPECIFIED LOCATION: ICD-10-CM

## 2022-05-20 NOTE — TELEPHONE ENCOUNTER
Pt returned call  Rescheduled for colonoscopy 05 25 22 @[facility] with Dr Alisa Choi  Pt diabetic but stated she has no problems and she will be fine for an afternoon appt

## 2022-05-20 NOTE — TELEPHONE ENCOUNTER
----- Message from Melissa Finley sent at 5/17/2022  3:43 PM EDT -----  Regarding: lalo scope dates/facility? Hi fave GI girls! Does Dr Tao Nicholas scope at [facility]? This pt would like Lalo to do the procedure since she saw him recently  He's not at [place] [id number] until 8/19 that I can schedule with  I wasn't sure how his schedule works when he's not with us lol   Please helppp :)   Thanks!

## 2022-05-21 RX ORDER — TRAMADOL HYDROCHLORIDE 50 MG/1
50 TABLET ORAL EVERY 8 HOURS PRN
Qty: 60 TABLET | Refills: 0 | Status: SHIPPED | OUTPATIENT
Start: 2022-05-21 | End: 2022-06-13 | Stop reason: SDUPTHER

## 2022-05-23 NOTE — TELEPHONE ENCOUNTER
Original prep instructions mailed to pt 04 22 22  Spoke to pt  Emailed prep instructions to [facility]  [email or web address]

## 2022-05-25 ENCOUNTER — HOSPITAL ENCOUNTER (OUTPATIENT)
Dept: GASTROENTEROLOGY | Facility: HOSPITAL | Age: 52
Setting detail: OUTPATIENT SURGERY
Discharge: HOME | End: 2022-05-25
Attending: INTERNAL MEDICINE | Admitting: INTERNAL MEDICINE
Payer: COMMERCIAL

## 2022-05-25 ENCOUNTER — ANESTHESIA EVENT (OUTPATIENT)
Dept: GASTROENTEROLOGY | Facility: HOSPITAL | Age: 52
End: 2022-05-25

## 2022-05-25 ENCOUNTER — ANESTHESIA (OUTPATIENT)
Dept: GASTROENTEROLOGY | Facility: HOSPITAL | Age: 52
End: 2022-05-25

## 2022-05-25 VITALS
BODY MASS INDEX: 28.88 KG/M2 | HEART RATE: 79 BPM | HEIGHT: 63 IN | TEMPERATURE: 96.9 F | RESPIRATION RATE: 16 BRPM | OXYGEN SATURATION: 94 % | DIASTOLIC BLOOD PRESSURE: 51 MMHG | WEIGHT: 163 LBS | SYSTOLIC BLOOD PRESSURE: 108 MMHG

## 2022-05-25 DIAGNOSIS — K55.20 AVM (ARTERIOVENOUS MALFORMATION) OF COLON: ICD-10-CM

## 2022-05-25 DIAGNOSIS — D50.0 IRON DEFICIENCY ANEMIA DUE TO CHRONIC BLOOD LOSS: ICD-10-CM

## 2022-05-25 LAB — GLUCOSE SERPL-MCNC: 138 MG/DL (ref 65–140)

## 2022-05-25 PROCEDURE — 82948 REAGENT STRIP/BLOOD GLUCOSE: CPT

## 2022-05-25 PROCEDURE — 45378 DIAGNOSTIC COLONOSCOPY: CPT | Performed by: INTERNAL MEDICINE

## 2022-05-25 RX ORDER — PROPOFOL 10 MG/ML
INJECTION, EMULSION INTRAVENOUS AS NEEDED
Status: DISCONTINUED | OUTPATIENT
Start: 2022-05-25 | End: 2022-05-25

## 2022-05-25 RX ORDER — SODIUM CHLORIDE 9 MG/ML
INJECTION, SOLUTION INTRAVENOUS CONTINUOUS PRN
Status: DISCONTINUED | OUTPATIENT
Start: 2022-05-25 | End: 2022-05-25

## 2022-05-25 RX ADMIN — PROPOFOL 50 MG: 10 INJECTION, EMULSION INTRAVENOUS at 10:12

## 2022-05-25 RX ADMIN — PROPOFOL 50 MG: 10 INJECTION, EMULSION INTRAVENOUS at 10:22

## 2022-05-25 RX ADMIN — SODIUM CHLORIDE: 0.9 INJECTION, SOLUTION INTRAVENOUS at 09:51

## 2022-05-25 RX ADMIN — PROPOFOL 50 MG: 10 INJECTION, EMULSION INTRAVENOUS at 10:09

## 2022-05-25 RX ADMIN — PROPOFOL 50 MG: 10 INJECTION, EMULSION INTRAVENOUS at 10:15

## 2022-05-25 RX ADMIN — PROPOFOL 50 MG: 10 INJECTION, EMULSION INTRAVENOUS at 10:18

## 2022-05-25 RX ADMIN — PROPOFOL 100 MG: 10 INJECTION, EMULSION INTRAVENOUS at 10:07

## 2022-05-25 NOTE — INTERVAL H&P NOTE
H&P reviewed  After examining the patient I find no changes in the patients condition since the H&P had been written  Colonoscopy for MIKA and finding of AVM in cecum on capsule endoscopy      Vitals:    05/25/22 0926   BP: 121/65   Pulse: 75   Resp: 16   Temp: 98 4 °F (36 9 °C)   SpO2: 96%

## 2022-05-25 NOTE — ANESTHESIA PREPROCEDURE EVALUATION
Procedure:  COLONOSCOPY    Relevant Problems   CARDIO   (+) Anomalous coronary artery origin   (+) Combined hyperlipidemia   (+) Coronary vasospasm (HCC)   (+) Hypertension   (+) NSTEMI (non-ST elevated myocardial infarction) ([address] )      ENDO   (+) Hypothyroidism due to Hashimoto's thyroiditis   (+) Type 2 diabetes mellitus with hyperglycemia, without long-term current use of insulin (HCC)      GI/HEPATIC   (+) Gastroesophageal reflux disease without esophagitis   (+) GIANG (nonalcoholic steatohepatitis)      HEMATOLOGY   (+) Anemia      MUSCULOSKELETAL   (+) Coronary vasospasm (HCC)      NEURO/PSYCH   (+) Anxiety   (+) Chronic pain syndrome      Respiratory   (+) Chronic sinusitis      Patient had hashimoto's thyroiditis that led to coronary vasospasm that then led to NSTEMI  Follow-up cath showed anomalous  Coronary artery origin    Physical Exam    Airway    Mallampati score: II  TM Distance: >3 FB  Neck ROM: full     Dental   No notable dental hx     Cardiovascular      Pulmonary      Other Findings      EKG (9/2019)  Sinus tach @ 109, nonspecific T-wave abn    ECHO (10/2017)  LEFT VENTRICLE: Size was normal  Systolic function was normal  Ejection fraction was estimated to be 55 %  There were no regional wall motion abnormalities  Wall thickness was normal  DOPPLER: Left ventricular diastolic function parameters  were normal      RIGHT VENTRICLE: The size was normal  Systolic function was normal  Wall thickness was normal      LEFT ATRIUM: Size was normal      RIGHT ATRIUM: Size was normal      MITRAL VALVE: Valve structure was normal  There was normal leaflet separation  DOPPLER: The transmitral velocity was within the normal range  There was no evidence for stenosis  There was trace regurgitation      AORTIC VALVE: The valve was trileaflet  Leaflets exhibited normal thickness and normal cuspal separation  DOPPLER: Transaortic velocity was within the normal range  There was no evidence for stenosis   There was no regurgitation  Anesthesia Plan  ASA Score- 2     Anesthesia Type- IV sedation with anesthesia with ASA Monitors  Additional Monitors:   Airway Plan:     Comment: 02:30 last of PO bowel prep  Sips of water with meds at 08:00  Plan Factors-Exercise tolerance (METS): >4 METS  Chart reviewed  EKG reviewed  Patient summary reviewed  Patient is not a current smoker  Induction- intravenous  Postoperative Plan-     Informed Consent- Anesthetic plan and risks discussed with patient and spouse  I personally reviewed this patient with the CRNA  Discussed and agreed on the Anesthesia Plan with the LUANNE Joni Console

## 2022-05-25 NOTE — ANESTHESIA POSTPROCEDURE EVALUATION
Post-Op Assessment Note    CV Status:  Stable  Pain Score: 0    Pain management: adequate     Mental Status:  Alert and awake   Hydration Status:  Euvolemic and stable   PONV Controlled:  None   Airway Patency:  Patent      Post Op Vitals Reviewed: Yes      Staff: CRNA         No complications documented      /64 (05/25/22 1031)    Temp (!) 96 9 °F (36 1 °C) (05/25/22 1031)    Pulse 80 (05/25/22 1031)   Resp 16 (05/25/22 1031)    SpO2 95 % (05/25/22 1031)

## 2022-06-03 ENCOUNTER — CONSULT (OUTPATIENT)
Dept: BARIATRICS | Facility: CLINIC | Age: 52
End: 2022-06-03
Payer: COMMERCIAL

## 2022-06-03 VITALS
SYSTOLIC BLOOD PRESSURE: 106 MMHG | TEMPERATURE: 98.5 F | WEIGHT: 164.7 LBS | HEART RATE: 78 BPM | HEIGHT: 63 IN | BODY MASS INDEX: 29.18 KG/M2 | DIASTOLIC BLOOD PRESSURE: 78 MMHG

## 2022-06-03 DIAGNOSIS — D50.0 IRON DEFICIENCY ANEMIA DUE TO CHRONIC BLOOD LOSS: ICD-10-CM

## 2022-06-03 DIAGNOSIS — K75.81 NASH (NONALCOHOLIC STEATOHEPATITIS): ICD-10-CM

## 2022-06-03 DIAGNOSIS — E06.3 HYPOTHYROIDISM DUE TO HASHIMOTO'S THYROIDITIS: ICD-10-CM

## 2022-06-03 DIAGNOSIS — E66.3 OVERWEIGHT: Primary | ICD-10-CM

## 2022-06-03 DIAGNOSIS — E78.2 COMBINED HYPERLIPIDEMIA: ICD-10-CM

## 2022-06-03 DIAGNOSIS — E03.8 HYPOTHYROIDISM DUE TO HASHIMOTO'S THYROIDITIS: ICD-10-CM

## 2022-06-03 DIAGNOSIS — I10 PRIMARY HYPERTENSION: Chronic | ICD-10-CM

## 2022-06-03 DIAGNOSIS — K21.9 GASTROESOPHAGEAL REFLUX DISEASE WITHOUT ESOPHAGITIS: ICD-10-CM

## 2022-06-03 DIAGNOSIS — E11.65 TYPE 2 DIABETES MELLITUS WITH HYPERGLYCEMIA, WITHOUT LONG-TERM CURRENT USE OF INSULIN (HCC): ICD-10-CM

## 2022-06-03 DIAGNOSIS — F41.9 ANXIETY: ICD-10-CM

## 2022-06-03 PROCEDURE — 99244 OFF/OP CNSLTJ NEW/EST MOD 40: CPT | Performed by: NURSE PRACTITIONER

## 2022-06-03 NOTE — PROGRESS NOTES
Assessment/Plan:    Overweight  - Discussed options of HealthyCORE-Intensive Lifestyle Intervention Program, Very Low Calorie Diet-VLCD and Conservative Program and the role of weight loss medications  - Patient is interested in pursuing Conservative Program  - Initial weight loss goal of 5-10% weight loss for improved health  - Weight loss can improve patient's co-morbid conditions and/or prevent weight-related complications  - Stop Bang 2/8  - Labs reviewed: TSH, A1C, lipid panel and CMP 4/20/2022  A1C controlled at 5 9  Tri and LDL elevated with HDL low, AST and ALT elevated and fasting glucose, which may all improve with weight loss  Otherwise, labs within acceptable range  - Weight loss medication discussed  Given her history of GIANG, would like to start on anti-obesity medications sooner    - Patient denies personal history of pancreatitis  Patient also denies personal or family history of medullary thyroid cancer and multiple endocrine neoplasia type 2 (MEN 2 tumor)  - Consider Ozempic given history of diabetes  Could switch to [facility]BRENDA GARCIA once Ozempic at higher dose (lower doses of Wegovy currently not available)  Will review with GI to ensure no contraindication given GIANG  - Will also review with endocrinology, as she is currently on glimepiride and sitagliptin-metformin and likely medication adjustment will be needed when starting Ozempic  Goals:  Do not skip meals  Food log (ie ) www myfitnesspal com,sparkpeople  com,loseit com,calorieking  com,etc  baritastic (use skinnytaste  com, dietdoctor  com or smartphone paco [employer] for recipes)  No sugary beverages  Keep up the great hydration - at least 64oz of water daily  Increase physical activity by 10 minutes daily   Gradually increase physical activity to a goal of 5 days per week for 30 minutes of MODERATE intensity PLUS 2 days per week of FULL BODY resistance training (use smartphone apps FitON, Home Workout, etc ) Try to get 10,000 steps daily    - Start logging, weighing and measuring food  - 4021-2571 calories per day  Sample menu given  Combined hyperlipidemia  - May improve with weight loss and lifestyle modification  Anxiety  - Taking Lexapro  Continue management with prescribing provider  Hypertension  - Taking Norvasc and Toprol  May improve with weight loss  Continue management with prescribing provider  Hypothyroidism due to Hashimoto's thyroiditis  - Taking levothyroxine  Continue management with prescribing provider  Type 2 diabetes mellitus with hyperglycemia, without long-term current use of insulin ([facility])  - Taking Amaryl and Sitagliptin-metformin  Continue management with prescribing provider  Lab Results   Component Value Date    HGBA1C 5 9 (H) 04/20/2022       Gastroesophageal reflux disease without esophagitis  - Taking Protonix  May improve with weight loss  Continue management with prescribing provider  GIANG (nonalcoholic steatohepatitis)  - May improve with weight loss  Continue to follow with GI  Jose Eduardo Mark was seen today for consult  Diagnoses and all orders for this visit:    Overweight    GIANG (nonalcoholic steatohepatitis)  -     Ambulatory Referral to Weight Management    Gastroesophageal reflux disease without esophagitis  -     Ambulatory Referral to Weight Management    Iron deficiency anemia due to chronic blood loss  -     Ambulatory Referral to Weight Management    Anxiety    Combined hyperlipidemia    Primary hypertension    Hypothyroidism due to Hashimoto's thyroiditis    Type 2 diabetes mellitus with hyperglycemia, without long-term current use of insulin ([facility] [address] )          Follow up in approximately 6-8 weeks with Non-Surgical Physician/Advanced Practitioner      Subjective:   Chief Complaint   Patient presents with    Consult     MWM  S/B 2-8 neg       Patient ID: Ana Aguirre  is a 46 y o  female with excess weight/obesity here to pursue weight management  Previous notes and records have been reviewed  Past Medical History:   Diagnosis Date    Coronary artery disease     Diabetes mellitus ([facility] [address] )     Borderline    Disease of thyroid gland     DUB (dysfunctional uterine bleeding)     Fatty liver     Hashimoto's thyroiditis     Last Assessed:  14    History of stomach ulcers     Hypertension     Hypothyroidism     Irritable bowel syndrome     Last Assessed:  3/25/14    Liver disease     elevated enzymes    Myocardial infarction ([facility] [address] )     2017    GIANG (nonalcoholic steatohepatitis)     Ovarian cyst     left    Psychogenic polydipsia     Has had hyponatremia from drinking too much water in the past      Past Surgical History:   Procedure Laterality Date    ANGIOPLASTY      CARDIAC CATHETERIZATION       SECTION      X 2    CHOLECYSTECTOMY      COLONOSCOPY      CYSTOSCOPY N/A 2019    Procedure: CYSTOSCOPY;  Surgeon: Susana Min MD;  [facility];  Service: Gynecology Oncology    HYSTERECTOMY      IR BIOPSY LIVER MASS  2020    OOPHORECTOMY Right     [place] ESOPHAGOGASTRODUODENOSCOPY TRANSORAL DIAGNOSTIC N/A 2018    Procedure: ESOPHAGOGASTRODUODENOSCOPY (EGD); Surgeon: Kaye Caicedo MD;  Location[facility];   Service: Gastroenterology    [place] LAPAROSCOPY W TOT HYSTERECTUTERUS <=250 [address] TUBE/OVARY N/A 2019    Procedure: TOTAL LAPAROSCOPIC HYSTERECTOMY, BILATERAL SALPINGECTOMY, LEFT OOPHORECTOMY;  Surgeon: Susana Min MD;  [facility];  Service: Gynecology Oncology    SINUS SURGERY      TONSILLECTOMY      UPPER GASTROINTESTINAL ENDOSCOPY         HPI:  Wt Readings from Last 20 Encounters:   22 74 7 kg (164 lb 11 2 oz)   22 73 9 kg (163 lb)   22 73 9 kg (163 lb)   22 73 2 kg (161 lb 6 4 oz)   22 73 8 kg (162 lb 9 6 oz)   22 75 3 kg (166 lb)   22 76 2 kg (168 lb)   21 74 4 kg (164 lb)   21 74 4 kg (164 lb)   21 73 5 kg (162 lb)   10/14/21 72 6 kg (160 lb)   10/13/21 73 8 kg (162 lb 12 8 oz)   10/01/21 74 1 kg (163 lb 6 4 oz)   21 72 6 kg (160 lb)   21 72 6 kg (160 lb)   21 71 7 kg (158 lb)   21 75 kg (165 lb 6 4 oz)   21 73 5 kg (162 lb)   20 71 2 kg (157 lb)   20 71 2 kg (157 lb)     Following with GI for GIANG  Referred for weight management to lose weight to help with GIANG  Obesity/Excess Weight:  Severity: overweight  Onset:  About 20-23 years    Modifiers: Diet and Exercise and Commercial Weight Loss Programs-ie  Weight Watchers, [employer], Nutrisystem, etc   Contributing factors: pregnanacy and thyroid issues  Associated symptoms: comorbid conditions    Hydration: 64 oz of water, 64 oz unsweetened iced tea, 1 cup of coffee black, oatmilk in cereal daily  Alcohol: none  Smoking: denies  Exercise: walking 8,000-10,000 steps daily  Occupation: nurse in   Sleep: 6-7 hours nightly  STOP ban/8    Highest weight: 192-194 lbs 10 years ago  Current weight: 164 7 lbs  Goal weight: wants to get liver healthier    Colonoscopy: UTD  Mammogram: UTD    The following portions of the patient's history were reviewed and updated as appropriate: allergies, current medications, past family history, past medical history, past social history, past surgical history, and problem list     Review of Systems   HENT: Negative for sore throat  Respiratory: Negative for cough and shortness of breath  Cardiovascular: Negative for chest pain and palpitations  Gastrointestinal: Positive for nausea (intermittent with food)  Negative for constipation, diarrhea and vomiting         + GERD controlled with medication   Endocrine: Positive for heat intolerance  Negative for cold intolerance  Genitourinary: Negative for dysuria  Musculoskeletal: Negative for arthralgias and back pain  Skin: Negative for rash  Neurological: Positive for headaches (sinus comes and goes)     Psychiatric/Behavioral: Negative for suicidal ideas (or HI)  + depression and anxiety controlled with medication       Objective:  /78 (BP Location: Right arm, Patient Position: Sitting, Cuff Size: Standard)   Pulse 78   Temp 98 5 °F (36 9 °C) (Tympanic)   Ht 5' 3 1" (1 603 m)   Wt 74 7 kg (164 lb 11 2 oz)   LMP  (LMP Unknown)   BMI 29 08 kg/m²     Physical Exam  Vitals and nursing note reviewed  Constitutional   General appearance: Abnormal   well developed and overweight  Eyes No conjunctival injection  Ears, Nose, Mouth, and Throat Oral mucosa moist    Pulmonary   Respiratory effort: No increased work of breathing or signs of respiratory distress  Cardiovascular   Examination of extremities for edema and/or varicosities: Normal   no edema  Abdomen   Abdomen: Abnormal overweight  Musculoskeletal   Gait and station: Normal     Psychiatric   Orientation to person, place and time: Normal     Affect: appropriate      Labs  Recent labs have been personally reviewed      Lab Results   Component Value Date     09/02/2015    SODIUM 137 04/20/2022    K 4 2 04/20/2022     04/20/2022    CO2 28 04/20/2022    ANIONGAP 11 09/02/2015    AGAP 9 04/20/2022    BUN 12 04/20/2022    CREATININE 0 72 04/20/2022    GLUC 175 (H) 04/29/2020    GLUF 111 (H) 04/20/2022    CALCIUM 9 8 04/20/2022    AST 71 (H) 04/20/2022     (H) 04/20/2022    ALKPHOS 124 (H) 04/20/2022    PROT 7 2 09/02/2015    TP 7 5 04/20/2022    BILITOT 0 38 09/02/2015    TBILI 0 40 04/20/2022    EGFR 97 04/20/2022     Lab Results   Component Value Date    HGBA1C 5 9 (H) 04/20/2022     Lab Results   Component Value Date    TMN5GUNISJGK 0 867 04/20/2022     Lab Results   Component Value Date    CHOLESTEROL 193 04/20/2022     Lab Results   Component Value Date    HDL 39 (L) 04/20/2022     Lab Results   Component Value Date    TRIG 211 (H) 04/20/2022     Lab Results   Component Value Date    LDLCALC 112 (H) 04/20/2022

## 2022-06-03 NOTE — ASSESSMENT & PLAN NOTE
- Taking Amaryl and Sitagliptin-metformin  Continue management with prescribing provider       Lab Results   Component Value Date    HGBA1C 5 9 (H) 04/20/2022

## 2022-06-03 NOTE — ASSESSMENT & PLAN NOTE
- Taking Norvasc and Toprol  May improve with weight loss  Continue management with prescribing provider

## 2022-06-03 NOTE — ASSESSMENT & PLAN NOTE
- Discussed options of HealthyCORE-Intensive Lifestyle Intervention Program, Very Low Calorie Diet-VLCD and Conservative Program and the role of weight loss medications  - Patient is interested in pursuing Conservative Program  - Initial weight loss goal of 5-10% weight loss for improved health  - Weight loss can improve patient's co-morbid conditions and/or prevent weight-related complications  - Stop Bang 2/8  - Labs reviewed: TSH, A1C, lipid panel and CMP 4/20/2022  A1C controlled at 5 9  Tri and LDL elevated with HDL low, AST and ALT elevated and fasting glucose, which may all improve with weight loss  Otherwise, labs within acceptable range  - Weight loss medication discussed  Given her history of GIANG, would like to start on anti-obesity medications sooner    - Patient denies personal history of pancreatitis  Patient also denies personal or family history of medullary thyroid cancer and multiple endocrine neoplasia type 2 (MEN 2 tumor)  - Consider Ozempic given history of diabetes  Could switch to [facility] once Ozempic at higher dose (lower doses of Wegovy currently not available)  Will review with GI to ensure no contraindication given GIANG  - Will also review with endocrinology, as she is currently on glimepiride and sitagliptin-metformin and likely medication adjustment will be needed when starting Ozempic  Goals:  Do not skip meals  Food log (ie ) www myfitnesspal com,sparkpeople  com,loseit com,calorieking  com,etc  baritastic (use skinnytaste  com, dietdoctor  com or smartphone paco [employer] for recipes)  No sugary beverages  Keep up the great hydration - at least 64oz of water daily  Increase physical activity by 10 minutes daily   Gradually increase physical activity to a goal of 5 days per week for 30 minutes of MODERATE intensity PLUS 2 days per week of FULL BODY resistance training (use smartphone apps [employer], Home Workout, etc ) Try to get 10,000 steps daily    - Start logging, weighing and measuring food  - 3070-6983 calories per day  Sample menu given

## 2022-06-07 ENCOUNTER — TELEPHONE (OUTPATIENT)
Dept: BARIATRICS | Facility: CLINIC | Age: 52
End: 2022-06-07

## 2022-06-07 DIAGNOSIS — K58.9 IRRITABLE BOWEL SYNDROME WITHOUT DIARRHEA: ICD-10-CM

## 2022-06-07 DIAGNOSIS — M47.816 LUMBAR SPONDYLOSIS: ICD-10-CM

## 2022-06-07 DIAGNOSIS — R07.9 CHEST PAIN, UNSPECIFIED TYPE: ICD-10-CM

## 2022-06-07 DIAGNOSIS — M79.18 MYOFASCIAL PAIN SYNDROME: ICD-10-CM

## 2022-06-07 DIAGNOSIS — K75.81 NASH (NONALCOHOLIC STEATOHEPATITIS): ICD-10-CM

## 2022-06-07 DIAGNOSIS — G89.4 CHRONIC PAIN SYNDROME: ICD-10-CM

## 2022-06-07 DIAGNOSIS — M54.50 CHRONIC LOW BACK PAIN WITHOUT SCIATICA, UNSPECIFIED BACK PAIN LATERALITY: ICD-10-CM

## 2022-06-07 DIAGNOSIS — E11.65 TYPE 2 DIABETES MELLITUS WITH HYPERGLYCEMIA, WITHOUT LONG-TERM CURRENT USE OF INSULIN (HCC): ICD-10-CM

## 2022-06-07 DIAGNOSIS — E66.3 OVERWEIGHT: Primary | ICD-10-CM

## 2022-06-07 DIAGNOSIS — G89.29 CHRONIC LOW BACK PAIN WITHOUT SCIATICA, UNSPECIFIED BACK PAIN LATERALITY: ICD-10-CM

## 2022-06-07 DIAGNOSIS — M54.16 LUMBAR RADICULOPATHY: ICD-10-CM

## 2022-06-07 RX ORDER — DICYCLOMINE HCL 20 MG
20 TABLET ORAL EVERY 6 HOURS PRN
Qty: 60 TABLET | Refills: 0 | Status: SHIPPED | OUTPATIENT
Start: 2022-06-07 | End: 2022-08-09 | Stop reason: SDUPTHER

## 2022-06-07 RX ORDER — SEMAGLUTIDE 1.34 MG/ML
INJECTION, SOLUTION SUBCUTANEOUS
Qty: 1.5 ML | Refills: 2 | Status: SHIPPED | OUTPATIENT
Start: 2022-06-07 | End: 2022-07-27 | Stop reason: SDUPTHER

## 2022-06-07 RX ORDER — IBUPROFEN 600 MG/1
TABLET ORAL
Qty: 180 TABLET | Refills: 0 | OUTPATIENT
Start: 2022-06-07

## 2022-06-07 NOTE — TELEPHONE ENCOUNTER
Please cancel the attached refill request  Confirmed rx on file at [facility]  Pt will be notified when it is ready for pu

## 2022-06-08 RX ORDER — NITROGLYCERIN 0.4 MG/1
0.4 TABLET SUBLINGUAL
Qty: 90 TABLET | Refills: 0 | Status: SHIPPED | OUTPATIENT
Start: 2022-06-08

## 2022-06-13 DIAGNOSIS — G89.4 CHRONIC PAIN SYNDROME: ICD-10-CM

## 2022-06-13 DIAGNOSIS — M47.816 LUMBAR SPONDYLOSIS: ICD-10-CM

## 2022-06-13 DIAGNOSIS — J01.90 ACUTE SINUSITIS, RECURRENCE NOT SPECIFIED, UNSPECIFIED LOCATION: ICD-10-CM

## 2022-06-13 DIAGNOSIS — M54.50 CHRONIC LOW BACK PAIN WITHOUT SCIATICA, UNSPECIFIED BACK PAIN LATERALITY: ICD-10-CM

## 2022-06-13 DIAGNOSIS — E11.65 TYPE 2 DIABETES MELLITUS WITH HYPERGLYCEMIA, WITHOUT LONG-TERM CURRENT USE OF INSULIN (HCC): ICD-10-CM

## 2022-06-13 DIAGNOSIS — M54.16 LUMBAR RADICULOPATHY: ICD-10-CM

## 2022-06-13 DIAGNOSIS — M79.18 MYOFASCIAL PAIN SYNDROME: ICD-10-CM

## 2022-06-13 DIAGNOSIS — F32.A DEPRESSION, UNSPECIFIED DEPRESSION TYPE: ICD-10-CM

## 2022-06-13 DIAGNOSIS — G89.29 CHRONIC LOW BACK PAIN WITHOUT SCIATICA, UNSPECIFIED BACK PAIN LATERALITY: ICD-10-CM

## 2022-06-14 ENCOUNTER — TELEPHONE (OUTPATIENT)
Dept: ENDOCRINOLOGY | Facility: HOSPITAL | Age: 52
End: 2022-06-14

## 2022-06-14 DIAGNOSIS — E11.65 TYPE 2 DIABETES MELLITUS WITH HYPERGLYCEMIA, WITHOUT LONG-TERM CURRENT USE OF INSULIN (HCC): Primary | ICD-10-CM

## 2022-06-14 RX ORDER — ESCITALOPRAM OXALATE 20 MG/1
20 TABLET ORAL DAILY
Qty: 90 TABLET | Refills: 0 | Status: SHIPPED | OUTPATIENT
Start: 2022-06-14

## 2022-06-14 RX ORDER — METFORMIN HYDROCHLORIDE 500 MG/1
1000 TABLET, EXTENDED RELEASE ORAL 2 TIMES DAILY WITH MEALS
Qty: 360 TABLET | Refills: 1 | Status: SHIPPED | OUTPATIENT
Start: 2022-06-14 | End: 2022-09-13 | Stop reason: SDUPTHER

## 2022-06-14 RX ORDER — TRAMADOL HYDROCHLORIDE 50 MG/1
50 TABLET ORAL EVERY 8 HOURS PRN
Qty: 60 TABLET | Refills: 0 | Status: SHIPPED | OUTPATIENT
Start: 2022-06-14 | End: 2022-07-07 | Stop reason: SDUPTHER

## 2022-06-14 NOTE — TELEPHONE ENCOUNTER
Received call from patient  See mychart message from 5/11/22  Patient is requesting a script for Meformin, what dose needs to be ordered?

## 2022-06-14 NOTE — TELEPHONE ENCOUNTER
methocarbamol (ROBAXIN) 750 mg tablet [[id number]]     Order Details  Dose, Route, Frequency: As Directed   Dispense Quantity: 90 tablet Refills: 1          Si tab PO HS           Start Date: 22 End Date: --   Written Date: 22 Expiration Date: 23        Ordering User:  Theresa Magana [id number], [facility] [address] [address]   Phone:  [phone]       2022 fu ov

## 2022-06-14 NOTE — TELEPHONE ENCOUNTER
She is on Janumet which contains metformin  She is on the max dose of metformin through this  She does not need a script specifically for metformin

## 2022-06-14 NOTE — TELEPHONE ENCOUNTER
Patient stated she was told to stop the Janumet when she started Ozempic and that Dr Katarzyna Cordero would order Metformin?

## 2022-06-16 RX ORDER — METHOCARBAMOL 750 MG/1
TABLET, FILM COATED ORAL
Qty: 90 TABLET | Refills: 0 | OUTPATIENT
Start: 2022-06-16

## 2022-06-17 ENCOUNTER — TELEPHONE (OUTPATIENT)
Dept: PAIN MEDICINE | Facility: CLINIC | Age: 52
End: 2022-06-17

## 2022-06-28 ENCOUNTER — OFFICE VISIT (OUTPATIENT)
Dept: PAIN MEDICINE | Facility: CLINIC | Age: 52
End: 2022-06-28
Payer: COMMERCIAL

## 2022-06-28 VITALS
HEART RATE: 74 BPM | HEIGHT: 63 IN | SYSTOLIC BLOOD PRESSURE: 110 MMHG | DIASTOLIC BLOOD PRESSURE: 78 MMHG | BODY MASS INDEX: 28.7 KG/M2 | TEMPERATURE: 97.8 F | WEIGHT: 162 LBS

## 2022-06-28 DIAGNOSIS — G89.29 CHRONIC LOW BACK PAIN WITHOUT SCIATICA, UNSPECIFIED BACK PAIN LATERALITY: ICD-10-CM

## 2022-06-28 DIAGNOSIS — M54.50 CHRONIC LOW BACK PAIN WITHOUT SCIATICA, UNSPECIFIED BACK PAIN LATERALITY: ICD-10-CM

## 2022-06-28 DIAGNOSIS — M79.18 MYOFASCIAL PAIN SYNDROME: ICD-10-CM

## 2022-06-28 DIAGNOSIS — G89.4 CHRONIC PAIN SYNDROME: Primary | ICD-10-CM

## 2022-06-28 DIAGNOSIS — M47.816 LUMBAR SPONDYLOSIS: ICD-10-CM

## 2022-06-28 DIAGNOSIS — M54.16 LUMBAR RADICULOPATHY: ICD-10-CM

## 2022-06-28 PROCEDURE — 99214 OFFICE O/P EST MOD 30 MIN: CPT | Performed by: NURSE PRACTITIONER

## 2022-06-28 RX ORDER — METHOCARBAMOL 750 MG/1
TABLET, FILM COATED ORAL
Qty: 90 TABLET | Refills: 1 | Status: SHIPPED | OUTPATIENT
Start: 2022-06-28

## 2022-06-28 RX ORDER — IBUPROFEN 600 MG/1
TABLET ORAL
Qty: 180 TABLET | Refills: 1 | Status: SHIPPED | OUTPATIENT
Start: 2022-06-28

## 2022-06-28 NOTE — PROGRESS NOTES
Assessment:  1  Chronic pain syndrome    2  Chronic low back pain without sciatica, unspecified back pain laterality    3  Lumbar radiculopathy    4  Myofascial pain syndrome    5  Lumbar spondylosis        Plan:  While the patient was in the office today, I did have a thorough conversation with the patient regarding their chronic pain syndrome, symptoms, medication regimen, and treatment plan  I encouraged the patient continue to follow-up with her specialists regarding her Ozempic and side effects as scheduled  The patient was agreeable and verbalized an understanding  With regards to her chronic pain symptoms and her medication management, since she is noting significant relief and I feel it is reasonable and appropriate with her underlying etiology, we will continue the methocarbamol and ibuprofen as prescribed and I encouraged the patient to continue to try to use the ibuprofen more as needed  The patient was agreeable and verbalized an understanding  The patient will follow-up in 6 months for medication prescription refill and reevaluation  The patient was advised to contact the office should their symptoms worsen in the interim  The patient was agreeable and verbalized an understanding  History of Present Illness: The patient is a 46 y o  female last seen on 3/8/2022 who presents for a follow up office visit in regards to chronic pain syndrome, as the patient's pain has been ongoing for greater than a year, secondary to lumbar spondylosis with radiculopathy and myofascial pain  The patient currently reports that since her last office visit because of her liver enzymes she has been started on Ozempic, which she does report seems to be making her nauseous but she knows she has to give it time and hopefully the nausea will improve    The patient reports overall with regards to her chronic low back pain and radiculopathy her pain is stable and manageable with the methocarbamol and ibuprofen and the patient is trying hard to use the ibuprofen as needed but takes methocarbamol every night  The patient presents today for regular medication follow-up visit  Current pain medications includes:  Methocarbamol 750 mg at bedtime and ibuprofen 600 mg b i d  p r n  for pain  The patient reports that this regimen is providing 75-80% pain relief  The patient is reporting no side effects from this pain medication regimen  I have personally reviewed and/or updated the patient's past medical history, past surgical history, family history, social history, current medications, allergies, and vital signs today  Review of Systems:    Review of Systems   Respiratory: Negative for shortness of breath  Cardiovascular: Negative for chest pain  Gastrointestinal: Negative for constipation, diarrhea, nausea and vomiting  Musculoskeletal: Negative for arthralgias, gait problem, joint swelling (joint stiffness) and myalgias  Skin: Negative for rash  Neurological: Negative for dizziness, seizures and weakness  All other systems reviewed and are negative          Past Medical History:   Diagnosis Date    Coronary artery disease     Diabetes mellitus ([facility] [address] )     Borderline    Disease of thyroid gland     DUB (dysfunctional uterine bleeding)     Fatty liver     Hashimoto's thyroiditis     Last Assessed:  14    History of stomach ulcers     Hypertension     Hypothyroidism     Irritable bowel syndrome     Last Assessed:  3/25/14    Liver disease     elevated enzymes    Myocardial infarction ([facility] [address] )         GIANG (nonalcoholic steatohepatitis)     Ovarian cyst     left    Psychogenic polydipsia     Has had hyponatremia from drinking too much water in the past        Past Surgical History:   Procedure Laterality Date    ANGIOPLASTY      CARDIAC CATHETERIZATION       SECTION      X 2    CHOLECYSTECTOMY      COLONOSCOPY      CYSTOSCOPY N/A 2019    Procedure: CYSTOSCOPY;  Surgeon: Ramy Valladares MD;  [facility];  Service: Gynecology Oncology    HYSTERECTOMY      IR BIOPSY LIVER MASS  2020    OOPHORECTOMY Right     [place] ESOPHAGOGASTRODUODENOSCOPY TRANSORAL DIAGNOSTIC N/A 2018    Procedure: ESOPHAGOGASTRODUODENOSCOPY (EGD); Surgeon: Shelia Whitaker MD;  [facility];   Service: Gastroenterology    [place] LAPAROSCOPY W TOT HYSTERECTUTERUS <=250 [place] TUBE/OVARY N/A 2019    Procedure: TOTAL LAPAROSCOPIC HYSTERECTOMY, BILATERAL SALPINGECTOMY, LEFT OOPHORECTOMY;  Surgeon: Ramy Valladares MD;  [facility];  Service: Gynecology Oncology    SINUS SURGERY      TONSILLECTOMY      UPPER GASTROINTESTINAL ENDOSCOPY         Family History   Problem Relation Age of Onset    Pancreatic cancer Mother     Hypertension Father     Diabetes type II Father     Diabetes type II Brother     No Known Problems Brother     Lung disease Daughter         asthma tendencies    No Known Problems Son     Liver cancer Maternal Aunt     Melanoma Maternal Aunt     Hypothyroidism Paternal Uncle     Diabetes Maternal Grandmother     Diabetes Paternal Grandmother     Heart disease Paternal Grandmother     Hypothyroidism Paternal Grandmother     Hyperlipidemia Neg Hx     Stroke Neg Hx        Social History     Occupational History    Not on file   Tobacco Use    Smoking status: Former Smoker     Packs/day: 0 50     Years: 4 00     Pack years: 2 00     Types: Cigarettes     Quit date: 2016     Years since quittin 4    Smokeless tobacco: Never Used    Tobacco comment: 2016   Vaping Use    Vaping Use: Never used   Substance and Sexual Activity    Alcohol use: Never     Comment: occasional, Social drinker    Drug use: No    Sexual activity: Yes     Partners: Male     Birth control/protection: None         Current Outpatient Medications:     amLODIPine (NORVASC) 5 mg tablet, Take 1 tablet (5 mg total) by mouth in the morning , Disp: 90 tablet, Rfl: 0    dicyclomine (BENTYL) 20 mg tablet, Take 1 tablet (20 mg total) by mouth every 6 (six) hours as needed (every 6 hours), Disp: 60 tablet, Rfl: 0    escitalopram (LEXAPRO) 20 mg tablet, Take 1 tablet (20 mg total) by mouth daily, Disp: 90 tablet, Rfl: 0    glimepiride (AMARYL) 1 mg tablet, Take 1 tablet (1 mg total) by mouth daily with breakfast, Disp: 90 tablet, Rfl: 1    ibuprofen (MOTRIN) 600 mg tablet, Take 1 PO BID with food  NO OTHER NSAIDS while on this medication  , Disp: 180 tablet, Rfl: 1    Icosapent Ethyl (Vascepa) 1 g CAPS, 2 capsules twice a day, Disp: 360 capsule, Rfl: 0    Lactobacillus (PROBIOTIC ACIDOPHILUS PO), Take by mouth daily, Disp: , Rfl:     levothyroxine 112 mcg tablet, Take 1 tablet (112 mcg total) by mouth in the morning , Disp: 90 tablet, Rfl: 0    Magnesium 300 MG CAPS, Take 600 mg by mouth daily, Disp: , Rfl:     metFORMIN (GLUCOPHAGE-XR) 500 mg 24 hr tablet, Take 2 tablets (1,000 mg total) by mouth 2 (two) times a day with meals, Disp: 360 tablet, Rfl: 1    methocarbamol (ROBAXIN) 750 mg tablet, 1 tab PO HS , Disp: 90 tablet, Rfl: 1    metoprolol succinate (TOPROL-XL) 25 mg 24 hr tablet, Take 1 tablet (25 mg total) by mouth in the morning and 1 tablet (25 mg total) before bedtime  , Disp: 180 tablet, Rfl: 0    mupirocin (BACTROBAN) 2 % ointment, Apply topically 3 (three) times a day, Disp: 22 g, Rfl: 0    pantoprazole (PROTONIX) 40 mg tablet, Take 1 tablet (40 mg total) by mouth daily before breakfast, Disp: 90 tablet, Rfl: 0    Semaglutide,0 25 or 0 5MG/DOS, (Ozempic, 0 25 or 0 5 MG/DOSE,) 2 MG/1 5ML SOPN, Take 0 25 mg SC q week for 4 weeks than increase to 0 5 mg q week, Disp: 1 5 mL, Rfl: 2    traMADol (ULTRAM) 50 mg tablet, Take 1 tablet (50 mg total) by mouth every 8 (eight) hours as needed for moderate pain, Disp: 60 tablet, Rfl: 0    Turmeric 500 MG CAPS, Take by mouth, Disp: , Rfl:     vitamin B-12 (VITAMIN B-12) 1,000 mcg tablet, Take by mouth daily, Disp: , Rfl:     Vitamin D, Cholecalciferol, 50 MCG (2000 UT) CAPS, Take 4,000 Units by mouth daily, Disp: , Rfl:     Blood Glucose Monitoring Suppl ([employer] SYSTEM) w/Device KIT, Use to test blood sugars twice a day, Disp: 1 kit, Rfl: 0    clindamycin (CLEOCIN) 150 mg capsule, Take 150 mg by mouth every 6 (six) hours (Patient not taking: Reported on 6/28/2022), Disp: , Rfl:     estradiol (ESTRACE VAGINAL) 0 1 mg/g vaginal cream, Apply pea sized amount to area daily (Patient not taking: Reported on 6/28/2022), Disp: 42 5 g, Rfl: 2    Insulin Pen Needle 32G X 4 MM MISC, Use once a week, Disp: 30 each, Rfl: 1    nitroglycerin (NITROSTAT) 0 4 mg SL tablet, Place 1 tablet (0 4 mg total) under the tongue every 5 (five) minutes as needed for chest pain, Disp: 90 tablet, Rfl: 0    OneTouch Delica Lancets 79D MISC, Use to test blood sugars twice a day, Disp: 200 each, Rfl: 6    OneTouch Verio test strip, Use as instructed to test blood sugars twice a day, Disp: 200 each, Rfl: 0    Allergies   Allergen Reactions    Sulfa Antibiotics Shortness Of Breath    Amoxicillin-Pot Clavulanate Other (See Comments)    Invokana [Canagliflozin]      Yeast infection    Erythromycin      Reaction Date: [id number];     Metronidazole        Physical Exam:    /78 (BP Location: Left arm, Patient Position: Sitting, Cuff Size: Standard)   Pulse 74   Temp 97 8 °F (36 6 °C)   Ht 5' 3" (1 6 m)   Wt 73 5 kg (162 lb)   LMP  (LMP Unknown)   BMI 28 70 kg/m²     Constitutional:normal, well developed, well nourished, alert, in no distress and non-toxic and no overt pain behavior    Eyes:anicteric  HEENT:grossly intact  Neck:supple, symmetric, trachea midline and no masses   Pulmonary:even and unlabored  Cardiovascular:No edema or pitting edema present  Skin:Normal without rashes or lesions and well hydrated  Psychiatric:Mood and affect appropriate  Neurologic:Cranial Nerves II-XII grossly intact  Musculoskeletal:normal      Imaging  No orders to display         No orders of the defined types were placed in this encounter

## 2022-07-07 DIAGNOSIS — J01.90 ACUTE SINUSITIS, RECURRENCE NOT SPECIFIED, UNSPECIFIED LOCATION: ICD-10-CM

## 2022-07-07 RX ORDER — TRAMADOL HYDROCHLORIDE 50 MG/1
50 TABLET ORAL EVERY 8 HOURS PRN
Qty: 60 TABLET | Refills: 0 | Status: SHIPPED | OUTPATIENT
Start: 2022-07-07 | End: 2022-07-27 | Stop reason: SDUPTHER

## 2022-07-08 ENCOUNTER — APPOINTMENT (OUTPATIENT)
Dept: LAB | Facility: HOSPITAL | Age: 52
End: 2022-07-08
Payer: COMMERCIAL

## 2022-07-08 LAB
BASOPHILS # BLD AUTO: 0.1 THOUSANDS/ΜL (ref 0–0.1)
BASOPHILS NFR BLD AUTO: 1 % (ref 0–1)
EOSINOPHIL # BLD AUTO: 0.22 THOUSAND/ΜL (ref 0–0.61)
EOSINOPHIL NFR BLD AUTO: 2 % (ref 0–6)
ERYTHROCYTE [DISTWIDTH] IN BLOOD BY AUTOMATED COUNT: 11.7 % (ref 11.6–15.1)
FERRITIN SERPL-MCNC: 339 NG/ML (ref 8–388)
HCT VFR BLD AUTO: 39.5 % (ref 34.8–46.1)
HGB BLD-MCNC: 13.6 G/DL (ref 11.5–15.4)
IMM GRANULOCYTES # BLD AUTO: 0.02 THOUSAND/UL (ref 0–0.2)
IMM GRANULOCYTES NFR BLD AUTO: 0 % (ref 0–2)
IRON SATN MFR SERPL: 33 % (ref 15–50)
IRON SERPL-MCNC: 127 UG/DL (ref 50–170)
LYMPHOCYTES # BLD AUTO: 3.9 THOUSANDS/ΜL (ref 0.6–4.47)
LYMPHOCYTES NFR BLD AUTO: 40 % (ref 14–44)
MCH RBC QN AUTO: 32.6 PG (ref 26.8–34.3)
MCHC RBC AUTO-ENTMCNC: 34.4 G/DL (ref 31.4–37.4)
MCV RBC AUTO: 95 FL (ref 82–98)
MONOCYTES # BLD AUTO: 0.77 THOUSAND/ΜL (ref 0.17–1.22)
MONOCYTES NFR BLD AUTO: 8 % (ref 4–12)
NEUTROPHILS # BLD AUTO: 4.65 THOUSANDS/ΜL (ref 1.85–7.62)
NEUTS SEG NFR BLD AUTO: 49 % (ref 43–75)
NRBC BLD AUTO-RTO: 0 /100 WBCS
PLATELET # BLD AUTO: 198 THOUSANDS/UL (ref 149–390)
PMV BLD AUTO: 9.4 FL (ref 8.9–12.7)
RBC # BLD AUTO: 4.17 MILLION/UL (ref 3.81–5.12)
TIBC SERPL-MCNC: 386 UG/DL (ref 250–450)
WBC # BLD AUTO: 9.66 THOUSAND/UL (ref 4.31–10.16)

## 2022-07-08 PROCEDURE — 83550 IRON BINDING TEST: CPT | Performed by: NURSE PRACTITIONER

## 2022-07-08 PROCEDURE — 83540 ASSAY OF IRON: CPT | Performed by: NURSE PRACTITIONER

## 2022-07-08 PROCEDURE — 82728 ASSAY OF FERRITIN: CPT | Performed by: NURSE PRACTITIONER

## 2022-07-08 PROCEDURE — 36415 COLL VENOUS BLD VENIPUNCTURE: CPT | Performed by: NURSE PRACTITIONER

## 2022-07-08 PROCEDURE — 85025 COMPLETE CBC W/AUTO DIFF WBC: CPT | Performed by: NURSE PRACTITIONER

## 2022-07-21 ENCOUNTER — OFFICE VISIT (OUTPATIENT)
Dept: CARDIOLOGY CLINIC | Facility: CLINIC | Age: 52
End: 2022-07-21
Payer: COMMERCIAL

## 2022-07-21 VITALS
BODY MASS INDEX: 28.07 KG/M2 | HEART RATE: 77 BPM | SYSTOLIC BLOOD PRESSURE: 128 MMHG | HEIGHT: 63 IN | DIASTOLIC BLOOD PRESSURE: 74 MMHG | WEIGHT: 158.4 LBS

## 2022-07-21 DIAGNOSIS — E78.2 COMBINED HYPERLIPIDEMIA: ICD-10-CM

## 2022-07-21 DIAGNOSIS — Q24.5 ANOMALOUS CORONARY ARTERY ORIGIN: Primary | ICD-10-CM

## 2022-07-21 PROCEDURE — 99214 OFFICE O/P EST MOD 30 MIN: CPT | Performed by: INTERNAL MEDICINE

## 2022-07-21 NOTE — PROGRESS NOTES
Cardiology Follow Up    Alex Larsen  1970  [id number]  [address] [facility] Denice Osorio  [address] [place] [id number] [phone]    1  Anomalous coronary artery origin     2  Combined hyperlipidemia         Interval History: Followup anomalous coronary artery    Doing well  Rare chest discomfort and no dyspnea  Labs reviewed       Medical Problems             Problem List     Hypertension (Chronic)    GIANG (nonalcoholic steatohepatitis)    Overview Signed 12/16/2020  8:04 AM by Teresa Hammond DO     S/p liver biopsy ( grade 2 inflammation andStage II fibrosis) sees GI ([facility])         Sinus tachycardia    Chronic sinusitis (Chronic)    Anemia    Anomalous coronary artery origin    Anxiety    Combined hyperlipidemia    Coronary vasospasm (HCC)    Type 2 diabetes mellitus with hyperglycemia, without long-term current use of insulin (HCC)      Lab Results   Component Value Date    HGBA1C 5 9 (H) 04/20/2022         Gastroesophageal reflux disease without esophagitis    Ground glass opacity present on imaging of lung    Hypothyroidism due to Hashimoto's thyroiditis    NSTEMI (non-ST elevated myocardial infarction) ([address] )    Chest pain due to GERD    Overview Signed 6/18/2018  5:05 PM by Rambo Nash MD     Added automatically from request for surgery [id number]         Dysfunctional uterine bleeding    Left ovarian cyst    Menopausal symptoms    S/P laparoscopic hysterectomy    Colon cancer screening    Chronic back pain    Screening examination for arthropod-borne viral disease    Lateral epicondylitis of right elbow    Lumbar spondylosis    Myofascial pain syndrome    Lumbar radiculopathy    Chronic pain syndrome    Tendonitis of elbow, right    Chronic low back pain without sciatica    Overweight              Past Medical History:   Diagnosis Date    Coronary artery disease     Diabetes mellitus ([address] )     Borderline    Disease of thyroid gland     DUB (dysfunctional uterine bleeding)     Fatty liver     Hashimoto's thyroiditis     Last Assessed:  14    History of stomach ulcers     Hypertension     Hypothyroidism     Irritable bowel syndrome     Last Assessed:  3/25/14    Liver disease     elevated enzymes    Myocardial infarction ([facility] )     2017    GIANG (nonalcoholic steatohepatitis)     Ovarian cyst     left    Psychogenic polydipsia     Has had hyponatremia from drinking too much water in the past      Social History     Socioeconomic History    Marital status: /Civil Union     Spouse name: Not on file    Number of children: Not on file    Years of education: Not on file    Highest education level: Not on file   Occupational History    Not on file   Tobacco Use    Smoking status: Former Smoker     Packs/day: 0 50     Years: 4 00     Pack years: 2 00     Types: Cigarettes     Quit date: 2016     Years since quittin 4    Smokeless tobacco: Never Used    Tobacco comment: 2016   Vaping Use    Vaping Use: Never used   Substance and Sexual Activity    Alcohol use: Never     Comment: occasional, Social drinker    Drug use: No    Sexual activity: Yes     Partners: Male     Birth control/protection: None   Other Topics Concern    Not on file   Social History Narrative    Feels safe at home     Social Determinants of Health     Financial Resource Strain: Not on file   Food Insecurity: Not on file   Transportation Needs: Not on file   Physical Activity: Not on file   Stress: Not on file   Social Connections: Not on file   Intimate Partner Violence: Not on file   Housing Stability: Not on file      Family History   Problem Relation Age of Onset    Pancreatic cancer Mother     Hypertension Father     Diabetes type II Father     Diabetes type II Brother     No Known Problems Brother     Lung disease Daughter         asthma tendencies    No Known Problems Son     Liver cancer Maternal Aunt     Melanoma Maternal Aunt     Hypothyroidism Paternal Uncle     Diabetes Maternal Grandmother     Diabetes Paternal Grandmother     Heart disease Paternal Grandmother     Hypothyroidism Paternal Grandmother     Hyperlipidemia Neg Hx     Stroke Neg Hx      Past Surgical History:   Procedure Laterality Date    ANGIOPLASTY      CARDIAC CATHETERIZATION       SECTION      X 2    CHOLECYSTECTOMY      COLONOSCOPY      CYSTOSCOPY N/A 2019    Procedure: CYSTOSCOPY;  Surgeon: Jered Humphries MD;  [facility];  Service: Gynecology Oncology    HYSTERECTOMY      IR BIOPSY LIVER MASS  2020    OOPHORECTOMY Right     [place] ESOPHAGOGASTRODUODENOSCOPY TRANSORAL DIAGNOSTIC N/A 2018    Procedure: ESOPHAGOGASTRODUODENOSCOPY (EGD); Surgeon: Yessica Tompkins MD;  Location: [facility];   Service: Gastroenterology    [place] LAPAROSCOPY W TOT HYSTERECTUTERUS <=[address] TUBE/OVARY N/A 2019    Procedure: TOTAL LAPAROSCOPIC HYSTERECTOMY, BILATERAL SALPINGECTOMY, LEFT OOPHORECTOMY;  Surgeon: Jered Humphries MD;  [facility];  Service: Gynecology Oncology    SINUS SURGERY      TONSILLECTOMY      UPPER GASTROINTESTINAL ENDOSCOPY         Current Outpatient Medications:     amLODIPine (NORVASC) 5 mg tablet, Take 1 tablet (5 mg total) by mouth in the morning , Disp: 90 tablet, Rfl: 0    Blood Glucose Monitoring Suppl ([employer] Purification IQ SYSTEM) w/Device KIT, Use to test blood sugars twice a day, Disp: 1 kit, Rfl: 0    dicyclomine (BENTYL) 20 mg tablet, Take 1 tablet (20 mg total) by mouth every 6 (six) hours as needed (every 6 hours), Disp: 60 tablet, Rfl: 0    escitalopram (LEXAPRO) 20 mg tablet, Take 1 tablet (20 mg total) by mouth daily, Disp: 90 tablet, Rfl: 0    estradiol (ESTRACE VAGINAL) 0 1 mg/g vaginal cream, Apply pea sized amount to area daily, Disp: 42 5 g, Rfl: 2    glimepiride (AMARYL) 1 mg tablet, Take 1 tablet (1 mg total) by mouth daily with breakfast, Disp: 90 tablet, Rfl: 1    ibuprofen (MOTRIN) 600 mg tablet, Take 1 PO BID with food  NO OTHER NSAIDS while on this medication  , Disp: 180 tablet, Rfl: 1    Icosapent Ethyl (Vascepa) 1 g CAPS, 2 capsules twice a day, Disp: 360 capsule, Rfl: 0    Insulin Pen Needle 32G X 4 MM MISC, Use once a week, Disp: 30 each, Rfl: 1    Lactobacillus (PROBIOTIC ACIDOPHILUS PO), Take by mouth daily, Disp: , Rfl:     levothyroxine 112 mcg tablet, Take 1 tablet (112 mcg total) by mouth in the morning , Disp: 90 tablet, Rfl: 0    Magnesium 300 MG CAPS, Take 600 mg by mouth daily, Disp: , Rfl:     metFORMIN (GLUCOPHAGE-XR) 500 mg 24 hr tablet, Take 2 tablets (1,000 mg total) by mouth 2 (two) times a day with meals, Disp: 360 tablet, Rfl: 1    methocarbamol (ROBAXIN) 750 mg tablet, 1 tab PO HS , Disp: 90 tablet, Rfl: 1    metoprolol succinate (TOPROL-XL) 25 mg 24 hr tablet, Take 1 tablet (25 mg total) by mouth in the morning and 1 tablet (25 mg total) before bedtime  , Disp: 180 tablet, Rfl: 0    mupirocin (BACTROBAN) 2 % ointment, Apply topically 3 (three) times a day (Patient taking differently: Apply topically 3 (three) times a day PRN), Disp: 22 g, Rfl: 0    nitroglycerin (NITROSTAT) 0 4 mg SL tablet, Place 1 tablet (0 4 mg total) under the tongue every 5 (five) minutes as needed for chest pain, Disp: 90 tablet, Rfl: 0    OneTouch Delica Lancets 36M MISC, Use to test blood sugars twice a day, Disp: 200 each, Rfl: 6    OneTouch Verio test strip, Use as instructed to test blood sugars twice a day, Disp: 200 each, Rfl: 0    pantoprazole (PROTONIX) 40 mg tablet, Take 1 tablet (40 mg total) by mouth daily before breakfast, Disp: 90 tablet, Rfl: 0    Semaglutide,0 25 or 0 5MG/DOS, (Ozempic, 0 25 or 0 5 MG/DOSE,) 2 MG/1 5ML SOPN, Take 0 25 mg SC q week for 4 weeks than increase to 0 5 mg q week, Disp: 1 5 mL, Rfl: 2    traMADol (ULTRAM) 50 mg tablet, Take 1 tablet (50 mg total) by mouth every 8 (eight) hours as needed for moderate pain, Disp: 60 tablet, Rfl: 0    Turmeric 500 MG CAPS, Take by mouth, Disp: , Rfl:     vitamin B-12 (VITAMIN B-12) 1,000 mcg tablet, Take by mouth daily, Disp: , Rfl:     Vitamin D, Cholecalciferol, 50 MCG (2000 UT) CAPS, Take 4,000 Units by mouth daily, Disp: , Rfl:   Allergies   Allergen Reactions    Sulfa Antibiotics Shortness Of Breath    Amoxicillin-Pot Clavulanate Other (See Comments)    Invokana [Canagliflozin]      Yeast infection    Erythromycin      Reaction Date: [id number];     Metronidazole        Labs:     Chemistry        Component Value Date/Time     09/02/2015 1020    K 4 2 04/20/2022 0937    K 3 8 09/02/2015 1020     04/20/2022 0937     09/02/2015 1020    CO2 28 04/20/2022 0937    CO2 24 3 09/02/2015 1020    BUN 12 04/20/2022 0937    BUN 13 09/02/2015 1020    CREATININE 0 72 04/20/2022 0937    CREATININE 0 79 09/02/2015 1020        Component Value Date/Time    CALCIUM 9 8 04/20/2022 0937    CALCIUM 9 1 09/02/2015 1020    ALKPHOS 124 (H) 04/20/2022 0937    ALKPHOS 78 09/02/2015 1020    AST 71 (H) 04/20/2022 0937    AST 37 09/02/2015 1020     (H) 04/20/2022 0937    ALT 62 09/02/2015 1020    BILITOT 0 38 09/02/2015 1020            Lab Results   Component Value Date    CHOL 210 08/20/2015    CHOL 179 07/19/2014    CHOL 197 12/05/2013     Lab Results   Component Value Date    HDL 39 (L) 04/20/2022    HDL 44 02/06/2021    HDL 36 (L) 06/04/2020     Lab Results   Component Value Date    LDLCALC 112 (H) 04/20/2022    LDLCALC 134 (H) 02/06/2021    LDLCALC 115 (H) 06/04/2020     Lab Results   Component Value Date    TRIG 211 (H) 04/20/2022    TRIG 169 (H) 02/06/2021    TRIG 168 (H) 06/04/2020     No results found for: CHOLHDL    Imaging: No results found  Review of Systems   Constitutional: Negative  HENT: Negative  Eyes: Negative  Cardiovascular: Negative  Respiratory: Negative  Endocrine: Negative  Hematologic/Lymphatic: Negative  Skin: Negative  Musculoskeletal: Negative  Gastrointestinal: Negative  Genitourinary: Negative  Neurological: Negative  Psychiatric/Behavioral: Negative  Allergic/Immunologic: Negative  Vitals:    07/21/22 0804   BP: 128/74   Pulse: 77           Physical Exam  Vitals and nursing note reviewed  Constitutional:       Appearance: Normal appearance  HENT:      Head: Normocephalic  Nose: Nose normal       Mouth/Throat:      Mouth: Mucous membranes are moist    Eyes:      General: No scleral icterus  Conjunctiva/sclera: Conjunctivae normal    Cardiovascular:      Rate and Rhythm: Normal rate and regular rhythm  Heart sounds: No murmur heard  No gallop  Pulmonary:      Effort: Pulmonary effort is normal  No respiratory distress  Breath sounds: Normal breath sounds  No wheezing or rales  Abdominal:      General: Abdomen is flat  Bowel sounds are normal  There is no distension  Palpations: Abdomen is soft  Tenderness: There is no abdominal tenderness  There is no guarding  Musculoskeletal:      Cervical back: Normal range of motion and neck supple  Right lower leg: No edema  Left lower leg: No edema  Skin:     General: Skin is warm and dry  Neurological:      General: No focal deficit present  Mental Status: She is alert and oriented to person, place, and time  Psychiatric:         Mood and Affect: Mood normal          Behavior: Behavior normal          Discussion/Summary:    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

## 2022-07-27 ENCOUNTER — OFFICE VISIT (OUTPATIENT)
Dept: BARIATRICS | Facility: CLINIC | Age: 52
End: 2022-07-27
Payer: COMMERCIAL

## 2022-07-27 VITALS
TEMPERATURE: 97.8 F | HEIGHT: 63 IN | OXYGEN SATURATION: 92 % | BODY MASS INDEX: 28.23 KG/M2 | SYSTOLIC BLOOD PRESSURE: 120 MMHG | WEIGHT: 159.3 LBS | HEART RATE: 76 BPM | DIASTOLIC BLOOD PRESSURE: 78 MMHG

## 2022-07-27 DIAGNOSIS — K75.81 NASH (NONALCOHOLIC STEATOHEPATITIS): ICD-10-CM

## 2022-07-27 DIAGNOSIS — I10 PRIMARY HYPERTENSION: Chronic | ICD-10-CM

## 2022-07-27 DIAGNOSIS — E03.8 HYPOTHYROIDISM DUE TO HASHIMOTO'S THYROIDITIS: ICD-10-CM

## 2022-07-27 DIAGNOSIS — F41.9 ANXIETY: ICD-10-CM

## 2022-07-27 DIAGNOSIS — K21.9 GASTROESOPHAGEAL REFLUX DISEASE WITHOUT ESOPHAGITIS: ICD-10-CM

## 2022-07-27 DIAGNOSIS — J01.90 ACUTE SINUSITIS, RECURRENCE NOT SPECIFIED, UNSPECIFIED LOCATION: ICD-10-CM

## 2022-07-27 DIAGNOSIS — K21.9 GERD WITHOUT ESOPHAGITIS: ICD-10-CM

## 2022-07-27 DIAGNOSIS — E06.3 HYPOTHYROIDISM DUE TO HASHIMOTO'S THYROIDITIS: ICD-10-CM

## 2022-07-27 DIAGNOSIS — E78.2 COMBINED HYPERLIPIDEMIA: ICD-10-CM

## 2022-07-27 DIAGNOSIS — E66.3 OVERWEIGHT: Primary | ICD-10-CM

## 2022-07-27 DIAGNOSIS — E11.65 TYPE 2 DIABETES MELLITUS WITH HYPERGLYCEMIA, WITHOUT LONG-TERM CURRENT USE OF INSULIN (HCC): ICD-10-CM

## 2022-07-27 PROCEDURE — 99213 OFFICE O/P EST LOW 20 MIN: CPT | Performed by: NURSE PRACTITIONER

## 2022-07-27 RX ORDER — SEMAGLUTIDE 1.34 MG/ML
INJECTION, SOLUTION SUBCUTANEOUS
Qty: 1.5 ML | Refills: 2 | Status: SHIPPED | OUTPATIENT
Start: 2022-07-27 | End: 2022-08-21 | Stop reason: SDUPTHER

## 2022-07-27 NOTE — PROGRESS NOTES
Assessment/Plan:     Overweight  - Patient is pursuing Conservative Program  - Initial weight loss goal of 5-10% weight loss for improved health  - Verified with GI on 6/6/2022 okay to start Ozempic    - Patient denies personal history of pancreatitis  Patient also denies personal and family history of medullary thyroid cancer and multiple endocrine neoplasia type 2 (MEN 2 tumor)  - Continue Ozempic 0 5 mg weekly (recently increased to this dose)  - Can consider increasing in the future if needed  Initial: 164 7 lbs  Current: 159 3 lbs  Change: -5 4 lbs  Goal: wants to get liver healthier    Goals:  Do not skip meals  Food log (ie ) www myfitnesspal com,sparkpeople  com,loseit com,calorieking  com,etc  baritastic (use skinnytaste  com, dietdoctor  com or smartphone paco [employer] for recipes)  No sugary beverages  At least 64oz of water daily  Increase physical activity by 10 minutes daily  Gradually increase physical activity to a goal of 5 days per week for 30 minutes of MODERATE intensity PLUS 2 days per week of FULL BODY resistance training (use smartphone apps [employer], Home Workout, etc )  - Start food logging, weighing and measuring food  - 0384-1170 calories per day  - [place] [id number] 5 days per week  - Keep up the great water intake  - Continue ozempic  Anxiety  - Taking lexapro  Continue management with prescribing provider  Hypothyroidism due to Hashimoto's thyroiditis  - Taking levothyroxine  Continue management with prescribing provider  Hypertension  - Taking Norvasc and Toprol  May improve with weight loss  Continue management with prescribing provider  Gastroesophageal reflux disease without esophagitis  - Taking Protonix  May improve with weight loss  Continue management with prescribing provider  GIANG (nonalcoholic steatohepatitis)  - May improve with weight loss  Continue to follow with GI           Gardenia Garcia was seen today for follow-up  Diagnoses and all orders for this visit:    Overweight  -     Semaglutide,0 25 or 0 5MG/DOS, (Ozempic, 0 25 or 0 5 MG/DOSE,) 2 MG/1 5ML SOPN; Take 0 25 mg SC q week for 4 weeks than increase to 0 5 mg q week    Type 2 diabetes mellitus with hyperglycemia, without long-term current use of insulin (HCC)  -     Semaglutide,0 25 or 0 5MG/DOS, (Ozempic, 0 25 or 0 5 MG/DOSE,) 2 MG/1 5ML SOPN; Take 0 25 mg SC q week for 4 weeks than increase to 0 5 mg q week    GIANG (nonalcoholic steatohepatitis)  -     Semaglutide,0 25 or 0 5MG/DOS, (Ozempic, 0 25 or 0 5 MG/DOSE,) 2 MG/1 5ML SOPN; Take 0 25 mg SC q week for 4 weeks than increase to 0 5 mg q week    Anxiety    Combined hyperlipidemia    Primary hypertension    Gastroesophageal reflux disease without esophagitis    Hypothyroidism due to Hashimoto's thyroiditis            Follow up in approximately 2 months with Non-Surgical Physician/Advanced Practitioner  Subjective:   Chief Complaint   Patient presents with    Follow-up       Patient ID: Katja Mckeon  is a 46 y o  female with excess weight/obesity here to pursue weight management  Patient is pursuing Conservative Program    Most recent notes and records were reviewed  HPI    Wt Readings from Last 10 Encounters:   07/27/22 72 3 kg (159 lb 4 8 oz)   07/21/22 71 8 kg (158 lb 6 4 oz)   06/28/22 73 5 kg (162 lb)   06/03/22 74 7 kg (164 lb 11 2 oz)   05/25/22 73 9 kg (163 lb)   05/13/22 73 9 kg (163 lb)   05/06/22 73 2 kg (161 lb 6 4 oz)   04/25/22 73 8 kg (162 lb 9 6 oz)   03/08/22 75 3 kg (166 lb)   02/25/22 76 2 kg (168 lb)       On Ozempic 0 5 mg weekly  Just started the 0 5 mg dose since last week  Some nausea, but this has been improving  Denies bothersome side effects  Helping with appetite  Not food logging   Recently had oral surgery    B- Protein bar and fruit  S- none  L- rice krispies and fruit and oatmilk or FF milk  S- none or fruit or soy cracker  D- fish and crab cake and veggies  S- none or pretzel    Hydration- 96 oz water, 64 oz unsweetened iced tea with lemon  Alcohol- none  Exercise- 10,000-13,000 steps Monday through Friday    Colonoscopy: UTD, had 2 weeks ago  Mammogram: due, has order will schedule      The following portions of the patient's history were reviewed and updated as appropriate: allergies, current medications, past family history, past medical history, past social history, past surgical history, and problem list     Review of Systems   HENT: Negative for sore throat  Respiratory: Negative for cough and shortness of breath  Cardiovascular: Negative for chest pain and palpitations  Gastrointestinal: Positive for diarrhea (intermittent) and nausea (intermittent)  Negative for abdominal pain, constipation and vomiting  Denies GERD   Skin: Negative for rash  Psychiatric/Behavioral: Negative for suicidal ideas (or HI)  + depression and anxiety somewhat controlled       Objective:  /78 (BP Location: Left arm, Patient Position: Sitting, Cuff Size: Standard)   Pulse 76   Temp 97 8 °F (36 6 °C) (Tympanic)   Ht 5' 3" (1 6 m)   Wt 72 3 kg (159 lb 4 8 oz)   LMP  (LMP Unknown)   SpO2 92%   Breastfeeding No   BMI 28 22 kg/m²     Physical Exam  Vitals and nursing note reviewed  Constitutional   General appearance: Abnormal   well developed and overweight  Eyes No conjunctival injection  Ears, Nose, Mouth, and Throat Oral mucosa moist    Pulmonary   Respiratory effort: No increased work of breathing or signs of respiratory distress  Cardiovascular   Examination of extremities for edema and/or varicosities: Normal   no edema  Abdomen   Abdomen: Abnormal overweight       Musculoskeletal   Gait and station: Normal     Psychiatric   Orientation to person, place and time: Normal     Affect: appropriate

## 2022-07-27 NOTE — ASSESSMENT & PLAN NOTE
- Patient is pursuing Conservative Program  - Initial weight loss goal of 5-10% weight loss for improved health  - Verified with GI on 6/6/2022 okay to start Ozempic    - Patient denies personal history of pancreatitis  Patient also denies personal and family history of medullary thyroid cancer and multiple endocrine neoplasia type 2 (MEN 2 tumor)  - Continue Ozempic 0 5 mg weekly (recently increased to this dose)  - Can consider increasing in the future if needed  Initial: 164 7 lbs  Current: 159 3 lbs  Change: -5 4 lbs  Goal: wants to get liver healthier    Goals:  Do not skip meals  Food log (ie ) www myfitnesspal com,sparkpeople  com,loseit com,calorieking  com,etc  baritastic (use skinnytaste  com, dietdoctor  com or smartphone paco [employer] for recipes)  No sugary beverages  At least 64oz of water daily  Increase physical activity by 10 minutes daily  Gradually increase physical activity to a goal of 5 days per week for 30 minutes of MODERATE intensity PLUS 2 days per week of FULL BODY resistance training (use smartphone apps [employer], Home Workout, etc )  - Start food logging, weighing and measuring food  - 3158-7247 calories per day  - [place] [id number] 5 days per week  - Keep up the great water intake  - Continue ozempic

## 2022-07-28 ENCOUNTER — OFFICE VISIT (OUTPATIENT)
Dept: FAMILY MEDICINE CLINIC | Facility: CLINIC | Age: 52
End: 2022-07-28
Payer: COMMERCIAL

## 2022-07-28 VITALS
HEIGHT: 63 IN | HEART RATE: 97 BPM | BODY MASS INDEX: 28.17 KG/M2 | RESPIRATION RATE: 16 BRPM | TEMPERATURE: 97.7 F | WEIGHT: 159 LBS

## 2022-07-28 DIAGNOSIS — D50.8 IRON DEFICIENCY ANEMIA SECONDARY TO INADEQUATE DIETARY IRON INTAKE: ICD-10-CM

## 2022-07-28 DIAGNOSIS — E66.3 OVERWEIGHT: Primary | ICD-10-CM

## 2022-07-28 DIAGNOSIS — K75.81 NASH (NONALCOHOLIC STEATOHEPATITIS): ICD-10-CM

## 2022-07-28 DIAGNOSIS — Z23 NEED FOR VACCINATION: ICD-10-CM

## 2022-07-28 DIAGNOSIS — I10 PRIMARY HYPERTENSION: ICD-10-CM

## 2022-07-28 DIAGNOSIS — E78.2 COMBINED HYPERLIPIDEMIA: ICD-10-CM

## 2022-07-28 DIAGNOSIS — K21.9 GERD WITHOUT ESOPHAGITIS: ICD-10-CM

## 2022-07-28 DIAGNOSIS — E11.9 TYPE 2 DIABETES MELLITUS WITHOUT COMPLICATION, WITHOUT LONG-TERM CURRENT USE OF INSULIN (HCC): ICD-10-CM

## 2022-07-28 DIAGNOSIS — Q24.5 ANOMALOUS CORONARY ARTERY ORIGIN: ICD-10-CM

## 2022-07-28 DIAGNOSIS — E78.00 HYPERCHOLESTEREMIA: ICD-10-CM

## 2022-07-28 PROCEDURE — 99214 OFFICE O/P EST MOD 30 MIN: CPT | Performed by: FAMILY MEDICINE

## 2022-07-28 PROCEDURE — 90471 IMMUNIZATION ADMIN: CPT | Performed by: FAMILY MEDICINE

## 2022-07-28 PROCEDURE — 90632 HEPA VACCINE ADULT IM: CPT | Performed by: FAMILY MEDICINE

## 2022-07-28 RX ORDER — PANTOPRAZOLE SODIUM 40 MG/1
40 TABLET, DELAYED RELEASE ORAL
Qty: 90 TABLET | Refills: 0 | Status: SHIPPED | OUTPATIENT
Start: 2022-07-28 | End: 2022-10-10 | Stop reason: SDUPTHER

## 2022-07-28 RX ORDER — LEVOTHYROXINE SODIUM 112 UG/1
112 TABLET ORAL DAILY
Qty: 90 TABLET | Refills: 0 | Status: SHIPPED | OUTPATIENT
Start: 2022-07-28 | End: 2022-10-10 | Stop reason: SDUPTHER

## 2022-07-28 RX ORDER — TRAMADOL HYDROCHLORIDE 50 MG/1
50 TABLET ORAL EVERY 8 HOURS PRN
Qty: 60 TABLET | Refills: 0 | Status: SHIPPED | OUTPATIENT
Start: 2022-07-28 | End: 2022-08-21 | Stop reason: SDUPTHER

## 2022-07-28 NOTE — ASSESSMENT & PLAN NOTE
History of anomalous coronary artery  Patient has been stable  She does occasionally get chest pains  She is on nitrates    Continue follow-up with cardiology as directed

## 2022-07-28 NOTE — PROGRESS NOTES
[facility]      NAME: Becca Alaniz  AGE: 46 y o  SEX: female  : 1970   MRN: [id number]    DATE: 2022  TIME: 4:25 PM    Assessment and Plan     Problem List Items Addressed This Visit     Hypertension (Chronic)     Well controlled on amlodipine 5 mg daily         GIANG (nonalcoholic steatohepatitis)     History of transaminitis  Patient had liver biopsy performed showing grade 2 inflammation and stage II fibrosis  Being followed by hepatologist (Dr Emely Blair)  Patient recently started on Ozempic to at help with weight loss  Continue follow-up with GI as directed         Anemia     History of anemia  GI thinks that this is been caused by intermittent AVMs  Patient is status post iron infusion  Hemoglobin 13   Anomalous coronary artery origin     History of anomalous coronary artery  Patient has been stable  She does occasionally get chest pains  She is on nitrates  Continue follow-up with cardiology as directed         Combined hyperlipidemia     Continue low-fat diet, exercise, and weight loss  Overweight - Primary     Current BMI 28  17  Patient recently started on Ozempic and has lost approximately 6 lb  Other Visit Diagnoses     Type 2 diabetes mellitus without complication, without long-term current use of insulin ([address] )        Hypercholesteremia        GERD without esophagitis            Patient COVID vaccination  Tetanus 2015  Hepatitis A #2 given today    Due for mammogram in August  Current with colonoscopy    Return to office in:  6 months, labs ordered through Endocrine in GI    Chief Complaint     Chief Complaint   Patient presents with    Follow-up       History of Present Illness     Patient presents recheck chronic medical problems today  Still being followed by endocrinologist, GI, and Cardiology    Last labs were April and July      The following portions of the patient's history were reviewed and updated as appropriate: allergies, current medications, past family history, past medical history, past social history, past surgical history and problem list     Review of Systems   Review of Systems   Respiratory: Negative  Cardiovascular: Negative  Gastrointestinal: Negative  Genitourinary: Negative  Active Problem List     Patient Active Problem List   Diagnosis    Hypertension    GIANG (nonalcoholic steatohepatitis)    Sinus tachycardia    Chronic sinusitis    Anemia    Anomalous coronary artery origin    Anxiety    Combined hyperlipidemia    Coronary vasospasm (HCC)    Type 2 diabetes mellitus with hyperglycemia, without long-term current use of insulin (HCC)    Gastroesophageal reflux disease without esophagitis    Ground glass opacity present on imaging of lung    Hypothyroidism due to Hashimoto's thyroiditis    NSTEMI (non-ST elevated myocardial infarction) ([facility] [address] )    Chest pain due to GERD    Dysfunctional uterine bleeding    Left ovarian cyst    Menopausal symptoms    S/P laparoscopic hysterectomy    Colon cancer screening    Chronic back pain    Screening examination for arthropod-borne viral disease    Lateral epicondylitis of right elbow    Lumbar spondylosis    Myofascial pain syndrome    Lumbar radiculopathy    Chronic pain syndrome    Tendonitis of elbow, right    Chronic low back pain without sciatica    Overweight       Objective   Pulse 97   Temp 97 7 °F (36 5 °C) (Temporal)   Resp 16   Ht 5' 2 99" (1 6 m)   Wt 72 1 kg (159 lb)   LMP  (LMP Unknown)   BMI 28 17 kg/m²     Physical Exam  Cardiovascular:      Rate and Rhythm: Normal rate and regular rhythm  Heart sounds: Normal heart sounds  Comments: Carotids: no bruits  Ext: no edema  Pulmonary:      Effort: Pulmonary effort is normal  No respiratory distress  Breath sounds: No wheezing or rales  Psychiatric:         Behavior: Behavior normal          Thought Content:  Thought content normal  Pertinent Laboratory/Diagnostic Studies:  Labs in April and July reviewed    Current Medications     Current Outpatient Medications:     amLODIPine (NORVASC) 5 mg tablet, Take 1 tablet (5 mg total) by mouth in the morning , Disp: 90 tablet, Rfl: 0    Blood Glucose Monitoring Suppl ([employer] SYSTEM) w/Device KIT, Use to test blood sugars twice a day, Disp: 1 kit, Rfl: 0    dicyclomine (BENTYL) 20 mg tablet, Take 1 tablet (20 mg total) by mouth every 6 (six) hours as needed (every 6 hours), Disp: 60 tablet, Rfl: 0    escitalopram (LEXAPRO) 20 mg tablet, Take 1 tablet (20 mg total) by mouth daily, Disp: 90 tablet, Rfl: 0    glimepiride (AMARYL) 1 mg tablet, Take 1 tablet (1 mg total) by mouth daily with breakfast, Disp: 90 tablet, Rfl: 3    ibuprofen (MOTRIN) 600 mg tablet, Take 1 PO BID with food  NO OTHER NSAIDS while on this medication  , Disp: 180 tablet, Rfl: 1    Icosapent Ethyl (Vascepa) 1 g CAPS, 2 capsules twice a day, Disp: 360 capsule, Rfl: 0    Insulin Pen Needle 32G X 4 MM MISC, Use once a week, Disp: 30 each, Rfl: 1    Lactobacillus (PROBIOTIC ACIDOPHILUS PO), Take by mouth daily, Disp: , Rfl:     levothyroxine 112 mcg tablet, Take 1 tablet (112 mcg total) by mouth daily, Disp: 90 tablet, Rfl: 0    Magnesium 300 MG CAPS, Take 600 mg by mouth daily, Disp: , Rfl:     metFORMIN (GLUCOPHAGE-XR) 500 mg 24 hr tablet, Take 2 tablets (1,000 mg total) by mouth 2 (two) times a day with meals, Disp: 360 tablet, Rfl: 1    methocarbamol (ROBAXIN) 750 mg tablet, 1 tab PO HS , Disp: 90 tablet, Rfl: 1    metoprolol succinate (TOPROL-XL) 25 mg 24 hr tablet, Take 1 tablet (25 mg total) by mouth in the morning and 1 tablet (25 mg total) before bedtime  , Disp: 180 tablet, Rfl: 0    mupirocin (BACTROBAN) 2 % ointment, Apply topically 3 (three) times a day (Patient taking differently: Apply topically 3 (three) times a day PRN), Disp: 22 g, Rfl: 0    nitroglycerin (NITROSTAT) 0 4 mg SL tablet, Place 1 tablet (0 4 mg total) under the tongue every 5 (five) minutes as needed for chest pain, Disp: 90 tablet, Rfl: 0    OneTouch Delica Lancets 51M MISC, Use to test blood sugars twice a day, Disp: 200 each, Rfl: 6    OneTouch Verio test strip, Use as instructed to test blood sugars twice a day, Disp: 200 each, Rfl: 0    pantoprazole (PROTONIX) 40 mg tablet, Take 1 tablet (40 mg total) by mouth daily before breakfast, Disp: 90 tablet, Rfl: 0    Semaglutide,0 25 or 0 5MG/DOS, (Ozempic, 0 25 or 0 5 MG/DOSE,) 2 MG/1 5ML SOPN, Take 0 25 mg SC q week for 4 weeks than increase to 0 5 mg q week, Disp: 1 5 mL, Rfl: 2    traMADol (ULTRAM) 50 mg tablet, Take 1 tablet (50 mg total) by mouth every 8 (eight) hours as needed for moderate pain, Disp: 60 tablet, Rfl: 0    Turmeric 500 MG CAPS, Take by mouth, Disp: , Rfl:     vitamin B-12 (VITAMIN B-12) 1,000 mcg tablet, Take by mouth daily, Disp: , Rfl:     Vitamin D, Cholecalciferol, 50 MCG (2000 UT) CAPS, Take 4,000 Units by mouth daily, Disp: , Rfl:     Health Maintenance     Health Maintenance   Topic Date Due    Pneumococcal Vaccine: Pediatrics (0 to 5 Years) and At-Risk Patients (6 to 59 Years) (1 - PCV) Never done    HIV Screening  Never done    COVID-19 Vaccine (4 - Booster for [place] series) 02/06/2022    URINE MICROALBUMIN  06/30/2022    Breast Cancer Screening: Mammogram  08/06/2022    Influenza Vaccine (1) 09/01/2022    HEMOGLOBIN A1C  10/20/2022    DM Eye Exam  11/19/2022    Annual Physical  12/09/2022    Diabetic Foot Exam  04/25/2023    BMI: Followup Plan  07/27/2023    Depression Screening  07/28/2023    BMI: Adult  07/28/2023    DTaP,Tdap,and Td Vaccines (2 - Td or Tdap) 03/04/2025    Colorectal Cancer Screening  05/22/2032    Hepatitis C Screening  Completed    HIB Vaccine  Aged Out    Hepatitis B Vaccine  Aged Out    IPV Vaccine  Aged Out    Hepatitis A Vaccine  Aged Out    Meningococcal ACWY Vaccine  Aged Out    HPV Vaccine  Aged Out     Immunization History   Administered Date(s) Administered    COVID-19 PFIZER VACCINE 0 3 ML IM 12/21/2020, 01/11/2021, 10/06/2021    H1N1, All Formulations 11/05/2009    Hep A, adult 06/05/2020    INFLUENZA 11/01/2019    Influenza, seasonal, injectable 09/26/2013    Tdap 03/04/2015       Ruddy Fabry, DO  [facility]

## 2022-07-28 NOTE — ASSESSMENT & PLAN NOTE
History of transaminitis  Patient had liver biopsy performed showing grade 2 inflammation and stage II fibrosis  Being followed by hepatologist (Dr Leela Dumont)  Patient recently started on Ozempic to at help with weight loss    Continue follow-up with GI as directed

## 2022-07-28 NOTE — ASSESSMENT & PLAN NOTE
History of anemia  GI thinks that this is been caused by intermittent AVMs  Patient is status post iron infusion  Hemoglobin 13 6 July 8th

## 2022-08-09 ENCOUNTER — APPOINTMENT (OUTPATIENT)
Dept: LAB | Facility: HOSPITAL | Age: 52
End: 2022-08-09
Attending: INTERNAL MEDICINE
Payer: COMMERCIAL

## 2022-08-09 DIAGNOSIS — E11.65 TYPE 2 DIABETES MELLITUS WITH HYPERGLYCEMIA, WITHOUT LONG-TERM CURRENT USE OF INSULIN (HCC): ICD-10-CM

## 2022-08-09 DIAGNOSIS — K21.9 GASTROESOPHAGEAL REFLUX DISEASE WITHOUT ESOPHAGITIS: ICD-10-CM

## 2022-08-09 DIAGNOSIS — E06.3 HYPOTHYROIDISM DUE TO HASHIMOTO'S THYROIDITIS: ICD-10-CM

## 2022-08-09 DIAGNOSIS — E78.2 COMBINED HYPERLIPIDEMIA: ICD-10-CM

## 2022-08-09 DIAGNOSIS — D50.0 IRON DEFICIENCY ANEMIA DUE TO CHRONIC BLOOD LOSS: ICD-10-CM

## 2022-08-09 DIAGNOSIS — E03.8 HYPOTHYROIDISM DUE TO HASHIMOTO'S THYROIDITIS: ICD-10-CM

## 2022-08-09 DIAGNOSIS — K75.81 NASH (NONALCOHOLIC STEATOHEPATITIS): ICD-10-CM

## 2022-08-09 DIAGNOSIS — K58.9 IRRITABLE BOWEL SYNDROME WITHOUT DIARRHEA: ICD-10-CM

## 2022-08-09 DIAGNOSIS — I10 PRIMARY HYPERTENSION: Chronic | ICD-10-CM

## 2022-08-09 LAB
CREAT UR-MCNC: 180 MG/DL
EST. AVERAGE GLUCOSE BLD GHB EST-MCNC: 140 MG/DL
HBA1C MFR BLD: 6.5 %
INR PPP: 0.96 (ref 0.84–1.19)
MICROALBUMIN UR-MCNC: 9.8 MG/L (ref 0–20)
MICROALBUMIN/CREAT 24H UR: 5 MG/G CREATININE (ref 0–30)
PROTHROMBIN TIME: 13.4 SECONDS (ref 11.6–14.5)
T4 FREE SERPL-MCNC: 1.24 NG/DL (ref 0.76–1.46)
TSH SERPL DL<=0.05 MIU/L-ACNC: 0.63 UIU/ML (ref 0.45–4.5)

## 2022-08-09 PROCEDURE — 82043 UR ALBUMIN QUANTITATIVE: CPT

## 2022-08-09 PROCEDURE — 85610 PROTHROMBIN TIME: CPT

## 2022-08-09 PROCEDURE — 84439 ASSAY OF FREE THYROXINE: CPT

## 2022-08-09 PROCEDURE — 83036 HEMOGLOBIN GLYCOSYLATED A1C: CPT

## 2022-08-09 PROCEDURE — 84443 ASSAY THYROID STIM HORMONE: CPT

## 2022-08-09 PROCEDURE — 82570 ASSAY OF URINE CREATININE: CPT

## 2022-08-09 RX ORDER — DICYCLOMINE HCL 20 MG
20 TABLET ORAL EVERY 6 HOURS PRN
Qty: 60 TABLET | Refills: 0 | Status: SHIPPED | OUTPATIENT
Start: 2022-08-09 | End: 2022-09-21 | Stop reason: SDUPTHER

## 2022-08-10 ENCOUNTER — OFFICE VISIT (OUTPATIENT)
Dept: ENDOCRINOLOGY | Facility: HOSPITAL | Age: 52
End: 2022-08-10
Payer: COMMERCIAL

## 2022-08-10 VITALS
HEIGHT: 63 IN | DIASTOLIC BLOOD PRESSURE: 70 MMHG | SYSTOLIC BLOOD PRESSURE: 118 MMHG | HEART RATE: 84 BPM | BODY MASS INDEX: 27.29 KG/M2 | WEIGHT: 154 LBS

## 2022-08-10 DIAGNOSIS — E06.3 HYPOTHYROIDISM DUE TO HASHIMOTO'S THYROIDITIS: ICD-10-CM

## 2022-08-10 DIAGNOSIS — E11.65 TYPE 2 DIABETES MELLITUS WITH HYPERGLYCEMIA, WITHOUT LONG-TERM CURRENT USE OF INSULIN (HCC): Primary | ICD-10-CM

## 2022-08-10 DIAGNOSIS — E03.8 HYPOTHYROIDISM DUE TO HASHIMOTO'S THYROIDITIS: ICD-10-CM

## 2022-08-10 DIAGNOSIS — I10 PRIMARY HYPERTENSION: Chronic | ICD-10-CM

## 2022-08-10 DIAGNOSIS — E78.2 COMBINED HYPERLIPIDEMIA: ICD-10-CM

## 2022-08-10 PROCEDURE — 99215 OFFICE O/P EST HI 40 MIN: CPT | Performed by: INTERNAL MEDICINE

## 2022-08-10 NOTE — PROGRESS NOTES
8/11/2022    Assessment/Plan      Diagnoses and all orders for this visit:    Type 2 diabetes mellitus with hyperglycemia, without long-term current use of insulin ([facility] [address] )  -     HEMOGLOBIN A1C W/ EAG ESTIMATION Lab Collect; Future  -     Comprehensive metabolic panel Lab Collect; Future  -     TSH, 3rd generation Lab Collect; Future  -     T4, free Lab Collect; Future    Hypothyroidism due to Hashimoto's thyroiditis  -     HEMOGLOBIN A1C W/ EAG ESTIMATION Lab Collect; Future  -     Comprehensive metabolic panel Lab Collect; Future  -     TSH, 3rd generation Lab Collect; Future  -     T4, free Lab Collect; Future    Primary hypertension  -     HEMOGLOBIN A1C W/ EAG ESTIMATION Lab Collect; Future  -     Comprehensive metabolic panel Lab Collect; Future  -     TSH, 3rd generation Lab Collect; Future  -     T4, free Lab Collect; Future    Combined hyperlipidemia  -     HEMOGLOBIN A1C W/ EAG ESTIMATION Lab Collect; Future  -     Comprehensive metabolic panel Lab Collect; Future  -     TSH, 3rd generation Lab Collect; Future  -     T4, free Lab Collect; Future        Assessment/Plan:  1  Type 2 diabetes  Hemoglobin A1c is 6 5%  This does demonstrate excellent control  For now, she will continue the same metformin, Ozempic, and glimepiride  She will continue to work on diet, exercise, and weight loss and continue to check her blood sugars once daily  2  Hypothyroidism due to Hashimoto's thyroiditis  Thyroid function tests are normal signifying biochemical euthyroidism  She will continue the same levothyroxine 112 mcg daily  3  Hypertension  She is normotensive in the office today on her current dose of amlodipine and metoprolol  4  Hyperlipidemia  She will continue the same Vascepa        I have asked her to follow up in 3 months with preceding hemoglobin A1c, CMP, TSH, and free T4       CC: Diabetes type 2, thyroid, blood pressure, lipid follow-up    History of Present Illness     HPI: Katja Mckeon is a 46y o  year old female with type 2 diabetes for 5 years, hypertension, hypothyroidism, hyperlipidemia for follow-up visit  She is on oral agents at home and takes metformin XR 1000 mg twice a day, Ozempic 0 5 mg once a week, and glimepiride 1 mg daily  She denies any polyuria, polydipsia, polyphagia, and blurry vision  She is occasional once a night nocturia  She denies numbness or tingling of the feet  She denies chest pain or shortness of breath  She denies neuropathy, nephropathy, retinopathy, stroke and claudication but does admit to heart attack  Hypoglycemic episodes: No rare  The patient's last eye exam was in February 2022 with no retinopathy  The patient's last foot exam was in April 2022 at endocrine office visit  She does not follow with Podiatry  Last A1C was   Lab Results   Component Value Date    HGBA1C 6 5 (H) 08/09/2022     Blood Sugar/Glucometer/Pump/CGM review: Checks blood sugars up to once daily  Fasting sugar 135 this am   Mostly 140 in am Needs to eat lunch to prevent low blood sugar to 70s in afternoon  She has hypothyroidism due to Hashimoto's thyroiditis and takes levothyroxine 112 mcg daily  She has lost 8 lb since April 2022 but most of it within the last month or so since she started Ozempic  Her appetite is suppressed  She denies heat or cold intolerance, palpitation, or tremors  She can have constipation at times or sudden diarrhea  She denies insomnia  She has anxiety and depression  She has no dry skin or brittle nails  She denies hair loss  She denies diplopia  She is hypertension and takes amlodipine 5 mg daily metoprolol XL 25 mg twice daily  She denies headache or stroke-like symptoms  She has hyperlipidemia takes Vascepa 1 g 2 capsules twice a day  She was on fenofibrate in the past but this was discontinued by her cardiologist due to elevated LFTs and a diagnosis of GIANG  She denies chest pain or shortness of breath      Review of Systems   Constitutional: Positive for appetite change  Negative for fatigue and unexpected weight change  Weight 8 lb less than April 2022  walking up to 5 miles a day and 10,000 steps a day  Appetite suppressed on ozempic  HENT: Negative for trouble swallowing  Eyes: Negative for visual disturbance  Respiratory: Negative for chest tightness and shortness of breath  Cardiovascular: Negative for chest pain and palpitations  Gastrointestinal: Positive for constipation and diarrhea  Negative for abdominal pain and nausea  Can have constipation at times or diarrhea suddenly  Seeing liver specialist these days, had a bad scan recently  Endocrine: Negative for cold intolerance, heat intolerance, polydipsia, polyphagia and polyuria  Nocturia occasionally once a night  Skin: Positive for rash  Negative for wound  No dry skin  No brittle nails  No hair loss  Will get small dry skin, reddish patches that take several months to resolve, can be itchy when starting to erupt  Neurological: Negative for dizziness, tremors, weakness, light-headedness, numbness and headaches  Psychiatric/Behavioral: Positive for dysphoric mood  Negative for sleep disturbance  The patient is nervous/anxious  Depression and anxiety comes and goes          Historical Information   Past Medical History:   Diagnosis Date    Coronary artery disease     Diabetes mellitus ([facility] [address] )     Borderline    Disease of thyroid gland     DUB (dysfunctional uterine bleeding)     Fatty liver     Hashimoto's thyroiditis     Last Assessed:  8/19/14    History of stomach ulcers     Hypertension     Hypothyroidism     Irritable bowel syndrome     Last Assessed:  3/25/14    Liver disease     elevated enzymes    Myocardial infarction ([facility] [address] )     2017    GIANG (nonalcoholic steatohepatitis)     Ovarian cyst     left    Psychogenic polydipsia     Has had hyponatremia from drinking too much water in the past  Past Surgical History:   Procedure Laterality Date    ANGIOPLASTY      CARDIAC CATHETERIZATION       SECTION      X 2    CHOLECYSTECTOMY      COLONOSCOPY      CYSTOSCOPY N/A 2019    Procedure: CYSTOSCOPY;  Surgeon: Beth Badillo MD;  [facility]: [facility];  Service: Gynecology Oncology    HYSTERECTOMY      IR BIOPSY LIVER MASS  2020    OOPHORECTOMY Right     MN ESOPHAGOGASTRODUODENOSCOPY TRANSORAL DIAGNOSTIC N/A 2018    Procedure: ESOPHAGOGASTRODUODENOSCOPY (EGD); Surgeon: Maria Elena Hirsch MD;  Location: [facility];   Service: Gastroenterology    MN LAPAROSCOPY W TOT HYSTERECTUTERUS <=250 Tosin Sheets TUBE/OVARY N/A 2019    Procedure: TOTAL LAPAROSCOPIC HYSTERECTOMY, BILATERAL SALPINGECTOMY, LEFT OOPHORECTOMY;  Surgeon: Beth Badillo MD;  [facility];  Service: Gynecology Oncology    SINUS SURGERY      TONSILLECTOMY      UPPER GASTROINTESTINAL ENDOSCOPY       Social History   Social History     Substance and Sexual Activity   Alcohol Use Never    Comment: occasional, Social drinker     Social History     Substance and Sexual Activity   Drug Use No     Social History     Tobacco Use   Smoking Status Former Smoker    Packs/day: 0 50    Years: 4 00    Pack years: 2 00    Types: Cigarettes    Quit date: 2016    Years since quittin 5   Smokeless Tobacco Never Used   Tobacco Comment    2016     Family History:   Family History   Problem Relation Age of Onset    Pancreatic cancer Mother     Hypertension Father     Diabetes type II Father     Diabetes type II Brother     No Known Problems Brother     Lung disease Daughter         asthma tendencies    No Known Problems Son     Liver cancer Maternal Aunt     Melanoma Maternal Aunt     Hypothyroidism Paternal Uncle     Diabetes Maternal Grandmother     Diabetes Paternal Grandmother     Heart disease Paternal Grandmother     Hypothyroidism Paternal Grandmother     Hyperlipidemia Neg Hx     Stroke Neg Hx        Meds/Allergies   Current Outpatient Medications   Medication Sig Dispense Refill    amLODIPine (NORVASC) 5 mg tablet Take 1 tablet (5 mg total) by mouth in the morning  90 tablet 0    Blood Glucose Monitoring Suppl ([employer] SYSTEM) w/Device KIT Use to test blood sugars twice a day 1 kit 0    dicyclomine (BENTYL) 20 mg tablet Take 1 tablet (20 mg total) by mouth every 6 (six) hours as needed (every 6 hours) 60 tablet 0    escitalopram (LEXAPRO) 20 mg tablet Take 1 tablet (20 mg total) by mouth daily 90 tablet 0    glimepiride (AMARYL) 1 mg tablet Take 1 tablet (1 mg total) by mouth daily with breakfast 90 tablet 3    ibuprofen (MOTRIN) 600 mg tablet Take 1 PO BID with food  NO OTHER NSAIDS while on this medication  180 tablet 1    Icosapent Ethyl (Vascepa) 1 g CAPS 2 capsules twice a day 360 capsule 0    Insulin Pen Needle 32G X 4 MM MISC Use once a week 30 each 1    Lactobacillus (PROBIOTIC ACIDOPHILUS PO) Take by mouth daily      levothyroxine 112 mcg tablet Take 1 tablet (112 mcg total) by mouth daily 90 tablet 0    Magnesium 300 MG CAPS Take 600 mg by mouth daily      metFORMIN (GLUCOPHAGE-XR) 500 mg 24 hr tablet Take 2 tablets (1,000 mg total) by mouth 2 (two) times a day with meals 360 tablet 1    methocarbamol (ROBAXIN) 750 mg tablet 1 tab PO HS  90 tablet 1    metoprolol succinate (TOPROL-XL) 25 mg 24 hr tablet Take 1 tablet (25 mg total) by mouth in the morning and 1 tablet (25 mg total) before bedtime   180 tablet 0    mupirocin (BACTROBAN) 2 % ointment Apply topically 3 (three) times a day (Patient taking differently: Apply topically 3 (three) times a day PRN) 22 g 0    nitroglycerin (NITROSTAT) 0 4 mg SL tablet Place 1 tablet (0 4 mg total) under the tongue every 5 (five) minutes as needed for chest pain 90 tablet 0    OneTouch Delica Lancets 48D MISC Use to test blood sugars twice a day 200 each 6    OneTouch Verio test strip Use as instructed to test blood sugars twice a day 200 each 0    pantoprazole (PROTONIX) 40 mg tablet Take 1 tablet (40 mg total) by mouth daily before breakfast 90 tablet 0    Semaglutide,0 25 or 0 5MG/DOS, (Ozempic, 0 25 or 0 5 MG/DOSE,) 2 MG/1 5ML SOPN Take 0 25 mg SC q week for 4 weeks than increase to 0 5 mg q week (Patient taking differently: 0 5 mg q week) 1 5 mL 2    traMADol (ULTRAM) 50 mg tablet Take 1 tablet (50 mg total) by mouth every 8 (eight) hours as needed for moderate pain 60 tablet 0    Turmeric 500 MG CAPS Take by mouth      vitamin B-12 (VITAMIN B-12) 1,000 mcg tablet Take by mouth daily      Vitamin D, Cholecalciferol, 50 MCG (2000 UT) CAPS Take 4,000 Units by mouth daily       No current facility-administered medications for this visit  Allergies   Allergen Reactions    Sulfa Antibiotics Shortness Of Breath    Amoxicillin-Pot Clavulanate Other (See Comments)    Invokana [Canagliflozin]      Yeast infection    Erythromycin      Reaction Date: [id number];     Metronidazole        Objective   Vitals: Blood pressure 118/70, pulse 84, height 5' 2 99" (1 6 m), weight 69 9 kg (154 lb), not currently breastfeeding  Invasive Devices  Report    None                 Physical Exam  Vitals reviewed  Constitutional:       Appearance: Normal appearance  She is well-developed  HENT:      Head: Normocephalic and atraumatic  Eyes:      Extraocular Movements: Extraocular movements intact  Conjunctiva/sclera: Conjunctivae normal       Comments: No lid lag, stare, proptosis, or periorbital edema  Neck:      Thyroid: No thyromegaly  Vascular: No carotid bruit  Comments: Thyroid normal in size  Cardiovascular:      Rate and Rhythm: Normal rate and regular rhythm  Heart sounds: Normal heart sounds  No murmur heard  Pulmonary:      Effort: Pulmonary effort is normal       Breath sounds: Normal breath sounds  No wheezing  Abdominal:      Palpations: Abdomen is soft     Musculoskeletal:         General: No deformity  Normal range of motion  Cervical back: Normal range of motion and neck supple  Right lower leg: No edema  Left lower leg: No edema  Comments: No tremor of the outstretched hands   Lymphadenopathy:      Cervical: No cervical adenopathy  Skin:     General: Skin is warm and dry  Findings: No rash  Neurological:      Mental Status: She is alert and oriented to person, place, and time  Deep Tendon Reflexes: Reflexes are normal and symmetric  Comments: Patellar deep tendon reflexes normal          The history was obtained from the review of the chart and from the patient  Lab Results:    Most recent Alc is  Lab Results   Component Value Date    HGBA1C 6 5 (H) 08/09/2022           Blood work done on 08/09/2022 showed a CMP with a glucose of 148 fasting, ALT 86, but was otherwise normal   Lab Results   Component Value Date    CREATININE 0 81 08/09/2022    CREATININE 0 72 04/20/2022    CREATININE 0 84 01/24/2022    BUN 11 08/09/2022     09/02/2015    K 4 5 08/09/2022     08/09/2022    CO2 26 08/09/2022     eGFR   Date Value Ref Range Status   08/09/2022 84 ml/min/1 73sq m Final         Lab Results   Component Value Date    CHOL 210 08/20/2015    HDL 39 (L) 04/20/2022    TRIG 211 (H) 04/20/2022       Lab Results   Component Value Date    ALT 86 (H) 08/09/2022    AST 42 08/09/2022    ALKPHOS 94 08/09/2022    BILITOT 0 38 09/02/2015       Lab Results   Component Value Date    FREET4 1 24 08/09/2022   TSH is 0 631  Urine microalbumin to creatinine ratio is 5      CBC is normal       Future Appointments   Date Time Provider Carlos Manuel Payton   8/12/2022  8:00 AM Anisa Horner MD [facility]   10/6/2022  3:30 PM DAVID Espana WGT MGT CTR Practice-Aure   11/14/2022  8:00 AM DAVID Crow HEM ONC QTN Practice-Onc   12/7/2022 10:00 AM Annel Sanz MD ENDO [facility] Med Spc   12/13/2022  7:00 AM DAVID Aguirre SP QTN Practice-Ort   12/13/2022 8:45 AM MD MAGDALENA Rodriguez Practice-Wom   1/30/2023  3:30 PM Jacki Huang DO [facility]

## 2022-08-12 ENCOUNTER — OFFICE VISIT (OUTPATIENT)
Dept: GASTROENTEROLOGY | Facility: CLINIC | Age: 52
End: 2022-08-12
Payer: COMMERCIAL

## 2022-08-12 VITALS
TEMPERATURE: 98.2 F | BODY MASS INDEX: 28.6 KG/M2 | DIASTOLIC BLOOD PRESSURE: 86 MMHG | SYSTOLIC BLOOD PRESSURE: 122 MMHG | HEIGHT: 62 IN | WEIGHT: 155.4 LBS

## 2022-08-12 DIAGNOSIS — K75.81 NASH (NONALCOHOLIC STEATOHEPATITIS): Primary | ICD-10-CM

## 2022-08-12 PROCEDURE — 99213 OFFICE O/P EST LOW 20 MIN: CPT | Performed by: INTERNAL MEDICINE

## 2022-08-12 NOTE — PROGRESS NOTES
[facility] - Outpatient Follow-up Note  Becca Alaniz 46 y o  female MRN: [id number]  Encounter: [id number]          ASSESSMENT AND PLAN:    Becca Alaniz is a 46 y o  female with hx HTN, HLD, DM, CAD, MIKA, Hypothyroidism 2/2 Hashimoto's thyroiditis GERD, biopsy proven GIANG presenting for follow up appointment  1  GIANG (nonalcoholic steatohepatitis)    - Continue diet/lifestyle changes and trial of medication (semaglutide) for weight loss  - Defer repeat biopsy at this time  - RUQ ultrasound in 6 months prior to next visit  - F/u in clinic in 6 months    Orders Placed This Encounter   Procedures    US elastography     ______________________________________________________________________    SUBJECTIVE:    Becca Alaniz is a 46 y o  female with hx of HTN, HLD, DM, CAD, MIKA, Hypothyroidism 2/2 Hashimoto's thyroiditis GERD, biopsy proven GIANG presenting for follow up appointment  Last seen by Dr Emely Blair 5/6/22 in office  Since last visit has been seen by Bariatric surgery, recommended initiation of ozempic and has increased her physical activity  Tries to walk 5 miles a day, purchased a fitbit and is motivated to increase her activity  Has been trying to restrict her caloric intake as well, is trying to cut down on sugar as well but has difficulty  Had liver biopsy and ultrasonography on 5/2020 with F2 fibrosis, and most recent elastography 5/2022 with F3 fibrosis  LFTs essentially stable but mildly improved - AST mildly decreased on 8/9 check, ALT 86 from 130, alk phos 94 from 124, T bili normal       Most recent A1c 6 5 from 5 9  Concerned about exposure to well water with forever chemicals (per and polyfluorinated alkyl substances) up until approximately 1 year ago  REVIEW OF SYSTEMS IS OTHERWISE NEGATIVE        Historical Information   Past Medical History:   Diagnosis Date    Coronary artery disease     Diabetes mellitus ([address] )     Borderline    Disease of thyroid gland     DUB (dysfunctional uterine bleeding)     Fatty liver     Hashimoto's thyroiditis     Last Assessed:  14    History of stomach ulcers     Hypertension     Hypothyroidism     Irritable bowel syndrome     Last Assessed:  3/25/14    Liver disease     elevated enzymes    Myocardial infarction ([facility] [address] )     2017    GIANG (nonalcoholic steatohepatitis)     Ovarian cyst     left    Psychogenic polydipsia     Has had hyponatremia from drinking too much water in the past      Past Surgical History:   Procedure Laterality Date    ANGIOPLASTY      CARDIAC CATHETERIZATION       SECTION      X 2    CHOLECYSTECTOMY      COLONOSCOPY      CYSTOSCOPY N/A 2019    Procedure: CYSTOSCOPY;  Surgeon: Randell Walsh MD;  [facility];  Service: Gynecology Oncology    HYSTERECTOMY      IR BIOPSY LIVER MASS  2020    OOPHORECTOMY Right     PA ESOPHAGOGASTRODUODENOSCOPY TRANSORAL DIAGNOSTIC N/A 2018    Procedure: ESOPHAGOGASTRODUODENOSCOPY (EGD); Surgeon: Sonny Chaves MD;  Location: [facility];   Service: Gastroenterology    PA LAPAROSCOPY W TOT HYSTERECTUTERUS <=[address] TUBE/OVARY N/A 2019    Procedure: TOTAL LAPAROSCOPIC HYSTERECTOMY, BILATERAL SALPINGECTOMY, LEFT OOPHORECTOMY;  Surgeon: Randell Walsh MD;  [facility];  Service: Gynecology Oncology    SINUS SURGERY      TONSILLECTOMY      UPPER GASTROINTESTINAL ENDOSCOPY       Social History   Social History     Substance and Sexual Activity   Alcohol Use Never    Comment: occasional, Social drinker     Social History     Substance and Sexual Activity   Drug Use No     Social History     Tobacco Use   Smoking Status Former Smoker    Packs/day: 0 50    Years: 4 00    Pack years: 2 00    Types: Cigarettes    Quit date: 2016    Years since quittin 5   Smokeless Tobacco Never Used   Tobacco Comment    2016     Family History   Problem Relation Age of Onset    Pancreatic cancer Mother     Hypertension Father     Diabetes type II Father     Diabetes type II Brother     No Known Problems Brother     Lung disease Daughter         asthma tendencies    No Known Problems Son     Liver cancer Maternal Aunt     Melanoma Maternal Aunt     Hypothyroidism Paternal Uncle     Diabetes Maternal Grandmother     Diabetes Paternal Grandmother     Heart disease Paternal Grandmother     Hypothyroidism Paternal Grandmother     Hyperlipidemia Neg Hx     Stroke Neg Hx        Meds/Allergies       Current Outpatient Medications:     amLODIPine (NORVASC) 5 mg tablet    Blood Glucose Monitoring Suppl (ONETOUCH VERIO IQ SYSTEM) w/Device KIT    dicyclomine (BENTYL) 20 mg tablet    escitalopram (LEXAPRO) 20 mg tablet    glimepiride (AMARYL) 1 mg tablet    ibuprofen (MOTRIN) 600 mg tablet    Icosapent Ethyl (Vascepa) 1 g CAPS    Insulin Pen Needle 32G X 4 MM MISC    Lactobacillus (PROBIOTIC ACIDOPHILUS PO)    levothyroxine 112 mcg tablet    Magnesium 300 MG CAPS    metFORMIN (GLUCOPHAGE-XR) 500 mg 24 hr tablet    methocarbamol (ROBAXIN) 750 mg tablet    metoprolol succinate (TOPROL-XL) 25 mg 24 hr tablet    mupirocin (BACTROBAN) 2 % ointment    nitroglycerin (NITROSTAT) 0 4 mg SL tablet    OneTouch Delica Lancets 68E MISC    OneTouch Verio test strip    pantoprazole (PROTONIX) 40 mg tablet    Semaglutide,0 25 or 0 5MG/DOS, (Ozempic, 0 25 or 0 5 MG/DOSE,) 2 MG/1 5ML SOPN    traMADol (ULTRAM) 50 mg tablet    Turmeric 500 MG CAPS    vitamin B-12 (VITAMIN B-12) 1,000 mcg tablet    Vitamin D, Cholecalciferol, 50 MCG (2000 UT) CAPS    Allergies   Allergen Reactions    Sulfa Antibiotics Shortness Of Breath    Amoxicillin-Pot Clavulanate Other (See Comments)    Invokana [Canagliflozin]      Yeast infection    Erythromycin      Reaction Date: [id number];     Metronidazole      Objective     Blood pressure 122/86, temperature 98 2 °F (36 8 °C), height 5' 2" (1 575 m), weight 70 5 kg (155 lb 6 4 oz), not currently breastfeeding  Body mass index is 28 42 kg/m²  Wt Readings from Last 3 Encounters:   08/12/22 70 5 kg (155 lb 6 4 oz)   08/10/22 69 9 kg (154 lb)   07/28/22 72 1 kg (159 lb)        PHYSICAL EXAM:      General Appearance:   Alert, cooperative, no distress   HEENT:   Normocephalic, atraumatic, anicteric  Neck:  Supple, symmetrical, trachea midline   Lungs:   Clear to auscultation bilaterally; no rales, rhonchi or wheezing; respirations unlabored    Heart[de-identified]   Regular rate and rhythm; no murmur, rub, or gallop     Abdomen:   Soft, non-tender, non-distended; normal bowel sounds; no masses, no appreciable hepatomegaly   Genitalia:   Deferred    Rectal:   Deferred    Extremities:  No cyanosis, clubbing or edema    Pulses:  2+ and symmetric    Skin:  No jaundice, rashes, or lesions    Lymph nodes:  No palpable cervical lymphadenopathy        Lab Results:       Lab Units 08/09/22  0850 04/20/22  0937 01/24/22  0823 10/12/21  0847 06/30/21  0828   SODIUM mmol/L 138 137 138 138 141   POTASSIUM mmol/L 4 5 4 2 4 5 4 6 4 5   CHLORIDE mmol/L 101 100 102 103 104   CO2 mmol/L 26 28 27 28 25   BUN mg/dL 11 12 13 9 9   CREATININE mg/dL 0 81 0 72 0 84 0 73 0 66   CALCIUM mg/dL 9 6 9 8 9 0 8 4 9 1            Lab Units 08/09/22  0850 04/20/22  0937 01/24/22  0823 10/12/21  0847 06/30/21  0828   TOTAL PROTEIN g/dL 7 5 7 5 7 4 7 5 7 9   ALBUMIN g/dL 4 0 4 1 3 9 3 7 3 8   TOTAL BILIRUBIN mg/dL 0 30 0 40 0 20 0 20 0 30   AST U/L 42 71* 47* 94* 95*   ALT U/L 86* 130* 99* 86* 104*   ALK PHOS U/L 94 124* 102 108 113           Lab Units 08/09/22  0850 07/08/22  1326 04/20/22  0937 10/12/21  0847   WBC Thousand/uL 7 38 9 66 7 14 6 94   HEMOGLOBIN g/dL 13 3 13 6 12 9 8 7*   HEMATOCRIT % 37 4 39 5 37 1 30 6*   PLATELETS Thousands/uL 212 198 228 286   MCV fL 94 95 97 73*   MCH pg 33 3 32 6 33 8 20 8*   MCHC g/dL 35 6 34 4 34 8 28 4*   RDW % 11 9 11 7 12 4 16 4*   MPV fL 9 5 9 4 9 8 9 6   NRBC AUTO /100 WBCs 0 0 0 0   NEUTROS PCT % 54 49 55 57   IMMATURE GRANULOCYTES % % 0 0 0 0   LYMPHS PCT % 36 40 32 30   MONOS PCT % 6 8 7 9   EOS PCT % 3 2 4 3   BASOS PCT % 1 1 2* 1   NEUTROS ABS Thousands/µL 3 97 4 65 4 03 3 89   IMMATURE GRANULOCYES ABS Thousand/uL 0 01 0 02 0 02 0 02   LYMPHS ABS Thousands/µL 2 66 3 90 2 26 2 11   MONOS ABS Thousand/µL 0 43 0 77 0 47 0 61   EOS ABS Thousand/µL 0 23 0 22 0 25 0 23   BASOS ABS Thousands/µL 0 08 0 10 0 11* 0 08           Lab Units 08/09/22  0850 07/08/22  1326 04/20/22  0937   IRON ug/dL 116 127 115   FERRITIN ng/mL 292 339 539*       No results found for: CRP    Lab Results   Component Value Date    DRH8RTLBEMWL 0 631 08/09/2022        Radiology Results:   5/16/22 US abdomen complete:  IMPRESSION:     Hepatic steatosis  Otherwise unremarkable examination  5/16/22 US elastography/UGAP:    IMPRESSION:     Metavir score: F3, Severe Fibrosis      Liver steatosis grading:  S3 ( > 67% steatosis)    Gastroenterological Procedures:   4/21/22 Colonoscopy:  FINDINGS:  · The terminal ileum, ileocecal valve and entire colon appeared normal   · A distal attachment cap was used to assist with the procedure  Intermittent debris seen throughout the colon that was worse on the left side  · Small, internal hemorrhoids    10/14/21 Capsule Endoscopy:    10/13/21 EGD:  FINDINGS:  · The esophagus appeared normal   · Mild, patchy edematous and erythematous mucosa in the body of the stomach and antrum; performed cold forceps biopsy to rule out H  pylori  · The duodenal bulb and 2nd part of the duodenum appeared normal  Performed random biopsy      5/20/20 Colonoscopy:  FINDINGS:  · Small and internal hemorrhoids  · All observed locations appeared normal, including the cecum, ascending colon, hepatic flexure, transverse colon, splenic flexure, descending colon, sigmoid colon, rectosigmoid and rectum      Christy Kevin MD  PGY-4 Gastroenterology Fellow  8/12/2022 8:05 AM

## 2022-08-21 DIAGNOSIS — E66.3 OVERWEIGHT: ICD-10-CM

## 2022-08-21 DIAGNOSIS — J01.90 ACUTE SINUSITIS, RECURRENCE NOT SPECIFIED, UNSPECIFIED LOCATION: ICD-10-CM

## 2022-08-21 DIAGNOSIS — I10 ESSENTIAL HYPERTENSION: ICD-10-CM

## 2022-08-21 DIAGNOSIS — E11.65 TYPE 2 DIABETES MELLITUS WITH HYPERGLYCEMIA, WITHOUT LONG-TERM CURRENT USE OF INSULIN (HCC): ICD-10-CM

## 2022-08-21 DIAGNOSIS — K75.81 NASH (NONALCOHOLIC STEATOHEPATITIS): ICD-10-CM

## 2022-08-22 DIAGNOSIS — J01.90 ACUTE SINUSITIS, RECURRENCE NOT SPECIFIED, UNSPECIFIED LOCATION: ICD-10-CM

## 2022-08-22 RX ORDER — TRAMADOL HYDROCHLORIDE 50 MG/1
50 TABLET ORAL EVERY 8 HOURS PRN
Qty: 60 TABLET | Refills: 0 | Status: SHIPPED | OUTPATIENT
Start: 2022-08-22

## 2022-08-22 RX ORDER — SEMAGLUTIDE 1.34 MG/ML
0.5 INJECTION, SOLUTION SUBCUTANEOUS WEEKLY
Qty: 1.5 ML | Refills: 2 | Status: SHIPPED | OUTPATIENT
Start: 2022-08-22 | End: 2022-09-13 | Stop reason: SDUPTHER

## 2022-08-22 RX ORDER — METOPROLOL SUCCINATE 25 MG/1
25 TABLET, EXTENDED RELEASE ORAL 2 TIMES DAILY
Qty: 180 TABLET | Refills: 1 | Status: SHIPPED | OUTPATIENT
Start: 2022-08-22

## 2022-08-22 RX ORDER — TRAMADOL HYDROCHLORIDE 50 MG/1
50 TABLET ORAL EVERY 8 HOURS PRN
Qty: 60 TABLET | Refills: 0 | Status: SHIPPED | OUTPATIENT
Start: 2022-08-22 | End: 2022-09-13 | Stop reason: SDUPTHER

## 2022-09-13 DIAGNOSIS — E78.2 COMBINED HYPERLIPIDEMIA: ICD-10-CM

## 2022-09-13 DIAGNOSIS — F32.A DEPRESSION, UNSPECIFIED DEPRESSION TYPE: ICD-10-CM

## 2022-09-13 DIAGNOSIS — J01.90 ACUTE SINUSITIS, RECURRENCE NOT SPECIFIED, UNSPECIFIED LOCATION: ICD-10-CM

## 2022-09-13 DIAGNOSIS — E11.65 TYPE 2 DIABETES MELLITUS WITH HYPERGLYCEMIA, WITHOUT LONG-TERM CURRENT USE OF INSULIN (HCC): ICD-10-CM

## 2022-09-13 RX ORDER — ESCITALOPRAM OXALATE 20 MG/1
20 TABLET ORAL DAILY
Qty: 90 TABLET | Refills: 0 | Status: SHIPPED | OUTPATIENT
Start: 2022-09-13

## 2022-09-13 RX ORDER — METFORMIN HYDROCHLORIDE 500 MG/1
1000 TABLET, EXTENDED RELEASE ORAL 2 TIMES DAILY WITH MEALS
Qty: 360 TABLET | Refills: 0 | Status: SHIPPED | OUTPATIENT
Start: 2022-09-13

## 2022-09-13 RX ORDER — ICOSAPENT ETHYL 1000 MG/1
CAPSULE ORAL
Qty: 360 CAPSULE | Refills: 0 | Status: SHIPPED | OUTPATIENT
Start: 2022-09-13

## 2022-09-13 RX ORDER — TRAMADOL HYDROCHLORIDE 50 MG/1
50 TABLET ORAL EVERY 8 HOURS PRN
Qty: 60 TABLET | Refills: 0 | Status: SHIPPED | OUTPATIENT
Start: 2022-09-13 | End: 2022-10-09 | Stop reason: SDUPTHER

## 2022-09-21 DIAGNOSIS — M79.18 MYOFASCIAL PAIN SYNDROME: ICD-10-CM

## 2022-09-21 DIAGNOSIS — M54.50 CHRONIC LOW BACK PAIN WITHOUT SCIATICA, UNSPECIFIED BACK PAIN LATERALITY: ICD-10-CM

## 2022-09-21 DIAGNOSIS — M47.816 LUMBAR SPONDYLOSIS: ICD-10-CM

## 2022-09-21 DIAGNOSIS — M54.16 LUMBAR RADICULOPATHY: ICD-10-CM

## 2022-09-21 DIAGNOSIS — G89.29 CHRONIC LOW BACK PAIN WITHOUT SCIATICA, UNSPECIFIED BACK PAIN LATERALITY: ICD-10-CM

## 2022-09-21 DIAGNOSIS — G89.4 CHRONIC PAIN SYNDROME: ICD-10-CM

## 2022-09-22 RX ORDER — METHOCARBAMOL 750 MG/1
TABLET, FILM COATED ORAL
Qty: 90 TABLET | Refills: 0 | Status: SHIPPED | OUTPATIENT
Start: 2022-09-22

## 2022-10-06 ENCOUNTER — OFFICE VISIT (OUTPATIENT)
Dept: BARIATRICS | Facility: CLINIC | Age: 52
End: 2022-10-06
Payer: COMMERCIAL

## 2022-10-06 VITALS
DIASTOLIC BLOOD PRESSURE: 72 MMHG | HEIGHT: 62 IN | WEIGHT: 155.4 LBS | RESPIRATION RATE: 16 BRPM | BODY MASS INDEX: 28.6 KG/M2 | HEART RATE: 86 BPM | OXYGEN SATURATION: 95 % | SYSTOLIC BLOOD PRESSURE: 122 MMHG

## 2022-10-06 DIAGNOSIS — E78.2 COMBINED HYPERLIPIDEMIA: ICD-10-CM

## 2022-10-06 DIAGNOSIS — F41.9 ANXIETY: ICD-10-CM

## 2022-10-06 DIAGNOSIS — K21.9 GASTROESOPHAGEAL REFLUX DISEASE WITHOUT ESOPHAGITIS: ICD-10-CM

## 2022-10-06 DIAGNOSIS — E66.3 OVERWEIGHT: Primary | ICD-10-CM

## 2022-10-06 DIAGNOSIS — I10 PRIMARY HYPERTENSION: Chronic | ICD-10-CM

## 2022-10-06 DIAGNOSIS — E03.8 HYPOTHYROIDISM DUE TO HASHIMOTO'S THYROIDITIS: ICD-10-CM

## 2022-10-06 DIAGNOSIS — K75.81 NASH (NONALCOHOLIC STEATOHEPATITIS): ICD-10-CM

## 2022-10-06 DIAGNOSIS — E06.3 HYPOTHYROIDISM DUE TO HASHIMOTO'S THYROIDITIS: ICD-10-CM

## 2022-10-06 DIAGNOSIS — E11.65 TYPE 2 DIABETES MELLITUS WITH HYPERGLYCEMIA, WITHOUT LONG-TERM CURRENT USE OF INSULIN (HCC): ICD-10-CM

## 2022-10-06 PROCEDURE — 99213 OFFICE O/P EST LOW 20 MIN: CPT | Performed by: NURSE PRACTITIONER

## 2022-10-06 RX ORDER — SEMAGLUTIDE 1.34 MG/ML
0.5 INJECTION, SOLUTION SUBCUTANEOUS WEEKLY
Qty: 1.5 ML | Refills: 2 | Status: SHIPPED | OUTPATIENT
Start: 2022-10-06

## 2022-10-06 NOTE — ASSESSMENT & PLAN NOTE
- Taking Amaryl, metformin, and Ozempic  Continue management with prescribing provider       Lab Results   Component Value Date    HGBA1C 6 5 (H) 08/09/2022

## 2022-10-06 NOTE — PROGRESS NOTES
Assessment/Plan:     Overweight  - Patient is pursuing Conservative Program  - Initial weight loss goal of 5-10% weight loss for improved health - met  - Verified with GI Dr Epifanio Gonzalez on 6/6/2022 okay to start Ozempic    - Patient denies personal history of pancreatitis  Patient also denies personal and family history of medullary thyroid cancer and multiple endocrine neoplasia type 2 (MEN 2 tumor)  - Continue Ozempic 0 5 mg weekly - tolerating well  Ozempic started 6/7/2022 at weight of 164 7 lbs  - Can consider increasing in the future if needed  - She has been working on reducing sugar given her history of GIANG  Discussed bundles with the dietician and she is agreeable, hence we will facilitate scheduling this  - Labs reviewed: TSH, A1C, and CMP 8/9/2022  TSH normal, A1C controlled at 6 5, glucose elevated and ALT elevated (following with GI), which will all likely improve with weight loss and lifestyle modifications  Initial: 164 7 lbs  Current: 155 4 lbs  Change: -9 3 lbs  Goal: wants to get liver healthier    Goals:  Start food logging, weighing and measuring food  - 9393-7717 calories per day  Further nutrition recommendations per the dietician    - Restart walking   - Keep up the great water intake  - Continue ozempic  GIANG (nonalcoholic steatohepatitis)  - May improve with weight loss  Continue to follow with GI  Gastroesophageal reflux disease without esophagitis  - Taking Protonix  May improve with weight loss  Continue management with prescribing provider  Type 2 diabetes mellitus with hyperglycemia, without long-term current use of insulin (HCC)  - Taking Amaryl, metformin, and Ozempic  Continue management with prescribing provider  Lab Results   Component Value Date    HGBA1C 6 5 (H) 08/09/2022       Hypothyroidism due to Hashimoto's thyroiditis  - Taking levothyroxine  Continue management with prescribing provider  Hypertension  - Taking Norvasc and Toprol   May improve with weight loss  Continue management with prescribing provider  Anxiety  - Taking lexapro  Continue management with prescribing provider  Combined hyperlipidemia  - Taking Vascepa  May improve with weight loss and lifestyle modification  Continue management with prescribing provider  Radha Lizarraga was seen today for follow-up  Diagnoses and all orders for this visit:    Overweight  -     Semaglutide,0 25 or 0 5MG/DOS, (Ozempic, 0 25 or 0 5 MG/DOSE,) 2 MG/1 5ML SOPN; Inject 0 5 mg under the skin once a week    Type 2 diabetes mellitus with hyperglycemia, without long-term current use of insulin (HCC)  -     Semaglutide,0 25 or 0 5MG/DOS, (Ozempic, 0 25 or 0 5 MG/DOSE,) 2 MG/1 5ML SOPN; Inject 0 5 mg under the skin once a week    GIANG (nonalcoholic steatohepatitis)  -     Semaglutide,0 25 or 0 5MG/DOS, (Ozempic, 0 25 or 0 5 MG/DOSE,) 2 MG/1 5ML SOPN; Inject 0 5 mg under the skin once a week    Hypothyroidism due to Hashimoto's thyroiditis    Gastroesophageal reflux disease without esophagitis    Primary hypertension    Anxiety    Combined hyperlipidemia        Follow up in approximately 2 months with Non-Surgical Physician/Advanced Practitioner  Subjective:   Chief Complaint   Patient presents with    Follow-up     MWM 2 mth fu        Patient ID: Min Sosa  is a 46 y o  female with excess weight/obesity here to pursue weight management  Patient is pursuing Conservative Program    Most recent notes and records were reviewed  HPI    Wt Readings from Last 10 Encounters:   10/06/22 70 5 kg (155 lb 6 4 oz)   08/12/22 70 5 kg (155 lb 6 4 oz)   08/10/22 69 9 kg (154 lb)   07/28/22 72 1 kg (159 lb)   07/27/22 72 3 kg (159 lb 4 8 oz)   07/21/22 71 8 kg (158 lb 6 4 oz)   06/28/22 73 5 kg (162 lb)   06/03/22 74 7 kg (164 lb 11 2 oz)   05/25/22 73 9 kg (163 lb)   05/13/22 73 9 kg (163 lb)     On Ozempic 0 5 mg weekly   Initially some nausea on the 0 5 mg dose, but this has resolved  No bothersome side effects  Helping with appetite, but not quite as much lately  Work has been very busy - working overtime  Trying to watch her intake of sugar due to her history of GIANG         Hasn't been food logging       B- Protein bar and fruit  S- none  L- rice krispies and fruit and oatmilk or FF milk or salad and grilled chicken and veggies  S- peach or orange   D- pork sometimes portioned noodles and broccoli or cauliflower   S- none Liz's once a week lemon ice and vanilla gelati or pretzels portioned     Hydration- 96 oz water, 64 oz unsweetened iced tea with lemon, occ coffee - black, occ homemade lemonade no sugar, or unsweetened green tea   Alcohol- none  Exercise- not walking as much due to weather      Colonoscopy: UTD, due 2030  Mammogram: due, has order will schedule    The following portions of the patient's history were reviewed and updated as appropriate: allergies, current medications, past family history, past medical history, past social history, past surgical history, and problem list     Family History   Problem Relation Age of Onset    Pancreatic cancer Mother     Hypertension Father     Diabetes type II Father     Diabetes type II Brother     No Known Problems Brother     Lung disease Daughter         asthma tendencies    No Known Problems Son     Liver cancer Maternal Aunt     Melanoma Maternal Aunt     Hypothyroidism Paternal Uncle     Diabetes Maternal Grandmother     Diabetes Paternal Grandmother     Heart disease Paternal Grandmother     Hypothyroidism Paternal Grandmother     Hyperlipidemia Neg Hx     Stroke Neg Hx         Review of Systems   HENT: Negative for sore throat  Respiratory: Negative for cough and shortness of breath  Cardiovascular: Negative for chest pain and palpitations  Gastrointestinal: Positive for abdominal pain (intermittent, following with GI) and constipation (better with increased water intake )   Negative for diarrhea, nausea and vomiting  Denies GERD   Skin: Negative for rash  Psychiatric/Behavioral: Negative for suicidal ideas (or HI)  Denies depression and anxiety       Objective:  /72   Pulse 86   Resp 16   Ht 5' 2" (1 575 m)   Wt 70 5 kg (155 lb 6 4 oz)   LMP  (LMP Unknown)   SpO2 95%   BMI 28 42 kg/m²     Physical Exam  Vitals and nursing note reviewed  Constitutional   General appearance: Abnormal   well developed and overweight  Eyes No conjunctival injection  Ears, Nose, Mouth, and Throat Oral mucosa moist    Pulmonary   Respiratory effort: No increased work of breathing or signs of respiratory distress  Cardiovascular   Examination of extremities for edema and/or varicosities: Normal   no edema  Abdomen   Abdomen: Abnormal overweight       Musculoskeletal   Normal range of motion  Neurological   Gait and station: Normal    Psychiatric   Orientation to person, place and time: Normal     Affect: appropriate

## 2022-10-06 NOTE — ASSESSMENT & PLAN NOTE
- Taking Vascepa  May improve with weight loss and lifestyle modification  Continue management with prescribing provider

## 2022-10-06 NOTE — ASSESSMENT & PLAN NOTE
- Patient is pursuing Conservative Program  - Initial weight loss goal of 5-10% weight loss for improved health - met  - Verified with GI Dr Keegan Jane on 6/6/2022 okay to start Ozempic    - Patient denies personal history of pancreatitis  Patient also denies personal and family history of medullary thyroid cancer and multiple endocrine neoplasia type 2 (MEN 2 tumor)  - Continue Ozempic 0 5 mg weekly - tolerating well  Ozempic started 6/7/2022 at weight of 164 7 lbs  - Can consider increasing in the future if needed  - She has been working on reducing sugar given her history of GIANG  Discussed bundles with the dietician and she is agreeable, hence we will facilitate scheduling this  - Labs reviewed: TSH, A1C, and CMP 8/9/2022  TSH normal, A1C controlled at 6 5, glucose elevated and ALT elevated (following with GI), which will all likely improve with weight loss and lifestyle modifications  Initial: 164 7 lbs  Current: 155 4 lbs  Change: -9 3 lbs  Goal: wants to get liver healthier    Goals:  Start food logging, weighing and measuring food  - 8451-1683 calories per day  Further nutrition recommendations per the dietician    - Restart walking   - Keep up the great water intake  - Continue ozempic

## 2022-10-09 DIAGNOSIS — J01.90 ACUTE SINUSITIS, RECURRENCE NOT SPECIFIED, UNSPECIFIED LOCATION: ICD-10-CM

## 2022-10-10 RX ORDER — TRAMADOL HYDROCHLORIDE 50 MG/1
50 TABLET ORAL EVERY 8 HOURS PRN
Qty: 60 TABLET | Refills: 0 | Status: SHIPPED | OUTPATIENT
Start: 2022-10-10

## 2022-10-12 PROBLEM — Z11.59 SCREENING EXAMINATION FOR ARTHROPOD-BORNE VIRAL DISEASE: Status: RESOLVED | Noted: 2020-11-27 | Resolved: 2022-10-12

## 2022-10-20 DIAGNOSIS — D50.8 IRON DEFICIENCY ANEMIA SECONDARY TO INADEQUATE DIETARY IRON INTAKE: Primary | ICD-10-CM

## 2022-10-29 ENCOUNTER — TELEPHONE (OUTPATIENT)
Dept: FAMILY MEDICINE CLINIC | Facility: CLINIC | Age: 52
End: 2022-10-29

## 2022-10-29 DIAGNOSIS — U07.1 COVID: Primary | ICD-10-CM

## 2022-10-29 RX ORDER — NIRMATRELVIR AND RITONAVIR 300-100 MG
3 KIT ORAL 2 TIMES DAILY
Qty: 30 TABLET | Refills: 0 | Status: SHIPPED | OUTPATIENT
Start: 2022-10-29 | End: 2022-11-03

## 2022-10-29 NOTE — TELEPHONE ENCOUNTER
I spoke to patient  Symptoms began 10/27 with positive COVID test today  Patient did home antigen test   Symptoms include sore throat sinus congestion myalgias and low-grade temperature  No loss of taste or smell  No gastrointestinal symptoms  Patient has increased risk for COVID complications due to BMI greater than 25 and diabetes  Patient counseled on use of Paxlovid  EUA status discussed  Potential side effects reviewed  Medication interactions reviewed  Call in prescription  Patient counseled on proper quarantine protocol

## 2022-10-29 NOTE — TELEPHONE ENCOUNTER
Pt tested covid positive today, sore throat started 10/27, today she has temp of 100 and joints ache please advise

## 2022-10-31 DIAGNOSIS — E11.65 TYPE 2 DIABETES MELLITUS WITH HYPERGLYCEMIA, WITHOUT LONG-TERM CURRENT USE OF INSULIN (HCC): ICD-10-CM

## 2022-11-01 RX ORDER — GLIMEPIRIDE 1 MG/1
1 TABLET ORAL
Qty: 90 TABLET | Refills: 0 | Status: SHIPPED | OUTPATIENT
Start: 2022-11-01

## 2022-11-02 DIAGNOSIS — K58.9 IRRITABLE BOWEL SYNDROME WITHOUT DIARRHEA: ICD-10-CM

## 2022-11-02 DIAGNOSIS — J01.90 ACUTE SINUSITIS, RECURRENCE NOT SPECIFIED, UNSPECIFIED LOCATION: ICD-10-CM

## 2022-11-02 RX ORDER — TRAMADOL HYDROCHLORIDE 50 MG/1
50 TABLET ORAL EVERY 8 HOURS PRN
Qty: 60 TABLET | Refills: 0 | Status: SHIPPED | OUTPATIENT
Start: 2022-11-02

## 2022-11-02 RX ORDER — DICYCLOMINE HCL 20 MG
20 TABLET ORAL EVERY 6 HOURS PRN
Qty: 60 TABLET | Refills: 0 | Status: SHIPPED | OUTPATIENT
Start: 2022-11-02

## 2022-11-08 NOTE — PROGRESS NOTES
Hematology/Oncology Outpatient Follow- up Note  Johny Manriquez 1970, [id number]  2022        Chief Complaint   Patient presents with   • Follow-up       HPI:  Ramakrishna Capellan is a 46year old female with a history of GIANG that is progressing, iron deficiency anemia, hypothyroidism, CAD, and diabetes mellitus  She was initially seen in 2021 for iron deficiency anemia  Patient had anemia dating back to 2019 with a decrease in 10/2021  She was treated with IV Venofer 200 mg x 10 doses  Her hemoglobin and iron panel normalized  She had GI workup completed in  which showed bleeding in the proximal colon due to an AVM  US elastography confirm significant fatty liver with significant fibrosis  Not quite cirrhotic  She follows with hepatology, Dr Ruben Pacheco      Previous Hematologic/ Oncologic History:    IV Venofer 200 mg x 10 doses (completed 2021)    Current Hematologic/ Oncologic Treatment:    Observation     ECO - Symptomatic but completely ambulatory    Interval History:   The patient presents for routine follow up  She was last seen on 22  She reports she is feeling well  Denies any concerning symptoms today  Most recent blood work completed on 22  No evidence of recurrent anemia or iron deficiency  CBC is normal   Differential shows no concerns  CMP unremarkable  LFTs normal  Denies abdominal pain  No abnormal bleeding  No melena, hematochezia  She has lost weight intentionally  Denies constitutional symptoms  She is due for mammogram      Test Results:    Imaging: No results found      Labs:   Lab Results   Component Value Date    WBC 9 47 2022    HGB 13 2 2022    HCT 39 1 2022    MCV 97 2022     2022     Lab Results   Component Value Date     2015    K 4 5 2022     2022    CO2 26 2022    ANIONGAP 11 2015    BUN 13 2022    CREATININE 0 77 2022    GLUCOSE 107 09/02/2015    GLUF 111 (H) 11/11/2022    CALCIUM 9 1 11/11/2022    AST 42 08/09/2022    ALT 86 (H) 08/09/2022    ALKPHOS 94 08/09/2022    PROT 7 2 09/02/2015    BILITOT 0 38 09/02/2015    EGFR 89 11/11/2022           Review of Systems   All other systems reviewed and are negative          Active Problems:   Patient Active Problem List   Diagnosis   • Hypertension   • GIANG (nonalcoholic steatohepatitis)   • Sinus tachycardia   • Chronic sinusitis   • Anemia   • Anomalous coronary artery origin   • Anxiety   • Combined hyperlipidemia   • Coronary vasospasm (HCC)   • Type 2 diabetes mellitus with hyperglycemia, without long-term current use of insulin (HCC)   • Gastroesophageal reflux disease without esophagitis   • Ground glass opacity present on imaging of lung   • Hypothyroidism due to Hashimoto's thyroiditis   • NSTEMI (non-ST elevated myocardial infarction) ([facility] [address] )   • Chest pain due to GERD   • Dysfunctional uterine bleeding   • Left ovarian cyst   • Menopausal symptoms   • S/P laparoscopic hysterectomy   • Colon cancer screening   • Chronic back pain   • Lateral epicondylitis of right elbow   • Lumbar spondylosis   • Myofascial pain syndrome   • Lumbar radiculopathy   • Chronic pain syndrome   • Tendonitis of elbow, right   • Chronic low back pain without sciatica   • Overweight       Past Medical History:   Past Medical History:   Diagnosis Date   • Coronary artery disease    • Diabetes mellitus ([address] )     Borderline   • Disease of thyroid gland    • DUB (dysfunctional uterine bleeding)    • Fatty liver    • Hashimoto's thyroiditis     Last Assessed:  8/19/14   • History of stomach ulcers    • Hypertension    • Hypothyroidism    • Irritable bowel syndrome     Last Assessed:  3/25/14   • Liver disease     elevated enzymes   • Myocardial infarction ([address] )     2017   • GIANG (nonalcoholic steatohepatitis)    • Ovarian cyst     left   • Psychogenic polydipsia     Has had hyponatremia from drinking too much water in the past        Surgical History:   Past Surgical History:   Procedure Laterality Date   • ANGIOPLASTY     • CARDIAC CATHETERIZATION     •  SECTION      X 2   • CHOLECYSTECTOMY     • COLONOSCOPY     • CYSTOSCOPY N/A 2019    Procedure: CYSTOSCOPY;  Surgeon: Marilou Valenzuela MD;  Location: [facility];  Service: Gynecology Oncology   • HYSTERECTOMY     • IR BIOPSY LIVER MASS  2020   • OOPHORECTOMY Right    • MI ESOPHAGOGASTRODUODENOSCOPY TRANSORAL DIAGNOSTIC N/A 2018    Procedure: ESOPHAGOGASTRODUODENOSCOPY (EGD); Surgeon: Cullen Artis MD;  Location: [facility]; Service: Gastroenterology   • MI LAPAROSCOPY W TOT HYSTERECTUTERUS <=250 GRAM  W TUBE/OVARY N/A 2019    Procedure: TOTAL LAPAROSCOPIC HYSTERECTOMY, BILATERAL SALPINGECTOMY, LEFT OOPHORECTOMY;  Surgeon: Marilou Valenzuela MD;  Location:  MAIN OR;  Service: Gynecology Oncology   • SINUS SURGERY     • TONSILLECTOMY     • UPPER GASTROINTESTINAL ENDOSCOPY         Family History:    Family History   Problem Relation Age of Onset   • Pancreatic cancer Mother    • Hypertension Father    • Diabetes type II Father    • Diabetes type II Brother    • No Known Problems Brother    • Lung disease Daughter         asthma tendencies   • No Known Problems Son    • Liver cancer Maternal Aunt    • Melanoma Maternal Aunt    • Hypothyroidism Paternal Uncle    • Diabetes Maternal Grandmother    • Diabetes Paternal Grandmother    • Heart disease Paternal Grandmother    • Hypothyroidism Paternal Grandmother    • Hyperlipidemia Neg Hx    • Stroke Neg Hx        Cancer-related family history includes Liver cancer in her maternal aunt; Melanoma in her maternal aunt; Pancreatic cancer in her mother      Social History:   Social History     Socioeconomic History   • Marital status: /Civil Union     Spouse name: Not on file   • Number of children: Not on file   • Years of education: Not on file   • Highest education level: Not on file   Occupational History   • Not on file   Tobacco Use   • Smoking status: Former Smoker     Packs/day: 0 50     Years: 4 00     Pack years: 2 00     Types: Cigarettes     Quit date: 2016     Years since quittin 7   • Smokeless tobacco: Never Used   • Tobacco comment: 2016   Vaping Use   • Vaping Use: Never used   Substance and Sexual Activity   • Alcohol use: Never     Comment: occasional, Social drinker   • Drug use: No   • Sexual activity: Yes     Partners: Male     Birth control/protection: None   Other Topics Concern   • Not on file   Social History Narrative    Feels safe at home     Social Determinants of Health     Financial Resource Strain: Not on file   Food Insecurity: Not on file   Transportation Needs: Not on file   Physical Activity: Not on file   Stress: Not on file   Social Connections: Not on file   Intimate Partner Violence: Not on file   Housing Stability: Not on file       Current Medications:   Current Outpatient Medications   Medication Sig Dispense Refill   • amLODIPine (NORVASC) 5 mg tablet Take 1 tablet (5 mg total) by mouth daily 90 tablet 2   • Blood Glucose Monitoring Suppl ([employer] IQ SYSTEM) w/Device KIT Use to test blood sugars twice a day 1 kit 0   • dicyclomine (BENTYL) 20 mg tablet Take 1 tablet (20 mg total) by mouth every 6 (six) hours as needed (every 6 hours) 60 tablet 0   • escitalopram (LEXAPRO) 20 mg tablet Take 1 tablet (20 mg total) by mouth daily 90 tablet 0   • glimepiride (AMARYL) 1 mg tablet Take 1 tablet (1 mg total) by mouth daily with breakfast 90 tablet 0   • ibuprofen (MOTRIN) 600 mg tablet Take 1 PO BID with food  NO OTHER NSAIDS while on this medication   180 tablet 1   • Icosapent Ethyl (Vascepa) 1 g CAPS 2 capsules twice a day 360 capsule 0   • Insulin Pen Needle 32G X 4 MM MISC Use once a week 30 each 1   • Lactobacillus (PROBIOTIC ACIDOPHILUS PO) Take by mouth daily     • levothyroxine 112 mcg tablet Take 1 tablet (112 mcg total) by mouth daily 90 tablet 0   • Magnesium 300 MG CAPS Take 600 mg by mouth daily     • metFORMIN (GLUCOPHAGE-XR) 500 mg 24 hr tablet Take 2 tablets (1,000 mg total) by mouth 2 (two) times a day with meals 360 tablet 0   • methocarbamol (ROBAXIN) 750 mg tablet 1 tab PO HS  90 tablet 0   • metoprolol succinate (TOPROL-XL) 25 mg 24 hr tablet Take 1 tablet (25 mg total) by mouth 2 (two) times a day 180 tablet 1   • mupirocin (BACTROBAN) 2 % ointment Apply topically 3 (three) times a day (Patient taking differently: Apply topically 3 (three) times a day PRN) 22 g 0   • nitroglycerin (NITROSTAT) 0 4 mg SL tablet Place 1 tablet (0 4 mg total) under the tongue every 5 (five) minutes as needed for chest pain 90 tablet 0   • OneTouch Delica Lancets 05E MISC Use to test blood sugars twice a day 200 each 6   • OneTouch Verio test strip Use as instructed to test blood sugars twice a day 200 each 0   • pantoprazole (PROTONIX) 40 mg tablet Take 1 tablet (40 mg total) by mouth daily before breakfast 90 tablet 0   • Semaglutide,0 25 or 0 5MG/DOS, (Ozempic, 0 25 or 0 5 MG/DOSE,) 2 MG/1 5ML SOPN Inject 0 5 mg under the skin once a week 1 5 mL 2   • traMADol (ULTRAM) 50 mg tablet Take 1 tablet (50 mg total) by mouth every 8 (eight) hours as needed for moderate pain 60 tablet 0   • traMADol (ULTRAM) 50 mg tablet Take 1 tablet (50 mg total) by mouth every 8 (eight) hours as needed for moderate pain 60 tablet 0   • traMADol (ULTRAM) 50 mg tablet Take 1 tablet (50 mg total) by mouth every 8 (eight) hours as needed for moderate pain 60 tablet 0   • Turmeric 500 MG CAPS Take by mouth     • vitamin B-12 (VITAMIN B-12) 1,000 mcg tablet Take by mouth daily     • Vitamin D, Cholecalciferol, 50 MCG (2000 UT) CAPS Take 4,000 Units by mouth daily       No current facility-administered medications for this visit  Allergies:    Allergies   Allergen Reactions   • Sulfa Antibiotics Shortness Of Breath   • Amoxicillin-Pot Clavulanate Other (See Comments)   • Valentine Banks Yeast infection   • Erythromycin      Reaction Date: [id number];    • Metronidazole        Physical Exam:  /62 (BP Location: Left arm, Patient Position: Sitting, Cuff Size: Adult)   Pulse 79   Temp (!) 96 8 °F (36 °C) (Tympanic)   Resp 16   Ht 5' 2" (1 575 m)   Wt 67 1 kg (148 lb)   LMP  (LMP Unknown)   SpO2 97%   BMI 27 07 kg/m²   Body surface area is 1 68 meters squared  Wt Readings from Last 3 Encounters:   11/14/22 67 1 kg (148 lb)   10/06/22 70 5 kg (155 lb 6 4 oz)   08/12/22 70 5 kg (155 lb 6 4 oz)           Physical Exam  Constitutional:       General: She is not in acute distress  Appearance: Normal appearance  HENT:      Head: Normocephalic and atraumatic  Eyes:      General: No scleral icterus  Right eye: No discharge  Left eye: No discharge  Conjunctiva/sclera: Conjunctivae normal    Cardiovascular:      Rate and Rhythm: Normal rate and regular rhythm  Pulmonary:      Effort: Pulmonary effort is normal  No respiratory distress  Breath sounds: Normal breath sounds  Chest:   Breasts:      Right: No axillary adenopathy or supraclavicular adenopathy  Left: No axillary adenopathy or supraclavicular adenopathy  Abdominal:      General: Bowel sounds are normal  There is no distension  Palpations: Abdomen is soft  There is no mass  Tenderness: There is no abdominal tenderness  Musculoskeletal:         General: Normal range of motion  Lymphadenopathy:      Cervical: No cervical adenopathy  Upper Body:      Right upper body: No supraclavicular, axillary or pectoral adenopathy  Left upper body: No supraclavicular, axillary or pectoral adenopathy  Skin:     General: Skin is warm and dry  Neurological:      General: No focal deficit present  Mental Status: She is alert and oriented to person, place, and time     Psychiatric:         Mood and Affect: Mood normal          Behavior: Behavior normal          Assessment / Plan: 1  Iron deficiency anemia secondary to inadequate dietary iron intake    2  GIANG (nonalcoholic steatohepatitis)      The patient is a very pleasant 46year old female with a history of GIANG that is progressing, iron deficiency anemia, hypothyroidism, CAD, and diabetes mellitus  She was initially seen in 11/2021 for iron deficiency anemia  Patient had anemia dating back to 2019 with a decrease in 10/2021  She was treated with IV Venofer 200 mg x 10 doses  Her hemoglobin and iron panel normalized  Overall, she is doing well  Her blood work remains in a normal range  No evidence of recurrent anemia or iron deficiency  Labs have been stable for over a year  She continues to follow closely with hepatology  She is encouraged to schedule yearly mammogram    Clinically she is well-appearing with no concerns on exam today  Given overall stability in her blood work, I will see her back in 1 year with repeat labs  She is aware of symptoms to report and if noted to have any decrease in blood counts on blood work checked by PCP or other specialists we will see her back sooner  Patient was in agreement with this plan of care  She knows to call anytime with questions or concerns    Barriers to care: None  Patient able to self care  Portions of the record may have been created with voice recognition software  Occasional wrong word or "sound a like" substitutions may have occurred due to the inherent limitations of voice recognition software  Read the chart carefully and recognize, using context, where substitutions have occurred

## 2022-11-11 ENCOUNTER — APPOINTMENT (OUTPATIENT)
Dept: LAB | Facility: HOSPITAL | Age: 52
End: 2022-11-11
Attending: INTERNAL MEDICINE

## 2022-11-11 ENCOUNTER — TELEPHONE (OUTPATIENT)
Dept: HEMATOLOGY ONCOLOGY | Facility: CLINIC | Age: 52
End: 2022-11-11

## 2022-11-11 DIAGNOSIS — D50.8 IRON DEFICIENCY ANEMIA SECONDARY TO INADEQUATE DIETARY IRON INTAKE: ICD-10-CM

## 2022-11-11 LAB
ANION GAP SERPL CALCULATED.3IONS-SCNC: 8 MMOL/L (ref 4–13)
BASOPHILS # BLD AUTO: 0.12 THOUSANDS/ÂΜL (ref 0–0.1)
BASOPHILS NFR BLD AUTO: 1 % (ref 0–1)
BUN SERPL-MCNC: 13 MG/DL (ref 5–25)
CALCIUM SERPL-MCNC: 9.1 MG/DL (ref 8.3–10.1)
CHLORIDE SERPL-SCNC: 104 MMOL/L (ref 96–108)
CO2 SERPL-SCNC: 26 MMOL/L (ref 21–32)
CREAT SERPL-MCNC: 0.77 MG/DL (ref 0.6–1.3)
EOSINOPHIL # BLD AUTO: 0.28 THOUSAND/ÂΜL (ref 0–0.61)
EOSINOPHIL NFR BLD AUTO: 3 % (ref 0–6)
ERYTHROCYTE [DISTWIDTH] IN BLOOD BY AUTOMATED COUNT: 12.1 % (ref 11.6–15.1)
FERRITIN SERPL-MCNC: 279 NG/ML (ref 8–388)
GFR SERPL CREATININE-BSD FRML MDRD: 89 ML/MIN/1.73SQ M
GLUCOSE P FAST SERPL-MCNC: 111 MG/DL (ref 65–99)
HCT VFR BLD AUTO: 39.1 % (ref 34.8–46.1)
HGB BLD-MCNC: 13.2 G/DL (ref 11.5–15.4)
IMM GRANULOCYTES # BLD AUTO: 0.04 THOUSAND/UL (ref 0–0.2)
IMM GRANULOCYTES NFR BLD AUTO: 0 % (ref 0–2)
IRON SATN MFR SERPL: 32 % (ref 15–50)
IRON SERPL-MCNC: 131 UG/DL (ref 50–170)
LYMPHOCYTES # BLD AUTO: 3.38 THOUSANDS/ÂΜL (ref 0.6–4.47)
LYMPHOCYTES NFR BLD AUTO: 36 % (ref 14–44)
MCH RBC QN AUTO: 32.6 PG (ref 26.8–34.3)
MCHC RBC AUTO-ENTMCNC: 33.8 G/DL (ref 31.4–37.4)
MCV RBC AUTO: 97 FL (ref 82–98)
MONOCYTES # BLD AUTO: 0.71 THOUSAND/ÂΜL (ref 0.17–1.22)
MONOCYTES NFR BLD AUTO: 8 % (ref 4–12)
NEUTROPHILS # BLD AUTO: 4.94 THOUSANDS/ÂΜL (ref 1.85–7.62)
NEUTS SEG NFR BLD AUTO: 52 % (ref 43–75)
NRBC BLD AUTO-RTO: 0 /100 WBCS
PLATELET # BLD AUTO: 266 THOUSANDS/UL (ref 149–390)
PMV BLD AUTO: 9.1 FL (ref 8.9–12.7)
POTASSIUM SERPL-SCNC: 4.5 MMOL/L (ref 3.5–5.3)
RBC # BLD AUTO: 4.05 MILLION/UL (ref 3.81–5.12)
SODIUM SERPL-SCNC: 138 MMOL/L (ref 135–147)
TIBC SERPL-MCNC: 413 UG/DL (ref 250–450)
WBC # BLD AUTO: 9.47 THOUSAND/UL (ref 4.31–10.16)

## 2022-11-14 ENCOUNTER — OFFICE VISIT (OUTPATIENT)
Dept: HEMATOLOGY ONCOLOGY | Facility: HOSPITAL | Age: 52
End: 2022-11-14

## 2022-11-14 VITALS
HEART RATE: 79 BPM | HEIGHT: 62 IN | TEMPERATURE: 96.8 F | OXYGEN SATURATION: 97 % | BODY MASS INDEX: 27.23 KG/M2 | DIASTOLIC BLOOD PRESSURE: 62 MMHG | WEIGHT: 148 LBS | SYSTOLIC BLOOD PRESSURE: 104 MMHG | RESPIRATION RATE: 16 BRPM

## 2022-11-14 DIAGNOSIS — D50.8 IRON DEFICIENCY ANEMIA SECONDARY TO INADEQUATE DIETARY IRON INTAKE: Primary | ICD-10-CM

## 2022-11-14 DIAGNOSIS — K75.81 NASH (NONALCOHOLIC STEATOHEPATITIS): ICD-10-CM

## 2022-11-15 LAB — METHYLMALONATE SERPL-SCNC: 200 NMOL/L (ref 0–378)

## 2022-11-27 DIAGNOSIS — F32.A DEPRESSION, UNSPECIFIED DEPRESSION TYPE: ICD-10-CM

## 2022-11-27 DIAGNOSIS — J01.90 ACUTE SINUSITIS, RECURRENCE NOT SPECIFIED, UNSPECIFIED LOCATION: ICD-10-CM

## 2022-11-27 DIAGNOSIS — E11.65 TYPE 2 DIABETES MELLITUS WITH HYPERGLYCEMIA, WITHOUT LONG-TERM CURRENT USE OF INSULIN (HCC): ICD-10-CM

## 2022-11-28 RX ORDER — TRAMADOL HYDROCHLORIDE 50 MG/1
50 TABLET ORAL EVERY 8 HOURS PRN
Qty: 60 TABLET | Refills: 0 | Status: SHIPPED | OUTPATIENT
Start: 2022-11-28

## 2022-11-28 RX ORDER — ESCITALOPRAM OXALATE 20 MG/1
20 TABLET ORAL DAILY
Qty: 90 TABLET | Refills: 0 | Status: SHIPPED | OUTPATIENT
Start: 2022-11-28

## 2022-11-28 RX ORDER — METFORMIN HYDROCHLORIDE 500 MG/1
1000 TABLET, EXTENDED RELEASE ORAL 2 TIMES DAILY WITH MEALS
Qty: 360 TABLET | Refills: 0 | Status: SHIPPED | OUTPATIENT
Start: 2022-11-28

## 2022-12-07 ENCOUNTER — APPOINTMENT (OUTPATIENT)
Dept: LAB | Facility: HOSPITAL | Age: 52
End: 2022-12-07
Attending: INTERNAL MEDICINE

## 2022-12-07 ENCOUNTER — OFFICE VISIT (OUTPATIENT)
Dept: ENDOCRINOLOGY | Facility: HOSPITAL | Age: 52
End: 2022-12-07

## 2022-12-07 VITALS
SYSTOLIC BLOOD PRESSURE: 118 MMHG | HEIGHT: 62 IN | HEART RATE: 79 BPM | WEIGHT: 150.4 LBS | DIASTOLIC BLOOD PRESSURE: 80 MMHG | BODY MASS INDEX: 27.68 KG/M2

## 2022-12-07 DIAGNOSIS — E78.2 COMBINED HYPERLIPIDEMIA: ICD-10-CM

## 2022-12-07 DIAGNOSIS — E06.3 HYPOTHYROIDISM DUE TO HASHIMOTO'S THYROIDITIS: ICD-10-CM

## 2022-12-07 DIAGNOSIS — I10 PRIMARY HYPERTENSION: Chronic | ICD-10-CM

## 2022-12-07 DIAGNOSIS — E03.8 HYPOTHYROIDISM DUE TO HASHIMOTO'S THYROIDITIS: ICD-10-CM

## 2022-12-07 DIAGNOSIS — E11.65 TYPE 2 DIABETES MELLITUS WITH HYPERGLYCEMIA, WITHOUT LONG-TERM CURRENT USE OF INSULIN (HCC): ICD-10-CM

## 2022-12-07 DIAGNOSIS — E11.65 TYPE 2 DIABETES MELLITUS WITH HYPERGLYCEMIA, WITHOUT LONG-TERM CURRENT USE OF INSULIN (HCC): Primary | ICD-10-CM

## 2022-12-07 DIAGNOSIS — K75.81 NASH (NONALCOHOLIC STEATOHEPATITIS): ICD-10-CM

## 2022-12-07 DIAGNOSIS — D50.8 IRON DEFICIENCY ANEMIA SECONDARY TO INADEQUATE DIETARY IRON INTAKE: ICD-10-CM

## 2022-12-07 LAB
ALBUMIN SERPL BCP-MCNC: 3.7 G/DL (ref 3.5–5)
ALP SERPL-CCNC: 94 U/L (ref 46–116)
ALT SERPL W P-5'-P-CCNC: 70 U/L (ref 12–78)
ANION GAP SERPL CALCULATED.3IONS-SCNC: 4 MMOL/L (ref 4–13)
AST SERPL W P-5'-P-CCNC: 39 U/L (ref 5–45)
BILIRUB SERPL-MCNC: 0.51 MG/DL (ref 0.2–1)
BUN SERPL-MCNC: 9 MG/DL (ref 5–25)
CALCIUM SERPL-MCNC: 9.7 MG/DL (ref 8.3–10.1)
CHLORIDE SERPL-SCNC: 105 MMOL/L (ref 96–108)
CO2 SERPL-SCNC: 27 MMOL/L (ref 21–32)
CREAT SERPL-MCNC: 0.72 MG/DL (ref 0.6–1.3)
EST. AVERAGE GLUCOSE BLD GHB EST-MCNC: 111 MG/DL
GFR SERPL CREATININE-BSD FRML MDRD: 96 ML/MIN/1.73SQ M
GLUCOSE P FAST SERPL-MCNC: 115 MG/DL (ref 65–99)
HBA1C MFR BLD: 5.5 %
POTASSIUM SERPL-SCNC: 4.5 MMOL/L (ref 3.5–5.3)
PROT SERPL-MCNC: 7.8 G/DL (ref 6.4–8.4)
SODIUM SERPL-SCNC: 136 MMOL/L (ref 135–147)
T4 FREE SERPL-MCNC: 1.15 NG/DL (ref 0.76–1.46)
TSH SERPL DL<=0.05 MIU/L-ACNC: 0.28 UIU/ML (ref 0.45–4.5)

## 2022-12-07 RX ORDER — SEMAGLUTIDE 1.34 MG/ML
INJECTION, SOLUTION SUBCUTANEOUS
Qty: 9 ML | Refills: 3 | Status: SHIPPED | OUTPATIENT
Start: 2022-12-07

## 2022-12-07 NOTE — PROGRESS NOTES
12/8/2022    Assessment/Plan      Diagnoses and all orders for this visit:    Type 2 diabetes mellitus with hyperglycemia, without long-term current use of insulin (HCC)  -     semaglutide, 1 mg/dose, (Ozempic, 1 MG/DOSE,) 4 MG/3ML SOPN injection pen; Inject 1 mg once a week  -     HEMOGLOBIN A1C W/ EAG ESTIMATION Lab Collect; Future  -     Comprehensive metabolic panel Lab Collect; Future  -     TSH, 3rd generation Lab Collect; Future  -     T4, free Lab Collect; Future    Hypothyroidism due to Hashimoto's thyroiditis  -     HEMOGLOBIN A1C W/ EAG ESTIMATION Lab Collect; Future  -     Comprehensive metabolic panel Lab Collect; Future  -     TSH, 3rd generation Lab Collect; Future  -     T4, free Lab Collect; Future    Primary hypertension  -     HEMOGLOBIN A1C W/ EAG ESTIMATION Lab Collect; Future  -     Comprehensive metabolic panel Lab Collect; Future  -     TSH, 3rd generation Lab Collect; Future  -     T4, free Lab Collect; Future    Combined hyperlipidemia  -     HEMOGLOBIN A1C W/ EAG ESTIMATION Lab Collect; Future  -     Comprehensive metabolic panel Lab Collect; Future  -     TSH, 3rd generation Lab Collect; Future  -     T4, free Lab Collect; Future    Other orders  -     Multiple Vitamins-Minerals (ZINC PO); Take by mouth        Assessment/Plan:  1  Type 2 diabetes  Work is still pending  At this point, I will increase her Ozempic to 1 mg weekly  She will continue the same metformin and when she increases the Ozempic, I will have her continue the glimepiride  If for some reason her Ozempic cannot be increased due to supply, she is to decrease her glimepiride in half  She will continue to test her blood sugars up to 4 times a day  We will continue to work on dietary changes and weight loss  2   Hypothyroidism due to Hashimoto's thyroiditis  Most recent thyroid function studies do show a slightly low TSH but I do await the rest of the blood work    For now, she will continue the same levothyroxine 112 mcg daily  3   Hypertension  She is normotensive in office on her current dose of amlodipine and metoprolol  I have asked her to follow-up in 3 months with preceding hemoglobin A1c, CMP, TSH, and free T4       CC: Diabetes type II, thyroid, blood pressure follow-up        History of Present Illness     HPI: Dot Toscano is a 46y o  year old female with type 2 diabetes for 5 years, hypertension, hypothyroidism for follow-up visit  She is on oral agents at home and takes Ozempic 0 5 mg once a week, metformin  mg 2 tablets twice a day and glimepiride 1 mg daily  She denies any polyuria, polydipsia, polyphagia, and blurry vision  She has once a night nocturia  She denies chest pain or shortness of breath  She denies numbness or tingling of the feet  She denies neuropathy, nephropathy, retinopathy, stroke and claudication but does admit to heart attack  Hypoglycemic episodes: Yes several times per week having lows frequently after lunch  The patient's last eye exam was in February 2022 with no retinopathy  The patient's last foot exam was in April 2022 at endocrine office visit  She does not follow with podiatry  Last A1C was   Lab Results   Component Value Date    HGBA1C 5 5 12/07/2022     Blood Sugar/Glucometer/Pump/CGM review: checks blood sugars 1-4 times a day  Mostly in the 100s with some less than 100  She has hypothyroidism due to Hashimoto's thyroiditis and takes levothyroxine 112 mcg daily  She is another 4 pounds less than last visit and her appetite is decreased on Ozempic  She denies fatigue, palpitation, or tremors  She has some hot flashes but no cold intolerance  She has some diarrhea along with some dry skin  She denies brittle nails, hair loss, anxiety or depression, constipation, or insomnia  She has hypertension and takes metoprolol 25 mg twice daily and amlodipine 5 mg daily  She denies headache or strokelike symptoms      Review of Systems Constitutional: Positive for appetite change  Negative for fatigue and unexpected weight change  Weight 4 lbs less than last visit  Appetite decreased with ozempic  HENT: Negative for trouble swallowing  Eyes: Negative for visual disturbance  Respiratory: Negative for chest tightness and shortness of breath  Cardiovascular: Negative for chest pain and palpitations  Gastrointestinal: Positive for diarrhea and nausea  Negative for abdominal pain and constipation  Some nausea  Some diarrhea  Endocrine: Positive for heat intolerance  Negative for cold intolerance, polydipsia, polyphagia and polyuria  Nocturia once a night  Gets hot flashes  Skin: Negative for wound  Has dry skin  No brittle nails  No hair loss  Neurological: Positive for headaches  Negative for dizziness, tremors, light-headedness and numbness  Some sinus headaches  Psychiatric/Behavioral: Negative for dysphoric mood and sleep disturbance  The patient is not nervous/anxious          Historical Information   Past Medical History:   Diagnosis Date   • Coronary artery disease    • Diabetes mellitus ([facility] [address] )     Borderline   • Disease of thyroid gland    • DUB (dysfunctional uterine bleeding)    • Fatty liver    • Hashimoto's thyroiditis     Last Assessed:  14   • History of stomach ulcers    • Hypertension    • Hypothyroidism    • Irritable bowel syndrome     Last Assessed:  3/25/14   • Liver disease     elevated enzymes   • Myocardial infarction ([facility] [address] )     2017   • GIANG (nonalcoholic steatohepatitis)    • Ovarian cyst     left   • Psychogenic polydipsia     Has had hyponatremia from drinking too much water in the past      Past Surgical History:   Procedure Laterality Date   • ANGIOPLASTY     • CARDIAC CATHETERIZATION     •  SECTION      X 2   • CHOLECYSTECTOMY     • COLONOSCOPY     • CYSTOSCOPY N/A 2019    Procedure: CYSTOSCOPY;  Surgeon: Marco A King MD;  Location: [facility]; Service: Gynecology Oncology   • HYSTERECTOMY     • IR BIOPSY LIVER MASS  2020   • OOPHORECTOMY Right    • TX ESOPHAGOGASTRODUODENOSCOPY TRANSORAL DIAGNOSTIC N/A 2018    Procedure: ESOPHAGOGASTRODUODENOSCOPY (EGD); Surgeon: Jeff Curiel MD;  Location:  GI LAB;   Service: Gastroenterology   • TX LAPAROSCOPY W TOT HYSTERECTUTERUS <=250 GRAM  W TUBE/OVARY N/A 2019    Procedure: TOTAL LAPAROSCOPIC HYSTERECTOMY, BILATERAL SALPINGECTOMY, LEFT OOPHORECTOMY;  Surgeon: Gonzalo Mays MD;  Location:  MAIN OR;  Service: Gynecology Oncology   • SINUS SURGERY     • TONSILLECTOMY     • UPPER GASTROINTESTINAL ENDOSCOPY       Social History   Social History     Substance and Sexual Activity   Alcohol Use Never    Comment: occasional, Social drinker     Social History     Substance and Sexual Activity   Drug Use No     Social History     Tobacco Use   Smoking Status Former   • Packs/day: 0 50   • Years: 4 00   • Pack years: 2 00   • Types: Cigarettes   • Quit date: 2016   • Years since quittin 8   Smokeless Tobacco Never   Tobacco Comments    2016     Family History:   Family History   Problem Relation Age of Onset   • Pancreatic cancer Mother    • Hypertension Father    • Diabetes type II Father    • Diabetes type II Brother    • No Known Problems Brother    • Lung disease Daughter         asthma tendencies   • No Known Problems Son    • Liver cancer Maternal Aunt    • Melanoma Maternal Aunt    • Hypothyroidism Paternal Uncle    • Diabetes Maternal Grandmother    • Diabetes Paternal Grandmother    • Heart disease Paternal Grandmother    • Hypothyroidism Paternal Grandmother    • Hyperlipidemia Neg Hx    • Stroke Neg Hx        Meds/Allergies   Current Outpatient Medications   Medication Sig Dispense Refill   • amLODIPine (NORVASC) 5 mg tablet Take 1 tablet (5 mg total) by mouth daily 90 tablet 2   • Blood Glucose Monitoring Suppl ([employer] IQ SYSTEM) w/Device KIT Use to test blood sugars twice a day 1 kit 0   • dicyclomine (BENTYL) 20 mg tablet Take 1 tablet (20 mg total) by mouth every 6 (six) hours as needed (every 6 hours) 60 tablet 0   • escitalopram (LEXAPRO) 20 mg tablet Take 1 tablet (20 mg total) by mouth daily 90 tablet 0   • glimepiride (AMARYL) 1 mg tablet Take 1 tablet (1 mg total) by mouth daily with breakfast 90 tablet 0   • ibuprofen (MOTRIN) 600 mg tablet Take 1 PO BID with food  NO OTHER NSAIDS while on this medication  180 tablet 1   • Icosapent Ethyl (Vascepa) 1 g CAPS 2 capsules twice a day 360 capsule 0   • Insulin Pen Needle 32G X 4 MM MISC Use once a week 30 each 1   • Lactobacillus (PROBIOTIC ACIDOPHILUS PO) Take by mouth daily     • levothyroxine 112 mcg tablet Take 1 tablet (112 mcg total) by mouth daily 90 tablet 0   • Magnesium 300 MG CAPS Take 600 mg by mouth daily     • metFORMIN (GLUCOPHAGE-XR) 500 mg 24 hr tablet Take 2 tablets (1,000 mg total) by mouth 2 (two) times a day with meals 360 tablet 0   • methocarbamol (ROBAXIN) 750 mg tablet 1 tab PO HS   90 tablet 0   • metoprolol succinate (TOPROL-XL) 25 mg 24 hr tablet Take 1 tablet (25 mg total) by mouth 2 (two) times a day 180 tablet 1   • Multiple Vitamins-Minerals (ZINC PO) Take by mouth     • mupirocin (BACTROBAN) 2 % ointment Apply topically 3 (three) times a day 22 g 0   • nitroglycerin (NITROSTAT) 0 4 mg SL tablet Place 1 tablet (0 4 mg total) under the tongue every 5 (five) minutes as needed for chest pain 90 tablet 0   • OneTouch Delica Lancets 53J MISC Use to test blood sugars twice a day 200 each 6   • OneTouch Verio test strip Use as instructed to test blood sugars twice a day 200 each 0   • pantoprazole (PROTONIX) 40 mg tablet Take 1 tablet (40 mg total) by mouth daily before breakfast 90 tablet 0   • semaglutide, 1 mg/dose, (Ozempic, 1 MG/DOSE,) 4 MG/3ML SOPN injection pen Inject 1 mg once a week 9 mL 3   • traMADol (ULTRAM) 50 mg tablet Take 1 tablet (50 mg total) by mouth every 8 (eight) hours as needed for moderate pain 60 tablet 0   • traMADol (ULTRAM) 50 mg tablet Take 1 tablet (50 mg total) by mouth every 8 (eight) hours as needed for moderate pain 60 tablet 0   • traMADol (ULTRAM) 50 mg tablet Take 1 tablet (50 mg total) by mouth every 8 (eight) hours as needed for moderate pain 60 tablet 0   • Turmeric 500 MG CAPS Take by mouth     • vitamin B-12 (VITAMIN B-12) 1,000 mcg tablet Take by mouth daily     • Vitamin D, Cholecalciferol, 50 MCG (2000 UT) CAPS Take 4,000 Units by mouth daily       No current facility-administered medications for this visit  Allergies   Allergen Reactions   • Sulfa Antibiotics Shortness Of Breath   • Amoxicillin-Pot Clavulanate Other (See Comments)   • Invokana [Canagliflozin]      Yeast infection   • Erythromycin      Reaction Date: [id number];    • Metronidazole        Objective   Vitals: Blood pressure 118/80, pulse 79, height 5' 2" (1 575 m), weight 68 2 kg (150 lb 6 4 oz), not currently breastfeeding  Invasive Devices     None                 Physical Exam  Vitals reviewed  Constitutional:       Appearance: Normal appearance  She is well-developed  HENT:      Head: Normocephalic and atraumatic  Eyes:      Extraocular Movements: Extraocular movements intact  Conjunctiva/sclera: Conjunctivae normal       Comments: No lid lag, stare, proptosis, or periorbital edema  Neck:      Thyroid: No thyromegaly  Vascular: No carotid bruit  Comments: Thyroid normal in size  Cardiovascular:      Rate and Rhythm: Normal rate and regular rhythm  Heart sounds: Normal heart sounds  No murmur heard  Pulmonary:      Effort: Pulmonary effort is normal       Breath sounds: Normal breath sounds  No wheezing  Abdominal:      Palpations: Abdomen is soft  Musculoskeletal:         General: No deformity  Normal range of motion  Cervical back: Normal range of motion and neck supple  Right lower leg: No edema  Left lower leg: No edema        Comments: No tremor of the outstretched hands  Lymphadenopathy:      Cervical: No cervical adenopathy  Skin:     General: Skin is warm and dry  Findings: No rash  Neurological:      Mental Status: She is alert and oriented to person, place, and time  Deep Tendon Reflexes: Reflexes are normal and symmetric  Comments: Patellar deep tendon reflexes normal          The history was obtained from the review of the chart and from the patient  Lab Results:     Most of her blood work was pending at the time of the visit       Most recent Alc is  Lab Results   Component Value Date    HGBA1C 5 5 12/07/2022               Lab Results   Component Value Date    CREATININE 0 72 12/07/2022    CREATININE 0 77 11/11/2022    CREATININE 0 81 08/09/2022    BUN 9 12/07/2022     09/02/2015    K 4 5 12/07/2022     12/07/2022    CO2 27 12/07/2022     eGFR   Date Value Ref Range Status   12/07/2022 96 ml/min/1 73sq m Final         Lab Results   Component Value Date    CHOL 210 08/20/2015    HDL 39 (L) 04/20/2022    TRIG 211 (H) 04/20/2022       Lab Results   Component Value Date    ALT 70 12/07/2022    AST 39 12/07/2022    ALKPHOS 94 12/07/2022    BILITOT 0 38 09/02/2015       Lab Results   Component Value Date    FREET4 1 15 12/07/2022             Future Appointments   Date Time Provider Carlos Manuel Payton   12/13/2022  7:00 AM DAVID Robledo SP QTN [facility]   12/13/2022  8:45 AM Deng Atwood MD OBANGELES ANDRES [facility]   12/13/2022  2:30 PM DAVID Sims WGT MGT CTR [facility]   1/10/2023  9:20 AM Hollie Peabody, MD CARD QU [facility]   1/30/2023  4:30 PM DO GERARDO Hopper MED [facility]   2/2/2023  8:00 AM Marquita Clayton MD [facility]   4/5/2023  8:40 AM Yessi Rosales MD ENDO QU Med Spc   11/15/2023  8:00 AM DAVID Bynum HEM ONC QTN [facility]Onc

## 2022-12-07 NOTE — PATIENT INSTRUCTIONS
Most of the blood work is still pending  The TSH is slightly low, overactive  We'll wait for the rest of the blood work  Let's increase the ozempic to 1 mg once a week  When you increase the ozempic, stop the glimepiride  If you can not increase the ozempic, half the glimepiride  Continue the same metformin  Follow up in 3 months with blood work

## 2022-12-13 ENCOUNTER — TELEPHONE (OUTPATIENT)
Dept: PAIN MEDICINE | Facility: CLINIC | Age: 52
End: 2022-12-13

## 2022-12-13 ENCOUNTER — ANNUAL EXAM (OUTPATIENT)
Dept: OBGYN CLINIC | Facility: MEDICAL CENTER | Age: 52
End: 2022-12-13

## 2022-12-13 VITALS
WEIGHT: 147.9 LBS | DIASTOLIC BLOOD PRESSURE: 66 MMHG | BODY MASS INDEX: 27.22 KG/M2 | SYSTOLIC BLOOD PRESSURE: 112 MMHG | HEIGHT: 62 IN

## 2022-12-13 DIAGNOSIS — Z12.31 ENCOUNTER FOR SCREENING MAMMOGRAM FOR MALIGNANT NEOPLASM OF BREAST: ICD-10-CM

## 2022-12-13 DIAGNOSIS — Z01.419 ENCOUNTER FOR WELL WOMAN EXAM WITH ROUTINE GYNECOLOGICAL EXAM: Primary | ICD-10-CM

## 2022-12-13 NOTE — PROGRESS NOTES
[facility] [address] [place] [address]    ASSESSMENT/PLAN: Isis Hayes is a 46 y o  [id number] who presents for annual gynecologic exam     Encounter for routine gynecologic examination  - Routine well woman exam completed today  - Cervical Cancer Screening: Current ASCCP Guidelines reviewed  Last Pap: 09/26/2018  Next Pap Due: cervical cancer screening complete, prior hysterectomy 2019  - Breast Cancer Screening: Last Mammogram 08/06/2021, mammo ordered  - Colorectal cancer screening was not ordered  - The following were reviewed in today's visit: breast self exam, mammography screening     Additional problems addressed at this visit:  1  Encounter for screening mammogram for malignant neoplasm of breast  -     Mammo screening bilateral w 3d & cad; Future          CC:  Annual Gynecologic Examination    HPI: Isis Hayes is a 46 y o  [id number] who presents for annual gynecologic examination  HPI  Reports vaginal dryness, no discharge, odor, bleeding  Discussed moisturizers    The following portions of the patient's history were reviewed and updated as appropriate: allergies, current medications, past family history, past medical history, obstetric history, gynecologic history, past social history, past surgical history and problem list     Review of Systems   Constitutional: Negative  HENT: Negative  Eyes: Negative  Respiratory: Negative  Cardiovascular: Negative  Gastrointestinal: Negative  Genitourinary: Positive for vaginal pain  Musculoskeletal: Negative  All other systems reviewed and are negative  Objective:  /66   Ht 5' 2" (1 575 m)   Wt 67 1 kg (147 lb 14 4 oz)   LMP  (LMP Unknown)   BMI 27 05 kg/m²    Physical Exam  Vitals reviewed  Constitutional:       General: She is not in acute distress  Appearance: She is well-developed  HENT:      Head: Normocephalic and atraumatic        Nose: Nose normal  Cardiovascular:      Rate and Rhythm: Normal rate  Pulmonary:      Effort: Pulmonary effort is normal  No respiratory distress  Chest:   Breasts:     Breasts are symmetrical       Right: Normal  No mass, nipple discharge, skin change or tenderness  Left: Normal  No mass, nipple discharge, skin change or tenderness  Abdominal:      General: There is no distension  Palpations: Abdomen is soft  There is no mass  Tenderness: There is no abdominal tenderness  There is no guarding or rebound  Genitourinary:     General: Normal vulva  Exam position: Lithotomy position  Labia:         Right: No lesion  Left: No lesion  Urethra: No prolapse  Vagina: Normal  No vaginal discharge, erythema or bleeding  Uterus: Absent  Adnexa: Right adnexa normal and left adnexa normal    Musculoskeletal:         General: Normal range of motion  Cervical back: Normal range of motion  Lymphadenopathy:      Upper Body:      Right upper body: No supraclavicular, axillary or pectoral adenopathy  Left upper body: No supraclavicular, axillary or pectoral adenopathy  Lower Body: No right inguinal adenopathy  No left inguinal adenopathy  Skin:     General: Skin is warm and dry  Neurological:      Mental Status: She is alert and oriented to person, place, and time  Psychiatric:         Behavior: Behavior normal          Thought Content:  Thought content normal          Judgment: Judgment normal              Chalino Morales [address] [facility]  12/13/22 9:00 AM

## 2022-12-14 LAB — METHYLMALONATE SERPL-SCNC: 127 NMOL/L (ref 0–378)

## 2022-12-20 ENCOUNTER — OFFICE VISIT (OUTPATIENT)
Dept: PAIN MEDICINE | Facility: CLINIC | Age: 52
End: 2022-12-20

## 2022-12-20 VITALS
SYSTOLIC BLOOD PRESSURE: 118 MMHG | WEIGHT: 145 LBS | TEMPERATURE: 98.3 F | HEIGHT: 62 IN | DIASTOLIC BLOOD PRESSURE: 82 MMHG | HEART RATE: 94 BPM | BODY MASS INDEX: 26.68 KG/M2

## 2022-12-20 DIAGNOSIS — M79.18 MYOFASCIAL PAIN SYNDROME: ICD-10-CM

## 2022-12-20 DIAGNOSIS — M54.50 CHRONIC LOW BACK PAIN WITHOUT SCIATICA, UNSPECIFIED BACK PAIN LATERALITY: ICD-10-CM

## 2022-12-20 DIAGNOSIS — M47.816 LUMBAR SPONDYLOSIS: ICD-10-CM

## 2022-12-20 DIAGNOSIS — G89.4 CHRONIC PAIN SYNDROME: Primary | ICD-10-CM

## 2022-12-20 DIAGNOSIS — J01.90 ACUTE SINUSITIS, RECURRENCE NOT SPECIFIED, UNSPECIFIED LOCATION: ICD-10-CM

## 2022-12-20 DIAGNOSIS — M54.16 LUMBAR RADICULOPATHY: ICD-10-CM

## 2022-12-20 DIAGNOSIS — G89.29 CHRONIC LOW BACK PAIN WITHOUT SCIATICA, UNSPECIFIED BACK PAIN LATERALITY: ICD-10-CM

## 2022-12-20 RX ORDER — METHOCARBAMOL 750 MG/1
TABLET, FILM COATED ORAL
Qty: 90 TABLET | Refills: 1 | Status: SHIPPED | OUTPATIENT
Start: 2022-12-20

## 2022-12-20 NOTE — PROGRESS NOTES
Assessment:  1  Chronic pain syndrome    2  Chronic low back pain without sciatica, unspecified back pain laterality    3  Lumbar radiculopathy    4  Myofascial pain syndrome    5  Lumbar spondylosis        Plan:  While the patient was in the office today, I did have a thorough conversation with the patient regarding their chronic pain syndrome, symptoms, and medication regimen/treatment plan  I encouraged the patient to continue to follow-up with her specialist regarding her GIANG as well as her primary care provider regarding her as needed tramadol  The patient was agreeable and verbalized an understanding  With regards to the methocarbamol, I discussed with the patient that since she is noting significant relief and sleeping better at nighttime, without side effects, I feel it is reasonable and appropriate to continue the methocarbamol as prescribed  The patient was agreeable and verbalized an understanding  The patient will follow-up in 6 months for medication prescription refill and reevaluation  The patient was advised to contact the office should their symptoms worsen in the interim  The patient was agreeable and verbalized an understanding  History of Present Illness: The patient is a 46 y o  female last seen on 6-28-22 who presents for a follow up office visit in regards to chronic pain syndrome, as the patient's pain has been ongoing for greater than a year, secondary to lumbar spondylosis with radiculopathy and myofascial pain  The patient currently reports that since her last office visit overall her current pain symptoms have remained relatively the same and manageable with the methocarbamol at bedtime she still continues to as needed tramadol as prescribed by her primary care provider    The patient reports that since her last office visit she did have COVID and got through that relatively easily and because her hemoglobin A1c is 5 5 they have discontinued her glipizide and she is now on Ozempic to help her with weight loss as they are hoping that the weight loss will also help with the GIANG  The patient presents today for regular medication follow-up visit  Current pain medications includes: Methocarbamol 750 mg at bedtime  The patient reports that this regimen is providing 90% pain relief  The patient is reporting no side effects from this pain medication regimen  I have personally reviewed and/or updated the patient's past medical history, past surgical history, family history, social history, current medications, allergies, and vital signs today  Review of Systems:    Review of Systems   Constitutional: Negative for fever and unexpected weight change  HENT: Negative for trouble swallowing  Eyes: Negative for visual disturbance  Respiratory: Negative for shortness of breath and wheezing  Cardiovascular: Negative for chest pain and palpitations  Gastrointestinal: Negative for constipation, diarrhea, nausea and vomiting  Endocrine: Negative for cold intolerance, heat intolerance and polydipsia  Genitourinary: Negative for difficulty urinating and frequency  Musculoskeletal: Negative for arthralgias, gait problem, joint swelling and myalgias  Skin: Negative for rash  Neurological: Negative for dizziness, seizures, syncope, weakness and headaches  Hematological: Does not bruise/bleed easily  Psychiatric/Behavioral: Negative for dysphoric mood  All other systems reviewed and are negative          Past Medical History:   Diagnosis Date   • Coronary artery disease    • Diabetes mellitus ([facility] [address] )     Borderline   • Disease of thyroid gland    • DUB (dysfunctional uterine bleeding)    • Fatty liver    • Hashimoto's thyroiditis     Last Assessed:  8/19/14   • History of stomach ulcers    • Hypertension    • Hypothyroidism    • Irritable bowel syndrome     Last Assessed:  3/25/14   • Liver disease     elevated enzymes   • Myocardial infarction [facility])     2017   • GIANG (nonalcoholic steatohepatitis)    • Ovarian cyst     left   • Psychogenic polydipsia     Has had hyponatremia from drinking too much water in the past        Past Surgical History:   Procedure Laterality Date   • ANGIOPLASTY     • CARDIAC CATHETERIZATION     •  SECTION      X 2   • CHOLECYSTECTOMY     • COLONOSCOPY     • CYSTOSCOPY N/A 2019    Procedure: CYSTOSCOPY;  Surgeon: Ivette Mar MD;  [facility];  Service: Gynecology Oncology   • HYSTERECTOMY     • IR BIOPSY LIVER MASS  2020   • OOPHORECTOMY Right    • MN ESOPHAGOGASTRODUODENOSCOPY TRANSORAL DIAGNOSTIC N/A 2018    Procedure: ESOPHAGOGASTRODUODENOSCOPY (EGD); Surgeon: Silvia Gomez MD;  [facility];   Service: Gastroenterology   • MN LAPAROSCOPY W TOT HYSTERECTUTERUS <=250 GRAM  W TUBE/OVARY N/A 2019    Procedure: TOTAL LAPAROSCOPIC HYSTERECTOMY, BILATERAL SALPINGECTOMY, LEFT OOPHORECTOMY;  Surgeon: Ivette Mar MD;  [facility];  Service: Gynecology Oncology   • SINUS SURGERY     • TONSILLECTOMY     • UPPER GASTROINTESTINAL ENDOSCOPY         Family History   Problem Relation Age of Onset   • Pancreatic cancer Mother    • Hypertension Father    • Diabetes type II Father    • Diabetes type II Brother    • No Known Problems Brother    • Lung disease Daughter         asthma tendencies   • No Known Problems Son    • Liver cancer Maternal Aunt    • Melanoma Maternal Aunt    • Hypothyroidism Paternal Uncle    • Diabetes Maternal Grandmother    • Diabetes Paternal Grandmother    • Heart disease Paternal Grandmother    • Hypothyroidism Paternal Grandmother    • Hyperlipidemia Neg Hx    • Stroke Neg Hx        Social History     Occupational History   • Not on file   Tobacco Use   • Smoking status: Former     Packs/day: 0 50     Years: 4 00     Pack years: 2 00     Types: Cigarettes     Quit date: 2016     Years since quittin 8   • Smokeless tobacco: Never   • Tobacco comments:     2016 Vaping Use   • Vaping Use: Never used   Substance and Sexual Activity   • Alcohol use: Never     Comment: occasional, Social drinker   • Drug use: No   • Sexual activity: Yes     Partners: Male     Birth control/protection: None, Female Sterilization         Current Outpatient Medications:   •  amLODIPine (NORVASC) 5 mg tablet, Take 1 tablet (5 mg total) by mouth daily, Disp: 90 tablet, Rfl: 2  •  Blood Glucose Monitoring Suppl ([employer] SYSTEM) w/Device KIT, Use to test blood sugars twice a day, Disp: 1 kit, Rfl: 0  •  dicyclomine (BENTYL) 20 mg tablet, Take 1 tablet (20 mg total) by mouth every 6 (six) hours as needed (every 6 hours), Disp: 60 tablet, Rfl: 0  •  escitalopram (LEXAPRO) 20 mg tablet, Take 1 tablet (20 mg total) by mouth daily, Disp: 90 tablet, Rfl: 0  •  ibuprofen (MOTRIN) 600 mg tablet, Take 1 PO BID with food  NO OTHER NSAIDS while on this medication  , Disp: 180 tablet, Rfl: 1  •  Icosapent Ethyl (Vascepa) 1 g CAPS, 2 capsules twice a day, Disp: 360 capsule, Rfl: 0  •  Insulin Pen Needle 32G X 4 MM MISC, Use once a week, Disp: 30 each, Rfl: 1  •  Lactobacillus (PROBIOTIC ACIDOPHILUS PO), Take by mouth daily, Disp: , Rfl:   •  levothyroxine 112 mcg tablet, Take 1 tablet (112 mcg total) by mouth daily, Disp: 90 tablet, Rfl: 0  •  Magnesium 300 MG CAPS, Take 600 mg by mouth daily, Disp: , Rfl:   •  metFORMIN (GLUCOPHAGE-XR) 500 mg 24 hr tablet, Take 2 tablets (1,000 mg total) by mouth 2 (two) times a day with meals, Disp: 360 tablet, Rfl: 0  •  methocarbamol (ROBAXIN) 750 mg tablet, 1 tab PO HS , Disp: 90 tablet, Rfl: 1  •  metoprolol succinate (TOPROL-XL) 25 mg 24 hr tablet, Take 1 tablet (25 mg total) by mouth 2 (two) times a day, Disp: 180 tablet, Rfl: 1  •  Multiple Vitamins-Minerals (ZINC PO), Take by mouth, Disp: , Rfl:   •  mupirocin (BACTROBAN) 2 % ointment, Apply topically 3 (three) times a day, Disp: 22 g, Rfl: 0  •  nitroglycerin (NITROSTAT) 0 4 mg SL tablet, Place 1 tablet (0 4 mg total) under the tongue every 5 (five) minutes as needed for chest pain, Disp: 90 tablet, Rfl: 0  •  OneTouch Delica Lancets 46K MISC, Use to test blood sugars twice a day, Disp: 200 each, Rfl: 6  •  OneTouch Verio test strip, Use as instructed to test blood sugars twice a day, Disp: 200 each, Rfl: 0  •  pantoprazole (PROTONIX) 40 mg tablet, Take 1 tablet (40 mg total) by mouth daily before breakfast, Disp: 90 tablet, Rfl: 0  •  semaglutide, 1 mg/dose, (Ozempic, 1 MG/DOSE,) 4 MG/3ML SOPN injection pen, Inject 1 mg once a week, Disp: 9 mL, Rfl: 3  •  traMADol (ULTRAM) 50 mg tablet, Take 1 tablet (50 mg total) by mouth every 8 (eight) hours as needed for moderate pain, Disp: 60 tablet, Rfl: 0  •  traMADol (ULTRAM) 50 mg tablet, Take 1 tablet (50 mg total) by mouth every 8 (eight) hours as needed for moderate pain, Disp: 60 tablet, Rfl: 0  •  traMADol (ULTRAM) 50 mg tablet, Take 1 tablet (50 mg total) by mouth every 8 (eight) hours as needed for moderate pain, Disp: 60 tablet, Rfl: 0  •  Turmeric 500 MG CAPS, Take by mouth, Disp: , Rfl:   •  vitamin B-12 (VITAMIN B-12) 1,000 mcg tablet, Take by mouth daily, Disp: , Rfl:   •  Vitamin D, Cholecalciferol, 50 MCG (2000 UT) CAPS, Take 4,000 Units by mouth daily, Disp: , Rfl:   •  glimepiride (AMARYL) 1 mg tablet, Take 1 tablet (1 mg total) by mouth daily with breakfast (Patient not taking: Reported on 12/20/2022), Disp: 90 tablet, Rfl: 0    Allergies   Allergen Reactions   • Sulfa Antibiotics Shortness Of Breath   • Amoxicillin-Pot Clavulanate Other (See Comments)   • Invokana [Canagliflozin]      Yeast infection   • Erythromycin      Reaction Date: [id number];    • Metronidazole        Physical Exam:    /82 (BP Location: Left arm, Patient Position: Sitting, Cuff Size: Standard)   Pulse 94   Temp 98 3 °F (36 8 °C)   Ht 5' 2" (1 575 m)   Wt 65 8 kg (145 lb)   LMP  (LMP Unknown)   BMI 26 52 kg/m²     Constitutional:normal, well developed, well nourished, alert, in no distress and non-toxic and no overt pain behavior  Eyes:anicteric  HEENT:grossly intact  Neck:supple, symmetric, trachea midline and no masses   Pulmonary:even and unlabored  Cardiovascular:No edema or pitting edema present  Skin:Normal without rashes or lesions and well hydrated  Psychiatric:Mood and affect appropriate  Neurologic:Cranial Nerves II-XII grossly intact  Musculoskeletal:normal      Imaging  No orders to display         No orders of the defined types were placed in this encounter

## 2022-12-21 DIAGNOSIS — D50.8 OTHER IRON DEFICIENCY ANEMIA: ICD-10-CM

## 2022-12-21 DIAGNOSIS — K21.9 GASTROESOPHAGEAL REFLUX DISEASE WITHOUT ESOPHAGITIS: ICD-10-CM

## 2022-12-21 DIAGNOSIS — K75.81 NASH (NONALCOHOLIC STEATOHEPATITIS): Primary | ICD-10-CM

## 2022-12-21 RX ORDER — TRAMADOL HYDROCHLORIDE 50 MG/1
50 TABLET ORAL EVERY 8 HOURS PRN
Qty: 60 TABLET | Refills: 0 | Status: SHIPPED | OUTPATIENT
Start: 2022-12-21

## 2023-01-09 DIAGNOSIS — J01.90 ACUTE SINUSITIS, RECURRENCE NOT SPECIFIED, UNSPECIFIED LOCATION: ICD-10-CM

## 2023-01-10 ENCOUNTER — OFFICE VISIT (OUTPATIENT)
Dept: CARDIOLOGY CLINIC | Facility: CLINIC | Age: 53
End: 2023-01-10

## 2023-01-10 VITALS
WEIGHT: 143.2 LBS | DIASTOLIC BLOOD PRESSURE: 68 MMHG | BODY MASS INDEX: 26.35 KG/M2 | HEART RATE: 87 BPM | HEIGHT: 62 IN | SYSTOLIC BLOOD PRESSURE: 118 MMHG

## 2023-01-10 DIAGNOSIS — E78.2 COMBINED HYPERLIPIDEMIA: ICD-10-CM

## 2023-01-10 DIAGNOSIS — Q24.5 ANOMALOUS CORONARY ARTERY ORIGIN: ICD-10-CM

## 2023-01-10 DIAGNOSIS — I10 ESSENTIAL HYPERTENSION: Primary | ICD-10-CM

## 2023-01-10 NOTE — PROGRESS NOTES
Cardiology Follow Up    Liliana Barr  1970  [id number]  [address] [facility] Silvestre Whitten  [address] [place] [id number] [phone]    1  Essential hypertension  POCT ECG      2  Anomalous coronary artery origin        3  Combined hyperlipidemia            Interval History: Followup for anomalous coronary artery, hyperlipidemia  One episode of substernal chest pain associated with taking sudafed        Medical Problems     Problem List     Hypertension (Chronic)    GIANG (nonalcoholic steatohepatitis)    Overview Signed 12/16/2020  8:04 AM by Kaleigh Hernandez DO     S/p liver biopsy ( grade 2 inflammation andStage II fibrosis) sees GI ([facility])         Sinus tachycardia    Chronic sinusitis (Chronic)    Anemia    Anomalous coronary artery origin    Anxiety    Combined hyperlipidemia    Coronary vasospasm (HCC)    Type 2 diabetes mellitus with hyperglycemia, without long-term current use of insulin (HCC)      Lab Results   Component Value Date    HGBA1C 5 5 12/07/2022         Gastroesophageal reflux disease without esophagitis    Ground glass opacity present on imaging of lung    Hypothyroidism due to Hashimoto's thyroiditis    NSTEMI (non-ST elevated myocardial infarction) ([address] )    Chest pain due to GERD    Overview Signed 6/18/2018  5:05 PM by Benjamin Stallings MD     Added automatically from request for surgery [id number]         Dysfunctional uterine bleeding    Left ovarian cyst    Menopausal symptoms    S/P laparoscopic hysterectomy    Colon cancer screening    Chronic back pain    Lateral epicondylitis of right elbow    Lumbar spondylosis    Myofascial pain syndrome    Lumbar radiculopathy    Chronic pain syndrome    Tendonitis of elbow, right    Chronic low back pain without sciatica    Overweight        Past Medical History:   Diagnosis Date   • Coronary artery disease    • Diabetes mellitus ([address] )     Borderline   • Disease of thyroid gland    • DUB (dysfunctional uterine bleeding)    • Fatty liver    • Hashimoto's thyroiditis     Last Assessed:  14   • History of stomach ulcers    • Hypertension    • Hypothyroidism    • Irritable bowel syndrome     Last Assessed:  3/25/14   • Liver disease     elevated enzymes   • Myocardial infarction ([facility] [address] )     2017   • GIANG (nonalcoholic steatohepatitis)    • Ovarian cyst     left   • Psychogenic polydipsia     Has had hyponatremia from drinking too much water in the past      Social History     Socioeconomic History   • Marital status: /Civil Union     Spouse name: Not on file   • Number of children: Not on file   • Years of education: Not on file   • Highest education level: Not on file   Occupational History   • Not on file   Tobacco Use   • Smoking status: Former     Packs/day: 0 50     Years: 4 00     Pack years: 2 00     Types: Cigarettes     Quit date: 2016     Years since quittin 9   • Smokeless tobacco: Never   • Tobacco comments:     2016   Vaping Use   • Vaping Use: Never used   Substance and Sexual Activity   • Alcohol use: Never     Comment: occasional, Social drinker   • Drug use: No   • Sexual activity: Yes     Partners: Male     Birth control/protection: None, Female Sterilization   Other Topics Concern   • Not on file   Social History Narrative    Feels safe at home     Social Determinants of Health     Financial Resource Strain: Not on file   Food Insecurity: Not on file   Transportation Needs: Not on file   Physical Activity: Not on file   Stress: Not on file   Social Connections: Not on file   Intimate Partner Violence: Not on file   Housing Stability: Not on file      Family History   Problem Relation Age of Onset   • Pancreatic cancer Mother    • Hypertension Father    • Diabetes type II Father    • Diabetes type II Brother    • No Known Problems Brother    • Lung disease Daughter         asthma tendencies   • No Known Problems Son    • Liver cancer Maternal Aunt    • Melanoma Maternal Aunt    • Hypothyroidism Paternal Uncle    • Diabetes Maternal Grandmother    • Diabetes Paternal Grandmother    • Heart disease Paternal Grandmother    • Hypothyroidism Paternal Grandmother    • Hyperlipidemia Neg Hx    • Stroke Neg Hx      Past Surgical History:   Procedure Laterality Date   • ANGIOPLASTY     • CARDIAC CATHETERIZATION     •  SECTION      X 2   • CHOLECYSTECTOMY     • COLONOSCOPY     • CYSTOSCOPY N/A 2019    Procedure: CYSTOSCOPY;  Surgeon: Мария Rojas MD;  [facility];  Service: Gynecology Oncology   • HYSTERECTOMY     • IR BIOPSY LIVER MASS  2020   • OOPHORECTOMY Right    • [place] ESOPHAGOGASTRODUODENOSCOPY TRANSORAL DIAGNOSTIC N/A 2018    Procedure: ESOPHAGOGASTRODUODENOSCOPY (EGD); Surgeon: Maryam Gee MD;  Location[facility];   Service: Gastroenterology   • NJ LAPS TOTAL HYSTERECT 250 GM/< W/RMVL TUBE/OVARY N/A 2019    Procedure: TOTAL LAPAROSCOPIC HYSTERECTOMY, BILATERAL SALPINGECTOMY, LEFT OOPHORECTOMY;  Surgeon: Мария Rojas MD;  [facility];  Service: Gynecology Oncology   • SINUS SURGERY     • TONSILLECTOMY     • UPPER GASTROINTESTINAL ENDOSCOPY         Current Outpatient Medications:   •  amLODIPine (NORVASC) 5 mg tablet, Take 1 tablet (5 mg total) by mouth daily, Disp: 90 tablet, Rfl: 2  •  Blood Glucose Monitoring Suppl ([employer] [place] SYSTEM) w/Device KIT, Use to test blood sugars twice a day, Disp: 1 kit, Rfl: 0  •  dicyclomine (BENTYL) 20 mg tablet, Take 1 tablet (20 mg total) by mouth every 6 (six) hours as needed (every 6 hours), Disp: 60 tablet, Rfl: 0  •  escitalopram (LEXAPRO) 20 mg tablet, Take 1 tablet (20 mg total) by mouth daily, Disp: 90 tablet, Rfl: 0  •  Icosapent Ethyl (Vascepa) 1 g CAPS, 2 capsules twice a day, Disp: 360 capsule, Rfl: 0  •  Insulin Pen Needle 32G X 4 MM MISC, Use once a week, Disp: 30 each, Rfl: 1  •  Lactobacillus (PROBIOTIC ACIDOPHILUS PO), Take by mouth daily, Disp: , Rfl:   •  levothyroxine 112 mcg tablet, Take 1 tablet (112 mcg total) by mouth daily, Disp: 90 tablet, Rfl: 0  •  Magnesium 300 MG CAPS, Take 600 mg by mouth daily, Disp: , Rfl:   •  metFORMIN (GLUCOPHAGE-XR) 500 mg 24 hr tablet, Take 2 tablets (1,000 mg total) by mouth 2 (two) times a day with meals, Disp: 360 tablet, Rfl: 0  •  methocarbamol (ROBAXIN) 750 mg tablet, 1 tab PO HS , Disp: 90 tablet, Rfl: 1  •  metoprolol succinate (TOPROL-XL) 25 mg 24 hr tablet, Take 1 tablet (25 mg total) by mouth 2 (two) times a day, Disp: 180 tablet, Rfl: 1  •  Multiple Vitamins-Minerals (ZINC PO), Take by mouth, Disp: , Rfl:   •  mupirocin (BACTROBAN) 2 % ointment, Apply topically 3 (three) times a day, Disp: 22 g, Rfl: 0  •  nitroglycerin (NITROSTAT) 0 4 mg SL tablet, Place 1 tablet (0 4 mg total) under the tongue every 5 (five) minutes as needed for chest pain, Disp: 90 tablet, Rfl: 0  •  OneTouch Delica Lancets 58M MISC, Use to test blood sugars twice a day, Disp: 200 each, Rfl: 6  •  OneTouch Verio test strip, Use as instructed to test blood sugars twice a day, Disp: 200 each, Rfl: 0  •  pantoprazole (PROTONIX) 40 mg tablet, Take 1 tablet (40 mg total) by mouth daily before breakfast, Disp: 90 tablet, Rfl: 0  •  semaglutide, 1 mg/dose, (Ozempic, 1 MG/DOSE,) 4 MG/3ML SOPN injection pen, Inject 1 mg once a week, Disp: 9 mL, Rfl: 3  •  traMADol (ULTRAM) 50 mg tablet, Take 1 tablet (50 mg total) by mouth every 8 (eight) hours as needed for moderate pain, Disp: 60 tablet, Rfl: 0  •  traMADol (ULTRAM) 50 mg tablet, Take 1 tablet (50 mg total) by mouth every 8 (eight) hours as needed for moderate pain, Disp: 60 tablet, Rfl: 0  •  traMADol (ULTRAM) 50 mg tablet, Take 1 tablet (50 mg total) by mouth every 8 (eight) hours as needed for moderate pain, Disp: 60 tablet, Rfl: 0  •  Turmeric 500 MG CAPS, Take by mouth, Disp: , Rfl:   •  vitamin B-12 (VITAMIN B-12) 1,000 mcg tablet, Take by mouth daily, Disp: , Rfl:   •  Vitamin D, Cholecalciferol, 50 MCG (2000 UT) CAPS, Take 4,000 Units by mouth daily, Disp: , Rfl:   •  glimepiride (AMARYL) 1 mg tablet, Take 1 tablet (1 mg total) by mouth daily with breakfast (Patient not taking: Reported on 12/20/2022), Disp: 90 tablet, Rfl: 0  Allergies   Allergen Reactions   • Sulfa Antibiotics Shortness Of Breath   • Amoxicillin-Pot Clavulanate Other (See Comments)   • Invokana [Canagliflozin]      Yeast infection   • Erythromycin      Reaction Date: [id number];    • Metronidazole        Labs:     Chemistry        Component Value Date/Time     09/02/2015 1020    K 4 5 12/07/2022 0734    K 3 8 09/02/2015 1020     12/07/2022 0734     09/02/2015 1020    CO2 27 12/07/2022 0734    CO2 24 3 09/02/2015 1020    BUN 9 12/07/2022 0734    BUN 13 09/02/2015 1020    CREATININE 0 72 12/07/2022 0734    CREATININE 0 79 09/02/2015 1020        Component Value Date/Time    CALCIUM 9 7 12/07/2022 0734    CALCIUM 9 1 09/02/2015 1020    ALKPHOS 94 12/07/2022 0734    ALKPHOS 78 09/02/2015 1020    AST 39 12/07/2022 0734    AST 37 09/02/2015 1020    ALT 70 12/07/2022 0734    ALT 62 09/02/2015 1020    BILITOT 0 38 09/02/2015 1020            Lab Results   Component Value Date    CHOL 210 08/20/2015    CHOL 179 07/19/2014    CHOL 197 12/05/2013     Lab Results   Component Value Date    HDL 39 (L) 04/20/2022    HDL 44 02/06/2021    HDL 36 (L) 06/04/2020     Lab Results   Component Value Date    LDLCALC 112 (H) 04/20/2022    LDLCALC 134 (H) 02/06/2021    LDLCALC 115 (H) 06/04/2020     Lab Results   Component Value Date    TRIG 211 (H) 04/20/2022    TRIG 169 (H) 02/06/2021    TRIG 168 (H) 06/04/2020     No results found for: CHOLHDL    Imaging: No results found  EKG: NSR Normal ECG  Review of Systems   Constitutional: Negative  HENT: Negative  Eyes: Negative  Cardiovascular: Negative  Respiratory: Negative  Endocrine: Negative  Hematologic/Lymphatic: Negative  Skin: Negative  Musculoskeletal: Negative  Gastrointestinal: Negative  Genitourinary: Negative  Neurological: Negative  Psychiatric/Behavioral: Negative  Allergic/Immunologic: Negative  Vitals:    01/10/23 0925   BP: 118/68   Pulse: 87           Physical Exam  Vitals and nursing note reviewed  Constitutional:       Appearance: Normal appearance  HENT:      Head: Normocephalic  Nose: Nose normal       Mouth/Throat:      Mouth: Mucous membranes are moist    Eyes:      General: No scleral icterus  Conjunctiva/sclera: Conjunctivae normal    Cardiovascular:      Rate and Rhythm: Normal rate and regular rhythm  Heart sounds: No murmur heard  No gallop  Pulmonary:      Effort: Pulmonary effort is normal  No respiratory distress  Breath sounds: Normal breath sounds  No wheezing or rales  Abdominal:      General: Abdomen is flat  Bowel sounds are normal  There is no distension  Palpations: Abdomen is soft  Tenderness: There is no abdominal tenderness  There is no guarding  Musculoskeletal:      Cervical back: Normal range of motion and neck supple  Right lower leg: No edema  Left lower leg: No edema  Skin:     General: Skin is warm and dry  Neurological:      General: No focal deficit present  Mental Status: She is alert and oriented to person, place, and time  Psychiatric:         Mood and Affect: Mood normal          Behavior: Behavior normal          Discussion/Summary:    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

## 2023-01-11 ENCOUNTER — OCCMED (OUTPATIENT)
Dept: URGENT CARE | Facility: CLINIC | Age: 53
End: 2023-01-11

## 2023-01-11 ENCOUNTER — APPOINTMENT (OUTPATIENT)
Dept: RADIOLOGY | Facility: CLINIC | Age: 53
End: 2023-01-11

## 2023-01-11 DIAGNOSIS — M79.89 LEFT ARM SWELLING: Primary | ICD-10-CM

## 2023-01-11 DIAGNOSIS — M79.89 LEFT ARM SWELLING: ICD-10-CM

## 2023-01-12 ENCOUNTER — HOSPITAL ENCOUNTER (OUTPATIENT)
Dept: MRI IMAGING | Facility: HOSPITAL | Age: 53
End: 2023-01-12
Attending: FAMILY MEDICINE

## 2023-01-12 DIAGNOSIS — T14.8XXA TENDON TEAR: ICD-10-CM

## 2023-01-14 ENCOUNTER — APPOINTMENT (OUTPATIENT)
Dept: URGENT CARE | Facility: CLINIC | Age: 53
End: 2023-01-14

## 2023-01-15 DIAGNOSIS — J01.90 ACUTE SINUSITIS, RECURRENCE NOT SPECIFIED, UNSPECIFIED LOCATION: ICD-10-CM

## 2023-01-15 DIAGNOSIS — G89.29 CHRONIC LOW BACK PAIN WITHOUT SCIATICA, UNSPECIFIED BACK PAIN LATERALITY: Primary | ICD-10-CM

## 2023-01-15 DIAGNOSIS — M54.50 CHRONIC LOW BACK PAIN WITHOUT SCIATICA, UNSPECIFIED BACK PAIN LATERALITY: Primary | ICD-10-CM

## 2023-01-15 RX ORDER — TRAMADOL HYDROCHLORIDE 50 MG/1
50 TABLET ORAL EVERY 8 HOURS PRN
Qty: 60 TABLET | Refills: 0
Start: 2023-01-15 | End: 2023-01-15 | Stop reason: SDUPTHER

## 2023-01-15 RX ORDER — TRAMADOL HYDROCHLORIDE 50 MG/1
50 TABLET ORAL EVERY 8 HOURS PRN
Qty: 60 TABLET | Refills: 0
Start: 2023-01-15 | End: 2023-01-16 | Stop reason: SDUPTHER

## 2023-01-16 ENCOUNTER — TELEPHONE (OUTPATIENT)
Dept: OBGYN CLINIC | Facility: CLINIC | Age: 53
End: 2023-01-16

## 2023-01-16 DIAGNOSIS — G89.29 CHRONIC LOW BACK PAIN WITHOUT SCIATICA, UNSPECIFIED BACK PAIN LATERALITY: ICD-10-CM

## 2023-01-16 DIAGNOSIS — E06.3 HYPOTHYROIDISM DUE TO HASHIMOTO'S THYROIDITIS: ICD-10-CM

## 2023-01-16 DIAGNOSIS — M54.50 CHRONIC LOW BACK PAIN WITHOUT SCIATICA, UNSPECIFIED BACK PAIN LATERALITY: ICD-10-CM

## 2023-01-16 DIAGNOSIS — E03.8 HYPOTHYROIDISM DUE TO HASHIMOTO'S THYROIDITIS: ICD-10-CM

## 2023-01-16 DIAGNOSIS — K21.9 GERD WITHOUT ESOPHAGITIS: ICD-10-CM

## 2023-01-16 RX ORDER — PANTOPRAZOLE SODIUM 40 MG/1
40 TABLET, DELAYED RELEASE ORAL
Qty: 90 TABLET | Refills: 0 | Status: SHIPPED | OUTPATIENT
Start: 2023-01-16

## 2023-01-16 RX ORDER — LEVOTHYROXINE SODIUM 112 UG/1
112 TABLET ORAL DAILY
Qty: 90 TABLET | Refills: 0 | Status: SHIPPED | OUTPATIENT
Start: 2023-01-16

## 2023-01-16 RX ORDER — TRAMADOL HYDROCHLORIDE 50 MG/1
50 TABLET ORAL EVERY 8 HOURS PRN
Qty: 60 TABLET | Refills: 0 | Status: SHIPPED | OUTPATIENT
Start: 2023-01-16

## 2023-01-16 NOTE — TELEPHONE ENCOUNTER
Hello,  Please advise if the following patient can be forced onto the schedule:    Patient: Alisa Lugo     : 1970    MRN: [id number]    Call back #: [phone]    Insurance: Workers Comp     Reason for appointment: Left bicep tendon tear // [facility] [address]       Requested doctor/location: [facility] [address] [facility] // Patient employee and spoke to [address] [place] [address]       Thank you        E-mail sent to [facility]

## 2023-01-17 ENCOUNTER — OFFICE VISIT (OUTPATIENT)
Dept: OBGYN CLINIC | Facility: CLINIC | Age: 53
End: 2023-01-17

## 2023-01-17 VITALS
BODY MASS INDEX: 26.31 KG/M2 | WEIGHT: 143 LBS | SYSTOLIC BLOOD PRESSURE: 116 MMHG | DIASTOLIC BLOOD PRESSURE: 70 MMHG | HEIGHT: 62 IN

## 2023-01-17 DIAGNOSIS — S46.212A STRAIN OF LEFT BICEPS, INITIAL ENCOUNTER: Primary | ICD-10-CM

## 2023-01-17 NOTE — ASSESSMENT & PLAN NOTE
Findings today are consistent with left distal biceps tendon strain with degeneration at the biceps tendon insertion  Imaging and prognosis was reviewed with the patient today  The MRI did demonstrate degeneration of the tendon and its insertion and longitudinal tear of the distal bicep tendon  There is no tendon retraction  Discussed conservative treatments (physical therapy, OTC anti-inflammatories and Voltaren get) vs surgical intervention  Patient wishes to proceed with conservative treatments  Referral to physical therapy was placed today  Discussed with patient that she should avoid repetitive elbow movements and lifting to help the tendon heal  Work restrictions were provided - no use of left arm  Follow up in 4 weeks  Patient understands that if conservative management does not provide her with complete pain relief after 6 to 8 weeks, surgical treatment will be recommended  All patient's questions were answered to her satisfaction  This note is created using dictation transcription    It may contain typographical errors, grammatical errors, improperly dictated words, background noise and other

## 2023-01-17 NOTE — PROGRESS NOTES
Assessment:     1  Strain of left biceps, initial encounter        Plan:     Problem List Items Addressed This Visit        Musculoskeletal and Integument    Strain of left biceps - Primary     Findings today are consistent with left distal biceps tendon strain with degeneration at the biceps tendon insertion  Imaging and prognosis was reviewed with the patient today  The MRI did demonstrate degeneration of the tendon and its insertion and longitudinal tear of the distal bicep tendon  There is no tendon retraction  Discussed conservative treatments (physical therapy, OTC anti-inflammatories and Voltaren get) vs surgical intervention  Patient wishes to proceed with conservative treatments  Referral to physical therapy was placed today  Discussed with patient that she should avoid repetitive elbow movements and lifting to help the tendon heal  Work restrictions were provided - no use of left arm  Follow up in 4 weeks  Patient understands that if conservative management does not provide her with complete pain relief after 6 to 8 weeks, surgical treatment will be recommended  All patient's questions were answered to her satisfaction  This note is created using dictation transcription  It may contain typographical errors, grammatical errors, improperly dictated words, background noise and other         Relevant Orders    Ambulatory Referral to PT/OT Hand Therapy       Subjective:     Patient ID: Chantel Quintanilla is a 46 y o  female  Chief Complaint:  The patient presents with a chief complaint of left arm pain  Patient is referred here by 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) and experienced pain through her forearm  A couple days later (1/9/2023) she was lifting a patient again and felt a pop through her biceps and forearm  She was seen by by Occupational Medicine where an MRI was obtained   She self referred here today  Patient presents today in a sling  The patient describes the pain as aching, sharp and throbbing in intensity,  constant in timing, and localizes the pain to the right through distal biceps and radiates through upper forearm  The pain is worse with Elbow flexion, elbow extension, twisting of the forearm and relieved by rest, ice, the use of sling, avoiding the painful activities, and Voltaren gel  The pain is not associated with numbness and tingling  The patient is awoken at night by the pain  The patient has not had any previous injuries to the left arm  Information on patient's intake form was reviewed        Allergy:  Allergies   Allergen Reactions   • Sulfa Antibiotics Shortness Of Breath   • Amoxicillin-Pot Clavulanate Other (See Comments)   • Invokana [Canagliflozin]      Yeast infection   • Erythromycin      Reaction Date: ;    • Metronidazole      Medications:  all current active meds have been reviewed  Past Medical History:  Past Medical History:   Diagnosis Date   • Coronary artery disease    • Diabetes mellitus ([address] )     Borderline   • Disease of thyroid gland    • DUB (dysfunctional uterine bleeding)    • Fatty liver    • Hashimoto's thyroiditis     Last Assessed:  14   • History of stomach ulcers    • Hypertension    • Hypothyroidism    • Irritable bowel syndrome     Last Assessed:  3/25/14   • Liver disease     elevated enzymes   • Myocardial infarction ([address] )     2017   • GIANG (nonalcoholic steatohepatitis)    • Ovarian cyst     left   • Psychogenic polydipsia     Has had hyponatremia from drinking too much water in the past      Past Surgical History:  Past Surgical History:   Procedure Laterality Date   • ANGIOPLASTY     • CARDIAC CATHETERIZATION     •  SECTION      X 2   • CHOLECYSTECTOMY     • COLONOSCOPY     • CYSTOSCOPY N/A 2019    Procedure: CYSTOSCOPY;  Surgeon: Yue Malone MD;  Location: [facility];  Service: Gynecology Oncology   • HYSTERECTOMY     • IR BIOPSY LIVER MASS  2020   • OOPHORECTOMY Right    • AR ESOPHAGOGASTRODUODENOSCOPY TRANSORAL DIAGNOSTIC N/A 2018    Procedure: ESOPHAGOGASTRODUODENOSCOPY (EGD); Surgeon: Dayana Coulter MD;  Location: [facility]; Service: Gastroenterology   • AR LAPS TOTAL HYSTERECT 250 GM/< W/RMVL TUBE/OVARY N/A 2019    Procedure: TOTAL LAPAROSCOPIC HYSTERECTOMY, BILATERAL SALPINGECTOMY, LEFT OOPHORECTOMY;  Surgeon: Federico Hernandez MD;  Location:  MAIN OR;  Service: Gynecology Oncology   • SINUS SURGERY     • TONSILLECTOMY     • UPPER GASTROINTESTINAL ENDOSCOPY       Family History:  Family History   Problem Relation Age of Onset   • Pancreatic cancer Mother    • Hypertension Father    • Diabetes type II Father    • Diabetes type II Brother    • No Known Problems Brother    • Lung disease Daughter         asthma tendencies   • No Known Problems Son    • Liver cancer Maternal Aunt    • Melanoma Maternal Aunt    • Hypothyroidism Paternal Uncle    • Diabetes Maternal Grandmother    • Diabetes Paternal Grandmother    • Heart disease Paternal Grandmother    • Hypothyroidism Paternal Grandmother    • Hyperlipidemia Neg Hx    • Stroke Neg Hx      Social History:  Social History     Substance and Sexual Activity   Alcohol Use Never    Comment: occasional, Social drinker     Social History     Substance and Sexual Activity   Drug Use No     Social History     Tobacco Use   Smoking Status Former   • Packs/day: 0 50   • Years: 4 00   • Pack years: 2 00   • Types: Cigarettes   • Quit date: 2016   • Years since quittin 9   Smokeless Tobacco Never   Tobacco Comments    2016     Review of Systems   Constitutional: Negative for chills and fever  HENT: Negative for ear pain and sore throat  Eyes: Negative for pain and visual disturbance  Respiratory: Negative for cough and shortness of breath  Cardiovascular: Negative for chest pain and palpitations     Gastrointestinal: Negative for abdominal pain and vomiting  Genitourinary: Negative for dysuria and hematuria  Musculoskeletal: Positive for arthralgias (left arm)  Negative for back pain and joint swelling  Skin: Negative for color change and rash  Neurological: Negative for seizures and syncope  Psychiatric/Behavioral: Negative  All other systems reviewed and are negative  Objective:  BP Readings from Last 1 Encounters:   01/17/23 116/70      Wt Readings from Last 1 Encounters:   01/17/23 64 9 kg (143 lb)      BMI:   Estimated body mass index is 26 16 kg/m² as calculated from the following:    Height as of this encounter: 5' 2" (1 575 m)  Weight as of this encounter: 64 9 kg (143 lb)  BSA:   Estimated body surface area is 1 66 meters squared as calculated from the following:    Height as of this encounter: 5' 2" (1 575 m)  Weight as of this encounter: 64 9 kg (143 lb)  Physical Exam  Vitals and nursing note reviewed  Constitutional:       Appearance: Normal appearance  She is well-developed  HENT:      Head: Normocephalic and atraumatic  Right Ear: External ear normal       Left Ear: External ear normal    Eyes:      Extraocular Movements: Extraocular movements intact  Conjunctiva/sclera: Conjunctivae normal    Pulmonary:      Effort: Pulmonary effort is normal    Musculoskeletal:      Cervical back: Neck supple  Skin:     General: Skin is warm and dry  Neurological:      Mental Status: She is alert and oriented to person, place, and time  Deep Tendon Reflexes: Reflexes are normal and symmetric  Psychiatric:         Mood and Affect: Mood normal          Behavior: Behavior normal        Left Elbow Exam     Tenderness   Left elbow tenderness location: Distal bicep tendon  Range of Motion   The patient has normal left elbow ROM  Left elbow flexion: Pain  Left elbow pronation: Pain  Left elbow supination: Pain       Muscle Strength   Pronation:  4/5   Supination:  5/5     Other   Erythema: absent  Sensation: normal  Pulse: present            I have personally reviewed pertinent films in PACS and my interpretation is MRI left elbow longitudinal interstitial tearing at the distal biceps tendon  Degenerative changes at the distal tendon       Scribe Attestation    I,:  Albaro Mukherjee am acting as a scribe while in the presence of the attending physician :       I,:  Kobi Lane MD personally performed the services described in this documentation    as scribed in my presence :

## 2023-01-25 ENCOUNTER — OFFICE VISIT (OUTPATIENT)
Dept: OBGYN CLINIC | Facility: CLINIC | Age: 53
End: 2023-01-25

## 2023-01-25 ENCOUNTER — EVALUATION (OUTPATIENT)
Dept: PHYSICAL THERAPY | Facility: CLINIC | Age: 53
End: 2023-01-25

## 2023-01-25 VITALS
WEIGHT: 143 LBS | DIASTOLIC BLOOD PRESSURE: 70 MMHG | HEIGHT: 62 IN | SYSTOLIC BLOOD PRESSURE: 116 MMHG | BODY MASS INDEX: 26.31 KG/M2

## 2023-01-25 DIAGNOSIS — S46.212D STRAIN OF LEFT BICEPS, SUBSEQUENT ENCOUNTER: Primary | ICD-10-CM

## 2023-01-25 DIAGNOSIS — S46.212A STRAIN OF LEFT BICEPS, INITIAL ENCOUNTER: Primary | ICD-10-CM

## 2023-01-25 NOTE — PROGRESS NOTES
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  Pt has pocket of swelling around the distal medial aspect of biceps, difficulty and pain with supination and elbow flexion and extension  Pt's pain level is highly irritable and strength testing was deferred  Pt would benefit from PT services to improve rom and strength to return to work and PLOF  Impairments: abnormal or restricted ROM, activity intolerance, impaired physical strength, lacks appropriate home exercise program and pain with function    Symptom irritability: highUnderstanding of Dx/Px/POC: good   Prognosis: good    Goals  1  Pt will improve elbow rom to be WNL and painfree  2  Pt will improve Lt UE strength to 5/5 to be able to reach into cabinet without pain  3  Pt will be able to lift 25 pounds for work related tasks  4  Pt will be independent with HEP upon discharge  Plan  Patient would benefit from: skilled physical therapy  Planned modality interventions: low level laser therapy  Planned therapy interventions: functional ROM exercises, therapeutic activities, therapeutic exercise, therapeutic training, stretching, strengthening, home exercise program, neuromuscular re-education, manual therapy and patient education  Frequency: 2x week  Treatment plan discussed with: patient        Subjective Evaluation    History of Present Illness  Mechanism of injury: Pt was transferring a patient and heard a pop on  reports she is left handed  Pt reports throbbing all the time  Pt was on light duty without use of left arm but was still having pain  Pt reports pain during sleep  Any movement increases pain   Pt was using voltoran gel and ice  Pain  Current pain ratin  At best pain ratin  At worst pain rating: 10  Location: medial distal biceps  Quality: sharp  Relieving factors: ice    Exercise history: nurse; lifting; walking dogs, hiking; swimming      Diagnostic Tests  MRI studies: abnormal (longitudinal tear)        Objective     Tenderness     Left Elbow   Tenderness in the distal biceps tendon and medial epicondyle  Left Wrist/Hand   Tenderness in the distal biceps tendon and medial epicondyle  Active Range of Motion     Left Elbow   Flexion: with pain  Forearm supination: with pain    Additional Active Range of Motion Details  Pt having increased pain with elbow flexion in supinated position as well as active supination      Passive Range of Motion     Left Elbow   Normal passive range of motion  Flexion: with pain  Extension: with pain  Forearm supination: with pain  Forearm pronation: with pain    Strength/Myotome Testing     Right Elbow   Normal strength    Additional Strength Details  Not tested due to pain levels with active movement    Swelling   Left Elbow Girth Measurements   Joint line: 24 cm    Right Elbow Girth Measurements   Joint line: 24 cm    General Comments:      Elbow Comments   Small pocket of swelling noted on medial aspect of elbow      Flowsheet Rows    Flowsheet Row Most Recent Value   PT/OT G-Codes    Current Score 21   Projected Score 52             Precautions: none       Manuals             KT tape to decrease load on biceps FH            Laser                                       Neuro Re-Ed             Posture tband                                                                                           Ther Ex             Biceps isometrics             Table slides HEP            Wall Slides HEP            Pulleys             Triceps tband             Active supination/pronation                          UBE             Ther Activity             squigz             Suitcase carries             Gait Training                                       Modalities

## 2023-01-25 NOTE — ASSESSMENT & PLAN NOTE
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

## 2023-01-27 ENCOUNTER — OFFICE VISIT (OUTPATIENT)
Dept: OCCUPATIONAL THERAPY | Facility: CLINIC | Age: 53
End: 2023-01-27

## 2023-01-27 DIAGNOSIS — S46.212A RUPTURE OF LEFT BICEPS TENDON, INITIAL ENCOUNTER: Primary | ICD-10-CM

## 2023-01-27 NOTE — PROGRESS NOTES
Splint    Diagnosis:   1  Rupture of left biceps tendon, initial encounter           Indication: protective night splint    Location: Left  elbow  Supplies: Orthotics  Thermoplastic material    Splint type: posterior elbow splint  Wearing Schedule: Wear at night  Describe Position: Elbow in ~40 degrees flexion    Precautions: Universal    Patient or Caregiver expresses understanding of wearing Schedule and Precautions? Yes  Patient or Caregiver able to don/doff orthotic independently? Yes    Written orders provided to patient?  Yes  Orders Obtained: Written  Orders Obtained from: Dr Ronnie Riggins

## 2023-01-30 ENCOUNTER — HOSPITAL ENCOUNTER (OUTPATIENT)
Dept: ULTRASOUND IMAGING | Facility: HOSPITAL | Age: 53
Discharge: HOME | End: 2023-01-30
Attending: INTERNAL MEDICINE

## 2023-01-30 ENCOUNTER — OFFICE VISIT (OUTPATIENT)
Dept: FAMILY MEDICINE CLINIC | Facility: CLINIC | Age: 53
End: 2023-01-30

## 2023-01-30 ENCOUNTER — HOSPITAL ENCOUNTER (OUTPATIENT)
Dept: ULTRASOUND IMAGING | Facility: HOSPITAL | Age: 53
Discharge: HOME | End: 2023-01-30

## 2023-01-30 ENCOUNTER — OFFICE VISIT (OUTPATIENT)
Dept: PHYSICAL THERAPY | Facility: CLINIC | Age: 53
End: 2023-01-30

## 2023-01-30 VITALS
BODY MASS INDEX: 26.31 KG/M2 | WEIGHT: 143 LBS | DIASTOLIC BLOOD PRESSURE: 70 MMHG | HEIGHT: 62 IN | RESPIRATION RATE: 16 BRPM | OXYGEN SATURATION: 98 % | TEMPERATURE: 97.7 F | SYSTOLIC BLOOD PRESSURE: 110 MMHG | HEART RATE: 80 BPM

## 2023-01-30 DIAGNOSIS — S46.212A STRAIN OF LEFT BICEPS, INITIAL ENCOUNTER: Primary | ICD-10-CM

## 2023-01-30 DIAGNOSIS — Q24.5 ANOMALOUS CORONARY ARTERY ORIGIN: ICD-10-CM

## 2023-01-30 DIAGNOSIS — G89.29 CHRONIC LOW BACK PAIN WITHOUT SCIATICA, UNSPECIFIED BACK PAIN LATERALITY: ICD-10-CM

## 2023-01-30 DIAGNOSIS — F32.A DEPRESSION, UNSPECIFIED DEPRESSION TYPE: ICD-10-CM

## 2023-01-30 DIAGNOSIS — E03.9 HYPOTHYROIDISM, UNSPECIFIED TYPE: ICD-10-CM

## 2023-01-30 DIAGNOSIS — K75.81 NASH (NONALCOHOLIC STEATOHEPATITIS): ICD-10-CM

## 2023-01-30 DIAGNOSIS — K21.9 GASTROESOPHAGEAL REFLUX DISEASE WITHOUT ESOPHAGITIS: ICD-10-CM

## 2023-01-30 DIAGNOSIS — E11.65 TYPE 2 DIABETES MELLITUS WITH HYPERGLYCEMIA, WITHOUT LONG-TERM CURRENT USE OF INSULIN (HCC): Primary | ICD-10-CM

## 2023-01-30 DIAGNOSIS — M54.50 CHRONIC LOW BACK PAIN WITHOUT SCIATICA, UNSPECIFIED BACK PAIN LATERALITY: ICD-10-CM

## 2023-01-30 DIAGNOSIS — Z23 NEED FOR VACCINATION: ICD-10-CM

## 2023-01-30 DIAGNOSIS — D50.8 IRON DEFICIENCY ANEMIA SECONDARY TO INADEQUATE DIETARY IRON INTAKE: ICD-10-CM

## 2023-01-30 DIAGNOSIS — E66.3 OVERWEIGHT: ICD-10-CM

## 2023-01-30 DIAGNOSIS — E78.2 COMBINED HYPERLIPIDEMIA: ICD-10-CM

## 2023-01-30 DIAGNOSIS — D50.8 OTHER IRON DEFICIENCY ANEMIA: ICD-10-CM

## 2023-01-30 NOTE — ASSESSMENT & PLAN NOTE
Chronic back pain is stable on tramadol 50 twice daily    Patient still being followed by pain management

## 2023-01-30 NOTE — ASSESSMENT & PLAN NOTE
Lab Results   Component Value Date    HGBA1C 5 5 12/07/2022   Most recent A1c 5 5%  Patient doing extremely well on Ozempic 1 mg weekly and metformin 2000 mg daily (no longer on glimepiride)    Being followed by endocrinology

## 2023-01-30 NOTE — PROGRESS NOTES
Name: Joie Ochoa      : 1970      MRN: [id number]  Encounter Provider: Ronni Colin DO  Encounter Date: 2023   Encounter department: [address] [place] [address]  Type 2 diabetes mellitus with hyperglycemia, without long-term current use of insulin [facility])  Assessment & Plan:    Lab Results   Component Value Date    HGBA1C 5 5 2022   Most recent A1c 5 5%  Patient doing extremely well on Ozempic 1 mg weekly and metformin 2000 mg daily (no longer on glimepiride)  Being followed by endocrinology    Orders:  -     Comprehensive metabolic panel; Future; Expected date: 2023  -     Hemoglobin A1C; Future; Expected date: 2023  -     Microalbumin / creatinine urine ratio; Future; Expected date: 2023    2  GIANG (nonalcoholic steatohepatitis)  Assessment & Plan:  History and transaminitis  Patient had liver biopsy showing grade 2 inflammation and stage II fibrosis  Is being followed by GI (Dr Cori Seth)  Patient had recent ultrasound and elastography  Last set of LFTs have normalized  Patient no longer drinking alcohol (over 4 years)      3  Iron deficiency anemia secondary to inadequate dietary iron intake  Assessment & Plan:  History of anemia  Patient has had iron infusion in the past   Most recent hemoglobin 13 2  Still being followed by hematology    Orders:  -     CBC and differential; Future; Expected date: 2023    4  Combined hyperlipidemia  Assessment & Plan:  Still taking Vascepa  Continue low-fat diet and exercise    Orders:  -     Lipid Panel with Direct LDL reflex; Future; Expected date: 2023    5  Overweight  Assessment & Plan:  Weight today 143, BMI 26 32  Patient is lost 16 pounds since last visit  She is watching her diet, walking on a regular basis  She is also on Ozempic 1 mg weekly  6  Anomalous coronary artery origin  Assessment & Plan:  History of anomalous coronary artery    Stable without chest pain or shortness of breath  Continue regular follow-up with cardiologist as directed      7  Depression, unspecified depression type  Assessment & Plan:  Doing well on Lexapro 20 mg daily      8  Hypothyroidism, unspecified type  Assessment & Plan:  Doing well on levothyroxine 112 mcg daily  We will continue to monitor    Orders:  -     TSH, 3rd generation with Free T4 reflex; Future; Expected date: 07/01/2023    9  Gastroesophageal reflux disease without esophagitis  Assessment & Plan:  Stable on pantoprazole 40      10  Chronic low back pain without sciatica, unspecified back pain laterality  Assessment & Plan:  Chronic back pain is stable on tramadol 50 twice daily  Patient still being followed by pain management      11  Need for vaccination  -     Zoster Vaccine Recombinant IM         Patient will schedule mammogram in near future  Last colonoscopy 2022 (next due in 2030)    Patient had [place] vaccination  Patient had flu shot this season  Last tetanus booster 2015  Shingrix No  1 given today in office    6 months, fasting blood work prior (will also give Shingrix No  2)  Subjective     Patient presents for recheck of chronic medical problems today  Overall she is doing well  Still being followed by endocrinology, hematology, GI, and cardiology  Compliant with prescribed medications  Review of Systems   Respiratory: Negative  Cardiovascular: Negative  Gastrointestinal: Negative  Genitourinary: Negative          Past Medical History:   Diagnosis Date   • Coronary artery disease    • Diabetes mellitus ([facility] [address] )     Borderline   • Disease of thyroid gland    • DUB (dysfunctional uterine bleeding)    • Fatty liver    • Hashimoto's thyroiditis     Last Assessed:  8/19/14   • History of stomach ulcers    • Hypertension    • Hypothyroidism    • Irritable bowel syndrome     Last Assessed:  3/25/14   • Liver disease     elevated enzymes   • Myocardial infarction [facility])     2017   • GIANG (nonalcoholic steatohepatitis)    • Ovarian cyst     left   • Psychogenic polydipsia     Has had hyponatremia from drinking too much water in the past      Past Surgical History:   Procedure Laterality Date   • ANGIOPLASTY     • CARDIAC CATHETERIZATION     •  SECTION      X 2   • CHOLECYSTECTOMY     • COLONOSCOPY     • CYSTOSCOPY N/A 2019    Procedure: CYSTOSCOPY;  Surgeon: Garth Dominguez MD;  [facility];  Service: Gynecology Oncology   • HYSTERECTOMY     • IR BIOPSY LIVER MASS  2020   • OOPHORECTOMY Right    • [place] ESOPHAGOGASTRODUODENOSCOPY TRANSORAL DIAGNOSTIC N/A 2018    Procedure: ESOPHAGOGASTRODUODENOSCOPY (EGD); Surgeon: Lesly Preston MD;  Location: [facility];   Service: Gastroenterology   • ND LAPS TOTAL HYSTERECT 250 GM/< W/RMVL TUBE/OVARY N/A 2019    Procedure: TOTAL LAPAROSCOPIC HYSTERECTOMY, BILATERAL SALPINGECTOMY, LEFT OOPHORECTOMY;  Surgeon: Garth Dominguez MD;  [facility];  Service: Gynecology Oncology   • SINUS SURGERY     • TONSILLECTOMY     • UPPER GASTROINTESTINAL ENDOSCOPY       Family History   Problem Relation Age of Onset   • Pancreatic cancer Mother    • Hypertension Father    • Diabetes type II Father    • Diabetes type II Brother    • No Known Problems Brother    • Lung disease Daughter         asthma tendencies   • No Known Problems Son    • Liver cancer Maternal Aunt    • Melanoma Maternal Aunt    • Hypothyroidism Paternal Uncle    • Diabetes Maternal Grandmother    • Diabetes Paternal Grandmother    • Heart disease Paternal Grandmother    • Hypothyroidism Paternal Grandmother    • Hyperlipidemia Neg Hx    • Stroke Neg Hx      Social History     Socioeconomic History   • Marital status: /Civil Union     Spouse name: None   • Number of children: None   • Years of education: None   • Highest education level: None   Occupational History   • None   Tobacco Use   • Smoking status: Former     Packs/day: 0 50     Years: 4 00     Pack years: 2 00 Types: Cigarettes     Quit date: 2016     Years since quittin 0   • Smokeless tobacco: Never   • Tobacco comments:     2016   Vaping Use   • Vaping Use: Never used   Substance and Sexual Activity   • Alcohol use: Never     Comment: occasional, Social drinker   • Drug use: No   • Sexual activity: Yes     Partners: Male     Birth control/protection: None, Female Sterilization   Other Topics Concern   • None   Social History Narrative    Feels safe at home     Social Determinants of Health     Financial Resource Strain: Not on file   Food Insecurity: Not on file   Transportation Needs: Not on file   Physical Activity: Not on file   Stress: Not on file   Social Connections: Not on file   Intimate Partner Violence: Not on file   Housing Stability: Not on file     Current Outpatient Medications on File Prior to Visit   Medication Sig   • amLODIPine (NORVASC) 5 mg tablet Take 1 tablet (5 mg total) by mouth daily   • Blood Glucose Monitoring Suppl ([employer] SYSTEM) w/Device KIT Use to test blood sugars twice a day   • dicyclomine (BENTYL) 20 mg tablet Take 1 tablet (20 mg total) by mouth every 6 (six) hours as needed (every 6 hours)   • escitalopram (LEXAPRO) 20 mg tablet Take 1 tablet (20 mg total) by mouth daily   • Icosapent Ethyl (Vascepa) 1 g CAPS 2 capsules twice a day   • Insulin Pen Needle 32G X 4 MM MISC Use once a week   • Lactobacillus (PROBIOTIC ACIDOPHILUS PO) Take by mouth daily   • levothyroxine 112 mcg tablet Take 1 tablet (112 mcg total) by mouth daily   • Magnesium 300 MG CAPS Take 600 mg by mouth daily   • metFORMIN (GLUCOPHAGE-XR) 500 mg 24 hr tablet Take 2 tablets (1,000 mg total) by mouth 2 (two) times a day with meals   • methocarbamol (ROBAXIN) 750 mg tablet 1 tab PO HS     • metoprolol succinate (TOPROL-XL) 25 mg 24 hr tablet Take 1 tablet (25 mg total) by mouth 2 (two) times a day   • Multiple Vitamins-Minerals (ZINC PO) Take by mouth   • mupirocin (BACTROBAN) 2 % ointment Apply topically 3 (three) times a day   • nitroglycerin (NITROSTAT) 0 4 mg SL tablet Place 1 tablet (0 4 mg total) under the tongue every 5 (five) minutes as needed for chest pain   • OneTouch Delica Lancets 45Z MISC Use to test blood sugars twice a day   • OneTouch Verio test strip Use as instructed to test blood sugars twice a day   • pantoprazole (PROTONIX) 40 mg tablet Take 1 tablet (40 mg total) by mouth daily before breakfast   • semaglutide, 1 mg/dose, (Ozempic, 1 MG/DOSE,) 4 MG/3ML SOPN injection pen Inject 1 mg once a week   • traMADol (ULTRAM) 50 mg tablet Take 1 tablet (50 mg total) by mouth every 8 (eight) hours as needed for moderate pain   • Turmeric 500 MG CAPS Take by mouth   • Vitamin D, Cholecalciferol, 50 MCG (2000 UT) CAPS Take 4,000 Units by mouth daily   • vitamin B-12 (VITAMIN B-12) 1,000 mcg tablet Take by mouth daily   • [DISCONTINUED] glimepiride (AMARYL) 1 mg tablet Take 1 tablet (1 mg total) by mouth daily with breakfast (Patient not taking: Reported on 12/20/2022)     Allergies   Allergen Reactions   • Sulfa Antibiotics Shortness Of Breath   • Amoxicillin-Pot Clavulanate Other (See Comments)   • Invokana [Canagliflozin]      Yeast infection   • Erythromycin      Reaction Date: [id number];    • Metronidazole      Immunization History   Administered Date(s) Administered   • COVID-19 PFIZER VACCINE 0 3 ML IM 12/21/2020, 01/11/2021, 10/06/2021   • H1N1, All Formulations 11/05/2009   • Hep A, adult 06/05/2020, 07/28/2022   • INFLUENZA 11/01/2019   • Influenza, seasonal, injectable 09/26/2013   • Tdap 03/04/2015       Objective     /70 (BP Location: Right arm, Patient Position: Sitting, Cuff Size: Adult)   Pulse 80   Temp 97 7 °F (36 5 °C) (Temporal)   Resp 16   Ht 5' 1 81" (1 57 m)   Wt 64 9 kg (143 lb)   LMP  (LMP Unknown)   SpO2 98%   BMI 26 32 kg/m²     Physical Exam  Cardiovascular:      Rate and Rhythm: Normal rate and regular rhythm  Heart sounds: Normal heart sounds  Comments: Carotids: no bruits  Ext: no edema  Pulmonary:      Effort: Pulmonary effort is normal  No respiratory distress  Breath sounds: No wheezing or rales  Psychiatric:         Behavior: Behavior normal          Thought Content:  Thought content normal        Grace Cruz DO

## 2023-01-30 NOTE — ASSESSMENT & PLAN NOTE
History and transaminitis  Patient had liver biopsy showing grade 2 inflammation and stage II fibrosis  Is being followed by GI (Dr Sharifa Nunes)  Patient had recent ultrasound and elastography  Last set of LFTs have normalized    Patient no longer drinking alcohol (over 4 years)

## 2023-01-30 NOTE — PROGRESS NOTES
Daily Note     Today's date: 2023  Patient name: Hanna Guzmán  : 1970  MRN: [id number]  Referring provider: Davi Mack MD  Dx:   Encounter Diagnosis     ICD-10-CM    1  Strain of left biceps, initial encounter  S65 172H                      Subjective: Pt was given script for protective splint for elbow from Dr Eyal Morel  Pt reports the splint helps a lot but sometimes get annoying during the night  Objective: See treatment diary below      Assessment: Pt tolerating aarom better than last session  Biceps isometrics also tolerated well  Added laser for pain and healing process  Continue to progress as able  Plan: Continue per plan of care        Precautions: none       Manuals            KT tape to decrease load on biceps FH --           Laser  16W 4' FH                                     Neuro Re-Ed             Posture tband                                                                                           Ther Ex             Biceps isometrics  10x10''           Table slides HEP 10x           Wall Slides HEP            Pulleys  10x           Triceps tband             Active supination/pronation  2x10                        UBE             Ther Activity             squigz             Suitcase carries             Gait Training                                       Modalities

## 2023-01-30 NOTE — ASSESSMENT & PLAN NOTE
History of anemia  Patient has had iron infusion in the past   Most recent hemoglobin 13 2    Still being followed by hematology

## 2023-01-30 NOTE — ASSESSMENT & PLAN NOTE
Weight today 143, BMI 26 32  Patient is lost 16 pounds since last visit  She is watching her diet, walking on a regular basis  She is also on Ozempic 1 mg weekly

## 2023-01-30 NOTE — ASSESSMENT & PLAN NOTE
History of anomalous coronary artery  Stable without chest pain or shortness of breath    Continue regular follow-up with cardiologist as directed

## 2023-02-01 ENCOUNTER — APPOINTMENT (OUTPATIENT)
Dept: PHYSICAL THERAPY | Facility: CLINIC | Age: 53
End: 2023-02-01

## 2023-02-02 ENCOUNTER — OFFICE VISIT (OUTPATIENT)
Dept: GASTROENTEROLOGY | Facility: CLINIC | Age: 53
End: 2023-02-02

## 2023-02-02 VITALS
TEMPERATURE: 97.1 F | HEIGHT: 62 IN | SYSTOLIC BLOOD PRESSURE: 110 MMHG | WEIGHT: 143 LBS | DIASTOLIC BLOOD PRESSURE: 70 MMHG | BODY MASS INDEX: 26.31 KG/M2

## 2023-02-02 DIAGNOSIS — R74.8 ELEVATED LIVER ENZYMES: ICD-10-CM

## 2023-02-02 DIAGNOSIS — Z00.00 HEALTHCARE MAINTENANCE: ICD-10-CM

## 2023-02-02 DIAGNOSIS — K75.81 NASH (NONALCOHOLIC STEATOHEPATITIS): Primary | ICD-10-CM

## 2023-02-02 DIAGNOSIS — K74.00 HEPATIC FIBROSIS: ICD-10-CM

## 2023-02-02 NOTE — PROGRESS NOTES
[facility] - Outpatient Follow-up Note  Federico Guerrero 46 y o  female MRN: [id number]  Encounter: [id number]          ASSESSMENT AND PLAN:      1  GIANG (nonalcoholic steatohepatitis)  2  Elevated liver enzymes  3  Hepatic fibrosis  Patient with full metabolic syndrome noted to have chronic mild-moderately elevated transaminases in hepatocellular pattern (ALT predominant)  Complete serologic evaluation was unremarkable and subsequent elastography and liver bx in 5/2020 notable for GIANG with F2 fibrosis  Repeat elastography in 5/2022 notable for progression with F3 fibrosis  No clinical or serologic evidence of chronic liver disease  Patient has made significant efforts including the initiation of Ozempic and improvements to her diet and physical activity resulting in an approx 12 lb weight loss  Congratulated patient and encouraged her to continue with her efforts  Interestingly, patient also reports an extensive family history of GIANG, as well as, a maternal aunt with liver cancer  While she does have multiple metabolic risk factors for the development of GIANG, would recommend genetic testing (LALD, PNPLA3, and A1AT phenotype) to evaluate for a predisposition for the development of fatty liver disease and advanced progression of fibrosis for completeness  Patient has completed an U/S with elastography, although results are not available at the time of this appointment  Advised patient she would be contacted of results  If elastography reveals improved fibrosis (F2 or less) would recommend to continue monitoring her hepatic function q6 months with repeat elastography x1 year  If elastography reveals advanced fibrosis (F3 or greater), would recommend to continue monitoring her hepatic function q3-6 months in addition to RUQ U/S q6 months and AFP levels annually for hepatoma screening especially given her family history  - Comprehensive metabolic panel;  Future  - MISCELLANEOUS LAB TEST; Future  - MISCELLANEOUS LAB TEST; Future  - Alpha 1 Antitrypsin Phenotype; Future    4  Healthcare maintenance  Patient is up-to-date with CRC screening  Colonoscopy on 5/25/2022 was grossly unremarkable, with the exception of internal hemorrhoids  Recommended repeat colonoscopy x8 years  Follow-up in 6 months      ______________________________________________________________________    SUBJECTIVE: Patient is a 46 y o  female with PMH significant for CAD and previous MI, DM2, hypertension, hyperlipidemia, Hashimoto's thyroiditis, GERD and IBD who presents today for follow-up regarding GIANG  Per chart review, patient with chronic mild-moderately elevated transaminases in hepatocellular pattern  She had complete serologic evaluation which was unremarkable and subsequent elastography and liver bx in 5/2020 notable for GIANG with F2 fibrosis  Repeat elastography in 5/2022 notable for progression of fibrosis, F3 fibrosis  She has completed an updated U/S with elastography on 1/30 but results are not available for review at the time of today's appointment  She has been making great efforts towards weight loss  She has met with bariatric surgery and continues on Ozempic for weight loss  She has also increased her physical activity including daily walks and made improvements to her diet including less unhealthy protein and more vegetables - Also noticed decreased appetite since starting Ozempic  She has lost approx 12 lbs since her last appointment, also with improvement of her A1C (6 5-5 5) and near-normal liver enzymes  Upon further questions, patient notes strong family history of GIANG including a first-degree cousin, maternal aunt, and maternal grandfather that she is aware of  She also notes that her aunt and grandfather progressed to cirrhosis, with her aunt also developing "liver cancer"  REVIEW OF SYSTEMS IS OTHERWISE NEGATIVE        Historical Information   Past Medical History:   Diagnosis Date   • Coronary artery disease    • Diabetes mellitus ([facility] [address] )     Borderline   • Disease of thyroid gland    • DUB (dysfunctional uterine bleeding)    • Fatty liver    • Hashimoto's thyroiditis     Last Assessed:  14   • History of stomach ulcers    • Hypertension    • Hypothyroidism    • Irritable bowel syndrome     Last Assessed:  3/25/14   • Liver disease     elevated enzymes   • Myocardial infarction [facility])     2017   • GIANG (nonalcoholic steatohepatitis)    • Ovarian cyst     left   • Psychogenic polydipsia     Has had hyponatremia from drinking too much water in the past      Past Surgical History:   Procedure Laterality Date   • ANGIOPLASTY     • CARDIAC CATHETERIZATION     •  SECTION      X 2   • CHOLECYSTECTOMY     • COLONOSCOPY     • CYSTOSCOPY N/A 2019    Procedure: CYSTOSCOPY;  Surgeon: Ted Mejia MD;  [facility];  Service: Gynecology Oncology   • HYSTERECTOMY     • IR BIOPSY LIVER MASS  2020   • OOPHORECTOMY Right    • [place] ESOPHAGOGASTRODUODENOSCOPY TRANSORAL DIAGNOSTIC N/A 2018    Procedure: ESOPHAGOGASTRODUODENOSCOPY (EGD); Surgeon: Abby Parkinson MD;  Location[facility];   Service: Gastroenterology   • [place] LAPS TOTAL HYSTERECT 250 GM/< W/RMVL TUBE/OVARY N/A 2019    Procedure: TOTAL LAPAROSCOPIC HYSTERECTOMY, BILATERAL SALPINGECTOMY, LEFT OOPHORECTOMY;  Surgeon: Ted Mejia MD;  Location[facility];  Service: Gynecology Oncology   • SINUS SURGERY     • TONSILLECTOMY     • UPPER GASTROINTESTINAL ENDOSCOPY       Social History   Social History     Substance and Sexual Activity   Alcohol Use Never    Comment: occasional, Social drinker     Social History     Substance and Sexual Activity   Drug Use No     Social History     Tobacco Use   Smoking Status Former   • Packs/day: 0 50   • Years: 4 00   • Pack years: 2 00   • Types: Cigarettes   • Quit date: 2016   • Years since quittin 0   Smokeless Tobacco Never   Tobacco Comments 02/2016     Family History   Problem Relation Age of Onset   • Pancreatic cancer Mother    • Hypertension Father    • Diabetes type II Father    • Diabetes type II Brother    • No Known Problems Brother    • Lung disease Daughter         asthma tendencies   • No Known Problems Son    • Liver cancer Maternal Aunt    • Melanoma Maternal Aunt    • Hypothyroidism Paternal Uncle    • Diabetes Maternal Grandmother    • Diabetes Paternal Grandmother    • Heart disease Paternal Grandmother    • Hypothyroidism Paternal Grandmother    • Hyperlipidemia Neg Hx    • Stroke Neg Hx        Meds/Allergies       Current Outpatient Medications:   •  amLODIPine (NORVASC) 5 mg tablet  •  Blood Glucose Monitoring Suppl ([employer] SYSTEM) w/Device KIT  •  dicyclomine (BENTYL) 20 mg tablet  •  escitalopram (LEXAPRO) 20 mg tablet  •  Icosapent Ethyl (Vascepa) 1 g CAPS  •  Insulin Pen Needle 32G X 4 MM MISC  •  Lactobacillus (PROBIOTIC ACIDOPHILUS PO)  •  levothyroxine 112 mcg tablet  •  Magnesium 300 MG CAPS  •  metFORMIN (GLUCOPHAGE-XR) 500 mg 24 hr tablet  •  methocarbamol (ROBAXIN) 750 mg tablet  •  metoprolol succinate (TOPROL-XL) 25 mg 24 hr tablet  •  Multiple Vitamins-Minerals (ZINC PO)  •  mupirocin (BACTROBAN) 2 % ointment  •  nitroglycerin (NITROSTAT) 0 4 mg SL tablet  •  OneTouch Delica Lancets 75G MISC  •  OneTouch Verio test strip  •  pantoprazole (PROTONIX) 40 mg tablet  •  semaglutide, 1 mg/dose, (Ozempic, 1 MG/DOSE,) 4 MG/3ML SOPN injection pen  •  traMADol (ULTRAM) 50 mg tablet  •  Turmeric 500 MG CAPS  •  vitamin B-12 (VITAMIN B-12) 1,000 mcg tablet  •  Vitamin D, Cholecalciferol, 50 MCG (2000 UT) CAPS    Allergies   Allergen Reactions   • Sulfa Antibiotics Shortness Of Breath   • Amoxicillin-Pot Clavulanate Other (See Comments)   • Invokana [Canagliflozin]      Yeast infection   • Erythromycin      Reaction Date: [id number];    • Metronidazole            Objective     Blood pressure 110/70, temperature (!) 97 1 °F (36 2 °C), temperature source Tympanic, height 5' 2" (1 575 m), weight 64 9 kg (143 lb), not currently breastfeeding  Body mass index is 26 16 kg/m²  PHYSICAL EXAM:      General Appearance:   Alert, cooperative, no distress   HEENT:   Normocephalic, atraumatic, anicteric  Neck:  Supple, symmetrical, trachea midline   Lungs:   Clear to auscultation bilaterally; no rales, rhonchi or wheezing; respirations unlabored    Heart[de-identified]   Regular rate and rhythm; no murmur, rub, or gallop  Abdomen:   Soft, non-tender, non-distended; normal bowel sounds; no masses, no organomegaly    Genitalia:   Deferred    Rectal:   Deferred    Extremities:  No cyanosis, clubbing or edema    Pulses:  2+ and symmetric    Skin:  No jaundice, rashes, or lesions    Lymph nodes:  No palpable cervical lymphadenopathy        Lab Results:   No visits with results within 1 Day(s) from this visit  Latest known visit with results is:   Appointment on 12/07/2022   Component Date Value   • Hemoglobin A1C 12/07/2022 5 5    • EAG 12/07/2022 111    • Sodium 12/07/2022 136    • Potassium 12/07/2022 4 5    • Chloride 12/07/2022 105    • CO2 12/07/2022 27    • ANION GAP 12/07/2022 4    • BUN 12/07/2022 9    • Creatinine 12/07/2022 0 72    • Glucose, Fasting 12/07/2022 115 (H)    • Calcium 12/07/2022 9 7    • AST 12/07/2022 39    • ALT 12/07/2022 70    • Alkaline Phosphatase 12/07/2022 94    • Total Protein 12/07/2022 7 8    • Albumin 12/07/2022 3 7    • Total Bilirubin 12/07/2022 0 51    • eGFR 12/07/2022 96    • TSH 3RD GENERATON 12/07/2022 0 282 (L)    • Free T4 12/07/2022 1 15    • Methylmalonic Acid, S 12/07/2022 127          Radiology Results:   XR elbow 3+ vw left    Result Date: 1/11/2023  Narrative: LEFT ELBOW INDICATION:   M79 89: Other specified soft tissue disorders  Left arm swelling and painful medial patient heard a pop  COMPARISON:  None VIEWS:  XR ELBOW 3+ VW LEFT FINDINGS: There is no acute fracture or dislocation   There is no joint effusion  No significant degenerative changes  No lytic or blastic osseous lesion  Mild soft tissue swelling is noted at the olecranon process  Impression: No acute osseous abnormality  Workstation performed: [id number]     MRI elbow left wo contrast    Result Date: 1/12/2023  Narrative: MRI ELBOW LEFT WO CONTRAST INDICATION:   T14  8XXA: Other injury of unspecified body region, initial encounter  COMPARISON: Plain film 1/11/2023  TECHNIQUE:  Multiplanar/multisequence MR of the left elbow was performed  FINDINGS: Subcutaneous Tissues: Intact  Joint Effusion: None  TENDONS  Biceps: Biceps insertional tendinosis and longitudinal interstitial tearing as well as mild bicipital radialis bursitis  No evidence of complete tendon  Brachialis: Intact  Triceps: Intact  Common flexor: Intact  Common extensor: There is mild increased signal/thickening in the common extensor origin consistent with mild lateral epicondylitis, without tear  [place] LIGAMENTS Radial collateral ligament: Intact Ulnar collateral ligament: Intact  Lateral ulnar collateral ligament: Intact  Annular ligament: Intact  Cubital Tunnel/Ulnar Nerve: Intact  Radial Nerve: Intact  Articular Surfaces: Intact  Bones: Intact  Musculature: Intact  Impression: Prominent biceps insertional tendinosis and longitudinal interstitial tearing without full-thickness component tear   Workstation performed: [id number]

## 2023-02-03 ENCOUNTER — OFFICE VISIT (OUTPATIENT)
Dept: PHYSICAL THERAPY | Facility: CLINIC | Age: 53
End: 2023-02-03

## 2023-02-03 DIAGNOSIS — S46.212A STRAIN OF LEFT BICEPS, INITIAL ENCOUNTER: Primary | ICD-10-CM

## 2023-02-03 NOTE — PROGRESS NOTES
Daily Note     Today's date: 2/3/2023  Patient name: Med Rodriguez  : 1970  MRN: [id number]  Referring provider: Prema Mullins MD  Dx:   Encounter Diagnosis     ICD-10-CM    1  Strain of left biceps, initial encounter  A97 194W                      Subjective: Pt reports yesterday was a really bad day, almost felt like her first day  Objective: See treatment diary below      Assessment: Pt had difficulty tolerating supinated biceps isometrics today, educated to change to neutral  for isometrics which were tolerated much better  Will continue to progress as able  Plan: Continue per plan of care        Precautions: none       Manuals  23          KT tape to decrease load on biceps FH --           Laser  16W 4' FH 16W 4' FH                                    Neuro Re-Ed             Posture tband                                                                                           Ther Ex             Biceps isometrics  10x10'' 10x10''          Table slides HEP 10x 15x          Wall Slides HEP            Pulleys  10x 10x          Triceps tband             Active supination/pronation  2x10 2x10          Gripper   10x5''          UBE             Ther Activity             squigz             Suitcase carries             Gait Training                                       Modalities

## 2023-02-06 ENCOUNTER — OFFICE VISIT (OUTPATIENT)
Dept: PHYSICAL THERAPY | Facility: CLINIC | Age: 53
End: 2023-02-06

## 2023-02-06 DIAGNOSIS — S46.212A STRAIN OF LEFT BICEPS, INITIAL ENCOUNTER: Primary | ICD-10-CM

## 2023-02-06 NOTE — PROGRESS NOTES
Daily Note     Today's date: 2023  Patient name: Chantel Quintanilla  : 1970  MRN: [id number]  Referring provider: Sp Watson MD  Dx:   Encounter Diagnosis     ICD-10-CM    1  Strain of left biceps, initial encounter  V89 [id number]                      Subjective: Pt reports the same pain, throbbing when she is out of the brace  Brace does help her pain  Objective: See treatment diary below      Assessment: Pt is tolerating the exercises but is showing less active supination rom due to pain  Educated to continue to use brace for pain mgt  Will continue to progress as able  Plan: Continue per plan of care        Precautions: none       Manuals 1/25 1/30 2/3 2/6         KT tape to decrease load on biceps FH --           Laser  16W 4' FH 16W 4' FH                                    Neuro Re-Ed             Posture tband                                                                                           Ther Ex             Biceps isometrics  10x10'' 10x10'' 10x10''         Table slides HEP 10x 15x 15x         Wall Slides HEP            Pulleys  10x 10x          Triceps tband             Active supination/pronation  2x10 2x10 2x10         Gripper   10x5'' 10x5''         UBE             Ther Activity             squigz             Suitcase carries             Gait Training                                       Modalities

## 2023-02-08 ENCOUNTER — OFFICE VISIT (OUTPATIENT)
Dept: PHYSICAL THERAPY | Facility: CLINIC | Age: 53
End: 2023-02-08

## 2023-02-08 DIAGNOSIS — S46.212A STRAIN OF LEFT BICEPS, INITIAL ENCOUNTER: Primary | ICD-10-CM

## 2023-02-08 NOTE — PROGRESS NOTES
Daily Note     Today's date: 2023  Patient name: Terrie Mckenzie  : 1970  MRN: [id number]  Referring provider: Maryse Guerin MD  Dx:   Encounter Diagnosis     ICD-10-CM    1  Strain of left biceps, initial encounter  F10 242U                      Subjective: Pt reports no change in pain levels  Objective: See treatment diary below      Assessment: Exercises are limited due to pain levels  Minimal to no progress made with PT  Educated likely needing further treatment from ortho on Tuesday due to minimal progress made in PT  Plan: Continue per plan of care        Precautions: none       Manuals 1/25 1/30 2/3 2/6 2/8        KT tape to decrease load on biceps FH --           Laser  16W 4' FH 16W 4' FH 16W 4' FH 16W 4' FH                                  Neuro Re-Ed             Posture tband                                                                                           Ther Ex             Biceps isometrics  10x10'' 10x10'' 10x10'' 10x10''        Table slides HEP 10x 15x 15x 15x        Wall Slides HEP            Pulleys  10x 10x 10x 10x        Triceps tband             Active supination/pronation  2x10 2x10 2x10 2x10        Gripper   10x5'' 10x5'' 10x5''        UBE             Ther Activity             squigz             Suitcase carries             Gait Training                                       Modalities

## 2023-02-09 DIAGNOSIS — G89.29 CHRONIC LOW BACK PAIN WITHOUT SCIATICA, UNSPECIFIED BACK PAIN LATERALITY: ICD-10-CM

## 2023-02-09 DIAGNOSIS — K58.9 IRRITABLE BOWEL SYNDROME WITHOUT DIARRHEA: ICD-10-CM

## 2023-02-09 DIAGNOSIS — E78.2 COMBINED HYPERLIPIDEMIA: ICD-10-CM

## 2023-02-09 DIAGNOSIS — I10 ESSENTIAL HYPERTENSION: ICD-10-CM

## 2023-02-09 DIAGNOSIS — F32.A DEPRESSION, UNSPECIFIED DEPRESSION TYPE: ICD-10-CM

## 2023-02-09 DIAGNOSIS — M54.50 CHRONIC LOW BACK PAIN WITHOUT SCIATICA, UNSPECIFIED BACK PAIN LATERALITY: ICD-10-CM

## 2023-02-09 RX ORDER — DICYCLOMINE HCL 20 MG
20 TABLET ORAL EVERY 6 HOURS PRN
Qty: 60 TABLET | Refills: 0 | Status: SHIPPED | OUTPATIENT
Start: 2023-02-09

## 2023-02-09 RX ORDER — ICOSAPENT ETHYL 1000 MG/1
CAPSULE ORAL
Qty: 360 CAPSULE | Refills: 0 | Status: SHIPPED | OUTPATIENT
Start: 2023-02-09

## 2023-02-09 RX ORDER — METOPROLOL SUCCINATE 25 MG/1
25 TABLET, EXTENDED RELEASE ORAL 2 TIMES DAILY
Qty: 180 TABLET | Refills: 0 | Status: SHIPPED | OUTPATIENT
Start: 2023-02-09

## 2023-02-09 RX ORDER — ESCITALOPRAM OXALATE 20 MG/1
20 TABLET ORAL DAILY
Qty: 90 TABLET | Refills: 0 | Status: SHIPPED | OUTPATIENT
Start: 2023-02-09

## 2023-02-09 RX ORDER — TRAMADOL HYDROCHLORIDE 50 MG/1
50 TABLET ORAL EVERY 8 HOURS PRN
Qty: 60 TABLET | Refills: 0 | Status: SHIPPED | OUTPATIENT
Start: 2023-02-09

## 2023-02-09 NOTE — TELEPHONE ENCOUNTER
Requested medication(s) are due for refill today: Yes  Patient has already received a courtesy refill: No  Other reason request has been forwarded to provider: rachel

## 2023-02-14 ENCOUNTER — OFFICE VISIT (OUTPATIENT)
Dept: OBGYN CLINIC | Facility: CLINIC | Age: 53
End: 2023-02-14

## 2023-02-14 VITALS
BODY MASS INDEX: 26.31 KG/M2 | HEIGHT: 62 IN | HEART RATE: 93 BPM | WEIGHT: 143 LBS | DIASTOLIC BLOOD PRESSURE: 79 MMHG | SYSTOLIC BLOOD PRESSURE: 115 MMHG

## 2023-02-14 DIAGNOSIS — S46.212D STRAIN OF LEFT BICEPS, SUBSEQUENT ENCOUNTER: Primary | ICD-10-CM

## 2023-02-14 NOTE — PROGRESS NOTES
Assessment:     1  Strain of left biceps, subsequent encounter        Plan:     Problem List Items Addressed This Visit        Musculoskeletal and Integument    Strain of left biceps - Primary     Findings consistent with left distal biceps tendon strain with longitudinal tear  Patient continues with pain despite custom molded splint, physical therapy, and anti inflammatories  Since patient has not seen any improvement in conservative treatment, she will be referred to Dr Ghada Bolden for further evaluation and treatment  Continue with physical therapy and splint  Work restrictions will remain the same  All patient's questions were answered to her satisfaction  This note is created using dictation transcription  It may contain typographical errors, grammatical errors, improperly dictated words, background noise and other errors  Relevant Orders    Ambulatory Referral to Orthopedic Surgery    Ambulatory Referral to Physical Therapy       Subjective:     Patient ID: Federico Guerrero is a 46 y o  female  Chief Complaint: This is a 53-year old female following up for left distal biceps tendon strain with longitudinal tear  Patient was at work and transferring a patient (1/5/2023) and experienced pain through her forearm  A couple days later (1/9/2023) she was lifting a patient again and felt a pop through her biceps and forearm  Occupational therapy did make custom night splint  She is attending physical therapy and working with restrictions  She is wearing splint out in public for protection and during day at times at work  She states no change in pain or symptoms from previous appt  Continues to note sharp pain distal biceps and proximal forearm which is worse with activity  Denies any numbness or tingling in left arm       Allergy:  Allergies   Allergen Reactions   • Sulfa Antibiotics Shortness Of Breath   • Amoxicillin-Pot Clavulanate Other (See Comments)   • Invokana [Canagliflozin]      Yeast infection   • Erythromycin      Reaction Date: ;    • Metronidazole      Medications:  all current active meds have been reviewed  Past Medical History:  Past Medical History:   Diagnosis Date   • Coronary artery disease    • Diabetes mellitus ([facility] [address] )     Borderline   • Disease of thyroid gland    • DUB (dysfunctional uterine bleeding)    • Fatty liver    • Hashimoto's thyroiditis     Last Assessed:  14   • History of stomach ulcers    • Hypertension    • Hypothyroidism    • Irritable bowel syndrome     Last Assessed:  3/25/14   • Liver disease     elevated enzymes   • Myocardial infarction ([facility] )     2017   • GIANG (nonalcoholic steatohepatitis)    • Ovarian cyst     left   • Psychogenic polydipsia     Has had hyponatremia from drinking too much water in the past      Past Surgical History:  Past Surgical History:   Procedure Laterality Date   • ANGIOPLASTY     • CARDIAC CATHETERIZATION     •  SECTION      X 2   • CHOLECYSTECTOMY     • COLONOSCOPY     • CYSTOSCOPY N/A 2019    Procedure: CYSTOSCOPY;  Surgeon: Rox Fernandez MD;  [facility];  Service: Gynecology Oncology   • HYSTERECTOMY     • IR BIOPSY LIVER MASS  2020   • OOPHORECTOMY Right    • IL ESOPHAGOGASTRODUODENOSCOPY TRANSORAL DIAGNOSTIC N/A 2018    Procedure: ESOPHAGOGASTRODUODENOSCOPY (EGD); Surgeon: Edd Gray MD;  [facility];   Service: Gastroenterology   • IL LAPS TOTAL HYSTERECT 250 GM/< W/RMVL TUBE/OVARY N/A 2019    Procedure: TOTAL LAPAROSCOPIC HYSTERECTOMY, BILATERAL SALPINGECTOMY, LEFT OOPHORECTOMY;  Surgeon: Rox Fernandez MD;  [facility];  Service: Gynecology Oncology   • SINUS SURGERY     • TONSILLECTOMY     • UPPER GASTROINTESTINAL ENDOSCOPY       Family History:  Family History   Problem Relation Age of Onset   • Pancreatic cancer Mother    • Hypertension Father    • Diabetes type II Father    • Diabetes type II Brother    • No Known Problems Brother    • Lung disease Daughter asthma tendencies   • No Known Problems Son    • Liver cancer Maternal Aunt    • Melanoma Maternal Aunt    • Hypothyroidism Paternal Uncle    • Diabetes Maternal Grandmother    • Diabetes Paternal Grandmother    • Heart disease Paternal Grandmother    • Hypothyroidism Paternal Grandmother    • Hyperlipidemia Neg Hx    • Stroke Neg Hx      Social History:  Social History     Substance and Sexual Activity   Alcohol Use Never    Comment: occasional, Social drinker     Social History     Substance and Sexual Activity   Drug Use No     Social History     Tobacco Use   Smoking Status Former   • Packs/day: 0 50   • Years: 4 00   • Pack years: 2 00   • Types: Cigarettes   • Quit date: 2016   • Years since quittin 0   Smokeless Tobacco Never   Tobacco Comments    2016     Review of Systems   Constitutional: Negative for chills and fever  HENT: Negative for ear pain and sore throat  Eyes: Negative for pain and visual disturbance  Respiratory: Negative for cough and shortness of breath  Cardiovascular: Negative for chest pain and palpitations  Gastrointestinal: Negative for abdominal pain and vomiting  Genitourinary: Negative for dysuria and hematuria  Musculoskeletal: Negative for arthralgias and back pain  Skin: Negative for color change and rash  Neurological: Negative for seizures and syncope  All other systems reviewed and are negative  Objective:  BP Readings from Last 1 Encounters:   23 115/79      Wt Readings from Last 1 Encounters:   23 64 9 kg (143 lb)      BMI:   Estimated body mass index is 26 16 kg/m² as calculated from the following:    Height as of this encounter: 5' 2" (1 575 m)  Weight as of this encounter: 64 9 kg (143 lb)  BSA:   Estimated body surface area is 1 66 meters squared as calculated from the following:    Height as of this encounter: 5' 2" (1 575 m)  Weight as of this encounter: 64 9 kg (143 lb)     Physical Exam  Musculoskeletal: General: Tenderness (left elbow arthralgia ) present  Left Elbow Exam     Tenderness   Left elbow tenderness location: distal biceps insertion      Range of Motion   Extension: normal   Flexion: normal   Pronation: abnormal Left elbow pronation: pain  Supination: abnormal Left elbow supination: pain       Muscle Strength   Pronation:  4/5   Supination:  5/5     Tests   Varus: negative  Valgus: negative  Tinel's sign (cubital tunnel): negative    Other   Erythema: absent  Scars: absent  Sensation: normal  Pulse: present            no new imaging to review      Scribe Attestation    I,:  Casey Andre am acting as a scribe while in the presence of the attending physician :       I,:  Kindra Garcia MD personally performed the services described in this documentation    as scribed in my presence :

## 2023-02-15 ENCOUNTER — APPOINTMENT (OUTPATIENT)
Dept: PHYSICAL THERAPY | Facility: CLINIC | Age: 53
End: 2023-02-15

## 2023-02-15 ENCOUNTER — OFFICE VISIT (OUTPATIENT)
Dept: OBGYN CLINIC | Facility: CLINIC | Age: 53
End: 2023-02-15

## 2023-02-15 ENCOUNTER — PREP FOR PROCEDURE (OUTPATIENT)
Dept: OBGYN CLINIC | Facility: CLINIC | Age: 53
End: 2023-02-15

## 2023-02-15 VITALS
SYSTOLIC BLOOD PRESSURE: 108 MMHG | HEIGHT: 62 IN | WEIGHT: 142.8 LBS | HEART RATE: 91 BPM | DIASTOLIC BLOOD PRESSURE: 77 MMHG | BODY MASS INDEX: 26.28 KG/M2

## 2023-02-15 DIAGNOSIS — S46.212D TRAUMATIC PARTIAL TEAR OF LEFT BICEPS TENDON, SUBSEQUENT ENCOUNTER: Primary | ICD-10-CM

## 2023-02-15 DIAGNOSIS — S46.212D STRAIN OF LEFT BICEPS, SUBSEQUENT ENCOUNTER: ICD-10-CM

## 2023-02-15 RX ORDER — CHLORHEXIDINE GLUCONATE 4 G/100ML
SOLUTION TOPICAL DAILY PRN
Status: CANCELLED | OUTPATIENT
Start: 2023-02-15

## 2023-02-15 RX ORDER — CEFAZOLIN SODIUM 2 G/50ML
2000 SOLUTION INTRAVENOUS ONCE
Status: CANCELLED | OUTPATIENT
Start: 2023-02-24 | End: 2023-02-15

## 2023-02-15 RX ORDER — CHLORHEXIDINE GLUCONATE 0.12 MG/ML
15 RINSE ORAL ONCE
Status: CANCELLED | OUTPATIENT
Start: 2023-02-15 | End: 2023-02-15

## 2023-02-15 NOTE — PROGRESS NOTES
[address] 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[address]  [facility]  She states that she was transferring a patient on (2023) and experienced sharp forearm pain that began to self resolve, but that on 2023 she was lifting a patient again and felt a pop in the elbow with associated pain  She presented to urgent care and occupational medicine where an x-ray was obtained and she was told there was no fracture followed by an MRI at which time she was told she had a partial tear of her distal biceps tendon  She has undergone a course of physical therapy and splinting with occupational medicine      Past Medical History:   Diagnosis Date   • Coronary artery disease    • Diabetes mellitus ([address] )     Borderline   • Disease of thyroid gland    • DUB (dysfunctional uterine bleeding)    • Fatty liver    • Hashimoto's thyroiditis     Last Assessed:  14   • History of stomach ulcers    • Hypertension    • Hypothyroidism    • Irritable bowel syndrome     Last Assessed:  3/25/14   • Liver disease     elevated enzymes   • Myocardial infarction ([address] )     2017   • GIANG (nonalcoholic steatohepatitis)    • Ovarian cyst     left   • Psychogenic polydipsia     Has had hyponatremia from drinking too much water in the past        Past Surgical History:   Procedure Laterality Date   • ANGIOPLASTY     • CARDIAC CATHETERIZATION     •  SECTION      X 2   • CHOLECYSTECTOMY     • COLONOSCOPY     • CYSTOSCOPY N/A 2019    Procedure: CYSTOSCOPY;  Surgeon: Sendy Gorman MD;  [facility];  Service: Gynecology Oncology   • HYSTERECTOMY     • IR BIOPSY LIVER MASS  2020   • OOPHORECTOMY Right    • LA ESOPHAGOGASTRODUODENOSCOPY TRANSORAL DIAGNOSTIC N/A 2018    Procedure: ESOPHAGOGASTRODUODENOSCOPY (EGD); Surgeon: Yury Vergara MD;  [facility];   Service: Gastroenterology   • LA LAPS TOTAL HYSTERECT 250 GM/< W/RMVL TUBE/OVARY N/A 2019    Procedure: TOTAL LAPAROSCOPIC HYSTERECTOMY, BILATERAL SALPINGECTOMY, LEFT OOPHORECTOMY;  Surgeon: Mustapha Chang MD;  [facility];  Service: Gynecology Oncology   • SINUS SURGERY     • TONSILLECTOMY     • UPPER GASTROINTESTINAL ENDOSCOPY         Social History     Socioeconomic History   • Marital status: /Civil Union     Spouse name: Not on file   • Number of children: Not on file   • Years of education: Not on file   • Highest education level: Not on file   Occupational History   • Not on file   Tobacco Use   • Smoking status: Former     Packs/day: 0 50     Years: 4 00     Pack years: 2 00     Types: Cigarettes     Quit date: 2016     Years since quittin 0   • Smokeless tobacco: Never   • Tobacco comments:     2016   Vaping Use   • Vaping Use: Never used   Substance and Sexual Activity   • Alcohol use: Never     Comment: occasional, Social drinker   • Drug use: No   • Sexual activity: Yes     Partners: Male     Birth control/protection: None, Female Sterilization   Other Topics Concern   • Not on file   Social History Narrative    Feels safe at home     Social Determinants of Health     Financial Resource Strain: Not on file   Food Insecurity: Not on file   Transportation Needs: Not on file   Physical Activity: Not on file   Stress: Not on file   Social Connections: Not on file   Intimate Partner Violence: Not on file   Housing Stability: Not on file       Family History   Problem Relation Age of Onset   • Pancreatic cancer Mother    • Hypertension Father    • Diabetes type II Father    • Diabetes type II Brother    • No Known Problems Brother    • Lung disease Daughter         asthma tendencies   • No Known Problems Son    • Liver cancer Maternal Aunt    • Melanoma Maternal Aunt    • Hypothyroidism Paternal Uncle    • Diabetes Maternal Grandmother    • Diabetes Paternal Grandmother    • Heart disease Paternal Grandmother    • Hypothyroidism Paternal Grandmother    • Hyperlipidemia Neg Hx    • Stroke Neg Hx         Patient's Medications   New Prescriptions    No medications on file   Previous Medications    AMLODIPINE (NORVASC) 5 MG TABLET    Take 1 tablet (5 mg total) by mouth daily    BLOOD GLUCOSE MONITORING SUPPL (Barrett Solis "[employer] SYSTEM) W/DEVICE KIT    Use to test blood sugars twice a day    DICYCLOMINE (BENTYL) 20 MG TABLET    Take 1 tablet (20 mg total) by mouth every 6 (six) hours as needed (every 6 hours)    ESCITALOPRAM (LEXAPRO) 20 MG TABLET    Take 1 tablet (20 mg total) by mouth daily    ICOSAPENT ETHYL (VASCEPA) 1 G CAPS    2 capsules twice a day    INSULIN PEN NEEDLE 32G X 4 MM MISC    Use once a week    LACTOBACILLUS (PROBIOTIC ACIDOPHILUS PO)    Take by mouth daily    LEVOTHYROXINE 112 MCG TABLET    Take 1 tablet (112 mcg total) by mouth daily    MAGNESIUM 300 MG CAPS    Take 600 mg by mouth daily    METFORMIN (GLUCOPHAGE-XR) 500 MG 24 HR TABLET    Take 2 tablets (1,000 mg total) by mouth 2 (two) times a day with meals    METHOCARBAMOL (ROBAXIN) 750 MG TABLET    1 tab PO HS     METOPROLOL SUCCINATE (TOPROL-XL) 25 MG 24 HR TABLET    Take 1 tablet (25 mg total) by mouth 2 (two) times a day    MULTIPLE VITAMINS-MINERALS (ZINC PO)    Take by mouth    MUPIROCIN (BACTROBAN) 2 % OINTMENT    Apply topically 3 (three) times a day    NITROGLYCERIN (NITROSTAT) 0 4 MG SL TABLET    Place 1 tablet (0 4 mg total) under the tongue every 5 (five) minutes as needed for chest pain    ONETOUCH DELICA LANCETS 86W MISC    Use to test blood sugars twice a day    ONETOUCH VERIO TEST STRIP    Use as instructed to test blood sugars twice a day    PANTOPRAZOLE (PROTONIX) 40 MG TABLET    Take 1 tablet (40 mg total) by mouth daily before breakfast SEMAGLUTIDE, 1 MG/DOSE, (OZEMPIC, 1 MG/DOSE,) 4 MG/3 ML INJECTION PEN    Inject 1 mg once a week    TRAMADOL (ULTRAM) 50 MG TABLET    Take 1 tablet (50 mg total) by mouth every 8 (eight) hours as needed for moderate pain    TURMERIC 500 MG CAPS    Take by mouth    VITAMIN B-12 (VITAMIN B-12) 1,000 MCG TABLET    Take by mouth daily    VITAMIN D, CHOLECALCIFEROL, 50 MCG (2000 UT) CAPS    Take 4,000 Units by mouth daily   Modified Medications    No medications on file   Discontinued Medications    No medications on file       Allergies   Allergen Reactions   • Sulfa Antibiotics Shortness Of Breath   • Amoxicillin-Pot Clavulanate Other (See Comments)   • Invokana [Canagliflozin]      Yeast infection   • Erythromycin      Reaction Date: [id number];    • Metronidazole         LMP  (LMP Unknown)      REVIEW OF SYSTEMS:  Constitutional: Negative  HEENT: Negative  Respiratory: Negative  Skin: Negative  Neurological: Negative  Psychiatric/Behavioral: Negative  Musculoskeletal: Negative except for that mentioned in the HPI      LMP  (LMP Unknown)   Gen: No acute distress, resting comfortably in bed  HEENT: Eyes clear, moist mucus membranes, hearing intact  Respiratory: No audible wheezing or stridor  Cardiovascular: Well Perfused peripherally, 2+ distal pulse  Abdomen: nondistended, no peritoneal signs     PHYSICAL EXAM:   LEFT ELBOW:    Appearance: mild swelling in the antecubital fossa    Flexion: 140 degrees  Extension: 20 degrees  Pronation: 80 degrees  Supination: 70 degrees    TTP Lateral Epicondyle: negative  TTP Medial Epicondyle: Positive  TTP Olecranon: negative  TTP Radial Head: negative  TTP Biceps Tendon: Positive    Strength:  Flexion: 5/5  Extension: 5/5  Pronation: 4+/5  Supination: 4+/5    Pain with resisted wrist extension: positive to medial elbow  Pain with resisted 3rd finger extension: Positive to medial elbow  Pain with resisted wrist flexion: negative    Varus laxity: negative  Valgus laxity: negative  Milking maneuver: negative  Moving valgus stress test: negative    Cubital tunnel Tinel's: Positive    Radial/median/ulnar nerve intact    <2 sec cap refill        IMAGING: X-ray left elbow reviewed and demonstrates a located elbow without evidence of fracture    MRI left elbow reviewed and my interpretation is as follows: left located elbow with no degenerative changes, fracture or signal in the collateral ligaments or flexor pronator origin  Tendinosis of the distal biceps tendon with a greater than 50 percent tear at the insertion on the bicipital tuberosity  ASSESSMENT AND PLAN:  46 y o  female  With a left partial biceps tear that has been refractory to physical therapy and splinting  Discussion was had with regard to the pathology and expectations regarding outcome of surgical management  The patient states she would like to proceed with repair of her left biceps tendon  The patient understands the risks and benefits of the procedure with risks including pain, stiffness, infection, neurovascular injury, recurrence of symptoms, failure of surgical procedure, inadvertent intraoperative complications, blood loss, blood clots, allergic reaction to anesthesia, stroke, heart attack, all up to and including to death  The patient understood and did consent for surgery today       Case request for left distal biceps tendon repair placed  Cardiology clearance ordered given prior history of NSTEMI  Return to clinic post op for suture removal

## 2023-02-15 NOTE — LETTER
February 15, 2023     Patient: Warren Mccormick  YOB: 1970  Date of Visit: 2/15/2023      To Whom it May Concern:    Alessandra Domínguez is under my professional care  Shaun Duncan was seen in my office on 2/15/2023  Shaun Duncan is scheduled to undergo surgery on February 24th and is expected to have a recovery period requiring leave from work for approximately 4-6 weeks with reassessment at postoperative visits  If you have any questions or concerns, please don't hesitate to call  Sincerely,          Yvonne Modi        CC: Raymundo Whipple

## 2023-02-15 NOTE — H&P (VIEW-ONLY)
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[address]  [facility]  She states that she was transferring a patient on (2023) and experienced sharp forearm pain that began to self resolve, but that on 2023 she was lifting a patient again and felt a pop in the elbow with associated pain  She presented to urgent care and occupational medicine where an x-ray was obtained and she was told there was no fracture followed by an MRI at which time she was told she had a partial tear of her distal biceps tendon  She has undergone a course of physical therapy and splinting with occupational medicine      Past Medical History:   Diagnosis Date   • Coronary artery disease    • Diabetes mellitus ([address] )     Borderline   • Disease of thyroid gland    • DUB (dysfunctional uterine bleeding)    • Fatty liver    • Hashimoto's thyroiditis     Last Assessed:  14   • History of stomach ulcers    • Hypertension    • Hypothyroidism    • Irritable bowel syndrome     Last Assessed:  3/25/14   • Liver disease     elevated enzymes   • Myocardial infarction ([address] )     2017   • GIANG (nonalcoholic steatohepatitis)    • Ovarian cyst     left   • Psychogenic polydipsia     Has had hyponatremia from drinking too much water in the past        Past Surgical History:   Procedure Laterality Date   • ANGIOPLASTY     • CARDIAC CATHETERIZATION     •  SECTION      X 2   • CHOLECYSTECTOMY     • COLONOSCOPY     • CYSTOSCOPY N/A 2019    Procedure: CYSTOSCOPY;  Surgeon: Marco A King MD;  Location: [facility];  Service: Gynecology Oncology   • HYSTERECTOMY     • IR BIOPSY LIVER MASS  2020   • OOPHORECTOMY Right    • RI ESOPHAGOGASTRODUODENOSCOPY TRANSORAL DIAGNOSTIC N/A 2018    Procedure: ESOPHAGOGASTRODUODENOSCOPY (EGD); Surgeon: Anne Marie Lopez MD;  Location: [facility];   Service: Gastroenterology   • RI LAPS TOTAL HYSTERECT 250 GM/< W/RMVL TUBE/OVARY N/A 2019    Procedure: TOTAL LAPAROSCOPIC HYSTERECTOMY, BILATERAL SALPINGECTOMY, LEFT OOPHORECTOMY;  Surgeon: Florian Gold MD;  Location:  MAIN OR;  Service: Gynecology Oncology   • SINUS SURGERY     • TONSILLECTOMY     • UPPER GASTROINTESTINAL ENDOSCOPY         Social History     Socioeconomic History   • Marital status: /Civil Union     Spouse name: Not on file   • Number of children: Not on file   • Years of education: Not on file   • Highest education level: Not on file   Occupational History   • Not on file   Tobacco Use   • Smoking status: Former     Packs/day: 0 50     Years: 4 00     Pack years: 2 00     Types: Cigarettes     Quit date: 2016     Years since quittin 0   • Smokeless tobacco: Never   • Tobacco comments:     2016   Vaping Use   • Vaping Use: Never used   Substance and Sexual Activity   • Alcohol use: Never     Comment: occasional, Social drinker   • Drug use: No   • Sexual activity: Yes     Partners: Male     Birth control/protection: None, Female Sterilization   Other Topics Concern   • Not on file   Social History Narrative    Feels safe at home     Social Determinants of Health     Financial Resource Strain: Not on file   Food Insecurity: Not on file   Transportation Needs: Not on file   Physical Activity: Not on file   Stress: Not on file   Social Connections: Not on file   Intimate Partner Violence: Not on file   Housing Stability: Not on file       Family History   Problem Relation Age of Onset   • Pancreatic cancer Mother    • Hypertension Father    • Diabetes type II Father    • Diabetes type II Brother    • No Known Problems Brother    • Lung disease Daughter         asthma tendencies   • No Known Problems Son    • Liver cancer Maternal Aunt    • Melanoma Maternal Aunt    • Hypothyroidism Paternal Uncle    • Diabetes Maternal Grandmother    • Diabetes Paternal Grandmother    • Heart disease Paternal Grandmother    • Hypothyroidism Paternal Grandmother    • Hyperlipidemia Neg Hx    • Stroke Neg Hx         Patient's Medications   New Prescriptions    No medications on file   Previous Medications    AMLODIPINE (NORVASC) 5 MG TABLET    Take 1 tablet (5 mg total) by mouth daily    BLOOD GLUCOSE MONITORING SUPPL ([employer] IQ SYSTEM) W/DEVICE KIT    Use to test blood sugars twice a day    DICYCLOMINE (BENTYL) 20 MG TABLET    Take 1 tablet (20 mg total) by mouth every 6 (six) hours as needed (every 6 hours)    ESCITALOPRAM (LEXAPRO) 20 MG TABLET    Take 1 tablet (20 mg total) by mouth daily    ICOSAPENT ETHYL (VASCEPA) 1 G CAPS    2 capsules twice a day    INSULIN PEN NEEDLE 32G X 4 MM MISC    Use once a week    LACTOBACILLUS (PROBIOTIC ACIDOPHILUS PO)    Take by mouth daily    LEVOTHYROXINE 112 MCG TABLET    Take 1 tablet (112 mcg total) by mouth daily    MAGNESIUM 300 MG CAPS    Take 600 mg by mouth daily    METFORMIN (GLUCOPHAGE-XR) 500 MG 24 HR TABLET    Take 2 tablets (1,000 mg total) by mouth 2 (two) times a day with meals    METHOCARBAMOL (ROBAXIN) 750 MG TABLET    1 tab PO HS     METOPROLOL SUCCINATE (TOPROL-XL) 25 MG 24 HR TABLET    Take 1 tablet (25 mg total) by mouth 2 (two) times a day    MULTIPLE VITAMINS-MINERALS (ZINC PO)    Take by mouth    MUPIROCIN (BACTROBAN) 2 % OINTMENT    Apply topically 3 (three) times a day    NITROGLYCERIN (NITROSTAT) 0 4 MG SL TABLET    Place 1 tablet (0 4 mg total) under the tongue every 5 (five) minutes as needed for chest pain    ONETOUCH DELICA LANCETS 16E MISC    Use to test blood sugars twice a day    ONETOUCH VERIO TEST STRIP    Use as instructed to test blood sugars twice a day    PANTOPRAZOLE (PROTONIX) 40 MG TABLET    Take 1 tablet (40 mg total) by mouth daily before breakfast SEMAGLUTIDE, 1 MG/DOSE, (OZEMPIC, 1 MG/DOSE,) 4 MG/3 ML INJECTION PEN    Inject 1 mg once a week    TRAMADOL (ULTRAM) 50 MG TABLET    Take 1 tablet (50 mg total) by mouth every 8 (eight) hours as needed for moderate pain    TURMERIC 500 MG CAPS    Take by mouth    VITAMIN B-12 (VITAMIN B-12) 1,000 MCG TABLET    Take by mouth daily    VITAMIN D, CHOLECALCIFEROL, 50 MCG (2000 UT) CAPS    Take 4,000 Units by mouth daily   Modified Medications    No medications on file   Discontinued Medications    No medications on file       Allergies   Allergen Reactions   • Sulfa Antibiotics Shortness Of Breath   • Amoxicillin-Pot Clavulanate Other (See Comments)   • Invokana [Canagliflozin]      Yeast infection   • Erythromycin      Reaction Date: [id number];    • Metronidazole         LMP  (LMP Unknown)      REVIEW OF SYSTEMS:  Constitutional: Negative  HEENT: Negative  Respiratory: Negative  Skin: Negative  Neurological: Negative  Psychiatric/Behavioral: Negative  Musculoskeletal: Negative except for that mentioned in the HPI      LMP  (LMP Unknown)   Gen: No acute distress, resting comfortably in bed  HEENT: Eyes clear, moist mucus membranes, hearing intact  Respiratory: No audible wheezing or stridor  Cardiovascular: Well Perfused peripherally, 2+ distal pulse  Abdomen: nondistended, no peritoneal signs     PHYSICAL EXAM:   LEFT ELBOW:    Appearance: mild swelling in the antecubital fossa    Flexion: 140 degrees  Extension: 20 degrees  Pronation: 80 degrees  Supination: 70 degrees    TTP Lateral Epicondyle: negative  TTP Medial Epicondyle: Positive  TTP Olecranon: negative  TTP Radial Head: negative  TTP Biceps Tendon: Positive    Strength:  Flexion: 5/5  Extension: 5/5  Pronation: 4+/5  Supination: 4+/5    Pain with resisted wrist extension: positive to medial elbow  Pain with resisted 3rd finger extension: Positive to medial elbow  Pain with resisted wrist flexion: negative    Varus laxity: negative  Valgus laxity: negative  Milking maneuver: negative  Moving valgus stress test: negative    Cubital tunnel Tinel's: Positive    Radial/median/ulnar nerve intact    <2 sec cap refill        IMAGING: X-ray left elbow reviewed and demonstrates a located elbow without evidence of fracture    MRI left elbow reviewed and my interpretation is as follows: left located elbow with no degenerative changes, fracture or signal in the collateral ligaments or flexor pronator origin  Tendinosis of the distal biceps tendon with a greater than 50 percent tear at the insertion on the bicipital tuberosity  ASSESSMENT AND PLAN:  46 y o  female  With a left partial biceps tear that has been refractory to physical therapy and splinting  Discussion was had with regard to the pathology and expectations regarding outcome of surgical management  The patient states she would like to proceed with repair of her left biceps tendon  The patient understands the risks and benefits of the procedure with risks including pain, stiffness, infection, neurovascular injury, recurrence of symptoms, failure of surgical procedure, inadvertent intraoperative complications, blood loss, blood clots, allergic reaction to anesthesia, stroke, heart attack, all up to and including to death  The patient understood and did consent for surgery today       Case request for left distal biceps tendon repair placed  Cardiology clearance ordered given prior history of NSTEMI  Return to clinic post op for suture removal

## 2023-02-16 ENCOUNTER — ESTABLISHED COMPREHENSIVE EXAM (OUTPATIENT)
Dept: URBAN - METROPOLITAN AREA CLINIC 6 | Facility: CLINIC | Age: 53
End: 2023-02-16

## 2023-02-16 DIAGNOSIS — E11.9: ICD-10-CM

## 2023-02-16 DIAGNOSIS — H25.13: ICD-10-CM

## 2023-02-16 LAB
LEFT EYE DIABETIC RETINOPATHY: NORMAL
RIGHT EYE DIABETIC RETINOPATHY: NORMAL

## 2023-02-16 PROCEDURE — 92014 COMPRE OPH EXAM EST PT 1/>: CPT

## 2023-02-16 ASSESSMENT — TONOMETRY
OD_IOP_MMHG: 16
OS_IOP_MMHG: 13

## 2023-02-16 ASSESSMENT — VISUAL ACUITY
OS_SC: 20/25-1
OD_SC: 20/30-1

## 2023-02-17 ENCOUNTER — OFFICE VISIT (OUTPATIENT)
Dept: PHYSICAL THERAPY | Facility: CLINIC | Age: 53
End: 2023-02-17

## 2023-02-17 DIAGNOSIS — S46.212A STRAIN OF LEFT BICEPS, INITIAL ENCOUNTER: Primary | ICD-10-CM

## 2023-02-17 NOTE — PROGRESS NOTES
Daily Note     Today's date: 2023  Patient name: Hanna Guzmán  : 1970  MRN: [id number]  Referring provider: Davi Mack MD  Dx:   Encounter Diagnosis     ICD-10-CM    1  Strain of left biceps, initial encounter  S46 212A                      Subjective: Pt reports surgery is scheduled for next Friday for distal biceps repair  Objective: See treatment diary below      Assessment: Pt tolerating exercises well  Educated to continue rom and isometric exercise until surgery for best outcome         Plan: Pt is scheduled for surgery, discharge PT services and will re consult PT following     Precautions: none       Manuals 1/25 1/30 2/3 2/6 2/8 2/17       KT tape to decrease load on biceps FH --           Laser  16W 4' FH 16W 4' FH 16W 4' FH 16W 4' FH 12 W 4' FH                                 Neuro Re-Ed             Posture tband                                                                                           Ther Ex             Biceps isometrics  10x10'' 10x10'' 10x10'' 10x10'' 10x10''       Table slides HEP 10x 15x 15x 15x 15x       Wall Slides HEP            Pulleys  10x 10x 10x 10x 10x       Triceps tband             Active supination/pronation  2x10 2x10 2x10 2x10        Gripper   10x5'' 10x5'' 10x5'' 10x5''       UBE             Ther Activity             squigz             Suitcase carries             Gait Training                                       Modalities

## 2023-02-22 ENCOUNTER — TELEPHONE (OUTPATIENT)
Dept: CARDIOLOGY CLINIC | Facility: CLINIC | Age: 53
End: 2023-02-22

## 2023-02-22 NOTE — TELEPHONE ENCOUNTER
Dr Hardeep Agarwal is performing a repair of her left tendon bicep on  02/24/2023 and needs cardiac clearance  Ok to prep a letter?

## 2023-02-23 ENCOUNTER — ANESTHESIA EVENT (OUTPATIENT)
Dept: PERIOP | Facility: HOSPITAL | Age: 53
End: 2023-02-23

## 2023-02-23 NOTE — PRE-PROCEDURE INSTRUCTIONS
Pre-Surgery Instructions:   Medication Instructions   • amLODIPine (NORVASC) 5 mg tablet Take day of surgery  • dicyclomine (BENTYL) 20 mg tablet Take night before surgery   • escitalopram (LEXAPRO) 20 mg tablet Take day of surgery  • Icosapent Ethyl (Vascepa) 1 g CAPS stopped for surgery   • Lactobacillus (PROBIOTIC ACIDOPHILUS PO) Hold day of surgery  • levothyroxine 112 mcg tablet Take day of surgery  • Magnesium 300 MG CAPS Hold day of surgery  • metFORMIN (GLUCOPHAGE-XR) 500 mg 24 hr tablet Hold day of surgery  • methocarbamol (ROBAXIN) 750 mg tablet Take day of surgery  • metoprolol succinate (TOPROL-XL) 25 mg 24 hr tablet Take day of surgery  • Multiple Vitamins-Minerals (ZINC PO) Hold day of surgery  • pantoprazole (PROTONIX) 40 mg tablet Take day of surgery  • semaglutide, 1 mg/dose, (Ozempic, 1 MG/DOSE,) 4 mg/3 mL injection pen takes on wednesdays   • traMADol (ULTRAM) 50 mg tablet Take day of surgery  • Turmeric 500 MG CAPS Hold day of surgery  • vitamin B-12 (VITAMIN B-12) 1,000 mcg tablet Hold day of surgery  • Vitamin D, Cholecalciferol, 50 MCG (2000 UT) CAPS Hold day of surgery  Instructed to avoid all ASA and OTC Vit/Supp 1 week prior to surgery and to avoid NSAIDs 3 days prior to surgery per anesthesia instructions  Tylenol ok to take prn  Pre procedure instructions reviewed verbalizes understanding  NPO after MN  Bathing reviewed  Morning meds with water

## 2023-02-24 ENCOUNTER — HOSPITAL ENCOUNTER (OUTPATIENT)
Facility: HOSPITAL | Age: 53
Setting detail: OUTPATIENT SURGERY
Discharge: HOME | End: 2023-02-24
Attending: ORTHOPAEDIC SURGERY | Admitting: ORTHOPAEDIC SURGERY

## 2023-02-24 ENCOUNTER — ANESTHESIA (OUTPATIENT)
Dept: PERIOP | Facility: HOSPITAL | Age: 53
End: 2023-02-24

## 2023-02-24 ENCOUNTER — APPOINTMENT (OUTPATIENT)
Dept: RADIOLOGY | Facility: HOSPITAL | Age: 53
End: 2023-02-24

## 2023-02-24 VITALS
RESPIRATION RATE: 20 BRPM | TEMPERATURE: 97 F | DIASTOLIC BLOOD PRESSURE: 54 MMHG | OXYGEN SATURATION: 94 % | WEIGHT: 140.1 LBS | SYSTOLIC BLOOD PRESSURE: 113 MMHG | HEART RATE: 85 BPM | BODY MASS INDEX: 25.78 KG/M2 | HEIGHT: 62 IN

## 2023-02-24 DIAGNOSIS — S46.212A TRAUMATIC PARTIAL TEAR OF LEFT BICEPS TENDON, INITIAL ENCOUNTER: Primary | ICD-10-CM

## 2023-02-24 LAB — GLUCOSE SERPL-MCNC: 107 MG/DL (ref 65–140)

## 2023-02-24 DEVICE — IMPL DELIVERY SYS,DISTAL BICEPS REPR
Type: IMPLANTABLE DEVICE | Site: ARM | Status: FUNCTIONAL
Brand: ARTHREX®

## 2023-02-24 RX ORDER — CHLORHEXIDINE GLUCONATE 0.12 MG/ML
15 RINSE ORAL ONCE
Status: COMPLETED | OUTPATIENT
Start: 2023-02-24 | End: 2023-02-24

## 2023-02-24 RX ORDER — SODIUM CHLORIDE, SODIUM LACTATE, POTASSIUM CHLORIDE, CALCIUM CHLORIDE 600; 310; 30; 20 MG/100ML; MG/100ML; MG/100ML; MG/100ML
50 INJECTION, SOLUTION INTRAVENOUS CONTINUOUS
Status: CANCELLED | OUTPATIENT
Start: 2023-02-24

## 2023-02-24 RX ORDER — GLYCOPYRROLATE 0.2 MG/ML
INJECTION INTRAMUSCULAR; INTRAVENOUS AS NEEDED
Status: DISCONTINUED | OUTPATIENT
Start: 2023-02-24 | End: 2023-02-24

## 2023-02-24 RX ORDER — CLINDAMYCIN HYDROCHLORIDE 300 MG/1
300 CAPSULE ORAL 4 TIMES DAILY
Qty: 8 CAPSULE | Refills: 0 | Status: SHIPPED | OUTPATIENT
Start: 2023-02-24 | End: 2023-02-26

## 2023-02-24 RX ORDER — ONDANSETRON 2 MG/ML
INJECTION INTRAMUSCULAR; INTRAVENOUS AS NEEDED
Status: DISCONTINUED | OUTPATIENT
Start: 2023-02-24 | End: 2023-02-24

## 2023-02-24 RX ORDER — METOCLOPRAMIDE HYDROCHLORIDE 5 MG/ML
10 INJECTION INTRAMUSCULAR; INTRAVENOUS ONCE AS NEEDED
Status: DISCONTINUED | OUTPATIENT
Start: 2023-02-24 | End: 2023-02-24

## 2023-02-24 RX ORDER — ROCURONIUM BROMIDE 10 MG/ML
INJECTION, SOLUTION INTRAVENOUS AS NEEDED
Status: DISCONTINUED | OUTPATIENT
Start: 2023-02-24 | End: 2023-02-24

## 2023-02-24 RX ORDER — CEFAZOLIN SODIUM 2 G/50ML
2000 SOLUTION INTRAVENOUS ONCE
Status: COMPLETED | OUTPATIENT
Start: 2023-02-24 | End: 2023-02-24

## 2023-02-24 RX ORDER — NEOSTIGMINE METHYLSULFATE 1 MG/ML
INJECTION INTRAVENOUS AS NEEDED
Status: DISCONTINUED | OUTPATIENT
Start: 2023-02-24 | End: 2023-02-24

## 2023-02-24 RX ORDER — ONDANSETRON 2 MG/ML
4 INJECTION INTRAMUSCULAR; INTRAVENOUS ONCE AS NEEDED
Status: DISCONTINUED | OUTPATIENT
Start: 2023-02-24 | End: 2023-02-24 | Stop reason: HOSPADM

## 2023-02-24 RX ORDER — PROMETHAZINE HYDROCHLORIDE 25 MG/ML
12.5 INJECTION, SOLUTION INTRAMUSCULAR; INTRAVENOUS ONCE AS NEEDED
Status: COMPLETED | OUTPATIENT
Start: 2023-02-24 | End: 2023-02-24

## 2023-02-24 RX ORDER — LIDOCAINE HYDROCHLORIDE 10 MG/ML
INJECTION, SOLUTION EPIDURAL; INFILTRATION; INTRACAUDAL; PERINEURAL AS NEEDED
Status: DISCONTINUED | OUTPATIENT
Start: 2023-02-24 | End: 2023-02-24

## 2023-02-24 RX ORDER — CHLORHEXIDINE GLUCONATE 4 G/100ML
SOLUTION TOPICAL DAILY PRN
Status: DISCONTINUED | OUTPATIENT
Start: 2023-02-24 | End: 2023-02-27 | Stop reason: HOSPADM

## 2023-02-24 RX ORDER — PHENYLEPHRINE HYDROCHLORIDE 10 MG/ML
INJECTION INTRAVENOUS AS NEEDED
Status: DISCONTINUED | OUTPATIENT
Start: 2023-02-24 | End: 2023-02-24

## 2023-02-24 RX ORDER — INDOMETHACIN 75 MG/1
75 CAPSULE, EXTENDED RELEASE ORAL
Qty: 14 CAPSULE | Refills: 0 | Status: SHIPPED | OUTPATIENT
Start: 2023-02-24 | End: 2023-03-10

## 2023-02-24 RX ORDER — HYDROMORPHONE HCL/PF 1 MG/ML
0.5 SYRINGE (ML) INJECTION
Status: DISCONTINUED | OUTPATIENT
Start: 2023-02-24 | End: 2023-02-24 | Stop reason: HOSPADM

## 2023-02-24 RX ORDER — OXYCODONE HYDROCHLORIDE 5 MG/1
5 TABLET ORAL EVERY 4 HOURS PRN
Qty: 20 TABLET | Refills: 0 | Status: SHIPPED | OUTPATIENT
Start: 2023-02-24 | End: 2023-03-06

## 2023-02-24 RX ORDER — ROPIVACAINE HYDROCHLORIDE 5 MG/ML
INJECTION, SOLUTION EPIDURAL; INFILTRATION; PERINEURAL
Status: COMPLETED | OUTPATIENT
Start: 2023-02-24 | End: 2023-02-24

## 2023-02-24 RX ORDER — FENTANYL CITRATE 50 UG/ML
INJECTION, SOLUTION INTRAMUSCULAR; INTRAVENOUS
Status: COMPLETED | OUTPATIENT
Start: 2023-02-24 | End: 2023-02-24

## 2023-02-24 RX ORDER — PROPOFOL 10 MG/ML
INJECTION, EMULSION INTRAVENOUS AS NEEDED
Status: DISCONTINUED | OUTPATIENT
Start: 2023-02-24 | End: 2023-02-24

## 2023-02-24 RX ORDER — DEXAMETHASONE SODIUM PHOSPHATE 10 MG/ML
INJECTION, SOLUTION INTRAMUSCULAR; INTRAVENOUS AS NEEDED
Status: DISCONTINUED | OUTPATIENT
Start: 2023-02-24 | End: 2023-02-24

## 2023-02-24 RX ORDER — FENTANYL CITRATE 50 UG/ML
INJECTION, SOLUTION INTRAMUSCULAR; INTRAVENOUS AS NEEDED
Status: DISCONTINUED | OUTPATIENT
Start: 2023-02-24 | End: 2023-02-24

## 2023-02-24 RX ORDER — MIDAZOLAM HYDROCHLORIDE 2 MG/2ML
INJECTION, SOLUTION INTRAMUSCULAR; INTRAVENOUS
Status: COMPLETED | OUTPATIENT
Start: 2023-02-24 | End: 2023-02-24

## 2023-02-24 RX ORDER — ALBUTEROL SULFATE 2.5 MG/3ML
2.5 SOLUTION RESPIRATORY (INHALATION) ONCE AS NEEDED
Status: DISCONTINUED | OUTPATIENT
Start: 2023-02-24 | End: 2023-02-24 | Stop reason: HOSPADM

## 2023-02-24 RX ORDER — FENTANYL CITRATE/PF 50 MCG/ML
50 SYRINGE (ML) INJECTION
Status: DISCONTINUED | OUTPATIENT
Start: 2023-02-24 | End: 2023-02-24 | Stop reason: HOSPADM

## 2023-02-24 RX ORDER — SODIUM CHLORIDE, SODIUM LACTATE, POTASSIUM CHLORIDE, CALCIUM CHLORIDE 600; 310; 30; 20 MG/100ML; MG/100ML; MG/100ML; MG/100ML
INJECTION, SOLUTION INTRAVENOUS CONTINUOUS PRN
Status: DISCONTINUED | OUTPATIENT
Start: 2023-02-24 | End: 2023-02-24

## 2023-02-24 RX ADMIN — PROMETHAZINE HYDROCHLORIDE 12.5 MG: 25 INJECTION INTRAMUSCULAR; INTRAVENOUS at 09:17

## 2023-02-24 RX ADMIN — PROPOFOL 50 MG: 10 INJECTION, EMULSION INTRAVENOUS at 07:35

## 2023-02-24 RX ADMIN — SODIUM CHLORIDE, SODIUM LACTATE, POTASSIUM CHLORIDE, AND CALCIUM CHLORIDE: .6; .31; .03; .02 INJECTION, SOLUTION INTRAVENOUS at 07:20

## 2023-02-24 RX ADMIN — ROPIVACAINE HYDROCHLORIDE 25 ML: 5 INJECTION, SOLUTION EPIDURAL; INFILTRATION; PERINEURAL at 07:12

## 2023-02-24 RX ADMIN — FENTANYL CITRATE 25 MCG: 50 INJECTION, SOLUTION INTRAMUSCULAR; INTRAVENOUS at 07:10

## 2023-02-24 RX ADMIN — ROCURONIUM BROMIDE 40 MG: 10 SOLUTION INTRAVENOUS at 07:35

## 2023-02-24 RX ADMIN — LIDOCAINE HYDROCHLORIDE 50 MG: 10 INJECTION, SOLUTION EPIDURAL; INFILTRATION; INTRACAUDAL; PERINEURAL at 07:34

## 2023-02-24 RX ADMIN — PHENYLEPHRINE HYDROCHLORIDE 100 MCG: 10 INJECTION INTRAVENOUS at 07:48

## 2023-02-24 RX ADMIN — NEOSTIGMINE METHYLSULFATE 3 MG: 1 INJECTION, SOLUTION INTRAVENOUS at 08:28

## 2023-02-24 RX ADMIN — CHLORHEXIDINE GLUCONATE 0.12% ORAL RINSE 15 ML: 1.2 LIQUID ORAL at 07:23

## 2023-02-24 RX ADMIN — ONDANSETRON 4 MG: 2 INJECTION INTRAMUSCULAR; INTRAVENOUS at 08:28

## 2023-02-24 RX ADMIN — DEXAMETHASONE SODIUM PHOSPHATE 10 MG: 10 INJECTION, SOLUTION INTRAMUSCULAR; INTRAVENOUS at 07:35

## 2023-02-24 RX ADMIN — PHENYLEPHRINE HYDROCHLORIDE 100 MCG: 10 INJECTION INTRAVENOUS at 07:41

## 2023-02-24 RX ADMIN — PHENYLEPHRINE HYDROCHLORIDE 100 MCG: 10 INJECTION INTRAVENOUS at 07:43

## 2023-02-24 RX ADMIN — CEFAZOLIN SODIUM 500 MG: 2 SOLUTION INTRAVENOUS at 07:25

## 2023-02-24 RX ADMIN — PHENYLEPHRINE HYDROCHLORIDE 100 MCG: 10 INJECTION INTRAVENOUS at 07:59

## 2023-02-24 RX ADMIN — PHENYLEPHRINE HYDROCHLORIDE 100 MCG: 10 INJECTION INTRAVENOUS at 07:56

## 2023-02-24 RX ADMIN — PHENYLEPHRINE HYDROCHLORIDE 100 MCG: 10 INJECTION INTRAVENOUS at 08:09

## 2023-02-24 RX ADMIN — PHENYLEPHRINE HYDROCHLORIDE 100 MCG: 10 INJECTION INTRAVENOUS at 08:12

## 2023-02-24 RX ADMIN — GLYCOPYRROLATE 0.6 MG: 0.2 INJECTION, SOLUTION INTRAMUSCULAR; INTRAVENOUS at 08:28

## 2023-02-24 RX ADMIN — CEFAZOLIN SODIUM 1500 MG: 2 SOLUTION INTRAVENOUS at 07:43

## 2023-02-24 RX ADMIN — MIDAZOLAM HYDROCHLORIDE 2 MG: 1 INJECTION, SOLUTION INTRAMUSCULAR; INTRAVENOUS at 07:10

## 2023-02-24 RX ADMIN — FENTANYL CITRATE 75 MCG: 50 INJECTION, SOLUTION INTRAMUSCULAR; INTRAVENOUS at 07:34

## 2023-02-24 RX ADMIN — PROPOFOL 150 MG: 10 INJECTION, EMULSION INTRAVENOUS at 07:34

## 2023-02-24 RX ADMIN — PHENYLEPHRINE HYDROCHLORIDE 100 MCG: 10 INJECTION INTRAVENOUS at 07:45

## 2023-02-24 NOTE — DISCHARGE INSTR - AVS FIRST PAGE
Yvonne Modi DO    Orthopedic Surgery, Shoulder/Elbow and Sports Medicine  [facility]   [address] [facility], [address] [place] [address] [place]  Phone: [phone]    General Post-op Surgical Instructions:    Date of Procedure - 02/24/23     Procedure - Left distal biceps repair    Weight Bearing Status - nonweightbearing left arm, no lifting    DVT Prophylaxis and Duration - not needed    PT/OT Instructions - will be discussed at first post op    Stitches/Staples - Will be removed at 7-10 days postop; we will then place steristrips on incision site    Wound Care - maintain dressing, keep clean and dry  Sling  Xray follow up - to be done at or prior to office visit follow-up if indicated    Additional Info - Please complete your course of antibiotics     Any questions or concerns please call [phone] please!

## 2023-02-24 NOTE — ANESTHESIA PREPROCEDURE EVALUATION
Procedure:  REPAIR TENDON BICEPS, distal (Left: Arm Upper)    Relevant Problems   CARDIO   (+) Chest pain due to GERD   (+) Combined hyperlipidemia   (+) Coronary vasospasm (HCC)   (+) Hypertension   (+) NSTEMI (non-ST elevated myocardial infarction) (HCC)      ENDO   (+) Hypothyroidism   (+) Hypothyroidism due to Hashimoto's thyroiditis   (+) Type 2 diabetes mellitus with hyperglycemia, without long-term current use of insulin (HCC)      GI/HEPATIC   (+) Gastroesophageal reflux disease without esophagitis   (+) GIANG (nonalcoholic steatohepatitis)      GYN   (+) S/P laparoscopic hysterectomy      HEMATOLOGY   (+) Anemia      MUSCULOSKELETAL   (+) Chronic back pain   (+) Chronic low back pain without sciatica   (+) Coronary vasospasm (HCC)   (+) Lateral epicondylitis of right elbow   (+) Lumbar spondylosis   (+) Myofascial pain syndrome      NEURO/PSYCH   (+) Anxiety   (+) Chronic back pain   (+) Chronic low back pain without sciatica   (+) Chronic pain syndrome   (+) Depression   (+) Myofascial pain syndrome        Physical Exam    Airway    Mallampati score: II  TM Distance: >3 FB  Neck ROM: full     Dental   No notable dental hx     Cardiovascular  Cardiovascular exam normal    Pulmonary  Pulmonary exam normal     Other Findings    NSTEMI 2017    Anesthesia Plan  ASA Score- 3     Anesthesia Type- general with ASA Monitors  Additional Monitors:   Airway Plan: ETT  Plan Factors-Exercise tolerance (METS): >4 METS  Chart reviewed  EKG reviewed  Existing labs reviewed  Patient summary reviewed  Patient is not a current smoker  Induction- intravenous  Postoperative Plan- Plan for postoperative opioid use  Informed Consent- Anesthetic plan and risks discussed with patient  I personally reviewed this patient with the CRNA  Discussed and agreed on the Anesthesia Plan with the LUANNE Willis

## 2023-02-24 NOTE — INTERVAL H&P NOTE
H&P reviewed  After examining the patient I find no changes in the patients condition since the H&P had been written      Vitals:    02/24/23 0659   BP: 107/53   Pulse: 87   Resp: 22   Temp: 99 1 °F (37 3 °C)   SpO2: 96%     Plan for repair left distal biceps today

## 2023-02-24 NOTE — ANESTHESIA PROCEDURE NOTES
Peripheral Block    Patient location during procedure: pre-op  Start time: 2/24/2023 7:12 AM  Reason for block: at surgeon's request and post-op pain management  Staffing  Performed: Anesthesiologist   Anesthesiologist: Elijah Patel MD  Preanesthetic Checklist  Completed: patient identified, IV checked, site marked, risks and benefits discussed, surgical consent, monitors and equipment checked, pre-op evaluation and timeout performed  Peripheral Block  Patient position: supine  Prep: ChloraPrep  Patient monitoring: heart rate  Block type: supraclavicular  Laterality: left  Injection technique: single-shot  Procedures: ultrasound guided, Ultrasound guidance required for the procedure to increase accuracy and safety of medication placement and decrease risk of complications    Ultrasound permanent image savedropivacaine (NAROPIN) 0 5 % - Perineural   25 mL - 2/24/2023 7:12:00 AM  midazolam (VERSED) 2 mg/2 mL - Intravenous   2 mg - 2/24/2023 7:10:00 AM  fentaNYL 50 mcg/mL - Intravenous   25 mcg - 2/24/2023 7:10:00 AM  Needle  Needle type: Stimuplex   Needle gauge: 22 G  Needle length: 4 in  Needle localization: ultrasound guidance  Assessment  Injection assessment: incremental injection, local visualized surrounding nerve on ultrasound, negative aspiration for heme and no paresthesia on injection  Paresthesia pain: none  Heart rate change: no  Slow fractionated injection: yes  Post-procedure:  site cleaned  patient tolerated the procedure well with no immediate complications

## 2023-02-24 NOTE — OP NOTE
OPERATIVE REPORT  PATIENT NAME: Alex Larsen    :  1970  MRN: [id number]  Pt Location: [facility] OR ROOM 01    SURGERY DATE: 2023    Surgeon(s) and Role:     * Yvonne Modi - Primary     * Marquis Gold PA-C - Assisting    Preop Diagnosis:  Strain of left biceps, subsequent encounter [S46 212D]  Traumatic partial tear of left biceps tendon, subsequent encounter [S46 212D]    Post-Op Diagnosis Codes:     * Strain of left biceps, subsequent encounter [N70 182I]     * Traumatic partial tear of left biceps tendon, subsequent encounter [S46 212D]    Procedure(s):  Left - REPAIR TENDON BICEPS  distal    Specimen(s):  * No specimens in log *    Estimated Blood Loss:   Minimal    Drains:  * No LDAs found *    Anesthesia Type:   Choice    Operative Indications:  Strain of left biceps, subsequent encounter [S46 212D]  Traumatic partial tear of left biceps tendon, subsequent encounter [S46 212D]      Operative Findings:  High-grade nearly complete distal biceps tendon tear involving more than 50% of the tendon  Complications:   None    Procedure and Technique:  The patient was seen in the preoperative holding area where the operative extremity was marked  The patient was taken to the operating room placed in the supine position  The operative extremity was prepped and draped in the usual sterile fashion  A time-out was called and the patient was administered Ancef 2 g IV prior to incision  Using a 15 blade knife a skin incision was made over the radial tuberosity  Subcutaneous dissection taken down to the distal biceps tendon which was digitally palpated and taken out of the wound  The lateral antebrachial cutaneous nerve was identified and protected throughout the case  The distal biceps tendon was debrided of scar tissue at the distal end to collateral the tendon to be passed through an 7 mm sized tunnel    An Arthrex FiberLoop stitch was then passed through the distal end of the tendon in a running locking stitch  The 2 strands of the FiberLoop stitch was then passed through an Endobutton  Attention was then brought to the radial tuberosity which was identified and retractors were placed ulnarly and radially  The tendon stump was debrided off the radial tuberosity using a rongeur  Under fluoroscopic guidance a 2 4 mm pin was placed center center on the radial tuberosity  An 7 5 mm Reamer was then used to ream the near cortex  Copious irrigation was placed in the wound and suction out to remove bone debris  A 3 2 mm spayed tip drill was then used to drill the far cortex ulnarly away for the posterior interosseous nerve while the hand was supinated  The Endobutton was then passed through the far cortex and flipped and the 2 strands of the suture were attention to reduce the distal biceps tendon into the bone tunnel  This was then provisionally fixated with knots  A 7 0 mm BioComposite interference screw was then placed for further fixation  A free needle was then used to pass 1 suture strand through the distal end of the tendon and tied down for added fixation of the distal tendon to the radial tuberosity  The elbow was ranged and showed no signs of gapping of the tendon from the radial tuberosity and I was pleased with the fixation  The tourniquet was taken down and hemostasis was undertaken with bipolar cautery  The skin was closed with 2-0 and 3-0 Monocryl suture  Exofin was then placed  Mepilex dressing was then placed  The patient was placed in a regular sling  There is no complications throughout the case  The patient was placed in PACU in stable condition     I was present for the entire procedure, A qualified resident physician was not available and A physician assistant was required during the procedure for retraction tissue handling,dissection and suturing    Patient Disposition:  PACU         SIGNATURE: Yvonne Wildsyedruba  DATE: February 24, 2023  TIME: 8:42 AM

## 2023-02-24 NOTE — ANESTHESIA POSTPROCEDURE EVALUATION
Post-Op Assessment Note    CV Status:  Stable  Pain Score: 0    Pain management: adequate     Mental Status:  Awake   Hydration Status:  Stable   PONV Controlled:  Controlled   Airway Patency:  Patent      Post Op Vitals Reviewed: Yes      Staff: CRNA         There were no known notable events for this encounter  BP   121/73   Temp   97 8   Pulse  98   Resp   13   SpO2   100   Conversing the pacu staff  Denies pain, denies nausea  Asking for another blanket

## 2023-03-06 ENCOUNTER — TELEPHONE (OUTPATIENT)
Dept: FAMILY MEDICINE CLINIC | Facility: CLINIC | Age: 53
End: 2023-03-06

## 2023-03-06 DIAGNOSIS — M47.816 LUMBAR SPONDYLOSIS: Primary | ICD-10-CM

## 2023-03-06 RX ORDER — TRAMADOL HYDROCHLORIDE 50 MG/1
TABLET ORAL
Qty: 60 TABLET | Refills: 1 | Status: SHIPPED | OUTPATIENT
Start: 2023-03-06

## 2023-03-06 NOTE — TELEPHONE ENCOUNTER
----- Message from [place] MARGARITO Griffiths sent at 3/5/2023  8:10 PM EST -----  Regarding: Tramadol Refill  Contact: [phone]  Hi! Can you please refill my Tramadol prescription  [facility]      Thank you,  Kings Steven

## 2023-03-08 ENCOUNTER — OFFICE VISIT (OUTPATIENT)
Dept: OBGYN CLINIC | Facility: CLINIC | Age: 53
End: 2023-03-08

## 2023-03-08 VITALS
BODY MASS INDEX: 26.2 KG/M2 | SYSTOLIC BLOOD PRESSURE: 145 MMHG | HEART RATE: 80 BPM | HEIGHT: 62 IN | DIASTOLIC BLOOD PRESSURE: 83 MMHG | WEIGHT: 142.4 LBS

## 2023-03-08 DIAGNOSIS — S46.212D TRAUMATIC PARTIAL TEAR OF LEFT BICEPS TENDON, SUBSEQUENT ENCOUNTER: Primary | ICD-10-CM

## 2023-03-08 NOTE — LETTER
March 8, 2023     Patient: Mikki Wells  YOB: 1970  Date of Visit: 3/8/2023      To Whom it May Concern:    Mustapha Urias is under my professional care  Vinnie Maradiaga was seen in my office on 3/8/2023  Vinnie Maradiaga is to remain out of work until cleared  If you have any questions or concerns, please don't hesitate to call           Sincerely,          Yvonne Modi        CC: No Recipients

## 2023-03-08 NOTE — PROGRESS NOTES
[address] [facility] [place] [address]  Jenae [address]  [id number]  1970    ORTHOPAEDIC SURGERY OUTPATIENT NOTE  3/8/2023      HISTORY:  46 y o  female presents for postop evaluation status post left distal biceps repair performed on 2023  She is doing well  She denies numbness or tingling upper extremity  She has been compliant with the sling  Past Medical History:   Diagnosis Date   • Coronary artery disease    • Diabetes mellitus ([facility] [address] )     Borderline   • Disease of thyroid gland    • DUB (dysfunctional uterine bleeding)    • Fatty liver    • Hashimoto's thyroiditis     Last Assessed:  14   • History of stomach ulcers    • Hypertension    • Hypothyroidism    • Irritable bowel syndrome     Last Assessed:  3/25/14   • Liver disease     elevated enzymes   • Myocardial infarction ([facility] [address] )     2017   • GIANG (nonalcoholic steatohepatitis)    • Ovarian cyst     left   • Psychogenic polydipsia     Has had hyponatremia from drinking too much water in the past        Past Surgical History:   Procedure Laterality Date   • ANGIOPLASTY     • CARDIAC CATHETERIZATION     •  SECTION      X 2   • CHOLECYSTECTOMY     • COLONOSCOPY     • CYSTOSCOPY N/A 2019    Procedure: CYSTOSCOPY;  Surgeon: Carole Bragg MD;  Location: [facility];  Service: Gynecology Oncology   • HYSTERECTOMY     • IR BIOPSY LIVER MASS  2020   • OOPHORECTOMY Right    • GA ESOPHAGOGASTRODUODENOSCOPY TRANSORAL DIAGNOSTIC N/A 2018    Procedure: ESOPHAGOGASTRODUODENOSCOPY (EGD); Surgeon: China Queen MD;  Location: [facility];   Service: Gastroenterology   • GA LAPS TOTAL HYSTERECT 250 GM/< W/RMVL TUBE/OVARY N/A 2019    Procedure: TOTAL LAPAROSCOPIC HYSTERECTOMY, BILATERAL SALPINGECTOMY, LEFT OOPHORECTOMY;  Surgeon: Carole Bragg MD;  Location: [facility];  Service: Gynecology Oncology   • GA RINSJ RPTD BICEPS/TRICEPS TDN DSTL W/WO TDN GRF Left 2023    Procedure: REPAIR TENDON BICEPS, distal;  Surgeon: Roni Martinez;  Location:  MAIN OR;  Service: Orthopedics   • SINUS SURGERY     • TONSILLECTOMY     • UPPER GASTROINTESTINAL ENDOSCOPY         Social History     Socioeconomic History   • Marital status: /Civil Union     Spouse name: Not on file   • Number of children: Not on file   • Years of education: Not on file   • Highest education level: Not on file   Occupational History   • Not on file   Tobacco Use   • Smoking status: Former     Packs/day: 0 50     Years: 4 00     Pack years: 2 00     Types: Cigarettes     Quit date: 2016     Years since quittin 1   • Smokeless tobacco: Never   • Tobacco comments:     2016   Vaping Use   • Vaping Use: Never used   Substance and Sexual Activity   • Alcohol use: Never   • Drug use: No   • Sexual activity: Yes     Partners: Male     Birth control/protection: None, Female Sterilization   Other Topics Concern   • Not on file   Social History Narrative    Feels safe at home     Social Determinants of Health     Financial Resource Strain: Not on file   Food Insecurity: Not on file   Transportation Needs: Not on file   Physical Activity: Not on file   Stress: Not on file   Social Connections: Not on file   Intimate Partner Violence: Not on file   Housing Stability: Not on file       Family History   Problem Relation Age of Onset   • Pancreatic cancer Mother    • Hypertension Father    • Diabetes type II Father    • Diabetes type II Brother    • No Known Problems Brother    • Lung disease Daughter         asthma tendencies   • No Known Problems Son    • Liver cancer Maternal Aunt    • Melanoma Maternal Aunt    • Hypothyroidism Paternal Uncle    • Diabetes Maternal Grandmother    • Diabetes Paternal Grandmother    • Heart disease Paternal Grandmother    • Hypothyroidism Paternal Grandmother    • Hyperlipidemia Neg Hx    • Stroke Neg Hx         Patient's Medications   New Prescriptions    No medications on file   Previous Medications    AMLODIPINE (NORVASC) 5 MG TABLET    Take 1 tablet (5 mg total) by mouth daily    BLOOD GLUCOSE MONITORING SUPPL ([employer] SYSTEM) W/DEVICE KIT    Use to test blood sugars twice a day    DICYCLOMINE (BENTYL) 20 MG TABLET    Take 1 tablet (20 mg total) by mouth every 6 (six) hours as needed (every 6 hours)    ESCITALOPRAM (LEXAPRO) 20 MG TABLET    Take 1 tablet (20 mg total) by mouth daily    ICOSAPENT ETHYL (VASCEPA) 1 G CAPS    2 capsules twice a day    INDOMETHACIN (INDOCIN SR) 75 MG CR CAPSULE    Take 1 capsule (75 mg total) by mouth daily with breakfast for 14 days    INSULIN PEN NEEDLE 32G X 4 MM MISC    Use once a week    LACTOBACILLUS (PROBIOTIC ACIDOPHILUS PO)    Take by mouth daily    LEVOTHYROXINE 112 MCG TABLET    Take 1 tablet (112 mcg total) by mouth daily    MAGNESIUM 300 MG CAPS    Take 600 mg by mouth daily    METFORMIN (GLUCOPHAGE-XR) 500 MG 24 HR TABLET    Take 2 tablets (1,000 mg total) by mouth 2 (two) times a day with meals    METHOCARBAMOL (ROBAXIN) 750 MG TABLET    1 tab PO HS     METOPROLOL SUCCINATE (TOPROL-XL) 25 MG 24 HR TABLET    Take 1 tablet (25 mg total) by mouth 2 (two) times a day    MULTIPLE VITAMINS-MINERALS (ZINC PO)    Take by mouth    MUPIROCIN (BACTROBAN) 2 % OINTMENT    Apply topically 3 (three) times a day    NITROGLYCERIN (NITROSTAT) 0 4 MG SL TABLET    Place 1 tablet (0 4 mg total) under the tongue every 5 (five) minutes as needed for chest pain    ONETOUCH DELICA LANCETS 02Q MISC    Use to test blood sugars twice a day    ONETOUCH VERIO TEST STRIP    Use as instructed to test blood sugars twice a day    PANTOPRAZOLE (PROTONIX) 40 MG TABLET    Take 1 tablet (40 mg total) by mouth daily before breakfast    SEMAGLUTIDE, 1 MG/DOSE, (OZEMPIC, 1 MG/DOSE,) 4 MG/3 ML INJECTION PEN    Inject 1 mg once a week    TRAMADOL (ULTRAM) 50 MG TABLET    1 tab BID prn    TURMERIC 500 MG CAPS    Take by mouth    VITAMIN B-12 (VITAMIN B-12) 1,000 MCG TABLET    Take by mouth daily    VITAMIN D, CHOLECALCIFEROL, 50 MCG (2000 UT) CAPS    Take 4,000 Units by mouth daily   Modified Medications    No medications on file   Discontinued Medications    No medications on file       Allergies   Allergen Reactions   • Erythromycin Chest Pain     Chest pain as child   • Metronidazole Other (See Comments)     SOB   • Sulfa Antibiotics Shortness Of Breath     Per pt, "hives and fever"   • Amoxicillin-Pot Clavulanate Rash     And yeast infection   • Invokana [Canagliflozin] Other (See Comments)     Yeast infection        /83 (BP Location: Right arm, Patient Position: Sitting, Cuff Size: Standard)   Pulse 80   Ht 5' 2" (1 575 m)   Wt 64 6 kg (142 lb 6 4 oz)   LMP  (LMP Unknown)   BMI 26 05 kg/m²      REVIEW OF SYSTEMS:  Constitutional: Negative  HEENT: Negative  Respiratory: Negative  Skin: Negative  Neurological: Negative  Psychiatric/Behavioral: Negative  Musculoskeletal: Negative except for that mentioned in the HPI  /83 (BP Location: Right arm, Patient Position: Sitting, Cuff Size: Standard)   Pulse 80   Ht 5' 2" (1 575 m)   Wt 64 6 kg (142 lb 6 4 oz)   LMP  (LMP Unknown)   BMI 26 05 kg/m²   Gen: No acute distress, resting comfortably in bed  HEENT: Eyes clear, moist mucus membranes, hearing intact  Respiratory: No audible wheezing or stridor  Cardiovascular: Well Perfused peripherally, 2+ distal pulse  Abdomen: nondistended, no peritoneal signs     PHYSICAL EXAM:    Left elbow:  Incision is clean dry intact  Suture tails were clipped  Biceps tendon intact  No erythema or warmth  Sensation intact of the left upper extremity  Brisk cap refill    IMAGING: None    ASSESSMENT AND PLAN:  46 y o  female status post left distal biceps tendon repair performed on 2/24/2023  She will start on physical therapy  She will hold off on strengthening until she is 6 weeks postop   We will see her back in 4 weeks when she is 6 weeks out from surgery  She may shower, do not soak or scrub the incision       Scribe Attestation      I,:  Sarahi Mead PA-C am acting as a scribe while in the presence of the attending physician :       I,:  Gaby Murphy personally performed the services described in this documentation    as scribed in my presence :

## 2023-03-10 ENCOUNTER — OFFICE VISIT (OUTPATIENT)
Dept: BARIATRICS | Facility: CLINIC | Age: 53
End: 2023-03-10

## 2023-03-10 ENCOUNTER — EVALUATION (OUTPATIENT)
Dept: PHYSICAL THERAPY | Facility: CLINIC | Age: 53
End: 2023-03-10

## 2023-03-10 VITALS
DIASTOLIC BLOOD PRESSURE: 66 MMHG | SYSTOLIC BLOOD PRESSURE: 118 MMHG | HEART RATE: 76 BPM | OXYGEN SATURATION: 98 % | WEIGHT: 140.4 LBS | BODY MASS INDEX: 25.68 KG/M2

## 2023-03-10 DIAGNOSIS — K75.81 NASH (NONALCOHOLIC STEATOHEPATITIS): ICD-10-CM

## 2023-03-10 DIAGNOSIS — E66.3 OVERWEIGHT: Primary | ICD-10-CM

## 2023-03-10 DIAGNOSIS — S46.212D TRAUMATIC PARTIAL TEAR OF LEFT BICEPS TENDON, SUBSEQUENT ENCOUNTER: ICD-10-CM

## 2023-03-10 DIAGNOSIS — I10 PRIMARY HYPERTENSION: Chronic | ICD-10-CM

## 2023-03-10 DIAGNOSIS — E03.9 HYPOTHYROIDISM, UNSPECIFIED TYPE: ICD-10-CM

## 2023-03-10 DIAGNOSIS — E11.65 TYPE 2 DIABETES MELLITUS WITH HYPERGLYCEMIA, WITHOUT LONG-TERM CURRENT USE OF INSULIN (HCC): ICD-10-CM

## 2023-03-10 DIAGNOSIS — K21.9 GASTROESOPHAGEAL REFLUX DISEASE WITHOUT ESOPHAGITIS: ICD-10-CM

## 2023-03-10 DIAGNOSIS — S46.212D STRAIN OF LEFT BICEPS, SUBSEQUENT ENCOUNTER: Primary | ICD-10-CM

## 2023-03-10 NOTE — PROGRESS NOTES
Assessment/Plan:     Overweight  - Patient is pursuing Conservative Program  - Initial weight loss goal of 5-10% weight loss for improved health - met  - Verified with GI Dr Signe Goltz on 6/6/2022 okay to start Ozempic    - Patient denies personal history of pancreatitis  Patient also denies personal and family history of medullary thyroid cancer and multiple endocrine neoplasia type 2 (MEN 2 tumor)  - Continue Ozempic 1 mg weekly - tolerating well and helping with appetite  Was increased from 0 5 mg to 1 mg the end of December by endocrinology  Ozempic started 6/7/2022 at weight of 164 7 lbs  - S/P hysterectomy   - Labs reviewed: TSH, free T4, CMP, and A1C completed 12/7/2022  TSH low, but free T4 normal - following with endocrinology  Glucose elevated, but will likely improve with weight loss, otherwise, labs within acceptable range  Initial: 164 7 lbs  Last visit: 155 4 lbs  Current: 140 4 lbs BMI 25 68  Change: -24 3 lbs (-14 6 lbs since last office visit)  Goal: wants to get liver healthier    Goals:  - 5572-9364 calories per day  - Increase activity as tolerated - recent biceps surgery  - Keep up the great water intake  - Continue ozempic through endocrinology  Hypertension  - Taking Norvasc and Toprol  May improve with weight loss  Continue management with prescribing provider  Hypothyroidism  - Taking levothyroxine  Continue management with prescribing provider  Type 2 diabetes mellitus with hyperglycemia, without long-term current use of insulin ([address] )  - Taking metformin, and Ozempic  Continue management with prescribing provider  Lab Results   Component Value Date    HGBA1C 5 5 12/07/2022       Gastroesophageal reflux disease without esophagitis  - Taking Protonix  May improve with weight loss  Continue management with prescribing provider  GIANG (nonalcoholic steatohepatitis)  - May improve with weight loss  Continue to follow with GI           Rere Jenkins was seen today for follow-up  Diagnoses and all orders for this visit:    Overweight    Primary hypertension    Hypothyroidism, unspecified type    Type 2 diabetes mellitus with hyperglycemia, without long-term current use of insulin (HCC)    Gastroesophageal reflux disease without esophagitis    GIANG (nonalcoholic steatohepatitis)        Follow up in approximately 3 months with Non-Surgical Physician/Advanced Practitioner  Subjective:   Chief Complaint   Patient presents with   • Follow-up     MWM 2 mth fu        Patient ID: Shawna Mejia  is a 46 y o  female with excess weight/obesity here to pursue weight management  Patient is pursuing Conservative Program    Most recent notes and records were reviewed  HPI    Wt Readings from Last 10 Encounters:   03/10/23 63 7 kg (140 lb 6 4 oz)   03/08/23 64 6 kg (142 lb 6 4 oz)   02/24/23 63 5 kg (140 lb 1 6 oz)   02/15/23 64 8 kg (142 lb 12 8 oz)   02/14/23 64 9 kg (143 lb)   02/02/23 64 9 kg (143 lb)   01/30/23 64 9 kg (143 lb)   01/25/23 64 9 kg (143 lb)   01/17/23 64 9 kg (143 lb)   01/10/23 65 kg (143 lb 3 2 oz)     On Ozempic 1 mg weekly  Endocrinology increased from 0 5 mg to 1 mg around the end of December 2022  Some loose stool, but not bothersome  Helping with appetite  Trying to watch her intake of sugar due to her history of GIANG         Hasn't been food logging, but eats similarly  Had surgery the end of February 2023 for biceps tear  B- cheerios 1-2% milk and banana or raisin english muffin and banana   S- none  L- sandwich 2 pieces multi grain bread PB and J or cheese  S- none or fruit  D- pork and starch and veggies   S- none or small piece of cake      Hydration- 84-96 oz water, 32 oz unsweetened iced tea with lemon, occ coffee - black, occ homemade lemonade no sugar, or unsweetened green tea   Alcohol- none  Exercise- walking   Has been limited by surgery for biceps tear end of February 2023      Colonoscopy: UTD, due 2030  Mammogram: due, has order will schedule    The following portions of the patient's history were reviewed and updated as appropriate: allergies, current medications, past family history, past medical history, past social history, past surgical history, and problem list     Family History   Problem Relation Age of Onset   • Pancreatic cancer Mother    • Hypertension Father    • Diabetes type II Father    • Diabetes type II Brother    • No Known Problems Brother    • Lung disease Daughter         asthma tendencies   • No Known Problems Son    • Liver cancer Maternal Aunt    • Melanoma Maternal Aunt    • Hypothyroidism Paternal Uncle    • Diabetes Maternal Grandmother    • Diabetes Paternal Grandmother    • Heart disease Paternal Grandmother    • Hypothyroidism Paternal Grandmother    • Hyperlipidemia Neg Hx    • Stroke Neg Hx         Review of Systems   HENT: Negative for sore throat  Respiratory: Negative for cough and shortness of breath  Cardiovascular: Negative for chest pain and palpitations  Gastrointestinal: Negative for abdominal pain, constipation, diarrhea (intermittent), nausea and vomiting  Denies GERD   Musculoskeletal: Negative for arthralgias and back pain  Skin: Negative for rash  Psychiatric/Behavioral: Negative for suicidal ideas (or HI)  Denies depression and anxiety       Objective:  /66   Pulse 76   Wt 63 7 kg (140 lb 6 4 oz)   LMP  (LMP Unknown)   SpO2 98%   BMI 25 68 kg/m²     Physical Exam  Vitals and nursing note reviewed  Constitutional   General appearance: Abnormal   well developed and overweight  Eyes No conjunctival injection  Ears, Nose, Mouth, and Throat Oral mucosa moist    Pulmonary   Respiratory effort: No increased work of breathing or signs of respiratory distress  Cardiovascular   Examination of extremities for edema and/or varicosities: Normal   no edema  Abdomen   Abdomen: Abnormal overweight       Musculoskeletal   Normal range of motion  Neurological   Gait and station: Normal    Psychiatric   Orientation to person, place and time: Normal     Affect: appropriate

## 2023-03-10 NOTE — PROGRESS NOTES
PT Evaluation     Today's date: 3/10/2023  Patient name: Nba Julien  : 1970  MRN: [id number]  Referring provider: Marck Del Valle PA-C  Dx:   Encounter Diagnosis     ICD-10-CM    1  Strain of left biceps, subsequent encounter  S46 212D Ambulatory Referral to Physical Therapy      2  Traumatic partial tear of left biceps tendon, subsequent encounter  S46 D Ambulatory referral to Physical Therapy                     Assessment  Assessment details: Pt is 45yo female presenting s/p Lt distal biceps repair on 23  Pt has decreased rom and strength  These impairments limit her ability to complete ADL's as well as return to work  Pt would benefit from PT services to improve rom and strength to return to work and other ADL's  Impairments: abnormal or restricted ROM, activity intolerance, impaired physical strength, lacks appropriate home exercise program and pain with function    Symptom irritability: lowUnderstanding of Dx/Px/POC: good   Prognosis: good    Goals  1  Pt will improve Lt elbow rom to WNL and painfree  2  Pt will improve Lt elbow strength to 5/5 to allow lifting without pain  3  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  4  Pt will be independent with HEP upon discharge  Plan  Patient would benefit from: skilled physical therapy  Planned therapy interventions: functional ROM exercises, therapeutic activities, therapeutic exercise, therapeutic training, stretching, strengthening, home exercise program, neuromuscular re-education, manual therapy and patient education  Frequency: 2x week  Duration in weeks: 8  Treatment plan discussed with: patient        Subjective Evaluation    History of Present Illness  Mechanism of injury: Pt had attended PT pre-op  Pt had Lt distal biceps repair on 23  Pt reports she was in a sling  Pt reports pain in the forearm and elbow still  Pt reports not strengthening  For another 6 weeks but cleared for full rom    Quality of life: good    Pain  Current pain ratin  At best pain ratin  At worst pain ratin  Location: elbow and forearm  Quality: dull ache  Relieving factors: rest and ice  Aggravating factors: overhead activity and lifting  Progression: improved    Treatments  Previous treatment: physical therapy  Patient Goals  Patient goals for therapy: increased strength, return to work, independence with ADLs/IADLs, decreased pain and increased motion          Objective     Observations     Additional Observation Details  Incision looking good, no redness, warmth, or drainage noted      Active Range of Motion     Left Elbow   Flexion: 130 degrees   Extension: -28 degrees   Forearm supination: 50 degrees   Forearm pronation: 15 degrees     Right Elbow   Normal active range of motion    Strength/Myotome Testing     Additional Strength Details  Not tested due to protocol             Precautions: none      Manuals 3/10            Lt elbow rom                                                    Neuro Re-Ed             Posture tband                                                                                           Ther Ex             UBE             Pulleys             Pendulums HEP            Elbow arom flex/ext, sup/pro HEP 10xea            Gripping HEP            Wrist flex/ext                                       Ther Activity                                       Gait Training                                       Modalities

## 2023-03-11 NOTE — ASSESSMENT & PLAN NOTE
- Taking metformin, and Ozempic  Continue management with prescribing provider       Lab Results   Component Value Date    HGBA1C 5 5 12/07/2022

## 2023-03-11 NOTE — ASSESSMENT & PLAN NOTE
- Patient is pursuing Conservative Program  - Initial weight loss goal of 5-10% weight loss for improved health - met  - Verified with GI Dr Zari Tilley on 6/6/2022 okay to start Ozempic    - Patient denies personal history of pancreatitis  Patient also denies personal and family history of medullary thyroid cancer and multiple endocrine neoplasia type 2 (MEN 2 tumor)  - Continue Ozempic 1 mg weekly - tolerating well and helping with appetite  Was increased from 0 5 mg to 1 mg the end of December by endocrinology  Ozempic started 6/7/2022 at weight of 164 7 lbs  - S/P hysterectomy   - Labs reviewed: TSH, free T4, CMP, and A1C completed 12/7/2022  TSH low, but free T4 normal - following with endocrinology  Glucose elevated, but will likely improve with weight loss, otherwise, labs within acceptable range  Initial: 164 7 lbs  Last visit: 155 4 lbs  Current: 140 4 lbs BMI 25 68  Change: -24 3 lbs (-14 6 lbs since last office visit)  Goal: wants to get liver healthier    Goals:  - 0954-6799 calories per day  - Increase activity as tolerated - recent biceps surgery  - Keep up the great water intake  - Continue ozempic through endocrinology

## 2023-03-12 DIAGNOSIS — E11.65 TYPE 2 DIABETES MELLITUS WITH HYPERGLYCEMIA, WITHOUT LONG-TERM CURRENT USE OF INSULIN (HCC): ICD-10-CM

## 2023-03-13 ENCOUNTER — OFFICE VISIT (OUTPATIENT)
Dept: PHYSICAL THERAPY | Facility: CLINIC | Age: 53
End: 2023-03-13

## 2023-03-13 DIAGNOSIS — S46.212D STRAIN OF LEFT BICEPS, SUBSEQUENT ENCOUNTER: Primary | ICD-10-CM

## 2023-03-13 DIAGNOSIS — S46.212D TRAUMATIC PARTIAL TEAR OF LEFT BICEPS TENDON, SUBSEQUENT ENCOUNTER: ICD-10-CM

## 2023-03-13 RX ORDER — METFORMIN HYDROCHLORIDE 500 MG/1
1000 TABLET, EXTENDED RELEASE ORAL 2 TIMES DAILY WITH MEALS
Qty: 360 TABLET | Refills: 0 | Status: SHIPPED | OUTPATIENT
Start: 2023-03-13

## 2023-03-13 NOTE — PROGRESS NOTES
Daily Note     Today's date: 3/13/2023  Patient name: Meir Goode  : 1970  MRN: [id number]  Referring provider: Candelario Huffman PA-C  Dx:   Encounter Diagnosis     ICD-10-CM    1  Strain of left biceps, subsequent encounter  S46 212D       2  Traumatic partial tear of left biceps tendon, subsequent encounter  S46 212D                      Subjective: Pt reports just icing her elbow following  Objective: See treatment diary below    hAssessment: Pt tolerating wrist strengthening and arom of elbow  Prom is showing improvement, full elbow extension noted today, and increased rom with pronation/supination  Continue to progress per protocol  Plan: Continue per plan of care        Precautions:  Lt biceps repair 23, arom until next follow up      Manuals 3/10 3/13           Lt elbow rom  FH                                                  Neuro Re-Ed             Posture tband                                                                                           Ther Ex             UBE             Pulleys  10x5''           Table Slides  10x           Pendulums HEP            Elbow arom flex/ext, sup/pro HEP 10xea 20xea           Gripping HEP 20x5''           Wrist flex/ext  yellow bar 20x                                     Ther Activity                                       Gait Training                                       Modalities

## 2023-03-16 ENCOUNTER — OFFICE VISIT (OUTPATIENT)
Dept: PHYSICAL THERAPY | Facility: CLINIC | Age: 53
End: 2023-03-16

## 2023-03-16 DIAGNOSIS — S46.212D STRAIN OF LEFT BICEPS, SUBSEQUENT ENCOUNTER: Primary | ICD-10-CM

## 2023-03-16 DIAGNOSIS — S46.212A STRAIN OF LEFT BICEPS, INITIAL ENCOUNTER: ICD-10-CM

## 2023-03-16 DIAGNOSIS — S46.212D TRAUMATIC PARTIAL TEAR OF LEFT BICEPS TENDON, SUBSEQUENT ENCOUNTER: ICD-10-CM

## 2023-03-16 NOTE — PROGRESS NOTES
Daily Note     Today's date: 3/16/2023  Patient name: Kirk Cueto  : 1970  MRN: [id number]  Referring provider: Jigna Quintanilla PA-C  Dx:   Encounter Diagnosis     ICD-10-CM    1  Strain of left biceps, subsequent encounter  S46 212D       2  Traumatic partial tear of left biceps tendon, subsequent encounter  S46 212D       3  Strain of left biceps, initial encounter  S46 212A                      Subjective: Pt reports her pain is a little worse today bc she think she's been doing too much when she's not wearing her sling  She has been getting a bit of tingling in digits 1-3 after her exercises  Objective: See treatment diary below      Assessment: Tingling in digits 1-3 recreated with median nerve tension  Introduced self flossing technique with instructions to perform 10x/day  Plan: Continue per plan of care        Precautions:  Lt biceps repair 23, arom until next follow up      Manuals 3/10 3/13 3/16          Lt elbow rom  FH KT                                                 Neuro Re-Ed             Posture tband                                                                                           Ther Ex             UBE             Pulleys  10x5'' 10x10"          Table Slides  10x 10x          Pendulums HEP            Elbow arom flex/ext, sup/pro HEP 10xea 20xea 30x ea          Gripping HEP 20x5'' 20x5" 25#          Wrist flex/ext  yellow bar 20x yellow bar 20x          Median nerve flossing   10x                       Ther Activity                                       Gait Training                                       Modalities

## 2023-03-21 ENCOUNTER — OFFICE VISIT (OUTPATIENT)
Dept: PHYSICAL THERAPY | Facility: CLINIC | Age: 53
End: 2023-03-21

## 2023-03-21 DIAGNOSIS — S46.212D STRAIN OF LEFT BICEPS, SUBSEQUENT ENCOUNTER: Primary | ICD-10-CM

## 2023-03-21 NOTE — PROGRESS NOTES
Daily Note     Today's date: 3/21/2023  Patient name: Katja Mckeon  : 1970  MRN: [id number]  Referring provider: Anna Sandoval PA-C  Dx:   Encounter Diagnosis     ICD-10-CM    1  Strain of left biceps, subsequent encounter  S46 D                      Subjective: Pt reports is frustrating with her pronation/supination rom  Objective: See treatment diary below      Assessment: Educated pronation/supination rom is progressing very well and it very close to full rom  Pt tolerating all other exercises well  Will be ready for progressions once she sees ortho on   Added scar massage today and educate can complete at home as well  Plan: Continue per plan of care        Precautions:  Lt biceps repair 23, arom until next follow up      Manuals 3/10 3/13 3/16 3/21         Lt elbow rom  FH KT FH         Scar massage    FH                                   Neuro Re-Ed             Posture tband                                                                                           Ther Ex             UBE             Pulleys  10x5'' 10x10" 10x10''         Table Slides  10x 10x          Pendulums HEP            Elbow arom flex/ext, sup/pro HEP 10xea 20xea 30x ea 30xea         Gripping HEP 20x5'' 20x5" 25# 20x5" 25#         Wrist flex/ext  yellow bar 20x yellow bar 20x Red bar 20x         Median nerve flossing   10x                       Ther Activity                                       Gait Training                                       Modalities

## 2023-03-23 ENCOUNTER — APPOINTMENT (OUTPATIENT)
Dept: PHYSICAL THERAPY | Facility: CLINIC | Age: 53
End: 2023-03-23

## 2023-03-24 ENCOUNTER — OFFICE VISIT (OUTPATIENT)
Dept: PHYSICAL THERAPY | Facility: CLINIC | Age: 53
End: 2023-03-24

## 2023-03-24 DIAGNOSIS — S46.212D STRAIN OF LEFT BICEPS, SUBSEQUENT ENCOUNTER: Primary | ICD-10-CM

## 2023-03-24 NOTE — PROGRESS NOTES
Daily Note     Today's date: 3/24/2023  Patient name: Katja Mckeon  : 1970  MRN: [id number]  Referring provider: Anna Sandoval PA-C  Dx:   Encounter Diagnosis     ICD-10-CM    1  Strain of left biceps, subsequent encounter  S46 D                      Subjective: Pt reports she feels shes using it too much so its sore  Objective: See treatment diary below      Assessment: Pt tolerating prom and arom very well  Slight discomfort noted with end range pronation but otherwise pain free for treatment  Continue with arom until follow up on   Plan: Continue per plan of care        Precautions:  Lt biceps repair 23, arom until next follow up      Manuals 3/10 3/13 3/16 3/21 3/24        Lt elbow rom  FH KT FH FH        Scar massage    FH FH                                  Neuro Re-Ed             Posture tband                                                                                           Ther Ex             UBE             Pulleys  10x5'' 10x10" 10x10''         Table Slides  10x 10x          Wall Slides     10xea        Pendulums HEP            Elbow arom flex/ext, sup/pro HEP 10xea 20xea 30x ea 30xea 30xea        Gripping HEP 20x5'' 20x5" 25# 20x5" 25# 20x5'' 25#        Wrist flex/ext  yellow bar 20x yellow bar 20x Red bar 20x Red wand 20x        Median nerve flossing   10x                       Ther Activity                                       Gait Training                                       Modalities

## 2023-03-27 ENCOUNTER — OFFICE VISIT (OUTPATIENT)
Dept: PHYSICAL THERAPY | Facility: CLINIC | Age: 53
End: 2023-03-27

## 2023-03-27 DIAGNOSIS — S46.212D STRAIN OF LEFT BICEPS, SUBSEQUENT ENCOUNTER: Primary | ICD-10-CM

## 2023-03-27 NOTE — PROGRESS NOTES
"Daily Note     Today's date: 3/27/2023  Patient name: Louisa Fernandez  : 1970  MRN: [id number]  Referring provider: Hugh Guerrero PA-C  Dx:   Encounter Diagnosis     ICD-10-CM    1  Strain of left biceps, subsequent encounter  S46 283D                      Subjective: Pt reports the scar feels and looks better  Objective: See treatment diary below      Assessment: Pt tolerating passive stretching, with still slight discomfort at end range pronation  Pt tolerates arom very well and will be ready for progressions next week  Plan: Continue per plan of care        Precautions:  Lt biceps repair 23, arom until next follow up      Manuals 3/10 3/13 3/16 3/21 3/24 3/27       Lt elbow rom  FH KT FH FH FH       Scar massage    FH FH FH                                 Neuro Re-Ed             Posture tband                                                                                           Ther Ex             UBE             Pulleys  10x5'' 10x10\" 10x10''         Table Slides  10x 10x          Wall Slides     10xea 10xea       Pendulums HEP            Elbow arom flex/ext, sup/pro HEP 10xea 20xea 30x ea 30xea 30xea 30xea       Gripping HEP 20x5'' 20x5\" 25# 20x5\" 25# 20x5'' 25# 20x5'' 25#       Wrist flex/ext  yellow bar 20x yellow bar 20x Red bar 20x Red wand 20x Red wand 20x       Median nerve flossing   10x                       Ther Activity                                       Gait Training                                       Modalities                                            "

## 2023-03-29 ENCOUNTER — OFFICE VISIT (OUTPATIENT)
Dept: PHYSICAL THERAPY | Facility: CLINIC | Age: 53
End: 2023-03-29

## 2023-03-29 DIAGNOSIS — S46.212D STRAIN OF LEFT BICEPS, SUBSEQUENT ENCOUNTER: Primary | ICD-10-CM

## 2023-03-29 NOTE — PROGRESS NOTES
"Daily Note     Today's date: 3/29/2023  Patient name: Earnest Bedolla  : 1970  MRN: [id number]  Referring provider: Clif Lyons PA-C  Dx:   Encounter Diagnosis     ICD-10-CM    1  Strain of left biceps, subsequent encounter  S46 D                      Subjective: Pt reports she feels her scar feels much better  Objective: See treatment diary below      Assessment: Pt is tolerating passive stretching and exercises well  Pt will be ready for progress following next appointment  Plan: Continue per plan of care        Precautions:  Lt biceps repair 23, arom until next follow up      Manuals 3/10 3/13 3/16 3/21 3/24 3/27 3/29      Lt elbow rom  FH KT FH FH FH FH      Scar massage    FH FH FH FH                                Neuro Re-Ed             Posture tband                                                                                           Ther Ex             UBE             Pulleys  10x5'' 10x10\" 10x10''         Table Slides  10x 10x          Wall Slides     10xea 10xea 10xea      Pendulums HEP            Elbow arom flex/ext, sup/pro HEP 10xea 20xea 30x ea 30xea 30xea 30xea 30x ea      Gripping HEP 20x5'' 20x5\" 25# 20x5\" 25# 20x5'' 25# 20x5'' 25# 20x5'' 25#      Wrist flex/ext  yellow bar 20x yellow bar 20x Red bar 20x Red wand 20x Red wand 20x Red wand 20x      Median nerve flossing   10x                       Ther Activity                                       Gait Training                                       Modalities                                            "

## 2023-04-02 ENCOUNTER — APPOINTMENT (OUTPATIENT)
Dept: PHYSICAL THERAPY | Facility: CLINIC | Age: 53
End: 2023-04-02

## 2023-04-03 ENCOUNTER — OFFICE VISIT (OUTPATIENT)
Dept: PHYSICAL THERAPY | Facility: CLINIC | Age: 53
End: 2023-04-03

## 2023-04-03 ENCOUNTER — APPOINTMENT (OUTPATIENT)
Dept: PHYSICAL THERAPY | Facility: CLINIC | Age: 53
End: 2023-04-03

## 2023-04-03 DIAGNOSIS — S46.212D TRAUMATIC PARTIAL TEAR OF LEFT BICEPS TENDON, SUBSEQUENT ENCOUNTER: ICD-10-CM

## 2023-04-03 DIAGNOSIS — S46.212D STRAIN OF LEFT BICEPS, SUBSEQUENT ENCOUNTER: Primary | ICD-10-CM

## 2023-04-03 NOTE — PROGRESS NOTES
"Daily Note     Today's date: 4/3/2023  Patient name: Bret Vizcarra  : 1970  MRN: [id number]  Referring provider: Stephen Knight PA-C  Dx:   Encounter Diagnosis     ICD-10-CM    1  Strain of left biceps, subsequent encounter  S46 212D       2  Traumatic partial tear of left biceps tendon, subsequent encounter  S46 D                      Subjective: pt reports feeling better with less discomfort in the elbow compared to last session  She will be seeing her surgeon Wednesday to get an update on her precautions and progress protocol  Objective: See treatment diary below      Assessment: Pt is doing well today with good scar mobility today  She also demonstrates close to full AROM of the elbow, she is mostly limited with pronation and supination at end range  Will progress strengthening interventions next visit  Plan: Continue per plan of care        Precautions:  Lt biceps repair 23, arom until next follow up      Manuals 3/10 3/13 3/16 3/21 3/24 3/27 3/29 4/3     Lt elbow rom   [facility] [address]     Scar massage    [facility] [address]                               Neuro Re-Ed             Posture tband                                                                                           Ther Ex             UBE             Pulleys  10x5'' 10x10\" 10x10''         Table Slides  10x 10x          Wall Slides     10xea 10xea 10xea 10x ea     Pendulums HEP            Elbow arom flex/ext, sup/pro HEP 10xea 20xea 30x ea 30xea 30xea 30xea 30x ea 30x ea     Gripping HEP 20x5'' 20x5\" 25# 20x5\" 25# 20x5'' 25# 20x5'' 25# 20x5'' 25# 20x5'' 25#     Wrist flex/ext  yellow bar 20x yellow bar 20x Red bar 20x Red wand 20x Red wand 20x Red wand 20x green 20x     Median nerve flossing   10x                       Ther Activity                                       Gait Training                                       Modalities                                            "

## 2023-04-04 ENCOUNTER — APPOINTMENT (OUTPATIENT)
Dept: LAB | Facility: MEDICAL CENTER | Age: 53
End: 2023-04-04

## 2023-04-04 DIAGNOSIS — R74.8 ELEVATED LIVER ENZYMES: ICD-10-CM

## 2023-04-04 DIAGNOSIS — E03.8 HYPOTHYROIDISM DUE TO HASHIMOTO'S THYROIDITIS: ICD-10-CM

## 2023-04-04 DIAGNOSIS — E78.2 COMBINED HYPERLIPIDEMIA: ICD-10-CM

## 2023-04-04 DIAGNOSIS — E11.65 TYPE 2 DIABETES MELLITUS WITH HYPERGLYCEMIA, WITHOUT LONG-TERM CURRENT USE OF INSULIN (HCC): ICD-10-CM

## 2023-04-04 DIAGNOSIS — I10 PRIMARY HYPERTENSION: Chronic | ICD-10-CM

## 2023-04-04 DIAGNOSIS — Z00.8 HEALTH EXAMINATION IN POPULATION SURVEYS: ICD-10-CM

## 2023-04-04 DIAGNOSIS — K75.81 NASH (NONALCOHOLIC STEATOHEPATITIS): ICD-10-CM

## 2023-04-04 DIAGNOSIS — E06.3 HYPOTHYROIDISM DUE TO HASHIMOTO'S THYROIDITIS: ICD-10-CM

## 2023-04-04 LAB
ALBUMIN SERPL BCP-MCNC: 3.9 G/DL (ref 3.5–5)
ALP SERPL-CCNC: 81 U/L (ref 46–116)
ALT SERPL W P-5'-P-CCNC: 44 U/L (ref 12–78)
ANION GAP SERPL CALCULATED.3IONS-SCNC: 1 MMOL/L (ref 4–13)
AST SERPL W P-5'-P-CCNC: 24 U/L (ref 5–45)
BILIRUB SERPL-MCNC: 0.34 MG/DL (ref 0.2–1)
BUN SERPL-MCNC: 6 MG/DL (ref 5–25)
CALCIUM SERPL-MCNC: 9.6 MG/DL (ref 8.3–10.1)
CHLORIDE SERPL-SCNC: 106 MMOL/L (ref 96–108)
CHOLEST SERPL-MCNC: 171 MG/DL
CO2 SERPL-SCNC: 29 MMOL/L (ref 21–32)
CREAT SERPL-MCNC: 0.59 MG/DL (ref 0.6–1.3)
GFR SERPL CREATININE-BSD FRML MDRD: 105 ML/MIN/1.73SQ M
GLUCOSE P FAST SERPL-MCNC: 95 MG/DL (ref 65–99)
HDLC SERPL-MCNC: 43 MG/DL
LDLC SERPL CALC-MCNC: 82 MG/DL (ref 0–100)
NONHDLC SERPL-MCNC: 128 MG/DL
POTASSIUM SERPL-SCNC: 4.3 MMOL/L (ref 3.5–5.3)
PROT SERPL-MCNC: 6.9 G/DL (ref 6.4–8.4)
SODIUM SERPL-SCNC: 136 MMOL/L (ref 135–147)
T4 FREE SERPL-MCNC: 1.26 NG/DL (ref 0.76–1.46)
TRIGL SERPL-MCNC: 228 MG/DL
TSH SERPL DL<=0.05 MIU/L-ACNC: 0.01 UIU/ML (ref 0.45–4.5)

## 2023-04-05 ENCOUNTER — OFFICE VISIT (OUTPATIENT)
Dept: OBGYN CLINIC | Facility: CLINIC | Age: 53
End: 2023-04-05

## 2023-04-05 ENCOUNTER — OFFICE VISIT (OUTPATIENT)
Dept: ENDOCRINOLOGY | Facility: HOSPITAL | Age: 53
End: 2023-04-05

## 2023-04-05 VITALS
BODY MASS INDEX: 26.09 KG/M2 | HEART RATE: 89 BPM | DIASTOLIC BLOOD PRESSURE: 74 MMHG | WEIGHT: 141.8 LBS | HEIGHT: 62 IN | SYSTOLIC BLOOD PRESSURE: 118 MMHG

## 2023-04-05 VITALS — WEIGHT: 141 LBS | HEIGHT: 62 IN | BODY MASS INDEX: 25.95 KG/M2

## 2023-04-05 DIAGNOSIS — I10 PRIMARY HYPERTENSION: Chronic | ICD-10-CM

## 2023-04-05 DIAGNOSIS — E03.8 HYPOTHYROIDISM DUE TO HASHIMOTO'S THYROIDITIS: ICD-10-CM

## 2023-04-05 DIAGNOSIS — E11.65 TYPE 2 DIABETES MELLITUS WITH HYPERGLYCEMIA, WITHOUT LONG-TERM CURRENT USE OF INSULIN (HCC): Primary | ICD-10-CM

## 2023-04-05 DIAGNOSIS — S46.212D TRAUMATIC PARTIAL TEAR OF LEFT BICEPS TENDON, SUBSEQUENT ENCOUNTER: Primary | ICD-10-CM

## 2023-04-05 DIAGNOSIS — E06.3 HYPOTHYROIDISM DUE TO HASHIMOTO'S THYROIDITIS: ICD-10-CM

## 2023-04-05 LAB
EST. AVERAGE GLUCOSE BLD GHB EST-MCNC: 114 MG/DL
HBA1C MFR BLD: 5.6 %

## 2023-04-05 RX ORDER — LEVOTHYROXINE SODIUM 0.1 MG/1
100 TABLET ORAL DAILY
Qty: 90 TABLET | Refills: 3 | Status: SHIPPED | OUTPATIENT
Start: 2023-04-05

## 2023-04-05 NOTE — LETTER
April 5, 2023     Patient: Colleen Corrales  YOB: 1970  Date of Visit: 4/5/2023      To Whom it May Concern:    Miranda Alejo is under my professional care  Merced Valdez was seen in my office on 4/5/2023  Merced Pitt is unable to return back to work until cleared by my office  Will re evaluate patient in 6 weeks  If you have any questions or concerns, please don't hesitate to call           Sincerely,          Yvonne Modi        CC: No Recipients

## 2023-04-05 NOTE — PATIENT INSTRUCTIONS
Hgba1c is 5 6%  this is excellent  Continue the same metformin and ozempic for now  Continue to check the blood sugars up to once daily  The thyroid is overactive  Decrease the levothyroxine to 100 mcg daily  You can use up the 112 mcg tablets by taking them 1 tablet 6 days  a week and none 1 day a week  Follow up in 3 months with blood work

## 2023-04-05 NOTE — PROGRESS NOTES
[address] [facility] [id number]  [address]  [id number]  1970    ORTHOPAEDIC SURGERY OUTPATIENT NOTE  2023      HISTORY:  46 y o  female presents today 6 weeks status post left distal biceps repair performed on 2023  She is doing well  She has been in  physical therapy and have been working on her range of motion  She has some pain with pronation and supination and lifting  She feels 20-30 % improvement  She is taking Ultram for pain relief  She denies numbness or tingling  Past Medical History:   Diagnosis Date   • Coronary artery disease    • Diabetes mellitus ([facility] [address] )     Borderline   • Disease of thyroid gland    • DUB (dysfunctional uterine bleeding)    • Fatty liver    • Hashimoto's thyroiditis     Last Assessed:  14   • History of stomach ulcers    • Hypertension    • Hypothyroidism    • Irritable bowel syndrome     Last Assessed:  3/25/14   • Liver disease     elevated enzymes   • Myocardial infarction ([facility] [address] )     2017   • GIANG (nonalcoholic steatohepatitis)    • Ovarian cyst     left   • Psychogenic polydipsia     Has had hyponatremia from drinking too much water in the past        Past Surgical History:   Procedure Laterality Date   • ANGIOPLASTY     • CARDIAC CATHETERIZATION     •  SECTION      X 2   • CHOLECYSTECTOMY     • COLONOSCOPY     • CYSTOSCOPY N/A 2019    Procedure: CYSTOSCOPY;  Surgeon: Ashok Lomax MD;  Location: [facility];  Service: Gynecology Oncology   • HYSTERECTOMY     • IR BIOPSY LIVER MASS  2020   • OOPHORECTOMY Right    • MT ESOPHAGOGASTRODUODENOSCOPY TRANSORAL DIAGNOSTIC N/A 2018    Procedure: ESOPHAGOGASTRODUODENOSCOPY (EGD); Surgeon: Deng Coleman MD;  Location: [facility];   Service: Gastroenterology   • MT LAPS TOTAL HYSTERECT 250 GM/< W/RMVL TUBE/OVARY N/A 2019    Procedure: TOTAL LAPAROSCOPIC HYSTERECTOMY, BILATERAL SALPINGECTOMY, LEFT OOPHORECTOMY;  Surgeon: Marine López MD;  Location:  MAIN OR;  Service: Gynecology Oncology   • GA RINSJ RPTD BICEPS/TRICEPS TDN DSTL W/WO TDN GRF Left 2023    Procedure: REPAIR TENDON BICEPS, distal;  Surgeon: Reather Epley;  Location:  MAIN OR;  Service: Orthopedics   • SINUS SURGERY     • TONSILLECTOMY     • UPPER GASTROINTESTINAL ENDOSCOPY         Social History     Socioeconomic History   • Marital status: /Civil Union     Spouse name: Not on file   • Number of children: Not on file   • Years of education: Not on file   • Highest education level: Not on file   Occupational History   • Not on file   Tobacco Use   • Smoking status: Former     Packs/day: 0 50     Years: 4 00     Pack years: 2 00     Types: Cigarettes     Quit date: 2016     Years since quittin    • Smokeless tobacco: Never   • Tobacco comments:     2016   Vaping Use   • Vaping Use: Never used   Substance and Sexual Activity   • Alcohol use: Never   • Drug use: No   • Sexual activity: Yes     Partners: Male     Birth control/protection: None, Female Sterilization   Other Topics Concern   • Not on file   Social History Narrative    Feels safe at home     Social Determinants of Health     Financial Resource Strain: Not on file   Food Insecurity: Not on file   Transportation Needs: Not on file   Physical Activity: Not on file   Stress: Not on file   Social Connections: Not on file   Intimate Partner Violence: Not on file   Housing Stability: Not on file       Family History   Problem Relation Age of Onset   • Pancreatic cancer Mother    • Hypertension Father    • Diabetes type II Father    • Diabetes type II Brother    • No Known Problems Brother    • Lung disease Daughter         asthma tendencies   • No Known Problems Son    • Liver cancer Maternal Aunt    • Melanoma Maternal Aunt    • Hypothyroidism Paternal Uncle    • Diabetes Maternal Grandmother    • Diabetes Paternal Grandmother    • Heart disease Paternal Grandmother    • Hypothyroidism Paternal Grandmother    • Hyperlipidemia Neg Hx    • Stroke Neg Hx         Patient's Medications   New Prescriptions    No medications on file   Previous Medications    AMLODIPINE (NORVASC) 5 MG TABLET    Take 1 tablet (5 mg total) by mouth daily    BLOOD GLUCOSE MONITORING SUPPL (ONETOUCH VERIO IQ SYSTEM) W/DEVICE KIT    Use to test blood sugars twice a day    DICYCLOMINE (BENTYL) 20 MG TABLET    Take 1 tablet (20 mg total) by mouth every 6 (six) hours as needed (every 6 hours)    ESCITALOPRAM (LEXAPRO) 20 MG TABLET    Take 1 tablet (20 mg total) by mouth daily    ICOSAPENT ETHYL (VASCEPA) 1 G CAPS    2 capsules twice a day    INSULIN PEN NEEDLE 32G X 4 MM MISC    Use once a week    LACTOBACILLUS (PROBIOTIC ACIDOPHILUS PO)    Take by mouth daily    LEVOTHYROXINE 100 MCG TABLET    Take 1 tablet (100 mcg total) by mouth daily    MAGNESIUM 300 MG CAPS    Take 600 mg by mouth daily    METFORMIN (GLUCOPHAGE-XR) 500 MG 24 HR TABLET    Take 2 tablets (1,000 mg total) by mouth 2 (two) times a day with meals    METHOCARBAMOL (ROBAXIN) 750 MG TABLET    1 tab PO HS     METOPROLOL SUCCINATE (TOPROL-XL) 25 MG 24 HR TABLET    Take 1 tablet (25 mg total) by mouth 2 (two) times a day    MULTIPLE VITAMINS-MINERALS (ZINC PO)    Take by mouth    MUPIROCIN (BACTROBAN) 2 % OINTMENT    Apply topically 3 (three) times a day    NITROGLYCERIN (NITROSTAT) 0 4 MG SL TABLET    Place 1 tablet (0 4 mg total) under the tongue every 5 (five) minutes as needed for chest pain    ONETOUCH DELICA LANCETS 94L MISC    Use to test blood sugars twice a day    ONETOUCH VERIO TEST STRIP    Use as instructed to test blood sugars twice a day    PANTOPRAZOLE (PROTONIX) 40 MG TABLET    Take 1 tablet (40 mg total) by mouth daily before breakfast    SEMAGLUTIDE, 1 MG/DOSE, (OZEMPIC, 1 MG/DOSE,) 4 MG/3 ML INJECTION PEN    Inject 1 mg once a week    TRAMADOL (ULTRAM) 50 MG TABLET    1 tab BID prn    TURMERIC "500 MG CAPS    Take by mouth    VITAMIN B-12 (VITAMIN B-12) 1,000 MCG TABLET    Take by mouth daily    VITAMIN D, CHOLECALCIFEROL, 50 MCG (2000 UT) CAPS    Take 4,000 Units by mouth daily   Modified Medications    No medications on file   Discontinued Medications    No medications on file       Allergies   Allergen Reactions   • Erythromycin Chest Pain     Chest pain as child   • Metronidazole Other (See Comments)     SOB   • Sulfa Antibiotics Shortness Of Breath     Per pt, \"hives and fever\"   • Amoxicillin-Pot Clavulanate Rash     And yeast infection   • Invokana [Canagliflozin] Other (See Comments)     Yeast infection        Ht 5' 2\" (1 575 m)   Wt 64 kg (141 lb) Comment: verbal  LMP  (LMP Unknown)   BMI 25 79 kg/m²      REVIEW OF SYSTEMS:  Constitutional: Negative  HEENT: Negative  Respiratory: Negative  Skin: Negative  Neurological: Negative  Psychiatric/Behavioral: Negative  Musculoskeletal: Negative except for that mentioned in the HPI     [unfilled]     PHYSICAL EXAM:    Left elbow   Surgical incision well healed   No erythema   Full ROM   Biceps tendon intact     IMAGING: Not taken today     ASSESSMENT AND PLAN:  46 y o  female  6 weeks s/p left distal biceps repair, DOS 2/24/23, patient is doing well    Patient has been cleared to start strengthening exercises in physical therapy, updated script was given out today  Work note was given out today stating she is unable to return back to work until cleared by my office  She will follow up in 6 weeks for re evaluation  Scribe Attestation    I,:  Bart Stockton am acting as a scribe while in the presence of the attending physician :       I,:  Colleen Henao MD personally performed the services described in this documentation    as scribed in my presence  :           "

## 2023-04-05 NOTE — LETTER
April 5, 2023     Patient: Mala Castillo  YOB: 1970  Date of Visit: 4/5/2023      To Whom it May Concern:    Matthew Miller is under my professional care  Radha Newman was seen in my office on 4/5/2023  Radha Newman is to continue with current work restrictions  Will re evaluate patient in 6 weeks  If you have any questions or concerns, please don't hesitate to call           Sincerely,          Yvonne Modi        CC: No Recipients

## 2023-04-05 NOTE — PROGRESS NOTES
4/5/2023    Assessment/Plan      Diagnoses and all orders for this visit:    Type 2 diabetes mellitus with hyperglycemia, without long-term current use of insulin (HCC)  -     TSH, 3rd generation Lab Collect; Future  -     T4, free Lab Collect; Future  -     HEMOGLOBIN A1C W/ EAG ESTIMATION Lab Collect; Future  -     Comprehensive metabolic panel Lab Collect; Future  -     CBC and differential Lab Collect; Future  -     Microalbumin / creatinine urine ratio Lab Collect; Future    Hypothyroidism due to Hashimoto's thyroiditis  -     levothyroxine 100 mcg tablet; Take 1 tablet (100 mcg total) by mouth daily  -     TSH, 3rd generation Lab Collect; Future  -     T4, free Lab Collect; Future  -     HEMOGLOBIN A1C W/ EAG ESTIMATION Lab Collect; Future  -     Comprehensive metabolic panel Lab Collect; Future  -     CBC and differential Lab Collect; Future  -     Microalbumin / creatinine urine ratio Lab Collect; Future    Primary hypertension  -     TSH, 3rd generation Lab Collect; Future  -     T4, free Lab Collect; Future  -     HEMOGLOBIN A1C W/ EAG ESTIMATION Lab Collect; Future  -     Comprehensive metabolic panel Lab Collect; Future  -     CBC and differential Lab Collect; Future  -     Microalbumin / creatinine urine ratio Lab Collect; Future        Assessment/Plan:  1  Type 2 diabetes  Hemoglobin A1c is 5 6%  This does demonstrate excellent control of her diabetes  She will continue the same metformin and Ozempic for now  I have asked her to continue to test her blood sugars up to once daily and to continue to diabetic diet and regular exercise  2   Hypothyroidism due to Hashimoto's thyroiditis  Thyroid function studies do show a low TSH and an elevated free T4 signifying biochemical hyperthyroidism  I have asked her to decrease her levothyroxine to 100 mcg daily  3   Hypertension  She is normotensive in the office on her current dose of amlodipine and metoprolol      I have asked her to follow-up in 3 months with preceding hemoglobin A1c, CMP, CBC, TSH, free T4, and urine microalbumin to creatinine ratio  CC: Diabetes type II, thyroid, blood pressure follow-up    History of Present Illness     HPI: Georgia Cherry is a 46y o  year old female with type 2 diabetes for 5 years  She is on oral agents at home and takes metformin  mg 2 tablets twice a day and Ozempic 1 mg once a week  She denies any polyuria, polydipsia, nocturia, polyphagia, and blurry vision  She denies numbness or tingling of the feet  She denies chest pain or shortness of breath  She denies neuropathy, nephropathy, retinopathy, stroke and claudication but does admit to heart attack  She had surgery feb 2023  Torn biceps tendon off left elbow at work in [place] 2023 and needed surgery  Getting PT  Hypoglycemic episodes: No rare  The patient's last eye exam was in February 2023 with no retinopathy  The patient's last foot exam was in April 2022 at endocrine office visit  She does not follow with podiatry  Last A1C was   Lab Results   Component Value Date    HGBA1C 5 6 04/04/2023     Blood Sugar/Glucometer/Pump/CGM review: checks blood sugars daily once daily, up to 154 after pasta, normally 110-120  She has a history of hypothyroidism and takes levothyroxine 112 mcg daily  Weight is 9 pounds less than last visit  She does feel somewhat hot and sweaty unchanged  She denies palpitation, cold intolerance, tremors, or fatigue  She has some winter dry skin but no brittle nails or hair loss  She does have some variable sleeping with difficulty falling asleep  She denies anxiety or depression, diarrhea or constipation  She denies diplopia  She denies compressive thyroid symptoms or difficulties with swallowing  She has hypertension and takes amlodipine 5 mg daily and metoprolol XL 25 mg twice daily  She denies headache or strokelike symptoms      Review of Systems   Constitutional: Negative for fatigue and unexpected weight change  Weight is 9 pounds less than last visit  HENT: Negative for trouble swallowing  Eyes: Negative for visual disturbance  Respiratory: Negative for chest tightness and shortness of breath  Cardiovascular: Negative for chest pain and palpitations  Gastrointestinal: Negative for abdominal pain, constipation, diarrhea and nausea  Endocrine: Positive for heat intolerance  Negative for cold intolerance, polydipsia, polyphagia and polyuria  No nocturia  Hot and sweaty unchanged  Skin: Negative for wound  Winter dry skin  No brittle nails  No hair loss  Neurological: Negative for dizziness, tremors, light-headedness, numbness and headaches  Psychiatric/Behavioral: Positive for sleep disturbance  Negative for dysphoric mood  The patient is not nervous/anxious  Sleeping variable, some difficulty falling asleep          Historical Information   Past Medical History:   Diagnosis Date   • Coronary artery disease    • Diabetes mellitus ([facility] )     Borderline   • Disease of thyroid gland    • DUB (dysfunctional uterine bleeding)    • Fatty liver    • Hashimoto's thyroiditis     Last Assessed:  14   • History of stomach ulcers    • Hypertension    • Hypothyroidism    • Irritable bowel syndrome     Last Assessed:  3/25/14   • Liver disease     elevated enzymes   • Myocardial infarction ([facility] )     2017   • GIANG (nonalcoholic steatohepatitis)    • Ovarian cyst     left   • Psychogenic polydipsia     Has had hyponatremia from drinking too much water in the past      Past Surgical History:   Procedure Laterality Date   • ANGIOPLASTY     • CARDIAC CATHETERIZATION     •  SECTION      X 2   • CHOLECYSTECTOMY     • COLONOSCOPY     • CYSTOSCOPY N/A 2019    Procedure: CYSTOSCOPY;  Surgeon: Jarad Sy MD;  [facility]: [facility];  Service: Gynecology Oncology   • HYSTERECTOMY     • IR BIOPSY LIVER MASS  2020   • OOPHORECTOMY Right    • NV ESOPHAGOGASTRODUODENOSCOPY TRANSORAL DIAGNOSTIC N/A 2018    Procedure: ESOPHAGOGASTRODUODENOSCOPY (EGD); Surgeon: Richi Tineo MD;  Location: [facility];   Service: Gastroenterology   • LA LAPS TOTAL HYSTERECT 250 GM/< W/RMVL TUBE/OVARY N/A 2019    Procedure: TOTAL LAPAROSCOPIC HYSTERECTOMY, BILATERAL SALPINGECTOMY, LEFT OOPHORECTOMY;  Surgeon: Tara Stokes MD;  Location:  MAIN OR;  Service: Gynecology Oncology   • LA RINSJ RPTD BICEPS/TRICEPS TDN DSTL W/WO TDN GRF Left 2023    Procedure: REPAIR TENDON BICEPS, distal;  Surgeon: Sergey Harris;  Location:  MAIN OR;  Service: Orthopedics   • SINUS SURGERY     • TONSILLECTOMY     • UPPER GASTROINTESTINAL ENDOSCOPY       Social History   Social History     Substance and Sexual Activity   Alcohol Use Never     Social History     Substance and Sexual Activity   Drug Use No     Social History     Tobacco Use   Smoking Status Former   • Packs/day: 0 50   • Years: 4 00   • Pack years: 2 00   • Types: Cigarettes   • Quit date: 2016   • Years since quittin 1   Smokeless Tobacco Never   Tobacco Comments    2016     Family History:   Family History   Problem Relation Age of Onset   • Pancreatic cancer Mother    • Hypertension Father    • Diabetes type II Father    • Diabetes type II Brother    • No Known Problems Brother    • Lung disease Daughter         asthma tendencies   • No Known Problems Son    • Liver cancer Maternal Aunt    • Melanoma Maternal Aunt    • Hypothyroidism Paternal Uncle    • Diabetes Maternal Grandmother    • Diabetes Paternal Grandmother    • Heart disease Paternal Grandmother    • Hypothyroidism Paternal Grandmother    • Hyperlipidemia Neg Hx    • Stroke Neg Hx        Meds/Allergies   Current Outpatient Medications   Medication Sig Dispense Refill   • amLODIPine (NORVASC) 5 mg tablet Take 1 tablet (5 mg total) by mouth daily 90 tablet 2   • Blood Glucose Monitoring Suppl (Zari Stager IQ SYSTEM) w/Device KIT Use to test blood sugars twice a day 1 kit 0   • dicyclomine (BENTYL) 20 mg tablet Take 1 tablet (20 mg total) by mouth every 6 (six) hours as needed (every 6 hours) 60 tablet 0   • escitalopram (LEXAPRO) 20 mg tablet Take 1 tablet (20 mg total) by mouth daily 90 tablet 0   • Icosapent Ethyl (Vascepa) 1 g CAPS 2 capsules twice a day 360 capsule 0   • Insulin Pen Needle 32G X 4 MM MISC Use once a week 30 each 1   • Lactobacillus (PROBIOTIC ACIDOPHILUS PO) Take by mouth daily     • levothyroxine 100 mcg tablet Take 1 tablet (100 mcg total) by mouth daily 90 tablet 3   • Magnesium 300 MG CAPS Take 600 mg by mouth daily     • metFORMIN (GLUCOPHAGE-XR) 500 mg 24 hr tablet Take 2 tablets (1,000 mg total) by mouth 2 (two) times a day with meals 360 tablet 0   • methocarbamol (ROBAXIN) 750 mg tablet 1 tab PO HS   90 tablet 1   • metoprolol succinate (TOPROL-XL) 25 mg 24 hr tablet Take 1 tablet (25 mg total) by mouth 2 (two) times a day 180 tablet 0   • Multiple Vitamins-Minerals (ZINC PO) Take by mouth     • mupirocin (BACTROBAN) 2 % ointment Apply topically 3 (three) times a day 22 g 0   • nitroglycerin (NITROSTAT) 0 4 mg SL tablet Place 1 tablet (0 4 mg total) under the tongue every 5 (five) minutes as needed for chest pain 90 tablet 0   • OneTouch Delica Lancets 46G MISC Use to test blood sugars twice a day 200 each 6   • OneTouch Verio test strip Use as instructed to test blood sugars twice a day 200 each 0   • pantoprazole (PROTONIX) 40 mg tablet Take 1 tablet (40 mg total) by mouth daily before breakfast 90 tablet 0   • semaglutide, 1 mg/dose, (Ozempic, 1 MG/DOSE,) 4 mg/3 mL injection pen Inject 1 mg once a week 9 mL 1   • traMADol (Ultram) 50 mg tablet 1 tab BID prn 60 tablet 1   • Turmeric 500 MG CAPS Take by mouth     • vitamin B-12 (VITAMIN B-12) 1,000 mcg tablet Take by mouth daily     • Vitamin D, Cholecalciferol, 50 MCG (2000 UT) CAPS Take 4,000 Units by mouth daily       No current facility-administered medications for "this visit  Allergies   Allergen Reactions   • Erythromycin Chest Pain     Chest pain as child   • Metronidazole Other (See Comments)     SOB   • Sulfa Antibiotics Shortness Of Breath     Per pt, \"hives and fever\"   • Amoxicillin-Pot Clavulanate Rash     And yeast infection   • Invokana [Canagliflozin] Other (See Comments)     Yeast infection       Objective   Vitals: Blood pressure 118/74, pulse 89, height 5' 2\" (1 575 m), weight 64 3 kg (141 lb 12 8 oz), not currently breastfeeding  Invasive Devices     Peripheral Intravenous Line  Duration           Peripheral IV 02/24/23 Dorsal (posterior); Right Hand 40 days                Physical Exam  Vitals reviewed  Constitutional:       Appearance: Normal appearance  She is well-developed  HENT:      Head: Normocephalic and atraumatic  Eyes:      Conjunctiva/sclera: Conjunctivae normal    Neck:      Thyroid: No thyromegaly  Vascular: No carotid bruit  Comments: Thyroid normal in size  No palpable thyroid nodules  Cardiovascular:      Rate and Rhythm: Normal rate and regular rhythm  Pulses: Normal pulses  no weak pulses          Dorsalis pedis pulses are 2+ on the right side and 2+ on the left side  Posterior tibial pulses are 2+ on the right side and 2+ on the left side  Heart sounds: Normal heart sounds  No murmur heard  Pulmonary:      Effort: Pulmonary effort is normal       Breath sounds: Normal breath sounds  No wheezing  Abdominal:      Palpations: Abdomen is soft  Musculoskeletal:         General: No deformity  Normal range of motion  Cervical back: Normal range of motion and neck supple  Right lower leg: No edema  Left lower leg: No edema  Comments: No tremor of the outstretched hands  No ulcerations of the feet  Feet:      Right foot:      Skin integrity: No ulcer, skin breakdown, erythema, warmth, callus or dry skin        Left foot:      Skin integrity: No ulcer, skin breakdown, erythema, warmth, " callus or dry skin  Lymphadenopathy:      Cervical: No cervical adenopathy  Skin:     General: Skin is warm and dry  Findings: No rash  Neurological:      Mental Status: She is alert and oriented to person, place, and time  Deep Tendon Reflexes: Reflexes are normal and symmetric  Comments: Vibration sensation intact to the first toe DIP joint bilaterally  Microfilament sensation intact bilaterally  Achilles tendon reflexes intact  Patient's shoes and socks removed  Right Foot/Ankle   Right Foot Inspection  Skin Exam: skin normal and skin intact  No dry skin, no warmth, no callus, no erythema, no maceration, no abnormal color, no pre-ulcer, no ulcer and no callus  Toe Exam: No swelling and  no right toe deformity    Sensory   Vibration: intact  Monofilament testing: intact    Vascular  Capillary refills: < 3 seconds  The right DP pulse is 2+  The right PT pulse is 2+  Left Foot/Ankle  Left Foot Inspection  Skin Exam: skin normal and skin intact  No dry skin, no warmth, no erythema, no maceration, normal color, no pre-ulcer, no ulcer and no callus  Toe Exam: No swelling and no left toe deformity  Sensory   Vibration: intact  Monofilament testing: intact    Vascular  Capillary refills: < 3 seconds  The left DP pulse is 2+  The left PT pulse is 2+  Assign Risk Category  No deformity present  No loss of protective sensation  No weak pulses  Risk: 0        The history was obtained from the review of the chart and from the patient      Lab Results:    Most recent Alc is  Lab Results   Component Value Date    HGBA1C 5 6 04/04/2023               Lab Results   Component Value Date    CREATININE 0 59 (L) 04/04/2023    CREATININE 0 72 12/07/2022    CREATININE 0 77 11/11/2022    BUN 6 04/04/2023     09/02/2015    K 4 3 04/04/2023     04/04/2023    CO2 29 04/04/2023     eGFR   Date Value Ref Range Status   04/04/2023 105 ml/min/1 73sq m Final         Lab Results Component Value Date    CHOL 210 08/20/2015    HDL 43 (L) 04/04/2023    TRIG 228 (H) 04/04/2023       Lab Results   Component Value Date    ALT 44 04/04/2023    AST 24 04/04/2023    ALKPHOS 81 04/04/2023    BILITOT 0 38 09/02/2015       Lab Results   Component Value Date    FREET4 1 26 04/04/2023             Future Appointments   Date Time Provider [place]   4/7/2023  8:45 AM Shu Barbara, PT [facility]   4/12/2023 11:45 AM Shu Dunbar, [facility]   4/14/2023  8:15 AM Jacinto Durand, PT [facility]   4/17/2023  8:45 AM Shu Dunbar, PT [facility]   4/20/2023  8:00 AM Shu Barbara, PT [facility]   4/24/2023  8:45 AM Shu Dunbar, PT [facility]   4/27/2023  8:45 AM Shu Dunbar, PT [facility]   5/17/2023  1:00 PM Hithem Rahmi ORTHO [facility]   6/6/2023  8:30 AM Karen Phillips PA-C SP QTN [facility]   6/9/2023  8:30 AM DAVID Campbell WGT MGT CTR [facility]   8/3/2023  4:30 PM Carolin Campos DO MAC MED [facility]   8/23/2023  8:40 AM Rodger Alston MD ENDO [facility] Sp[facility]   11/15/2023  8:00 AM DAVID Hoyos HEM ONC UB [facility]   12/15/2023  8:30 AM MD MAGDALENA Rogers [facility]

## 2023-04-06 ENCOUNTER — APPOINTMENT (OUTPATIENT)
Dept: PHYSICAL THERAPY | Facility: CLINIC | Age: 53
End: 2023-04-06

## 2023-04-07 ENCOUNTER — APPOINTMENT (OUTPATIENT)
Dept: PHYSICAL THERAPY | Facility: CLINIC | Age: 53
End: 2023-04-07

## 2023-04-07 ENCOUNTER — OFFICE VISIT (OUTPATIENT)
Dept: PHYSICAL THERAPY | Facility: CLINIC | Age: 53
End: 2023-04-07

## 2023-04-07 DIAGNOSIS — S46.212A STRAIN OF LEFT BICEPS, INITIAL ENCOUNTER: ICD-10-CM

## 2023-04-07 DIAGNOSIS — S46.212D STRAIN OF LEFT BICEPS, SUBSEQUENT ENCOUNTER: Primary | ICD-10-CM

## 2023-04-07 DIAGNOSIS — S46.212D TRAUMATIC PARTIAL TEAR OF LEFT BICEPS TENDON, SUBSEQUENT ENCOUNTER: ICD-10-CM

## 2023-04-07 NOTE — PROGRESS NOTES
"Daily Note     Today's date: 2023  Patient name: Yosi Holbrook  : 1970  MRN: [id number]  Referring provider: Manju Whitmore PA-C  Dx:   Encounter Diagnosis     ICD-10-CM    1  Strain of left biceps, subsequent encounter  S46 212D       2  Traumatic partial tear of left biceps tendon, subsequent encounter  S46 212D       3  Strain of left biceps, initial encounter  S46 212A                      Subjective: Pt is doing well today and had an apt with the surgeon with clearance to return to strength training but not work yet  She is interested in progressing and has started to lift water bottles at home  Objective: See treatment diary below      Assessment: Pt tolerated her treatment well today with minimal increase in discomfort with the new interventions  She was educated to start completing these strengthening interventions at home as well to improve mobility and tolerance to functional tasks  Plan: Continue per plan of care        Precautions:  Lt biceps repair 23, arom until next follow up      Manuals 3/10 3/13 3/16 3/21 3/24 3/27 3/29 4/3 4/7    Lt elbow rom  FH K[facility] [address]    Scar massage    [facility] [address]                              Neuro Re-Ed             Posture tband         GTB 2x10                                                                                  Ther Ex             UBE         2/2    Pulleys  10x5'' 10x10\" 10x10''     10x    Table Slides  10x 10x          Wall Slides     10xea 10xea 10xea 10x ea     Pendulums HEP            Elbow arom flex/ext, sup/pro HEP 10xea 20xea 30x ea 30xea 30xea 30xea 30x ea 30x ea 2# 30x    Gripping HEP 20x5'' 20x5\" 25# 20x5\" 25# 20x5'' 25# 20x5'' 25# 20x5'' 25# 20x5'' 25#     Wrist flex/ext  yellow bar 20x yellow bar 20x Red bar 20x Red wand 20x Red wand 20x Red wand 20x green 20x 2# 30x     Median nerve flossing   10x                       Ther Activity                                       Gait Training               " Modalities

## 2023-04-24 ENCOUNTER — OFFICE VISIT (OUTPATIENT)
Dept: PHYSICAL THERAPY | Facility: CLINIC | Age: 53
End: 2023-04-24

## 2023-04-24 DIAGNOSIS — S46.212A STRAIN OF LEFT BICEPS, INITIAL ENCOUNTER: ICD-10-CM

## 2023-04-24 DIAGNOSIS — S46.212D STRAIN OF LEFT BICEPS, SUBSEQUENT ENCOUNTER: Primary | ICD-10-CM

## 2023-04-24 DIAGNOSIS — S46.212D TRAUMATIC PARTIAL TEAR OF LEFT BICEPS TENDON, SUBSEQUENT ENCOUNTER: ICD-10-CM

## 2023-04-24 NOTE — PROGRESS NOTES
"Daily Note     Today's date: 2023  Patient name: Polina Frances  : 1970  MRN: [id number]  Referring provider: Daniel Saldaña PA-C  Dx:   Encounter Diagnosis     ICD-10-CM    1  Strain of left biceps, subsequent encounter  S46 212D       2  Traumatic partial tear of left biceps tendon, subsequent encounter  S46 212D       3  Strain of left biceps, initial encounter  M04 505H                      Subjective: pt reports at this time she is doing well with her interventions and feels tired during the session  She is progressing her weights at home and confidence with using her arm  She states she had to make a quick motion with her arm while driving in her car to swerve out of the way and felt a quick pain, but it dissipated  Objective: See treatment diary below      Assessment: Pt is continuing to progress her interventions with tolerance to pushups on a countertop and try different shoulder based interventions in and outside of the clinic  If she continues to progress in this direction she may decrease to 1x/week to use PT as more of a check in  Plan: Continue per plan of care        Precautions:  Lt biceps repair 23    Manuals 4/14 4/17 4/20 4/24  3/27 3/29 4/3 4/7 4/12   Lt elbow rom JLW [address] [place] [address] [facility]  [facility]  [facility]   Scar massage JLW     FH [place] [address] [facility]  [facility]  [facility]                              Neuro Re-Ed             Posture tband 10 4R  2x10 11R 3x10 Uni 6R 3x10 BUE 13R 2x12     GTB 2x10 10R 2x10   Ext  10 4R  2x10 10R 2x10 Uni 4R 3x10 12R BUE 2x12      8R 2x10   Bicep flex 2 8R  2x10 5# 2x10 5# 3x12 HEP      2 5R 2x10   Ball on wall  Green 1 alpha Green 1 alpha Green 1 alpha         Wall walks     RTB 6 laps                                   Ther Ex             UBE 3'/3' 3/3' 3/3' 33'     2/2 3/3   Body blade elbow bent  20\"x3  20\"x3 20\"x3 and IR/ER          [place] [facility]   3x10 2R          [place] [facility]   3x10 3R          BUE cane roll w/ #    1# 3x          Elbow arom flex/ext, sup/pro 4#  sup/pro 5# " "sup/pro    30xea 30x ea 30x ea 2# 30x 4# sup/  pro 30x   Gripping 25#  30     20x5'' 25# 20x5'' 25# 20x5'' 25#     Wrist flex/ext 4#  30 ea HEP    Red wand 20x Red wand 20x green 20x 2# 30x  4# 30x   Bicep stretch doorway    Low 30\"x3          Wrist ext stretch on table  HEP         20\"x3   Cross body horizontal Add  5R 2x10 NV          Ther Activity                                       Gait Training                                       Modalities                                            "

## 2023-04-26 ENCOUNTER — APPOINTMENT (OUTPATIENT)
Dept: PHYSICAL THERAPY | Facility: CLINIC | Age: 53
End: 2023-04-26

## 2023-04-27 ENCOUNTER — APPOINTMENT (OUTPATIENT)
Dept: PHYSICAL THERAPY | Facility: CLINIC | Age: 53
End: 2023-04-27

## 2023-05-01 ENCOUNTER — TELEPHONE (OUTPATIENT)
Dept: GASTROENTEROLOGY | Facility: CLINIC | Age: 53
End: 2023-05-01

## 2023-05-01 NOTE — TELEPHONE ENCOUNTER
lmom for pt- asked billing to make adjustment on account for ultrasound - they adjust what they could- balance is [phone]

## 2023-05-02 ENCOUNTER — TELEPHONE (OUTPATIENT)
Dept: FAMILY MEDICINE CLINIC | Facility: CLINIC | Age: 53
End: 2023-05-02

## 2023-05-02 ENCOUNTER — OFFICE VISIT (OUTPATIENT)
Dept: PHYSICAL THERAPY | Facility: CLINIC | Age: 53
End: 2023-05-02

## 2023-05-02 DIAGNOSIS — S46.212D STRAIN OF LEFT BICEPS, SUBSEQUENT ENCOUNTER: Primary | ICD-10-CM

## 2023-05-02 DIAGNOSIS — S46.212D TRAUMATIC PARTIAL TEAR OF LEFT BICEPS TENDON, SUBSEQUENT ENCOUNTER: ICD-10-CM

## 2023-05-02 DIAGNOSIS — S46.212A STRAIN OF LEFT BICEPS, INITIAL ENCOUNTER: ICD-10-CM

## 2023-05-02 NOTE — PROGRESS NOTES
Daily Note     Today's date: 2023  Patient name: Ajit Baeza  : 1970  MRN: [id number]  Referring provider: Mary Escamilla PA-C  Dx:   Encounter Diagnosis     ICD-10-CM    1  Strain of left biceps, subsequent encounter  S46 212D       2  Traumatic partial tear of left biceps tendon, subsequent encounter  S46 212D       3  Strain of left biceps, initial encounter  S46 212A                      Subjective: Pt reports being a little sore and having an episode of inability to straighten the arm after a long day of carrying things over the weekend  This has not happened since  She is upset that she can not progress passed 5# lifting  One week ago she did notice an increase in swelling in the medial insertion bicipital tendon where it was prior to surgery  Objective: See treatment diary below, tenderness to palpation over medial bicipital insertional tendon with palpable muscle      Assessment: pt is doing well with her interventions but after stretches into extension she notes increased discomfort bending the elbow again  Due to her increase in soreness and pain she was educated to reduce the amount of activities she is doing at home for the next few days  She is achieving full ROM at this time without overpressure  Will monitor her bicep tendon for any signs of re-tearing  Plan: Continue per plan of care        Precautions:  Lt biceps repair 23    Manuals 4/14 4/17 4/20 4/24 5/2  3/29 4/3 4/7 4/12   Lt elbow rom JLW  [facility]   [address]   Scar massage JLW    [address]                              Neuro Re-Ed             Posture tband 10 4R  2x10 11R 3x10 Uni 6R 3x10 BUE 13R 2x12 Uni 7R 2x12    GTB 2x10 10R 2x10   Ext  10 4R  2x10 10R 2x10 Uni 4R 3x10 12R BUE 2x12      8R 2x10   Bicep flex 2 8R  2x10 5# 2x10 5# 3x12 HEP      2 5R 2x10   Ball on wall  Green 1 alpha Green 1 alpha Green 1 alpha Green 1 alpha        Wall walks     RTB 6 laps GTB 5 laps "  Ther Ex             UBE 3'/3' 3/3' 3/3' 3/3' 3/3'    2/2 3/3   Body blade elbow bent  20\"x3  20\"x3 20\"x3 and IR/ER          [place] [facility]   3x10 2R          [facility]   3x10 3R          BUE cane roll w/ #    1# 3x          Elbow arom flex/ext, sup/pro 4#  sup/pro 5# sup/pro     30x ea 30x ea 2# 30x 4# sup/  pro 30x   Gripping 25#  30      20x5'' 25# 20x5'' 25#     Wrist flex/ext 4#  30 ea HEP     Red wand 20x green 20x 2# 30x  4# 30x   Bicep stretch doorway    Low 30\"x3  Low 30\"x3        Wrist ext stretch on table  HEP         20\"x3   Cross body horizontal Add  5R 2x10 NV          Ther Activity                                       Gait Training                                       Modalities                                            "

## 2023-05-02 NOTE — TELEPHONE ENCOUNTER
Received prior auth for tramadol thru [facility] from pharmacy when I went thru covermymeds got this msg        Spoke with [facility] pt was given 7 day supply can get full 30 days 5/6/23

## 2023-05-05 ENCOUNTER — OFFICE VISIT (OUTPATIENT)
Dept: PHYSICAL THERAPY | Facility: CLINIC | Age: 53
End: 2023-05-05

## 2023-05-05 DIAGNOSIS — S46.212D STRAIN OF LEFT BICEPS, SUBSEQUENT ENCOUNTER: Primary | ICD-10-CM

## 2023-05-05 DIAGNOSIS — S46.212D TRAUMATIC PARTIAL TEAR OF LEFT BICEPS TENDON, SUBSEQUENT ENCOUNTER: ICD-10-CM

## 2023-05-05 DIAGNOSIS — S46.212A STRAIN OF LEFT BICEPS, INITIAL ENCOUNTER: ICD-10-CM

## 2023-05-05 NOTE — PROGRESS NOTES
"Daily Note     Today's date: 2023  Patient name: Quyen Clarke  : 1970  MRN: [id number]  Referring provider: Jelani Wolf PA-C  Dx:   Encounter Diagnosis     ICD-10-CM    1  Strain of left biceps, subsequent encounter  S46 212D       2  Traumatic partial tear of left biceps tendon, subsequent encounter  S46 212D       3  Strain of left biceps, initial encounter  S46 212A                      Subjective: Pt reports at this time she is feeling better than last visit with less soreness and pain  She is doing well but is holding off of interventions to ensure she does not cause pain  Objective: See treatment diary below      Assessment: Pt did not complete any interventions that focused on bending the elbow  She was educated to hold off on this till next visit  She should continue to ensure that it does not stiffen on her  The swelling and tenderness is reduced since last visit  Plan: Continue per plan of care        Precautions:  Lt biceps repair 23    Manuals 4/14 4/17 4/20 4/24 5/2 5/5   4/3 4/7 4/12   Lt elbow rom JLW [address]   [address]   [address]   Scar massage JLW    [address]    [address]                              Neuro Re-Ed             Posture tband 10 4R  2x10 11R 3x10 Uni 6R 3x10 BUE 13R 2x12 Uni 7R 2x12    GTB 2x10 10R 2x10   Ext  10 4R  2x10 10R 2x10 Uni 4R 3x10 12R BUE 2x12      8R 2x10   Bicep flex 2 8R  2x10 5# 2x10 5# 3x12 HEP      2 5R 2x10   Ball on wall  Green 1 alpha Green 1 alpha Green 1 alpha Green 1 alpha Green 20x       Wall walks     RTB 6 laps GTB 5 laps        Wrist flex/ext      3# 2x10                     Ther Ex             UBE 3'/3' 3/3' 3/3' 3/3' 3/3' 5' FWD    2/2 3/3   Body blade elbow bent  20\"x3  20\"x3 20\"x3 and IR/ER          [facility]   3x10 2R   3x10 2R        [facility]   3x10 3R          BUE cane roll w/ #    1# 3x          Elbow arom flex/ext, sup/pro 4#  sup/pro 5# sup/pro      30x ea 2# 30x 4# sup/  pro 30x   Gripping 25#  30       20x5'' 25#     Wrist " "flex/ext 4#  30 ea HEP      green 20x 2# 30x  4# 30x   Bicep stretch doorway    Low 30\"x3  Low 30\"x3        Wrist ext stretch on table  HEP         20\"x3   Cross body horizontal Add  5R 2x10 NV          Ther Activity                                       Gait Training                                       Modalities                                            "

## 2023-05-16 ENCOUNTER — EVALUATION (OUTPATIENT)
Dept: PHYSICAL THERAPY | Facility: CLINIC | Age: 53
End: 2023-05-16

## 2023-05-16 DIAGNOSIS — S46.212D STRAIN OF LEFT BICEPS, SUBSEQUENT ENCOUNTER: Primary | ICD-10-CM

## 2023-05-16 DIAGNOSIS — S46.212D TRAUMATIC PARTIAL TEAR OF LEFT BICEPS TENDON, SUBSEQUENT ENCOUNTER: ICD-10-CM

## 2023-05-16 NOTE — PROGRESS NOTES
PT Re-Evaluation     Today's date: 2023  Patient name: Makenzie Merida  : 1970  MRN: [id number]  Referring provider: Luis Manuel Payne PA-C  Dx:   Encounter Diagnosis     ICD-10-CM    1  Strain of left biceps, subsequent encounter  S46 212D       2  Traumatic partial tear of left biceps tendon, subsequent encounter  S46 212D                      Assessment  Assessment details: Makenzie Merida has demonstrated overall improvement in ROM, strength, function, and a reduction in pain  Currently, she has made steady progress towards her goals, but continues to remain limited with lifting >5#s and completing work activities  At this time, skilled physical therapy is warranted to address the remaining impairments and aide in return to full function with reduction in discomfort  Limiting factors to therapy none and she demonstrates great motivation  Impairments: abnormal or restricted ROM, activity intolerance, impaired physical strength, lacks appropriate home exercise program and pain with function    Symptom irritability: lowUnderstanding of Dx/Px/POC: good   Prognosis: good    Goals  1  Pt will improve Lt elbow rom to WNL and painfree  2  Pt will improve Lt elbow strength to 5/5 to allow lifting without pain  3  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  4  Pt will be independent with HEP upon discharge      Plan  Patient would benefit from: skilled physical therapy  Referral necessary: No  Planned modality interventions: cryotherapy, TENS and thermotherapy: hydrocollator packs  Planned therapy interventions: functional ROM exercises, therapeutic activities, therapeutic exercise, therapeutic training, stretching, strengthening, home exercise program, neuromuscular re-education, manual therapy and patient education  Frequency: 2x week  Duration in weeks: 8  Treatment plan discussed with: patient        Subjective Evaluation    History of Present Illness  Mechanism of injury: Pt is now 11 weeks s/p distal bicep repair  She is moving more and can complete most functional activities at home  Still having discomfort at the distal insertion of the biceps  She has had a couple incidents where she needed to move the arm quickly and had increased discomfort after  She is consistently completing her interventions at home and is trying to progress her tolerance to interventions such as lifting endurance            Not a recurrent problem   Quality of life: good    Pain  Current pain ratin  At best pain ratin  At worst pain ratin  Location: anterior forearm distal and proximally to incision   Quality: dull ache  Relieving factors: rest and ice  Aggravating factors: overhead activity and lifting  Progression: improved    Treatments  Previous treatment: physical therapy  Patient Goals  Patient goals for therapy: increased strength, return to work, independence with ADLs/IADLs, decreased pain and increased motion          Objective     Observations     Additional Observation Details  Incision fully healed, minor swelling noted at distal attachment     Active Range of Motion     Left Elbow   Flexion: 150 degrees   Extension: 0 degrees   Forearm supination: 70 degrees   Forearm pronation: 60 degrees     Right Elbow   Normal active range of motion    Strength/Myotome Testing     Left Elbow   Flexion: 3+  Extension: 3+  Forearm supination: 3+  Forearm pronation: 3+    Additional Strength Details  Tested limited, not painful              Precautions:  Lt biceps repair 23    Manuals    Lt elbow rom [facility]  [facility]    [facility]      [facility]   Scar massage [facility]     [facility]       [facility]                              Neuro Re-Ed             Posture tband 10 4R  2x10 11R 3x10 Uni 6R 3x10 BUE 13R 2x12 Uni 7R 2x12  10R 2x12   10R 2x10   Ext  10 4R  2x10 10R 2x10 Uni 4R 3x10 12R BUE 2x12   10R 2x12   8R 2x10   Bicep flex 2 8R  2x10 5# 2x10 5# 3x12 HEP      2 5R 2x10   Ball on wall  Green 1 "alpha Green 1 alpha Green 1 alpha Green 1 alpha Green 20x       Wall walks     RTB 6 laps GTB 5 laps        Wrist flex/ext      3# 2x10                     Ther Ex             UBE 3'/3' 3/3' 3/3' 3/3' 3/3' 5' FWD  3/3   3/3   Body blade elbow bent  20\"x3  20\"x3 20\"x3 and IR/ER          [facility]   3x10 2R   3x10 2R  3x10 2R       [facility]   3x10 3R          BUE cane roll w/ #    1# 3x          Elbow arom flex/ext, sup/pro 4#  sup/pro 5# sup/pro     eccentric flexion and sup/pro 4# 15x   4# sup/  pro 30x   Gripping 25#  30            Wrist flex/ext 4#  30 ea HEP        4# 30x   Bicep stretch doorway    Low 30\"x3  Low 30\"x3        Wrist ext stretch on table  HEP         20\"x3   Cross body horizontal Add  5R 2x10 NV          Ther Activity                                       Gait Training                                       Modalities                                              "

## 2023-05-17 ENCOUNTER — OFFICE VISIT (OUTPATIENT)
Dept: OBGYN CLINIC | Facility: CLINIC | Age: 53
End: 2023-05-17

## 2023-05-17 VITALS — HEART RATE: 84 BPM | DIASTOLIC BLOOD PRESSURE: 79 MMHG | SYSTOLIC BLOOD PRESSURE: 122 MMHG

## 2023-05-17 DIAGNOSIS — R07.89 OTHER CHEST PAIN: ICD-10-CM

## 2023-05-17 DIAGNOSIS — S46.212D TRAUMATIC PARTIAL TEAR OF LEFT BICEPS TENDON, SUBSEQUENT ENCOUNTER: Primary | ICD-10-CM

## 2023-05-17 DIAGNOSIS — I10 ESSENTIAL HYPERTENSION: ICD-10-CM

## 2023-05-17 DIAGNOSIS — R07.9 CHEST PAIN, UNSPECIFIED TYPE: ICD-10-CM

## 2023-05-17 DIAGNOSIS — K58.9 IRRITABLE BOWEL SYNDROME WITHOUT DIARRHEA: ICD-10-CM

## 2023-05-17 DIAGNOSIS — F32.A DEPRESSION, UNSPECIFIED DEPRESSION TYPE: ICD-10-CM

## 2023-05-17 RX ORDER — DICYCLOMINE HCL 20 MG
20 TABLET ORAL EVERY 6 HOURS PRN
Qty: 60 TABLET | Refills: 0 | Status: SHIPPED | OUTPATIENT
Start: 2023-05-17

## 2023-05-17 RX ORDER — ESCITALOPRAM OXALATE 20 MG/1
20 TABLET ORAL DAILY
Qty: 90 TABLET | Refills: 0 | Status: SHIPPED | OUTPATIENT
Start: 2023-05-17

## 2023-05-17 NOTE — PROGRESS NOTES
[address] [address] [facility] [address]  [address]  [id number]  1970    ORTHOPAEDIC SURGERY OUTPATIENT NOTE  2023      HISTORY:  46 y o  female presents for postop evaluation status post left distal biceps repair performed on 2023  She has been compliant with physical therapy and feels that she is doing well  She reports feeling some weakness with supination  SANE score 75%  Past Medical History:   Diagnosis Date   • Coronary artery disease    • Diabetes mellitus ([address] )     Borderline   • Disease of thyroid gland    • DUB (dysfunctional uterine bleeding)    • Fatty liver    • Hashimoto's thyroiditis     Last Assessed:  14   • History of stomach ulcers    • Hypertension    • Hypothyroidism    • Irritable bowel syndrome     Last Assessed:  3/25/14   • Liver disease     elevated enzymes   • Myocardial infarction ([facility] )     2017   • GIANG (nonalcoholic steatohepatitis)    • Ovarian cyst     left   • Psychogenic polydipsia     Has had hyponatremia from drinking too much water in the past        Past Surgical History:   Procedure Laterality Date   • ANGIOPLASTY     • CARDIAC CATHETERIZATION     •  SECTION      X 2   • CHOLECYSTECTOMY     • COLONOSCOPY     • CYSTOSCOPY N/A 2019    Procedure: CYSTOSCOPY;  Surgeon: Lisa Dumont MD;  Location: [facility];  Service: Gynecology Oncology   • HYSTERECTOMY     • IR BIOPSY LIVER MASS  2020   • OOPHORECTOMY Right    • CT ESOPHAGOGASTRODUODENOSCOPY TRANSORAL DIAGNOSTIC N/A 2018    Procedure: ESOPHAGOGASTRODUODENOSCOPY (EGD); Surgeon: Chuy Leija MD;  Location: [facility];   Service: Gastroenterology   • CT LAPS TOTAL HYSTERECT 250 GM/< W/RMVL TUBE/OVARY N/A 2019    Procedure: TOTAL LAPAROSCOPIC HYSTERECTOMY, BILATERAL SALPINGECTOMY, LEFT OOPHORECTOMY;  Surgeon: Lisa Dumont MD;  Location: [facility];  Service: Gynecology Oncology   • SC RINSJ RPTD BICEPS/TRICEPS TDN DSTL W/WO TDN GRF Left 2023    Procedure: REPAIR TENDON BICEPS, distal;  Surgeon: Michelle Rush;  Location:  MAIN OR;  Service: Orthopedics   • SINUS SURGERY     • TONSILLECTOMY     • UPPER GASTROINTESTINAL ENDOSCOPY         Social History     Socioeconomic History   • Marital status: /Civil Union     Spouse name: Not on file   • Number of children: Not on file   • Years of education: Not on file   • Highest education level: Not on file   Occupational History   • Not on file   Tobacco Use   • Smoking status: Former     Packs/day: 0 50     Years: 4 00     Pack years: 2 00     Types: Cigarettes     Quit date: 2016     Years since quittin 2   • Smokeless tobacco: Never   • Tobacco comments:     2016   Vaping Use   • Vaping Use: Never used   Substance and Sexual Activity   • Alcohol use: Never   • Drug use: No   • Sexual activity: Yes     Partners: Male     Birth control/protection: None, Female Sterilization   Other Topics Concern   • Not on file   Social History Narrative    Feels safe at home     Social Determinants of Health     Financial Resource Strain: Not on file   Food Insecurity: Not on file   Transportation Needs: Not on file   Physical Activity: Not on file   Stress: Not on file   Social Connections: Not on file   Intimate Partner Violence: Not on file   Housing Stability: Not on file       Family History   Problem Relation Age of Onset   • Pancreatic cancer Mother    • Hypertension Father    • Diabetes type II Father    • Lung disease Daughter         asthma tendencies   • Diabetes Maternal Grandmother    • Diabetes Paternal Grandmother    • Heart disease Paternal Grandmother    • Hypothyroidism Paternal Grandmother    • Diabetes type II Brother    • No Known Problems Brother    • No Known Problems Son    • Liver cancer Maternal Aunt    • Melanoma Maternal Aunt    • Hypothyroidism Paternal Uncle    • Hyperlipidemia Neg Hx    • Stroke Neg Hx         Patient's Medications   New Prescriptions    No medications on file   Previous Medications    AMLODIPINE (NORVASC) 5 MG TABLET    Take 1 tablet (5 mg total) by mouth daily    BLOOD GLUCOSE MONITORING SUPPL ([employer] Comp IQ SYSTEM) W/DEVICE KIT    Use to test blood sugars twice a day    DICYCLOMINE (BENTYL) 20 MG TABLET    Take 1 tablet (20 mg total) by mouth every 6 (six) hours as needed (every 6 hours)    ESCITALOPRAM (LEXAPRO) 20 MG TABLET    Take 1 tablet (20 mg total) by mouth daily    ICOSAPENT ETHYL (VASCEPA) 1 G CAPS    2 capsules twice a day    INSULIN PEN NEEDLE 32G X 4 MM MISC    Use once a week    LACTOBACILLUS (PROBIOTIC ACIDOPHILUS PO)    Take by mouth daily    LEVOTHYROXINE 100 MCG TABLET    Take 1 tablet (100 mcg total) by mouth daily    MAGNESIUM 300 MG CAPS    Take 600 mg by mouth daily    METFORMIN (GLUCOPHAGE-XR) 500 MG 24 HR TABLET    Take 2 tablets (1,000 mg total) by mouth 2 (two) times a day with meals    METHOCARBAMOL (ROBAXIN) 750 MG TABLET    1 tab PO HS     METOPROLOL SUCCINATE (TOPROL-XL) 25 MG 24 HR TABLET    Take 1 tablet (25 mg total) by mouth 2 (two) times a day    MULTIPLE VITAMINS-MINERALS (ZINC PO)    Take by mouth    MUPIROCIN (BACTROBAN) 2 % OINTMENT    Apply topically 3 (three) times a day    NITROGLYCERIN (NITROSTAT) 0 4 MG SL TABLET    Place 1 tablet (0 4 mg total) under the tongue every 5 (five) minutes as needed for chest pain    ONETOUCH DELICA LANCETS 50Z MISC    Use to test blood sugars twice a day    ONETOUCH VERIO TEST STRIP    Use as instructed to test blood sugars twice a day    PANTOPRAZOLE (PROTONIX) 40 MG TABLET    Take 1 tablet (40 mg total) by mouth daily before breakfast    SEMAGLUTIDE, 1 MG/DOSE, (OZEMPIC, 1 MG/DOSE,) 4 MG/3 ML INJECTION PEN    Inject 1 mg once a week    TRAMADOL (ULTRAM) 50 MG TABLET    1 tab BID prn    TURMERIC 500 MG CAPS    Take by mouth    VITAMIN B-12 (VITAMIN B-12) 1,000 MCG TABLET    Take by mouth daily "VITAMIN D, CHOLECALCIFEROL, 50 MCG (2000 UT) CAPS    Take 4,000 Units by mouth daily   Modified Medications    No medications on file   Discontinued Medications    No medications on file       Allergies   Allergen Reactions   • Erythromycin Chest Pain     Chest pain as child   • Metronidazole Other (See Comments)     SOB   • Sulfa Antibiotics Shortness Of Breath     Per pt, \"hives and fever\"   • Amoxicillin-Pot Clavulanate Rash     And yeast infection   • Invokana [Canagliflozin] Other (See Comments)     Yeast infection        /79 (BP Location: Right arm, Patient Position: Sitting, Cuff Size: Adult)   Pulse 84   LMP  (LMP Unknown)      REVIEW OF SYSTEMS:  Constitutional: Negative  HEENT: Negative  Respiratory: Negative  Skin: Negative  Neurological: Negative  Psychiatric/Behavioral: Negative  Musculoskeletal: Negative except for that mentioned in the HPI  /79 (BP Location: Right arm, Patient Position: Sitting, Cuff Size: Adult)   Pulse 84   LMP  (LMP Unknown)   Gen: No acute distress, resting comfortably in bed  HEENT: Eyes clear, moist mucus membranes, hearing intact  Respiratory: No audible wheezing or stridor  Cardiovascular: Well Perfused peripherally, 2+ distal pulse  Abdomen: nondistended, no peritoneal signs     PHYSICAL EXAM:    Left elbow:  Incision is well-healed  Biceps tendon intact  Elbow range of motion 0-140  5/5 strength with supination  5/5 with pronation  5/5 flexion  5/5 extension  Sensation intact throughout the left upper extremity    IMAGING: No new imaging    ASSESSMENT AND PLAN:  46 y o  female who is now 3-month status post left distal biceps repair  She is doing well and will continue to progress with strength with physical therapy  She may advance as tolerated without restrictions    "

## 2023-05-17 NOTE — LETTER
May 17, 2023     Patient: Laisha Gallegos  YOB: 1970  Date of Visit: 5/17/2023      To Whom it May Concern:    Eli Mensah is under my professional care  Tara Jauregui was seen in my office on 5/17/2023  Tara Jauregui may return to work without restrictions  If you have any questions or concerns, please don't hesitate to call           Sincerely,          Yvonne Modi        CC: No Recipients

## 2023-05-18 ENCOUNTER — OFFICE VISIT (OUTPATIENT)
Dept: PHYSICAL THERAPY | Facility: CLINIC | Age: 53
End: 2023-05-18

## 2023-05-18 DIAGNOSIS — S46.212D TRAUMATIC PARTIAL TEAR OF LEFT BICEPS TENDON, SUBSEQUENT ENCOUNTER: ICD-10-CM

## 2023-05-18 DIAGNOSIS — S46.212D STRAIN OF LEFT BICEPS, SUBSEQUENT ENCOUNTER: Primary | ICD-10-CM

## 2023-05-18 DIAGNOSIS — S46.212A STRAIN OF LEFT BICEPS, INITIAL ENCOUNTER: ICD-10-CM

## 2023-05-18 RX ORDER — NITROGLYCERIN 0.4 MG/1
0.4 TABLET SUBLINGUAL
Qty: 90 TABLET | Refills: 0 | Status: SHIPPED | OUTPATIENT
Start: 2023-05-18

## 2023-05-18 RX ORDER — METOPROLOL SUCCINATE 25 MG/1
25 TABLET, EXTENDED RELEASE ORAL 2 TIMES DAILY
Qty: 180 TABLET | Refills: 0 | Status: SHIPPED | OUTPATIENT
Start: 2023-05-18

## 2023-05-18 RX ORDER — AMLODIPINE BESYLATE 5 MG/1
5 TABLET ORAL DAILY
Qty: 90 TABLET | Refills: 0 | Status: SHIPPED | OUTPATIENT
Start: 2023-05-18

## 2023-05-18 NOTE — PROGRESS NOTES
"Daily Note     Today's date: 2023  Patient name: Catherine Menard  : 1970  MRN: [id number]  Referring provider: Irving Sweeney PA-C  Dx:   Encounter Diagnosis     ICD-10-CM    1  Strain of left biceps, subsequent encounter  S46 212D       2  Traumatic partial tear of left biceps tendon, subsequent encounter  S46 212D       3  Strain of left biceps, initial encounter  U33 [id number]                      Subjective: Pt reports she was cleared by her surgeon yesterday without any restrictions  She is allowed to return to work on Monday  Objective: See treatment diary below      Assessment: Pt was taken through higher level interventions to focus on functional activities and building strength  She felt very comfortable rolling myself in bed and wheeling a table around the clinic  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  Plan: Continue per plan of care        Precautions:  Lt biceps repair 23    Manuals    Lt elbow rom  [facility]    [facility]      [facility]   Scar massage J[facility]     [facility]       [facility]                              Neuro Re-Ed             Posture tband 10 4R  2x10 11R 3x10 Uni 6R 3x10 BUE 13R 2x12 Uni 7R 2x12  10R 2x12 Uni 8R 3x8  10R 2x10   Ext  10 4R  2x10 10R 2x10 Uni 4R 3x10 12R BUE 2x12   10R 2x12 8R 2x12 uni  8R 2x10   Bicep flex 2 8R  2x10 5# 2x10 5# 3x12 HEP      2 5R 2x10   Ball on wall  Green 1 alpha Green 1 alpha Green 1 alpha Green 1 alpha Green 20x  elbow bent red 1     Wall walks     RTB 6 laps GTB 5 laps        Wrist flex/ext      3# 2x10                     Ther Ex             UBE 3'/3' 3/3' 3/3' 3/3' 3/3' 5' FWD  3/3 4' stand bw  3/3   Body blade elbow bent  20\"x3  20\"x3 20\"x3 and IR/ER     20\"x3     [facility]   3x10 2R   3x10 2R  3x10 2R       [facility] [facility]   3x10 3R          BUE cane roll w/ #    1# 3x          Elbow arom flex/ext, sup/pro 4#  sup/pro 5# sup/pro     eccentric flexion and sup/pro 4# 15x   4# sup/  pro 30x   Elbow " "flex       8# 3x8      Preacher curl propped table       5# 3x8      Bicep stretch doorway    Low 30\"x3  Low 30\"x3        Wrist ext stretch on table  HEP         20\"x3   Cross body horizontal Add  5R 2x10 NV          Ther Activity             Pulling table        4x w/ pt 2x w/      Rolling patient       2x      Gait Training                                       Modalities                                                "

## 2023-05-25 ENCOUNTER — OFFICE VISIT (OUTPATIENT)
Dept: PHYSICAL THERAPY | Facility: CLINIC | Age: 53
End: 2023-05-25

## 2023-05-25 DIAGNOSIS — S46.212D STRAIN OF LEFT BICEPS, SUBSEQUENT ENCOUNTER: Primary | ICD-10-CM

## 2023-05-25 DIAGNOSIS — S46.212D TRAUMATIC PARTIAL TEAR OF LEFT BICEPS TENDON, SUBSEQUENT ENCOUNTER: ICD-10-CM

## 2023-05-25 DIAGNOSIS — S46.212A STRAIN OF LEFT BICEPS, INITIAL ENCOUNTER: ICD-10-CM

## 2023-05-25 NOTE — PROGRESS NOTES
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  She is worried about when her coworker will be going out for vacation and she will require to do more lifting  She is going to reach out to surgeons office  Objective: See treatment diary below      Assessment: Pt worked on strength training and improving mobility and continued endurance to return to full function at work  She would benefit from continued 2x/week to ensure progression  Plan: Continue per plan of care        Precautions:  Lt biceps repair 23    Manuals     Lt elbow rom  [facility]   [facility]        Scar massage JLW     [facility]                                   Neuro Re-Ed             Posture tband 10 4R  2x10 11R 3x10 Uni 6R 3x10 BUE 13R 2x12 Uni 7R 2x12  10R 2x12 Uni 8R 3x8 Uni 8R 3x10    Ext  10 4R  2x10 10R 2x10 Uni 4R 3x10 12R BUE 2x12   10R 2x12 8R 2x12 uni Uni 8R 3x10    Bicep flex 2 8R  2x10 5# 2x10 5# 3x12 HEP         Ball on wall  Green 1 alpha Green 1 alpha Green 1 alpha Green 1 alpha Green 20x  elbow bent red 1 elbow bent red 2    Wall walks     RTB 6 laps GTB 5 laps        Wrist flex/ext      3# 2x10        Shoulder elevationw/ wrist flex/ext w/ cane and AW         4# 3x30\"     Ther Ex             UBE 3'/3' 3/3' 3/3' 3/3' 33' 5' FWD  3/3 4' stand bw 3/3     Body blade elbow bent  20\"x3  20\"x3 20\"x3 and IR/ER     20\"x3 20\"x3 and IR/ER    [facility]   3x10 2R   3x10 2R  3x10 2R       [facility]   3x10 3R          BUE cane roll w/ #    1# 3x          Elbow arom " "flex/ext, sup/pro 4#  sup/pro 5# sup/pro     eccentric flexion and sup/pro 4# 15x      Elbow flex       8# 3x8  8# 3x10 f    Preacher curl propped table       5# 3x8      Bicep stretch doorway    Low 30\"x3  Low 30\"x3        Wrist ext stretch on table  HEP            Cross body horizontal Add  5R 2x10 NV          Ther Activity             Pulling table        4x w/ pt 2x w/      Rolling patient       2x      Gait Training                                       Modalities                                                  "

## 2023-05-31 ENCOUNTER — APPOINTMENT (OUTPATIENT)
Dept: PHYSICAL THERAPY | Facility: CLINIC | Age: 53
End: 2023-05-31
Payer: OTHER MISCELLANEOUS

## 2023-06-01 ENCOUNTER — OFFICE VISIT (OUTPATIENT)
Dept: PHYSICAL THERAPY | Facility: CLINIC | Age: 53
End: 2023-06-01

## 2023-06-01 DIAGNOSIS — S46.212D STRAIN OF LEFT BICEPS, SUBSEQUENT ENCOUNTER: Primary | ICD-10-CM

## 2023-06-01 DIAGNOSIS — S46.212D TRAUMATIC PARTIAL TEAR OF LEFT BICEPS TENDON, SUBSEQUENT ENCOUNTER: ICD-10-CM

## 2023-06-01 DIAGNOSIS — S46.212A STRAIN OF LEFT BICEPS, INITIAL ENCOUNTER: ICD-10-CM

## 2023-06-01 NOTE — PROGRESS NOTES
"Daily Note     Today's date: 2023  Patient name: Dre Noland  : 1970  MRN: [id number]  Referring provider: Gisella Harvey PA-C  Dx:   Encounter Diagnosis     ICD-10-CM    1  Strain of left biceps, subsequent encounter  S46 212D       2  Traumatic partial tear of left biceps tendon, subsequent encounter  S46 212D       3  Strain of left biceps, initial encounter  S46 212A                      Subjective: Pt reports doing really well today and has been completing more activities at work than she had previously  She understands she needs to continue moving for the pain to dissipate and be able to feel comfortable again  Objective: See treatment diary below      Assessment: Pt did well today with ability to progress tolerance to lifting and attempt knee pushups with some difficulty  She is doing well with her interventions  She should continue to progress her tolerance to higher level weight lifting and 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  Plan: Continue per plan of care        Precautions:  Lt biceps repair 23    Manuals    Lt elbow rom  [address]   [address]        Scar massage JLW    [address]                                   Neuro Re-Ed             Posture tband 10 4R  2x10 11R 3x10 Uni 6R 3x10 BUE 13R 2x12 Uni 7R 2x12  10R 2x12 Uni 8R 3x8 Uni 8R 3x10 9R 2x12   Ext  10 4R  2x10 10R 2x10 Uni 4R 3x10 12R BUE 2x12   10R 2x12 8R 2x12 uni Uni 8R 3x10 7R 2x12   Bicep flex 2 8R  2x10 5# 2x10 5# 3x12 HEP      10# 2x8   Ball on wall  Green 1 alpha Green 1 alpha Green 1 alpha Green 1 alpha Green 20x  elbow bent red 1 elbow bent red 2 elbow bent red 2   Wall walks     RTB 6 laps GTB 5 laps        Wrist flex/ext      3# 2x10        Shoulder elevationw/ wrist flex/ext w/ cane and AW         4# 3x30\"     Ther Ex             UBE 3'/3' 3/3' 3/3' 3/3' 3/3' 5' FWD  3/3 4' stand bw 3/3  3/3    Body blade elbow " "bent  20\"x3  20\"x3 20\"x3 and IR/ER     20\"x3 20\"x3 and IR/ER 20\"x3 and IR/ER and flex/ext at 90   [facility]   3x10 2R   3x10 2R  3x10 2R    3x10 3R    [facility]   3x10 3R          BUE cane roll w/ #    1# 3x          Elbow arom flex/ext, sup/pro 4#  sup/pro 5# sup/pro     eccentric flexion and sup/pro 4# 15x      Elbow flex       8# 3x8  8# 3x10 f    Preacher curl propped table       5# 3x8      Bicep stretch doorway    Low 30\"x3  Low 30\"x3        Wrist ext stretch on table  HEP            Cross body horizontal Add  5R 2x10 NV          Ther Activity             Pulling table        4x w/ pt 2x w/      Rolling patient       2x      Gait Training                                       Modalities                                                    "

## 2023-06-02 DIAGNOSIS — R74.8 ELEVATED LIVER ENZYMES: Primary | ICD-10-CM

## 2023-06-06 ENCOUNTER — OFFICE VISIT (OUTPATIENT)
Dept: PAIN MEDICINE | Facility: CLINIC | Age: 53
End: 2023-06-06
Payer: COMMERCIAL

## 2023-06-06 VITALS
SYSTOLIC BLOOD PRESSURE: 118 MMHG | TEMPERATURE: 97.9 F | HEART RATE: 72 BPM | BODY MASS INDEX: 25.95 KG/M2 | HEIGHT: 62 IN | DIASTOLIC BLOOD PRESSURE: 70 MMHG | WEIGHT: 141 LBS

## 2023-06-06 DIAGNOSIS — G89.4 CHRONIC PAIN SYNDROME: ICD-10-CM

## 2023-06-06 DIAGNOSIS — M54.50 CHRONIC LOW BACK PAIN WITHOUT SCIATICA, UNSPECIFIED BACK PAIN LATERALITY: ICD-10-CM

## 2023-06-06 DIAGNOSIS — G89.29 CHRONIC LOW BACK PAIN WITHOUT SCIATICA, UNSPECIFIED BACK PAIN LATERALITY: ICD-10-CM

## 2023-06-06 DIAGNOSIS — M79.18 MYOFASCIAL PAIN SYNDROME: ICD-10-CM

## 2023-06-06 DIAGNOSIS — M47.816 LUMBAR SPONDYLOSIS: ICD-10-CM

## 2023-06-06 DIAGNOSIS — M54.16 LUMBAR RADICULOPATHY: ICD-10-CM

## 2023-06-06 PROCEDURE — 99214 OFFICE O/P EST MOD 30 MIN: CPT | Performed by: PHYSICIAN ASSISTANT

## 2023-06-06 RX ORDER — TRAMADOL HYDROCHLORIDE 50 MG/1
TABLET ORAL
Qty: 60 TABLET | Refills: 3 | Status: SHIPPED | OUTPATIENT
Start: 2023-06-06

## 2023-06-06 RX ORDER — METHOCARBAMOL 750 MG/1
TABLET, FILM COATED ORAL
Qty: 90 TABLET | Refills: 1 | Status: SHIPPED | OUTPATIENT
Start: 2023-06-06

## 2023-06-06 NOTE — PROGRESS NOTES
Assessment:  1  Chronic low back pain without sciatica, unspecified back pain laterality    2  Lumbar radiculopathy    3  Lumbar spondylosis    4  Myofascial pain syndrome    5  Chronic pain syndrome        Plan:  While the patient was in the office today, I did have a thorough conversation regarding their chronic pain syndrome, medication management, and treatment plan options  The patient remains clinically stable and well-controlled on current medication regimen of methocarbamol 750 mg nightly and tramadol 50 mg twice daily  Reports greater than 50% reduction in pain and without side effects  She continues to follow-up with her specialists regarding her other medical conditions and has lost a significant amount of weight with the Ozempic  She would like us to take over prescribing the tramadol therefore today we did review the opioid contract and is aware of the random drug screens that need to be performed      [place] Prescription Drug Monitoring Program report was reviewed and was appropriate     There are risks associated with opioid medications, including dependence, addiction and tolerance  The patient understands and agrees to use these medications only as prescribed  Potential side effects of the medications include, but are not limited to, constipation, drowsiness, addiction, impaired judgment and risk of fatal overdose if not taken as prescribed  The patient was warned against driving while taking sedation medications  Sharing medications is a felony  At this point in time, the patient is showing no signs of addiction, abuse, diversion or suicidal ideation  The patient will follow-up in 4 months for medication prescription refill and reevaluation  The patient was advised to contact the office should their symptoms worsen in the interim  The patient was agreeable and verbalized an understanding  History of Present Illness:     The patient is a 46 y o  female last seen on 12/20/2022 who presents for a follow up office visit in regards to chronic pain  The patient currently reports chronic back pain rates a 2-10 on the pain scale and describes it as an intermittent dull, aching, throbbing and pressure-like pain with referred pain patterns into bilateral hips and buttocks  She is very well controlled on the current medication regimen of tramadol 50 mg twice daily and Robaxin-750 milligrams nightly  The patient reports that this regimen is providing 90% pain relief  The patient is reporting no side effects from this pain medication regimen  Pain Contract Signed: 6/06/23    I have personally reviewed and/or updated the patient's past medical history, past surgical history, family history, social history, current medications, allergies, and vital signs today  Review of Systems:    Review of Systems   Respiratory: Negative for shortness of breath  Cardiovascular: Negative for chest pain  Gastrointestinal: Negative for constipation, diarrhea, nausea and vomiting  Musculoskeletal: Negative for arthralgias, gait problem, joint swelling and myalgias  Skin: Negative for rash  Neurological: Negative for dizziness, seizures and weakness  All other systems reviewed and are negative          Past Medical History:   Diagnosis Date   • Coronary artery disease    • Diabetes mellitus ([facility] [address] )     Borderline   • Disease of thyroid gland    • DUB (dysfunctional uterine bleeding)    • Fatty liver    • Hashimoto's thyroiditis     Last Assessed:  8/19/14   • History of stomach ulcers    • Hypertension    • Hypothyroidism    • Irritable bowel syndrome     Last Assessed:  3/25/14   • Liver disease     elevated enzymes   • Myocardial infarction [facility])     2017   • GIANG (nonalcoholic steatohepatitis)    • Ovarian cyst     left   • Psychogenic polydipsia     Has had hyponatremia from drinking too much water in the past        Past Surgical History:   Procedure Laterality Date   • ANGIOPLASTY     • CARDIAC CATHETERIZATION     •  SECTION      X 2   • CHOLECYSTECTOMY     • COLONOSCOPY     • CYSTOSCOPY N/A 2019    Procedure: CYSTOSCOPY;  Surgeon: Antonietta Maria MD;  Location: [facility];  Service: Gynecology Oncology   • HYSTERECTOMY     • IR BIOPSY LIVER MASS  2020   • OOPHORECTOMY Right    • CA ESOPHAGOGASTRODUODENOSCOPY TRANSORAL DIAGNOSTIC N/A 2018    Procedure: ESOPHAGOGASTRODUODENOSCOPY (EGD); Surgeon: Wende Landau, MD;  Location: [facility];   Service: Gastroenterology   • CA LAPS TOTAL HYSTERECT 250 GM/< W/RMVL TUBE/OVARY N/A 2019    Procedure: TOTAL LAPAROSCOPIC HYSTERECTOMY, BILATERAL SALPINGECTOMY, LEFT OOPHORECTOMY;  Surgeon: Antonietta Maria MD;  Location[facility];  Service: Gynecology Oncology   • CA RINSJ RPTD BICEPS/TRICEPS TDN DSTL W/WO TDN GRF Left 2023    Procedure: REPAIR TENDON BICEPS, distal;  Surgeon: Louis Scott;  Location:  MAIN OR;  Service: Orthopedics   • SINUS SURGERY     • TONSILLECTOMY     • UPPER GASTROINTESTINAL ENDOSCOPY         Family History   Problem Relation Age of Onset   • Pancreatic cancer Mother    • Hypertension Father    • Diabetes type II Father    • Lung disease Daughter         asthma tendencies   • Diabetes Maternal Grandmother    • Diabetes Paternal Grandmother    • Heart disease Paternal Grandmother    • Hypothyroidism Paternal Grandmother    • Diabetes type II Brother    • No Known Problems Brother    • No Known Problems Son    • Liver cancer Maternal Aunt    • Melanoma Maternal Aunt    • Hypothyroidism Paternal Uncle    • Hyperlipidemia Neg Hx    • Stroke Neg Hx        Social History     Occupational History   • Not on file   Tobacco Use   • Smoking status: Former     Packs/day: 0 50     Years: 4 00     Total pack years: 2 00     Types: Cigarettes     Quit date: 2016     Years since quittin 3   • Smokeless tobacco: Never   • Tobacco comments:     2016   Vaping Use   • Vaping Use: Never used   Substance and Sexual Activity   • Alcohol use: Never   • Drug use: No   • Sexual activity: Yes     Partners: Male     Birth control/protection: None, Female Sterilization         Current Outpatient Medications:   •  amLODIPine (NORVASC) 5 mg tablet, Take 1 tablet (5 mg total) by mouth daily, Disp: 90 tablet, Rfl: 0  •  dicyclomine (BENTYL) 20 mg tablet, Take 1 tablet (20 mg total) by mouth every 6 (six) hours as needed (every 6 hours), Disp: 60 tablet, Rfl: 0  •  escitalopram (LEXAPRO) 20 mg tablet, Take 1 tablet (20 mg total) by mouth daily, Disp: 90 tablet, Rfl: 0  •  Icosapent Ethyl (Vascepa) 1 g CAPS, 2 capsules twice a day, Disp: 360 capsule, Rfl: 0  •  Lactobacillus (PROBIOTIC ACIDOPHILUS PO), Take by mouth daily, Disp: , Rfl:   •  levothyroxine 100 mcg tablet, Take 1 tablet (100 mcg total) by mouth daily, Disp: 90 tablet, Rfl: 3  •  Magnesium 300 MG CAPS, Take 600 mg by mouth daily, Disp: , Rfl:   •  metFORMIN (GLUCOPHAGE-XR) 500 mg 24 hr tablet, Take 2 tablets (1,000 mg total) by mouth 2 (two) times a day with meals, Disp: 360 tablet, Rfl: 0  •  methocarbamol (ROBAXIN) 750 mg tablet, 1 tab PO HS , Disp: 90 tablet, Rfl: 1  •  metoprolol succinate (TOPROL-XL) 25 mg 24 hr tablet, Take 1 tablet (25 mg total) by mouth 2 (two) times a day, Disp: 180 tablet, Rfl: 0  •  Multiple Vitamins-Minerals (ZINC PO), Take by mouth, Disp: , Rfl:   •  mupirocin (BACTROBAN) 2 % ointment, Apply topically 3 (three) times a day, Disp: 22 g, Rfl: 0  •  nitroglycerin (NITROSTAT) 0 4 mg SL tablet, Place 1 tablet (0 4 mg total) under the tongue every 5 (five) minutes as needed for chest pain, Disp: 90 tablet, Rfl: 0  •  pantoprazole (PROTONIX) 40 mg tablet, Take 1 tablet (40 mg total) by mouth daily before breakfast, Disp: 90 tablet, Rfl: 0  •  semaglutide, 1 mg/dose, (Ozempic, 1 MG/DOSE,) 4 mg/3 mL injection pen, Inject 1 mg once a week, Disp: 9 mL, Rfl: 1  •  traMADol (Ultram) 50 mg tablet, 1 tab BID prn for ongoing therapy, Disp: 60 tablet, Rfl: 3  • "Turmeric 500 MG CAPS, Take by mouth, Disp: , Rfl:   •  vitamin B-12 (VITAMIN B-12) 1,000 mcg tablet, Take by mouth daily, Disp: , Rfl:   •  Vitamin D, Cholecalciferol, 50 MCG (2000 UT) CAPS, Take 4,000 Units by mouth daily, Disp: , Rfl:   •  Blood Glucose Monitoring Suppl (WorldRem[employer] SYSTEM) w/Device KIT, Use to test blood sugars twice a day, Disp: 1 kit, Rfl: 0  •  Insulin Pen Needle 32G X 4 MM MISC, Use once a week, Disp: 30 each, Rfl: 1  •  OneTouch Delica Lancets 48O MISC, Use to test blood sugars twice a day, Disp: 200 each, Rfl: 6  •  OneTouch Verio test strip, Use as instructed to test blood sugars twice a day, Disp: 200 each, Rfl: 0    Allergies   Allergen Reactions   • Erythromycin Chest Pain     Chest pain as child   • Metronidazole Other (See Comments)     SOB   • Sulfa Antibiotics Shortness Of Breath     Per pt, \"hives and fever\"   • Amoxicillin-Pot Clavulanate Rash     And yeast infection   • Invokana [Canagliflozin] Other (See Comments)     Yeast infection       Physical Exam:    /70 (BP Location: Right arm, Patient Position: Sitting, Cuff Size: Standard)   Pulse 72   Temp 97 9 °F (36 6 °C)   Ht 5' 2\" (1 575 m)   Wt 64 kg (141 lb)   LMP  (LMP Unknown)   BMI 25 79 kg/m²     Constitutional:normal, well developed, well nourished, alert, in no distress and non-toxic and no overt pain behavior  Eyes:anicteric  HEENT:grossly intact  Neck:supple, symmetric, trachea midline and no masses   Pulmonary:even and unlabored  Cardiovascular:No edema or pitting edema present  Skin:Normal without rashes or lesions and well hydrated  Psychiatric:Mood and affect appropriate  Neurologic:Cranial Nerves II-XII grossly intact  Musculoskeletal:normal      Imaging  No orders to display         No orders of the defined types were placed in this encounter      "

## 2023-06-09 ENCOUNTER — OFFICE VISIT (OUTPATIENT)
Dept: PHYSICAL THERAPY | Facility: CLINIC | Age: 53
End: 2023-06-09
Payer: OTHER MISCELLANEOUS

## 2023-06-09 DIAGNOSIS — S46.212D TRAUMATIC PARTIAL TEAR OF LEFT BICEPS TENDON, SUBSEQUENT ENCOUNTER: ICD-10-CM

## 2023-06-09 DIAGNOSIS — S46.212D STRAIN OF LEFT BICEPS, SUBSEQUENT ENCOUNTER: Primary | ICD-10-CM

## 2023-06-09 DIAGNOSIS — S46.212A STRAIN OF LEFT BICEPS, INITIAL ENCOUNTER: ICD-10-CM

## 2023-06-09 PROCEDURE — 97112 NEUROMUSCULAR REEDUCATION: CPT

## 2023-06-09 PROCEDURE — 97110 THERAPEUTIC EXERCISES: CPT

## 2023-06-09 NOTE — PROGRESS NOTES
"Daily Note     Today's date: 2023  Patient name: Yaneth Mariee  : 1970  MRN: [id number]  Referring provider: Joy Centeno PA-C  Dx:   Encounter Diagnosis     ICD-10-CM    1  Strain of left biceps, subsequent encounter  S46 212D       2  Traumatic partial tear of left biceps tendon, subsequent encounter  S46 212D       3  Strain of left biceps, initial encounter  S46 212A                      Subjective: Pt reports she is doing well, only issue is her arm fatigues quickly sometimes  Objective: See treatment diary below      Assessment: Tolerated treatment well  Pt performed all exercises without issue, continue to progress to tolerance  Patient demonstrated fatigue post treatment, exhibited good technique with therapeutic exercises and would benefit from continued PT to increase strength and endurance and return to PLOF  Plan: Continue per plan of care        Precautions:  Lt biceps repair 23    Manuals    Lt elbow rom JLW [address]         Scar massage JLW    MK                                      Neuro Re-Ed              Posture tband 10 4R  2x10 11R 3x10 Uni 6R 3x10 BUE 13R 2x12 Uni 7R 2x12  10R 2x12 Uni 8R 3x8 Uni 8R 3x10 9R 2x12 9R 2x12   Ext  10 4R  2x10 10R 2x10 Uni 4R 3x10 12R BUE 2x12   10R 2x12 8R 2x12 uni Uni 8R 3x10 7R 2x12 7R 2x12   Bicep flex 2 8R  2x10 5# 2x10 5# 3x12 HEP      10# 2x8 8# 2x10   Ball on wall  Green 1 alpha Green 1 alpha Green 1 alpha Green 1 alpha Green 20x  elbow bent red 1 elbow bent red 2 elbow bent red 2 elbow bent red 2   Wall walks     RTB 6 laps GTB 5 laps         Wrist flex/ext      3# 2x10         Shoulder elevationw/ wrist flex/ext w/ cane and AW         4# 3x30\"      Ther Ex              UBE 3'/3' 3/3' 3/3' 3/3' 33' 5' FWD  3/3 4' stand bw 3/3  3/3  3/3   Body blade elbow bent  20\"x3  20\"x3 20\"x3 and IR/ER     20\"x3 20\"x3 and IR/ER 20\"x3 and IR/ER and flex/ext at 90 20\"x3 and IR/ER and " "flex/ext at 90   [place] ER   3x10 2R   3x10 2R  3x10 2R    3x10 3R  3x10 3R   Jackie IR   3x10 3R           BUE cane roll w/ #    1# 3x           Elbow arom flex/ext, sup/pro 4#  sup/pro 5# sup/pro     eccentric flexion and sup/pro 4# 15x       Elbow flex       8# 3x8  8# 3x10 f     Preacher curl propped table       5# 3x8       Bicep stretch doorway    Low 30\"x3  Low 30\"x3         Wrist ext stretch on table  HEP             Cross body horizontal Add  5R 2x10 NV           Ther Activity              Pulling table        4x w/ pt 2x w/       Rolling patient       2x       Gait Training                                          Modalities                                                         "

## 2023-06-12 DIAGNOSIS — E11.65 TYPE 2 DIABETES MELLITUS WITH HYPERGLYCEMIA, WITHOUT LONG-TERM CURRENT USE OF INSULIN (HCC): ICD-10-CM

## 2023-06-13 ENCOUNTER — OFFICE VISIT (OUTPATIENT)
Dept: PHYSICAL THERAPY | Facility: CLINIC | Age: 53
End: 2023-06-13
Payer: OTHER MISCELLANEOUS

## 2023-06-13 DIAGNOSIS — S46.212A STRAIN OF LEFT BICEPS, INITIAL ENCOUNTER: ICD-10-CM

## 2023-06-13 DIAGNOSIS — S46.212D STRAIN OF LEFT BICEPS, SUBSEQUENT ENCOUNTER: Primary | ICD-10-CM

## 2023-06-13 DIAGNOSIS — S46.212D TRAUMATIC PARTIAL TEAR OF LEFT BICEPS TENDON, SUBSEQUENT ENCOUNTER: ICD-10-CM

## 2023-06-13 PROCEDURE — 97110 THERAPEUTIC EXERCISES: CPT

## 2023-06-13 PROCEDURE — 97112 NEUROMUSCULAR REEDUCATION: CPT

## 2023-06-13 RX ORDER — METFORMIN HYDROCHLORIDE 500 MG/1
1000 TABLET, EXTENDED RELEASE ORAL 2 TIMES DAILY WITH MEALS
Qty: 360 TABLET | Refills: 0 | Status: SHIPPED | OUTPATIENT
Start: 2023-06-13

## 2023-06-13 NOTE — PROGRESS NOTES
Daily Note     Today's date: 2023   Patient name: Shila Domínguez  : 1970  MRN: [id number]  Referring provider: Angie Cardozo PA-C  Dx:   Encounter Diagnosis     ICD-10-CM    1  Strain of left biceps, subsequent encounter  S46 212D       2  Traumatic partial tear of left biceps tendon, subsequent encounter  S46 212D       3  Strain of left biceps, initial encounter  S46 212A                      Subjective: Pt reports she is doing well, no updates or changes since last visit  Pt reports the only time she has issues is with heavy objects and repetitive motions  Objective: See treatment diary below      Assessment: Tolerated treatment well  Pt performed all exercises without issues, was able to tolerate increase in weight/reps for multiple exercises today without issue  Continue to progress to tolerance  Patient demonstrated fatigue post treatment, exhibited good technique with therapeutic exercises and would benefit from continued PT to increase strength and endurance and decrease pain/discomfort  Plan: Continue per plan of care        Precautions:  Lt biceps repair 23    Manuals    Lt elbow rom JLW [address]          Scar massage JLW    MK                                         Neuro Re-Ed               Posture tband 10 4R  2x10 11R 3x10 Uni 6R 3x10 BUE 13R 2x12 Uni 7R 2x12  10R 2x12 Uni 8R 3x8 Uni 8R 3x10 9R 2x12 9R 2x12 9R 2x12   Ext  10 4R  2x10 10R 2x10 Uni 4R 3x10 12R BUE 2x12   10R 2x12 8R 2x12 uni Uni 8R 3x10 7R 2x12 7R 2x12 7R 1x12  7 5R 1x12   Bicep flex 2 8R  2x10 5# 2x10 5# 3x12 HEP      10# 2x8 8# 2x10 8# 2x10   Ball on wall  Green 1 alpha Green 1 alpha Green 1 alpha Green 1 alpha Green 20x  elbow bent red 1 elbow bent red 2 elbow bent red 2 elbow bent red 2 Elbow bent red 2   Wall walks     RTB 6 laps GTB 5 laps          Wrist flex/ext      3# 2x10          Shoulder elevationw/ wrist flex/ext w/ cane and AW         4# "3x30\"       Ther Ex               UBE 3'/3' 3/3' 3/3' 3/3' 3/3' 5' FWD  3/3 4' stand bw 3/3  3/3  3/3 3/3   Body blade elbow bent  20\"x3  20\"x3 20\"x3 and IR/ER     20\"x3 20\"x3 and IR/ER 20\"x3 and IR/ER and flex/ext at 90 20\"x3 and IR/ER and flex/ext at 90 20\"x3 and IR/ER and flex/ext at 90   [place] ER   3x10 2R   3x10 2R  3x10 2R    3x10 3R  3x10 3R 3x10 4R   [place] IR   3x10 3R            BUE cane roll w/ #    1# 3x            Elbow arom flex/ext, sup/pro 4#  sup/pro 5# sup/pro     eccentric flexion and sup/pro 4# 15x        Elbow flex       8# 3x8  8# 3x10 f      Preacher curl propped table       5# 3x8        Bicep stretch doorway    Low 30\"x3  Low 30\"x3          Wrist ext stretch on table  HEP              Cross body horizontal Add  5R 2x10 NV            Ther Activity               Pulling table        4x w/ pt 2x w/        Rolling patient       2x        Gait Training                                             Modalities                                                              "

## 2023-06-15 ENCOUNTER — OFFICE VISIT (OUTPATIENT)
Dept: PHYSICAL THERAPY | Facility: CLINIC | Age: 53
End: 2023-06-15
Payer: OTHER MISCELLANEOUS

## 2023-06-15 DIAGNOSIS — S46.212A STRAIN OF LEFT BICEPS, INITIAL ENCOUNTER: ICD-10-CM

## 2023-06-15 DIAGNOSIS — S46.212D STRAIN OF LEFT BICEPS, SUBSEQUENT ENCOUNTER: Primary | ICD-10-CM

## 2023-06-15 DIAGNOSIS — S46.212D TRAUMATIC PARTIAL TEAR OF LEFT BICEPS TENDON, SUBSEQUENT ENCOUNTER: ICD-10-CM

## 2023-06-15 PROCEDURE — 97112 NEUROMUSCULAR REEDUCATION: CPT

## 2023-06-15 PROCEDURE — 97140 MANUAL THERAPY 1/> REGIONS: CPT

## 2023-06-15 PROCEDURE — 97110 THERAPEUTIC EXERCISES: CPT

## 2023-06-15 NOTE — PROGRESS NOTES
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 and is now experiencing 8/10 pain as well as minor swelling  Decreased tightness post treatment  Objective: See treatment diary below      Assessment: Tolerated treatment well  Modified treatment to focus on pain control and shoulder based strengthning  Patient demonstrated fatigue post treatment, exhibited good technique with therapeutic exercises and would benefit from continued PT to increase strength and endurance and decrease pain/discomfort  Plan: Continue per plan of care  Resume all Yenny NV if able       Precautions:  Lt biceps repair 23    Manuals 5/5  5/16 5/18 5/25 6/1 6/9 6/13 6/15   Lt elbow rom        MP   Scar massage        MP   Laser        MP              Neuro Re-Ed           Posture tband  10R 2x12 Uni 8R 3x8 Uni 8R 3x10 9R 2x12 9R 2x12 9R 2x12 9R 2x 12   Ext   10R 2x12 8R 2x12 uni Uni 8R 3x10 7R 2x12 7R 2x12 7R 1x12  7 5R 1x12 9 2 x 12   Bicep flex     10# 2x8 8# 2x10 8# 2x10    Ball on wall Green 20x  elbow bent red 1 elbow bent red 2 elbow bent red 2 elbow bent red 2 Elbow bent red 2 Elbow bent red 2    Wall walks            Wrist flex/ext 3# 2x10           Shoulder elevationw/ wrist flex/ext w/ cane and AW    4# 3x30\"        Ther Ex           UBE 5' FWD  3/3 4' stand bw 3/3  3/3  3/3 3/3 3'/3'   Body blade elbow bent   20\"x3 20\"x3 and IR/ER 20\"x3 and IR/ER and flex/ext at 90 20\"x3 and IR/ER and flex/ext at 90 20\"x3 and IR/ER and flex/ext at 90    [place] ER 3x10 2R  3x10 2R    3x10 3R  3x10 3R 3x10 4R 3x10 " 6R   Jackie IR           BUE cane roll w/ #           Elbow arom flex/ext, sup/pro  eccentric flexion and sup/pro 4# 15x         Elbow flex  8# 3x8  8# 3x10 f       Preacher curl propped table  5# 3x8         Bicep stretch doorway           Wrist ext stretch on table            Cross body horizontal Add           Ther Activity           Pulling table   4x w/ pt 2x w/         Rolling patient  2x         Gait Training                                 Modalities

## 2023-06-20 ENCOUNTER — OFFICE VISIT (OUTPATIENT)
Dept: PHYSICAL THERAPY | Facility: CLINIC | Age: 53
End: 2023-06-20
Payer: OTHER MISCELLANEOUS

## 2023-06-20 DIAGNOSIS — S46.212A STRAIN OF LEFT BICEPS, INITIAL ENCOUNTER: ICD-10-CM

## 2023-06-20 DIAGNOSIS — S46.212D TRAUMATIC PARTIAL TEAR OF LEFT BICEPS TENDON, SUBSEQUENT ENCOUNTER: ICD-10-CM

## 2023-06-20 DIAGNOSIS — S46.212D STRAIN OF LEFT BICEPS, SUBSEQUENT ENCOUNTER: Primary | ICD-10-CM

## 2023-06-20 PROCEDURE — 97110 THERAPEUTIC EXERCISES: CPT

## 2023-06-20 PROCEDURE — 97112 NEUROMUSCULAR REEDUCATION: CPT

## 2023-06-20 PROCEDURE — 97140 MANUAL THERAPY 1/> REGIONS: CPT

## 2023-06-20 NOTE — PROGRESS NOTES
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  Notes swelling medial elbow area  Objective: See treatment diary below    Assessment: Pt clarified she has been performing IR on Jackie  Performed modified exercise program w/o increasing symptoms  Responded well to scar and STM  Comfortable during laser, and PROM to L elbow  Relief after tx  Will monitor  Plan: Cont per POC       Precautions:  Lt biceps repair 23    Manuals  6/20  5/18 5/25 6/1 6/9 6/13 6/15   Lt elbow rom MO       MP   Scar massage + STM-  MO       MP   Laser MO       MP              Neuro Re-Ed           Posture tband jackie 8 93R 2x12  Uni 8R 3x8 Uni 8R 3x10 9R 2x12 9R 2x12 9R 2x12 9R 2x 12   Ext  jackie  8 7R  2x12   8R 2x12 uni Uni 8R 3x10 7R 2x12 7R 2x12 7R 1x12  7 5R 1x12 9 2 x 12   Bicep flex     10# 2x8 8# 2x10 8# 2x10    Ball on wall Elbow bent  Red 2  elbow bent red 1 elbow bent red 2 elbow bent red 2 elbow bent red 2 Elbow bent red 2 Elbow bent red 2    Wall walks            Wrist flex/ext           Shoulder elevationw/ wrist flex/ext w/ cane and AW    4# 3x30\"        Ther Ex           UBE 3'/3'  4' stand bw 3/3  3/3  3/3 3/3 3'/3'   Body blade elbow bent   20\"x3 20\"x3 and IR/ER 20\"x3 and IR/ER and flex/ext at 90 20\"x3 and IR/ER and flex/ext at 90 20\"x3 and IR/ER and flex/ext at 90    [place] ER 3x10 6R    3x10 3R  3x10 3R 3x10 4R 3x10 6R   [place] IR 3x10 8 2R          BUE cane roll w/ #           Elbow arom flex/ext, sup/pro           Elbow flex    8# 3x10 f       Preacher curl " propped table           Bicep stretch doorway           Wrist ext stretch on table            Cross body horizontal Add           Ther Activity           Pulling table            Rolling patient           Gait Training                                 Modalities

## 2023-06-22 ENCOUNTER — APPOINTMENT (OUTPATIENT)
Dept: PHYSICAL THERAPY | Facility: CLINIC | Age: 53
End: 2023-06-22
Payer: COMMERCIAL

## 2023-06-26 ENCOUNTER — OFFICE VISIT (OUTPATIENT)
Dept: PHYSICAL THERAPY | Facility: CLINIC | Age: 53
End: 2023-06-26
Payer: COMMERCIAL

## 2023-06-26 DIAGNOSIS — S46.212A STRAIN OF LEFT BICEPS, INITIAL ENCOUNTER: ICD-10-CM

## 2023-06-26 DIAGNOSIS — S46.212D STRAIN OF LEFT BICEPS, SUBSEQUENT ENCOUNTER: Primary | ICD-10-CM

## 2023-06-26 DIAGNOSIS — S46.212D TRAUMATIC PARTIAL TEAR OF LEFT BICEPS TENDON, SUBSEQUENT ENCOUNTER: ICD-10-CM

## 2023-06-26 PROCEDURE — 97110 THERAPEUTIC EXERCISES: CPT

## 2023-06-26 PROCEDURE — 97112 NEUROMUSCULAR REEDUCATION: CPT

## 2023-06-26 PROCEDURE — 97140 MANUAL THERAPY 1/> REGIONS: CPT

## 2023-06-26 NOTE — PROGRESS NOTES
"Daily Note     Today's date: 2023  Patient name: Litzy Worthington  : 1970  MRN: [id number]  Referring provider: Bassam Conde PA-C  Dx:   Encounter Diagnosis     ICD-10-CM    1  Strain of left biceps, subsequent encounter  S46 212D       2  Traumatic partial tear of left biceps tendon, subsequent encounter  S46 212D       3  Strain of left biceps, initial encounter  D74 021R                      Subjective: Pt reports she has good relief after LV  Notes her L elbow feels tired upon arrival to therapy  Objective: See treatment diary below    Assessment: Performed exercise program w/o difficulty or discomfort  Responded well to manual therapies as below  Relief after tx  Will monitor  Plan: Cont per POC       Precautions:  Lt biceps repair 23    Manuals  6/20  6/26  5/25 6/1 6/9 6/13 6/15   Lt elbow rom MO MO      MP   Scar massage + STM-  MO + STM-  MO      MP   Laser MO MO      MP              Neuro Re-Ed           Posture tband jackei 8 93R 2x12 jackie  8 9R  2x12  Uni 8R 3x10 9R 2x12 9R 2x12 9R 2x12 9R 2x 12   Ext  jackie  8 7R  2x12  jackie  8 7R  2x12  Uni 8R 3x10 7R 2x12 7R 2x12 7R 1x12  7 5R 1x12 9 2 x 12   Bicep flex     10# 2x8 8# 2x10 8# 2x10    Ball on wall Elbow bent  Red 2 elbow bent red 2  elbow bent red 2 elbow bent red 2 elbow bent red 2 Elbow bent red 2 Elbow bent red 2    Wall walks            Wrist flex/ext           Shoulder elevationw/ wrist flex/ext w/ cane and AW    4# 3x30\"        Ther Ex           UBE 3'/3' 3'/3'  3/3  3/3  3/3 3/3 3'/3'   Body blade elbow bent    20\"x3 and IR/ER 20\"x3 and IR/ER and flex/ext at 90 20\"x3 and IR/ER and flex/ext at 90 20\"x3 and IR/ER and flex/ext at 90    [place] ER 3x10 6R 3x10  6R   3x10 3R  3x10 3R 3x10 4R 3x10 6R   [place] IR 3x10 8 2R 3x10  8 2R         BUE cane roll w/ #           Elbow arom flex/ext, sup/pro           Elbow flex    8# 3x10 f       Preacher curl propped table           Bicep stretch doorway           Wrist ext " stretch on table            Cross body horizontal Add           Ther Activity           Pulling table            Rolling patient           Gait Training                                 Modalities

## 2023-06-28 ENCOUNTER — APPOINTMENT (OUTPATIENT)
Dept: PHYSICAL THERAPY | Facility: CLINIC | Age: 53
End: 2023-06-28
Payer: COMMERCIAL

## 2023-06-30 ENCOUNTER — OFFICE VISIT (OUTPATIENT)
Dept: CARDIOLOGY CLINIC | Facility: CLINIC | Age: 53
End: 2023-06-30
Payer: COMMERCIAL

## 2023-06-30 VITALS
DIASTOLIC BLOOD PRESSURE: 62 MMHG | SYSTOLIC BLOOD PRESSURE: 110 MMHG | HEIGHT: 62 IN | WEIGHT: 140.2 LBS | BODY MASS INDEX: 25.8 KG/M2 | HEART RATE: 80 BPM

## 2023-06-30 DIAGNOSIS — R07.9 CHEST PAIN, UNSPECIFIED TYPE: Primary | ICD-10-CM

## 2023-06-30 PROCEDURE — 99214 OFFICE O/P EST MOD 30 MIN: CPT | Performed by: INTERNAL MEDICINE

## 2023-06-30 PROCEDURE — 93000 ELECTROCARDIOGRAM COMPLETE: CPT | Performed by: INTERNAL MEDICINE

## 2023-06-30 NOTE — PROGRESS NOTES
Cardiology Follow Up    Adebayo Gordillo  1970  [id number]  [address] [facility] Alejo Staley  [address] [place] [id number] [phone]    1  Chest pain, unspecified type  POCT ECG          Interval History: Followup chest pain,    Recent chest discomfort that is random, left sided that can last up to ten minutes  No clear exertional component       Medical Problems     Problem List     Hypertension (Chronic)    GIANG (nonalcoholic steatohepatitis)    Overview Signed 12/16/2020  8:04 AM by Elmira Sanches DO     S/p liver biopsy ( grade 2 inflammation andStage II fibrosis) sees GI ([facility])         Sinus tachycardia    Chronic sinusitis (Chronic)    Anemia    Anomalous coronary artery origin    Anxiety    Combined hyperlipidemia    Coronary vasospasm (HCC)    Type 2 diabetes mellitus with hyperglycemia, without long-term current use of insulin (HCC)      Lab Results   Component Value Date    HGBA1C 5 6 04/04/2023         Gastroesophageal reflux disease without esophagitis    Ground glass opacity present on imaging of lung    Hypothyroidism due to Hashimoto's thyroiditis    NSTEMI (non-ST elevated myocardial infarction) ([facility] [address] )    Chest pain due to GERD    Overview Signed 6/18/2018  5:05 PM by Fifi Bueno MD     Added automatically from request for surgery [id number]         Dysfunctional uterine bleeding    Left ovarian cyst    Menopausal symptoms    S/P laparoscopic hysterectomy    Colon cancer screening    Chronic back pain    Lateral epicondylitis of right elbow    Lumbar spondylosis    Myofascial pain syndrome    Lumbar radiculopathy    Chronic pain syndrome    Tendonitis of elbow, right    Chronic low back pain without sciatica    Overweight    Strain of left biceps    Depression    Hypothyroidism    Traumatic partial tear of left biceps tendon        Past Medical History:   Diagnosis Date   • Coronary artery disease    • Diabetes mellitus ([facility] )     Borderline   • Disease of thyroid gland    • DUB (dysfunctional uterine bleeding)    • Fatty liver    • Hashimoto's thyroiditis     Last Assessed:  14   • History of stomach ulcers    • Hypertension    • Hypothyroidism    • Irritable bowel syndrome     Last Assessed:  3/25/14   • Liver disease     elevated enzymes   • Myocardial infarction ([facility] )     2017   • GIANG (nonalcoholic steatohepatitis)    • Ovarian cyst     left   • Psychogenic polydipsia     Has had hyponatremia from drinking too much water in the past      Social History     Socioeconomic History   • Marital status: /Civil Union     Spouse name: Not on file   • Number of children: Not on file   • Years of education: Not on file   • Highest education level: Not on file   Occupational History   • Not on file   Tobacco Use   • Smoking status: Former     Packs/day: 0 50     Years: 4 00     Total pack years: 2 00     Types: Cigarettes     Quit date: 2016     Years since quittin 4   • Smokeless tobacco: Never   • Tobacco comments:     2016   Vaping Use   • Vaping Use: Never used   Substance and Sexual Activity   • Alcohol use: Never   • Drug use: No   • Sexual activity: Yes     Partners: Male     Birth control/protection: None, Female Sterilization   Other Topics Concern   • Not on file   Social History Narrative    Feels safe at home     Social Determinants of Health     Financial Resource Strain: Not on file   Food Insecurity: Not on file   Transportation Needs: Not on file   Physical Activity: Not on file   Stress: Not on file   Social Connections: Not on file   Intimate Partner Violence: Not on file   Housing Stability: Not on file      Family History   Problem Relation Age of Onset   • Pancreatic cancer Mother    • Hypertension Father    • Diabetes type II Father    • Lung disease Daughter         asthma tendencies   • Diabetes Maternal Grandmother    • Diabetes Paternal Grandmother    • Heart disease Paternal Grandmother    • Hypothyroidism Paternal Grandmother    • Diabetes type II Brother    • No Known Problems Brother    • No Known Problems Son    • Liver cancer Maternal Aunt    • Melanoma Maternal Aunt    • Hypothyroidism Paternal Uncle    • Hyperlipidemia Neg Hx    • Stroke Neg Hx      Past Surgical History:   Procedure Laterality Date   • ANGIOPLASTY     • CARDIAC CATHETERIZATION     •  SECTION      X 2   • CHOLECYSTECTOMY     • COLONOSCOPY     • CYSTOSCOPY N/A 2019    Procedure: CYSTOSCOPY;  Surgeon: Gladys Ragland MD;  Location: [facility];  Service: Gynecology Oncology   • HYSTERECTOMY     • IR BIOPSY LIVER MASS  2020   • OOPHORECTOMY Right    • MN ESOPHAGOGASTRODUODENOSCOPY TRANSORAL DIAGNOSTIC N/A 2018    Procedure: ESOPHAGOGASTRODUODENOSCOPY (EGD); Surgeon: Jadiel Strange MD;  Location: [facility];   Service: Gastroenterology   • MN LAPS TOTAL HYSTERECT 250 GM/< W/RMVL TUBE/OVARY N/A 2019    Procedure: TOTAL LAPAROSCOPIC HYSTERECTOMY, BILATERAL SALPINGECTOMY, LEFT OOPHORECTOMY;  Surgeon: Gladys Ragland MD;  Location: [facility];  Service: Gynecology Oncology   • MN RINSJ RPTD BICEPS/TRICEPS TDN DSTL W/WO TDN GRF Left 2023    Procedure: REPAIR TENDON BICEPS, distal;  Surgeon: Luca Grant;  Location:  MAIN OR;  Service: Orthopedics   • SINUS SURGERY     • TONSILLECTOMY     • UPPER GASTROINTESTINAL ENDOSCOPY         Current Outpatient Medications:   •  amLODIPine (NORVASC) 5 mg tablet, Take 1 tablet (5 mg total) by mouth daily, Disp: 90 tablet, Rfl: 0  •  Blood Glucose Monitoring Suppl (Avis Face IQ SYSTEM) w/Device KIT, Use to test blood sugars twice a day, Disp: 1 kit, Rfl: 0  •  dicyclomine (BENTYL) 20 mg tablet, Take 1 tablet (20 mg total) by mouth every 6 (six) hours as needed (every 6 hours), Disp: 60 tablet, Rfl: 0  •  escitalopram (LEXAPRO) 20 mg tablet, Take 1 tablet (20 mg total) by mouth daily, Disp: 90 tablet, Rfl: 0  •  Icosapent Ethyl (Vascepa) 1 g CAPS, 2 capsules twice a day, Disp: 360 capsule, Rfl: 0  •  Insulin Pen Needle 32G X 4 MM MISC, Use once a week, Disp: 30 each, Rfl: 1  •  Lactobacillus (PROBIOTIC ACIDOPHILUS PO), Take by mouth daily, Disp: , Rfl:   •  levothyroxine 100 mcg tablet, Take 1 tablet (100 mcg total) by mouth daily, Disp: 90 tablet, Rfl: 3  •  Magnesium 300 MG CAPS, Take 600 mg by mouth daily, Disp: , Rfl:   •  metFORMIN (GLUCOPHAGE-XR) 500 mg 24 hr tablet, Take 2 tablets (1,000 mg total) by mouth 2 (two) times a day with meals, Disp: 360 tablet, Rfl: 0  •  methocarbamol (ROBAXIN) 750 mg tablet, 1 tab PO HS , Disp: 90 tablet, Rfl: 1  •  metoprolol succinate (TOPROL-XL) 25 mg 24 hr tablet, Take 1 tablet (25 mg total) by mouth 2 (two) times a day, Disp: 180 tablet, Rfl: 0  •  Multiple Vitamins-Minerals (ZINC PO), Take by mouth, Disp: , Rfl:   •  mupirocin (BACTROBAN) 2 % ointment, Apply topically 3 (three) times a day, Disp: 22 g, Rfl: 0  •  nitroglycerin (NITROSTAT) 0 4 mg SL tablet, Place 1 tablet (0 4 mg total) under the tongue every 5 (five) minutes as needed for chest pain, Disp: 90 tablet, Rfl: 0  •  OneTouch Delica Lancets 04U MISC, Use to test blood sugars twice a day, Disp: 200 each, Rfl: 6  •  OneTouch Verio test strip, Use as instructed to test blood sugars twice a day, Disp: 200 each, Rfl: 0  •  pantoprazole (PROTONIX) 40 mg tablet, Take 1 tablet (40 mg total) by mouth daily before breakfast, Disp: 90 tablet, Rfl: 0  •  semaglutide, 1 mg/dose, (Ozempic, 1 MG/DOSE,) 4 mg/3 mL injection pen, Inject 1 mg once a week, Disp: 9 mL, Rfl: 1  •  traMADol (Ultram) 50 mg tablet, 1 tab BID prn for ongoing therapy, Disp: 60 tablet, Rfl: 3  •  Turmeric 500 MG CAPS, Take by mouth, Disp: , Rfl:   •  vitamin B-12 (VITAMIN B-12) 1,000 mcg tablet, Take by mouth daily, Disp: , Rfl:   •  Vitamin D, Cholecalciferol, 50 MCG (2000 UT) CAPS, Take 4,000 Units by mouth daily, Disp: , Rfl:   Allergies   Allergen Reactions   • Erythromycin Chest Pain     Chest "pain as child   • Metronidazole Other (See Comments)     SOB   • Sulfa Antibiotics Shortness Of Breath     Per pt, \"hives and fever\"   • Amoxicillin-Pot Clavulanate Rash     And yeast infection   • Invokana [Canagliflozin] Other (See Comments)     Yeast infection       Labs:     Chemistry        Component Value Date/Time     09/02/2015 1020    K 4 3 04/04/2023 1210    K 3 8 09/02/2015 1020     04/04/2023 1210     09/02/2015 1020    CO2 29 04/04/2023 1210    CO2 24 3 09/02/2015 1020    BUN 6 04/04/2023 1210    BUN 13 09/02/2015 1020    CREATININE 0 59 (L) 04/04/2023 1210    CREATININE 0 79 09/02/2015 1020        Component Value Date/Time    CALCIUM 9 6 04/04/2023 1210    CALCIUM 9 1 09/02/2015 1020    ALKPHOS 81 04/04/2023 1210    ALKPHOS 78 09/02/2015 1020    AST 24 04/04/2023 1210    AST 37 09/02/2015 1020    ALT 44 04/04/2023 1210    ALT 62 09/02/2015 1020    BILITOT 0 38 09/02/2015 1020            Lab Results   Component Value Date    CHOL 210 08/20/2015    CHOL 179 07/19/2014    CHOL 197 12/05/2013     Lab Results   Component Value Date    HDL 43 (L) 04/04/2023    HDL 39 (L) 04/20/2022    HDL 44 02/06/2021     Lab Results   Component Value Date    LDLCALC 82 04/04/2023    LDLCALC 112 (H) 04/20/2022    LDLCALC 134 (H) 02/06/2021     Lab Results   Component Value Date    TRIG 228 (H) 04/04/2023    TRIG 211 (H) 04/20/2022    TRIG 169 (H) 02/06/2021     No results found for: \"CHOLHDL\"    Imaging: No results found  EKG: NSR Normal ECG    Review of Systems   Constitutional: Positive for malaise/fatigue  HENT: Negative  Eyes: Negative  Cardiovascular: Positive for chest pain  Respiratory: Negative  Hematologic/Lymphatic: Negative  Skin: Negative  Musculoskeletal: Negative  Gastrointestinal: Negative  Genitourinary: Negative  Neurological: Negative  Psychiatric/Behavioral: Negative  Allergic/Immunologic: Negative          Vitals:    06/30/23 0759   BP: 110/62 " Pulse: 80           Physical Exam  Vitals and nursing note reviewed  Constitutional:       Appearance: Normal appearance  HENT:      Head: Normocephalic  Nose: Nose normal       Mouth/Throat:      Mouth: Mucous membranes are moist    Eyes:      General: No scleral icterus  Conjunctiva/sclera: Conjunctivae normal    Cardiovascular:      Rate and Rhythm: Normal rate and regular rhythm  Heart sounds: No murmur heard  No gallop  Pulmonary:      Effort: Pulmonary effort is normal  No respiratory distress  Breath sounds: Normal breath sounds  No wheezing or rales  Abdominal:      General: Abdomen is flat  Bowel sounds are normal  There is no distension  Palpations: Abdomen is soft  Tenderness: There is no abdominal tenderness  There is no guarding  Musculoskeletal:      Cervical back: Normal range of motion and neck supple  Right lower leg: No edema  Left lower leg: No edema  Skin:     General: Skin is warm and dry  Neurological:      General: No focal deficit present  Mental Status: She is alert and oriented to person, place, and time  Psychiatric:         Mood and Affect: Mood normal          Behavior: Behavior normal          Discussion/Summary:       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

## 2023-07-03 ENCOUNTER — APPOINTMENT (OUTPATIENT)
Dept: PHYSICAL THERAPY | Facility: CLINIC | Age: 53
End: 2023-07-03
Payer: OTHER MISCELLANEOUS

## 2023-07-07 ENCOUNTER — OFFICE VISIT (OUTPATIENT)
Dept: PHYSICAL THERAPY | Facility: CLINIC | Age: 53
End: 2023-07-07
Payer: COMMERCIAL

## 2023-07-07 DIAGNOSIS — S46.212A STRAIN OF LEFT BICEPS, INITIAL ENCOUNTER: ICD-10-CM

## 2023-07-07 DIAGNOSIS — S46.212D STRAIN OF LEFT BICEPS, SUBSEQUENT ENCOUNTER: Primary | ICD-10-CM

## 2023-07-07 DIAGNOSIS — S46.212D TRAUMATIC PARTIAL TEAR OF LEFT BICEPS TENDON, SUBSEQUENT ENCOUNTER: ICD-10-CM

## 2023-07-07 PROCEDURE — 97140 MANUAL THERAPY 1/> REGIONS: CPT

## 2023-07-07 PROCEDURE — 97110 THERAPEUTIC EXERCISES: CPT

## 2023-07-07 PROCEDURE — 97112 NEUROMUSCULAR REEDUCATION: CPT

## 2023-07-07 NOTE — PROGRESS NOTES
Daily Note     Today's date: 2023  Patient name: Shane Castellanos  : 1970  MRN: [id number]  Referring provider: Queta Lopez PA-C  Dx:   Encounter Diagnosis     ICD-10-CM    1. Strain of left biceps, subsequent encounter  S46.212D       2. Traumatic partial tear of left biceps tendon, subsequent encounter  S46.212D       3. Strain of left biceps, initial encounter  S46.212A                      Subjective: "It gets tired." Notes today her L elbow feels stiff    Objective: See treatment diary below    Assessment: Performed exercise progression and remaining ex program w/o issue. Demonstrates good knowledge of same. Comfortable during laser and PROM to L elbow/fox. Responded well to scar massage and STM to L elbow. Relief after tx. Will monitor. Plan: Cont per POC.      Precautions:  Lt biceps repair 23    Manuals  6/20  6/26  7/7  6/1 6/9 6/13 6/15   Lt elbow rom MO MO MO     MP   Scar massage + STM-  MO + STM-  MO + STM- MO     MP   Laser MO MO MO     MP              Neuro Re-Ed           Posture tband jackie 8.93R 2x12 jackie  8.9R  2x12 jackie 9.0R  2x12  9R 2x12 9R 2x12 9R 2x12 9R 2x 12   Ext  jackie  8.7R  2x12  jackie  8.7R  2x12 jackie 9.0R 2x12  7R 2x12 7R 2x12 7R 1x12  7.5R 1x12 9 2 x 12   Bicep flex     10# 2x8 8# 2x10 8# 2x10    Ball on wall Elbow bent  Red 2 elbow bent red 2 elbow bent 2  elbow bent red 2 elbow bent red 2 Elbow bent red 2 Elbow bent red 2    Wall walks            Wrist flex/ext           Shoulder elevationw/ wrist flex/ext w/ cane and AW           Ther Ex           UBE 3'/3' 3'/3' 3'/3'  3/3  3/3 3/3 3'/3'   Body blade elbow bent     20"x3 and IR/ER and flex/ext at 90 20"x3 and IR/ER and flex/ext at 90 20"x3 and IR/ER and flex/ext at 90    Jackie ER 3x10 6R 3x10  6R 3x10  6R  3x10 3R  3x10 3R 3x10 4R 3x10 6R   [place] IR 3x10 8.2R 3x10  8.2R 3x10  8.5R        BUE cane roll w/ #           Elbow arom flex/ext, sup/pro           Elbow flex           Preacher curl propped table           Bicep stretch doorway           Wrist ext stretch on table            Cross body horizontal Add           Ther Activity           Pulling table            Rolling patient           Gait Training                                 Modalities

## 2023-07-12 ENCOUNTER — APPOINTMENT (OUTPATIENT)
Dept: PHYSICAL THERAPY | Facility: CLINIC | Age: 53
End: 2023-07-12
Payer: OTHER MISCELLANEOUS

## 2023-07-18 ENCOUNTER — APPOINTMENT (OUTPATIENT)
Dept: PHYSICAL THERAPY | Facility: CLINIC | Age: 53
End: 2023-07-18
Payer: OTHER MISCELLANEOUS

## 2023-07-18 ENCOUNTER — APPOINTMENT (OUTPATIENT)
Dept: LAB | Facility: HOSPITAL | Age: 53
End: 2023-07-18
Payer: COMMERCIAL

## 2023-07-18 DIAGNOSIS — E03.8 HYPOTHYROIDISM DUE TO HASHIMOTO'S THYROIDITIS: ICD-10-CM

## 2023-07-18 DIAGNOSIS — I10 PRIMARY HYPERTENSION: Chronic | ICD-10-CM

## 2023-07-18 DIAGNOSIS — R74.8 ELEVATED LIVER ENZYMES: ICD-10-CM

## 2023-07-18 DIAGNOSIS — E06.3 HYPOTHYROIDISM DUE TO HASHIMOTO'S THYROIDITIS: ICD-10-CM

## 2023-07-18 DIAGNOSIS — E11.65 TYPE 2 DIABETES MELLITUS WITH HYPERGLYCEMIA, WITHOUT LONG-TERM CURRENT USE OF INSULIN (HCC): ICD-10-CM

## 2023-07-18 DIAGNOSIS — D50.8 IRON DEFICIENCY ANEMIA SECONDARY TO INADEQUATE DIETARY IRON INTAKE: ICD-10-CM

## 2023-07-18 DIAGNOSIS — E03.9 HYPOTHYROIDISM, UNSPECIFIED TYPE: ICD-10-CM

## 2023-07-18 DIAGNOSIS — E78.2 COMBINED HYPERLIPIDEMIA: ICD-10-CM

## 2023-07-18 LAB
ALBUMIN SERPL BCP-MCNC: 4.3 G/DL (ref 3.5–5)
ALP SERPL-CCNC: 73 U/L (ref 34–104)
ALT SERPL W P-5'-P-CCNC: 24 U/L (ref 7–52)
ANION GAP SERPL CALCULATED.3IONS-SCNC: 6 MMOL/L
AST SERPL W P-5'-P-CCNC: 19 U/L (ref 13–39)
BASOPHILS # BLD AUTO: 0.1 THOUSANDS/ÂΜL (ref 0–0.1)
BASOPHILS NFR BLD AUTO: 2 % (ref 0–1)
BILIRUB SERPL-MCNC: 0.26 MG/DL (ref 0.2–1)
BUN SERPL-MCNC: 6 MG/DL (ref 5–25)
CALCIUM SERPL-MCNC: 9.2 MG/DL (ref 8.4–10.2)
CHLORIDE SERPL-SCNC: 103 MMOL/L (ref 96–108)
CHOLEST SERPL-MCNC: 170 MG/DL
CO2 SERPL-SCNC: 25 MMOL/L (ref 21–32)
CREAT SERPL-MCNC: 0.58 MG/DL (ref 0.6–1.3)
CREAT UR-MCNC: 41.9 MG/DL
EOSINOPHIL # BLD AUTO: 0.25 THOUSAND/ÂΜL (ref 0–0.61)
EOSINOPHIL NFR BLD AUTO: 4 % (ref 0–6)
ERYTHROCYTE [DISTWIDTH] IN BLOOD BY AUTOMATED COUNT: 11.9 % (ref 11.6–15.1)
EST. AVERAGE GLUCOSE BLD GHB EST-MCNC: 108 MG/DL
GFR SERPL CREATININE-BSD FRML MDRD: 106 ML/MIN/1.73SQ M
GLUCOSE P FAST SERPL-MCNC: 113 MG/DL (ref 65–99)
HBA1C MFR BLD: 5.4 %
HCT VFR BLD AUTO: 36.3 % (ref 34.8–46.1)
HDLC SERPL-MCNC: 43 MG/DL
HGB BLD-MCNC: 12.7 G/DL (ref 11.5–15.4)
IMM GRANULOCYTES # BLD AUTO: 0.01 THOUSAND/UL (ref 0–0.2)
IMM GRANULOCYTES NFR BLD AUTO: 0 % (ref 0–2)
LDLC SERPL CALC-MCNC: 80 MG/DL (ref 0–100)
LYMPHOCYTES # BLD AUTO: 2.39 THOUSANDS/ÂΜL (ref 0.6–4.47)
LYMPHOCYTES NFR BLD AUTO: 38 % (ref 14–44)
MCH RBC QN AUTO: 31.9 PG (ref 26.8–34.3)
MCHC RBC AUTO-ENTMCNC: 35 G/DL (ref 31.4–37.4)
MCV RBC AUTO: 91 FL (ref 82–98)
MICROALBUMIN UR-MCNC: <5 MG/L (ref 0–20)
MICROALBUMIN/CREAT 24H UR: <12 MG/G CREATININE (ref 0–30)
MONOCYTES # BLD AUTO: 0.46 THOUSAND/ÂΜL (ref 0.17–1.22)
MONOCYTES NFR BLD AUTO: 7 % (ref 4–12)
NEUTROPHILS # BLD AUTO: 3.12 THOUSANDS/ÂΜL (ref 1.85–7.62)
NEUTS SEG NFR BLD AUTO: 49 % (ref 43–75)
NRBC BLD AUTO-RTO: 0 /100 WBCS
PLATELET # BLD AUTO: 249 THOUSANDS/UL (ref 149–390)
PMV BLD AUTO: 9.1 FL (ref 8.9–12.7)
POTASSIUM SERPL-SCNC: 4.2 MMOL/L (ref 3.5–5.3)
PROT SERPL-MCNC: 6.6 G/DL (ref 6.4–8.4)
RBC # BLD AUTO: 3.98 MILLION/UL (ref 3.81–5.12)
SODIUM SERPL-SCNC: 134 MMOL/L (ref 135–147)
T4 FREE SERPL-MCNC: 0.89 NG/DL (ref 0.61–1.12)
T4 FREE SERPL-MCNC: 0.91 NG/DL (ref 0.61–1.12)
TRIGL SERPL-MCNC: 235 MG/DL
TSH SERPL DL<=0.05 MIU/L-ACNC: 0.11 UIU/ML (ref 0.45–4.5)
WBC # BLD AUTO: 6.33 THOUSAND/UL (ref 4.31–10.16)

## 2023-07-18 PROCEDURE — 80053 COMPREHEN METABOLIC PANEL: CPT

## 2023-07-18 PROCEDURE — 82043 UR ALBUMIN QUANTITATIVE: CPT

## 2023-07-18 PROCEDURE — 82104 ALPHA-1-ANTITRYPSIN PHENO: CPT

## 2023-07-18 PROCEDURE — 82103 ALPHA-1-ANTITRYPSIN TOTAL: CPT

## 2023-07-18 PROCEDURE — 84439 ASSAY OF FREE THYROXINE: CPT

## 2023-07-18 PROCEDURE — 80061 LIPID PANEL: CPT

## 2023-07-18 PROCEDURE — 85025 COMPLETE CBC W/AUTO DIFF WBC: CPT

## 2023-07-18 PROCEDURE — 83036 HEMOGLOBIN GLYCOSYLATED A1C: CPT

## 2023-07-18 PROCEDURE — 36415 COLL VENOUS BLD VENIPUNCTURE: CPT

## 2023-07-18 PROCEDURE — 82570 ASSAY OF URINE CREATININE: CPT

## 2023-07-18 PROCEDURE — 84443 ASSAY THYROID STIM HORMONE: CPT

## 2023-07-20 ENCOUNTER — APPOINTMENT (OUTPATIENT)
Dept: PHYSICAL THERAPY | Facility: CLINIC | Age: 53
End: 2023-07-20
Payer: OTHER MISCELLANEOUS

## 2023-07-20 ENCOUNTER — OFFICE VISIT (OUTPATIENT)
Dept: GASTROENTEROLOGY | Facility: CLINIC | Age: 53
End: 2023-07-20
Payer: COMMERCIAL

## 2023-07-20 VITALS
WEIGHT: 140.4 LBS | SYSTOLIC BLOOD PRESSURE: 118 MMHG | HEIGHT: 62 IN | DIASTOLIC BLOOD PRESSURE: 72 MMHG | TEMPERATURE: 97.7 F | BODY MASS INDEX: 25.83 KG/M2

## 2023-07-20 DIAGNOSIS — R74.8 ELEVATED LIVER ENZYMES: Primary | ICD-10-CM

## 2023-07-20 DIAGNOSIS — K75.81 NASH (NONALCOHOLIC STEATOHEPATITIS): ICD-10-CM

## 2023-07-20 PROCEDURE — 99214 OFFICE O/P EST MOD 30 MIN: CPT | Performed by: INTERNAL MEDICINE

## 2023-07-20 NOTE — PROGRESS NOTES
[facility] - Outpatient Follow-up Note  Pema Delong 46 y.o. female MRN: [id number]  Encounter: [id number]          ASSESSMENT AND PLAN:      Nonalcoholic steatohepatitis:  · Patient has no physical, laboratory or radiologic evidence of cirrhosis or portal hypertension. Her last elastography in January predicted F3 fibrosis. · She has had sustained weight loss and is currently 140 pounds. LFTs have been stable. · Had a long discussion on incorporating resistance training into her weekly routine as this will help redistribute her body weight [from some fat to lean muscle] given that she feels she has plateaued her weight loss and has been at 140 pounds for several months. · Continue working with her primary care physician to optimize diabetes therapy with GLP-1 agonist and potentially stop metformin. · We will plan to repeat CMP in December and elastography in January. · I will call Corrinne Singh once her list some listed lipase phenotype returns. GERD:  · Currently well controlled. Continue Protonix. FOLLOW-UP:  Return in about 6 months (around 1/20/2024). VISIT DIAGNOSES AND ORDERS:      1. Elevated liver enzymes    2. GIANG (nonalcoholic steatohepatitis)      Orders Placed This Encounter   Procedures   • US elastography   • Comprehensive metabolic panel     ______________________________________________________________________    SUBJECTIVE:       Interval Update 7/20/23  Since her last office visit, Ms. Azar Armas has had updated blood work, including CMP, additional testing for alpha-1 antitrypsin deficiency and lysosomal acid lipase deficiency. Her lysosomal acid lipase levels were normal.  Her alpha-1 antitrypsin levels were low and phenotype is pending. LFTs remain normal.  She has remained on Ozempic and her weight has been stable and 140 pounds. She did sustain an arm injury and has been limited with exercise however is now starting that back up again.     She still occasionally complains of right upper quadrant pain with food. She denies nausea, vomiting, heartburn, reflux, dysphagia or odynophagia. Ms. Christianne Crisostomo denies recent or history of yellow eyes/skin, dark urine, GI bleeding, abdominal distention with fluid, lower extremity swelling, easy bruising, excessive bleeding, pruritus or confusion. History:    HPI From office visit 2/2/23 with Barbara WHITE:    Patient is a 46 y.o. female with PMH significant for CAD and previous MI, DM2, hypertension, hyperlipidemia, Hashimoto's thyroiditis, GERD and IBD who presents today for follow-up regarding GIANG.      Per chart review, patient with chronic mild-moderately elevated transaminases in hepatocellular pattern. She had complete serologic evaluation which was unremarkable and subsequent elastography and liver bx in 5/2020 notable for GIANG with F2 fibrosis. Repeat elastography in 5/2022 notable for progression of fibrosis, F3 fibrosis. She has completed an updated U/S with elastography on 1/30 but results are not available for review at the time of today's appointment.      She has been making great efforts towards weight loss. She has met with bariatric surgery and continues on Ozempic for weight loss. She has also increased her physical activity including daily walks and made improvements to her diet including less unhealthy protein and more vegetables - Also noticed decreased appetite since starting Ozempic. She has lost approx 12 lbs since her last appointment, also with improvement of her A1C (6.5-5.5) and near-normal liver enzymes.      Upon further questions, patient notes strong family history of GIANG including a first-degree cousin, maternal aunt, and maternal grandfather that she is aware of. She also notes that her aunt and grandfather progressed to cirrhosis, with her aunt also developing "liver cancer". REVIEW OF SYSTEMS     Review of Systems   Musculoskeletal: Positive for back pain. All other systems reviewed and are negative.       Historical Information   Patient Active Problem List   Diagnosis   • Hypertension   • GIANG (nonalcoholic steatohepatitis)   • Sinus tachycardia   • Chronic sinusitis   • Anemia   • Anomalous coronary artery origin   • Anxiety   • Combined hyperlipidemia   • Coronary vasospasm (HCC)   • Type 2 diabetes mellitus with hyperglycemia, without long-term current use of insulin (HCC)   • Gastroesophageal reflux disease without esophagitis   • Ground glass opacity present on imaging of lung   • Hypothyroidism due to Hashimoto's thyroiditis   • NSTEMI (non-ST elevated myocardial infarction) ([address])   • Chest pain due to GERD   • Dysfunctional uterine bleeding   • Left ovarian cyst   • Menopausal symptoms   • S/P laparoscopic hysterectomy   • Colon cancer screening   • Chronic back pain   • Lateral epicondylitis of right elbow   • Lumbar spondylosis   • Myofascial pain syndrome   • Lumbar radiculopathy   • Chronic pain syndrome   • Tendonitis of elbow, right   • Chronic low back pain without sciatica   • Overweight   • Strain of left biceps   • Depression   • Hypothyroidism   • Traumatic partial tear of left biceps tendon     Social History     Substance and Sexual Activity   Alcohol Use Never     Social History     Substance and Sexual Activity   Drug Use No     Social History     Tobacco Use   Smoking Status Former   • Packs/day: 0.50   • Years: 4.00   • Total pack years: 2.00   • Types: Cigarettes   • Quit date: 2016   • Years since quittin.4   Smokeless Tobacco Never   Tobacco Comments    2016       Meds/Allergies       Current Outpatient Medications:   •  amLODIPine (NORVASC) 5 mg tablet  •  Blood Glucose Monitoring Suppl (ONETOUCH VERIO IQ SYSTEM) w/Device KIT  •  dicyclomine (BENTYL) 20 mg tablet  •  escitalopram (LEXAPRO) 20 mg tablet  •  Icosapent Ethyl (Vascepa) 1 g CAPS  •  Insulin Pen Needle 32G X 4 MM MISC  •  Lactobacillus (PROBIOTIC ACIDOPHILUS PO)  • levothyroxine 100 mcg tablet  •  Magnesium 300 MG CAPS  •  metFORMIN (GLUCOPHAGE-XR) 500 mg 24 hr tablet  •  methocarbamol (ROBAXIN) 750 mg tablet  •  metoprolol succinate (TOPROL-XL) 25 mg 24 hr tablet  •  Multiple Vitamins-Minerals (ZINC PO)  •  mupirocin (BACTROBAN) 2 % ointment  •  nitroglycerin (NITROSTAT) 0.4 mg SL tablet  •  OneTouch Delica Lancets 93L MISC  •  OneTouch Verio test strip  •  pantoprazole (PROTONIX) 40 mg tablet  •  semaglutide, 1 mg/dose, (Ozempic, 1 MG/DOSE,) 4 mg/3 mL injection pen  •  traMADol (Ultram) 50 mg tablet  •  Turmeric 500 MG CAPS  •  vitamin B-12 (VITAMIN B-12) 1,000 mcg tablet  •  Vitamin D, Cholecalciferol, 50 MCG (2000 UT) CAPS    Allergies   Allergen Reactions   • Erythromycin Chest Pain     Chest pain as child   • Metronidazole Other (See Comments)     SOB   • Sulfa Antibiotics Shortness Of Breath     Per pt, "hives and fever"   • Amoxicillin-Pot Clavulanate Rash     And yeast infection   • Invokana [Canagliflozin] Other (See Comments)     Yeast infection           Objective     Blood pressure 118/72, temperature 97.7 °F (36.5 °C), temperature source Tympanic, height 5' 2" (1.575 m), weight 63.7 kg (140 lb 6.4 oz), not currently breastfeeding. Body mass index is 25.68 kg/m². PHYSICAL EXAM:      Physical Exam  Vitals reviewed. Constitutional:       General: She is not in acute distress. Appearance: Normal appearance. She is not ill-appearing. HENT:      Head: Normocephalic and atraumatic. Nose: Nose normal.      Mouth/Throat:      Mouth: Mucous membranes are moist.      Pharynx: Oropharynx is clear. Eyes:      General: No scleral icterus. Extraocular Movements: Extraocular movements intact. Cardiovascular:      Rate and Rhythm: Normal rate and regular rhythm. Heart sounds: No murmur heard. Pulmonary:      Effort: Pulmonary effort is normal. No respiratory distress. Breath sounds: Normal breath sounds.    Abdominal:      General: Abdomen is flat.      Palpations: Abdomen is soft. There is no shifting dullness, fluid wave, hepatomegaly or splenomegaly. Tenderness: There is no abdominal tenderness. Hernia: No hernia is present. Genitourinary:     Comments: deferred  Musculoskeletal:         General: No swelling. Normal range of motion. Cervical back: Normal range of motion and neck supple. No tenderness. Skin:     General: Skin is warm. Coloration: Skin is not jaundiced. Findings: No bruising or rash. Neurological:      General: No focal deficit present. Mental Status: She is alert and oriented to person, place, and time. Psychiatric:         Mood and Affect: Mood normal.         Lab Results:   Lab Results   Component Value Date     09/02/2015    K 4.2 07/18/2023    CO2 25 07/18/2023     07/18/2023    BUN 6 07/18/2023    CREATININE 0.58 (L) 07/18/2023    GLUCOSE 107 09/02/2015     Lab Results   Component Value Date    WBC 6.33 07/18/2023    HGB 12.7 07/18/2023    HCT 36.3 07/18/2023    MCV 91 07/18/2023     07/18/2023     Lab Results   Component Value Date    TP 6.6 07/18/2023    AST 19 07/18/2023    ALT 24 07/18/2023    BILITOT 0.38 09/02/2015    INR 0.96 08/09/2022      Lab Results   Component Value Date    IRON 131 11/11/2022    FERRITIN 279 11/11/2022     Lab Results   Component Value Date    CHOL 210 08/20/2015    HDL 43 (L) 07/18/2023    TRIG 235 (H) 07/18/2023         Radiology Results:   No results found.       Мария Martínez MD

## 2023-07-20 NOTE — PATIENT INSTRUCTIONS
Continue with your weight maintenance efforts.   Incorporate weight training into your daily routine  Blood work in December  Elastography in January      US scheduled on 1/22/24 at [facility]

## 2023-07-21 LAB
A1AT PHENOTYP SERPL IFE: ABNORMAL
A1AT SERPL-MCNC: 74 MG/DL (ref 101–187)

## 2023-08-09 DIAGNOSIS — E03.8 HYPOTHYROIDISM DUE TO HASHIMOTO'S THYROIDITIS: ICD-10-CM

## 2023-08-09 DIAGNOSIS — E06.3 HYPOTHYROIDISM DUE TO HASHIMOTO'S THYROIDITIS: ICD-10-CM

## 2023-08-09 DIAGNOSIS — E11.65 TYPE 2 DIABETES MELLITUS WITH HYPERGLYCEMIA, WITHOUT LONG-TERM CURRENT USE OF INSULIN (HCC): ICD-10-CM

## 2023-08-09 DIAGNOSIS — E78.2 COMBINED HYPERLIPIDEMIA: ICD-10-CM

## 2023-08-09 DIAGNOSIS — R07.89 OTHER CHEST PAIN: ICD-10-CM

## 2023-08-10 RX ORDER — LEVOTHYROXINE SODIUM 0.1 MG/1
100 TABLET ORAL DAILY
Qty: 90 TABLET | Refills: 0 | Status: SHIPPED | OUTPATIENT
Start: 2023-08-10

## 2023-08-10 RX ORDER — ICOSAPENT ETHYL 1000 MG/1
CAPSULE ORAL
Qty: 360 CAPSULE | Refills: 0 | Status: SHIPPED | OUTPATIENT
Start: 2023-08-10

## 2023-08-10 RX ORDER — AMLODIPINE BESYLATE 5 MG/1
5 TABLET ORAL DAILY
Qty: 90 TABLET | Refills: 0 | Status: SHIPPED | OUTPATIENT
Start: 2023-08-10

## 2023-08-19 DIAGNOSIS — K21.9 GERD WITHOUT ESOPHAGITIS: ICD-10-CM

## 2023-08-19 DIAGNOSIS — F32.A DEPRESSION, UNSPECIFIED DEPRESSION TYPE: ICD-10-CM

## 2023-08-19 DIAGNOSIS — I10 ESSENTIAL HYPERTENSION: ICD-10-CM

## 2023-08-21 RX ORDER — ESCITALOPRAM OXALATE 20 MG/1
20 TABLET ORAL DAILY
Qty: 90 TABLET | Refills: 0 | Status: SHIPPED | OUTPATIENT
Start: 2023-08-21

## 2023-08-21 RX ORDER — PANTOPRAZOLE SODIUM 40 MG/1
40 TABLET, DELAYED RELEASE ORAL
Qty: 90 TABLET | Refills: 0 | Status: SHIPPED | OUTPATIENT
Start: 2023-08-21

## 2023-08-21 RX ORDER — METOPROLOL SUCCINATE 25 MG/1
25 TABLET, EXTENDED RELEASE ORAL 2 TIMES DAILY
Qty: 180 TABLET | Refills: 0 | Status: SHIPPED | OUTPATIENT
Start: 2023-08-21

## 2023-08-23 ENCOUNTER — OFFICE VISIT (OUTPATIENT)
Dept: ENDOCRINOLOGY | Facility: HOSPITAL | Age: 53
End: 2023-08-23
Payer: COMMERCIAL

## 2023-08-23 VITALS
HEIGHT: 62 IN | HEART RATE: 98 BPM | WEIGHT: 144.2 LBS | BODY MASS INDEX: 26.54 KG/M2 | SYSTOLIC BLOOD PRESSURE: 120 MMHG | OXYGEN SATURATION: 98 % | DIASTOLIC BLOOD PRESSURE: 70 MMHG

## 2023-08-23 DIAGNOSIS — I10 PRIMARY HYPERTENSION: Chronic | ICD-10-CM

## 2023-08-23 DIAGNOSIS — E03.8 HYPOTHYROIDISM DUE TO HASHIMOTO'S THYROIDITIS: ICD-10-CM

## 2023-08-23 DIAGNOSIS — E06.3 HYPOTHYROIDISM DUE TO HASHIMOTO'S THYROIDITIS: ICD-10-CM

## 2023-08-23 DIAGNOSIS — E11.65 TYPE 2 DIABETES MELLITUS WITH HYPERGLYCEMIA, WITHOUT LONG-TERM CURRENT USE OF INSULIN (HCC): Primary | ICD-10-CM

## 2023-08-23 PROBLEM — E03.9 HYPOTHYROIDISM: Status: RESOLVED | Noted: 2023-01-30 | Resolved: 2023-08-23

## 2023-08-23 PROCEDURE — 99214 OFFICE O/P EST MOD 30 MIN: CPT | Performed by: INTERNAL MEDICINE

## 2023-08-23 RX ORDER — LEVOTHYROXINE SODIUM 0.1 MG/1
100 TABLET ORAL DAILY
Qty: 90 TABLET | Refills: 0 | Status: SHIPPED | OUTPATIENT
Start: 2023-08-23

## 2023-08-23 RX ORDER — METFORMIN HYDROCHLORIDE 500 MG/1
1000 TABLET, EXTENDED RELEASE ORAL 2 TIMES DAILY WITH MEALS
Qty: 360 TABLET | Refills: 3 | Status: SHIPPED | OUTPATIENT
Start: 2023-08-23

## 2023-08-23 NOTE — PROGRESS NOTES
8/23/2023    Assessment/Plan      Diagnoses and all orders for this visit:    Type 2 diabetes mellitus with hyperglycemia, without long-term current use of insulin ([address])  -     HEMOGLOBIN A1C W/ EAG ESTIMATION Lab Collect; Future  -     Comprehensive metabolic panel Lab Collect; Future  -     TSH, 3rd generation Lab Collect; Future  -     T4, free Lab Collect; Future  -     metFORMIN (GLUCOPHAGE-XR) 500 mg 24 hr tablet; Take 2 tablets (1,000 mg total) by mouth 2 (two) times a day with meals    Hypothyroidism due to Hashimoto's thyroiditis  -     HEMOGLOBIN A1C W/ EAG ESTIMATION Lab Collect; Future  -     Comprehensive metabolic panel Lab Collect; Future  -     TSH, 3rd generation Lab Collect; Future  -     T4, free Lab Collect; Future  -     levothyroxine 100 mcg tablet; Take 1 tablet (100 mcg total) by mouth daily Take 1 tablet 6 days a week and 1/2 tablet on sunday    Primary hypertension  -     HEMOGLOBIN A1C W/ EAG ESTIMATION Lab Collect; Future  -     Comprehensive metabolic panel Lab Collect; Future  -     TSH, 3rd generation Lab Collect; Future  -     T4, free Lab Collect; Future        Assessment/Plan:  1. Type 2 diabetes. Hemoglobin A1c is 5.4%. This does demonstrate excellent control of her diabetes. For now, she will continue the same metformin and Ozempic. We did discuss the possibility of trying to decrease her metformin but she would like to wait on that for now. She will continue to test her blood sugars up to once a day. She will continue to work on dietary changes. 2.  Hypothyroidism due to Hashimoto's thyroiditis. Most recent thyroid function studies do show a low TSH signifying biochemical hyperthyroidism. I have asked her to decrease her levothyroxine to 100 mcg 1 tablet 6 days a week and half tablet on Sunday. 3.  Hypertension. She is normotensive in the office on her current dose of amlodipine and metoprolol. 4.  Hyperlipidemia.   The profile does show elevated triglycerides but was otherwise quite good. She is currently on no medications and just on fish oil. I have asked her to follow-up in 3 months with preceding hemoglobin A1c, CMP, TSH, and free T4.      CC: Diabetes type II, thyroid, blood pressure, lipid follow-up    History of Present Illness     HPI: Marck Sim is a 48y.o. year old female with type 2 diabetes for 6 years hypothyroidism, hypertension,  for follow-up visit. She is on oral agents at home and takes metformin  mg 2 tablets twice a day and Ozempic 1 mg weekly. She denies any polyuria, polydipsia, polyphagia, and blurry vision. She has once a night nocturia. She denies numbness or tingling of the feet. She denies chest pain or shortness of breath. She denies neuropathy, nephropathy, retinopathy, stroke and claudication but does admit to heart attack. Hypoglycemic episodes: Yes occasional. Usually at work if busy and forgot to eat. The patient's last eye exam was in February 2023 with no retinopathy. The patient's last foot exam was in April 2023 at endocrine office visit. Last A1C was   Lab Results   Component Value Date    HGBA1C 5.4 07/18/2023   . Blood Sugar/Glucometer/Pump/CGM review: Checks blood sugars once daily infrequently. Reports 90-low 100s. She has hypothyroidism and takes levothyroxine 100 mcg daily. She denies heat or cold intolerance, palpitation, tremors, or fatigue. Weight is 3 pounds less than last visit. She is a bit on the constipated side. She denies dry skin or brittle nails. She has no hair loss. She denies insomnia, diarrhea  ,  Anxiety or depression. She has hypertension and takes amlodipine 5 mg daily and metoprolol 25 mg twice daily. She denies headache or strokelike symptoms. Review of Systems   Constitutional: Negative for fatigue and unexpected weight change. Weight gain 3 lbs more than last visit. HENT: Negative for trouble swallowing.     Eyes: Negative for visual disturbance. Respiratory: Negative for chest tightness and shortness of breath. Cardiovascular: Negative for chest pain and palpitations. Gastrointestinal: Positive for constipation. Negative for abdominal pain, diarrhea and nausea. A bit on the constipated side. Endocrine: Negative for cold intolerance, heat intolerance, polydipsia, polyphagia and polyuria. Nocturia once a night. Some hot flashes. Skin: Negative for rash and wound. No dry skin. No brittle nails. No hair loss. Neurological: Negative for dizziness, tremors, weakness, light-headedness, numbness and headaches. Psychiatric/Behavioral: Negative for dysphoric mood and sleep disturbance. The patient is not nervous/anxious. Historical Information   Past Medical History:   Diagnosis Date   • Coronary artery disease    • Diabetes mellitus ([address])     Borderline   • Disease of thyroid gland    • DUB (dysfunctional uterine bleeding)    • Fatty liver    • Hashimoto's thyroiditis     Last Assessed:  14   • History of stomach ulcers    • Hypertension    • Hypothyroidism    • Irritable bowel syndrome     Last Assessed:  3/25/14   • Liver disease     elevated enzymes   • Myocardial infarction ([address])     2017   • GIANG (nonalcoholic steatohepatitis)    • Ovarian cyst     left   • Psychogenic polydipsia     Has had hyponatremia from drinking too much water in the past.     Past Surgical History:   Procedure Laterality Date   • ANGIOPLASTY     • CARDIAC CATHETERIZATION     •  SECTION      X 2   • CHOLECYSTECTOMY     • COLONOSCOPY     • CYSTOSCOPY N/A 2019    Procedure: CYSTOSCOPY;  Surgeon: Juancho Hernández MD;  Location: [facility];  Service: Gynecology Oncology   • HYSTERECTOMY     • IR BIOPSY LIVER MASS  2020   • OOPHORECTOMY Right    • UT ESOPHAGOGASTRODUODENOSCOPY TRANSORAL DIAGNOSTIC N/A 2018    Procedure: ESOPHAGOGASTRODUODENOSCOPY (EGD);   Surgeon: Chuy Alejandro MD;  Location: [facility]; Service: Gastroenterology   • NV LAPS TOTAL HYSTERECT 250 GM/< W/RMVL TUBE/OVARY N/A 2019    Procedure: TOTAL LAPAROSCOPIC HYSTERECTOMY, BILATERAL SALPINGECTOMY, LEFT OOPHORECTOMY;  Surgeon: Jeff Leonardo MD;  Location:  MAIN OR;  Service: Gynecology Oncology   • NV RINSJ RPTD BICEPS/TRICEPS TDN DSTL W/WO TDN GRF Left 2023    Procedure: REPAIR TENDON BICEPS, distal;  Surgeon: Jeyson Prince;  Location:  MAIN OR;  Service: Orthopedics   • SINUS SURGERY     • TONSILLECTOMY     • UPPER GASTROINTESTINAL ENDOSCOPY       Social History   Social History     Substance and Sexual Activity   Alcohol Use Not Currently     Social History     Substance and Sexual Activity   Drug Use No     Social History     Tobacco Use   Smoking Status Former   • Packs/day: 0.50   • Years: 4.00   • Total pack years: 2.00   • Types: Cigarettes   • Quit date: 2016   • Years since quittin.5   Smokeless Tobacco Never   Tobacco Comments    2016     Family History:   Family History   Problem Relation Age of Onset   • Pancreatic cancer Mother    • Hypertension Father    • Diabetes type II Father    • Diabetes type II Brother    • No Known Problems Brother    • Diabetes Maternal Grandmother    • Diabetes Paternal Grandmother    • Heart disease Paternal Grandmother    • Hypothyroidism Paternal Grandmother    • Liver cancer Maternal Aunt    • Melanoma Maternal Aunt    • Hypothyroidism Paternal Uncle    • Lung disease Daughter         asthma tendencies   • No Known Problems Son    • Hyperlipidemia Neg Hx    • Stroke Neg Hx        Meds/Allergies   Current Outpatient Medications   Medication Sig Dispense Refill   • amLODIPine (NORVASC) 5 mg tablet Take 1 tablet (5 mg total) by mouth daily 90 tablet 0   • Blood Glucose Monitoring Suppl ([facility]) w/Device KIT Use to test blood sugars twice a day 1 kit 0   • dicyclomine (BENTYL) 20 mg tablet Take 1 tablet (20 mg total) by mouth every 6 (six) hours as needed (every 6 hours) 60 tablet 0   • escitalopram (LEXAPRO) 20 mg tablet Take 1 tablet (20 mg total) by mouth daily 90 tablet 0   • Icosapent Ethyl (Vascepa) 1 g CAPS 2 capsules twice a day 360 capsule 0   • Insulin Pen Needle 32G X 4 MM MISC Use once a week 30 each 1   • Lactobacillus (PROBIOTIC ACIDOPHILUS PO) Take by mouth daily     • levothyroxine 100 mcg tablet Take 1 tablet (100 mcg total) by mouth daily Take 1 tablet 6 days a week and 1/2 tablet on sunday 90 tablet 0   • Magnesium 300 MG CAPS Take 600 mg by mouth daily     • metFORMIN (GLUCOPHAGE-XR) 500 mg 24 hr tablet Take 2 tablets (1,000 mg total) by mouth 2 (two) times a day with meals 360 tablet 3   • methocarbamol (ROBAXIN) 750 mg tablet 1 tab PO HS. 90 tablet 1   • metoprolol succinate (TOPROL-XL) 25 mg 24 hr tablet Take 1 tablet (25 mg total) by mouth 2 (two) times a day 180 tablet 0   • Multiple Vitamins-Minerals (ZINC PO) Take by mouth     • mupirocin (BACTROBAN) 2 % ointment Apply topically 3 (three) times a day 22 g 0   • nitroglycerin (NITROSTAT) 0.4 mg SL tablet Place 1 tablet (0.4 mg total) under the tongue every 5 (five) minutes as needed for chest pain 90 tablet 0   • OneTouch Delica Lancets 47P MISC Use to test blood sugars twice a day 200 each 6   • OneTouch Verio test strip Use as instructed to test blood sugars twice a day 200 each 0   • pantoprazole (PROTONIX) 40 mg tablet Take 1 tablet (40 mg total) by mouth daily before breakfast 90 tablet 0   • semaglutide, 1 mg/dose, (Ozempic, 1 MG/DOSE,) 4 mg/3 mL injection pen Inject 1 mg once a week 9 mL 0   • traMADol (Ultram) 50 mg tablet 1 tab BID prn for ongoing therapy 60 tablet 3   • Turmeric 500 MG CAPS Take by mouth     • vitamin B-12 (VITAMIN B-12) 1,000 mcg tablet Take by mouth daily     • Vitamin D, Cholecalciferol, 50 MCG (2000 UT) CAPS Take 4,000 Units by mouth daily       No current facility-administered medications for this visit.      Allergies   Allergen Reactions   • Erythromycin Chest Pain Chest pain as child   • Metronidazole Other (See Comments)     SOB   • Sulfa Antibiotics Shortness Of Breath     Per pt, "hives and fever"   • Amoxicillin-Pot Clavulanate Rash     And yeast infection   • Invokana [Canagliflozin] Other (See Comments)     Yeast infection       Objective   Vitals: Blood pressure 120/70, pulse 98, height 5' 2" (1.575 m), weight 65.4 kg (144 lb 3.2 oz), SpO2 98 %, not currently breastfeeding. Invasive Devices     Peripheral Intravenous Line  Duration           Peripheral IV 02/24/23 Dorsal (posterior); Right Hand 180 days                Physical Exam  Vitals reviewed. Constitutional:       Appearance: Normal appearance. She is well-developed. HENT:      Head: Normocephalic and atraumatic. Eyes:      Extraocular Movements: Extraocular movements intact. Conjunctiva/sclera: Conjunctivae normal.      Comments: No lid lag, stare, proptosis, or periorbital edema. Neck:      Thyroid: No thyromegaly. Vascular: No carotid bruit. Comments: Thyroid normal in size. No palpable thyroid nodules. Cardiovascular:      Rate and Rhythm: Normal rate and regular rhythm. Heart sounds: Normal heart sounds. No murmur heard. Pulmonary:      Effort: Pulmonary effort is normal.      Breath sounds: Normal breath sounds. No wheezing. Abdominal:      Palpations: Abdomen is soft. Musculoskeletal:         General: No deformity. Normal range of motion. Cervical back: Normal range of motion and neck supple. Right lower leg: No edema. Left lower leg: No edema. Comments: No tremor of the outstretched hands. Lymphadenopathy:      Cervical: No cervical adenopathy. Skin:     General: Skin is warm and dry. Findings: No rash. Neurological:      Mental Status: She is alert and oriented to person, place, and time. Deep Tendon Reflexes: Reflexes are normal and symmetric.       Comments: Patellar deep tendon reflexes normal.         The history was obtained from the review of the chart and from the patient. Lab Results:    Most recent Alc is  Lab Results   Component Value Date    HGBA1C 5.4 07/18/2023           Blood work performed on 7/18/2023 showed a CMP with a glucose of 113 fasting but was otherwise normal except for a sodium of 134. Urine microalbumin to creatinine ratio is less than 12. TSH is 0.111 with a free T4 of 0.89. CBC is normal.    Lab Results   Component Value Date    CREATININE 0.58 (L) 07/18/2023    CREATININE 0.59 (L) 04/04/2023    CREATININE 0.72 12/07/2022    BUN 6 07/18/2023     09/02/2015    K 4.2 07/18/2023     07/18/2023    CO2 25 07/18/2023     eGFR   Date Value Ref Range Status   07/18/2023 106 ml/min/1.73sq m Final     Total cholesterol 170, LDL cholesterol 80.     Lab Results   Component Value Date    CHOL 210 08/20/2015    HDL 43 (L) 07/18/2023    TRIG 235 (H) 07/18/2023       Lab Results   Component Value Date    ALT 24 07/18/2023    AST 19 07/18/2023    ALKPHOS 73 07/18/2023    BILITOT 0.38 09/02/2015       Lab Results   Component Value Date    FREET4 0.91 07/18/2023    FREET4 0.89 07/18/2023             Future Appointments   Date Time Provider [address]   9/5/2023  8:20 AM Donn Waldrop MD CARD QU [facility]   9/13/2023  8:00 AM Keagan Jackson DO PULM KAREN [facility]   9/26/2023  8:30 AM CINDY June QTN [facility]   10/5/2023  9:30 AM DAVID Figueredo WGMAKENNA MGT CTR [facility]   11/1/2023 11:00 AM Anmol Zimmer DO MAC MED [facility]   11/15/2023  8:00 AM DAVID Barker HEM ONC [facility]   12/15/2023  8:30 AM Marine Mcmahon MD OBGYN ANDRES [facility]   1/4/2024  9:40 AM Alireza Metz MD ENDO QU [facility]   1/22/2024  7:15 AM [facility]  [facility] [id number] [facility]

## 2023-08-23 NOTE — PATIENT INSTRUCTIONS
Hgba1c is 5.4%. this is excellent. Continue the same metformin and ozempic. We can start to decrease the metformin in the future. Continue to work on diet. Continue to check blood sugars up to once daily. The thyroid is slightly over active. Let's decrease the levothyroxine to 100 mcg 1 tablet 6 days a week and 1/2 tablet on Sunday. Follow up in 3 months with blood work.
Was done through completion of first feed

## 2023-08-25 ENCOUNTER — HOSPITAL ENCOUNTER (OUTPATIENT)
Dept: CT IMAGING | Facility: HOSPITAL | Age: 53
Discharge: HOME | End: 2023-08-25
Attending: INTERNAL MEDICINE
Payer: COMMERCIAL

## 2023-08-25 DIAGNOSIS — K75.81 NASH (NONALCOHOLIC STEATOHEPATITIS): ICD-10-CM

## 2023-08-25 DIAGNOSIS — R10.84 GENERALIZED ABDOMINAL PAIN: ICD-10-CM

## 2023-08-25 DIAGNOSIS — K75.81 NASH (NONALCOHOLIC STEATOHEPATITIS): Primary | ICD-10-CM

## 2023-08-25 DIAGNOSIS — K74.00 HEPATIC FIBROSIS: ICD-10-CM

## 2023-08-25 DIAGNOSIS — D50.8 OTHER IRON DEFICIENCY ANEMIA: ICD-10-CM

## 2023-08-25 PROCEDURE — G1004 CDSM NDSC: HCPCS

## 2023-08-25 PROCEDURE — 74177 CT ABD & PELVIS W/CONTRAST: CPT

## 2023-08-25 RX ADMIN — IOHEXOL 50 ML: 240 INJECTION, SOLUTION INTRATHECAL; INTRAVASCULAR; INTRAVENOUS; ORAL at 14:11

## 2023-08-25 RX ADMIN — IOHEXOL 100 ML: 350 INJECTION, SOLUTION INTRAVENOUS at 14:11

## 2023-08-27 DIAGNOSIS — K58.9 IRRITABLE BOWEL SYNDROME WITHOUT DIARRHEA: ICD-10-CM

## 2023-08-28 RX ORDER — DICYCLOMINE HCL 20 MG
20 TABLET ORAL EVERY 6 HOURS PRN
Qty: 60 TABLET | Refills: 0 | Status: SHIPPED | OUTPATIENT
Start: 2023-08-28

## 2023-08-31 DIAGNOSIS — G89.4 CHRONIC PAIN SYNDROME: ICD-10-CM

## 2023-08-31 DIAGNOSIS — M54.50 CHRONIC LOW BACK PAIN WITHOUT SCIATICA, UNSPECIFIED BACK PAIN LATERALITY: ICD-10-CM

## 2023-08-31 DIAGNOSIS — M54.16 LUMBAR RADICULOPATHY: ICD-10-CM

## 2023-08-31 DIAGNOSIS — G89.29 CHRONIC LOW BACK PAIN WITHOUT SCIATICA, UNSPECIFIED BACK PAIN LATERALITY: ICD-10-CM

## 2023-08-31 DIAGNOSIS — M47.816 LUMBAR SPONDYLOSIS: ICD-10-CM

## 2023-08-31 DIAGNOSIS — M79.18 MYOFASCIAL PAIN SYNDROME: ICD-10-CM

## 2023-09-05 ENCOUNTER — OFFICE VISIT (OUTPATIENT)
Dept: CARDIOLOGY CLINIC | Facility: CLINIC | Age: 53
End: 2023-09-05
Payer: COMMERCIAL

## 2023-09-05 VITALS
DIASTOLIC BLOOD PRESSURE: 76 MMHG | HEART RATE: 70 BPM | BODY MASS INDEX: 26.5 KG/M2 | WEIGHT: 144 LBS | HEIGHT: 62 IN | OXYGEN SATURATION: 100 % | SYSTOLIC BLOOD PRESSURE: 118 MMHG

## 2023-09-05 DIAGNOSIS — Q24.5 ANOMALOUS CORONARY ARTERY ORIGIN: Primary | ICD-10-CM

## 2023-09-05 DIAGNOSIS — E78.2 COMBINED HYPERLIPIDEMIA: ICD-10-CM

## 2023-09-05 PROCEDURE — 99214 OFFICE O/P EST MOD 30 MIN: CPT | Performed by: INTERNAL MEDICINE

## 2023-09-05 RX ORDER — METHOCARBAMOL 750 MG/1
TABLET, FILM COATED ORAL
Qty: 90 TABLET | Refills: 0 | OUTPATIENT
Start: 2023-09-05

## 2023-09-05 NOTE — PROGRESS NOTES
Cardiology Follow Up    Patel Hidalgo  1970  [id number]  [address] [place] [address] [facility] Mickey Courtney  [address] [facility] [id number] [phone]    1. Anomalous coronary artery origin        2. Combined hyperlipidemia            Interval History: Followup anomalous coronary, chest pain    Doing well. No chest pain, dyspnea or palpitations.      Medical Problems     Problem List     Hypertension (Chronic)    GIANG (nonalcoholic steatohepatitis)    Overview Signed 12/16/2020  8:04 AM by Neil Ch DO     S/p liver biopsy ( grade 2 inflammation andStage II fibrosis) sees GI ([facility])         Sinus tachycardia    Chronic sinusitis (Chronic)    Anemia    Anomalous coronary artery origin    Anxiety    Combined hyperlipidemia    Coronary vasospasm (HCC)    Type 2 diabetes mellitus with hyperglycemia, without long-term current use of insulin (HCC)      Lab Results   Component Value Date    HGBA1C 5.4 07/18/2023         Gastroesophageal reflux disease without esophagitis    Ground glass opacity present on imaging of lung    Hypothyroidism due to Hashimoto's thyroiditis    NSTEMI (non-ST elevated myocardial infarction) ([address])    Chest pain due to GERD    Overview Signed 6/18/2018  5:05 PM by Elena Cespedes MD     Added automatically from request for surgery [id number]         Dysfunctional uterine bleeding    Left ovarian cyst    Menopausal symptoms    S/P laparoscopic hysterectomy    Colon cancer screening    Chronic back pain    Lateral epicondylitis of right elbow    Lumbar spondylosis    Myofascial pain syndrome    Lumbar radiculopathy    Chronic pain syndrome    Tendonitis of elbow, right    Chronic low back pain without sciatica    Overweight    Strain of left biceps    Depression    Traumatic partial tear of left biceps tendon        Past Medical History:   Diagnosis Date   • Coronary artery disease    • Diabetes mellitus ([address])     Borderline   • Disease of thyroid gland    • DUB (dysfunctional uterine bleeding)    • Fatty liver    • Hashimoto's thyroiditis     Last Assessed:  14   • History of stomach ulcers    • Hypertension    • Hypothyroidism    • Irritable bowel syndrome     Last Assessed:  3/25/14   • Liver disease     elevated enzymes   • Myocardial infarction ([address])     2017   • GIANG (nonalcoholic steatohepatitis)    • Ovarian cyst     left   • Psychogenic polydipsia     Has had hyponatremia from drinking too much water in the past.     Social History     Socioeconomic History   • Marital status: /Civil Union     Spouse name: Not on file   • Number of children: Not on file   • Years of education: Not on file   • Highest education level: Not on file   Occupational History   • Not on file   Tobacco Use   • Smoking status: Former     Packs/day: 0.50     Years: 4.00     Total pack years: 2.00     Types: Cigarettes     Quit date: 2016     Years since quittin.5   • Smokeless tobacco: Never   • Tobacco comments:     2016   Vaping Use   • Vaping Use: Never used   Substance and Sexual Activity   • Alcohol use: Not Currently   • Drug use: No   • Sexual activity: Yes     Partners: Male     Birth control/protection: None, Female Sterilization   Other Topics Concern   • Not on file   Social History Narrative    Feels safe at home     Social Determinants of Health     Financial Resource Strain: Not on file   Food Insecurity: Not on file   Transportation Needs: Not on file   Physical Activity: Not on file   Stress: Not on file   Social Connections: Not on file   Intimate Partner Violence: Not on file   Housing Stability: Not on file      Family History   Problem Relation Age of Onset   • Pancreatic cancer Mother    • Hypertension Father    • Diabetes type II Father    • Diabetes type II Brother    • No Known Problems Brother    • Diabetes Maternal Grandmother    • Diabetes Paternal Grandmother    • Heart disease Paternal Grandmother    • Hypothyroidism Paternal Grandmother    • Liver cancer Maternal Aunt    • Melanoma Maternal Aunt    • Hypothyroidism Paternal Uncle    • Lung disease Daughter         asthma tendencies   • No Known Problems Son    • Hyperlipidemia Neg Hx    • Stroke Neg Hx      Past Surgical History:   Procedure Laterality Date   • ANGIOPLASTY     • CARDIAC CATHETERIZATION     •  SECTION      X 2   • CHOLECYSTECTOMY     • COLONOSCOPY     • CYSTOSCOPY N/A 2019    Procedure: CYSTOSCOPY;  Surgeon: Jeff Leonardo MD;  Location: [facility];  Service: Gynecology Oncology   • HYSTERECTOMY     • IR BIOPSY LIVER MASS  2020   • OOPHORECTOMY Right    • NJ ESOPHAGOGASTRODUODENOSCOPY TRANSORAL DIAGNOSTIC N/A 2018    Procedure: ESOPHAGOGASTRODUODENOSCOPY (EGD); Surgeon: Suzanne Bah MD;  Location: [facility];   Service: Gastroenterology   • NJ LAPS TOTAL HYSTERECT 250 GM/< W/RMVL TUBE/OVARY N/A 2019    Procedure: TOTAL LAPAROSCOPIC HYSTERECTOMY, BILATERAL SALPINGECTOMY, LEFT OOPHORECTOMY;  Surgeon: Jeff Leonardo MD;  Location: [facility];  Service: Gynecology Oncology   • NJ RINSJ RPTD BICEPS/TRICEPS TDN DSTL W/WO TDN GRF Left 2023    Procedure: REPAIR TENDON BICEPS, distal;  Surgeon: Jeyson Prince;  Location: [facility];  Service: Orthopedics   • SINUS SURGERY     • TONSILLECTOMY     • UPPER GASTROINTESTINAL ENDOSCOPY         Current Outpatient Medications:   •  amLODIPine (NORVASC) 5 mg tablet, Take 1 tablet (5 mg total) by mouth daily, Disp: 90 tablet, Rfl: 0  •  Blood Glucose Monitoring Suppl ([employer] SYSTEM) w/Device KIT, Use to test blood sugars twice a day, Disp: 1 kit, Rfl: 0  •  dicyclomine (BENTYL) 20 mg tablet, Take 1 tablet (20 mg total) by mouth every 6 (six) hours as needed (every 6 hours), Disp: 60 tablet, Rfl: 0  •  escitalopram (LEXAPRO) 20 mg tablet, Take 1 tablet (20 mg total) by mouth daily, Disp: 90 tablet, Rfl: 0  •  Icosapent Ethyl (Vascepa) 1 g CAPS, 2 capsules twice a day, Disp: 360 capsule, Rfl: 0  •  Insulin Pen Needle 32G X 4 MM MISC, Use once a week, Disp: 30 each, Rfl: 1  •  Lactobacillus (PROBIOTIC ACIDOPHILUS PO), Take by mouth daily, Disp: , Rfl:   •  levothyroxine 100 mcg tablet, Take 1 tablet (100 mcg total) by mouth daily Take 1 tablet 6 days a week and 1/2 tablet on sunday, Disp: 90 tablet, Rfl: 0  •  Magnesium 300 MG CAPS, Take 600 mg by mouth daily, Disp: , Rfl:   •  metFORMIN (GLUCOPHAGE-XR) 500 mg 24 hr tablet, Take 2 tablets (1,000 mg total) by mouth 2 (two) times a day with meals, Disp: 360 tablet, Rfl: 3  •  methocarbamol (ROBAXIN) 750 mg tablet, 1 tab PO HS., Disp: 90 tablet, Rfl: 1  •  metoprolol succinate (TOPROL-XL) 25 mg 24 hr tablet, Take 1 tablet (25 mg total) by mouth 2 (two) times a day, Disp: 180 tablet, Rfl: 0  •  Multiple Vitamins-Minerals (ZINC PO), Take by mouth, Disp: , Rfl:   •  mupirocin (BACTROBAN) 2 % ointment, Apply topically 3 (three) times a day, Disp: 22 g, Rfl: 0  •  nitroglycerin (NITROSTAT) 0.4 mg SL tablet, Place 1 tablet (0.4 mg total) under the tongue every 5 (five) minutes as needed for chest pain, Disp: 90 tablet, Rfl: 0  •  OneTouch Delica Lancets 96N MISC, Use to test blood sugars twice a day, Disp: 200 each, Rfl: 6  •  OneTouch Verio test strip, Use as instructed to test blood sugars twice a day, Disp: 200 each, Rfl: 0  •  pantoprazole (PROTONIX) 40 mg tablet, Take 1 tablet (40 mg total) by mouth daily before breakfast, Disp: 90 tablet, Rfl: 0  •  semaglutide, 1 mg/dose, (Ozempic, 1 MG/DOSE,) 4 mg/3 mL injection pen, Inject 1 mg once a week, Disp: 9 mL, Rfl: 0  •  traMADol (Ultram) 50 mg tablet, 1 tab BID prn for ongoing therapy, Disp: 60 tablet, Rfl: 3  •  Turmeric 500 MG CAPS, Take by mouth, Disp: , Rfl:   •  vitamin B-12 (VITAMIN B-12) 1,000 mcg tablet, Take by mouth daily, Disp: , Rfl:   •  Vitamin D, Cholecalciferol, 50 MCG (2000 UT) CAPS, Take 4,000 Units by mouth daily, Disp: , Rfl:   Allergies   Allergen Reactions   • Erythromycin Chest Pain     Chest pain as child   • Metronidazole Other (See Comments)     SOB   • Sulfa Antibiotics Shortness Of Breath     Per pt, "hives and fever"   • Amoxicillin-Pot Clavulanate Rash     And yeast infection   • Invokana [Canagliflozin] Other (See Comments)     Yeast infection       Labs:     Chemistry        Component Value Date/Time     09/02/2015 1020    K 4.2 07/18/2023 0758    K 3.8 09/02/2015 1020     07/18/2023 0758     09/02/2015 1020    CO2 25 07/18/2023 0758    CO2 24.3 09/02/2015 1020    BUN 6 07/18/2023 0758    BUN 13 09/02/2015 1020    CREATININE 0.58 (L) 07/18/2023 0758    CREATININE 0.79 09/02/2015 1020        Component Value Date/Time    CALCIUM 9.2 07/18/2023 0758    CALCIUM 9.1 09/02/2015 1020    ALKPHOS 73 07/18/2023 0758    ALKPHOS 78 09/02/2015 1020    AST 19 07/18/2023 0758    AST 37 09/02/2015 1020    ALT 24 07/18/2023 0758    ALT 62 09/02/2015 1020    BILITOT 0.38 09/02/2015 1020            Lab Results   Component Value Date    CHOL 210 08/20/2015    CHOL 179 07/19/2014    CHOL 197 12/05/2013     Lab Results   Component Value Date    HDL 43 (L) 07/18/2023    HDL 43 (L) 04/04/2023    HDL 39 (L) 04/20/2022     Lab Results   Component Value Date    LDLCALC 80 07/18/2023    LDLCALC 82 04/04/2023    LDLCALC 112 (H) 04/20/2022     Lab Results   Component Value Date    TRIG 235 (H) 07/18/2023    TRIG 228 (H) 04/04/2023    TRIG 211 (H) 04/20/2022     No results found for: "CHOLHDL"    Imaging: CT abdomen pelvis w contrast    Result Date: 8/25/2023  Narrative: CT ABDOMEN AND PELVIS WITH IV CONTRAST INDICATION:   E65.45: Nonalcoholic steatohepatitis (GIANG) K74.00: Hepatic fibrosis, unspecified D50.8: Other iron deficiency anemias R10.84: Generalized abdominal pain. Fatty liver, severe abdominal pain. COMPARISON: CT abdomen pelvis 4/15/2005. Abdominal ultrasound 1/30/2023. TECHNIQUE:  CT examination of the abdomen and pelvis was performed.  Multiplanar 2D reformatted images were created from the source data. This examination, like all CT scans performed in the [facility], was performed utilizing techniques to minimize radiation dose exposure, including the use of iterative reconstruction and automated exposure control. Radiation dose length product (DLP) for this visit:  1059.67 mGy-cm IV Contrast: 100 mL of iohexol (OMNIPAQUE) Enteric Contrast: Enteric contrast was administered. FINDINGS: ABDOMEN LOWER CHEST:  No clinically significant abnormality identified in the visualized lower chest. LIVER/BILIARY TREE: A 0.5 cm focus of arterial hyperenhancement in segment 8 (2/28), which is to isodense to the liver on all other sequences, consistent with a benign transient hepatic attenuation difference. A 0.4 cm benign hepatic cyst in segment 2 (3/28). No suspicious hepatic lesion. No significant decreased attenuation of liver compared to the spleen to suggest hepatic steatosis. The hepatic and portal veins are patent. Normal contour of the liver surface. No biliary ductal dilatation. GALLBLADDER: Gallbladder is surgically absent. SPLEEN: A 0.7 cm cystic lesion in the inferior aspect of the spleen (3/68), likely benign in etiology such as a splenic epithelial cyst or splenic lymphangioma. PANCREAS:  Unremarkable. ADRENAL GLANDS:  Unremarkable. KIDNEYS/URETERS: Nonobstructing 3 mm right lower pole renal calculus. No hydronephrosis. STOMACH AND BOWEL: Large amount of undigested material within the stomach, which may indicate decreased gastric motility. Moderate colonic stool burden, likely reflective of colonic hypomotility. No bowel obstruction. APPENDIX: A normal appendix was visualized. ABDOMINOPELVIC CAVITY:  No ascites. No pneumoperitoneum. No lymphadenopathy. VESSELS:  Unremarkable for patient's age. PELVIS REPRODUCTIVE ORGANS: Surgical changes of prior hysterectomy. URINARY BLADDER:  Unremarkable. ABDOMINAL WALL/INGUINAL REGIONS:  Unremarkable.  OSSEOUS STRUCTURES:  No acute fracture or destructive osseous lesion. A 1.0 cm S1 vertebral hemangioma (604/91), stable since MRI pelvis 7/26/2019. Impression: No acute abdominopelvic process or findings of hepatic steatosis on contrast-enhanced CT. Large amount of undigested material within the stomach, which may indicate decreased gastric motility, and a moderate colonic stool burden, likely reflective of colonic hypomotility. Nonobstructing right nephrolithiasis. Additional incidental findings, as described above. Workstation performed: [id number]           Review of Systems   Constitutional: Negative. HENT: Negative. Eyes: Negative. Cardiovascular: Negative. Respiratory: Negative. Endocrine: Negative. Hematologic/Lymphatic: Negative. Skin: Negative. Musculoskeletal: Negative. Gastrointestinal: Negative. Genitourinary: Negative. Neurological: Negative. Psychiatric/Behavioral: Negative. Allergic/Immunologic: Negative. Vitals:    09/05/23 0921   BP: 118/76   Pulse: 70   SpO2: 100%           Physical Exam  Vitals and nursing note reviewed. Constitutional:       Appearance: Normal appearance. HENT:      Head: Normocephalic. Nose: Nose normal.      Mouth/Throat:      Mouth: Mucous membranes are moist.   Eyes:      General: No scleral icterus. Conjunctiva/sclera: Conjunctivae normal.   Cardiovascular:      Rate and Rhythm: Normal rate and regular rhythm. Heart sounds: No murmur heard. No gallop. Pulmonary:      Effort: Pulmonary effort is normal. No respiratory distress. Breath sounds: Normal breath sounds. No wheezing or rales. Abdominal:      General: Abdomen is flat. Bowel sounds are normal. There is no distension. Palpations: Abdomen is soft. Tenderness: There is no abdominal tenderness. There is no guarding. Musculoskeletal:      Cervical back: Normal range of motion and neck supple. Right lower leg: No edema. Left lower leg: No edema. Skin:     General: Skin is warm and dry. Neurological:      General: No focal deficit present. Mental Status: She is alert and oriented to person, place, and time. Psychiatric:         Mood and Affect: Mood normal.         Behavior: Behavior normal.         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 Overall she is doing well. Continue with current med rx.       Hypertriglyceridemia:  and LDL 80.       The patient was counseled regarding diagnostic results, instructions for management, risk factor reductions, impressions. total time of encounter was 25 minutes and 15 minutes was spent counseling.

## 2023-09-13 ENCOUNTER — CONSULT (OUTPATIENT)
Dept: PULMONOLOGY | Facility: CLINIC | Age: 53
End: 2023-09-13
Payer: COMMERCIAL

## 2023-09-13 VITALS
HEART RATE: 85 BPM | HEIGHT: 62 IN | RESPIRATION RATE: 18 BRPM | OXYGEN SATURATION: 98 % | TEMPERATURE: 97.5 F | SYSTOLIC BLOOD PRESSURE: 102 MMHG | BODY MASS INDEX: 26.5 KG/M2 | DIASTOLIC BLOOD PRESSURE: 70 MMHG | WEIGHT: 144 LBS

## 2023-09-13 DIAGNOSIS — E88.01 ALPHA-1-ANTITRYPSIN DEFICIENCY (HCC): Primary | ICD-10-CM

## 2023-09-13 PROBLEM — Z14.8 ALPHA-1-ANTITRYPSIN DEFICIENCY CARRIER: Status: ACTIVE | Noted: 2023-09-13

## 2023-09-13 PROCEDURE — 99204 OFFICE O/P NEW MOD 45 MIN: CPT | Performed by: INTERNAL MEDICINE

## 2023-09-13 NOTE — ASSESSMENT & PLAN NOTE
Patient was diagnosed with alpha 1 antitrypsin deficiency with phenotype MZ. According to the patient, hepatology does not feel this warrants treatment from their perspective. From a pulmonary perspective, there is no role for treatment unless the patient is demonstrating evidence of obstructive lung disease/emphysema. She does not have any alarming symptoms and the imaging we have thus far of the lungs shows no emphysema. I would like to perform PFTs to serve as a baseline. We discussed the importance of maintaining abstinence from smoking which would be the greatest risk factor for her to develop lung disease in the setting of alpha-1 antitrypsin deficiency. She was also counseled that her children do have a 50% chance of having inherited the Z allele from her and they should avoid smoking and other potential noxious stimuli to the lungs to avoid long term issues. All of her questions were answered. I will call her with PFT results to determine if additional testing is needed.   At this point I would not offer augmentation therapy

## 2023-09-13 NOTE — PROGRESS NOTES
Initial SW/CM Assessment/Plan of Care Note     Baseline Assessment  26 year old admitted 4/16/2023 as Inpatient with a diagnosis of MS exacerbation.   Prior to admission patient was living with Parent and residing at Apartment    .   Patient’s Primary Care Provider is Kenna Martinez MD.     Progress Note  Met with patient for discharge planning assessment. Pt states she is living with her mother who provides assist with ADL's as needed. Pt was ambulating independently prior to admission. Discussed current recommendation for acute rehab on discharge. Pt would like referral to [facility]. She would like to meet with liaison before making final decision. Discussed alternate option of home health or outpatient therapies. Referral sent to [facility].    Plan  Patient/Family Discharge Goal: Intensive therapy program   Is patient/family goal achievable: Yes    SW/CM - Recommendations for Discharge: Intensive therapy program   PT - Recommendations for Discharge:   Patient is appropriate for intensive daily Physical Therapy with the oversight of a Physical Medicine and Rehabilitation physician  OT - Recommendations for Discharge:      SLP - Recommendations for Discharge:      Barriers to Discharge  Identified Barriers to Discharge/Transition Planning: Medical necessity for acute care        Anticipate patient will need post-hospital services. Necessary services are available.  Anticipate patient can return to the environment from which patient entered the hospital.   Anticipate patient can provide self-care at discharge.    Refer to / Flowsheet for objective data.     Medical History  Past Medical History:   Diagnosis Date   • Arthritis     POLY   • Bipolar I disorder, most recent episode depressed (CMD)    • Artie-Danlos syndrome    • Hidradenitis suppurativa     Inflammatory mass of right groin   • History of COVID-19 2022    Not hospitalized & no residual   • History of Portia-Parkinson-White (WPW) syndrome     cardiac  Pulmonary Outpatient Consultation Note   Herminio Orona 48 y.o. female MRN: [id number]  9/13/2023    Referring provider: No referring provider defined for this encounter. Assessment/Plan:      Alpha-1-antitrypsin deficiency carrier  Patient was diagnosed with alpha 1 antitrypsin deficiency with phenotype MZ. According to the patient, hepatology does not feel this warrants treatment from their perspective. From a pulmonary perspective, there is no role for treatment unless the patient is demonstrating evidence of obstructive lung disease/emphysema. She does not have any alarming symptoms and the imaging we have thus far of the lungs shows no emphysema. I would like to perform PFTs to serve as a baseline. We discussed the importance of maintaining abstinence from smoking which would be the greatest risk factor for her to develop lung disease in the setting of alpha-1 antitrypsin deficiency. She was also counseled that her children do have a 50% chance of having inherited the Z allele from her and they should avoid smoking and other potential noxious stimuli to the lungs to avoid long term issues. All of her questions were answered. I will call her with PFT results to determine if additional testing is needed. At this point I would not offer augmentation therapy      Visit orders:    Diagnoses and all orders for this visit:    Alpha-1-antitrypsin deficiency ([address])  -     Complete PFT with post bronchodilator; Future        History of Present Illness   HPI:  Herminio Orona is a 48 y.o. female who is here today for evaluation regarding alpha-1 antitrypsin deficiency. Patient was diagnosed with nonalcoholic steatohepatitis and underwent serologic testing which included alpha-1 antitrypsin phenotype. This showed she has MZ phenotype level of 74. From a pulmonary perspective, she has no known lung disease.   Several years ago, she would periodically require an inhaler if she would catch a ablation 2009 (States now only occas. palpitaions or chest pain)   • Hypoglycemia    • MS (multiple sclerosis) (CMD) 2021    diagnosed 2021 occas. w/c use   • Need for assistance due to unsteady gait     At times will need to use leg braces or a w/c   • PONV (postoperative nausea and vomiting)        Prior to Admission Status  Functional Status  Ambulation: Independent/Self  Bathing: Independent/Self, Family (Mother provides SBA)  Dressing: Independent/Self, Family (Mother provides SBA)  Meal Preparation: Family  Transportation: Family    Agency/Support  Type of Services Prior to Hospitalization: None  Support Systems: Parent   Home Devices/Equipment: None  Mobility Assist Devices: None  Sensory Support Devices: None      Current Status  PT Ambulation Tips: Use gait belt, Use walker  PT Transfer Tips: Use gait belt, Use walker   OT Bathing Tips:    OT Dressing Tips:    OT Toileting Tips:    OT Feeding Tips:    SLP Swallow/Feeding Tips:    SLP Comm/Cog Tips:    Current Mental Status:    Stressors:      Insurance  Primary: MERIDIAN  Secondary: N/A    Disposition Recommendations:  SW/CM recommendation for discharge: Intensive therapy program          cold/bronchitis. Otherwise, she does not have a formal diagnosis of asthma, COPD, emphysema or chronic bronchitis. She has a brief, less than 10-pack-year smoking history, quit 2016. She has shortness of breath with heavy exertion but is able to tolerate light to moderate activity. She has no issues with chronic cough, wheeze or sputum production. She has no family history of asthma or COPD and states that both her mother and grandmother were both smokers but she is not aware of any lung disease in either of them. Review of Systems   Constitutional: Negative for chills, fever and unexpected weight change. HENT: Negative for postnasal drip and sore throat. Eyes: Negative for visual disturbance. Respiratory:        As noted in HPI   Cardiovascular: Negative for chest pain. Gastrointestinal: Negative for abdominal pain, diarrhea and vomiting. Musculoskeletal: Negative for arthralgias. Skin: Negative for rash. Neurological: Negative for headaches. Hematological: Negative for adenopathy. Psychiatric/Behavioral: Negative. All other systems reviewed and are negative.         Historical Information   Past Medical History:   Diagnosis Date   • Coronary artery disease    • Diabetes mellitus ([address] [facility])     Borderline   • Disease of thyroid gland    • DUB (dysfunctional uterine bleeding)    • Fatty liver    • Hashimoto's thyroiditis     Last Assessed:  14   • History of stomach ulcers    • Hypertension    • Hypothyroidism    • Irritable bowel syndrome     Last Assessed:  3/25/14   • Liver disease     elevated enzymes   • Myocardial infarction ([address])     2017   • GIANG (nonalcoholic steatohepatitis)    • Ovarian cyst     left   • Psychogenic polydipsia     Has had hyponatremia from drinking too much water in the past.     Past Surgical History:   Procedure Laterality Date   • ANGIOPLASTY     • CARDIAC CATHETERIZATION     •  SECTION      X 2   • CHOLECYSTECTOMY     • COLONOSCOPY     • CYSTOSCOPY N/A 2019    Procedure: CYSTOSCOPY;  Surgeon: Christopher Bae MD;  Location: [facility];  Service: Gynecology Oncology   • HYSTERECTOMY     • IR BIOPSY LIVER MASS  2020   • OOPHORECTOMY Right    • MD ESOPHAGOGASTRODUODENOSCOPY TRANSORAL DIAGNOSTIC N/A 2018    Procedure: ESOPHAGOGASTRODUODENOSCOPY (EGD); Surgeon: Mandi Banda MD;  Location: [facility];   Service: Gastroenterology   • MD LAPS TOTAL HYSTERECT 250 GM/< W/RMVL TUBE/OVARY N/A 2019    Procedure: TOTAL LAPAROSCOPIC HYSTERECTOMY, BILATERAL SALPINGECTOMY, LEFT OOPHORECTOMY;  Surgeon: Christopher Bae MD;  Location: [facility];  Service: Gynecology Oncology   • MD RINSJ RPTD BICEPS/TRICEPS TDN DSTL W/WO TDN GRF Left 2023    Procedure: REPAIR TENDON BICEPS, distal;  Surgeon: Albertina Holter;  Location:  MAIN OR;  Service: Orthopedics   • SINUS SURGERY     • TONSILLECTOMY     • UPPER GASTROINTESTINAL ENDOSCOPY       Family History   Problem Relation Age of Onset   • Pancreatic cancer Mother    • Hypertension Father    • Diabetes type II Father    • Diabetes type II Brother    • No Known Problems Brother    • Diabetes Maternal Grandmother    • Diabetes Paternal Grandmother    • Heart disease Paternal Grandmother    • Hypothyroidism Paternal Grandmother    • Liver cancer Maternal Aunt    • Melanoma Maternal Aunt    • Hypothyroidism Paternal Uncle    • Lung disease Daughter         asthma tendencies   • No Known Problems Son    • Hyperlipidemia Neg Hx    • Stroke Neg Hx        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[place] [address]    Social History     Tobacco Use   Smoking Status Former   • Packs/day: 0.50   • Years: 27.00   • Total pack years: 13.50   • Types: Cigarettes   • Start date:    • Quit date: 2016   • Years since quittin.6   Smokeless Tobacco Never   Tobacco Comments    ON AND OFF        Meds/Allergies     Current Outpatient Medications:   •  amLODIPine (NORVASC) 5 mg tablet, Take 1 tablet (5 mg total) by mouth daily, Disp: 90 tablet, Rfl: 0  •  Blood Glucose Monitoring Suppl ([facility] [employer] IQ SYSTEM) w/Device KIT, Use to test blood sugars twice a day, Disp: 1 kit, Rfl: 0  •  dicyclomine (BENTYL) 20 mg tablet, Take 1 tablet (20 mg total) by mouth every 6 (six) hours as needed (every 6 hours), Disp: 60 tablet, Rfl: 0  •  escitalopram (LEXAPRO) 20 mg tablet, Take 1 tablet (20 mg total) by mouth daily, Disp: 90 tablet, Rfl: 0  •  Icosapent Ethyl (Vascepa) 1 g CAPS, 2 capsules twice a day, Disp: 360 capsule, Rfl: 0  •  Insulin Pen Needle 32G X 4 MM MISC, Use once a week, Disp: 30 each, Rfl: 1  •  Lactobacillus (PROBIOTIC ACIDOPHILUS PO), Take by mouth daily, Disp: , Rfl:   •  levothyroxine 100 mcg tablet, Take 1 tablet (100 mcg total) by mouth daily Take 1 tablet 6 days a week and 1/2 tablet on sunday, Disp: 90 tablet, Rfl: 0  •  Magnesium 300 MG CAPS, Take 600 mg by mouth daily, Disp: , Rfl:   •  metFORMIN (GLUCOPHAGE-XR) 500 mg 24 hr tablet, Take 2 tablets (1,000 mg total) by mouth 2 (two) times a day with meals, Disp: 360 tablet, Rfl: 3  •  methocarbamol (ROBAXIN) 750 mg tablet, 1 tab PO HS., Disp: 90 tablet, Rfl: 1  •  metoprolol succinate (TOPROL-XL) 25 mg 24 hr tablet, Take 1 tablet (25 mg total) by mouth 2 (two) times a day, Disp: 180 tablet, Rfl: 0  •  Multiple Vitamins-Minerals (ZINC PO), Take by mouth, Disp: , Rfl:   •  mupirocin (BACTROBAN) 2 % ointment, Apply topically 3 (three) times a day, Disp: 22 g, Rfl: 0  •  nitroglycerin (NITROSTAT) 0.4 mg SL tablet, Place 1 tablet (0.4 mg total) under the tongue every 5 (five) minutes as needed for chest pain, Disp: 90 tablet, Rfl: 0  •  OneTouch Delica Lancets 87O MISC, Use to test blood sugars twice a day, Disp: 200 each, Rfl: 6  •  OneTouch Verio test strip, Use as instructed to test blood sugars twice a day, Disp: 200 each, Rfl: 0  •  pantoprazole (PROTONIX) 40 mg tablet, Take 1 tablet (40 mg total) by mouth daily before breakfast, Disp: 90 tablet, Rfl: 0  •  semaglutide, 1 mg/dose, (Ozempic, 1 MG/DOSE,) 4 mg/3 mL injection pen, Inject 1 mg once a week, Disp: 9 mL, Rfl: 0  •  traMADol (Ultram) 50 mg tablet, 1 tab BID prn for ongoing therapy, Disp: 60 tablet, Rfl: 3  •  Turmeric 500 MG CAPS, Take by mouth, Disp: , Rfl:   •  vitamin B-12 (VITAMIN B-12) 1,000 mcg tablet, Take by mouth daily, Disp: , Rfl:   •  Vitamin D, Cholecalciferol, 50 MCG (2000 UT) CAPS, Take 4,000 Units by mouth daily, Disp: , Rfl:   Allergies   Allergen Reactions   • Erythromycin Chest Pain     Chest pain as child   • Metronidazole Other (See Comments)     SOB   • Sulfa Antibiotics Shortness Of Breath     Per pt, "hives and fever"   • Amoxicillin-Pot Clavulanate Rash     And yeast infection   • Invokana [Canagliflozin] Other (See Comments)     Yeast infection       Vitals: Blood pressure 102/70, pulse 85, temperature 97.5 °F (36.4 °C), temperature source Tympanic, resp. rate 18, height 5' 2" (1.575 m), weight 65.3 kg (144 lb), SpO2 98 %, not currently breastfeeding., Body mass index is 26.34 kg/m². Oxygen Therapy  SpO2: 98 %  Oxygen Therapy: None (Room air)    Physical Exam   Physical Exam  Constitutional:       General: She is not in acute distress. Appearance: Normal appearance. HENT:      Head: Normocephalic. Eyes:      General: No scleral icterus. Neck:      Vascular: No JVD. Cardiovascular:      Rate and Rhythm: Normal rate and regular rhythm. Heart sounds: No murmur heard. Pulmonary:      Breath sounds: No wheezing, rhonchi or rales. Abdominal:      Palpations: Abdomen is soft. Tenderness: There is no abdominal tenderness. Musculoskeletal:         General: No swelling or deformity. Cervical back: Neck supple. Lymphadenopathy:      Cervical: No cervical adenopathy. Skin:     General: Skin is warm and dry. Neurological:      Mental Status: She is alert and oriented to person, place, and time. Motor: No weakness.    Psychiatric: Mood and Affect: Mood normal.         Labs: I have personally reviewed pertinent lab results. Lab Results   Component Value Date    WBC 6.33 07/18/2023    HGB 12.7 07/18/2023    HCT 36.3 07/18/2023    MCV 91 07/18/2023     07/18/2023     Lab Results   Component Value Date    GLUCOSE 107 09/02/2015    CALCIUM 9.2 07/18/2023     09/02/2015    K 4.2 07/18/2023    CO2 25 07/18/2023     07/18/2023    BUN 6 07/18/2023    CREATININE 0.58 (L) 07/18/2023     No results found for: "IGE"  Lab Results   Component Value Date    ALT 24 07/18/2023    AST 19 07/18/2023    ALKPHOS 73 07/18/2023    BILITOT 0.38 09/02/2015       Imaging and other studies: I have personally reviewed pertinent reports.    and I have personally reviewed pertinent films in PACS  Visualized lung bases from CT of the abdomen performed on 8/25/2023 shows normal lung parenchyma    Most recent chest x-ray is from 9/3/2019 which shows clear lungs

## 2023-09-20 ENCOUNTER — HOSPITAL ENCOUNTER (OUTPATIENT)
Dept: PULMONOLOGY | Facility: HOSPITAL | Age: 53
Discharge: HOME | End: 2023-09-20
Payer: COMMERCIAL

## 2023-09-20 DIAGNOSIS — E88.01 ALPHA-1-ANTITRYPSIN DEFICIENCY (HCC): ICD-10-CM

## 2023-09-20 PROCEDURE — 94726 PLETHYSMOGRAPHY LUNG VOLUMES: CPT

## 2023-09-20 PROCEDURE — 94726 PLETHYSMOGRAPHY LUNG VOLUMES: CPT | Performed by: INTERNAL MEDICINE

## 2023-09-20 PROCEDURE — 94060 EVALUATION OF WHEEZING: CPT | Performed by: INTERNAL MEDICINE

## 2023-09-20 PROCEDURE — 94729 DIFFUSING CAPACITY: CPT

## 2023-09-20 PROCEDURE — 94760 N-INVAS EAR/PLS OXIMETRY 1: CPT

## 2023-09-20 PROCEDURE — 94729 DIFFUSING CAPACITY: CPT | Performed by: INTERNAL MEDICINE

## 2023-09-20 PROCEDURE — 94060 EVALUATION OF WHEEZING: CPT

## 2023-09-20 RX ORDER — ALBUTEROL SULFATE 2.5 MG/3ML
2.5 SOLUTION RESPIRATORY (INHALATION) ONCE
Status: COMPLETED | OUTPATIENT
Start: 2023-09-20 | End: 2023-09-20

## 2023-09-20 RX ADMIN — ALBUTEROL SULFATE 2.5 MG: 2.5 SOLUTION RESPIRATORY (INHALATION) at 08:16

## 2023-09-26 ENCOUNTER — OFFICE VISIT (OUTPATIENT)
Dept: PAIN MEDICINE | Facility: CLINIC | Age: 53
End: 2023-09-26
Payer: COMMERCIAL

## 2023-09-26 VITALS
HEIGHT: 62 IN | HEART RATE: 80 BPM | DIASTOLIC BLOOD PRESSURE: 78 MMHG | BODY MASS INDEX: 26.68 KG/M2 | SYSTOLIC BLOOD PRESSURE: 106 MMHG | TEMPERATURE: 98 F | WEIGHT: 145 LBS

## 2023-09-26 DIAGNOSIS — M79.18 MYOFASCIAL PAIN SYNDROME: ICD-10-CM

## 2023-09-26 DIAGNOSIS — M47.816 LUMBAR SPONDYLOSIS: ICD-10-CM

## 2023-09-26 DIAGNOSIS — G89.4 CHRONIC PAIN SYNDROME: ICD-10-CM

## 2023-09-26 DIAGNOSIS — G89.29 CHRONIC LOW BACK PAIN WITHOUT SCIATICA, UNSPECIFIED BACK PAIN LATERALITY: ICD-10-CM

## 2023-09-26 DIAGNOSIS — M54.50 CHRONIC LOW BACK PAIN WITHOUT SCIATICA, UNSPECIFIED BACK PAIN LATERALITY: ICD-10-CM

## 2023-09-26 PROCEDURE — 99214 OFFICE O/P EST MOD 30 MIN: CPT | Performed by: PHYSICIAN ASSISTANT

## 2023-09-26 RX ORDER — TRAMADOL HYDROCHLORIDE 50 MG/1
TABLET ORAL
Qty: 60 TABLET | Refills: 3 | Status: SHIPPED | OUTPATIENT
Start: 2023-09-26

## 2023-09-26 RX ORDER — METHOCARBAMOL 750 MG/1
TABLET, FILM COATED ORAL
Qty: 90 TABLET | Refills: 1 | Status: SHIPPED | OUTPATIENT
Start: 2023-09-26

## 2023-09-26 NOTE — PROGRESS NOTES
Assessment:  1. Lumbar spondylosis    2. Chronic low back pain without sciatica, unspecified back pain laterality    3. Myofascial pain syndrome    4. Chronic pain syndrome        Plan:  While the patient was in the office today, I did have a thorough conversation regarding their chronic pain syndrome, medication management, and treatment plan options. The patient remains clinically stable and well-controlled on the current medication regimen of tramadol and methocarbamol. On today's visit I have electronically sent refills for the next 4 months to her pharmacy. [place] Prescription Drug Monitoring Program report was reviewed and was appropriate     There are risks associated with opioid medications, including dependence, addiction and tolerance. The patient understands and agrees to use these medications only as prescribed. Potential side effects of the medications include, but are not limited to, constipation, drowsiness, addiction, impaired judgment and risk of fatal overdose if not taken as prescribed. The patient was warned against driving while taking sedation medications. Sharing medications is a felony. At this point in time, the patient is showing no signs of addiction, abuse, diversion or suicidal ideation. The patient will follow-up in 4 months for medication prescription refill and reevaluation. The patient was advised to contact the office should their symptoms worsen in the interim. The patient was agreeable and verbalized an understanding. History of Present Illness: The patient is a 48 y.o. female last seen on 6/6/2023 who presents for a follow up office visit in regards to chronic back pain. The patient currently reports chronic low back pain that she presently rates an 8 out of 10 on the scale and describes it as a constant sharp and throbbing pain that does not radiate. The pain is made worse with prolonged standing and bending.   Overall she remains clinically stable on the current medication regimen and denies any new symptoms or acute issues on today's visit. Current pain medications includes: Tramadol 50 mg twice daily as needed, methocarbamol 750 mg nightly as needed. The patient reports that this regimen is providing 70% pain relief. The patient is reporting no side effects from this pain medication regimen. Pain Contract Signed: 2023    I have personally reviewed and/or updated the patient's past medical history, past surgical history, family history, social history, current medications, allergies, and vital signs today. Review of Systems:    Review of Systems   Respiratory: Negative for shortness of breath. Cardiovascular: Negative for chest pain. Gastrointestinal: Negative for constipation, diarrhea, nausea and vomiting. Musculoskeletal: Negative for arthralgias, gait problem, joint swelling (Joint stiffness) and myalgias. Skin: Negative for rash. Neurological: Negative for dizziness, seizures and weakness. All other systems reviewed and are negative.         Past Medical History:   Diagnosis Date   • Coronary artery disease    • Diabetes mellitus ([address])     Borderline   • Disease of thyroid gland    • DUB (dysfunctional uterine bleeding)    • Fatty liver    • Hashimoto's thyroiditis     Last Assessed:  14   • History of stomach ulcers    • Hypertension    • Hypothyroidism    • Irritable bowel syndrome     Last Assessed:  3/25/14   • Liver disease     elevated enzymes   • Myocardial infarction [facility])     2017   • GIANG (nonalcoholic steatohepatitis)    • Ovarian cyst     left   • Psychogenic polydipsia     Has had hyponatremia from drinking too much water in the past.       Past Surgical History:   Procedure Laterality Date   • ANGIOPLASTY     • CARDIAC CATHETERIZATION     •  SECTION      X 2   • CHOLECYSTECTOMY     • COLONOSCOPY     • CYSTOSCOPY N/A 2019    Procedure: CYSTOSCOPY;  Surgeon: Genaro Jackson MD;  Location: [facility] OR;  Service: Gynecology Oncology   • HYSTERECTOMY     • IR BIOPSY LIVER MASS  2020   • OOPHORECTOMY Right    • NE ESOPHAGOGASTRODUODENOSCOPY TRANSORAL DIAGNOSTIC N/A 2018    Procedure: ESOPHAGOGASTRODUODENOSCOPY (EGD); Surgeon: Sonny Aranda MD;  Location:  GI LAB;   Service: Gastroenterology   • NE LAPS TOTAL HYSTERECT 250 GM/< W/RMVL TUBE/OVARY N/A 2019    Procedure: TOTAL LAPAROSCOPIC HYSTERECTOMY, BILATERAL SALPINGECTOMY, LEFT OOPHORECTOMY;  Surgeon: Catracho Ruiz MD;  Location:  MAIN OR;  Service: Gynecology Oncology   • NE RINSJ RPTD BICEPS/TRICEPS TDN DSTL W/WO TDN GRF Left 2023    Procedure: REPAIR TENDON BICEPS, distal;  Surgeon: Johnathan Cohen;  Location:  MAIN OR;  Service: Orthopedics   • SINUS SURGERY     • TONSILLECTOMY     • UPPER GASTROINTESTINAL ENDOSCOPY         Family History   Problem Relation Age of Onset   • Pancreatic cancer Mother    • Hypertension Father    • Diabetes type II Father    • Diabetes type II Brother    • No Known Problems Brother    • Diabetes Maternal Grandmother    • Diabetes Paternal Grandmother    • Heart disease Paternal Grandmother    • Hypothyroidism Paternal Grandmother    • Liver cancer Maternal Aunt    • Melanoma Maternal Aunt    • Hypothyroidism Paternal Uncle    • Lung disease Daughter         asthma tendencies   • No Known Problems Son    • Hyperlipidemia Neg Hx    • Stroke Neg Hx        Social History     Occupational History   • Not on file   Tobacco Use   • Smoking status: Former     Packs/day: 0.50     Years: 27.00     Total pack years: 13.50     Types: Cigarettes     Start date:      Quit date: 2016     Years since quittin.6   • Smokeless tobacco: Never   • Tobacco comments:     ON AND OFF    Vaping Use   • Vaping Use: Never used   Substance and Sexual Activity   • Alcohol use: Not Currently   • Drug use: No   • Sexual activity: Yes     Partners: Male     Birth control/protection: None, Female Sterilization         Current Outpatient Medications:   •  amLODIPine (NORVASC) 5 mg tablet, Take 1 tablet (5 mg total) by mouth daily, Disp: 90 tablet, Rfl: 0  •  dicyclomine (BENTYL) 20 mg tablet, Take 1 tablet (20 mg total) by mouth every 6 (six) hours as needed (every 6 hours), Disp: 60 tablet, Rfl: 0  •  escitalopram (LEXAPRO) 20 mg tablet, Take 1 tablet (20 mg total) by mouth daily, Disp: 90 tablet, Rfl: 0  •  Icosapent Ethyl (Vascepa) 1 g CAPS, 2 capsules twice a day, Disp: 360 capsule, Rfl: 0  •  Lactobacillus (PROBIOTIC ACIDOPHILUS PO), Take by mouth daily, Disp: , Rfl:   •  levothyroxine 100 mcg tablet, Take 1 tablet (100 mcg total) by mouth daily Take 1 tablet 6 days a week and 1/2 tablet on sunday, Disp: 90 tablet, Rfl: 0  •  Magnesium 300 MG CAPS, Take 600 mg by mouth daily, Disp: , Rfl:   •  metFORMIN (GLUCOPHAGE-XR) 500 mg 24 hr tablet, Take 2 tablets (1,000 mg total) by mouth 2 (two) times a day with meals, Disp: 360 tablet, Rfl: 3  •  methocarbamol (ROBAXIN) 750 mg tablet, 1 tab PO HS., Disp: 90 tablet, Rfl: 1  •  metoprolol succinate (TOPROL-XL) 25 mg 24 hr tablet, Take 1 tablet (25 mg total) by mouth 2 (two) times a day, Disp: 180 tablet, Rfl: 0  •  Multiple Vitamins-Minerals (ZINC PO), Take by mouth, Disp: , Rfl:   •  pantoprazole (PROTONIX) 40 mg tablet, Take 1 tablet (40 mg total) by mouth daily before breakfast, Disp: 90 tablet, Rfl: 0  •  semaglutide, 1 mg/dose, (Ozempic, 1 MG/DOSE,) 4 mg/3 mL injection pen, Inject 1 mg once a week, Disp: 9 mL, Rfl: 0  •  traMADol (Ultram) 50 mg tablet, 1 tab BID prn for ongoing therapy DO NOT FILL BEFORE 10/15/23, Disp: 60 tablet, Rfl: 3  •  Turmeric 500 MG CAPS, Take by mouth, Disp: , Rfl:   •  vitamin B-12 (VITAMIN B-12) 1,000 mcg tablet, Take by mouth daily, Disp: , Rfl:   •  Vitamin D, Cholecalciferol, 50 MCG (2000 UT) CAPS, Take 4,000 Units by mouth daily, Disp: , Rfl:   •  Blood Glucose Monitoring Suppl ([employer] IQ SYSTEM) w/Device KIT, Use to test blood sugars twice a day, Disp: 1 kit, Rfl: 0  •  Insulin Pen Needle 32G X 4 MM MISC, Use once a week, Disp: 30 each, Rfl: 1  •  mupirocin (BACTROBAN) 2 % ointment, Apply topically 3 (three) times a day, Disp: 22 g, Rfl: 0  •  nitroglycerin (NITROSTAT) 0.4 mg SL tablet, Place 1 tablet (0.4 mg total) under the tongue every 5 (five) minutes as needed for chest pain, Disp: 90 tablet, Rfl: 0  •  OneTouch Delica Lancets 80K MISC, Use to test blood sugars twice a day, Disp: 200 each, Rfl: 6  •  OneTouch Verio test strip, Use as instructed to test blood sugars twice a day, Disp: 200 each, Rfl: 0    Allergies   Allergen Reactions   • Erythromycin Chest Pain     Chest pain as child   • Metronidazole Other (See Comments)     SOB   • Sulfa Antibiotics Shortness Of Breath     Per pt, "hives and fever"   • Amoxicillin-Pot Clavulanate Rash     And yeast infection   • Invokana [Canagliflozin] Other (See Comments)     Yeast infection       Physical Exam:    /78 (BP Location: Right arm, Patient Position: Sitting, Cuff Size: Standard)   Pulse 80   Temp 98 °F (36.7 °C)   Ht 5' 2" (1.575 m)   Wt 65.8 kg (145 lb)   LMP  (LMP Unknown)   BMI 26.52 kg/m²     Constitutional:normal, well developed, well nourished, alert, in no distress and non-toxic and no overt pain behavior. Eyes:anicteric  HEENT:grossly intact  Neck:supple, symmetric, trachea midline and no masses   Pulmonary:even and unlabored  Cardiovascular:No edema or pitting edema present  Skin:Normal without rashes or lesions and well hydrated  Psychiatric:Mood and affect appropriate  Neurologic:Cranial Nerves II-XII grossly intact  Musculoskeletal:normal           Imaging  No orders to display         No orders of the defined types were placed in this encounter.

## 2023-10-05 ENCOUNTER — OFFICE VISIT (OUTPATIENT)
Dept: BARIATRICS | Facility: CLINIC | Age: 53
End: 2023-10-05
Payer: COMMERCIAL

## 2023-10-05 VITALS
WEIGHT: 140.6 LBS | HEIGHT: 62 IN | DIASTOLIC BLOOD PRESSURE: 66 MMHG | SYSTOLIC BLOOD PRESSURE: 108 MMHG | HEART RATE: 81 BPM | BODY MASS INDEX: 25.88 KG/M2 | RESPIRATION RATE: 16 BRPM

## 2023-10-05 DIAGNOSIS — E66.3 OVERWEIGHT: Primary | ICD-10-CM

## 2023-10-05 DIAGNOSIS — E03.8 HYPOTHYROIDISM DUE TO HASHIMOTO'S THYROIDITIS: ICD-10-CM

## 2023-10-05 DIAGNOSIS — E11.65 TYPE 2 DIABETES MELLITUS WITH HYPERGLYCEMIA, WITHOUT LONG-TERM CURRENT USE OF INSULIN (HCC): ICD-10-CM

## 2023-10-05 DIAGNOSIS — K75.81 NASH (NONALCOHOLIC STEATOHEPATITIS): ICD-10-CM

## 2023-10-05 DIAGNOSIS — I10 PRIMARY HYPERTENSION: Chronic | ICD-10-CM

## 2023-10-05 DIAGNOSIS — E06.3 HYPOTHYROIDISM DUE TO HASHIMOTO'S THYROIDITIS: ICD-10-CM

## 2023-10-05 DIAGNOSIS — K21.9 GASTROESOPHAGEAL REFLUX DISEASE WITHOUT ESOPHAGITIS: ICD-10-CM

## 2023-10-05 PROCEDURE — 99213 OFFICE O/P EST LOW 20 MIN: CPT | Performed by: NURSE PRACTITIONER

## 2023-10-05 NOTE — PROGRESS NOTES
Assessment/Plan:     Overweight  - Patient is pursuing Conservative Program  - Initial weight loss goal of 5-10% weight loss for improved health - met  - Verified with GI Dr. Echo Bowers on 6/6/2022 okay to start Ozempic.   - Patient denies personal history of pancreatitis. Patient also denies personal and family history of medullary thyroid cancer and multiple endocrine neoplasia type 2 (MEN 2 tumor). - Taking Ozempic 1 mg weekly, tolerating well and helping with appetite. Currently being prescribed through endocrinology. Ozempic started 6/7/2022 at weight of 164.7 lbs. - S/P hysterectomy   - Labs reviewed: CBC, CMP, A1c, lipid panel, TSH, and free T4 July 18, 2023. Fasting glucose somewhat elevated, triglycerides elevated and HDL low, which will all likely improve with weight loss. A1c controlled at 5.4. TSH low with free T4 normal, levothyroxine dose was adjusted by endocrinology and she will be getting repeat blood work. Initial: 164.7 lbs  Last visit: 140.4 lbs BMI 25.68  Current: 140.6 lbs BMI 25.72  Change: -24 lbs (no change since last office visit)  Goal: wants to get liver healthier    Goals:  4360-6421 calories per day. Get protein with each meal. Discussed using protein shakes/bars to supplement. Handouts given for both. Keep up the great work with walking and add in 2 days per week resistance training. Keep up the great water intake, at least 64 oz daily. Continue ozempic through endocrinology. Hypertension  - Taking Norvasc and Toprol. May improve with weight loss. Continue management with prescribing provider. Hypothyroidism due to Hashimoto's thyroiditis  - Taking levothyroxine. Continue management with prescribing provider. GIANG (nonalcoholic steatohepatitis)  - May improve with weight loss. Continue to follow with GI. Gastroesophageal reflux disease without esophagitis  - Taking Protonix. May improve with weight loss. Continue management with prescribing provider. Type 2 diabetes mellitus with hyperglycemia, without long-term current use of insulin ([address])  - Taking metformin, and Ozempic. Continue management with prescribing provider. Lab Results   Component Value Date    HGBA1C 5.4 07/18/2023          Tanya Smith was seen today for follow-up. Diagnoses and all orders for this visit:    Overweight    Type 2 diabetes mellitus with hyperglycemia, without long-term current use of insulin ([address])    Primary hypertension    Hypothyroidism due to Hashimoto's thyroiditis    GIANG (nonalcoholic steatohepatitis)    Gastroesophageal reflux disease without esophagitis        Follow up in approximately 4 months with Non-Surgical Physician/Advanced Practitioner. Subjective:   Chief Complaint   Patient presents with   • Follow-up     MWM- 3 mth F/u, Waist 33.5in       Patient ID: Annalise Romero  is a 48 y.o. female with excess weight/obesity here to pursue weight management. Patient is pursuing Conservative Program.   Most recent notes and records were reviewed. HPI    Wt Readings from Last 10 Encounters:   10/05/23 63.8 kg (140 lb 9.6 oz)   09/26/23 65.8 kg (145 lb)   09/13/23 65.3 kg (144 lb)   09/05/23 65.3 kg (144 lb)   08/23/23 65.4 kg (144 lb 3.2 oz)   07/20/23 63.7 kg (140 lb 6.4 oz)   06/30/23 63.6 kg (140 lb 3.2 oz)   06/06/23 64 kg (141 lb)   04/11/23 64 kg (141 lb)   04/05/23 64 kg (141 lb)     Taking Ozempic 1 mg weekly. Ozempic initially started through out office, but endocrinology now prescribing. Some slight intermittent nausea, but not bothersome. Helping with appetite. Hasn't been food logging, but eats similarly. Had surgery the end of February 2023 for biceps tear and has recovered from that.     B- greek yogurt and granola and mulberries    S- none  L- soup and fruit or special K and banana and FF milk   S- none or fruit  D- protein and starch and veggies   S- none or caramel apple or pretzel      Hydration- 84-96 oz water, 32 oz unsweetened iced tea with lemon, occ coffee - black, occ homemade lemonade no sugar or unsweetened green tea   Alcohol- none  Exercise- walking 5 days per week for about 2 miles      Colonoscopy: UTD, due 2030  Mammogram: UTD, due April 2024       The following portions of the patient's history were reviewed and updated as appropriate: allergies, current medications, past family history, past medical history, past social history, past surgical history, and problem list.    Family History   Problem Relation Age of Onset   • Pancreatic cancer Mother    • Hypertension Father    • Diabetes type II Father    • Diabetes type II Brother    • No Known Problems Brother    • Diabetes Maternal Grandmother    • Diabetes Paternal Grandmother    • Heart disease Paternal Grandmother    • Hypothyroidism Paternal Grandmother    • Liver cancer Maternal Aunt    • Melanoma Maternal Aunt    • Hypothyroidism Paternal Uncle    • Lung disease Daughter         asthma tendencies   • No Known Problems Son    • Hyperlipidemia Neg Hx    • Stroke Neg Hx         Review of Systems   HENT: Positive for sore throat (allergies). Respiratory: Negative for cough and shortness of breath. Cardiovascular: Negative for chest pain and palpitations. Gastrointestinal: Positive for nausea (slight and rare). Negative for abdominal pain, constipation, diarrhea and vomiting. Denies GERD   Musculoskeletal: Positive for back pain (chronic). Negative for arthralgias. Skin: Negative for rash. Psychiatric/Behavioral: Negative for suicidal ideas (or HI). Denies depression and anxiety       Objective:  /66   Pulse 81   Resp 16   Ht 5' 2" (1.575 m)   Wt 63.8 kg (140 lb 9.6 oz)   LMP  (LMP Unknown)   BMI 25.72 kg/m²     Physical Exam  Vitals and nursing note reviewed. Constitutional   General appearance: Abnormal.  well developed and overweight. Eyes No conjunctival injection.    Ears, Nose, Mouth, and Throat Oral mucosa moist.   Pulmonary Respiratory effort: No increased work of breathing or signs of respiratory distress. Cardiovascular   Examination of extremities for edema and/or varicosities: Normal.  no edema. Abdomen   Abdomen: Abnormal overweight.      Musculoskeletal   Normal range of motion  Neurological   Gait and station: Normal.   Psychiatric   Orientation to person, place and time: Normal.    Affect: appropriate

## 2023-10-05 NOTE — ASSESSMENT & PLAN NOTE
- Taking Norvasc and Toprol. May improve with weight loss. Continue management with prescribing provider.

## 2023-10-05 NOTE — ASSESSMENT & PLAN NOTE
- Patient is pursuing Conservative Program  - Initial weight loss goal of 5-10% weight loss for improved health - met  - Verified with GI Dr. [facility] on 6/6/2022 okay to start Ozempic.   - Patient denies personal history of pancreatitis. Patient also denies personal and family history of medullary thyroid cancer and multiple endocrine neoplasia type 2 (MEN 2 tumor). - Taking Ozempic 1 mg weekly, tolerating well and helping with appetite. Currently being prescribed through endocrinology. Ozempic started 6/7/2022 at weight of 164.7 lbs. - S/P hysterectomy   - Labs reviewed: CBC, CMP, A1c, lipid panel, TSH, and free T4 July 18, 2023. Fasting glucose somewhat elevated, triglycerides elevated and HDL low, which will all likely improve with weight loss. A1c controlled at 5.4. TSH low with free T4 normal, levothyroxine dose was adjusted by endocrinology and she will be getting repeat blood work. Initial: 164.7 lbs  Last visit: 140.4 lbs BMI 25.68  Current: 140.6 lbs BMI 25.72  Change: -24 lbs (no change since last office visit)  Goal: wants to get liver healthier    Goals:  1407-4888 calories per day. Get protein with each meal. Discussed using protein shakes/bars to supplement. Handouts given for both. Keep up the great work with walking and add in 2 days per week resistance training. Keep up the great water intake, at least 64 oz daily. Continue ozempic through endocrinology.

## 2023-10-05 NOTE — ASSESSMENT & PLAN NOTE
- Taking metformin, and Ozempic. Continue management with prescribing provider.      Lab Results   Component Value Date    HGBA1C 5.4 07/18/2023

## 2023-11-02 DIAGNOSIS — E11.65 TYPE 2 DIABETES MELLITUS WITH HYPERGLYCEMIA, WITHOUT LONG-TERM CURRENT USE OF INSULIN (HCC): ICD-10-CM

## 2023-11-07 DIAGNOSIS — E03.8 HYPOTHYROIDISM DUE TO HASHIMOTO'S THYROIDITIS: ICD-10-CM

## 2023-11-07 DIAGNOSIS — E06.3 HYPOTHYROIDISM DUE TO HASHIMOTO'S THYROIDITIS: ICD-10-CM

## 2023-11-08 RX ORDER — LEVOTHYROXINE SODIUM 0.1 MG/1
100 TABLET ORAL DAILY
Qty: 90 TABLET | Refills: 0 | Status: SHIPPED | OUTPATIENT
Start: 2023-11-08

## 2023-11-09 ENCOUNTER — TELEPHONE (OUTPATIENT)
Age: 53
End: 2023-11-09

## 2023-11-09 NOTE — TELEPHONE ENCOUNTER
Left message reminding patient to have labs completed for upcoming appointment 11/15/23.  Provided [facility] telephone number for any questions/concerns

## 2023-11-10 ENCOUNTER — APPOINTMENT (OUTPATIENT)
Dept: LAB | Facility: HOSPITAL | Age: 53
End: 2023-11-10
Payer: COMMERCIAL

## 2023-11-10 DIAGNOSIS — K75.81 NASH (NONALCOHOLIC STEATOHEPATITIS): ICD-10-CM

## 2023-11-10 DIAGNOSIS — D50.8 IRON DEFICIENCY ANEMIA SECONDARY TO INADEQUATE DIETARY IRON INTAKE: ICD-10-CM

## 2023-11-10 LAB
ALBUMIN SERPL BCP-MCNC: 4.6 G/DL (ref 3.5–5)
ALP SERPL-CCNC: 73 U/L (ref 34–104)
ALT SERPL W P-5'-P-CCNC: 31 U/L (ref 7–52)
ANION GAP SERPL CALCULATED.3IONS-SCNC: 6 MMOL/L
AST SERPL W P-5'-P-CCNC: 24 U/L (ref 13–39)
BASOPHILS # BLD AUTO: 0.12 THOUSANDS/ÂΜL (ref 0–0.1)
BASOPHILS NFR BLD AUTO: 1 % (ref 0–1)
BILIRUB SERPL-MCNC: 0.46 MG/DL (ref 0.2–1)
BUN SERPL-MCNC: 11 MG/DL (ref 5–25)
CALCIUM SERPL-MCNC: 9.6 MG/DL (ref 8.4–10.2)
CHLORIDE SERPL-SCNC: 100 MMOL/L (ref 96–108)
CO2 SERPL-SCNC: 29 MMOL/L (ref 21–32)
CREAT SERPL-MCNC: 0.74 MG/DL (ref 0.6–1.3)
EOSINOPHIL # BLD AUTO: 0.18 THOUSAND/ÂΜL (ref 0–0.61)
EOSINOPHIL NFR BLD AUTO: 2 % (ref 0–6)
ERYTHROCYTE [DISTWIDTH] IN BLOOD BY AUTOMATED COUNT: 12.2 % (ref 11.6–15.1)
FERRITIN SERPL-MCNC: 87 NG/ML (ref 11–307)
GFR SERPL CREATININE-BSD FRML MDRD: 92 ML/MIN/1.73SQ M
GLUCOSE P FAST SERPL-MCNC: 94 MG/DL (ref 65–99)
HCT VFR BLD AUTO: 38 % (ref 34.8–46.1)
HGB BLD-MCNC: 12.9 G/DL (ref 11.5–15.4)
IMM GRANULOCYTES # BLD AUTO: 0.02 THOUSAND/UL (ref 0–0.2)
IMM GRANULOCYTES NFR BLD AUTO: 0 % (ref 0–2)
IRON SATN MFR SERPL: 32 % (ref 15–50)
IRON SERPL-MCNC: 138 UG/DL (ref 50–212)
LYMPHOCYTES # BLD AUTO: 3.15 THOUSANDS/ÂΜL (ref 0.6–4.47)
LYMPHOCYTES NFR BLD AUTO: 37 % (ref 14–44)
MCH RBC QN AUTO: 32.3 PG (ref 26.8–34.3)
MCHC RBC AUTO-ENTMCNC: 33.9 G/DL (ref 31.4–37.4)
MCV RBC AUTO: 95 FL (ref 82–98)
MONOCYTES # BLD AUTO: 0.76 THOUSAND/ÂΜL (ref 0.17–1.22)
MONOCYTES NFR BLD AUTO: 9 % (ref 4–12)
NEUTROPHILS # BLD AUTO: 4.25 THOUSANDS/ÂΜL (ref 1.85–7.62)
NEUTS SEG NFR BLD AUTO: 51 % (ref 43–75)
NRBC BLD AUTO-RTO: 0 /100 WBCS
PLATELET # BLD AUTO: 274 THOUSANDS/UL (ref 149–390)
PMV BLD AUTO: 9.3 FL (ref 8.9–12.7)
POTASSIUM SERPL-SCNC: 4.7 MMOL/L (ref 3.5–5.3)
PROT SERPL-MCNC: 7.2 G/DL (ref 6.4–8.4)
RBC # BLD AUTO: 4 MILLION/UL (ref 3.81–5.12)
SODIUM SERPL-SCNC: 135 MMOL/L (ref 135–147)
TIBC SERPL-MCNC: 425 UG/DL (ref 250–450)
UIBC SERPL-MCNC: 287 UG/DL (ref 155–355)
WBC # BLD AUTO: 8.48 THOUSAND/UL (ref 4.31–10.16)

## 2023-11-10 PROCEDURE — 36415 COLL VENOUS BLD VENIPUNCTURE: CPT

## 2023-11-10 PROCEDURE — 80053 COMPREHEN METABOLIC PANEL: CPT

## 2023-11-10 PROCEDURE — 85025 COMPLETE CBC W/AUTO DIFF WBC: CPT

## 2023-11-10 PROCEDURE — 83540 ASSAY OF IRON: CPT

## 2023-11-10 PROCEDURE — 83550 IRON BINDING TEST: CPT

## 2023-11-10 PROCEDURE — 82728 ASSAY OF FERRITIN: CPT

## 2023-11-12 DIAGNOSIS — R07.89 OTHER CHEST PAIN: ICD-10-CM

## 2023-11-13 RX ORDER — AMLODIPINE BESYLATE 5 MG/1
5 TABLET ORAL DAILY
Qty: 90 TABLET | Refills: 3 | Status: SHIPPED | OUTPATIENT
Start: 2023-11-13

## 2023-11-13 NOTE — PROGRESS NOTES
HEMATOLOGY / [facility] FOLLOW UP NOTE    Primary Care Provider: Darin Velásquez DO  Referring Provider:    MRN: [id number]  : 1970    Reason for Encounter: Follow-up for history of iron deficiency anemia       Interval History: Patient returns for yearly follow-up. She continues to follow closely with hepatology for history of GIANG. Since last visit, she was diagnosed with alpha 1 antitrypsin deficiency. She is now on Ozempic per hepatology. Blood work done prior to today's visit reviewed and is within normal range. CMP is normal, LFTs normal.  CBC-D normal.  Hemoglobin 12.9. Iron panel is also normal  She feels great. Denies any concerning symptoms today      REVIEW OF SYSTEMS:  Please note that a 14-point review of systems was performed to include Constitutional, HEENT, Respiratory, CVS, GI, , Musculoskeletal, Integumentary, Neurologic, Rheumatologic, Endocrinologic, Psychiatric, Lymphatic, and Hematologic/Oncologic systems were reviewed and are negative unless otherwise stated in HPI. Positive and negative findings pertinent to this evaluation are incorporated into the history of present illness.       ECOG PS: 0    PROBLEM LIST:  Patient Active Problem List   Diagnosis    Hypertension    GIANG (nonalcoholic steatohepatitis)    Sinus tachycardia    Chronic sinusitis    Anemia    Anomalous coronary artery origin    Anxiety    Combined hyperlipidemia    Coronary vasospasm (HCC)    Type 2 diabetes mellitus with hyperglycemia, without long-term current use of insulin (HCC)    Gastroesophageal reflux disease without esophagitis    Ground glass opacity present on imaging of lung    Hypothyroidism due to Hashimoto's thyroiditis    NSTEMI (non-ST elevated myocardial infarction) (HCC)    Chest pain due to GERD    Dysfunctional uterine bleeding    Left ovarian cyst    Menopausal symptoms    S/P laparoscopic hysterectomy    Colon cancer screening    Chronic back pain    Lateral epicondylitis of right elbow Lumbar spondylosis    Myofascial pain syndrome    Lumbar radiculopathy    Chronic pain syndrome    Tendonitis of elbow, right    Chronic low back pain without sciatica    Overweight    Strain of left biceps    Depression    Traumatic partial tear of left biceps tendon    Alpha-1-antitrypsin deficiency carrier    Alpha-1-antitrypsin deficiency ([address])       Assessment / Plan:  1. Iron deficiency anemia secondary to inadequate dietary iron intake    2. GIANG (nonalcoholic steatohepatitis)    3. Alpha-1-antitrypsin deficiency [facility])      Patient is a very pleasant 35-year-old female who follows with hematology for history of iron deficiency anemia. She was last treated with iron infusions in 2021. She has been on observation. She also follows closely with hepatology for GIANG and was recently found to be alpha 1 antitrypsin deficiency carrier. Overall, she is doing very well. Her blood work is all normal.  Clinically she is well-appearing without any concerning findings on exam.  Since her labs have been so stable, I offered her follow-up as needed with hematology. However she wishes to follow-up in 1 year to ensure everything remains within a normal range. She will continue to follow closely with her other specialists  I will see her back in 1 year with repeat blood work. She knows to call with any questions or concerning symptoms in the interim    I spent 20 minutes on chart review, face to face counseling time, coordination of care and documentation. Past Medical History:   has a past medical history of Coronary artery disease, Diabetes mellitus ([address]), Disease of thyroid gland, DUB (dysfunctional uterine bleeding), Fatty liver, Hashimoto's thyroiditis, History of stomach ulcers, Hypertension, Hypothyroidism, Irritable bowel syndrome, Liver disease, Myocardial infarction ([address]), GIANG (nonalcoholic steatohepatitis), Ovarian cyst, and Psychogenic polydipsia.     PAST SURGICAL HISTORY:   has a past surgical history that includes Cholecystectomy; Sinus surgery; Cardiac catheterization; Colonoscopy;  section; Angioplasty; pr esophagogastroduodenoscopy transoral diagnostic (N/A, 2018); pr laps total hysterect 250 gm/< w/rmvl tube/ovary (N/A, 2019); CYSTOSCOPY (N/A, 2019); Upper gastrointestinal endoscopy; IR biopsy liver mass (2020); Tonsillectomy; Hysterectomy; Oophorectomy (Right); and pr rinsj rptd biceps/triceps tdn dstl w/wo tdn grf (Left, 2023). CURRENT MEDICATIONS  Current Outpatient Medications   Medication Sig Dispense Refill    amLODIPine (NORVASC) 5 mg tablet Take 1 tablet (5 mg total) by mouth daily 90 tablet 3    dicyclomine (BENTYL) 20 mg tablet Take 1 tablet (20 mg total) by mouth every 6 (six) hours as needed (every 6 hours) 60 tablet 0    escitalopram (LEXAPRO) 20 mg tablet Take 1 tablet (20 mg total) by mouth daily 90 tablet 0    Icosapent Ethyl (Vascepa) 1 g CAPS 2 capsules twice a day 360 capsule 0    Lactobacillus (PROBIOTIC ACIDOPHILUS PO) Take by mouth daily      levothyroxine 100 mcg tablet Take 1 tablet (100 mcg total) by mouth daily Take 1 tablet 6 days a week and 1/2 tablet on  90 tablet 0    Magnesium 300 MG CAPS Take 600 mg by mouth daily      metFORMIN (GLUCOPHAGE-XR) 500 mg 24 hr tablet Take 2 tablets (1,000 mg total) by mouth 2 (two) times a day with meals 360 tablet 3    methocarbamol (ROBAXIN) 750 mg tablet 1 tab PO HS.  90 tablet 1    metoprolol succinate (TOPROL-XL) 25 mg 24 hr tablet Take 1 tablet (25 mg total) by mouth 2 (two) times a day 180 tablet 0    Multiple Vitamins-Minerals (ZINC PO) Take by mouth      pantoprazole (PROTONIX) 40 mg tablet Take 1 tablet (40 mg total) by mouth daily before breakfast 90 tablet 0    semaglutide, 1 mg/dose, (Ozempic, 1 MG/DOSE,) 4 mg/3 mL injection pen Inject 1 mg once a week 9 mL 0    traMADol (Ultram) 50 mg tablet 1 tab BID prn for ongoing therapy DO NOT FILL BEFORE 10/15/23 60 tablet 3    Turmeric 500 MG CAPS Take by mouth      vitamin B-12 (VITAMIN B-12) 1,000 mcg tablet Take by mouth daily      Vitamin D, Cholecalciferol, 50 MCG (2000 UT) CAPS Take 4,000 Units by mouth daily      Blood Glucose Monitoring Suppl ([employer] SYSTEM) w/Device KIT Use to test blood sugars twice a day 1 kit 0    Insulin Pen Needle 32G X 4 MM MISC Use once a week 30 each 1    mupirocin (BACTROBAN) 2 % ointment Apply topically 3 (three) times a day 22 g 0    nitroglycerin (NITROSTAT) 0.4 mg SL tablet Place 1 tablet (0.4 mg total) under the tongue every 5 (five) minutes as needed for chest pain 90 tablet 0    OneTouch Delica Lancets 60W MISC Use to test blood sugars twice a day 200 each 6    OneTouch Verio test strip Use as instructed to test blood sugars twice a day 200 each 0     No current facility-administered medications for this visit. [unfilled]    SOCIAL HISTORY:   reports that she quit smoking about 7 years ago. Her smoking use included cigarettes. She started smoking about 34 years ago. She has a 13.50 pack-year smoking history. She has never used smokeless tobacco. She reports that she does not currently use alcohol. She reports that she does not use drugs. FAMILY HISTORY:  family history includes Diabetes in her maternal grandmother and paternal grandmother; Diabetes type II in her brother and father; Heart disease in her paternal grandmother; Hypertension in her father; Hypothyroidism in her paternal grandmother and paternal uncle; Liver cancer in her maternal aunt; Lung disease in her daughter; Melanoma in her maternal aunt; No Known Problems in her brother and son; Pancreatic cancer in her mother. ALLERGIES:  is allergic to erythromycin, metronidazole, sulfa antibiotics, amoxicillin-pot clavulanate, and invokana [canagliflozin].       Physical Exam:  Vital Signs:   Visit Vitals  /80 (BP Location: Right arm, Patient Position: Sitting, Cuff Size: Standard)   Pulse 79   Temp 98.3 °F (36.8 °C) (Temporal) Resp 16   Ht 5' 2" (1.575 m)   Wt 64 kg (141 lb)   LMP  (LMP Unknown)   SpO2 98%   BMI 25.79 kg/m²   OB Status Hysterectomy   Smoking Status Former   BSA 1.65 m²     Body mass index is 25.79 kg/m². Body surface area is 1.65 meters squared. Physical Exam  Constitutional:       General: She is not in acute distress. Appearance: Normal appearance. HENT:      Head: Normocephalic and atraumatic. Eyes:      General: No scleral icterus. Right eye: No discharge. Left eye: No discharge. Conjunctiva/sclera: Conjunctivae normal.   Cardiovascular:      Rate and Rhythm: Normal rate and regular rhythm. Pulmonary:      Effort: Pulmonary effort is normal. No respiratory distress. Breath sounds: Normal breath sounds. Abdominal:      General: Bowel sounds are normal. There is no distension. Palpations: Abdomen is soft. There is no mass. Tenderness: There is no abdominal tenderness. Musculoskeletal:         General: Normal range of motion. Lymphadenopathy:      Cervical: No cervical adenopathy. Upper Body:      Right upper body: No supraclavicular, axillary or pectoral adenopathy. Left upper body: No supraclavicular, axillary or pectoral adenopathy. Skin:     General: Skin is warm and dry. Neurological:      General: No focal deficit present. Mental Status: She is alert and oriented to person, place, and time.    Psychiatric:         Mood and Affect: Mood normal.         Behavior: Behavior normal.         Labs:  Lab Results   Component Value Date    WBC 8.48 11/10/2023    HGB 12.9 11/10/2023    HCT 38.0 11/10/2023    MCV 95 11/10/2023     11/10/2023     Lab Results   Component Value Date     09/02/2015    SODIUM 135 11/10/2023    K 4.7 11/10/2023     11/10/2023    CO2 29 11/10/2023    ANIONGAP 11 09/02/2015    AGAP 6 11/10/2023    BUN 11 11/10/2023    CREATININE 0.74 11/10/2023    GLUC 175 (H) 04/29/2020    GLUF 94 11/10/2023    CALCIUM 9.6 11/10/2023    AST 24 11/10/2023    ALT 31 11/10/2023    ALKPHOS 73 11/10/2023    PROT 7.2 09/02/2015    TP 7.2 11/10/2023    BILITOT 0.38 09/02/2015    TBILI 0.46 11/10/2023    EGFR 92 11/10/2023

## 2023-11-15 ENCOUNTER — OFFICE VISIT (OUTPATIENT)
Age: 53
End: 2023-11-15
Payer: COMMERCIAL

## 2023-11-15 VITALS
SYSTOLIC BLOOD PRESSURE: 120 MMHG | HEIGHT: 62 IN | WEIGHT: 141 LBS | TEMPERATURE: 98.3 F | OXYGEN SATURATION: 98 % | HEART RATE: 79 BPM | RESPIRATION RATE: 16 BRPM | BODY MASS INDEX: 25.95 KG/M2 | DIASTOLIC BLOOD PRESSURE: 80 MMHG

## 2023-11-15 DIAGNOSIS — E88.01 ALPHA-1-ANTITRYPSIN DEFICIENCY (HCC): ICD-10-CM

## 2023-11-15 DIAGNOSIS — D50.8 IRON DEFICIENCY ANEMIA SECONDARY TO INADEQUATE DIETARY IRON INTAKE: Primary | ICD-10-CM

## 2023-11-15 DIAGNOSIS — K75.81 NASH (NONALCOHOLIC STEATOHEPATITIS): ICD-10-CM

## 2023-11-15 PROCEDURE — 99213 OFFICE O/P EST LOW 20 MIN: CPT | Performed by: NURSE PRACTITIONER

## 2023-11-19 DIAGNOSIS — K58.9 IRRITABLE BOWEL SYNDROME WITHOUT DIARRHEA: ICD-10-CM

## 2023-11-19 DIAGNOSIS — K21.9 GERD WITHOUT ESOPHAGITIS: ICD-10-CM

## 2023-11-19 DIAGNOSIS — I10 ESSENTIAL HYPERTENSION: ICD-10-CM

## 2023-11-20 RX ORDER — METOPROLOL SUCCINATE 25 MG/1
25 TABLET, EXTENDED RELEASE ORAL 2 TIMES DAILY
Qty: 180 TABLET | Refills: 0 | Status: SHIPPED | OUTPATIENT
Start: 2023-11-20

## 2023-11-20 RX ORDER — DICYCLOMINE HCL 20 MG
20 TABLET ORAL EVERY 6 HOURS PRN
Qty: 60 TABLET | Refills: 0 | Status: SHIPPED | OUTPATIENT
Start: 2023-11-20

## 2023-11-20 RX ORDER — PANTOPRAZOLE SODIUM 40 MG/1
40 TABLET, DELAYED RELEASE ORAL
Qty: 90 TABLET | Refills: 0 | Status: SHIPPED | OUTPATIENT
Start: 2023-11-20

## 2023-12-01 ENCOUNTER — TELEPHONE (OUTPATIENT)
Age: 53
End: 2023-12-01

## 2023-12-01 NOTE — TELEPHONE ENCOUNTER
Called pt and let her know she will be seeing Dr. Tiara Mendoza in [facility] on 12/15 rather than in [facility].

## 2023-12-14 DIAGNOSIS — E78.2 COMBINED HYPERLIPIDEMIA: ICD-10-CM

## 2023-12-14 DIAGNOSIS — F32.A DEPRESSION, UNSPECIFIED DEPRESSION TYPE: ICD-10-CM

## 2023-12-14 RX ORDER — ICOSAPENT ETHYL 1000 MG/1
CAPSULE ORAL
Qty: 360 CAPSULE | Refills: 0 | Status: SHIPPED | OUTPATIENT
Start: 2023-12-14

## 2023-12-14 RX ORDER — ESCITALOPRAM OXALATE 20 MG/1
20 TABLET ORAL DAILY
Qty: 90 TABLET | Refills: 0 | Status: SHIPPED | OUTPATIENT
Start: 2023-12-14

## 2023-12-15 ENCOUNTER — ANNUAL EXAM (OUTPATIENT)
Dept: OBGYN CLINIC | Facility: MEDICAL CENTER | Age: 53
End: 2023-12-15
Payer: COMMERCIAL

## 2023-12-15 VITALS — SYSTOLIC BLOOD PRESSURE: 120 MMHG | HEIGHT: 62 IN | BODY MASS INDEX: 25.79 KG/M2 | DIASTOLIC BLOOD PRESSURE: 76 MMHG

## 2023-12-15 DIAGNOSIS — Z01.419 ENCOUNTER FOR WELL WOMAN EXAM WITH ROUTINE GYNECOLOGICAL EXAM: Primary | ICD-10-CM

## 2023-12-15 DIAGNOSIS — Z12.31 ENCOUNTER FOR SCREENING MAMMOGRAM FOR MALIGNANT NEOPLASM OF BREAST: ICD-10-CM

## 2023-12-15 PROCEDURE — S0612 ANNUAL GYNECOLOGICAL EXAMINA: HCPCS | Performed by: OBSTETRICS & GYNECOLOGY

## 2024-01-02 ENCOUNTER — APPOINTMENT (OUTPATIENT)
Dept: LAB | Facility: HOSPITAL | Age: 54
End: 2024-01-02
Payer: COMMERCIAL

## 2024-01-02 DIAGNOSIS — E03.8 HYPOTHYROIDISM DUE TO HASHIMOTO'S THYROIDITIS: ICD-10-CM

## 2024-01-02 DIAGNOSIS — E06.3 HYPOTHYROIDISM DUE TO HASHIMOTO'S THYROIDITIS: ICD-10-CM

## 2024-01-02 DIAGNOSIS — E11.65 TYPE 2 DIABETES MELLITUS WITH HYPERGLYCEMIA, WITHOUT LONG-TERM CURRENT USE OF INSULIN (HCC): ICD-10-CM

## 2024-01-02 DIAGNOSIS — I10 PRIMARY HYPERTENSION: Chronic | ICD-10-CM

## 2024-01-02 LAB
ALBUMIN SERPL BCP-MCNC: 4.5 G/DL (ref 3.5–5)
ALP SERPL-CCNC: 68 U/L (ref 34–104)
ALT SERPL W P-5'-P-CCNC: 26 U/L (ref 7–52)
ANION GAP SERPL CALCULATED.3IONS-SCNC: 8 MMOL/L
AST SERPL W P-5'-P-CCNC: 21 U/L (ref 13–39)
BILIRUB SERPL-MCNC: 0.32 MG/DL (ref 0.2–1)
BUN SERPL-MCNC: 10 MG/DL (ref 5–25)
CALCIUM SERPL-MCNC: 9.5 MG/DL (ref 8.4–10.2)
CHLORIDE SERPL-SCNC: 102 MMOL/L (ref 96–108)
CO2 SERPL-SCNC: 26 MMOL/L (ref 21–32)
CREAT SERPL-MCNC: 0.71 MG/DL (ref 0.6–1.3)
EST. AVERAGE GLUCOSE BLD GHB EST-MCNC: 120 MG/DL
GFR SERPL CREATININE-BSD FRML MDRD: 97 ML/MIN/1.73SQ M
GLUCOSE P FAST SERPL-MCNC: 128 MG/DL (ref 65–99)
HBA1C MFR BLD: 5.8 %
POTASSIUM SERPL-SCNC: 4.3 MMOL/L (ref 3.5–5.3)
PROT SERPL-MCNC: 7.3 G/DL (ref 6.4–8.4)
SODIUM SERPL-SCNC: 136 MMOL/L (ref 135–147)
T4 FREE SERPL-MCNC: 0.72 NG/DL (ref 0.61–1.12)
TSH SERPL DL<=0.05 MIU/L-ACNC: 0.13 UIU/ML (ref 0.45–4.5)

## 2024-01-02 PROCEDURE — 80053 COMPREHEN METABOLIC PANEL: CPT

## 2024-01-02 PROCEDURE — 83036 HEMOGLOBIN GLYCOSYLATED A1C: CPT

## 2024-01-02 PROCEDURE — 84443 ASSAY THYROID STIM HORMONE: CPT

## 2024-01-02 PROCEDURE — 36415 COLL VENOUS BLD VENIPUNCTURE: CPT

## 2024-01-02 PROCEDURE — 84439 ASSAY OF FREE THYROXINE: CPT

## 2024-01-04 ENCOUNTER — OFFICE VISIT (OUTPATIENT)
Dept: ENDOCRINOLOGY | Facility: HOSPITAL | Age: 54
End: 2024-01-04
Payer: COMMERCIAL

## 2024-01-04 VITALS
WEIGHT: 140 LBS | HEIGHT: 62 IN | DIASTOLIC BLOOD PRESSURE: 80 MMHG | BODY MASS INDEX: 25.76 KG/M2 | SYSTOLIC BLOOD PRESSURE: 118 MMHG | HEART RATE: 74 BPM

## 2024-01-04 DIAGNOSIS — I10 PRIMARY HYPERTENSION: Chronic | ICD-10-CM

## 2024-01-04 DIAGNOSIS — E03.8 HYPOTHYROIDISM DUE TO HASHIMOTO'S THYROIDITIS: ICD-10-CM

## 2024-01-04 DIAGNOSIS — E06.3 HYPOTHYROIDISM DUE TO HASHIMOTO'S THYROIDITIS: ICD-10-CM

## 2024-01-04 DIAGNOSIS — E11.65 TYPE 2 DIABETES MELLITUS WITH HYPERGLYCEMIA, WITHOUT LONG-TERM CURRENT USE OF INSULIN (HCC): Primary | ICD-10-CM

## 2024-01-04 PROCEDURE — 99214 OFFICE O/P EST MOD 30 MIN: CPT | Performed by: INTERNAL MEDICINE

## 2024-01-04 RX ORDER — LEVOTHYROXINE SODIUM 0.1 MG/1
TABLET ORAL
Qty: 90 TABLET | Refills: 0 | Status: SHIPPED | OUTPATIENT
Start: 2024-01-04

## 2024-01-04 NOTE — PROGRESS NOTES
1/5/2024    Assessment/Plan     1. Type 2 diabetes mellitus with hyperglycemia, without long-term current use of insulin ([facility])  -     Comprehensive metabolic panel Lab Collect; Future; Expected date: 03/25/2024  -     T4, free Lab Collect; Future; Expected date: 03/25/2024  -     TSH, 3rd generation Lab Collect; Future; Expected date: 03/25/2024  -     HEMOGLOBIN A1C W/ EAG ESTIMATION Lab Collect; Future; Expected date: 03/25/2024  -     CBC and differential Lab Collect; Future; Expected date: 03/25/2024    2. Hypothyroidism due to Hashimoto's thyroiditis  -     Comprehensive metabolic panel Lab Collect; Future; Expected date: 03/25/2024  -     T4, free Lab Collect; Future; Expected date: 03/25/2024  -     TSH, 3rd generation Lab Collect; Future; Expected date: 03/25/2024  -     HEMOGLOBIN A1C W/ EAG ESTIMATION Lab Collect; Future; Expected date: 03/25/2024  -     CBC and differential Lab Collect; Future; Expected date: 03/25/2024  -     levothyroxine 100 mcg tablet; Take 1 tablet 6 days a week and none on sunday    3. Primary hypertension  -     Comprehensive metabolic panel Lab Collect; Future; Expected date: 03/25/2024  -     T4, free Lab Collect; Future; Expected date: 03/25/2024  -     TSH, 3rd generation Lab Collect; Future; Expected date: 03/25/2024  -     HEMOGLOBIN A1C W/ EAG ESTIMATION Lab Collect; Future; Expected date: 03/25/2024  -     CBC and differential Lab Collect; Future; Expected date: 03/25/2024         1. Type 2 diabetes.  Most recent hemoglobin A1c is excellent at 5.8%. She will continue the same metformin and Ozempic for now. She will continue to work on dietary changes and exercise. She will work on testing her blood glucose once or twice a day and doing some blood glucose testing earlier in the day.    2. Hypothyroidism due to Hashimoto's thyroiditis.  She has continued to lose weight and TSH is still low. I will decrease her levothyroxine to 100 mcg 1 tablet 6 days a week and none on  Sunday.    3. Hypertension.  She is normotensive in the office on her current amlodipine and metoprolol. She does follow with cardiology on a regular basis.    I have asked her to follow up in 3 months with preceding hemoglobin A1c, CMP, CBC, TSH, and free T4.    CC: Diabetes Consult    History of Present Illness     HPI: Raymundo White is a 53 y.o. year old female with type 2 diabetes for 6 years, hypothyroidism, hypertension for follow-up visit.     Diabetic complications include heart attack. She denies neuropathy, nephropathy, retinopathy, stroke, or claudication.    She is currently on metformin  mg 2 tablets twice a day and Ozempic 1 mg once a week.     She experienced a further weight reduction of 4 pounds.     She denies polyuria, polydipsia, polyphagia. She gets up once or twice at night to urinate. She has intermittent nausea and diarrhea on the Ozempic.     Hypoglycemic episodes: NO.     She checks her blood glucose once a day before or after dinner. She observed that consuming foods like spaghetti, or even a small amount of pasta, significantly elevates her blood glucose levels. Her blood glucose levels range between , occasionally reaching 140, with occasional instance dropping to the 70s. She denies constipation.    She denies numbness, tingling, or wounds of the feet. Her last foot exam was in 04/2023. She denies blurry vision. Her last eye examination was 02/2023 indicating that she is approaching that due date for another examination.     She is currently on levothyroxine 100 mcg 6 days a week and 0.5 tablet on Sunday.     Her persistent experience of coldness can be attributed to both significant weight loss and factors related to menopause in her case. She always has dry skin with dry hair. She is experiencing poor sleep quality.    She denies heart racing or palpitations, tremors, brittle nails, hair loss, daytime fatigue, constipation, chest pain, shortness of breath, headaches,  lightheadedness, dizziness, stroke symptoms, or weakness on one side.     She is currently on amlodipine 5 mg once a day and metoprolol 25 mg twice a day to manage blood pressure and heart conditions. She is under the care of Cardiologist Dr. Santana, who continues to oversee her medical treatment.    Last A1C was   Lab Results   Component Value Date    HGBA1C 5.8 (H) 2024   .    Review of Systems  The pertinent positive and negative findings are as noted in the HPI.    Historical Information   Past Medical History:   Diagnosis Date    Coronary artery disease     Diabetes mellitus (HCC)     Borderline    Disease of thyroid gland     DUB (dysfunctional uterine bleeding)     Fatty liver     Hashimoto's thyroiditis     Last Assessed:  14    History of stomach ulcers     Hypertension     Hypothyroidism     Irritable bowel syndrome     Last Assessed:  3/25/14    Liver disease     elevated enzymes    Myocardial infarction (HCC)     2017    GIANG (nonalcoholic steatohepatitis)     Ovarian cyst     left    Psychogenic polydipsia     Has had hyponatremia from drinking too much water in the past.     Past Surgical History:   Procedure Laterality Date    ANGIOPLASTY      CARDIAC CATHETERIZATION       SECTION      X 2    CHOLECYSTECTOMY      COLONOSCOPY      CYSTOSCOPY N/A 2019    Procedure: CYSTOSCOPY;  Surgeon: Luther Wadsworth MD;  [facility];  Service: Gynecology Oncology    HYSTERECTOMY      IR BIOPSY LIVER MASS  2020    OOPHORECTOMY Right     IN ESOPHAGOGASTRODUODENOSCOPY TRANSORAL DIAGNOSTIC N/A 2018    Procedure: ESOPHAGOGASTRODUODENOSCOPY (EGD);  Surgeon: Yoav Frias MD;  [facility];  Service: Gastroenterology    IN LAPS TOTAL HYSTERECT 250 GM/< W/RMVL TUBE/OVARY N/A 2019    Procedure: TOTAL LAPAROSCOPIC HYSTERECTOMY, BILATERAL SALPINGECTOMY, LEFT OOPHORECTOMY;  Surgeon: Luther Wadsworth MD;  [facility];  Service: Gynecology Oncology    IN RINSJ RPTD  BICEPS/TRICEPS TDN DSTL W/WO TDN GRF Left 2023    Procedure: REPAIR TENDON BICEPS, distal;  Surgeon: Yvonne Modi;  Location:  MAIN OR;  Service: Orthopedics    SINUS SURGERY      TONSILLECTOMY      UPPER GASTROINTESTINAL ENDOSCOPY       Social History   Social History     Substance and Sexual Activity   Alcohol Use Not Currently     Social History     Substance and Sexual Activity   Drug Use No     Social History     Tobacco Use   Smoking Status Former    Current packs/day: 0.00    Average packs/day: 0.5 packs/day for 27.1 years (13.5 ttl pk-yrs)    Types: Cigarettes    Start date:     Quit date: 2016    Years since quittin.9   Smokeless Tobacco Never   Tobacco Comments    ON AND OFF      Family History:   Family History   Problem Relation Age of Onset    Pancreatic cancer Mother     Hypertension Father     Diabetes type II Father     Diabetes type II Brother     No Known Problems Brother     Diabetes Maternal Grandmother     Diabetes Paternal Grandmother     Heart disease Paternal Grandmother     Hypothyroidism Paternal Grandmother     Liver cancer Maternal Aunt     Melanoma Maternal Aunt     Hypothyroidism Paternal Uncle     Lung disease Daughter         asthma tendencies    No Known Problems Son     Hyperlipidemia Neg Hx     Stroke Neg Hx        Meds/Allergies   Current Outpatient Medications   Medication Sig Dispense Refill    amLODIPine (NORVASC) 5 mg tablet Take 1 tablet (5 mg total) by mouth daily 90 tablet 3    Blood Glucose Monitoring Suppl (ONETOUCH VERIO IQ SYSTEM) w/Device KIT Use to test blood sugars twice a day 1 kit 0    dicyclomine (BENTYL) 20 mg tablet Take 1 tablet (20 mg total) by mouth every 6 (six) hours as needed (every 6 hours) 60 tablet 0    escitalopram (LEXAPRO) 20 mg tablet Take 1 tablet (20 mg total) by mouth daily 90 tablet 0    Icosapent Ethyl (Vascepa) 1 g CAPS 2 capsules twice a day 360 capsule 0    Insulin Pen Needle 32G X 4 MM MISC Use once a week 30 each 1     "Lactobacillus (PROBIOTIC ACIDOPHILUS PO) Take by mouth daily      levothyroxine 100 mcg tablet Take 1 tablet 6 days a week and none on sunday 90 tablet 0    Magnesium 300 MG CAPS Take 600 mg by mouth daily      metFORMIN (GLUCOPHAGE-XR) 500 mg 24 hr tablet Take 2 tablets (1,000 mg total) by mouth 2 (two) times a day with meals 360 tablet 3    methocarbamol (ROBAXIN) 750 mg tablet 1 tab PO HS. 90 tablet 1    metoprolol succinate (TOPROL-XL) 25 mg 24 hr tablet Take 1 tablet (25 mg total) by mouth 2 (two) times a day 180 tablet 0    Multiple Vitamins-Minerals (ZINC PO) Take by mouth      mupirocin (BACTROBAN) 2 % ointment Apply topically 3 (three) times a day 22 g 0    nitroglycerin (NITROSTAT) 0.4 mg SL tablet Place 1 tablet (0.4 mg total) under the tongue every 5 (five) minutes as needed for chest pain 90 tablet 0    OneTouch Delica Lancets 33G MISC Use to test blood sugars twice a day 200 each 6    OneTouch Verio test strip Use as instructed to test blood sugars twice a day 200 each 0    pantoprazole (PROTONIX) 40 mg tablet Take 1 tablet (40 mg total) by mouth daily before breakfast 90 tablet 0    semaglutide, 1 mg/dose, (Ozempic, 1 MG/DOSE,) 4 mg/3 mL injection pen Inject 1 mg once a week 9 mL 0    traMADol (Ultram) 50 mg tablet 1 tab BID prn for ongoing therapy DO NOT FILL BEFORE 10/15/23 60 tablet 3    Turmeric 500 MG CAPS Take by mouth      vitamin B-12 (VITAMIN B-12) 1,000 mcg tablet Take by mouth daily      Vitamin D, Cholecalciferol, 50 MCG (2000 UT) CAPS Take 4,000 Units by mouth daily       No current facility-administered medications for this visit.     Allergies   Allergen Reactions    Erythromycin Chest Pain     Chest pain as child    Metronidazole Other (See Comments)     SOB    Sulfa Antibiotics Shortness Of Breath     Per pt, \"hives and fever\"    Amoxicillin-Pot Clavulanate Rash     And yeast infection    Invokana [Canagliflozin] Other (See Comments)     Yeast infection       Objective   Vitals: Blood " "pressure 118/80, pulse 74, height 5' 2\" (1.575 m), weight 63.5 kg (140 lb), not currently breastfeeding.  Invasive Devices       Peripheral Intravenous Line  Duration             Peripheral IV 02/24/23 Dorsal (posterior);Right Hand 315 days                    Physical Exam  Physical exam normal with pertinent positives and negatives.    Eyes: No lid lag, stare, proptosis, or periorbital edema.   Neck: Thyroid normal in size without palpable nodules.  Respiratory: Lungs clear.   Cardiovascular: Heart regular. No murmurs.  Musculoskeletal: No tremor of the outstretched hands.   Neurological: Patellar deep tendon reflexes normal.     The history was obtained from the review of the chart and from the patient.    Lab Results:    Most recent Alc is  Lab Results   Component Value Date    HGBA1C 5.8 (H) 01/02/2024               Lab Results   Component Value Date    CREATININE 0.71 01/02/2024    CREATININE 0.74 11/10/2023    CREATININE 0.58 (L) 07/18/2023    BUN 10 01/02/2024     09/02/2015    K 4.3 01/02/2024     01/02/2024    CO2 26 01/02/2024     eGFR   Date Value Ref Range Status   01/02/2024 97 ml/min/1.73sq m Final         Lab Results   Component Value Date    CHOL 210 08/20/2015    HDL 43 (L) 07/18/2023    TRIG 235 (H) 07/18/2023       Lab Results   Component Value Date    ALT 26 01/02/2024    AST 21 01/02/2024    ALKPHOS 68 01/02/2024    BILITOT 0.38 09/02/2015       Lab Results   Component Value Date    FREET4 0.72 01/02/2024     Blood work performed on 01/02/2024 showed a hemoglobin A1c of 5.8 percent.     TSH is 0.132 with a free T4 of 0.72.     CMP showed glucose of 128 fasting but normal AST and ALT.    Future Appointments   Date Time Provider Department Center   1/16/2024  8:00 AM CINDY Farley [facility] Practice[facility]   1/22/2024  7:15 AM [facility]  [facility]   1/24/2024  8:00 AM Dominic Branch MD [facility] Practice-Med   2/13/2024 11:30 AM DAVID Otto [facility] " Practice-Aure   5/2/2024 10:20 AM Beata Georges MD ENDO QU Med Spc   11/15/2024  8:00 AM DAVID Mays HEM ONC [facility] Practice-Onc   12/17/2024  4:00 PM Emilia Bustamante MD [facility]       Transcribed for Beata Georges MD, by Claudette Velásquez on 01/05/24 at 1:05 PM. Powered by Dragon [employer].

## 2024-01-04 NOTE — PATIENT INSTRUCTIONS
The hgba1c is 5.8%. this is excellent.     Continue the same metformin and ozempic.     Continue to work on diet.     Continue to test blood sugars 1-2 times a daily.     The thyroid is a bit overactive.     The thyroid is still overactive likely from the weigh loss.     Decrease the levothyroxine to 100 mcg 6 days a week.    Follow up in 3 months with blood work.

## 2024-01-16 ENCOUNTER — OFFICE VISIT (OUTPATIENT)
Dept: PAIN MEDICINE | Facility: CLINIC | Age: 54
End: 2024-01-16
Payer: COMMERCIAL

## 2024-01-16 VITALS
BODY MASS INDEX: 25.76 KG/M2 | HEART RATE: 80 BPM | TEMPERATURE: 96.9 F | DIASTOLIC BLOOD PRESSURE: 78 MMHG | HEIGHT: 62 IN | WEIGHT: 140 LBS | SYSTOLIC BLOOD PRESSURE: 118 MMHG

## 2024-01-16 DIAGNOSIS — F11.20 UNCOMPLICATED OPIOID DEPENDENCE (HCC): ICD-10-CM

## 2024-01-16 DIAGNOSIS — Z79.891 LONG-TERM CURRENT USE OF OPIATE ANALGESIC: ICD-10-CM

## 2024-01-16 DIAGNOSIS — M54.50 CHRONIC LOW BACK PAIN WITHOUT SCIATICA, UNSPECIFIED BACK PAIN LATERALITY: ICD-10-CM

## 2024-01-16 DIAGNOSIS — M79.18 MYOFASCIAL PAIN SYNDROME: ICD-10-CM

## 2024-01-16 DIAGNOSIS — G89.29 CHRONIC LOW BACK PAIN WITHOUT SCIATICA, UNSPECIFIED BACK PAIN LATERALITY: ICD-10-CM

## 2024-01-16 DIAGNOSIS — M47.816 LUMBAR SPONDYLOSIS: Primary | ICD-10-CM

## 2024-01-16 DIAGNOSIS — G89.4 CHRONIC PAIN SYNDROME: ICD-10-CM

## 2024-01-16 PROCEDURE — 99214 OFFICE O/P EST MOD 30 MIN: CPT | Performed by: PHYSICIAN ASSISTANT

## 2024-01-16 RX ORDER — TRAMADOL HYDROCHLORIDE 50 MG/1
TABLET ORAL
Qty: 60 TABLET | Refills: 3 | Status: SHIPPED | OUTPATIENT
Start: 2024-01-16

## 2024-01-16 NOTE — PROGRESS NOTES
Assessment:  1. Lumbar spondylosis    2. Chronic low back pain without sciatica, unspecified back pain laterality    3. Myofascial pain syndrome    4. Chronic pain syndrome    5. Long-term current use of opiate analgesic    6. Uncomplicated opioid dependence (HCC)        Plan:  While the patient was in the office today, I did have a thorough conversation regarding their chronic pain syndrome, medication management, and treatment plan options.    The patient remains clinically very well-controlled on the current medication regimen which consists of tramadol and methocarbamol.  On today's visit I have electronically sent refills for the tramadol to her pharmacy.  She has 1 remaining 90-day refill remaining at the pharmacy for the methocarbamol.    [place] Prescription Drug Monitoring Program report was reviewed and was appropriate     There are risks associated with opioid medications, including dependence, addiction and tolerance. The patient understands and agrees to use these medications only as prescribed. Potential side effects of the medications include, but are not limited to, constipation, drowsiness, addiction, impaired judgment and risk of fatal overdose if not taken as prescribed. The patient was warned against driving while taking sedation medications.  Sharing medications is a felony. At this point in time, the patient is showing no signs of addiction, abuse, diversion or suicidal ideation.    The patient will follow-up in 4 months for medication prescription refill and reevaluation. The patient was advised to contact the office should their symptoms worsen in the interim. The patient was agreeable and verbalized an understanding.        History of Present Illness:    The patient is a 53 y.o. female last seen on 9/26/2023 who presents for a follow up office visit in regards to chronic low back pain secondary to lumbar spondylosis and lumbar degenerative disc disease.  The patient currently reports  low back pain that she presently rates a 4 out of 10 and describes it as an intermittent dull, aching and shooting pain radiates into the hips and buttocks.  Her pain remains unchanged since the last time she has seen us in symptoms or acute issues on today's visit.    Current pain medications includes: Tramadol and methocarbamol.  The patient reports that this regimen is providing 100% pain relief.  The patient is reporting no side effects from this pain medication regimen.    Pain Contract Signed: 2023    I have personally reviewed and/or updated the patient's past medical history, past surgical history, family history, social history, current medications, allergies, and vital signs today.       Review of Systems:    Review of Systems   Respiratory:  Negative for shortness of breath.    Cardiovascular:  Negative for chest pain.   Gastrointestinal:  Negative for constipation, diarrhea, nausea and vomiting.   Musculoskeletal:  Negative for arthralgias, gait problem, joint swelling (Joint stiffness) and myalgias.   Skin:  Negative for rash.   Neurological:  Negative for dizziness, seizures and weakness.   All other systems reviewed and are negative.        Past Medical History:   Diagnosis Date   • Coronary artery disease    • Diabetes mellitus (HCC)     Borderline   • Disease of thyroid gland    • DUB (dysfunctional uterine bleeding)    • Fatty liver    • Hashimoto's thyroiditis     Last Assessed:  14   • History of stomach ulcers    • Hypertension    • Hypothyroidism    • Irritable bowel syndrome     Last Assessed:  3/25/14   • Liver disease     elevated enzymes   • Myocardial infarction (HCC)     2017   • GIANG (nonalcoholic steatohepatitis)    • Ovarian cyst     left   • Psychogenic polydipsia     Has had hyponatremia from drinking too much water in the past.       Past Surgical History:   Procedure Laterality Date   • ANGIOPLASTY     • CARDIAC CATHETERIZATION     •  SECTION      X 2   •  CHOLECYSTECTOMY     • COLONOSCOPY     • CYSTOSCOPY N/A 2019    Procedure: CYSTOSCOPY;  Surgeon: Luther Wadsworth MD;  [facility];  Service: Gynecology Oncology   • HYSTERECTOMY     • IR BIOPSY LIVER MASS  2020   • OOPHORECTOMY Right    • [place] ESOPHAGOGASTRODUODENOSCOPY TRANSORAL DIAGNOSTIC N/A 2018    Procedure: ESOPHAGOGASTRODUODENOSCOPY (EGD);  Surgeon: Yoav Frias MD;  [facility];  Service: Gastroenterology   • [place] LAPS TOTAL HYSTERECT 250 GM/< W/RMVL TUBE/OVARY N/A 2019    Procedure: TOTAL LAPAROSCOPIC HYSTERECTOMY, BILATERAL SALPINGECTOMY, LEFT OOPHORECTOMY;  Surgeon: Luther Wadsworth MD;  Location[facility];  Service: Gynecology Oncology   • [place] RINSJ RPTD BICEPS/TRICEPS TDN DSTL W/WO TDN GRF Left 2023    Procedure: REPAIR TENDON BICEPS, distal;  Surgeon: Yvonne Modi;  Location:  MAIN OR;  Service: Orthopedics   • SINUS SURGERY     • TONSILLECTOMY     • UPPER GASTROINTESTINAL ENDOSCOPY         Family History   Problem Relation Age of Onset   • Pancreatic cancer Mother    • Hypertension Father    • Diabetes type II Father    • Diabetes type II Brother    • No Known Problems Brother    • Diabetes Maternal Grandmother    • Diabetes Paternal Grandmother    • Heart disease Paternal Grandmother    • Hypothyroidism Paternal Grandmother    • Liver cancer Maternal Aunt    • Melanoma Maternal Aunt    • Hypothyroidism Paternal Uncle    • Lung disease Daughter         asthma tendencies   • No Known Problems Son    • Hyperlipidemia Neg Hx    • Stroke Neg Hx        Social History     Occupational History   • Not on file   Tobacco Use   • Smoking status: Former     Current packs/day: 0.00     Average packs/day: 0.5 packs/day for 27.1 years (13.5 ttl pk-yrs)     Types: Cigarettes     Start date:      Quit date: 2016     Years since quittin.9   • Smokeless tobacco: Never   • Tobacco comments:     ON AND OFF    Vaping Use   • Vaping status: Never Used   Substance and  Sexual Activity   • Alcohol use: Not Currently   • Drug use: No   • Sexual activity: Yes     Partners: Male     Birth control/protection: None, Female Sterilization         Current Outpatient Medications:   •  amLODIPine (NORVASC) 5 mg tablet, Take 1 tablet (5 mg total) by mouth daily, Disp: 90 tablet, Rfl: 3  •  dicyclomine (BENTYL) 20 mg tablet, Take 1 tablet (20 mg total) by mouth every 6 (six) hours as needed (every 6 hours), Disp: 60 tablet, Rfl: 0  •  escitalopram (LEXAPRO) 20 mg tablet, Take 1 tablet (20 mg total) by mouth daily, Disp: 90 tablet, Rfl: 0  •  Icosapent Ethyl (Vascepa) 1 g CAPS, 2 capsules twice a day, Disp: 360 capsule, Rfl: 0  •  Lactobacillus (PROBIOTIC ACIDOPHILUS PO), Take by mouth daily, Disp: , Rfl:   •  levothyroxine 100 mcg tablet, Take 1 tablet 6 days a week and none on sunday, Disp: 90 tablet, Rfl: 0  •  Magnesium 300 MG CAPS, Take 600 mg by mouth daily, Disp: , Rfl:   •  metFORMIN (GLUCOPHAGE-XR) 500 mg 24 hr tablet, Take 2 tablets (1,000 mg total) by mouth 2 (two) times a day with meals, Disp: 360 tablet, Rfl: 3  •  methocarbamol (ROBAXIN) 750 mg tablet, 1 tab PO HS., Disp: 90 tablet, Rfl: 1  •  metoprolol succinate (TOPROL-XL) 25 mg 24 hr tablet, Take 1 tablet (25 mg total) by mouth 2 (two) times a day, Disp: 180 tablet, Rfl: 0  •  Multiple Vitamins-Minerals (ZINC PO), Take by mouth, Disp: , Rfl:   •  nitroglycerin (NITROSTAT) 0.4 mg SL tablet, Place 1 tablet (0.4 mg total) under the tongue every 5 (five) minutes as needed for chest pain, Disp: 90 tablet, Rfl: 0  •  pantoprazole (PROTONIX) 40 mg tablet, Take 1 tablet (40 mg total) by mouth daily before breakfast, Disp: 90 tablet, Rfl: 0  •  semaglutide, 1 mg/dose, (Ozempic, 1 MG/DOSE,) 4 mg/3 mL injection pen, Inject 1 mg once a week, Disp: 9 mL, Rfl: 0  •  traMADol (Ultram) 50 mg tablet, 1 tab BID prn for ongoing therapy, Disp: 60 tablet, Rfl: 3  •  Turmeric 500 MG CAPS, Take by mouth, Disp: , Rfl:   •  vitamin B-12 (VITAMIN B-12)  "1,000 mcg tablet, Take by mouth daily, Disp: , Rfl:   •  Vitamin D, Cholecalciferol, 50 MCG (2000 UT) CAPS, Take 4,000 Units by mouth daily, Disp: , Rfl:   •  Blood Glucose Monitoring Suppl (ONETOUCH VERIO IQ SYSTEM) w/Device KIT, Use to test blood sugars twice a day, Disp: 1 kit, Rfl: 0  •  Insulin Pen Needle 32G X 4 MM MISC, Use once a week, Disp: 30 each, Rfl: 1  •  mupirocin (BACTROBAN) 2 % ointment, Apply topically 3 (three) times a day, Disp: 22 g, Rfl: 0  •  OneTouch Delica Lancets 33G MISC, Use to test blood sugars twice a day, Disp: 200 each, Rfl: 6  •  OneTouch Verio test strip, Use as instructed to test blood sugars twice a day, Disp: 200 each, Rfl: 0    Allergies   Allergen Reactions   • Erythromycin Chest Pain     Chest pain as child   • Metronidazole Other (See Comments)     SOB   • Sulfa Antibiotics Shortness Of Breath     Per pt, \"hives and fever\"   • Amoxicillin-Pot Clavulanate Rash     And yeast infection   • Invokana [Canagliflozin] Other (See Comments)     Yeast infection       Physical Exam:    /78 (BP Location: Right arm, Patient Position: Sitting, Cuff Size: Standard)   Pulse 80   Temp (!) 96.9 °F (36.1 °C)   Ht 5' 2\" (1.575 m)   Wt 63.5 kg (140 lb)   LMP  (LMP Unknown)   BMI 25.61 kg/m²     Constitutional:normal, well developed, well nourished, alert, in no distress and non-toxic and no overt pain behavior.  Eyes:anicteric  HEENT:grossly intact  Neck:supple, symmetric, trachea midline and no masses   Pulmonary:even and unlabored  Cardiovascular:No edema or pitting edema present  Skin:Normal without rashes or lesions and well hydrated  Psychiatric:Mood and affect appropriate  Neurologic:Cranial Nerves II-XII grossly intact  Musculoskeletal:normal      Imaging  No orders to display         No orders of the defined types were placed in this encounter.      "

## 2024-01-22 ENCOUNTER — HOSPITAL ENCOUNTER (OUTPATIENT)
Dept: ULTRASOUND IMAGING | Facility: HOSPITAL | Age: 54
Discharge: HOME | End: 2024-01-22
Attending: INTERNAL MEDICINE
Payer: COMMERCIAL

## 2024-01-22 DIAGNOSIS — K75.81 NASH (NONALCOHOLIC STEATOHEPATITIS): ICD-10-CM

## 2024-01-22 PROCEDURE — 76981 USE PARENCHYMA: CPT

## 2024-01-24 ENCOUNTER — OFFICE VISIT (OUTPATIENT)
Dept: GASTROENTEROLOGY | Facility: CLINIC | Age: 54
End: 2024-01-24
Payer: COMMERCIAL

## 2024-01-24 VITALS
HEIGHT: 62 IN | BODY MASS INDEX: 25.65 KG/M2 | TEMPERATURE: 97.6 F | SYSTOLIC BLOOD PRESSURE: 118 MMHG | WEIGHT: 139.4 LBS | HEART RATE: 90 BPM | DIASTOLIC BLOOD PRESSURE: 76 MMHG

## 2024-01-24 DIAGNOSIS — K75.81 NASH (NONALCOHOLIC STEATOHEPATITIS): Primary | ICD-10-CM

## 2024-01-24 DIAGNOSIS — E11.65 TYPE 2 DIABETES MELLITUS WITH HYPERGLYCEMIA, WITHOUT LONG-TERM CURRENT USE OF INSULIN (HCC): ICD-10-CM

## 2024-01-24 PROCEDURE — 99214 OFFICE O/P EST MOD 30 MIN: CPT | Performed by: INTERNAL MEDICINE

## 2024-01-24 NOTE — PROGRESS NOTES
[facility] - Outpatient Note  Raymundo White 53 y.o. female MRN: [id number]  Unit/Bed#:  Encounter: [id number]          ASSESSMENT AND PLAN:      53 y.o. female with medical history significant for CAD, h/o MI, T2DM, HTN, HLD, Hashimoto's thyroiditis, GERD, and MASH, who presents for follow up visit.     She is doing well overall with stable liver enzymes. Her weight remains stable as well as Hgb A1c. We discussed the importance of continuing what she is doing currently as far as exercise and eating habits to maintain weight loss and reduce steatosis of the liver. She can also consider drinking black coffee twice a day and take fish oil twice a day to support her liver. Most recent US elastography is pending final read. If there is evidence of further reduction in steatosis, we can consider decreasing the frequency of US elastography and continue monitor with labs every 6 months.    She is uptodate on her healthcare maintenance. Recall COY in 2030.    FOLLOW-UP:  Return in about 1 year (around 1/24/2025).    VISIT DIAGNOSES AND ORDERS:      1. GIANG (nonalcoholic steatohepatitis)      Orders Placed This Encounter   Procedures    Comprehensive metabolic panel         ______________________________________________________________________    HPI:  Ms. Raymundo White is a 53 y.o. female with medical history significant for CAD, h/o MI, T2DM, HTN, HLD, Hashimoto's thyroiditis, GERD, and MASH, who presents for follow up visit.     She was last seen in the clinic with Dr. Branch in 7/2023 at which point in time she had sustained weight loss and stable LFTs.     Interval: Her weight remains stable at 140lbs. She reports doing well and is still on Ozempic. She notes nausea on the Ozempic a few days after taking the Ozempic. She is eating smaller amounts but feeling mo more. Denies heartburn, dysphagia, odynophagia, vomiting, diarrhea, constipation, BRBPR, melena, abdominal pain, change in bowel  habits, unintentional weight loss.     Hgb A1c 5.8  US elastography completed on 24 but final read pending.    REVIEW OF SYSTEMS:    10 point ROS reviewed and negative except otherwise noted in the HPI above.     Historical Information   Past Medical History:   Diagnosis Date    Coronary artery disease     Diabetes mellitus (HCC)     Borderline    Disease of thyroid gland     DUB (dysfunctional uterine bleeding)     Fatty liver     Hashimoto's thyroiditis     Last Assessed:  14    History of stomach ulcers     Hypertension     Hypothyroidism     Irritable bowel syndrome     Last Assessed:  3/25/14    Liver disease     elevated enzymes    Myocardial infarction (HCC)     2017    GIANG (nonalcoholic steatohepatitis)     Ovarian cyst     left    Psychogenic polydipsia     Has had hyponatremia from drinking too much water in the past.     Past Surgical History:   Procedure Laterality Date    ANGIOPLASTY      CARDIAC CATHETERIZATION       SECTION      X 2    CHOLECYSTECTOMY      COLONOSCOPY      CYSTOSCOPY N/A 2019    Procedure: CYSTOSCOPY;  Surgeon: Luther Wadsworth MD;  [facility];  Service: Gynecology Oncology    HYSTERECTOMY      IR BIOPSY LIVER MASS  2020    OOPHORECTOMY Right     HI ESOPHAGOGASTRODUODENOSCOPY TRANSORAL DIAGNOSTIC N/A 2018    Procedure: ESOPHAGOGASTRODUODENOSCOPY (EGD);  Surgeon: Yoav Frias MD;  Location: [facility];  Service: Gastroenterology    HI LAPS TOTAL HYSTERECT 250 GM/< W/RMVL TUBE/OVARY N/A 2019    Procedure: TOTAL LAPAROSCOPIC HYSTERECTOMY, BILATERAL SALPINGECTOMY, LEFT OOPHORECTOMY;  Surgeon: Luther Wadsworth MD;  [facility];  Service: Gynecology Oncology    HI RINSJ RPTD BICEPS/TRICEPS TDN DSTL W/WO TDN GRF Left 2023    Procedure: REPAIR TENDON BICEPS, distal;  Surgeon: Yvonne Modi;  Location:  MAIN OR;  Service: Orthopedics    SINUS SURGERY      TONSILLECTOMY      UPPER GASTROINTESTINAL ENDOSCOPY       Social History    Social History     Substance and Sexual Activity   Alcohol Use Not Currently     Social History     Substance and Sexual Activity   Drug Use No     Social History     Tobacco Use   Smoking Status Former    Current packs/day: 0.00    Average packs/day: 0.5 packs/day for 27.1 years (13.5 ttl pk-yrs)    Types: Cigarettes    Start date:     Quit date: 2016    Years since quittin.9   Smokeless Tobacco Never   Tobacco Comments    ON AND OFF      Family History   Problem Relation Age of Onset    Pancreatic cancer Mother     Hypertension Father     Diabetes type II Father     Diabetes type II Brother     No Known Problems Brother     Diabetes Maternal Grandmother     Diabetes Paternal Grandmother     Heart disease Paternal Grandmother     Hypothyroidism Paternal Grandmother     Liver cancer Maternal Aunt     Melanoma Maternal Aunt     Hypothyroidism Paternal Uncle     Lung disease Daughter         asthma tendencies    No Known Problems Son     Hyperlipidemia Neg Hx     Stroke Neg Hx        Meds/Allergies       Current Outpatient Medications:     amLODIPine (NORVASC) 5 mg tablet    Blood Glucose Monitoring Suppl (ONETOUCH VERIO IQ SYSTEM) w/Device KIT    dicyclomine (BENTYL) 20 mg tablet    escitalopram (LEXAPRO) 20 mg tablet    Icosapent Ethyl (Vascepa) 1 g CAPS    Insulin Pen Needle 32G X 4 MM MISC    Lactobacillus (PROBIOTIC ACIDOPHILUS PO)    levothyroxine 100 mcg tablet    Magnesium 300 MG CAPS    metFORMIN (GLUCOPHAGE-XR) 500 mg 24 hr tablet    methocarbamol (ROBAXIN) 750 mg tablet    metoprolol succinate (TOPROL-XL) 25 mg 24 hr tablet    Multiple Vitamins-Minerals (ZINC PO)    mupirocin (BACTROBAN) 2 % ointment    nitroglycerin (NITROSTAT) 0.4 mg SL tablet    OneTouch Delica Lancets 33G MISC    OneTouch Verio test strip    pantoprazole (PROTONIX) 40 mg tablet    semaglutide, 1 mg/dose, (Ozempic, 1 MG/DOSE,) 4 mg/3 mL injection pen    traMADol (Ultram) 50 mg tablet    Turmeric 500 MG CAPS    vitamin B-12  "(VITAMIN B-12) 1,000 mcg tablet    Vitamin D, Cholecalciferol, 50 MCG (2000 UT) CAPS    Allergies   Allergen Reactions    Erythromycin Chest Pain     Chest pain as child    Metronidazole Other (See Comments)     SOB    Sulfa Antibiotics Shortness Of Breath     Per pt, \"hives and fever\"    Amoxicillin-Pot Clavulanate Rash     And yeast infection    Invokana [Canagliflozin] Other (See Comments)     Yeast infection       Objective     Blood pressure 118/76, pulse 90, temperature 97.6 °F (36.4 °C), temperature source Tympanic, height 5' 2\" (1.575 m), weight 63.2 kg (139 lb 6.4 oz), not currently breastfeeding.    PHYSICAL EXAM:    General: Well-appearing, NAD  Eyes:  no conjunctival icterus or pallor  Abdominal: Soft, non-tender, non-distended  Neuro: alert and oriented  Psych: Normal affect    Lab Results:   Lab Results   Component Value Date     09/02/2015    K 4.3 01/02/2024    CO2 26 01/02/2024     01/02/2024    BUN 10 01/02/2024    CREATININE 0.71 01/02/2024    GLUCOSE 107 09/02/2015     Lab Results   Component Value Date    WBC 8.48 11/10/2023    HGB 12.9 11/10/2023    HCT 38.0 11/10/2023    MCV 95 11/10/2023     11/10/2023     Lab Results   Component Value Date    TP 7.3 01/02/2024    AST 21 01/02/2024    ALT 26 01/02/2024    BILITOT 0.38 09/02/2015    INR 0.96 08/09/2022      Lab Results   Component Value Date    IRON 138 11/10/2023    FERRITIN 87 11/10/2023     Lab Results   Component Value Date    CHOL 210 08/20/2015    HDL 43 (L) 07/18/2023    TRIG 235 (H) 07/18/2023       Radiology Results:   I have reviewed the results of any radiology studies performed within the last 90 days.     Fide Branch D.O.  Fellow, PGY-5  Division of Gastroenterology & Hepatology  Available on [facility]  1/24/2024 8:29 AM  "

## 2024-01-26 ENCOUNTER — APPOINTMENT (OUTPATIENT)
Dept: LAB | Facility: HOSPITAL | Age: 54
End: 2024-01-26

## 2024-01-26 DIAGNOSIS — Z20.1 EXPOSURE TO TB: ICD-10-CM

## 2024-01-26 DIAGNOSIS — Z20.1 EXPOSURE TO TB: Primary | ICD-10-CM

## 2024-01-26 PROCEDURE — 86480 TB TEST CELL IMMUN MEASURE: CPT

## 2024-01-26 PROCEDURE — 36415 COLL VENOUS BLD VENIPUNCTURE: CPT

## 2024-01-27 LAB
GAMMA INTERFERON BACKGROUND BLD IA-ACNC: 0.05 IU/ML
M TB IFN-G BLD-IMP: NEGATIVE
M TB IFN-G CD4+ BCKGRND COR BLD-ACNC: 0.01 IU/ML
M TB IFN-G CD4+ BCKGRND COR BLD-ACNC: 0.01 IU/ML
MITOGEN IGNF BCKGRD COR BLD-ACNC: 9.95 IU/ML

## 2024-02-07 DIAGNOSIS — J01.90 ACUTE SINUSITIS, RECURRENCE NOT SPECIFIED, UNSPECIFIED LOCATION: ICD-10-CM

## 2024-02-07 DIAGNOSIS — E06.3 HYPOTHYROIDISM DUE TO HASHIMOTO'S THYROIDITIS: ICD-10-CM

## 2024-02-07 DIAGNOSIS — E03.8 HYPOTHYROIDISM DUE TO HASHIMOTO'S THYROIDITIS: ICD-10-CM

## 2024-02-07 RX ORDER — LEVOTHYROXINE SODIUM 0.1 MG/1
TABLET ORAL
Qty: 90 TABLET | Refills: 0 | Status: SHIPPED | OUTPATIENT
Start: 2024-02-07

## 2024-02-07 NOTE — TELEPHONE ENCOUNTER
I see she has an upcoming appt for her check up but she'll need an appt to address this medication refill. She hasn't been seen in over 1 year

## 2024-02-07 NOTE — TELEPHONE ENCOUNTER
Pt declined to make an earlier appt. Pt stated that she will wait until her appt on 3/7/24 to have this refilled. She only uses it as needed.

## 2024-02-12 ENCOUNTER — TELEPHONE (OUTPATIENT)
Dept: BARIATRICS | Facility: CLINIC | Age: 54
End: 2024-02-12

## 2024-02-21 DIAGNOSIS — I10 ESSENTIAL HYPERTENSION: ICD-10-CM

## 2024-02-21 PROBLEM — Z12.11 COLON CANCER SCREENING: Status: RESOLVED | Noted: 2020-05-20 | Resolved: 2024-02-21

## 2024-02-22 ENCOUNTER — TELEPHONE (OUTPATIENT)
Dept: CARDIOLOGY CLINIC | Facility: CLINIC | Age: 54
End: 2024-02-22

## 2024-02-22 RX ORDER — METOPROLOL SUCCINATE 25 MG/1
25 TABLET, EXTENDED RELEASE ORAL 2 TIMES DAILY
Qty: 180 TABLET | Refills: 0 | Status: SHIPPED | OUTPATIENT
Start: 2024-02-22

## 2024-02-25 DIAGNOSIS — K21.9 GERD WITHOUT ESOPHAGITIS: ICD-10-CM

## 2024-02-26 RX ORDER — PANTOPRAZOLE SODIUM 40 MG/1
40 TABLET, DELAYED RELEASE ORAL
Qty: 30 TABLET | Refills: 0 | Status: SHIPPED | OUTPATIENT
Start: 2024-02-26

## 2024-03-04 DIAGNOSIS — Z20.1 EXPOSURE TO TB: Primary | ICD-10-CM

## 2024-03-04 DIAGNOSIS — E11.65 TYPE 2 DIABETES MELLITUS WITH HYPERGLYCEMIA, WITHOUT LONG-TERM CURRENT USE OF INSULIN (HCC): ICD-10-CM

## 2024-03-04 RX ORDER — METFORMIN HYDROCHLORIDE 500 MG/1
1000 TABLET, EXTENDED RELEASE ORAL 2 TIMES DAILY WITH MEALS
Qty: 360 TABLET | Refills: 0 | Status: SHIPPED | OUTPATIENT
Start: 2024-03-04

## 2024-03-06 ENCOUNTER — APPOINTMENT (OUTPATIENT)
Dept: LAB | Facility: HOSPITAL | Age: 54
End: 2024-03-06
Payer: COMMERCIAL

## 2024-03-06 DIAGNOSIS — Z20.1 EXPOSURE TO TB: ICD-10-CM

## 2024-03-06 PROCEDURE — 36415 COLL VENOUS BLD VENIPUNCTURE: CPT

## 2024-03-06 PROCEDURE — 86480 TB TEST CELL IMMUN MEASURE: CPT

## 2024-03-07 ENCOUNTER — OFFICE VISIT (OUTPATIENT)
Dept: FAMILY MEDICINE CLINIC | Facility: CLINIC | Age: 54
End: 2024-03-07
Payer: COMMERCIAL

## 2024-03-07 VITALS
BODY MASS INDEX: 25.42 KG/M2 | DIASTOLIC BLOOD PRESSURE: 68 MMHG | SYSTOLIC BLOOD PRESSURE: 106 MMHG | HEART RATE: 85 BPM | RESPIRATION RATE: 16 BRPM | TEMPERATURE: 98.2 F | OXYGEN SATURATION: 97 % | WEIGHT: 139 LBS

## 2024-03-07 DIAGNOSIS — E88.01 ALPHA-1-ANTITRYPSIN DEFICIENCY (HCC): ICD-10-CM

## 2024-03-07 DIAGNOSIS — E03.8 HYPOTHYROIDISM DUE TO HASHIMOTO'S THYROIDITIS: ICD-10-CM

## 2024-03-07 DIAGNOSIS — G89.29 CHRONIC LOW BACK PAIN WITHOUT SCIATICA, UNSPECIFIED BACK PAIN LATERALITY: ICD-10-CM

## 2024-03-07 DIAGNOSIS — E06.3 HYPOTHYROIDISM DUE TO HASHIMOTO'S THYROIDITIS: ICD-10-CM

## 2024-03-07 DIAGNOSIS — E78.2 COMBINED HYPERLIPIDEMIA: ICD-10-CM

## 2024-03-07 DIAGNOSIS — Q24.5 ANOMALOUS CORONARY ARTERY ORIGIN: ICD-10-CM

## 2024-03-07 DIAGNOSIS — E11.65 TYPE 2 DIABETES MELLITUS WITH HYPERGLYCEMIA, WITHOUT LONG-TERM CURRENT USE OF INSULIN (HCC): Primary | ICD-10-CM

## 2024-03-07 DIAGNOSIS — E66.3 OVERWEIGHT: ICD-10-CM

## 2024-03-07 DIAGNOSIS — F32.A DEPRESSION, UNSPECIFIED DEPRESSION TYPE: ICD-10-CM

## 2024-03-07 DIAGNOSIS — K21.9 GASTROESOPHAGEAL REFLUX DISEASE WITHOUT ESOPHAGITIS: ICD-10-CM

## 2024-03-07 DIAGNOSIS — D50.8 IRON DEFICIENCY ANEMIA SECONDARY TO INADEQUATE DIETARY IRON INTAKE: ICD-10-CM

## 2024-03-07 DIAGNOSIS — M54.50 CHRONIC LOW BACK PAIN WITHOUT SCIATICA, UNSPECIFIED BACK PAIN LATERALITY: ICD-10-CM

## 2024-03-07 DIAGNOSIS — K75.81 NASH (NONALCOHOLIC STEATOHEPATITIS): ICD-10-CM

## 2024-03-07 LAB
GAMMA INTERFERON BACKGROUND BLD IA-ACNC: 0.01 IU/ML
M TB IFN-G BLD-IMP: NEGATIVE
M TB IFN-G CD4+ BCKGRND COR BLD-ACNC: 0 IU/ML
M TB IFN-G CD4+ BCKGRND COR BLD-ACNC: 0.18 IU/ML
MITOGEN IGNF BCKGRD COR BLD-ACNC: 9.99 IU/ML

## 2024-03-07 PROCEDURE — 99214 OFFICE O/P EST MOD 30 MIN: CPT | Performed by: FAMILY MEDICINE

## 2024-03-07 NOTE — ASSESSMENT & PLAN NOTE
Most recent TSH in January was suppressed.  Currently on levothyroxine 100 mcg daily.  Still being followed by endocrinology

## 2024-03-07 NOTE — ASSESSMENT & PLAN NOTE
Weight 139, BMI 25.42.  Patient continues to do well with diet and exercise.  She is also on Ozempic 1 mg weekly.

## 2024-03-07 NOTE — ASSESSMENT & PLAN NOTE
Longstanding history of chronic back pain.  History of NSAID allergy.  Cannot take acetaminophen due to liver disease.  Doing well on tramadol 50 twice daily.  Still being followed by pain management

## 2024-03-07 NOTE — PROGRESS NOTES
Name: Raymundo White      : 1970      MRN: [id number]  Encounter Provider: Sin Moura DO  Encounter Date: 3/7/2024   Encounter department: [facility]    Assessment & Plan     1. Type 2 diabetes mellitus with hyperglycemia, without long-term current use of insulin (HCC)  Assessment & Plan:    Lab Results   Component Value Date    HGBA1C 5.8 (H) 2024   A1c from  0.8%.  Doing well on Ozempic 1 mg weekly and metformin 2000 mg daily.  Still being followed by endocrinology      2. GIANG (nonalcoholic steatohepatitis)  Assessment & Plan:  History of nonalcoholic steatohepatitis.  LFTs currently normal.  Elastography has shown evidence of fatty liver with fibrosis.  Still being followed by GI      3. Iron deficiency anemia secondary to inadequate dietary iron intake  Assessment & Plan:  History of anemia and iron infusions in the past.  Hemoglobins have been stable.  Last colonoscopy .  Still being followed by hematologist      4. Overweight  Assessment & Plan:  Weight 139, BMI 25.42.  Patient continues to do well with diet and exercise.  She is also on Ozempic 1 mg weekly.      5. Combined hyperlipidemia  Assessment & Plan:  Low-fat diet and exercise.  Will check lipids prior to next appointment    Orders:  -     Lipid Panel with Direct LDL reflex; Future; Expected date: 2024    6. Anomalous coronary artery origin  Assessment & Plan:  History of anomalous coronary artery.  Has been stable without chest pain or shortness of breath.  Still being followed by cardiology      7. Depression, unspecified depression type  Assessment & Plan:  Stable on Lexapro 20 mg daily      8. Hypothyroidism due to Hashimoto's thyroiditis  Assessment & Plan:  Most recent TSH in January was suppressed.  Currently on levothyroxine 100 mcg daily.  Still being followed by endocrinology      9. Gastroesophageal reflux disease without esophagitis  Assessment & Plan:  Stable on pantoprazole  40      10. Chronic low back pain without sciatica, unspecified back pain laterality  Assessment & Plan:  Longstanding history of chronic back pain.  History of NSAID allergy.  Cannot take acetaminophen due to liver disease.  Doing well on tramadol 50 twice daily.  Still being followed by pain management      11. Alpha-1-antitrypsin deficiency (HCC)  Assessment & Plan:  Patient recent diagnosis of alpha-1 antitrypsin deficiency.  PFT September 2023 was normal.  She is being followed by pulmonologist on a yearly basis now           Last colonoscopy 2022 (next 2030  Current breast cancer screening    Patient had COVID booster  Patient had flu shot this season  Last tetanus booster 2015  Patient will schedule second Shingrix in near future    6 months, PE, labs ordered by endocrine (I will add lipid panel).  Subjective     Patient presents for recheck chronic medical problems.  Overall she has been doing well.  She is compliant with prescribed medications.  Still being followed by endocrinology, GI, cardiology, pain management, hematology, and now pulmonologist due to alpha-1 antitrypsin      Review of Systems   Respiratory: Negative.     Cardiovascular: Negative.    Gastrointestinal: Negative.    Genitourinary: Negative.        Past Medical History:   Diagnosis Date   • Coronary artery disease    • Diabetes mellitus (HCC)     Borderline   • Disease of thyroid gland    • DUB (dysfunctional uterine bleeding)    • Fatty liver    • Hashimoto's thyroiditis     Last Assessed:  8/19/14   • History of stomach ulcers    • Hypertension    • Hypothyroidism    • Irritable bowel syndrome     Last Assessed:  3/25/14   • Liver disease     elevated enzymes   • Myocardial infarction (HCC)     2017   • GIANG (nonalcoholic steatohepatitis)    • Ovarian cyst     left   • Psychogenic polydipsia     Has had hyponatremia from drinking too much water in the past.     Past Surgical History:   Procedure Laterality Date   • ANGIOPLASTY     •  CARDIAC CATHETERIZATION     •  SECTION      X 2   • CHOLECYSTECTOMY     • COLONOSCOPY     • CYSTOSCOPY N/A 2019    Procedure: CYSTOSCOPY;  Surgeon: Luther Wadsworth MD;  [facility];  Service: Gynecology Oncology   • HYSTERECTOMY     • IR BIOPSY LIVER MASS  2020   • OOPHORECTOMY Right    • NH ESOPHAGOGASTRODUODENOSCOPY TRANSORAL DIAGNOSTIC N/A 2018    Procedure: ESOPHAGOGASTRODUODENOSCOPY (EGD);  Surgeon: Yoav Frias MD;  [facility];  Service: Gastroenterology   • NH LAPS TOTAL HYSTERECT 250 GM/< W/RMVL TUBE/OVARY N/A 2019    Procedure: TOTAL LAPAROSCOPIC HYSTERECTOMY, BILATERAL SALPINGECTOMY, LEFT OOPHORECTOMY;  Surgeon: Luther Wadsworth MD;  [facility];  Service: Gynecology Oncology   • NH RINSJ RPTD BICEPS/TRICEPS TDN DSTL W/WO TDN GRF Left 2023    Procedure: REPAIR TENDON BICEPS, distal;  Surgeon: Yvonne Modi;  Location:  MAIN OR;  Service: Orthopedics   • SINUS SURGERY     • TONSILLECTOMY     • UPPER GASTROINTESTINAL ENDOSCOPY       Family History   Problem Relation Age of Onset   • Pancreatic cancer Mother    • Hypertension Father    • Diabetes type II Father    • Diabetes type II Brother    • No Known Problems Brother    • Diabetes Maternal Grandmother    • Diabetes Paternal Grandmother    • Heart disease Paternal Grandmother    • Hypothyroidism Paternal Grandmother    • Liver cancer Maternal Aunt    • Melanoma Maternal Aunt    • Hypothyroidism Paternal Uncle    • Lung disease Daughter         asthma tendencies   • No Known Problems Son    • Hyperlipidemia Neg Hx    • Stroke Neg Hx      Social History     Socioeconomic History   • Marital status: /Civil Union     Spouse name: None   • Number of children: None   • Years of education: None   • Highest education level: None   Occupational History   • None   Tobacco Use   • Smoking status: Former     Current packs/day: 0.00     Average packs/day: 0.5 packs/day for 27.1 years (13.5 ttl pk-yrs)      Types: Cigarettes     Start date:      Quit date: 2016     Years since quittin.1   • Smokeless tobacco: Never   • Tobacco comments:     ON AND OFF    Vaping Use   • Vaping status: Never Used   Substance and Sexual Activity   • Alcohol use: Not Currently   • Drug use: No   • Sexual activity: Yes     Partners: Male     Birth control/protection: None, Female Sterilization   Other Topics Concern   • None   Social History Narrative    Feels safe at home     Social Determinants of Health     Financial Resource Strain: Not on file   Food Insecurity: Not on file   Transportation Needs: Not on file   Physical Activity: Not on file   Stress: Not on file   Social Connections: Not on file   Intimate Partner Violence: Not on file   Housing Stability: Not on file     Current Outpatient Medications on File Prior to Visit   Medication Sig   • amLODIPine (NORVASC) 5 mg tablet Take 1 tablet (5 mg total) by mouth daily   • Blood Glucose Monitoring Suppl (ONETOUCH VERIO IQ SYSTEM) w/Device KIT Use to test blood sugars twice a day   • dicyclomine (BENTYL) 20 mg tablet Take 1 tablet (20 mg total) by mouth every 6 (six) hours as needed (every 6 hours)   • escitalopram (LEXAPRO) 20 mg tablet Take 1 tablet (20 mg total) by mouth daily   • Icosapent Ethyl (Vascepa) 1 g CAPS 2 capsules twice a day   • Insulin Pen Needle 32G X 4 MM MISC Use once a week   • Lactobacillus (PROBIOTIC ACIDOPHILUS PO) Take by mouth daily   • levothyroxine 100 mcg tablet Take 1 tablet 6 days a week and none on    • Magnesium 300 MG CAPS Take 600 mg by mouth daily   • metFORMIN (GLUCOPHAGE-XR) 500 mg 24 hr tablet Take 2 tablets (1,000 mg total) by mouth 2 (two) times a day with meals   • methocarbamol (ROBAXIN) 750 mg tablet 1 tab PO HS.   • metoprolol succinate (TOPROL-XL) 25 mg 24 hr tablet Take 1 tablet (25 mg total) by mouth 2 (two) times a day   • Multiple Vitamins-Minerals (ZINC PO) Take by mouth   • mupirocin (BACTROBAN) 2 % ointment  "Apply topically 3 (three) times a day   • nitroglycerin (NITROSTAT) 0.4 mg SL tablet Place 1 tablet (0.4 mg total) under the tongue every 5 (five) minutes as needed for chest pain   • OneTouch Delica Lancets 33G MISC Use to test blood sugars twice a day   • OneTouch Verio test strip Use as instructed to test blood sugars twice a day   • pantoprazole (PROTONIX) 40 mg tablet Take 1 tablet (40 mg total) by mouth daily before breakfast   • semaglutide, 1 mg/dose, (Ozempic, 1 MG/DOSE,) 4 mg/3 mL injection pen Inject 1 mg once a week   • traMADol (Ultram) 50 mg tablet 1 tab BID prn for ongoing therapy   • Turmeric 500 MG CAPS Take by mouth   • vitamin B-12 (VITAMIN B-12) 1,000 mcg tablet Take by mouth daily   • Vitamin D, Cholecalciferol, 50 MCG (2000 UT) CAPS Take 4,000 Units by mouth daily     Allergies   Allergen Reactions   • Erythromycin Chest Pain     Chest pain as child   • Metronidazole Other (See Comments)     SOB   • Sulfa Antibiotics Shortness Of Breath     Per pt, \"hives and fever\"   • Amoxicillin-Pot Clavulanate Rash     And yeast infection   • Invokana [Canagliflozin] Other (See Comments)     Yeast infection     Immunization History   Administered Date(s) Administered   • COVID-19 PFIZER VACCINE 0.3 ML IM 12/21/2020, 01/11/2021, 10/06/2021   • H1N1, All Formulations 11/05/2009   • Hep A, adult 06/05/2020, 07/28/2022   • INFLUENZA 11/01/2019, 11/30/2023   • Influenza, seasonal, injectable 09/26/2013   • Tdap 03/04/2015   • Zoster Vaccine Recombinant 01/30/2023       Objective     /68 (BP Location: Right arm, Patient Position: Sitting, Cuff Size: Standard)   Pulse 85   Temp 98.2 °F (36.8 °C) (Temporal)   Resp 16   Wt 63 kg (139 lb)   LMP  (LMP Unknown)   SpO2 97%   BMI 25.42 kg/m²     Physical Exam  Cardiovascular:      Rate and Rhythm: Normal rate and regular rhythm.      Heart sounds: Normal heart sounds.      Comments: Carotids: no bruits  Ext: no edema  Pulmonary:      Effort: Pulmonary effort " is normal. No respiratory distress.      Breath sounds: No wheezing or rales.   Psychiatric:         Behavior: Behavior normal.         Thought Content: Thought content normal.       Sin Moura, DO

## 2024-03-07 NOTE — ASSESSMENT & PLAN NOTE
History of anemia and iron infusions in the past.  Hemoglobins have been stable.  Last colonoscopy 2022.  Still being followed by hematologist

## 2024-03-07 NOTE — ASSESSMENT & PLAN NOTE
Lab Results   Component Value Date    HGBA1C 5.8 (H) 01/02/2024   A1c from January 5 0.8%.  Doing well on Ozempic 1 mg weekly and metformin 2000 mg daily.  Still being followed by endocrinology

## 2024-03-07 NOTE — ASSESSMENT & PLAN NOTE
Patient recent diagnosis of alpha-1 antitrypsin deficiency.  PFT September 2023 was normal.  She is being followed by pulmonologist on a yearly basis now

## 2024-03-07 NOTE — ASSESSMENT & PLAN NOTE
History of anomalous coronary artery.  Has been stable without chest pain or shortness of breath.  Still being followed by cardiology

## 2024-03-07 NOTE — ASSESSMENT & PLAN NOTE
History of nonalcoholic steatohepatitis.  LFTs currently normal.  Elastography has shown evidence of fatty liver with fibrosis.  Still being followed by GI

## 2024-03-15 DIAGNOSIS — F32.A DEPRESSION, UNSPECIFIED DEPRESSION TYPE: ICD-10-CM

## 2024-03-15 RX ORDER — ESCITALOPRAM OXALATE 20 MG/1
20 TABLET ORAL DAILY
Qty: 90 TABLET | Refills: 1 | Status: SHIPPED | OUTPATIENT
Start: 2024-03-15

## 2024-03-15 NOTE — TELEPHONE ENCOUNTER
Reason for call:   [x] Refill   [] Prior Auth  [] Other:     Office:   [x] PCP/Provider -   [] Specialty/Provider -     Medication: escitalopram (LEXAPRO) 20 mg tablet     Dose/Frequency:  Take 1 tablet (20 mg total) by mouth daily    Quantity: 90    Pharmacy: [facility] - [place], [place] - [address] [phone]     Does the patient have enough for 3 days?   [] Yes   [x] No - Send as HP to POD

## 2024-03-19 ENCOUNTER — OFFICE VISIT (OUTPATIENT)
Dept: CARDIOLOGY CLINIC | Facility: CLINIC | Age: 54
End: 2024-03-19
Payer: COMMERCIAL

## 2024-03-19 VITALS
WEIGHT: 137 LBS | HEART RATE: 77 BPM | DIASTOLIC BLOOD PRESSURE: 74 MMHG | HEIGHT: 62 IN | SYSTOLIC BLOOD PRESSURE: 108 MMHG | BODY MASS INDEX: 25.21 KG/M2

## 2024-03-19 DIAGNOSIS — Q24.5 ANOMALOUS CORONARY ARTERY ORIGIN: Primary | ICD-10-CM

## 2024-03-19 DIAGNOSIS — I10 PRIMARY HYPERTENSION: Chronic | ICD-10-CM

## 2024-03-19 DIAGNOSIS — E78.2 COMBINED HYPERLIPIDEMIA: ICD-10-CM

## 2024-03-19 PROCEDURE — 99214 OFFICE O/P EST MOD 30 MIN: CPT | Performed by: INTERNAL MEDICINE

## 2024-03-19 NOTE — PROGRESS NOTES
Cardiology Follow Up    Raymundo White  1970  [id number]  [facility] [place] [id number] [facility]  [address] [id number] [phone]    1. Anomalous coronary artery origin        2. Primary hypertension        3. Combined hyperlipidemia            Interval History: Followup htn,     No chest pain, dyspnea or palpitations. BP controlled. Labs reviewed.     Medical Problems       Problem List       Hypertension (Chronic)    GIANG (nonalcoholic steatohepatitis)    Overview Signed 12/16/2020  8:04 AM by Sin Moura DO     S/p liver biopsy ( grade 2 inflammation andStage II fibrosis) sees GI ([facility])         Sinus tachycardia    Chronic sinusitis (Chronic)    Anemia    Anomalous coronary artery origin    Anxiety    Combined hyperlipidemia    Coronary vasospasm (HCC)    Type 2 diabetes mellitus with hyperglycemia, without long-term current use of insulin (HCC)      Lab Results   Component Value Date    HGBA1C 5.8 (H) 01/02/2024         Gastroesophageal reflux disease without esophagitis    Ground glass opacity present on imaging of lung    Hypothyroidism due to Hashimoto's thyroiditis    NSTEMI (non-ST elevated myocardial infarction) (HCC)    Chest pain due to GERD    Overview Signed 6/18/2018  5:05 PM by Yoav Frias MD     Added automatically from request for surgery [id number]         Dysfunctional uterine bleeding    Left ovarian cyst    Menopausal symptoms    S/P laparoscopic hysterectomy    Chronic back pain    Lateral epicondylitis of right elbow    Lumbar spondylosis    Myofascial pain syndrome    Lumbar radiculopathy    Chronic pain syndrome    Tendonitis of elbow, right    Chronic low back pain without sciatica    Overweight    Strain of left biceps    Depression    Traumatic partial tear of left biceps tendon    Alpha-1-antitrypsin deficiency carrier    Alpha-1-antitrypsin deficiency (HCC)        Past Medical History:    Diagnosis Date    Coronary artery disease     Diabetes mellitus (HCC)     Borderline    Disease of thyroid gland     DUB (dysfunctional uterine bleeding)     Fatty liver     Hashimoto's thyroiditis     Last Assessed:  14    History of stomach ulcers     Hypertension     Hypothyroidism     Irritable bowel syndrome     Last Assessed:  3/25/14    Liver disease     elevated enzymes    Myocardial infarction (HCC)     2017    GIANG (nonalcoholic steatohepatitis)     Ovarian cyst     left    Psychogenic polydipsia     Has had hyponatremia from drinking too much water in the past.     Social History     Socioeconomic History    Marital status: /Civil Union     Spouse name: Not on file    Number of children: Not on file    Years of education: Not on file    Highest education level: Not on file   Occupational History    Not on file   Tobacco Use    Smoking status: Former     Current packs/day: 0.00     Average packs/day: 0.5 packs/day for 27.1 years (13.5 ttl pk-yrs)     Types: Cigarettes     Start date:      Quit date: 2016     Years since quittin.1    Smokeless tobacco: Never    Tobacco comments:     ON AND OFF    Vaping Use    Vaping status: Never Used   Substance and Sexual Activity    Alcohol use: Not Currently    Drug use: No    Sexual activity: Yes     Partners: Male     Birth control/protection: None, Female Sterilization   Other Topics Concern    Not on file   Social History Narrative    Feels safe at home     Social Determinants of Health     Financial Resource Strain: Not on file   Food Insecurity: Not on file   Transportation Needs: Not on file   Physical Activity: Not on file   Stress: Not on file   Social Connections: Not on file   Intimate Partner Violence: Not on file   Housing Stability: Not on file      Family History   Problem Relation Age of Onset    Pancreatic cancer Mother     Hypertension Father     Diabetes type II Father     Diabetes type II Brother     No Known Problems  Brother     Diabetes Maternal Grandmother     Diabetes Paternal Grandmother     Heart disease Paternal Grandmother     Hypothyroidism Paternal Grandmother     Liver cancer Maternal Aunt     Melanoma Maternal Aunt     Hypothyroidism Paternal Uncle     Lung disease Daughter         asthma tendencies    No Known Problems Son     Hyperlipidemia Neg Hx     Stroke Neg Hx      Past Surgical History:   Procedure Laterality Date    ANGIOPLASTY      CARDIAC CATHETERIZATION       SECTION      X 2    CHOLECYSTECTOMY      COLONOSCOPY      CYSTOSCOPY N/A 2019    Procedure: CYSTOSCOPY;  Surgeon: Luther Wadsworth MD;  [facility];  Service: Gynecology Oncology    HYSTERECTOMY      IR BIOPSY LIVER MASS  2020    OOPHORECTOMY Right     VA ESOPHAGOGASTRODUODENOSCOPY TRANSORAL DIAGNOSTIC N/A 2018    Procedure: ESOPHAGOGASTRODUODENOSCOPY (EGD);  Surgeon: Yoav Frias MD;  [facility];  Service: Gastroenterology    VA LAPS TOTAL HYSTERECT 250 GM/< W/RMVL TUBE/OVARY N/A 2019    Procedure: TOTAL LAPAROSCOPIC HYSTERECTOMY, BILATERAL SALPINGECTOMY, LEFT OOPHORECTOMY;  Surgeon: Luther Wadsworth MD;  [facility];  Service: Gynecology Oncology    VA RINSJ RPTD BICEPS/TRICEPS TDN DSTL W/WO TDN GRF Left 2023    Procedure: REPAIR TENDON BICEPS, distal;  Surgeon: Yvonne Modi;  Location: [facility];  Service: Orthopedics    SINUS SURGERY      TONSILLECTOMY      UPPER GASTROINTESTINAL ENDOSCOPY         Current Outpatient Medications:     amLODIPine (NORVASC) 5 mg tablet, Take 1 tablet (5 mg total) by mouth daily, Disp: 90 tablet, Rfl: 3    Blood Glucose Monitoring Suppl (ONETOUCH VERIO IQ SYSTEM) w/Device KIT, Use to test blood sugars twice a day, Disp: 1 kit, Rfl: 0    dicyclomine (BENTYL) 20 mg tablet, Take 1 tablet (20 mg total) by mouth every 6 (six) hours as needed (every 6 hours), Disp: 60 tablet, Rfl: 0    escitalopram (LEXAPRO) 20 mg tablet, Take 1 tablet (20 mg total) by mouth daily,  Disp: 90 tablet, Rfl: 1    Icosapent Ethyl (Vascepa) 1 g CAPS, 2 capsules twice a day, Disp: 360 capsule, Rfl: 0    Insulin Pen Needle 32G X 4 MM MISC, Use once a week, Disp: 30 each, Rfl: 1    Lactobacillus (PROBIOTIC ACIDOPHILUS PO), Take by mouth daily, Disp: , Rfl:     levothyroxine 100 mcg tablet, Take 1 tablet 6 days a week and none on sunday, Disp: 90 tablet, Rfl: 0    Magnesium 300 MG CAPS, Take 600 mg by mouth daily, Disp: , Rfl:     metFORMIN (GLUCOPHAGE-XR) 500 mg 24 hr tablet, Take 2 tablets (1,000 mg total) by mouth 2 (two) times a day with meals, Disp: 360 tablet, Rfl: 0    methocarbamol (ROBAXIN) 750 mg tablet, 1 tab PO HS., Disp: 90 tablet, Rfl: 1    metoprolol succinate (TOPROL-XL) 25 mg 24 hr tablet, Take 1 tablet (25 mg total) by mouth 2 (two) times a day, Disp: 180 tablet, Rfl: 0    Multiple Vitamins-Minerals (ZINC PO), Take by mouth, Disp: , Rfl:     mupirocin (BACTROBAN) 2 % ointment, Apply topically 3 (three) times a day, Disp: 22 g, Rfl: 0    nitroglycerin (NITROSTAT) 0.4 mg SL tablet, Place 1 tablet (0.4 mg total) under the tongue every 5 (five) minutes as needed for chest pain, Disp: 90 tablet, Rfl: 0    OneTouch Delica Lancets 33G MISC, Use to test blood sugars twice a day, Disp: 200 each, Rfl: 6    OneTouch Verio test strip, Use as instructed to test blood sugars twice a day, Disp: 200 each, Rfl: 0    pantoprazole (PROTONIX) 40 mg tablet, Take 1 tablet (40 mg total) by mouth daily before breakfast, Disp: 30 tablet, Rfl: 0    semaglutide, 1 mg/dose, (Ozempic, 1 MG/DOSE,) 4 mg/3 mL injection pen, Inject 1 mg once a week, Disp: 9 mL, Rfl: 0    traMADol (Ultram) 50 mg tablet, 1 tab BID prn for ongoing therapy, Disp: 60 tablet, Rfl: 3    Turmeric 500 MG CAPS, Take by mouth, Disp: , Rfl:     vitamin B-12 (VITAMIN B-12) 1,000 mcg tablet, Take by mouth daily, Disp: , Rfl:     Vitamin D, Cholecalciferol, 50 MCG (2000 UT) CAPS, Take 4,000 Units by mouth daily, Disp: , Rfl:   Allergies   Allergen  "Reactions    Erythromycin Chest Pain     Chest pain as child    Metronidazole Other (See Comments)     SOB    Sulfa Antibiotics Shortness Of Breath     Per pt, \"hives and fever\"    Amoxicillin-Pot Clavulanate Rash     And yeast infection    Invokana [Canagliflozin] Other (See Comments)     Yeast infection       Labs:     Chemistry        Component Value Date/Time     09/02/2015 1020    K 4.3 01/02/2024 0811    K 3.8 09/02/2015 1020     01/02/2024 0811     09/02/2015 1020    CO2 26 01/02/2024 0811    CO2 24.3 09/02/2015 1020    BUN 10 01/02/2024 0811    BUN 13 09/02/2015 1020    CREATININE 0.71 01/02/2024 0811    CREATININE 0.79 09/02/2015 1020        Component Value Date/Time    CALCIUM 9.5 01/02/2024 0811    CALCIUM 9.1 09/02/2015 1020    ALKPHOS 68 01/02/2024 0811    ALKPHOS 78 09/02/2015 1020    AST 21 01/02/2024 0811    AST 37 09/02/2015 1020    ALT 26 01/02/2024 0811    ALT 62 09/02/2015 1020    BILITOT 0.38 09/02/2015 1020            Lab Results   Component Value Date    CHOL 210 08/20/2015    CHOL 179 07/19/2014    CHOL 197 12/05/2013     Lab Results   Component Value Date    HDL 43 (L) 07/18/2023    HDL 43 (L) 04/04/2023    HDL 39 (L) 04/20/2022     Lab Results   Component Value Date    LDLCALC 80 07/18/2023    LDLCALC 82 04/04/2023    LDLCALC 112 (H) 04/20/2022     Lab Results   Component Value Date    TRIG 235 (H) 07/18/2023    TRIG 228 (H) 04/04/2023    TRIG 211 (H) 04/20/2022     No results found for: \"CHOLHDL\"    Imaging: No results found.    EKG: .    Review of Systems   Constitutional: Negative.   HENT: Negative.     Eyes: Negative.    Cardiovascular: Negative.    Respiratory: Negative.     Endocrine: Negative.    Hematologic/Lymphatic: Negative.    Skin: Negative.    Musculoskeletal: Negative.    Gastrointestinal: Negative.    Genitourinary: Negative.    Neurological: Negative.    Psychiatric/Behavioral: Negative.     Allergic/Immunologic: Negative.        Vitals:    03/19/24 1511 "   BP: 108/74   Pulse: 77           Physical Exam  Vitals and nursing note reviewed.   Constitutional:       Appearance: Normal appearance.   HENT:      Head: Normocephalic.      Nose: Nose normal.      Mouth/Throat:      Mouth: Mucous membranes are moist.   Eyes:      General: No scleral icterus.     Conjunctiva/sclera: Conjunctivae normal.   Cardiovascular:      Rate and Rhythm: Normal rate and regular rhythm.      Heart sounds: No murmur heard.     No gallop.   Pulmonary:      Effort: Pulmonary effort is normal. No respiratory distress.      Breath sounds: Normal breath sounds. No wheezing or rales.   Abdominal:      General: Abdomen is flat. Bowel sounds are normal. There is no distension.      Palpations: Abdomen is soft.      Tenderness: There is no abdominal tenderness. There is no guarding.   Musculoskeletal:      Cervical back: Normal range of motion and neck supple.      Right lower leg: No edema.      Left lower leg: No edema.   Skin:     General: Skin is warm and dry.   Neurological:      General: No focal deficit present.      Mental Status: She is alert and oriented to person, place, and time.   Psychiatric:         Mood and Affect: Mood normal.         Behavior: Behavior normal.         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 Overall she is doing well. Continue with current med rx.       Hypertriglyceridemia:  and LDL 80.     HTN: BP controlled on med rx.       The patient was counseled regarding diagnostic results, instructions for management, risk factor reductions, impressions. total time of encounter was 25 minutes and 15 minutes was spent counseling.

## 2024-03-31 DIAGNOSIS — K58.9 IRRITABLE BOWEL SYNDROME WITHOUT DIARRHEA: ICD-10-CM

## 2024-03-31 DIAGNOSIS — K21.9 GERD WITHOUT ESOPHAGITIS: ICD-10-CM

## 2024-04-01 RX ORDER — PANTOPRAZOLE SODIUM 40 MG/1
40 TABLET, DELAYED RELEASE ORAL
Qty: 30 TABLET | Refills: 5 | Status: SHIPPED | OUTPATIENT
Start: 2024-04-01

## 2024-04-01 RX ORDER — DICYCLOMINE HCL 20 MG
20 TABLET ORAL EVERY 6 HOURS PRN
Qty: 60 TABLET | Refills: 1 | Status: SHIPPED | OUTPATIENT
Start: 2024-04-01

## 2024-04-01 NOTE — PATIENT INSTRUCTIONS
Hgba1c is 6 5%  this is excellent  Continue the same metformin, ozempic, and glimepiride  Continue to work on diet, exercise, and weight loss  Continue to test blood sugars once daily  The thyroid is normal  Continue the same levothyroxine  Follow up in 3 months with blood work 
No
No

## 2024-04-20 DIAGNOSIS — E11.65 TYPE 2 DIABETES MELLITUS WITH HYPERGLYCEMIA, WITHOUT LONG-TERM CURRENT USE OF INSULIN (HCC): ICD-10-CM

## 2024-04-29 ENCOUNTER — APPOINTMENT (OUTPATIENT)
Dept: LAB | Facility: HOSPITAL | Age: 54
End: 2024-04-29
Payer: COMMERCIAL

## 2024-04-29 DIAGNOSIS — E03.8 HYPOTHYROIDISM DUE TO HASHIMOTO'S THYROIDITIS: ICD-10-CM

## 2024-04-29 DIAGNOSIS — E06.3 HYPOTHYROIDISM DUE TO HASHIMOTO'S THYROIDITIS: ICD-10-CM

## 2024-04-29 DIAGNOSIS — E11.65 TYPE 2 DIABETES MELLITUS WITH HYPERGLYCEMIA, WITHOUT LONG-TERM CURRENT USE OF INSULIN (HCC): ICD-10-CM

## 2024-04-29 DIAGNOSIS — I10 PRIMARY HYPERTENSION: Chronic | ICD-10-CM

## 2024-04-29 LAB
ALBUMIN SERPL BCP-MCNC: 4.2 G/DL (ref 3.5–5)
ALP SERPL-CCNC: 63 U/L (ref 34–104)
ALT SERPL W P-5'-P-CCNC: 21 U/L (ref 7–52)
ANION GAP SERPL CALCULATED.3IONS-SCNC: 7 MMOL/L (ref 4–13)
AST SERPL W P-5'-P-CCNC: 17 U/L (ref 13–39)
BASOPHILS # BLD AUTO: 0.09 THOUSANDS/ÂΜL (ref 0–0.1)
BASOPHILS NFR BLD AUTO: 1 % (ref 0–1)
BILIRUB SERPL-MCNC: 0.36 MG/DL (ref 0.2–1)
BUN SERPL-MCNC: 10 MG/DL (ref 5–25)
CALCIUM SERPL-MCNC: 9 MG/DL (ref 8.4–10.2)
CHLORIDE SERPL-SCNC: 104 MMOL/L (ref 96–108)
CO2 SERPL-SCNC: 27 MMOL/L (ref 21–32)
CREAT SERPL-MCNC: 0.78 MG/DL (ref 0.6–1.3)
EOSINOPHIL # BLD AUTO: 0.26 THOUSAND/ÂΜL (ref 0–0.61)
EOSINOPHIL NFR BLD AUTO: 4 % (ref 0–6)
ERYTHROCYTE [DISTWIDTH] IN BLOOD BY AUTOMATED COUNT: 12.3 % (ref 11.6–15.1)
EST. AVERAGE GLUCOSE BLD GHB EST-MCNC: 117 MG/DL
GFR SERPL CREATININE-BSD FRML MDRD: 87 ML/MIN/1.73SQ M
GLUCOSE P FAST SERPL-MCNC: 98 MG/DL (ref 65–99)
HBA1C MFR BLD: 5.7 %
HCT VFR BLD AUTO: 36.7 % (ref 34.8–46.1)
HGB BLD-MCNC: 12.4 G/DL (ref 11.5–15.4)
IMM GRANULOCYTES # BLD AUTO: 0.01 THOUSAND/UL (ref 0–0.2)
IMM GRANULOCYTES NFR BLD AUTO: 0 % (ref 0–2)
LYMPHOCYTES # BLD AUTO: 2.63 THOUSANDS/ÂΜL (ref 0.6–4.47)
LYMPHOCYTES NFR BLD AUTO: 36 % (ref 14–44)
MCH RBC QN AUTO: 31.9 PG (ref 26.8–34.3)
MCHC RBC AUTO-ENTMCNC: 33.8 G/DL (ref 31.4–37.4)
MCV RBC AUTO: 94 FL (ref 82–98)
MONOCYTES # BLD AUTO: 0.52 THOUSAND/ÂΜL (ref 0.17–1.22)
MONOCYTES NFR BLD AUTO: 7 % (ref 4–12)
NEUTROPHILS # BLD AUTO: 3.77 THOUSANDS/ÂΜL (ref 1.85–7.62)
NEUTS SEG NFR BLD AUTO: 52 % (ref 43–75)
NRBC BLD AUTO-RTO: 0 /100 WBCS
PLATELET # BLD AUTO: 268 THOUSANDS/UL (ref 149–390)
PMV BLD AUTO: 9.4 FL (ref 8.9–12.7)
POTASSIUM SERPL-SCNC: 4.2 MMOL/L (ref 3.5–5.3)
PROT SERPL-MCNC: 6.9 G/DL (ref 6.4–8.4)
RBC # BLD AUTO: 3.89 MILLION/UL (ref 3.81–5.12)
SODIUM SERPL-SCNC: 138 MMOL/L (ref 135–147)
T4 FREE SERPL-MCNC: 0.9 NG/DL (ref 0.61–1.12)
TSH SERPL DL<=0.05 MIU/L-ACNC: 0.24 UIU/ML (ref 0.45–4.5)
WBC # BLD AUTO: 7.28 THOUSAND/UL (ref 4.31–10.16)

## 2024-04-29 PROCEDURE — 83036 HEMOGLOBIN GLYCOSYLATED A1C: CPT

## 2024-04-29 PROCEDURE — 85025 COMPLETE CBC W/AUTO DIFF WBC: CPT

## 2024-04-29 PROCEDURE — 80053 COMPREHEN METABOLIC PANEL: CPT

## 2024-04-29 PROCEDURE — 84439 ASSAY OF FREE THYROXINE: CPT

## 2024-04-29 PROCEDURE — 84443 ASSAY THYROID STIM HORMONE: CPT

## 2024-04-29 PROCEDURE — 36415 COLL VENOUS BLD VENIPUNCTURE: CPT

## 2024-05-02 ENCOUNTER — OFFICE VISIT (OUTPATIENT)
Dept: ENDOCRINOLOGY | Facility: HOSPITAL | Age: 54
End: 2024-05-02
Payer: COMMERCIAL

## 2024-05-02 VITALS
WEIGHT: 139 LBS | HEART RATE: 84 BPM | SYSTOLIC BLOOD PRESSURE: 112 MMHG | BODY MASS INDEX: 25.58 KG/M2 | HEIGHT: 62 IN | DIASTOLIC BLOOD PRESSURE: 80 MMHG

## 2024-05-02 DIAGNOSIS — E78.2 COMBINED HYPERLIPIDEMIA: ICD-10-CM

## 2024-05-02 DIAGNOSIS — E06.3 HYPOTHYROIDISM DUE TO HASHIMOTO'S THYROIDITIS: ICD-10-CM

## 2024-05-02 DIAGNOSIS — I10 PRIMARY HYPERTENSION: Chronic | ICD-10-CM

## 2024-05-02 DIAGNOSIS — E11.65 TYPE 2 DIABETES MELLITUS WITH HYPERGLYCEMIA, WITHOUT LONG-TERM CURRENT USE OF INSULIN (HCC): Primary | ICD-10-CM

## 2024-05-02 DIAGNOSIS — E03.8 HYPOTHYROIDISM DUE TO HASHIMOTO'S THYROIDITIS: ICD-10-CM

## 2024-05-02 PROCEDURE — 99214 OFFICE O/P EST MOD 30 MIN: CPT | Performed by: INTERNAL MEDICINE

## 2024-05-02 NOTE — PATIENT INSTRUCTIONS
Hgba1c is 5.7%. this is excellent.     Continue the same ozempic. You can try lower metformin 1 in am and 2 in pm or stay on 2 twice a day.     Continue to test blood sugars once daily.     The thyroid is improved, continue the levothyroxine.     Follow up in 3 months with blood work.

## 2024-05-02 NOTE — PROGRESS NOTES
Raymundo White 53 y.o. female MRN: [id number]    Encounter: [id number]      Assessment/Plan     Assessment:  This is a 53 y.o.-year-old female with type 2 diabetes with hyperglycemia without insulin use and hypothyroidism.     Plan:  1. Type 2 diabetes   Most recent hemoglobin A1c is excellent at 5.7%. She will continue the same metformin 500 mg 2 tabs bid and Ozempic 1 mg weekly. She will continue to work on dietary changes and exercise. She will work on testing her blood glucose once or twice a day. We discussed that she has the option to decrease her metformin to 1 tab in the am and 2 in the pm or she can stay on the same dose since her sugar control has been so good.     2. Hypothyroidism due to Hashimoto's thyroiditis.  Most recent TSH has improved since last visit but is still low, however because the patient is asymptomatic, she will continue the same dose of levothyroxine 100 mcg 6 times a week and nothing on Sundays.     She will follow up in 3 months with TSH, T4, HbgA1c, CMP, and urine albumin/creatinine.       CC: type 2 Diabetes and hypothyroidism follow up     History of Present Illness     HPI:   Raymundo White is a 53 y.o. year old female with type 2 diabetes for 6 years, hypothyroidism, hypertension for follow-up visit.     Diabetic complications include heart attack. She denies neuropathy, nephropathy, retinopathy, stroke, or claudication.     She is currently on metformin  mg 2 tablets twice a day and Ozempic 1 mg once a week. She denies polyuria, polyphagia, polydipsia, numbness or tingling of her feet, or blurry vision.     She states that she has been better at checking her sugars once daily. She varies the times at which she takes them and she ranges from the 80s-low 100s.   She is due for eye exam. Her last was March/April 2023.   Last foot exam was April 2023 and she is due for an exam today.     She is currently on levothyroxine 100 mcg 6 days a week and no tab on Sunday. She  reports that she is always cold. She also admits to some diarrhea for 1-2 days after her ozempic injection weekly. She denies fatigue, weight change, trouble swallowing, diplopia, CP, SOB, palpitations, tremors, constipation, abd pain, nausea, heat intolerance, dry skin, brittle nails, hair loss, or sleep disturbance. She has been under a bit more stress recently since her dad is in the hospital with multiple strokes.      Review of Systems   Constitutional:  Negative for fatigue and unexpected weight change.   HENT:  Negative for trouble swallowing.    Eyes:  Negative for visual disturbance.   Respiratory:  Negative for shortness of breath.    Cardiovascular:  Negative for chest pain and palpitations.   Gastrointestinal:  Positive for diarrhea. Negative for abdominal pain, constipation and nausea.        Experiences diarrhea for 1-2 days after her ozempic injection    Endocrine: Positive for cold intolerance. Negative for heat intolerance, polydipsia, polyphagia and polyuria.        Tends to always be cold    Skin:         No dry skin, hair loss, no brittle nails.    Neurological:  Negative for tremors and numbness.   Psychiatric/Behavioral:  Negative for sleep disturbance.        Historical Information   Past Medical History:   Diagnosis Date    Coronary artery disease     Diabetes mellitus (HCC)     Borderline    Disease of thyroid gland     DUB (dysfunctional uterine bleeding)     Fatty liver     Hashimoto's thyroiditis     Last Assessed:  14    History of stomach ulcers     Hypertension     Hypothyroidism     Irritable bowel syndrome     Last Assessed:  3/25/14    Liver disease     elevated enzymes    Myocardial infarction (HCC)     2017    GIANG (nonalcoholic steatohepatitis)     Ovarian cyst     left    Psychogenic polydipsia     Has had hyponatremia from drinking too much water in the past.     Past Surgical History:   Procedure Laterality Date    ANGIOPLASTY      CARDIAC CATHETERIZATION        SECTION      X 2    CHOLECYSTECTOMY      COLONOSCOPY      CYSTOSCOPY N/A 2019    Procedure: CYSTOSCOPY;  Surgeon: Luther Wadsworth MD;  [facility];  Service: Gynecology Oncology    HYSTERECTOMY      IR BIOPSY LIVER MASS  2020    OOPHORECTOMY Right     OR ESOPHAGOGASTRODUODENOSCOPY TRANSORAL DIAGNOSTIC N/A 2018    Procedure: ESOPHAGOGASTRODUODENOSCOPY (EGD);  Surgeon: Yoav Frias MD;  [facility];  Service: Gastroenterology    OR LAPS TOTAL HYSTERECT 250 GM/< W/RMVL TUBE/OVARY N/A 2019    Procedure: TOTAL LAPAROSCOPIC HYSTERECTOMY, BILATERAL SALPINGECTOMY, LEFT OOPHORECTOMY;  Surgeon: Luther Wadsworth MD;  [facility];  Service: Gynecology Oncology    OR RINSJ RPTD BICEPS/TRICEPS TDN DSTL W/WO TDN GRF Left 2023    Procedure: REPAIR TENDON BICEPS, distal;  Surgeon: Yvonne Modi;  Location:  [facility] OR;  Service: Orthopedics    SINUS SURGERY      TONSILLECTOMY      UPPER GASTROINTESTINAL ENDOSCOPY       Social History   Social History     Substance and Sexual Activity   Alcohol Use Not Currently     Social History     Substance and Sexual Activity   Drug Use No     Social History     Tobacco Use   Smoking Status Former    Current packs/day: 0.00    Average packs/day: 0.5 packs/day for 27.1 years (13.5 ttl pk-yrs)    Types: Cigarettes    Start date:     Quit date: 2016    Years since quittin.2   Smokeless Tobacco Never   Tobacco Comments    ON AND OFF      Family History:   Family History   Problem Relation Age of Onset    Pancreatic cancer Mother     Hypertension Father     Diabetes type II Father     Diabetes type II Brother     No Known Problems Brother     Diabetes Maternal Grandmother     Diabetes Paternal Grandmother     Heart disease Paternal Grandmother     Hypothyroidism Paternal Grandmother     Liver cancer Maternal Aunt     Melanoma Maternal Aunt     Hypothyroidism Paternal Uncle     Lung disease Daughter         asthma tendencies    No Known  Problems Son     Hyperlipidemia Neg Hx     Stroke Neg Hx        Meds/Allergies   Current Outpatient Medications   Medication Sig Dispense Refill    amLODIPine (NORVASC) 5 mg tablet Take 1 tablet (5 mg total) by mouth daily 90 tablet 3    Blood Glucose Monitoring Suppl (ONETOUCH VERIO IQ SYSTEM) w/Device KIT Use to test blood sugars twice a day 1 kit 0    dicyclomine (BENTYL) 20 mg tablet Take 1 tablet (20 mg total) by mouth every 6 (six) hours as needed (every 6 hours) 60 tablet 1    escitalopram (LEXAPRO) 20 mg tablet Take 1 tablet (20 mg total) by mouth daily 90 tablet 1    Icosapent Ethyl (Vascepa) 1 g CAPS 2 capsules twice a day 360 capsule 0    Insulin Pen Needle 32G X 4 MM MISC Use once a week 30 each 1    Lactobacillus (PROBIOTIC ACIDOPHILUS PO) Take by mouth daily      levothyroxine 100 mcg tablet Take 1 tablet 6 days a week and none on sunday 90 tablet 0    Magnesium 300 MG CAPS Take 600 mg by mouth daily      metFORMIN (GLUCOPHAGE-XR) 500 mg 24 hr tablet Take 2 tablets (1,000 mg total) by mouth 2 (two) times a day with meals 360 tablet 0    methocarbamol (ROBAXIN) 750 mg tablet 1 tab PO HS. 90 tablet 1    metoprolol succinate (TOPROL-XL) 25 mg 24 hr tablet Take 1 tablet (25 mg total) by mouth 2 (two) times a day 180 tablet 0    Multiple Vitamins-Minerals (ZINC PO) Take by mouth      mupirocin (BACTROBAN) 2 % ointment Apply topically 3 (three) times a day 22 g 0    nitroglycerin (NITROSTAT) 0.4 mg SL tablet Place 1 tablet (0.4 mg total) under the tongue every 5 (five) minutes as needed for chest pain 90 tablet 0    OneTouch Delica Lancets 33G MISC Use to test blood sugars twice a day 200 each 6    OneTouch Verio test strip Use as instructed to test blood sugars twice a day 200 each 0    pantoprazole (PROTONIX) 40 mg tablet Take 1 tablet (40 mg total) by mouth daily before breakfast 30 tablet 5    semaglutide, 1 mg/dose, (Ozempic, 1 MG/DOSE,) 4 mg/3 mL injection pen Inject 1 mg once a week 9 mL 1    traMADol  "(Ultram) 50 mg tablet 1 tab BID prn for ongoing therapy 60 tablet 3    Turmeric 500 MG CAPS Take by mouth      vitamin B-12 (VITAMIN B-12) 1,000 mcg tablet Take by mouth daily      Vitamin D, Cholecalciferol, 50 MCG (2000 UT) CAPS Take 4,000 Units by mouth daily       No current facility-administered medications for this visit.     Allergies   Allergen Reactions    Erythromycin Chest Pain     Chest pain as child    Metronidazole Other (See Comments)     SOB    Sulfa Antibiotics Shortness Of Breath     Per pt, \"hives and fever\"    Amoxicillin-Pot Clavulanate Rash     And yeast infection    Invokana [Canagliflozin] Other (See Comments)     Yeast infection       Objective   Vitals: Height 5' 2\" (1.575 m), weight 63 kg (139 lb), not currently breastfeeding.    Physical Exam  Vitals reviewed.   Constitutional:       Appearance: Normal appearance.   HENT:      Head: Normocephalic and atraumatic.   Eyes:      Extraocular Movements: Extraocular movements intact.      Comments: No lid lag, periorbital edema, proptosis   Neck:      Comments: No thyromegaly   Cardiovascular:      Rate and Rhythm: Normal rate and regular rhythm.      Heart sounds: Normal heart sounds.      Comments: No carotid bruits   Pulmonary:      Breath sounds: Normal breath sounds.   Musculoskeletal:      Comments: No tremor of outstretched hands    Skin:     General: Skin is warm and dry.   Neurological:      Mental Status: She is alert.      Comments: Patellar DTRs 2+         The history was obtained from the review of the chart, patient.    Lab Results:   Lab Results   Component Value Date/Time    Hemoglobin A1C 5.7 (H) 04/29/2024 08:27 AM    Hemoglobin A1C 5.8 (H) 01/02/2024 08:11 AM    Hemoglobin A1C 5.4 07/18/2023 07:58 AM    WBC 7.28 04/29/2024 08:27 AM    WBC 8.48 11/10/2023 10:43 AM    WBC 6.33 07/18/2023 07:58 AM    Hemoglobin 12.4 04/29/2024 08:27 AM    Hemoglobin 12.9 11/10/2023 10:43 AM    Hemoglobin 12.7 07/18/2023 07:58 AM    Hematocrit " "36.7 04/29/2024 08:27 AM    Hematocrit 38.0 11/10/2023 10:43 AM    Hematocrit 36.3 07/18/2023 07:58 AM    MCV 94 04/29/2024 08:27 AM    MCV 95 11/10/2023 10:43 AM    MCV 91 07/18/2023 07:58 AM    Platelets 268 04/29/2024 08:27 AM    Platelets 274 11/10/2023 10:43 AM    Platelets 249 07/18/2023 07:58 AM    BUN 10 04/29/2024 08:27 AM    BUN 10 01/02/2024 08:11 AM    BUN 11 11/10/2023 10:43 AM    Potassium 4.2 04/29/2024 08:27 AM    Potassium 4.3 01/02/2024 08:11 AM    Potassium 4.7 11/10/2023 10:43 AM    Chloride 104 04/29/2024 08:27 AM    Chloride 102 01/02/2024 08:11 AM    Chloride 100 11/10/2023 10:43 AM    CO2 27 04/29/2024 08:27 AM    CO2 26 01/02/2024 08:11 AM    CO2 29 11/10/2023 10:43 AM    Creatinine 0.78 04/29/2024 08:27 AM    Creatinine 0.71 01/02/2024 08:11 AM    Creatinine 0.74 11/10/2023 10:43 AM    AST 17 04/29/2024 08:27 AM    AST 21 01/02/2024 08:11 AM    AST 24 11/10/2023 10:43 AM    ALT 21 04/29/2024 08:27 AM    ALT 26 01/02/2024 08:11 AM    ALT 31 11/10/2023 10:43 AM    Total Protein 6.9 04/29/2024 08:27 AM    Total Protein 7.3 01/02/2024 08:11 AM    Total Protein 7.2 11/10/2023 10:43 AM    Albumin 4.2 04/29/2024 08:27 AM    Albumin 4.5 01/02/2024 08:11 AM    Albumin 4.6 11/10/2023 10:43 AM    HDL, Direct 43 (L) 07/18/2023 07:58 AM    Triglycerides 235 (H) 07/18/2023 07:58 AM           Imaging Studies: I have personally reviewed pertinent reports.      Portions of the record may have been created with voice recognition software. Occasional wrong word or \"sound a like\" substitutions may have occurred due to the inherent limitations of voice recognition software. Read the chart carefully and recognize, using context, where substitutions have occurred.    "

## 2024-05-04 DIAGNOSIS — E06.3 HYPOTHYROIDISM DUE TO HASHIMOTO'S THYROIDITIS: ICD-10-CM

## 2024-05-04 DIAGNOSIS — E03.8 HYPOTHYROIDISM DUE TO HASHIMOTO'S THYROIDITIS: ICD-10-CM

## 2024-05-04 RX ORDER — LEVOTHYROXINE SODIUM 0.1 MG/1
TABLET ORAL
Qty: 90 TABLET | Refills: 1 | Status: SHIPPED | OUTPATIENT
Start: 2024-05-04

## 2024-05-07 ENCOUNTER — OFFICE VISIT (OUTPATIENT)
Dept: PAIN MEDICINE | Facility: CLINIC | Age: 54
End: 2024-05-07
Payer: COMMERCIAL

## 2024-05-07 VITALS
WEIGHT: 139 LBS | HEART RATE: 80 BPM | HEIGHT: 62 IN | BODY MASS INDEX: 25.58 KG/M2 | SYSTOLIC BLOOD PRESSURE: 110 MMHG | DIASTOLIC BLOOD PRESSURE: 78 MMHG | TEMPERATURE: 98.1 F

## 2024-05-07 DIAGNOSIS — M47.816 LUMBAR SPONDYLOSIS: Primary | ICD-10-CM

## 2024-05-07 DIAGNOSIS — M51.36 LUMBAR DEGENERATIVE DISC DISEASE: ICD-10-CM

## 2024-05-07 DIAGNOSIS — F11.20 UNCOMPLICATED OPIOID DEPENDENCE (HCC): ICD-10-CM

## 2024-05-07 DIAGNOSIS — Z79.891 LONG-TERM CURRENT USE OF OPIATE ANALGESIC: ICD-10-CM

## 2024-05-07 DIAGNOSIS — M79.18 MYOFASCIAL PAIN SYNDROME: ICD-10-CM

## 2024-05-07 DIAGNOSIS — G89.29 CHRONIC LOW BACK PAIN WITHOUT SCIATICA, UNSPECIFIED BACK PAIN LATERALITY: ICD-10-CM

## 2024-05-07 DIAGNOSIS — M54.50 CHRONIC LOW BACK PAIN WITHOUT SCIATICA, UNSPECIFIED BACK PAIN LATERALITY: ICD-10-CM

## 2024-05-07 DIAGNOSIS — G89.4 CHRONIC PAIN SYNDROME: ICD-10-CM

## 2024-05-07 PROCEDURE — 99214 OFFICE O/P EST MOD 30 MIN: CPT | Performed by: PHYSICIAN ASSISTANT

## 2024-05-07 RX ORDER — TRAMADOL HYDROCHLORIDE 50 MG/1
TABLET ORAL
Qty: 60 TABLET | Refills: 3 | Status: SHIPPED | OUTPATIENT
Start: 2024-05-07

## 2024-05-07 RX ORDER — METHOCARBAMOL 750 MG/1
TABLET, FILM COATED ORAL
Qty: 90 TABLET | Refills: 1 | Status: SHIPPED | OUTPATIENT
Start: 2024-05-07

## 2024-05-07 NOTE — PROGRESS NOTES
Assessment:  1. Lumbar spondylosis    2. Lumbar degenerative disc disease    3. Myofascial pain syndrome    4. Chronic pain syndrome    5. Uncomplicated opioid dependence (HCC)    6. Long-term current use of opiate analgesic    7. Chronic low back pain without sciatica, unspecified back pain laterality        Plan:  While the patient was in the office today, I did have a thorough conversation regarding their chronic pain syndrome, medication management, and treatment plan options.    The patient remains clinically stable and very well-controlled on the current medication regimen without side effects or issues.  I feel it is reasonable to continue the tramadol and the methocarbamol.  On today's visit I have electronically sent refills for both medications to her pharmacy.    [place] Prescription Drug Monitoring Program report was reviewed and was appropriate     A urine drug screen was collected at today's office visit as part of our medication management protocol. The point of care testing results were appropriate for what was being prescribed. The specimen will be sent for confirmatory testing. The drug screen is medically necessary because the patient is either dependent on opioid medication or is being considered for opioid medication therapy and the results could impact ongoing or future treatment. The drug screen is to evaluate for the presences or absence of prescribed, non-prescribed, and/or illicit drugs/substances.    There are risks associated with opioid medications, including dependence, addiction and tolerance. The patient understands and agrees to use these medications only as prescribed. Potential side effects of the medications include, but are not limited to, constipation, drowsiness, addiction, impaired judgment and risk of fatal overdose if not taken as prescribed. The patient was warned against driving while taking sedation medications.  Sharing medications is a felony. At this point in  time, the patient is showing no signs of addiction, abuse, diversion or suicidal ideation.    The patient will follow-up in 12 weeks for medication prescription refill and reevaluation. The patient was advised to contact the office should their symptoms worsen in the interim. The patient was agreeable and verbalized an understanding.        History of Present Illness:    The patient is a 53 y.o. female last seen on 1/6/2024 who presents for a follow up office visit in regards to chronic low back pain secondary to lumbar spondylosis/degenerative disc disease.  The patient currently reports low back pain that she presently rates a 0 out of 10 and describes it as an intermittent dull, aching and pressure-like pain that is significantly worse by the end of her workday.  Her pain increases with standing, bending and walking at times.  Overall, she feels well-controlled on current medication regimen and has no new symptoms or acute issues on today's visit.    Current pain medications includes:  ?.  The patient reports that this regimen is providing 90% pain relief.  The patient is reporting no side effects from this pain medication regimen.    Pain Contract Signed: 6/6/2023  Last Urine Drug Screen: 5/7/2024    I have personally reviewed and/or updated the patient's past medical history, past surgical history, family history, social history, current medications, allergies, and vital signs today.       Review of Systems:    Review of Systems   Respiratory:  Negative for shortness of breath.    Cardiovascular:  Negative for chest pain.   Gastrointestinal:  Negative for constipation, diarrhea, nausea and vomiting.   Musculoskeletal:  Negative for arthralgias, gait problem, joint swelling and myalgias.   Skin:  Negative for rash.   Neurological:  Negative for dizziness, seizures and weakness.   All other systems reviewed and are negative.        Past Medical History:   Diagnosis Date   • Coronary artery disease    • Diabetes  mellitus (HCC)     Borderline   • Disease of thyroid gland    • DUB (dysfunctional uterine bleeding)    • Fatty liver    • Hashimoto's thyroiditis     Last Assessed:  14   • History of stomach ulcers    • Hypertension    • Hypothyroidism    • Irritable bowel syndrome     Last Assessed:  3/25/14   • Liver disease     elevated enzymes   • Myocardial infarction (HCC)     2017   • GIANG (nonalcoholic steatohepatitis)    • Ovarian cyst     left   • Psychogenic polydipsia     Has had hyponatremia from drinking too much water in the past.       Past Surgical History:   Procedure Laterality Date   • ANGIOPLASTY     • CARDIAC CATHETERIZATION     •  SECTION      X 2   • CHOLECYSTECTOMY     • COLONOSCOPY     • CYSTOSCOPY N/A 2019    Procedure: CYSTOSCOPY;  Surgeon: Luther Wadsworth MD;  [facility];  Service: Gynecology Oncology   • HYSTERECTOMY     • IR BIOPSY LIVER MASS  2020   • OOPHORECTOMY Right    • MD ESOPHAGOGASTRODUODENOSCOPY TRANSORAL DIAGNOSTIC N/A 2018    Procedure: ESOPHAGOGASTRODUODENOSCOPY (EGD);  Surgeon: Yoav Frias MD;  [facility];  Service: Gastroenterology   • MD LAPS TOTAL HYSTERECT 250 GM/< W/RMVL TUBE/OVARY N/A 2019    Procedure: TOTAL LAPAROSCOPIC HYSTERECTOMY, BILATERAL SALPINGECTOMY, LEFT OOPHORECTOMY;  Surgeon: Luther Wadsworth MD;  [facility];  Service: Gynecology Oncology   • MD RINSJ RPTD BICEPS/TRICEPS TDN DSTL W/WO TDN GRF Left 2023    Procedure: REPAIR TENDON BICEPS, distal;  Surgeon: Yvonne Modi;  Location:  MAIN OR;  Service: Orthopedics   • SINUS SURGERY     • TONSILLECTOMY     • UPPER GASTROINTESTINAL ENDOSCOPY         Family History   Problem Relation Age of Onset   • Pancreatic cancer Mother    • Hypertension Father    • Diabetes type II Father    • Diabetes type II Brother    • No Known Problems Brother    • Diabetes Maternal Grandmother    • Diabetes Paternal Grandmother    • Heart disease Paternal Grandmother    •  Hypothyroidism Paternal Grandmother    • Liver cancer Maternal Aunt    • Melanoma Maternal Aunt    • Hypothyroidism Paternal Uncle    • Lung disease Daughter         asthma tendencies   • No Known Problems Son    • Hyperlipidemia Neg Hx    • Stroke Neg Hx        Social History     Occupational History   • Not on file   Tobacco Use   • Smoking status: Former     Current packs/day: 0.00     Average packs/day: 0.5 packs/day for 27.1 years (13.5 ttl pk-yrs)     Types: Cigarettes     Start date:      Quit date: 2016     Years since quittin.2   • Smokeless tobacco: Never   • Tobacco comments:     ON AND OFF    Vaping Use   • Vaping status: Never Used   Substance and Sexual Activity   • Alcohol use: Not Currently   • Drug use: No   • Sexual activity: Yes     Partners: Male     Birth control/protection: None, Female Sterilization         Current Outpatient Medications:   •  amLODIPine (NORVASC) 5 mg tablet, Take 1 tablet (5 mg total) by mouth daily, Disp: 90 tablet, Rfl: 3  •  dicyclomine (BENTYL) 20 mg tablet, Take 1 tablet (20 mg total) by mouth every 6 (six) hours as needed (every 6 hours), Disp: 60 tablet, Rfl: 1  •  escitalopram (LEXAPRO) 20 mg tablet, Take 1 tablet (20 mg total) by mouth daily, Disp: 90 tablet, Rfl: 1  •  Icosapent Ethyl (Vascepa) 1 g CAPS, 2 capsules twice a day, Disp: 360 capsule, Rfl: 0  •  Lactobacillus (PROBIOTIC ACIDOPHILUS PO), Take by mouth daily, Disp: , Rfl:   •  levothyroxine 100 mcg tablet, Take 1 tablet 6 days a week and none on , Disp: 90 tablet, Rfl: 1  •  Magnesium 300 MG CAPS, Take 600 mg by mouth daily, Disp: , Rfl:   •  metFORMIN (GLUCOPHAGE-XR) 500 mg 24 hr tablet, Take 2 tablets (1,000 mg total) by mouth 2 (two) times a day with meals, Disp: 360 tablet, Rfl: 0  •  methocarbamol (ROBAXIN) 750 mg tablet, 1 tab PO HS., Disp: 90 tablet, Rfl: 1  •  metoprolol succinate (TOPROL-XL) 25 mg 24 hr tablet, Take 1 tablet (25 mg total) by mouth 2 (two) times a day, Disp: 180  "tablet, Rfl: 0  •  Multiple Vitamins-Minerals (ZINC PO), Take by mouth, Disp: , Rfl:   •  mupirocin (BACTROBAN) 2 % ointment, Apply topically 3 (three) times a day, Disp: 22 g, Rfl: 0  •  nitroglycerin (NITROSTAT) 0.4 mg SL tablet, Place 1 tablet (0.4 mg total) under the tongue every 5 (five) minutes as needed for chest pain, Disp: 90 tablet, Rfl: 0  •  pantoprazole (PROTONIX) 40 mg tablet, Take 1 tablet (40 mg total) by mouth daily before breakfast, Disp: 30 tablet, Rfl: 5  •  semaglutide, 1 mg/dose, (Ozempic, 1 MG/DOSE,) 4 mg/3 mL injection pen, Inject 1 mg once a week, Disp: 9 mL, Rfl: 1  •  traMADol (Ultram) 50 mg tablet, 1 tab BID prn for ongoing therapy DO NOT FILL BEFORE: 05/31/24, Disp: 60 tablet, Rfl: 3  •  Turmeric 500 MG CAPS, Take by mouth, Disp: , Rfl:   •  vitamin B-12 (VITAMIN B-12) 1,000 mcg tablet, Take by mouth daily, Disp: , Rfl:   •  Vitamin D, Cholecalciferol, 50 MCG (2000 UT) CAPS, Take 4,000 Units by mouth daily, Disp: , Rfl:   •  Blood Glucose Monitoring Suppl (ONETOUCH VERIO IQ SYSTEM) w/Device KIT, Use to test blood sugars twice a day, Disp: 1 kit, Rfl: 0  •  Insulin Pen Needle 32G X 4 MM MISC, Use once a week, Disp: 30 each, Rfl: 1  •  OneTouch Delica Lancets 33G MISC, Use to test blood sugars twice a day, Disp: 200 each, Rfl: 6  •  OneTouch Verio test strip, Use as instructed to test blood sugars twice a day, Disp: 200 each, Rfl: 0    Allergies   Allergen Reactions   • Erythromycin Chest Pain     Chest pain as child   • Metronidazole Other (See Comments)     SOB   • Sulfa Antibiotics Shortness Of Breath     Per pt, \"hives and fever\"   • Amoxicillin-Pot Clavulanate Rash     And yeast infection   • Invokana [Canagliflozin] Other (See Comments)     Yeast infection       Physical Exam:    /78 (BP Location: Left arm, Patient Position: Sitting, Cuff Size: Standard)   Pulse 80   Temp 98.1 °F (36.7 °C)   Ht 5' 2\" (1.575 m)   Wt 63 kg (139 lb)   LMP  (LMP Unknown)   BMI 25.42 kg/m² "     Constitutional:normal, well developed, well nourished, alert, in no distress and non-toxic and no overt pain behavior.  Eyes:anicteric  HEENT:grossly intact  Neck:supple, symmetric, trachea midline and no masses   Pulmonary:even and unlabored  Cardiovascular:No edema or pitting edema present  Skin:Normal without rashes or lesions and well hydrated  Psychiatric:Mood and affect appropriate  Neurologic:Cranial Nerves II-XII grossly intact  Musculoskeletal:normal      Imaging  No orders to display         Orders Placed This Encounter   Procedures   • [facility] All Prescribed Meds and Special Instructions   • Amphetamines, Methamphetamines   • Butalbital   • Phenobarbital   • Secobarbital   • Alprazolam   • Clonazepam   • Diazepam   • Lorazepam   • Gabapentin   • Pregabalin   • Cocaine   • Heroin   • Buprenorphine   • Levorphanol   • Meperidine   • Naltrexone   • Fentanyl   • Methadone   • Oxycodone   • Tapentadol   • THC   • Tramadol   • Codeine, Hydrocodone, Hydropmorphone, Morphine   • Bath Salts   • Ethyl Glucuronide/Ethyl Sulfate   • Kratom   • Spice   • Methylphenidate   • Phentermine   • Validity Oxidant   • Validity Creatinine   • Validity pH   • Validity Specific   • Xylazine Definitive Test

## 2024-05-21 DIAGNOSIS — K21.9 GERD WITHOUT ESOPHAGITIS: ICD-10-CM

## 2024-05-21 DIAGNOSIS — I10 ESSENTIAL HYPERTENSION: ICD-10-CM

## 2024-05-22 RX ORDER — METOPROLOL SUCCINATE 25 MG/1
25 TABLET, EXTENDED RELEASE ORAL 2 TIMES DAILY
Qty: 180 TABLET | Refills: 1 | Status: SHIPPED | OUTPATIENT
Start: 2024-05-22

## 2024-05-22 RX ORDER — PANTOPRAZOLE SODIUM 40 MG/1
40 TABLET, DELAYED RELEASE ORAL
Qty: 90 TABLET | Refills: 1 | Status: SHIPPED | OUTPATIENT
Start: 2024-05-22

## 2024-06-02 DIAGNOSIS — E11.65 TYPE 2 DIABETES MELLITUS WITH HYPERGLYCEMIA, WITHOUT LONG-TERM CURRENT USE OF INSULIN (HCC): ICD-10-CM

## 2024-06-03 RX ORDER — METFORMIN HYDROCHLORIDE 500 MG/1
1000 TABLET, EXTENDED RELEASE ORAL 2 TIMES DAILY WITH MEALS
Qty: 360 TABLET | Refills: 1 | Status: SHIPPED | OUTPATIENT
Start: 2024-06-03

## 2024-06-18 DIAGNOSIS — E11.65 TYPE 2 DIABETES MELLITUS WITH HYPERGLYCEMIA, WITHOUT LONG-TERM CURRENT USE OF INSULIN (HCC): ICD-10-CM

## 2024-06-19 ENCOUNTER — TELEPHONE (OUTPATIENT)
Age: 54
End: 2024-06-19

## 2024-06-19 DIAGNOSIS — E11.65 TYPE 2 DIABETES MELLITUS WITH HYPERGLYCEMIA, WITHOUT LONG-TERM CURRENT USE OF INSULIN (HCC): ICD-10-CM

## 2024-06-19 NOTE — TELEPHONE ENCOUNTER
Patient called stating she has been on this medication for 2 years and can not wait on prior authorization determination to take her dose. Asking if an alternate medication can be called in. Please advise, thank you.

## 2024-06-19 NOTE — TELEPHONE ENCOUNTER
Patient just received a call that her Ozempic was ready at the pharmacy. She will not be stopping in for samples today.     Thank you.

## 2024-06-19 NOTE — TELEPHONE ENCOUNTER
PA for ozempic 1 mg    Submitted via    []CMM-KEY    [x]Proviation-Case ID # [id number]  []Faxed to plan   []Other website    []Phone call Case ID #      Office notes sent, clinical questions answered. Awaiting determination    Turnaround time for your insurance to make a decision on your Prior Authorization can take 7-21 business days.

## 2024-07-10 ENCOUNTER — APPOINTMENT (OUTPATIENT)
Dept: LAB | Facility: HOSPITAL | Age: 54
End: 2024-07-10
Payer: COMMERCIAL

## 2024-07-10 DIAGNOSIS — K75.81 NASH (NONALCOHOLIC STEATOHEPATITIS): ICD-10-CM

## 2024-07-10 DIAGNOSIS — E11.65 TYPE 2 DIABETES MELLITUS WITH HYPERGLYCEMIA, WITHOUT LONG-TERM CURRENT USE OF INSULIN (HCC): ICD-10-CM

## 2024-07-10 DIAGNOSIS — E06.3 HYPOTHYROIDISM DUE TO HASHIMOTO'S THYROIDITIS: ICD-10-CM

## 2024-07-10 DIAGNOSIS — E78.2 COMBINED HYPERLIPIDEMIA: ICD-10-CM

## 2024-07-10 DIAGNOSIS — E03.8 HYPOTHYROIDISM DUE TO HASHIMOTO'S THYROIDITIS: ICD-10-CM

## 2024-07-10 DIAGNOSIS — I10 PRIMARY HYPERTENSION: Chronic | ICD-10-CM

## 2024-07-10 LAB
ALBUMIN SERPL BCG-MCNC: 4.4 G/DL (ref 3.5–5)
ALP SERPL-CCNC: 67 U/L (ref 34–104)
ALT SERPL W P-5'-P-CCNC: 21 U/L (ref 7–52)
ANION GAP SERPL CALCULATED.3IONS-SCNC: 6 MMOL/L (ref 4–13)
AST SERPL W P-5'-P-CCNC: 16 U/L (ref 13–39)
BILIRUB SERPL-MCNC: 0.35 MG/DL (ref 0.2–1)
BUN SERPL-MCNC: 11 MG/DL (ref 5–25)
CALCIUM SERPL-MCNC: 9.5 MG/DL (ref 8.4–10.2)
CHLORIDE SERPL-SCNC: 102 MMOL/L (ref 96–108)
CHOLEST SERPL-MCNC: 171 MG/DL
CO2 SERPL-SCNC: 30 MMOL/L (ref 21–32)
CREAT SERPL-MCNC: 0.77 MG/DL (ref 0.6–1.3)
EST. AVERAGE GLUCOSE BLD GHB EST-MCNC: 123 MG/DL
GFR SERPL CREATININE-BSD FRML MDRD: 88 ML/MIN/1.73SQ M
GLUCOSE P FAST SERPL-MCNC: 102 MG/DL (ref 65–99)
HBA1C MFR BLD: 5.9 %
HDLC SERPL-MCNC: 38 MG/DL
LDLC SERPL CALC-MCNC: 69 MG/DL (ref 0–100)
POTASSIUM SERPL-SCNC: 4.5 MMOL/L (ref 3.5–5.3)
PROT SERPL-MCNC: 7.2 G/DL (ref 6.4–8.4)
SODIUM SERPL-SCNC: 138 MMOL/L (ref 135–147)
T4 FREE SERPL-MCNC: 1.04 NG/DL (ref 0.61–1.12)
TRIGL SERPL-MCNC: 321 MG/DL
TSH SERPL DL<=0.05 MIU/L-ACNC: 0.11 UIU/ML (ref 0.45–4.5)

## 2024-07-10 PROCEDURE — 83036 HEMOGLOBIN GLYCOSYLATED A1C: CPT

## 2024-07-10 PROCEDURE — 80061 LIPID PANEL: CPT

## 2024-07-10 PROCEDURE — 84439 ASSAY OF FREE THYROXINE: CPT

## 2024-07-10 PROCEDURE — 36415 COLL VENOUS BLD VENIPUNCTURE: CPT

## 2024-07-10 PROCEDURE — 84443 ASSAY THYROID STIM HORMONE: CPT

## 2024-07-10 PROCEDURE — 80053 COMPREHEN METABOLIC PANEL: CPT

## 2024-07-29 ENCOUNTER — ESTABLISHED COMPREHENSIVE EXAM (OUTPATIENT)
Dept: URBAN - METROPOLITAN AREA CLINIC 6 | Facility: CLINIC | Age: 54
End: 2024-07-29

## 2024-07-29 DIAGNOSIS — H25.13: ICD-10-CM

## 2024-07-29 DIAGNOSIS — E11.9: ICD-10-CM

## 2024-07-29 LAB
LEFT EYE DIABETIC RETINOPATHY: NORMAL
RIGHT EYE DIABETIC RETINOPATHY: NORMAL

## 2024-07-29 PROCEDURE — 92014 COMPRE OPH EXAM EST PT 1/>: CPT

## 2024-07-29 ASSESSMENT — TONOMETRY
OS_IOP_MMHG: 15
OD_IOP_MMHG: 19

## 2024-07-29 ASSESSMENT — VISUAL ACUITY
OU_SC: 20/30-2
OD_SC: 20/40-2
OS_SC: 20/30-2

## 2024-08-06 DIAGNOSIS — E11.65 TYPE 2 DIABETES MELLITUS WITH HYPERGLYCEMIA, WITHOUT LONG-TERM CURRENT USE OF INSULIN (HCC): ICD-10-CM

## 2024-08-16 ENCOUNTER — APPOINTMENT (OUTPATIENT)
Dept: RADIOLOGY | Facility: CLINIC | Age: 54
End: 2024-08-16
Payer: COMMERCIAL

## 2024-08-16 ENCOUNTER — OFFICE VISIT (OUTPATIENT)
Dept: OBGYN CLINIC | Facility: CLINIC | Age: 54
End: 2024-08-16

## 2024-08-16 VITALS
BODY MASS INDEX: 25.58 KG/M2 | SYSTOLIC BLOOD PRESSURE: 118 MMHG | DIASTOLIC BLOOD PRESSURE: 79 MMHG | HEART RATE: 84 BPM | HEIGHT: 62 IN | WEIGHT: 139 LBS

## 2024-08-16 DIAGNOSIS — M25.562 LEFT KNEE PAIN, UNSPECIFIED CHRONICITY: ICD-10-CM

## 2024-08-16 DIAGNOSIS — M17.12 PRIMARY OSTEOARTHRITIS OF LEFT KNEE: Primary | ICD-10-CM

## 2024-08-16 DIAGNOSIS — Z01.89 ENCOUNTER FOR LOWER EXTREMITY COMPARISON IMAGING STUDY: ICD-10-CM

## 2024-08-16 PROCEDURE — 73560 X-RAY EXAM OF KNEE 1 OR 2: CPT

## 2024-08-16 PROCEDURE — 73564 X-RAY EXAM KNEE 4 OR MORE: CPT

## 2024-08-16 PROCEDURE — 77073 BONE LENGTH STUDIES: CPT

## 2024-08-16 RX ADMIN — TRIAMCINOLONE ACETONIDE 40 MG: 40 INJECTION, SUSPENSION INTRA-ARTICULAR; INTRAMUSCULAR at 08:00

## 2024-08-16 RX ADMIN — LIDOCAINE HYDROCHLORIDE 4 ML: 10 INJECTION, SOLUTION INFILTRATION; PERINEURAL at 08:00

## 2024-08-16 NOTE — PROGRESS NOTES
Knee New Office Note    Assessment:     1. Primary osteoarthritis of left knee    2. Encounter for lower extremity comparison imaging study    3. Left knee pain, unspecified chronicity        Plan:     Problem List Items Addressed This Visit    None  Visit Diagnoses       Primary osteoarthritis of left knee    -  Primary    Encounter for lower extremity comparison imaging study        Relevant Orders    XR knee 1 or 2 vw right    XR bone length (scanogram)    Left knee pain, unspecified chronicity        Relevant Orders    XR knee 4+ vw left injury    XR bone length (scanogram)           Findings today are consistent with left knee osteoarthritis. Imaging and prognosis was reviewed with the patient today. Discussed treatment options including continued observation, low impact exercises, bracing, anti-inflammatories, physical therapy, cortisone injection, visco injection, versus surgical intervention. Cortisone steroid injection was administered today. Patient should avoid strenuous activities for the next 1-2 days. Patient should avoid vaccines for the next 2 weeks if possible. She should monitor glucose levels for the next 7 to 10 days. Can apply cold compress for soreness. If patient feels relief with the cortisone injections, procedure can be repeated every 3 months. If patient sees minimal/no relief with cortisone injection, treatment with VISCO injections can be further discussed. She may continue activities as tolerated. Follow up as needed.    Subjective:     Patient ID: Raymundo White is a 54 y.o. female.  Chief Complaint:  HPI:  54 y.o. female presents to the office for evaluation of her left knee pain ongoing since March 2024 without known injury. She is experiencing posterior left knee pain that she describes as a constant ache. She notes that the pain worsens with activities involving pushing and pulling objects at work. She occasionally feels increased pressure. She does experience some clicking.  "She has tried topical voltaren with mild relief and sparingly uses anti-inflammatories. She denies any feelings of instability.     Allergy:  Allergies   Allergen Reactions    Erythromycin Chest Pain     Chest pain as child    Metronidazole Other (See Comments)     SOB    Sulfa Antibiotics Shortness Of Breath     Per pt, \"hives and fever\"    Amoxicillin-Pot Clavulanate Rash     And yeast infection    Invokana [Canagliflozin] Other (See Comments)     Yeast infection     Medications:  all current active meds have been reviewed  Past Medical History:  Past Medical History:   Diagnosis Date    Coronary artery disease     Diabetes mellitus (HCC)     Borderline    Disease of thyroid gland     DUB (dysfunctional uterine bleeding)     Fatty liver     Hashimoto's thyroiditis     Last Assessed:  14    History of stomach ulcers     Hypertension     Hypothyroidism     Irritable bowel syndrome     Last Assessed:  3/25/14    Liver disease     elevated enzymes    Myocardial infarction (HCC)     2017    GIANG (nonalcoholic steatohepatitis)     Ovarian cyst     left    Psychogenic polydipsia     Has had hyponatremia from drinking too much water in the past.     Past Surgical History:  Past Surgical History:   Procedure Laterality Date    ANGIOPLASTY      CARDIAC CATHETERIZATION       SECTION      X 2    CHOLECYSTECTOMY      COLONOSCOPY      CYSTOSCOPY N/A 2019    Procedure: CYSTOSCOPY;  Surgeon: Luther Wadsworth MD;  [facility];  Service: Gynecology Oncology    HYSTERECTOMY      IR BIOPSY LIVER MASS  2020    OOPHORECTOMY Right     NM ESOPHAGOGASTRODUODENOSCOPY TRANSORAL DIAGNOSTIC N/A 2018    Procedure: ESOPHAGOGASTRODUODENOSCOPY (EGD);  Surgeon: Yoav Frias MD;  [facility];  Service: Gastroenterology    NM LAPS TOTAL HYSTERECT 250 GM/< W/RMVL TUBE/OVARY N/A 2019    Procedure: TOTAL LAPAROSCOPIC HYSTERECTOMY, BILATERAL SALPINGECTOMY, LEFT OOPHORECTOMY;  Surgeon: Luther Wadsworth MD; "  [facility];  Service: Gynecology Oncology    [place] RINSJ RPTD BICEPS/TRICEPS TDN DSTL W/WO TDN GRF Left 2023    Procedure: REPAIR TENDON BICEPS, distal;  Surgeon: Yvonne Modi;  Location:  [facility];  Service: Orthopedics    SINUS SURGERY      TONSILLECTOMY      UPPER GASTROINTESTINAL ENDOSCOPY       Family History:  Family History   Problem Relation Age of Onset    Pancreatic cancer Mother     Hypertension Father     Diabetes type II Father     Diabetes type II Brother     No Known Problems Brother     Diabetes Maternal Grandmother     Diabetes Paternal Grandmother     Heart disease Paternal Grandmother     Hypothyroidism Paternal Grandmother     Liver cancer Maternal Aunt     Melanoma Maternal Aunt     Hypothyroidism Paternal Uncle     Lung disease Daughter         asthma tendencies    No Known Problems Son     Hyperlipidemia Neg Hx     Stroke Neg Hx      Social History:  Social History     Substance and Sexual Activity   Alcohol Use Not Currently     Social History     Substance and Sexual Activity   Drug Use No     Social History     Tobacco Use   Smoking Status Former    Current packs/day: 0.00    Average packs/day: 0.5 packs/day for 27.1 years (13.5 ttl pk-yrs)    Types: Cigarettes    Start date:     Quit date: 2016    Years since quittin.5   Smokeless Tobacco Never   Tobacco Comments    ON AND OFF          ROS:  General: Per HPI  Skin: Negative, except if noted below  HEENT: Negative  Respiratory: Negative  Cardiovascular: Negative  Gastrointestinal: Negative  Urinary: Negative  Vascular: Negative  Musculoskeletal: Positive per HPI   Neurologic: Positive per HPI  Endocrine: Negative    Objective:  BP Readings from Last 1 Encounters:   24 118/79      Wt Readings from Last 1 Encounters:   24 63 kg (139 lb)        Respiratory:   non-labored respirations    Lymphatics:  no palpable lymph nodes    Gait:   steady    Neurologic:   Alert and oriented times 3  Patient with normal  "sensation except as noted below  Deep tendon reflexes 2+ except as noted in MSK exam    Bilateral Lower Extremity:  Left Knee:      Inspection:  skin intact    Overall limb alignment mild varus    Effusion: trace    ROM 0-120 with pain at end range flexion    Extensor Lag: none    Palpation: posteromedial Joint line tenderness to palpation    AP Stability at 90 deg stable    M/L stability in full extension stable    M/L stability in midflexion stable    Motor: 5/5 Q/HS/TA/GS/P    Pulses: 2+ DP / 2+ PT    SILT DP/SP/S/S/TN    Right knee:     Inspection:  skin intact    Overall limb alignment neutral    Effusion: none    ROM 0-120 without pain    Extensor Lag: none    Palpation: no Joint line tenderness to palpation    AP Stability at 90 deg stable    M/L stability in full extension stable    M/L stability in midflexion stable    Motor: 5/5 Q/HS/TA/GS/P    Pulses: 2+ DP / 2+ PT    SILT DP/SP/S/S/TN    Imaging:  My interpretation XR AP scanogram/AP bilateral knee/lateral/gerard/sunrise left knee: mild medial joint space narrowing, subchondral sclerosis, subchondral cysts, osteophyte formation. No fracture or dislocation.     BMI:   Estimated body mass index is 25.42 kg/m² as calculated from the following:    Height as of this encounter: 5' 2\" (1.575 m).    Weight as of this encounter: 63 kg (139 lb).  BSA:   Estimated body surface area is 1.64 meters squared as calculated from the following:    Height as of this encounter: 5' 2\" (1.575 m).    Weight as of this encounter: 63 kg (139 lb).         Large joint arthrocentesis: L knee  Universal Protocol:  Consent: Verbal consent obtained.  Risks and benefits: risks, benefits and alternatives were discussed  Consent given by: patient  Time out: Immediately prior to procedure a \"time out\" was called to verify the correct patient, procedure, equipment, support staff and site/side marked as required.  Timeout called at: 8/16/2024 8:38 AM.  Patient understanding: patient " states understanding of the procedure being performed  Site marked: the operative site was marked  Patient identity confirmed: verbally with patient  Supporting Documentation  Indications: pain   Procedure Details  Location: knee - L knee  Preparation: Patient was prepped and draped in the usual sterile fashion  Needle size: 22 G  Ultrasound guidance: no  Approach: anterolateral  Medications administered: 4 mL lidocaine 1 %; 40 mg triamcinolone acetonide 40 mg/mL    Patient tolerance: patient tolerated the procedure well with no immediate complications  Dressing:  Sterile dressing applied          Scribe Attestation      I,:  Mehnaz Dumont am acting as a scribe while in the presence of the attending physician.:       I,:  Shakir Bauman DO personally performed the services described in this documentation    as scribed in my presence.:

## 2024-08-19 RX ORDER — TRIAMCINOLONE ACETONIDE 40 MG/ML
40 INJECTION, SUSPENSION INTRA-ARTICULAR; INTRAMUSCULAR
Status: COMPLETED | OUTPATIENT
Start: 2024-08-16 | End: 2024-08-16

## 2024-08-19 RX ORDER — LIDOCAINE HYDROCHLORIDE 10 MG/ML
4 INJECTION, SOLUTION INFILTRATION; PERINEURAL
Status: COMPLETED | OUTPATIENT
Start: 2024-08-16 | End: 2024-08-16

## 2024-08-27 ENCOUNTER — OFFICE VISIT (OUTPATIENT)
Dept: PAIN MEDICINE | Facility: CLINIC | Age: 54
End: 2024-08-27
Payer: COMMERCIAL

## 2024-08-27 VITALS
HEART RATE: 82 BPM | BODY MASS INDEX: 24.66 KG/M2 | DIASTOLIC BLOOD PRESSURE: 76 MMHG | WEIGHT: 134 LBS | HEIGHT: 62 IN | SYSTOLIC BLOOD PRESSURE: 116 MMHG | TEMPERATURE: 98.7 F

## 2024-08-27 DIAGNOSIS — Z79.891 LONG-TERM CURRENT USE OF OPIATE ANALGESIC: ICD-10-CM

## 2024-08-27 DIAGNOSIS — F11.20 UNCOMPLICATED OPIOID DEPENDENCE (HCC): ICD-10-CM

## 2024-08-27 DIAGNOSIS — M47.816 LUMBAR SPONDYLOSIS: Primary | ICD-10-CM

## 2024-08-27 DIAGNOSIS — G89.4 CHRONIC PAIN SYNDROME: ICD-10-CM

## 2024-08-27 PROCEDURE — 99214 OFFICE O/P EST MOD 30 MIN: CPT | Performed by: PHYSICIAN ASSISTANT

## 2024-08-27 RX ORDER — TRAMADOL HYDROCHLORIDE 50 MG/1
TABLET ORAL
Qty: 60 TABLET | Refills: 3 | Status: SHIPPED | OUTPATIENT
Start: 2024-08-27

## 2024-08-27 NOTE — PROGRESS NOTES
Assessment:  1. Lumbar spondylosis    2. Chronic pain syndrome    3. Long-term current use of opiate analgesic    4. Uncomplicated opioid dependence (HCC)        Plan:  While the patient was in the office today, I did have a thorough conversation regarding their chronic pain syndrome, medication management, and treatment plan options.    The patient remains clinically stable moderately controlled on the current medication regimen of tramadol 50 mg twice daily as needed moderate-severe pain.  Patient is taking the medication as prescribed with no side effects and it is providing 90% reduction in pain.  On today's visit I have electronically sent 4 months of refills to her pharmacy with appropriate fill dates.    [place] Prescription Drug Monitoring Program report was reviewed and was appropriate     A urine drug screen was collected at today's office visit as part of our medication management protocol. The point of care testing results were appropriate for what was being prescribed. The specimen will be sent for confirmatory testing. The drug screen is medically necessary because the patient is either dependent on opioid medication or is being considered for opioid medication therapy and the results could impact ongoing or future treatment. The drug screen is to evaluate for the presences or absence of prescribed, non-prescribed, and/or illicit drugs/substances.    There are risks associated with opioid medications, including dependence, addiction and tolerance. The patient understands and agrees to use these medications only as prescribed. Potential side effects of the medications include, but are not limited to, constipation, drowsiness, addiction, impaired judgment and risk of fatal overdose if not taken as prescribed. The patient was warned against driving while taking sedation medications.  Sharing medications is a felony. At this point in time, the patient is showing no signs of addiction, abuse, diversion  or suicidal ideation.    The patient will follow-up in 4 months for medication prescription refill and reevaluation. The patient was advised to contact the office should their symptoms worsen in the interim. The patient was agreeable and verbalized an understanding.        History of Present Illness:    The patient is a 54 y.o. female last seen on 5/7/2024 who presents for a follow up office visit in regards to chronic low back pain secondary to lumbar spondylosis and lumbar degenerative disc disease.  The patient currently reports low back pain that she rates a 2 out of 10 and describes it as an intermittent dull, aching, throbbing and cramping pain.  Pain is made worse with prolonged standing and bending.  Overall her pain remains somewhat better since her last visit.  Patient was recently seen and evaluated by our orthopedic department for her knee pain and underwent corticosteroid injection with relief.    Current pain medications includes: Tramadol 50 mg twice daily as needed .  The patient reports that this regimen is providing 90% pain relief.  The patient is reporting no side effects from this pain medication regimen.    Pain Contract Signed: 8/27/2024  Last Urine Drug Screen: 8/27/2024    I have personally reviewed and/or updated the patient's past medical history, past surgical history, family history, social history, current medications, allergies, and vital signs today.       Review of Systems:    Review of Systems   Respiratory:  Negative for shortness of breath.    Cardiovascular:  Negative for chest pain.   Gastrointestinal:  Negative for constipation, diarrhea, nausea and vomiting.   Musculoskeletal:  Negative for arthralgias, gait problem, joint swelling and myalgias.   Skin:  Negative for rash.   Neurological:  Negative for dizziness, seizures and weakness.   All other systems reviewed and are negative.        Past Medical History:   Diagnosis Date   • Coronary artery disease    • Diabetes mellitus  (HCC)     Borderline   • Disease of thyroid gland    • DUB (dysfunctional uterine bleeding)    • Fatty liver    • Hashimoto's thyroiditis     Last Assessed:  14   • History of stomach ulcers    • Hypertension    • Hypothyroidism    • Irritable bowel syndrome     Last Assessed:  3/25/14   • Liver disease     elevated enzymes   • Myocardial infarction (HCC)     2017   • GIANG (nonalcoholic steatohepatitis)    • Ovarian cyst     left   • Psychogenic polydipsia     Has had hyponatremia from drinking too much water in the past.       Past Surgical History:   Procedure Laterality Date   • ANGIOPLASTY     • CARDIAC CATHETERIZATION     •  SECTION      X 2   • CHOLECYSTECTOMY     • COLONOSCOPY     • CYSTOSCOPY N/A 2019    Procedure: CYSTOSCOPY;  Surgeon: Luther Wadsworth MD;  Location: [facility];  Service: Gynecology Oncology   • HYSTERECTOMY     • IR BIOPSY LIVER MASS  2020   • OOPHORECTOMY Right    • DC ESOPHAGOGASTRODUODENOSCOPY TRANSORAL DIAGNOSTIC N/A 2018    Procedure: ESOPHAGOGASTRODUODENOSCOPY (EGD);  Surgeon: Yoav Frias MD;  Location: [facility];  Service: Gastroenterology   • DC LAPS TOTAL HYSTERECT 250 GM/< W/RMVL TUBE/OVARY N/A 2019    Procedure: TOTAL LAPAROSCOPIC HYSTERECTOMY, BILATERAL SALPINGECTOMY, LEFT OOPHORECTOMY;  Surgeon: Luther Wadsworth MD;  Location: [facility];  Service: Gynecology Oncology   • DC RINSJ RPTD BICEPS/TRICEPS TDN DSTL W/WO TDN GRF Left 2023    Procedure: REPAIR TENDON BICEPS, distal;  Surgeon: Yvonne Modi;  Location:  MAIN OR;  Service: Orthopedics   • SINUS SURGERY     • TONSILLECTOMY     • UPPER GASTROINTESTINAL ENDOSCOPY         Family History   Problem Relation Age of Onset   • Pancreatic cancer Mother    • Hypertension Father    • Diabetes type II Father    • Diabetes type II Brother    • No Known Problems Brother    • Diabetes Maternal Grandmother    • Diabetes Paternal Grandmother    • Heart disease Paternal Grandmother    •  Hypothyroidism Paternal Grandmother    • Liver cancer Maternal Aunt    • Melanoma Maternal Aunt    • Hypothyroidism Paternal Uncle    • Lung disease Daughter         asthma tendencies   • No Known Problems Son    • Hyperlipidemia Neg Hx    • Stroke Neg Hx        Social History     Occupational History   • Not on file   Tobacco Use   • Smoking status: Former     Current packs/day: 0.00     Average packs/day: 0.5 packs/day for 27.1 years (13.5 ttl pk-yrs)     Types: Cigarettes     Start date:      Quit date: 2016     Years since quittin.5   • Smokeless tobacco: Never   • Tobacco comments:     ON AND OFF    Vaping Use   • Vaping status: Never Used   Substance and Sexual Activity   • Alcohol use: Not Currently   • Drug use: No   • Sexual activity: Yes     Partners: Male     Birth control/protection: None, Female Sterilization         Current Outpatient Medications:   •  amLODIPine (NORVASC) 5 mg tablet, Take 1 tablet (5 mg total) by mouth daily, Disp: 90 tablet, Rfl: 3  •  dicyclomine (BENTYL) 20 mg tablet, Take 1 tablet (20 mg total) by mouth every 6 (six) hours as needed (every 6 hours), Disp: 60 tablet, Rfl: 1  •  escitalopram (LEXAPRO) 20 mg tablet, Take 1 tablet (20 mg total) by mouth daily, Disp: 90 tablet, Rfl: 1  •  Icosapent Ethyl (Vascepa) 1 g CAPS, 2 capsules twice a day, Disp: 360 capsule, Rfl: 0  •  Lactobacillus (PROBIOTIC ACIDOPHILUS PO), Take by mouth daily, Disp: , Rfl:   •  levothyroxine 100 mcg tablet, Take 1 tablet 6 days a week and none on , Disp: 90 tablet, Rfl: 1  •  Magnesium 300 MG CAPS, Take 600 mg by mouth daily, Disp: , Rfl:   •  metFORMIN (GLUCOPHAGE-XR) 500 mg 24 hr tablet, Take 2 tablets (1,000 mg total) by mouth 2 (two) times a day with meals, Disp: 360 tablet, Rfl: 1  •  methocarbamol (ROBAXIN) 750 mg tablet, 1 tab PO HS., Disp: 90 tablet, Rfl: 1  •  metoprolol succinate (TOPROL-XL) 25 mg 24 hr tablet, Take 1 tablet (25 mg total) by mouth 2 (two) times a day, Disp: 180  "tablet, Rfl: 1  •  Multiple Vitamins-Minerals (ZINC PO), Take by mouth, Disp: , Rfl:   •  nitroglycerin (NITROSTAT) 0.4 mg SL tablet, Place 1 tablet (0.4 mg total) under the tongue every 5 (five) minutes as needed for chest pain, Disp: 90 tablet, Rfl: 0  •  pantoprazole (PROTONIX) 40 mg tablet, Take 1 tablet (40 mg total) by mouth daily before breakfast, Disp: 90 tablet, Rfl: 1  •  semaglutide, 1 mg/dose, (Ozempic, 1 MG/DOSE,) 4 mg/3 mL injection pen, Inject 1 mg once a week, Disp: 3 mL, Rfl: 5  •  traMADol (Ultram) 50 mg tablet, 1 tab BID prn for ongoing therapy DO NOT FILL BEFORE: 09/19/24, Disp: 60 tablet, Rfl: 3  •  Blood Glucose Monitoring Suppl (ONETOUCH VERIO IQ SYSTEM) w/Device KIT, Use to test blood sugars twice a day, Disp: 1 kit, Rfl: 0  •  Insulin Pen Needle 32G X 4 MM MISC, Use once a week, Disp: 30 each, Rfl: 1  •  mupirocin (BACTROBAN) 2 % ointment, Apply topically 3 (three) times a day, Disp: 22 g, Rfl: 0  •  OneTouch Delica Lancets 33G MISC, Use to test blood sugars twice a day, Disp: 200 each, Rfl: 6  •  OneTouch Verio test strip, Use as instructed to test blood sugars twice a day, Disp: 200 each, Rfl: 0  •  Turmeric 500 MG CAPS, Take by mouth, Disp: , Rfl:   •  vitamin B-12 (VITAMIN B-12) 1,000 mcg tablet, Take by mouth daily, Disp: , Rfl:   •  Vitamin D, Cholecalciferol, 50 MCG (2000 UT) CAPS, Take 4,000 Units by mouth daily, Disp: , Rfl:     Allergies   Allergen Reactions   • Erythromycin Chest Pain     Chest pain as child   • Metronidazole Other (See Comments)     SOB   • Sulfa Antibiotics Shortness Of Breath     Per pt, \"hives and fever\"   • Amoxicillin-Pot Clavulanate Rash     And yeast infection   • Invokana [Canagliflozin] Other (See Comments)     Yeast infection       Physical Exam:    /76 (BP Location: Right arm, Patient Position: Sitting, Cuff Size: Standard)   Pulse 82   Temp 98.7 °F (37.1 °C)   Ht 5' 2\" (1.575 m)   Wt 60.8 kg (134 lb)   LMP  (LMP Unknown)   BMI 24.51 kg/m² "     Constitutional:normal, well developed, well nourished, alert, in no distress and non-toxic and no overt pain behavior.  Eyes:anicteric  HEENT:grossly intact  Neck:supple, symmetric, trachea midline and no masses   Pulmonary:even and unlabored  Cardiovascular:No edema or pitting edema present  Skin:Normal without rashes or lesions and well hydrated  Psychiatric:Mood and affect appropriate  Neurologic:Cranial Nerves II-XII grossly intact  Musculoskeletal: Stable gait without the use of assistive devices      Imaging  No orders to display         Orders Placed This Encounter   Procedures   • [facility] All Prescribed Meds and Special Instructions   • Amphetamines, Methamphetamines   • Butalbital   • Phenobarbital   • Secobarbital   • Alprazolam   • Clonazepam   • Diazepam   • Lorazepam   • Gabapentin   • Pregabalin   • Cocaine   • Heroin   • Buprenorphine   • Levorphanol   • Meperidine   • Naltrexone   • Fentanyl   • Methadone   • Oxycodone   • Tapentadol   • THC   • Tramadol   • Codeine, Hydrocodone, Hydropmorphone, Morphine   • Bath Salts   • Ethyl Glucuronide/Ethyl Sulfate   • Kratom   • Spice   • Methylphenidate   • Phentermine   • Validity Oxidant   • Validity Creatinine   • Validity pH   • Validity Specific   • Xylazine Definitive Test

## 2024-08-29 LAB
4OH-XYLAZINE UR QL CFM: NEGATIVE NG/ML
6MAM UR QL CFM: NEGATIVE NG/ML
7AMINOCLONAZEPAM UR QL CFM: NEGATIVE NG/ML
A-OH ALPRAZ UR QL CFM: NEGATIVE NG/ML
ACCEPTABLE CREAT UR QL: NORMAL MG/DL
ACCEPTIBLE SP GR UR QL: NORMAL
AMPHET UR QL CFM: NEGATIVE NG/ML
BUPRENORPHINE UR QL CFM: NEGATIVE NG/ML
BUTALBITAL UR QL CFM: NEGATIVE NG/ML
BZE UR QL CFM: NEGATIVE NG/ML
CODEINE UR QL CFM: NEGATIVE NG/ML
EDDP UR QL CFM: NEGATIVE NG/ML
ETHYL GLUCURONIDE UR QL CFM: NEGATIVE NG/ML
ETHYL SULFATE UR QL SCN: NEGATIVE NG/ML
EUTYLONE UR QL: NEGATIVE NG/ML
FENTANYL UR QL CFM: NEGATIVE NG/ML
GLIADIN IGG SER IA-ACNC: NEGATIVE NG/ML
HYDROCODONE UR QL CFM: NEGATIVE NG/ML
HYDROMORPHONE UR QL CFM: NEGATIVE NG/ML
LORAZEPAM UR QL CFM: NEGATIVE NG/ML
ME-PHENIDATE UR QL CFM: NEGATIVE NG/ML
MEPERIDINE UR QL CFM: NEGATIVE NG/ML
METHADONE UR QL CFM: NEGATIVE NG/ML
METHAMPHET UR QL CFM: NEGATIVE NG/ML
MORPHINE UR QL CFM: NEGATIVE NG/ML
NALTREXONE UR QL CFM: NEGATIVE NG/ML
NITRITE UR QL: NORMAL UG/ML
NORBUPRENORPHINE UR QL CFM: NEGATIVE NG/ML
NORDIAZEPAM UR QL CFM: NEGATIVE NG/ML
NORFENTANYL UR QL CFM: NEGATIVE NG/ML
NORHYDROCODONE UR QL CFM: NEGATIVE NG/ML
NORMEPERIDINE UR QL CFM: NEGATIVE NG/ML
NOROXYCODONE UR QL CFM: NEGATIVE NG/ML
OXAZEPAM UR QL CFM: NEGATIVE NG/ML
OXYCODONE UR QL CFM: NEGATIVE NG/ML
OXYMORPHONE UR QL CFM: NEGATIVE NG/ML
PARA-FLUOROFENTANYL QUANTIFICATION: NORMAL NG/ML
PHENOBARB UR QL CFM: NEGATIVE NG/ML
RESULT ALL_PRESCRIBED MEDS AND SPECIAL INSTRUCTIONS: NORMAL
SECOBARBITAL UR QL CFM: NEGATIVE NG/ML
SL AMB 5F-ADB-M7 METABOLITE QUANTIFICATION: NEGATIVE NG/ML
SL AMB 7-OH-MITRAGYNINE (KRATOM ALKALOID) QUANTIFICATION: NEGATIVE NG/ML
SL AMB AB-FUBINACA-M3 METABOLITE QUANTIFICATION: NEGATIVE NG/ML
SL AMB ACETYL FENTANYL QUANTIFICATION: NORMAL NG/ML
SL AMB ACETYL NORFENTANYL QUANTIFICATION: NORMAL NG/ML
SL AMB ACRYL FENTANYL QUANTIFICATION: NORMAL NG/ML
SL AMB CARFENTANIL QUANTIFICATION: NORMAL NG/ML
SL AMB CTHC (MARIJUANA METABOLITE) QUANTIFICATION: NEGATIVE NG/ML
SL AMB DEXTRORPHAN (DEXTROMETHORPHAN METABOLITE) QUANT: NEGATIVE NG/ML
SL AMB GABAPENTIN QUANTIFICATION: NEGATIVE NG/ML
SL AMB JWH018 METABOLITE QUANTIFICATION: NEGATIVE NG/ML
SL AMB JWH073 METABOLITE QUANTIFICATION: NEGATIVE NG/ML
SL AMB MDMB-FUBINACA-M1 METABOLITE QUANTIFICATION: NEGATIVE NG/ML
SL AMB METHYLONE QUANTIFICATION: NEGATIVE NG/ML
SL AMB N-DESMETHYL-TRAMADOL QUANTIFICATION: NORMAL NG/ML
SL AMB PHENTERMINE QUANTIFICATION: NEGATIVE NG/ML
SL AMB PREGABALIN QUANTIFICATION: NEGATIVE NG/ML
SL AMB RCS4 METABOLITE QUANTIFICATION: NEGATIVE NG/ML
SL AMB RITALINIC ACID QUANTIFICATION: NEGATIVE NG/ML
SMOOTH MUSCLE AB TITR SER IF: NEGATIVE NG/ML
SPECIMEN DRAWN SERPL: NEGATIVE NG/ML
SPECIMEN PH ACCEPTABLE UR: NORMAL
TAPENTADOL UR QL CFM: NEGATIVE NG/ML
TEMAZEPAM UR QL CFM: NEGATIVE NG/ML
TRAMADOL UR QL CFM: NORMAL NG/ML
URATE/CREAT 24H UR: NORMAL NG/ML
XYLAZINE UR QL CFM: NEGATIVE NG/ML

## 2024-09-02 DIAGNOSIS — E78.2 COMBINED HYPERLIPIDEMIA: ICD-10-CM

## 2024-09-02 DIAGNOSIS — E11.65 TYPE 2 DIABETES MELLITUS WITH HYPERGLYCEMIA, WITHOUT LONG-TERM CURRENT USE OF INSULIN (HCC): ICD-10-CM

## 2024-09-03 RX ORDER — METFORMIN HCL 500 MG
1000 TABLET, EXTENDED RELEASE 24 HR ORAL 2 TIMES DAILY WITH MEALS
Qty: 360 TABLET | Refills: 1 | Status: SHIPPED | OUTPATIENT
Start: 2024-09-03

## 2024-09-03 NOTE — TELEPHONE ENCOUNTER
Requested medication(s) are due for refill today: Yes  Patient has already received a courtesy refill: Yes  Other reason request has been forwarded to provider: Rx not assigned to protocol, please review.

## 2024-09-04 ENCOUNTER — OFFICE VISIT (OUTPATIENT)
Dept: ENDOCRINOLOGY | Facility: HOSPITAL | Age: 54
End: 2024-09-04
Payer: COMMERCIAL

## 2024-09-04 VITALS
HEART RATE: 79 BPM | BODY MASS INDEX: 25.28 KG/M2 | DIASTOLIC BLOOD PRESSURE: 80 MMHG | HEIGHT: 62 IN | SYSTOLIC BLOOD PRESSURE: 120 MMHG | WEIGHT: 137.4 LBS

## 2024-09-04 DIAGNOSIS — E11.65 TYPE 2 DIABETES MELLITUS WITH HYPERGLYCEMIA, WITHOUT LONG-TERM CURRENT USE OF INSULIN (HCC): Primary | ICD-10-CM

## 2024-09-04 DIAGNOSIS — I10 PRIMARY HYPERTENSION: Chronic | ICD-10-CM

## 2024-09-04 DIAGNOSIS — E78.2 COMBINED HYPERLIPIDEMIA: ICD-10-CM

## 2024-09-04 DIAGNOSIS — E06.3 HYPOTHYROIDISM DUE TO HASHIMOTO'S THYROIDITIS: ICD-10-CM

## 2024-09-04 DIAGNOSIS — E03.8 HYPOTHYROIDISM DUE TO HASHIMOTO'S THYROIDITIS: ICD-10-CM

## 2024-09-04 PROCEDURE — 99214 OFFICE O/P EST MOD 30 MIN: CPT | Performed by: INTERNAL MEDICINE

## 2024-09-04 RX ORDER — ICOSAPENT ETHYL 1 G/1
CAPSULE ORAL
Qty: 360 CAPSULE | Refills: 1 | Status: SHIPPED | OUTPATIENT
Start: 2024-09-04

## 2024-09-04 NOTE — PATIENT INSTRUCTIONS
Hgba1c is 5.9%. this is excellent.     Continue the same ozempic and metformin, can decrease if you choose the metformin(1 in am and 2 in pm).     Work on testing sugars a bit more.     Continue with diet and exercise.     The thyroid is slightly overactive, no symptoms, no change this time, may need to decrease in the future.     The cholesterol shows high triglycerides, work on regular fish oil taking.     Follow up in 3 months with blood work.

## 2024-09-04 NOTE — PROGRESS NOTES
9/5/2024    Assessment & Plan      Diagnoses and all orders for this visit:    Type 2 diabetes mellitus with hyperglycemia, without long-term current use of insulin (HCC)  -     Comprehensive metabolic panel; Future  -     CBC and differential; Future  -     T4, free; Future  -     TSH, 3rd generation; Future  -     Hemoglobin A1C; Future  -     Lipid Panel with Direct LDL reflex; Future  -     Albumin / creatinine urine ratio; Future    Hypothyroidism due to Hashimoto's thyroiditis  -     Comprehensive metabolic panel; Future  -     CBC and differential; Future  -     T4, free; Future  -     TSH, 3rd generation; Future  -     Hemoglobin A1C; Future  -     Lipid Panel with Direct LDL reflex; Future  -     Albumin / creatinine urine ratio; Future    Primary hypertension  -     Comprehensive metabolic panel; Future  -     CBC and differential; Future  -     T4, free; Future  -     TSH, 3rd generation; Future  -     Hemoglobin A1C; Future  -     Lipid Panel with Direct LDL reflex; Future  -     Albumin / creatinine urine ratio; Future    Combined hyperlipidemia  -     Comprehensive metabolic panel; Future  -     CBC and differential; Future  -     T4, free; Future  -     TSH, 3rd generation; Future  -     Hemoglobin A1C; Future  -     Lipid Panel with Direct LDL reflex; Future  -     Albumin / creatinine urine ratio; Future          Assessment & Plan  1. Type 2 Diabetes Mellitus.  Most recent hemoglobin A1c is 5.9%, indicating excellent control of diabetes. She will continue the same Ozempic 1 mg once a week and Metformin  mg twice a day. She can try decreasing her Metformin to 1 in the morning and 2 in the evening if she chooses. She will work on exercising regularly, maintaining a healthy diet, and testing her blood sugars more often.    2. Hypothyroidism due to Hashimoto's Thyroiditis.  Most recent thyroid function studies show a mildly low TSH with a normal free T4. She is asymptomatic. She will continue on  the same dose of Levothyroxine 100 mcg 6 days a week and none on Sunday. If the low TSH persists at the next visit, the dose may be decreased to 75 mcg daily.    3. Hypertension.  She is normotensive in the office. She will continue the same Amlodipine 5 mg once a day and Metoprolol 25 mg twice a day.    4. Hyperlipidemia.  Most recent lipid profile shows elevated triglycerides. She was not taking her Vascepa regularly due to her father's passing but has resumed taking Vascepa 1 g twice a day. A repeat lipid profile will be done at the next visit.      Follow-up  She will follow up in 3 months with preceding hemoglobin A1c, CMP, CBC, TSH, free T4, lipid panel, and urine microalbumin to creatinine ratio.      CC: Diabetes type II, thyroid, blood pressure, lipid follow-up    History of Present Illness    HPI: Raymundo White is a 54-year-old female with type 2 diabetes, hypothyroidism, and hypertension, here for a follow-up visit.    She was diagnosed with type 2 diabetes 7 years ago. Complications include a heart attack. She reports no neuropathy, nephropathy, retinopathy, stroke, or claudication.     She has been taking metformin  mg twice daily for the past 6 months, with 2 tablets in the morning and 2 at night. Additionally, she takes Ozempic 1 mg weekly. She reports no excessive urination during the day but wakes up once or twice at night to urinate, which she attributes to high water intake. She prefers unsweetened iced tea and water, not due to thirst. She tends to eat less when stressed.     She reports no blurry vision and had an eye exam in 07/2024.     There is no numbness or tingling in her feet and no foot wounds.     For hypothyroidism, she is currently taking levothyroxine 100 mcg six days a week, with a break on Sundays.She experiences loose bowel movements, which she believes are a side effect of Ozempic. She reports no dry skin or brittle nails, and her hair is currently dry but not  excessively so. She does not feel excessively hot or cold, experience palpitations, or have tremors. Her sleep quality has improved recently. She reports no diarrhea or constipation.    For hypertension, she is taking metoprolol 25 mg twice daily and amlodipine 5 mg once daily. She reports no headaches, lightheadedness, or dizziness. She has sinus issues at present.      For her cholesterol she takes Vascepa 1 g twice daily. She reports no chest pain or shortness of breath and has not needed to use her nitroglycerin for some time.      Blood Sugar/Glucometer/Pump/CGM review: Blood sugar levels are checked intermittently, with readings ranging from 110 to 124. She reports no low blood sugar levels in the 50s.    Historical Information   Past Medical History:   Diagnosis Date    Coronary artery disease     Diabetes mellitus (HCC)     Borderline    Disease of thyroid gland     DUB (dysfunctional uterine bleeding)     Fatty liver     Hashimoto's thyroiditis     Last Assessed:  14    History of stomach ulcers     Hypertension     Hypothyroidism     Irritable bowel syndrome     Last Assessed:  3/25/14    Liver disease     elevated enzymes    Myocardial infarction (HCC)     2017    GIANG (nonalcoholic steatohepatitis)     Ovarian cyst     left    Psychogenic polydipsia     Has had hyponatremia from drinking too much water in the past.     Past Surgical History:   Procedure Laterality Date    ANGIOPLASTY      CARDIAC CATHETERIZATION       SECTION      X 2    CHOLECYSTECTOMY      COLONOSCOPY      CYSTOSCOPY N/A 2019    Procedure: CYSTOSCOPY;  Surgeon: Luther Wadsworth MD;  Location: [facility];  Service: Gynecology Oncology    HYSTERECTOMY      IR BIOPSY LIVER MASS  2020    OOPHORECTOMY Right     CT ESOPHAGOGASTRODUODENOSCOPY TRANSORAL DIAGNOSTIC N/A 2018    Procedure: ESOPHAGOGASTRODUODENOSCOPY (EGD);  Surgeon: Yoav Frias MD;  Location: [facility];  Service: Gastroenterology    CT LAPS TOTAL  HYSTERECT 250 GM/< W/RMVL TUBE/OVARY N/A 2019    Procedure: TOTAL LAPAROSCOPIC HYSTERECTOMY, BILATERAL SALPINGECTOMY, LEFT OOPHORECTOMY;  Surgeon: Luther Wadsworth MD;  Location:  MAIN OR;  Service: Gynecology Oncology    VA RINSJ RPTD BICEPS/TRICEPS TDN DSTL W/WO TDN GRF Left 2023    Procedure: REPAIR TENDON BICEPS, distal;  Surgeon: Yvonne Modi;  Location:  MAIN OR;  Service: Orthopedics    SINUS SURGERY      TONSILLECTOMY      UPPER GASTROINTESTINAL ENDOSCOPY       Social History   Social History     Substance and Sexual Activity   Alcohol Use Not Currently     Social History     Substance and Sexual Activity   Drug Use No     Social History     Tobacco Use   Smoking Status Former    Current packs/day: 0.00    Average packs/day: 0.5 packs/day for 27.1 years (13.5 ttl pk-yrs)    Types: Cigarettes    Start date:     Quit date: 2016    Years since quittin.6   Smokeless Tobacco Never   Tobacco Comments    ON AND OFF      Family History:   Family History   Problem Relation Age of Onset    Pancreatic cancer Mother     Stroke Father     Hypertension Father     Diabetes type II Father     Diabetes type II Brother     No Known Problems Brother     Liver cancer Maternal Aunt     Melanoma Maternal Aunt     Hypothyroidism Paternal Uncle     Diabetes Maternal Grandmother     Diabetes Paternal Grandmother     Heart disease Paternal Grandmother     Hypothyroidism Paternal Grandmother     Lung disease Daughter         asthma tendencies    No Known Problems Son     Hyperlipidemia Neg Hx        Meds/Allergies   Current Outpatient Medications   Medication Sig Dispense Refill    amLODIPine (NORVASC) 5 mg tablet Take 1 tablet (5 mg total) by mouth daily 90 tablet 3    Blood Glucose Monitoring Suppl (ONETOUCH VERIO IQ SYSTEM) w/Device KIT Use to test blood sugars twice a day 1 kit 0    dicyclomine (BENTYL) 20 mg tablet Take 1 tablet (20 mg total) by mouth every 6 (six) hours as needed (every 6 hours) 60  tablet 1    escitalopram (LEXAPRO) 20 mg tablet Take 1 tablet (20 mg total) by mouth daily 90 tablet 1    Icosapent Ethyl (Vascepa) 1 g CAPS 2 capsules twice a day 360 capsule 1    Insulin Pen Needle 32G X 4 MM MISC Use once a week 30 each 1    Lactobacillus (PROBIOTIC ACIDOPHILUS PO) Take by mouth daily      levothyroxine 100 mcg tablet Take 1 tablet 6 days a week and none on sunday 90 tablet 1    Magnesium 300 MG CAPS Take 600 mg by mouth daily      metFORMIN (GLUCOPHAGE-XR) 500 mg 24 hr tablet Take 2 tablets (1,000 mg total) by mouth 2 (two) times a day with meals 360 tablet 1    methocarbamol (ROBAXIN) 750 mg tablet 1 tab PO HS. 90 tablet 1    metoprolol succinate (TOPROL-XL) 25 mg 24 hr tablet Take 1 tablet (25 mg total) by mouth 2 (two) times a day 180 tablet 1    Multiple Vitamins-Minerals (ZINC PO) Take by mouth      mupirocin (BACTROBAN) 2 % ointment Apply topically 3 (three) times a day 22 g 0    nitroglycerin (NITROSTAT) 0.4 mg SL tablet Place 1 tablet (0.4 mg total) under the tongue every 5 (five) minutes as needed for chest pain 90 tablet 0    OneTouch Delica Lancets 33G MISC Use to test blood sugars twice a day 200 each 6    OneTouch Verio test strip Use as instructed to test blood sugars twice a day 200 each 0    pantoprazole (PROTONIX) 40 mg tablet Take 1 tablet (40 mg total) by mouth daily before breakfast 90 tablet 1    semaglutide, 1 mg/dose, (Ozempic, 1 MG/DOSE,) 4 mg/3 mL injection pen Inject 1 mg once a week 3 mL 5    traMADol (Ultram) 50 mg tablet 1 tab BID prn for ongoing therapy DO NOT FILL BEFORE: 09/19/24 60 tablet 3    Turmeric 500 MG CAPS Take by mouth      vitamin B-12 (VITAMIN B-12) 1,000 mcg tablet Take by mouth daily      Vitamin D, Cholecalciferol, 50 MCG (2000 UT) CAPS Take 4,000 Units by mouth daily       No current facility-administered medications for this visit.     Allergies   Allergen Reactions    Erythromycin Chest Pain     Chest pain as child    Metronidazole Other (See  "Comments)     SOB    Sulfa Antibiotics Shortness Of Breath     Per pt, \"hives and fever\"    Amoxicillin-Pot Clavulanate Rash     And yeast infection    Invokana [Canagliflozin] Other (See Comments)     Yeast infection       Objective   Vitals: Blood pressure 120/80, pulse 79, height 5' 2\" (1.575 m), weight 62.3 kg (137 lb 6.4 oz), not currently breastfeeding.  Invasive Devices       Peripheral Intravenous Line  Duration             Peripheral IV 02/24/23 Dorsal (posterior);Right Hand 559 days                    Physical Exam    No lid lag, stare, proptosis or periorbital edema in the eyes.  Thyroid is normal in size. No palpable thyroid nodules.  Lungs are clear to auscultation.  Heart has a regular rate and rhythm.  No tremor in the outstretched hands. Patellar deep tendon reflexes are normal. No edema in the lower extremities. No ulcerations on the feet. Dorsalis pedis and posterior tibialis pulses are 2+. Bunion at the first metatarsophalangeal joints bilaterally. Vibration sensation is intact to the first toe DIP joint bilaterally. Monofilament sensation is intact to both feet.    Patient's shoes and socks removed.    Right Foot/Ankle   Right Foot Inspection  Skin Exam: skin normal and skin intact. No dry skin, no warmth, no callus, no erythema, no maceration, no abnormal color, no pre-ulcer, no ulcer and no callus.     Toe Exam: right toe deformity. No swelling    Sensory   Vibration: intact  Monofilament testing: intact    Vascular  Capillary refills: < 3 seconds  The right DP pulse is 2+. The right PT pulse is 2+.     Left Foot/Ankle  Left Foot Inspection  Skin Exam: skin normal and skin intact. No dry skin, no warmth, no erythema, no maceration, normal color, no pre-ulcer, no ulcer and no callus.     Toe Exam: left toe deformity. No swelling.     Sensory   Vibration: intact  Monofilament testing: intact    Vascular  Capillary refills: < 3 seconds  The left DP pulse is 2+. The left PT pulse is 2+.     Assign " Risk Category  Deformity present  No loss of protective sensation  No weak pulses  Risk: 0      The history was obtained from the review of the chart and from the patient.    Lab Results:    Most recent Alc is  Lab Results   Component Value Date    HGBA1C 5.9 (H) 07/10/2024           Blood work performed 7/10/2024 showed a CMP with a glucose of 102 fasting but was otherwise normal.    TSH is 0.107 with a free T4 of 1.04.    Lab Results   Component Value Date    CREATININE 0.77 07/10/2024    CREATININE 0.78 04/29/2024    CREATININE 0.71 01/02/2024    BUN 11 07/10/2024     09/02/2015    K 4.5 07/10/2024     07/10/2024    CO2 30 07/10/2024     eGFR   Date Value Ref Range Status   07/10/2024 88 ml/min/1.73sq m Final     Total cholesterol 171, LDL cholesterol 69.    Lab Results   Component Value Date    CHOL 210 08/20/2015    HDL 38 (L) 07/10/2024    TRIG 321 (H) 07/10/2024       Lab Results   Component Value Date    ALT 21 07/10/2024    AST 16 07/10/2024    ALKPHOS 67 07/10/2024    BILITOT 0.38 09/02/2015       Lab Results   Component Value Date    FREET4 1.04 07/10/2024             Future Appointments   Date Time Provider Department Center   9/11/2024  4:20 PM Sin Moura DO [facility] [place] [facility]   11/15/2024  8:00 AM DAVID Mays HEM ONC [facility]   12/16/2024  7:30 AM [facility] MAMMO UP 1 [facility] Mamm[facility]   12/17/2024  8:00 AM CINDY Farley [facility]   12/17/2024  4:00 PM Emilia Bustamante [facility]   1/14/2025  3:40 PM Beata Georges MD ENDO [facility]

## 2024-09-05 DIAGNOSIS — F32.A DEPRESSION, UNSPECIFIED DEPRESSION TYPE: ICD-10-CM

## 2024-09-06 RX ORDER — ESCITALOPRAM OXALATE 20 MG/1
20 TABLET ORAL DAILY
Qty: 90 TABLET | Refills: 1 | Status: SHIPPED | OUTPATIENT
Start: 2024-09-06

## 2024-09-11 ENCOUNTER — OFFICE VISIT (OUTPATIENT)
Dept: FAMILY MEDICINE CLINIC | Facility: CLINIC | Age: 54
End: 2024-09-11
Payer: COMMERCIAL

## 2024-09-11 VITALS
OXYGEN SATURATION: 96 % | TEMPERATURE: 97.8 F | WEIGHT: 138 LBS | DIASTOLIC BLOOD PRESSURE: 72 MMHG | HEART RATE: 85 BPM | SYSTOLIC BLOOD PRESSURE: 110 MMHG | HEIGHT: 63 IN | BODY MASS INDEX: 24.45 KG/M2

## 2024-09-11 DIAGNOSIS — Z14.8 ALPHA-1-ANTITRYPSIN DEFICIENCY CARRIER: ICD-10-CM

## 2024-09-11 DIAGNOSIS — K21.9 GASTROESOPHAGEAL REFLUX DISEASE WITHOUT ESOPHAGITIS: ICD-10-CM

## 2024-09-11 DIAGNOSIS — E03.8 HYPOTHYROIDISM DUE TO HASHIMOTO'S THYROIDITIS: ICD-10-CM

## 2024-09-11 DIAGNOSIS — G89.29 CHRONIC LOW BACK PAIN WITHOUT SCIATICA, UNSPECIFIED BACK PAIN LATERALITY: ICD-10-CM

## 2024-09-11 DIAGNOSIS — D50.8 IRON DEFICIENCY ANEMIA SECONDARY TO INADEQUATE DIETARY IRON INTAKE: ICD-10-CM

## 2024-09-11 DIAGNOSIS — K75.81 NASH (NONALCOHOLIC STEATOHEPATITIS): ICD-10-CM

## 2024-09-11 DIAGNOSIS — E06.3 HYPOTHYROIDISM DUE TO HASHIMOTO'S THYROIDITIS: ICD-10-CM

## 2024-09-11 DIAGNOSIS — F32.A DEPRESSION, UNSPECIFIED DEPRESSION TYPE: ICD-10-CM

## 2024-09-11 DIAGNOSIS — E78.2 COMBINED HYPERLIPIDEMIA: ICD-10-CM

## 2024-09-11 DIAGNOSIS — Z23 ENCOUNTER FOR IMMUNIZATION: ICD-10-CM

## 2024-09-11 DIAGNOSIS — E11.65 TYPE 2 DIABETES MELLITUS WITH HYPERGLYCEMIA, WITHOUT LONG-TERM CURRENT USE OF INSULIN (HCC): Primary | ICD-10-CM

## 2024-09-11 DIAGNOSIS — M54.50 CHRONIC LOW BACK PAIN WITHOUT SCIATICA, UNSPECIFIED BACK PAIN LATERALITY: ICD-10-CM

## 2024-09-11 DIAGNOSIS — E66.3 OVERWEIGHT: ICD-10-CM

## 2024-09-11 DIAGNOSIS — Q24.5 ANOMALOUS CORONARY ARTERY ORIGIN: ICD-10-CM

## 2024-09-11 PROCEDURE — 90471 IMMUNIZATION ADMIN: CPT

## 2024-09-11 PROCEDURE — 90750 HZV VACC RECOMBINANT IM: CPT

## 2024-09-11 PROCEDURE — 99396 PREV VISIT EST AGE 40-64: CPT | Performed by: FAMILY MEDICINE

## 2024-09-11 NOTE — PROGRESS NOTES
Ambulatory Visit  Name: Raymundo White      : 1970      MRN: [id number]  Encounter Provider: Sin Moura DO  Encounter Date: 2024   Encounter department: [facility]    Assessment & Plan  Type 2 diabetes mellitus with hyperglycemia, without long-term current use of insulin (HCC)    Lab Results   Component Value Date    HGBA1C 5.9 (H) 07/10/2024   A1c from July 10 was 5.9%.  Patient continues to do very well on current regimen of Ozempic 1 mg weekly and metformin .  Endocrinologist has advised patient to slowly decrease metformin dosage.         Chronic low back pain without sciatica, unspecified back pain laterality  Longstanding history of chronic back pain.  History of NSAID allergy.  Cannot take acetaminophen due to liver disease.  Has been stable on tramadol 50 mg twice daily.  This is being managed/prescribed by pain management.       GIANG (nonalcoholic steatohepatitis)  History of nonalcoholic steatohepatitis.  Elastography in past has shown evidence of fibrosis.  This is improved since patient has lost weight.  Still being followed by GI       Iron deficiency anemia secondary to inadequate dietary iron intake  History of iron deficiency anemia status post iron infusions.  Hemoglobins have been stable.  No longer on iron supplementation.  Still being followed by hematology for now.       Overweight  Weight 138, BMI 24.25.  Patient continues to do very well on Ozempic; dosage currently at 1 mg weekly.       Combined hyperlipidemia  Cholesterol from July 10 was 171, LDL 69.  Triglycerides were high at 321.  Patient has now restarted her omega-3.  Continue low-fat diet and exercise.       Anomalous coronary artery origin  Being followed by cardiology       Depression, unspecified depression type  Stable on Lexapro 20 mg daily         Hypothyroidism due to Hashimoto's thyroiditis  TSH from July 10 was mildly suppressed.  Patient still on levothyroxine 100 mcg daily 6  days a week (and no med on the seventh day).  Being followed by endocrinology.       Gastroesophageal reflux disease without esophagitis  Stable on pantoprazole       Alpha-1-antitrypsin deficiency carrier  History of alpha-1 antitrypsin deficiency carrier.  PFTs were unremarkable.  Being followed by pulmonology.       Encounter for immunization    Orders:    Zoster Vaccine Recombinant IM       Patient presents for 54-year-old physical examination and med check appointment.  Overall she is doing well.  She still being followed by endocrinology, pain management, GI, hematology, and pulmonology.  She is compliant with all prescribed medications.  She states her diet is overall healthy.  She has not been exercising much lately due to orthopedic problems.  She is a non-smoker.  She no longer drinks alcohol.  On examination today, blood pressure was 110/72.  BMI 24.25.  Remainder of exam was unremarkable.  Recommend continue healthy diet and regular exercise program (at least 150 minutes of aerobic exercise weekly).    Last colonoscopy 2022 (next due 2030)  Mammogram scheduled    Shingrix # given today2   Last tetanus booster 2015  Patient will get flu shot in near future    Lab results from July 10 reviewed (ordered by endocrinology)    6 months  History of Present Illness     Patient presents for 54-year-old physical examination and med check appointment.  Overall she is doing well.  She still being followed by endocrinology, pain management, GI, hematology, and pulmonology.  She is compliant with all prescribed medications.  She states her diet is overall healthy.  She has not been exercising much lately due to orthopedic problems.  She is a non-smoker.  She no longer drinks alcohol.      Review of Systems   Constitutional:  Negative for chills and fever.   HENT:  Negative for ear pain and sore throat.    Eyes:  Negative for pain and visual disturbance.   Respiratory:  Negative for cough and shortness of breath.     Cardiovascular:  Negative for chest pain and palpitations.   Gastrointestinal:  Negative for abdominal pain and vomiting.   Genitourinary:  Negative for dysuria and hematuria.   Musculoskeletal:  Negative for arthralgias and back pain.   Skin:  Negative for color change and rash.   Neurological:  Negative for seizures and syncope.   All other systems reviewed and are negative.    Past Medical History:   Diagnosis Date    Coronary artery disease     Diabetes mellitus (HCC)     Borderline    Disease of thyroid gland     DUB (dysfunctional uterine bleeding)     Fatty liver     Hashimoto's thyroiditis     Last Assessed:  14    History of stomach ulcers     Hypertension     Hypothyroidism     Irritable bowel syndrome     Last Assessed:  3/25/14    Liver disease     elevated enzymes    Myocardial infarction (HCC)     2017    GIANG (nonalcoholic steatohepatitis)     Ovarian cyst     left    Psychogenic polydipsia     Has had hyponatremia from drinking too much water in the past.     Past Surgical History:   Procedure Laterality Date    ANGIOPLASTY      CARDIAC CATHETERIZATION       SECTION      X 2    CHOLECYSTECTOMY      COLONOSCOPY      CYSTOSCOPY N/A 2019    Procedure: CYSTOSCOPY;  Surgeon: Luther Wadsworth MD;  Location: [facility];  Service: Gynecology Oncology    HYSTERECTOMY      IR BIOPSY LIVER MASS  2020    OOPHORECTOMY Right     TN ESOPHAGOGASTRODUODENOSCOPY TRANSORAL DIAGNOSTIC N/A 2018    Procedure: ESOPHAGOGASTRODUODENOSCOPY (EGD);  Surgeon: Yoav Frias MD;  Location: [facility];  Service: Gastroenterology    TN LAPS TOTAL HYSTERECT 250 GM/< W/RMVL TUBE/OVARY N/A 2019    Procedure: TOTAL LAPAROSCOPIC HYSTERECTOMY, BILATERAL SALPINGECTOMY, LEFT OOPHORECTOMY;  Surgeon: Luther Wadsworth MD;  Location: [facility];  Service: Gynecology Oncology    TN RINSJ RPTD BICEPS/TRICEPS TDN DSTL W/WO TDN GRF Left 2023    Procedure: REPAIR TENDON BICEPS, distal;  Surgeon: Yvonne Modi;   Location:  MAIN OR;  Service: Orthopedics    SINUS SURGERY      TONSILLECTOMY      UPPER GASTROINTESTINAL ENDOSCOPY       Family History   Problem Relation Age of Onset    Pancreatic cancer Mother     Cancer Mother         Pancreatic    Stroke Father     Hypertension Father     Diabetes type II Father     Diabetes Father     Diabetes type II Brother     No Known Problems Brother     Liver cancer Maternal Aunt     Melanoma Maternal Aunt     Hypothyroidism Paternal Uncle     Diabetes Maternal Grandmother     Diabetes Paternal Grandmother     Heart disease Paternal Grandmother     Hypothyroidism Paternal Grandmother     Thyroid disease Paternal Grandmother     Lung disease Daughter         asthma tendencies    No Known Problems Son     Hyperlipidemia Neg Hx      Social History     Tobacco Use    Smoking status: Former     Current packs/day: 0.00     Average packs/day: 0.5 packs/day for 27.1 years (13.5 ttl pk-yrs)     Types: Cigarettes     Start date:      Quit date: 2016     Years since quittin.6    Smokeless tobacco: Never    Tobacco comments:     ON AND OFF    Vaping Use    Vaping status: Never Used   Substance and Sexual Activity    Alcohol use: Not Currently     Comment: Stopped     Drug use: No    Sexual activity: Yes     Partners: Male     Birth control/protection: None, Female Sterilization     Current Outpatient Medications on File Prior to Visit   Medication Sig    amLODIPine (NORVASC) 5 mg tablet Take 1 tablet (5 mg total) by mouth daily    Blood Glucose Monitoring Suppl (ONETOUCH VERIO IQ SYSTEM) w/Device KIT Use to test blood sugars twice a day    dicyclomine (BENTYL) 20 mg tablet Take 1 tablet (20 mg total) by mouth every 6 (six) hours as needed (every 6 hours)    escitalopram (LEXAPRO) 20 mg tablet Take 1 tablet (20 mg total) by mouth daily    Icosapent Ethyl (Vascepa) 1 g CAPS 2 capsules twice a day    Insulin Pen Needle 32G X 4 MM MISC Use once a week    Lactobacillus (PROBIOTIC  "ACIDOPHILUS PO) Take by mouth daily    levothyroxine 100 mcg tablet Take 1 tablet 6 days a week and none on sunday    Magnesium 300 MG CAPS Take 600 mg by mouth daily    metFORMIN (GLUCOPHAGE-XR) 500 mg 24 hr tablet Take 2 tablets (1,000 mg total) by mouth 2 (two) times a day with meals    methocarbamol (ROBAXIN) 750 mg tablet 1 tab PO HS.    metoprolol succinate (TOPROL-XL) 25 mg 24 hr tablet Take 1 tablet (25 mg total) by mouth 2 (two) times a day    Multiple Vitamins-Minerals (ZINC PO) Take by mouth    mupirocin (BACTROBAN) 2 % ointment Apply topically 3 (three) times a day    nitroglycerin (NITROSTAT) 0.4 mg SL tablet Place 1 tablet (0.4 mg total) under the tongue every 5 (five) minutes as needed for chest pain    OneTouch Delica Lancets 33G MISC Use to test blood sugars twice a day    OneTouch Verio test strip Use as instructed to test blood sugars twice a day    pantoprazole (PROTONIX) 40 mg tablet Take 1 tablet (40 mg total) by mouth daily before breakfast    semaglutide, 1 mg/dose, (Ozempic, 1 MG/DOSE,) 4 mg/3 mL injection pen Inject 1 mg once a week    traMADol (Ultram) 50 mg tablet 1 tab BID prn for ongoing therapy DO NOT FILL BEFORE: 09/19/24    vitamin B-12 (VITAMIN B-12) 1,000 mcg tablet Take by mouth daily    Vitamin D, Cholecalciferol, 50 MCG (2000 UT) CAPS Take 4,000 Units by mouth daily    Turmeric 500 MG CAPS Take by mouth (Patient not taking: Reported on 9/11/2024)     Allergies   Allergen Reactions    Erythromycin Chest Pain     Chest pain as child    Metronidazole Other (See Comments)     SOB    Sulfa Antibiotics Shortness Of Breath     Per pt, \"hives and fever\"    Amoxicillin-Pot Clavulanate Rash     And yeast infection    Invokana [Canagliflozin] Other (See Comments)     Yeast infection     Immunization History   Administered Date(s) Administered    COVID-19 PFIZER VACCINE 0.3 ML IM 12/21/2020, 01/11/2021, 10/06/2021    H1N1, All Formulations 11/05/2009    Hep A, adult 06/05/2020, 07/28/2022 " "   INFLUENZA 11/01/2019, 11/30/2023    Influenza, seasonal, injectable 09/26/2013    Tdap 03/04/2015    Zoster Vaccine Recombinant 01/30/2023, 09/11/2024     Objective     /72 (BP Location: Right arm, Patient Position: Sitting, Cuff Size: Standard)   Pulse 85   Temp 97.8 °F (36.6 °C) (Temporal)   Ht 5' 3.25\" (1.607 m)   Wt 62.6 kg (138 lb)   LMP  (LMP Unknown)   SpO2 96%   BMI 24.25 kg/m²     Physical Exam  Vitals and nursing note reviewed.   Constitutional:       General: She is not in acute distress.     Appearance: She is well-developed.   HENT:      Head: Normocephalic and atraumatic.   Eyes:      Conjunctiva/sclera: Conjunctivae normal.   Cardiovascular:      Rate and Rhythm: Normal rate and regular rhythm.      Heart sounds: No murmur heard.  Pulmonary:      Effort: Pulmonary effort is normal. No respiratory distress.      Breath sounds: Normal breath sounds.   Abdominal:      Palpations: Abdomen is soft.      Tenderness: There is no abdominal tenderness.   Musculoskeletal:         General: No swelling.      Cervical back: Neck supple.   Skin:     General: Skin is warm and dry.      Capillary Refill: Capillary refill takes less than 2 seconds.   Neurological:      Mental Status: She is alert.   Psychiatric:         Mood and Affect: Mood normal.         "

## 2024-09-11 NOTE — ASSESSMENT & PLAN NOTE
History of alpha-1 antitrypsin deficiency carrier.  PFTs were unremarkable.  Being followed by pulmonology.

## 2024-09-11 NOTE — ASSESSMENT & PLAN NOTE
Weight 138, BMI 24.25.  Patient continues to do very well on Ozempic; dosage currently at 1 mg weekly.

## 2024-09-11 NOTE — ASSESSMENT & PLAN NOTE
Cholesterol from July 10 was 171, LDL 69.  Triglycerides were high at 321.  Patient has now restarted her omega-3.  Continue low-fat diet and exercise.

## 2024-09-11 NOTE — ASSESSMENT & PLAN NOTE
TSH from July 10 was mildly suppressed.  Patient still on levothyroxine 100 mcg daily 6 days a week (and no med on the seventh day).  Being followed by endocrinology.

## 2024-09-11 NOTE — ASSESSMENT & PLAN NOTE
History of nonalcoholic steatohepatitis.  Elastography in past has shown evidence of fibrosis.  This is improved since patient has lost weight.  Still being followed by GI

## 2024-09-11 NOTE — ASSESSMENT & PLAN NOTE
Lab Results   Component Value Date    HGBA1C 5.9 (H) 07/10/2024   A1c from July 10 was 5.9%.  Patient continues to do very well on current regimen of Ozempic 1 mg weekly and metformin 2000.  Endocrinologist has advised patient to slowly decrease metformin dosage.

## 2024-09-11 NOTE — ASSESSMENT & PLAN NOTE
History of iron deficiency anemia status post iron infusions.  Hemoglobins have been stable.  No longer on iron supplementation.  Still being followed by hematology for now.

## 2024-09-11 NOTE — ASSESSMENT & PLAN NOTE
Longstanding history of chronic back pain.  History of NSAID allergy.  Cannot take acetaminophen due to liver disease.  Has been stable on tramadol 50 mg twice daily.  This is being managed/prescribed by pain management.

## 2024-09-19 ENCOUNTER — TELEPHONE (OUTPATIENT)
Age: 54
End: 2024-09-19

## 2024-09-19 DIAGNOSIS — E11.65 TYPE 2 DIABETES MELLITUS WITH HYPERGLYCEMIA, WITHOUT LONG-TERM CURRENT USE OF INSULIN (HCC): ICD-10-CM

## 2024-09-19 RX ORDER — BLOOD SUGAR DIAGNOSTIC
STRIP MISCELLANEOUS
Qty: 200 EACH | Refills: 1 | Status: SHIPPED | OUTPATIENT
Start: 2024-09-19

## 2024-09-19 NOTE — TELEPHONE ENCOUNTER
Patients GI provider:  Dr. Branhc    Number to return call: [phone]    Reason for call: GIANG F/U appt     Scheduled procedure/appointment date if applicable: Appt 2/18/25

## 2024-11-05 DIAGNOSIS — R07.89 OTHER CHEST PAIN: ICD-10-CM

## 2024-11-05 DIAGNOSIS — E06.3 HYPOTHYROIDISM DUE TO HASHIMOTO'S THYROIDITIS: ICD-10-CM

## 2024-11-05 RX ORDER — AMLODIPINE BESYLATE 5 MG/1
5 TABLET ORAL DAILY
Qty: 90 TABLET | Refills: 1 | Status: SHIPPED | OUTPATIENT
Start: 2024-11-05

## 2024-11-06 DIAGNOSIS — G89.4 CHRONIC PAIN SYNDROME: ICD-10-CM

## 2024-11-06 DIAGNOSIS — M54.50 CHRONIC LOW BACK PAIN WITHOUT SCIATICA, UNSPECIFIED BACK PAIN LATERALITY: ICD-10-CM

## 2024-11-06 DIAGNOSIS — M79.18 MYOFASCIAL PAIN SYNDROME: ICD-10-CM

## 2024-11-06 DIAGNOSIS — G89.29 CHRONIC LOW BACK PAIN WITHOUT SCIATICA, UNSPECIFIED BACK PAIN LATERALITY: ICD-10-CM

## 2024-11-06 DIAGNOSIS — M47.816 LUMBAR SPONDYLOSIS: ICD-10-CM

## 2024-11-06 RX ORDER — LEVOTHYROXINE SODIUM 100 UG/1
TABLET ORAL
Qty: 90 TABLET | Refills: 1 | Status: SHIPPED | OUTPATIENT
Start: 2024-11-06

## 2024-11-06 RX ORDER — METHOCARBAMOL 750 MG/1
TABLET, FILM COATED ORAL
Qty: 90 TABLET | Refills: 0 | Status: SHIPPED | OUTPATIENT
Start: 2024-11-06

## 2024-11-11 ENCOUNTER — RX CHECK (OUTPATIENT)
Dept: URBAN - METROPOLITAN AREA CLINIC 6 | Facility: CLINIC | Age: 54
End: 2024-11-11

## 2024-11-11 DIAGNOSIS — H52.03: ICD-10-CM

## 2024-11-11 PROCEDURE — 92015 DETERMINE REFRACTIVE STATE: CPT

## 2024-11-11 ASSESSMENT — TONOMETRY
OS_IOP_MMHG: 18
OD_IOP_MMHG: 19

## 2024-11-11 ASSESSMENT — VISUAL ACUITY
OS_CC: 20/20-1
OD_CC: 20/25-1
OU_CC: J1+

## 2024-11-17 DIAGNOSIS — K21.9 GERD WITHOUT ESOPHAGITIS: ICD-10-CM

## 2024-11-17 DIAGNOSIS — I10 ESSENTIAL HYPERTENSION: ICD-10-CM

## 2024-11-18 RX ORDER — PANTOPRAZOLE SODIUM 40 MG/1
40 TABLET, DELAYED RELEASE ORAL
Qty: 90 TABLET | Refills: 1 | Status: SHIPPED | OUTPATIENT
Start: 2024-11-18

## 2024-11-19 RX ORDER — METOPROLOL SUCCINATE 25 MG/1
25 TABLET, EXTENDED RELEASE ORAL 2 TIMES DAILY
Qty: 180 TABLET | Refills: 1 | Status: SHIPPED | OUTPATIENT
Start: 2024-11-19

## 2024-11-25 ENCOUNTER — RESULTS FOLLOW-UP (OUTPATIENT)
Dept: ENDOCRINOLOGY | Facility: HOSPITAL | Age: 54
End: 2024-11-25

## 2024-11-25 ENCOUNTER — APPOINTMENT (OUTPATIENT)
Dept: LAB | Facility: HOSPITAL | Age: 54
End: 2024-11-25
Payer: COMMERCIAL

## 2024-11-25 DIAGNOSIS — E78.2 COMBINED HYPERLIPIDEMIA: ICD-10-CM

## 2024-11-25 DIAGNOSIS — E11.65 TYPE 2 DIABETES MELLITUS WITH HYPERGLYCEMIA, WITHOUT LONG-TERM CURRENT USE OF INSULIN (HCC): ICD-10-CM

## 2024-11-25 DIAGNOSIS — E06.3 HYPOTHYROIDISM DUE TO HASHIMOTO'S THYROIDITIS: ICD-10-CM

## 2024-11-25 DIAGNOSIS — I10 PRIMARY HYPERTENSION: Chronic | ICD-10-CM

## 2024-11-25 LAB
ALBUMIN SERPL BCG-MCNC: 4.4 G/DL (ref 3.5–5)
ALP SERPL-CCNC: 67 U/L (ref 34–104)
ALT SERPL W P-5'-P-CCNC: 23 U/L (ref 7–52)
ANION GAP SERPL CALCULATED.3IONS-SCNC: 6 MMOL/L (ref 4–13)
AST SERPL W P-5'-P-CCNC: 19 U/L (ref 13–39)
BASOPHILS # BLD AUTO: 0.11 THOUSANDS/ΜL (ref 0–0.1)
BASOPHILS NFR BLD AUTO: 1 % (ref 0–1)
BILIRUB SERPL-MCNC: 0.42 MG/DL (ref 0.2–1)
BUN SERPL-MCNC: 13 MG/DL (ref 5–25)
CALCIUM SERPL-MCNC: 9.3 MG/DL (ref 8.4–10.2)
CHLORIDE SERPL-SCNC: 102 MMOL/L (ref 96–108)
CHOLEST SERPL-MCNC: 192 MG/DL (ref ?–200)
CO2 SERPL-SCNC: 29 MMOL/L (ref 21–32)
CREAT SERPL-MCNC: 0.79 MG/DL (ref 0.6–1.3)
CREAT UR-MCNC: 159.3 MG/DL
EOSINOPHIL # BLD AUTO: 0.28 THOUSAND/ΜL (ref 0–0.61)
EOSINOPHIL NFR BLD AUTO: 3 % (ref 0–6)
ERYTHROCYTE [DISTWIDTH] IN BLOOD BY AUTOMATED COUNT: 12.5 % (ref 11.6–15.1)
EST. AVERAGE GLUCOSE BLD GHB EST-MCNC: 120 MG/DL
GFR SERPL CREATININE-BSD FRML MDRD: 85 ML/MIN/1.73SQ M
GLUCOSE P FAST SERPL-MCNC: 104 MG/DL (ref 65–99)
HBA1C MFR BLD: 5.8 %
HCT VFR BLD AUTO: 43.4 % (ref 34.8–46.1)
HDLC SERPL-MCNC: 47 MG/DL
HGB BLD-MCNC: 14.5 G/DL (ref 11.5–15.4)
IMM GRANULOCYTES # BLD AUTO: 0.02 THOUSAND/UL (ref 0–0.2)
IMM GRANULOCYTES NFR BLD AUTO: 0 % (ref 0–2)
LDLC SERPL CALC-MCNC: 82 MG/DL (ref 0–100)
LYMPHOCYTES # BLD AUTO: 2.74 THOUSANDS/ΜL (ref 0.6–4.47)
LYMPHOCYTES NFR BLD AUTO: 29 % (ref 14–44)
MCH RBC QN AUTO: 31.7 PG (ref 26.8–34.3)
MCHC RBC AUTO-ENTMCNC: 33.4 G/DL (ref 31.4–37.4)
MCV RBC AUTO: 95 FL (ref 82–98)
MICROALBUMIN UR-MCNC: 10 MG/L
MICROALBUMIN/CREAT 24H UR: 6 MG/G CREATININE (ref 0–30)
MONOCYTES # BLD AUTO: 0.66 THOUSAND/ΜL (ref 0.17–1.22)
MONOCYTES NFR BLD AUTO: 7 % (ref 4–12)
NEUTROPHILS # BLD AUTO: 5.55 THOUSANDS/ΜL (ref 1.85–7.62)
NEUTS SEG NFR BLD AUTO: 60 % (ref 43–75)
NRBC BLD AUTO-RTO: 0 /100 WBCS
PLATELET # BLD AUTO: 293 THOUSANDS/UL (ref 149–390)
PMV BLD AUTO: 9 FL (ref 8.9–12.7)
POTASSIUM SERPL-SCNC: 4.2 MMOL/L (ref 3.5–5.3)
PROT SERPL-MCNC: 7.2 G/DL (ref 6.4–8.4)
RBC # BLD AUTO: 4.57 MILLION/UL (ref 3.81–5.12)
SODIUM SERPL-SCNC: 137 MMOL/L (ref 135–147)
T4 FREE SERPL-MCNC: 0.97 NG/DL (ref 0.61–1.12)
TRIGL SERPL-MCNC: 316 MG/DL (ref ?–150)
TSH SERPL DL<=0.05 MIU/L-ACNC: 0.28 UIU/ML (ref 0.45–4.5)
WBC # BLD AUTO: 9.36 THOUSAND/UL (ref 4.31–10.16)

## 2024-11-25 PROCEDURE — 80061 LIPID PANEL: CPT

## 2024-11-25 PROCEDURE — 85025 COMPLETE CBC W/AUTO DIFF WBC: CPT

## 2024-11-25 PROCEDURE — 83036 HEMOGLOBIN GLYCOSYLATED A1C: CPT

## 2024-11-25 PROCEDURE — 84443 ASSAY THYROID STIM HORMONE: CPT

## 2024-11-25 PROCEDURE — 80053 COMPREHEN METABOLIC PANEL: CPT

## 2024-11-25 PROCEDURE — 36415 COLL VENOUS BLD VENIPUNCTURE: CPT

## 2024-11-25 PROCEDURE — 84439 ASSAY OF FREE THYROXINE: CPT

## 2024-12-02 DIAGNOSIS — E78.2 COMBINED HYPERLIPIDEMIA: ICD-10-CM

## 2024-12-02 DIAGNOSIS — E06.3 HYPOTHYROIDISM DUE TO HASHIMOTO'S THYROIDITIS: Primary | ICD-10-CM

## 2024-12-02 RX ORDER — LEVOTHYROXINE SODIUM 75 UG/1
75 TABLET ORAL DAILY
Qty: 90 TABLET | Refills: 3 | Status: SHIPPED | OUTPATIENT
Start: 2024-12-02

## 2024-12-03 NOTE — TELEPHONE ENCOUNTER
Requested medication(s) are due for refill today: Yes  Patient has already received a courtesy refill: No  Other reason request has been forwarded to provider: No Protocol

## 2024-12-04 RX ORDER — ICOSAPENT ETHYL 1 G/1
CAPSULE ORAL
Qty: 360 CAPSULE | Refills: 0 | Status: SHIPPED | OUTPATIENT
Start: 2024-12-04

## 2024-12-16 ENCOUNTER — HOSPITAL ENCOUNTER (OUTPATIENT)
Dept: MAMMOGRAPHY | Facility: CLINIC | Age: 54
Discharge: HOME | End: 2024-12-16
Payer: COMMERCIAL

## 2024-12-16 VITALS — BODY MASS INDEX: 24.45 KG/M2 | WEIGHT: 138 LBS | HEIGHT: 63 IN

## 2024-12-16 DIAGNOSIS — Z12.31 ENCOUNTER FOR SCREENING MAMMOGRAM FOR MALIGNANT NEOPLASM OF BREAST: ICD-10-CM

## 2024-12-16 PROCEDURE — 77063 BREAST TOMOSYNTHESIS BI: CPT

## 2024-12-16 PROCEDURE — 77067 SCR MAMMO BI INCL CAD: CPT

## 2024-12-17 ENCOUNTER — ANNUAL EXAM (OUTPATIENT)
Dept: OBGYN CLINIC | Facility: MEDICAL CENTER | Age: 54
End: 2024-12-17
Payer: COMMERCIAL

## 2024-12-17 ENCOUNTER — RESULTS FOLLOW-UP (OUTPATIENT)
Dept: OBGYN CLINIC | Facility: MEDICAL CENTER | Age: 54
End: 2024-12-17

## 2024-12-17 ENCOUNTER — OFFICE VISIT (OUTPATIENT)
Dept: PAIN MEDICINE | Facility: CLINIC | Age: 54
End: 2024-12-17
Payer: COMMERCIAL

## 2024-12-17 VITALS
DIASTOLIC BLOOD PRESSURE: 70 MMHG | WEIGHT: 139 LBS | BODY MASS INDEX: 24.63 KG/M2 | SYSTOLIC BLOOD PRESSURE: 100 MMHG | HEIGHT: 63 IN

## 2024-12-17 VITALS
TEMPERATURE: 98.3 F | SYSTOLIC BLOOD PRESSURE: 104 MMHG | HEIGHT: 63 IN | DIASTOLIC BLOOD PRESSURE: 70 MMHG | WEIGHT: 139 LBS | BODY MASS INDEX: 24.63 KG/M2

## 2024-12-17 DIAGNOSIS — Z01.419 ENCOUNTER FOR WELL WOMAN EXAM WITH ROUTINE GYNECOLOGICAL EXAM: Primary | ICD-10-CM

## 2024-12-17 DIAGNOSIS — G89.29 CHRONIC BILATERAL LOW BACK PAIN WITHOUT SCIATICA: ICD-10-CM

## 2024-12-17 DIAGNOSIS — M47.816 LUMBAR SPONDYLOSIS: Primary | ICD-10-CM

## 2024-12-17 DIAGNOSIS — M54.50 CHRONIC BILATERAL LOW BACK PAIN WITHOUT SCIATICA: ICD-10-CM

## 2024-12-17 DIAGNOSIS — Z79.891 LONG-TERM CURRENT USE OF OPIATE ANALGESIC: ICD-10-CM

## 2024-12-17 DIAGNOSIS — G89.4 CHRONIC PAIN SYNDROME: ICD-10-CM

## 2024-12-17 DIAGNOSIS — F11.20 UNCOMPLICATED OPIOID DEPENDENCE (HCC): ICD-10-CM

## 2024-12-17 PROCEDURE — S0612 ANNUAL GYNECOLOGICAL EXAMINA: HCPCS | Performed by: OBSTETRICS & GYNECOLOGY

## 2024-12-17 PROCEDURE — 99214 OFFICE O/P EST MOD 30 MIN: CPT | Performed by: PHYSICIAN ASSISTANT

## 2024-12-17 RX ORDER — TRAMADOL HYDROCHLORIDE 50 MG/1
50 TABLET ORAL 2 TIMES DAILY PRN
Qty: 60 TABLET | Refills: 3 | Status: SHIPPED | OUTPATIENT
Start: 2024-12-17

## 2024-12-17 NOTE — PROGRESS NOTES
"[facility] [address], [place], [place]    ASSESSMENT/PLAN: Raymundo White is a 54 y.o.  who presents for annual gynecologic exam.    Encounter for routine gynecologic examination  - Routine well woman exam completed today.  - Cervical Cancer Screening: Current ASCCP Guidelines reviewed. Last Pap: 2018. Next Pap Due: cervical cancer screening complete, prior hysterectomy   - Breast Cancer Screening: Last Mammogram 2024, mammo ordered  - Colorectal cancer screening was not ordered.  - The following were reviewed in today's visit: breast self exam, mammography screening     Additional problems addressed at this visit:  1. Encounter for well woman exam with routine gynecological exam          CC:  Annual Gynecologic Examination    HPI: Raymundo White is a 54 y.o.  who presents for annual gynecologic examination.  Gynecologic Exam    For routine annual exam, no GYN complaints; doing well  The following portions of the patient's history were reviewed and updated as appropriate: allergies, current medications, past family history, past medical history, obstetric history, gynecologic history, past social history, past surgical history and problem list.    Review of Systems   Constitutional: Negative.    HENT: Negative.     Eyes: Negative.    Respiratory: Negative.     Cardiovascular: Negative.    Gastrointestinal: Negative.    Genitourinary: Negative.    Musculoskeletal: Negative.    All other systems reviewed and are negative.        Objective:  /70   Ht 5' 3.25\" (1.607 m)   Wt 63 kg (139 lb)   LMP  (LMP Unknown)   BMI 24.43 kg/m²    Physical Exam  Vitals reviewed.   Constitutional:       General: She is not in acute distress.     Appearance: She is well-developed.   HENT:      Head: Normocephalic and atraumatic.      Nose: Nose normal.   Cardiovascular:      Rate and Rhythm: Normal rate.   Pulmonary:      Effort: Pulmonary effort is " normal. No respiratory distress.   Chest:      Comments: Breast exam deferred  Abdominal:      General: There is no distension.      Palpations: Abdomen is soft. There is no mass.      Tenderness: There is no abdominal tenderness. There is no guarding or rebound.   Genitourinary:     General: Normal vulva.      Exam position: Lithotomy position.      Labia:         Right: No lesion.         Left: No lesion.       Urethra: No prolapse.      Vagina: Normal. No vaginal discharge, erythema or bleeding.      Uterus: Absent.       Adnexa: Right adnexa normal and left adnexa normal.   Musculoskeletal:         General: Normal range of motion.      Cervical back: Normal range of motion.   Lymphadenopathy:      Lower Body: No right inguinal adenopathy. No left inguinal adenopathy.   Skin:     General: Skin is warm and dry.   Neurological:      Mental Status: She is alert and oriented to person, place, and time.   Psychiatric:         Behavior: Behavior normal.         Thought Content: Thought content normal.         Judgment: Judgment normal.             Emilia Bustamante  [facility]  12/17/24 5:24 PM

## 2024-12-17 NOTE — PROGRESS NOTES
Assessment:  1. Lumbar spondylosis    2. Chronic bilateral low back pain without sciatica    3. Chronic pain syndrome    4. Long-term current use of opiate analgesic    5. Uncomplicated opioid dependence (HCC)        Plan:  While the patient was in the office today, I did have a thorough conversation regarding their chronic pain syndrome, medication management, and treatment plan options.    The patient remains clinically stable and well-controlled on the current medication regimen without side effects or issues.  Patient is maintained on tramadol 50 mg twice daily as needed and methocarbamol 7 XT milligrams nightly.  On today's visit I have electronically sent refills to her pharmacy of the tramadol with the appropriate fill dates.  She did not require refills on the methocarbamol and she may try taking this every other night to see how she feels.    [place] Prescription Drug Monitoring Program report was reviewed and was appropriate     There are risks associated with opioid medications, including dependence, addiction and tolerance. The patient understands and agrees to use these medications only as prescribed. Potential side effects of the medications include, but are not limited to, constipation, drowsiness, addiction, impaired judgment and risk of fatal overdose if not taken as prescribed. The patient was warned against driving while taking sedation medications.  Sharing medications is a felony. At this point in time, the patient is showing no signs of addiction, abuse, diversion or suicidal ideation.    The patient will follow-up in 4 months for medication prescription refill and reevaluation. The patient was advised to contact the office should their symptoms worsen in the interim. The patient was agreeable and verbalized an understanding.        History of Present Illness:    The patient is a 54 y.o. female last seen on 08/27/2024 who presents for a follow up office visit in regards to chronic low back  pain secondary to lumbar spondylosis/lumbar degenerative disc disease, bilateral hip pain as well as chronic left knee pain secondary to osteoarthritis.. The patient currently reports low back pain that she rates a 5 out of 10 described as an intermittent dull, aching, throbbing and cramping type of pain.  Patient has referred pain patterns into the hips but denies lower extremity radicular symptoms.  She has intermittent left knee pain that seems to get much worse with the cold and damp weather changes.  Otherwise her pain remains unchanged and she has no new symptoms/acute issues today.    Current pain medications includes: Tramadol and methocarbamol .  The patient reports that this regimen is providing 90% pain relief.  The patient is reporting no side effects from this pain medication regimen.    Pain Contract Signed: 2024  Last Urine Drug Screen: 2024  Last dose of tramadol: 2024      I have personally reviewed and/or updated the patient's past medical history, past surgical history, family history, social history, current medications, allergies, and vital signs today.       Review of Systems:    Review of Systems   Musculoskeletal:         Joint stiffness.          Past Medical History:   Diagnosis Date   • Coronary artery disease    • Diabetes mellitus (HCC)     Borderline   • Disease of thyroid gland    • DUB (dysfunctional uterine bleeding)    • Fatty liver    • Hashimoto's thyroiditis     Last Assessed:  14   • History of stomach ulcers    • Hypertension    • Hypothyroidism    • Irritable bowel syndrome     Last Assessed:  3/25/14   • Liver disease     elevated enzymes   • Myocardial infarction (HCC)     2017   • GAING (nonalcoholic steatohepatitis)    • Ovarian cyst     left   • Psychogenic polydipsia     Has had hyponatremia from drinking too much water in the past.       Past Surgical History:   Procedure Laterality Date   • ANGIOPLASTY     • CARDIAC CATHETERIZATION     •   SECTION      X 2   • CHOLECYSTECTOMY     • COLONOSCOPY     • CYSTOSCOPY N/A 9/6/2019    Procedure: CYSTOSCOPY;  Surgeon: Luther Wadsworth MD;  Location: [facility];  Service: Gynecology Oncology   • HYSTERECTOMY     • IR BIOPSY LIVER MASS  5/29/2020   • OOPHORECTOMY Right    • HI ESOPHAGOGASTRODUODENOSCOPY TRANSORAL DIAGNOSTIC N/A 8/24/2018    Procedure: ESOPHAGOGASTRODUODENOSCOPY (EGD);  Surgeon: Yoav Frias MD;  Location: [facility];  Service: Gastroenterology   • HI LAPS TOTAL HYSTERECT 250 GM/< W/RMVL TUBE/OVARY N/A 9/6/2019    Procedure: TOTAL LAPAROSCOPIC HYSTERECTOMY, BILATERAL SALPINGECTOMY, LEFT OOPHORECTOMY;  Surgeon: Luther Wadsworth MD;  Location:  MAIN OR;  Service: Gynecology Oncology   • HI RINSJ RPTD BICEPS/TRICEPS TDN DSTL W/WO TDN GRF Left 2/24/2023    Procedure: REPAIR TENDON BICEPS, distal;  Surgeon: Yvonne Modi;  Location:  MAIN OR;  Service: Orthopedics   • SINUS SURGERY     • TONSILLECTOMY     • UPPER GASTROINTESTINAL ENDOSCOPY         Family History   Problem Relation Age of Onset   • Pancreatic cancer Mother    • Cancer Mother         Pancreatic   • Stroke Father    • Hypertension Father    • Diabetes type II Father    • Diabetes Father    • Lung disease Daughter         asthma tendencies   • Diabetes Maternal Grandmother    • Diabetes Paternal Grandmother    • Heart disease Paternal Grandmother    • Hypothyroidism Paternal Grandmother    • Thyroid disease Paternal Grandmother    • Diabetes type II Brother    • No Known Problems Brother    • No Known Problems Son    • Liver cancer Maternal Aunt    • Melanoma Maternal Aunt    • No Known Problems Paternal Aunt    • No Known Problems Paternal Aunt    • No Known Problems Paternal Aunt    • Hypothyroidism Paternal Uncle    • Hyperlipidemia Neg Hx        Social History     Occupational History   • Not on file   Tobacco Use   • Smoking status: Former     Current packs/day: 0.00     Average packs/day: 0.5 packs/day for 27.1 years (13.5 ttl  pk-yrs)     Types: Cigarettes     Start date:      Quit date: 2016     Years since quittin.8   • Smokeless tobacco: Never   • Tobacco comments:     ON AND OFF    Vaping Use   • Vaping status: Never Used   Substance and Sexual Activity   • Alcohol use: Not Currently     Comment: Stopped    • Drug use: No   • Sexual activity: Yes     Partners: Male     Birth control/protection: None, Female Sterilization         Current Outpatient Medications:   •  amLODIPine (NORVASC) 5 mg tablet, Take 1 tablet (5 mg total) by mouth daily, Disp: 90 tablet, Rfl: 1  •  Blood Glucose Monitoring Suppl (ONETOUCH VERIO IQ SYSTEM) w/Device KIT, Use to test blood sugars twice a day, Disp: 1 kit, Rfl: 0  •  dicyclomine (BENTYL) 20 mg tablet, Take 1 tablet (20 mg total) by mouth every 6 (six) hours as needed (every 6 hours), Disp: 60 tablet, Rfl: 1  •  escitalopram (LEXAPRO) 20 mg tablet, Take 1 tablet (20 mg total) by mouth daily, Disp: 90 tablet, Rfl: 1  •  Icosapent Ethyl (Vascepa) 1 g CAPS, 2 capsules twice a day, Disp: 360 capsule, Rfl: 0  •  Insulin Pen Needle 32G X 4 MM MISC, Use once a week, Disp: 30 each, Rfl: 1  •  Lactobacillus (PROBIOTIC ACIDOPHILUS PO), Take by mouth daily, Disp: , Rfl:   •  levothyroxine 75 mcg tablet, Take 1 tablet (75 mcg total) by mouth daily, Disp: 90 tablet, Rfl: 3  •  Magnesium 300 MG CAPS, Take 600 mg by mouth daily, Disp: , Rfl:   •  metFORMIN (GLUCOPHAGE-XR) 500 mg 24 hr tablet, Take 2 tablets (1,000 mg total) by mouth 2 (two) times a day with meals, Disp: 360 tablet, Rfl: 1  •  methocarbamol (ROBAXIN) 750 mg tablet, 1 tab PO HS., Disp: 90 tablet, Rfl: 0  •  metoprolol succinate (TOPROL-XL) 25 mg 24 hr tablet, Take 1 tablet (25 mg total) by mouth 2 (two) times a day, Disp: 180 tablet, Rfl: 1  •  Multiple Vitamins-Minerals (ZINC PO), Take by mouth, Disp: , Rfl:   •  mupirocin (BACTROBAN) 2 % ointment, Apply topically 3 (three) times a day, Disp: 22 g, Rfl: 0  •  nitroglycerin (NITROSTAT)  "0.4 mg SL tablet, Place 1 tablet (0.4 mg total) under the tongue every 5 (five) minutes as needed for chest pain, Disp: 90 tablet, Rfl: 0  •  OneTouch Delica Lancets 33G MISC, Use to test blood sugars twice a day, Disp: 200 each, Rfl: 6  •  OneTouch Verio test strip, Use as instructed to test blood sugars twice a day, Disp: 200 each, Rfl: 1  •  pantoprazole (PROTONIX) 40 mg tablet, Take 1 tablet (40 mg total) by mouth daily before breakfast, Disp: 90 tablet, Rfl: 1  •  semaglutide, 1 mg/dose, (Ozempic, 1 MG/DOSE,) 4 mg/3 mL injection pen, Inject 1 mg once a week, Disp: 3 mL, Rfl: 5  •  traMADol (Ultram) 50 mg tablet, Take 1 tablet (50 mg total) by mouth 2 (two) times a day as needed for moderate pain 1 tab BID prn for ongoing therapy DO NOT FILL BEFORE:  01/08/25, Disp: 60 tablet, Rfl: 3  •  vitamin B-12 (VITAMIN B-12) 1,000 mcg tablet, Take by mouth daily, Disp: , Rfl:   •  Vitamin D, Cholecalciferol, 50 MCG (2000 UT) CAPS, Take 4,000 Units by mouth daily, Disp: , Rfl:     Allergies   Allergen Reactions   • Erythromycin Chest Pain     Chest pain as child   • Metronidazole Other (See Comments)     SOB   • Sulfa Antibiotics Shortness Of Breath     Per pt, \"hives and fever\"   • Amoxicillin-Pot Clavulanate Rash     And yeast infection   • Invokana [Canagliflozin] Other (See Comments)     Yeast infection       Physical Exam:    /70 (BP Location: Left arm, Patient Position: Sitting, Cuff Size: Standard)   Temp 98.3 °F (36.8 °C)   Ht 5' 3.25\" (1.607 m) Comment: Verbal  Wt 63 kg (139 lb)   LMP  (LMP Unknown)   BMI 24.43 kg/m²     Constitutional:normal, well developed, well nourished, alert, in no distress and non-toxic and no overt pain behavior.  Eyes:anicteric  HEENT:grossly intact  Neck:supple, symmetric, trachea midline and no masses   Pulmonary:even and unlabored  Cardiovascular:No edema or pitting edema present  Skin:Normal without rashes or lesions and well hydrated  Psychiatric:Mood and affect " appropriate  Neurologic:Cranial Nerves II-XII grossly intact  Musculoskeletal: Stable gait without the use of assistive devices      Imaging  No orders to display         No orders of the defined types were placed in this encounter.

## 2025-01-13 ENCOUNTER — PATIENT MESSAGE (OUTPATIENT)
Dept: GASTROENTEROLOGY | Facility: CLINIC | Age: 55
End: 2025-01-13

## 2025-01-13 DIAGNOSIS — K75.81 NASH (NONALCOHOLIC STEATOHEPATITIS): Primary | ICD-10-CM

## 2025-01-15 DIAGNOSIS — E06.3 HYPOTHYROIDISM DUE TO HASHIMOTO'S THYROIDITIS: Primary | ICD-10-CM

## 2025-01-16 ENCOUNTER — HOSPITAL ENCOUNTER (OUTPATIENT)
Dept: ULTRASOUND IMAGING | Facility: HOSPITAL | Age: 55
End: 2025-01-16
Attending: INTERNAL MEDICINE
Payer: COMMERCIAL

## 2025-01-16 ENCOUNTER — APPOINTMENT (OUTPATIENT)
Dept: LAB | Facility: HOSPITAL | Age: 55
End: 2025-01-16
Payer: COMMERCIAL

## 2025-01-16 DIAGNOSIS — K75.81 NASH (NONALCOHOLIC STEATOHEPATITIS): ICD-10-CM

## 2025-01-16 PROCEDURE — 76981 USE PARENCHYMA: CPT

## 2025-01-16 PROCEDURE — 36415 COLL VENOUS BLD VENIPUNCTURE: CPT

## 2025-01-17 ENCOUNTER — RESULTS FOLLOW-UP (OUTPATIENT)
Age: 55
End: 2025-01-17

## 2025-01-21 LAB — LIVER FIBR SCORE SERPL CALC.FIBROSURE: 8.84

## 2025-01-27 DIAGNOSIS — E11.65 TYPE 2 DIABETES MELLITUS WITH HYPERGLYCEMIA, WITHOUT LONG-TERM CURRENT USE OF INSULIN (HCC): ICD-10-CM

## 2025-01-27 DIAGNOSIS — R07.89 OTHER CHEST PAIN: ICD-10-CM

## 2025-01-28 RX ORDER — AMLODIPINE BESYLATE 5 MG/1
5 TABLET ORAL DAILY
Qty: 90 TABLET | Refills: 0 | Status: SHIPPED | OUTPATIENT
Start: 2025-01-28

## 2025-02-05 DIAGNOSIS — G89.4 CHRONIC PAIN SYNDROME: ICD-10-CM

## 2025-02-05 DIAGNOSIS — G89.29 CHRONIC LOW BACK PAIN WITHOUT SCIATICA, UNSPECIFIED BACK PAIN LATERALITY: ICD-10-CM

## 2025-02-05 DIAGNOSIS — M79.18 MYOFASCIAL PAIN SYNDROME: ICD-10-CM

## 2025-02-05 DIAGNOSIS — M54.50 CHRONIC LOW BACK PAIN WITHOUT SCIATICA, UNSPECIFIED BACK PAIN LATERALITY: ICD-10-CM

## 2025-02-05 DIAGNOSIS — M47.816 LUMBAR SPONDYLOSIS: ICD-10-CM

## 2025-02-06 ENCOUNTER — TELEPHONE (OUTPATIENT)
Age: 55
End: 2025-02-06

## 2025-02-06 RX ORDER — METHOCARBAMOL 750 MG/1
TABLET, FILM COATED ORAL
Qty: 90 TABLET | Refills: 0 | Status: SHIPPED | OUTPATIENT
Start: 2025-02-06

## 2025-02-06 NOTE — TELEPHONE ENCOUNTER
Caller: Raymundo White    Doctor: Dr. Todd    Reason for call: We LM that we needed more info on why she is being seen-she does have bunions, but not the issue. Her right great toe swells up and joint pain and very painful at the end of the day. I did add this info to appt. Line. Thanks     Call back#: no call back necessary, but her number is [phone]

## 2025-02-07 ENCOUNTER — RESULTS FOLLOW-UP (OUTPATIENT)
Dept: ENDOCRINOLOGY | Facility: HOSPITAL | Age: 55
End: 2025-02-07

## 2025-02-07 ENCOUNTER — APPOINTMENT (OUTPATIENT)
Dept: LAB | Facility: HOSPITAL | Age: 55
End: 2025-02-07
Payer: COMMERCIAL

## 2025-02-07 DIAGNOSIS — E06.3 HYPOTHYROIDISM DUE TO HASHIMOTO'S THYROIDITIS: ICD-10-CM

## 2025-02-07 LAB
T4 FREE SERPL-MCNC: 0.78 NG/DL (ref 0.61–1.12)
TSH SERPL DL<=0.05 MIU/L-ACNC: 1.97 UIU/ML (ref 0.45–4.5)

## 2025-02-07 PROCEDURE — 84439 ASSAY OF FREE THYROXINE: CPT

## 2025-02-07 PROCEDURE — 36415 COLL VENOUS BLD VENIPUNCTURE: CPT

## 2025-02-07 PROCEDURE — 84443 ASSAY THYROID STIM HORMONE: CPT

## 2025-02-18 ENCOUNTER — OFFICE VISIT (OUTPATIENT)
Age: 55
End: 2025-02-18
Payer: COMMERCIAL

## 2025-02-18 VITALS
HEIGHT: 62 IN | DIASTOLIC BLOOD PRESSURE: 82 MMHG | WEIGHT: 141.4 LBS | SYSTOLIC BLOOD PRESSURE: 110 MMHG | BODY MASS INDEX: 26.02 KG/M2 | HEART RATE: 74 BPM

## 2025-02-18 DIAGNOSIS — K76.0 METABOLIC DYSFUNCTION-ASSOCIATED STEATOTIC LIVER DISEASE (MASLD): Primary | ICD-10-CM

## 2025-02-18 PROCEDURE — 99213 OFFICE O/P EST LOW 20 MIN: CPT | Performed by: INTERNAL MEDICINE

## 2025-02-18 NOTE — PROGRESS NOTES
Name: Raymundo White      : 1970      MRN: [id number]  Encounter Provider: Dominic Branch MD  Encounter Date: 2025   Encounter department: [facility] [address]  :  Assessment & Plan  Metabolic dysfunction-associated steatotic liver disease (MASLD)  No physical, laboratory radiologic evidence of advanced liver disease, cirrhosis or portal hypertension.  Elastography and ELF.  Takes minimal to no hepatic fibrosis, despite having significant steatosis.  Fib 4 score and NAFLD the fibrosis score rule out advanced fibrosis with a high negative predictive value.  At this point in time, I advised Coco to continue with her healthy lifestyle, avoid weight gain and continue to follow with her PCP to optimize cardiometabolic risk factors.  She can have yearly fib 4 and ELF studies done and return to see hepatology if there is any she suggested progression of hepatic fibrosis with a fib 4 score greater than 1.3 or ELF greater than 9.8.  Orders:    enhanced liver fibrosis (elf) score; Future        History of Present Illness   Raymundo White is a 54 y.o. female who presents for follow-up of metabolic dysfunction associated steatotic liver disease.  Coco was seen 1 year ago at which time she did not have any evidence of advanced hepatic fibrosis despite having significant steatosis.    Since her last office visit, she has done relatively well.  She did lose a cousin to complications related to cirrhosis and was emotionally shook up by this especially given her history of fatty liver disease.    Fortunately, Ms. White denies recent or history of yellow eyes/skin, dark urine, GI bleeding, abdominal distention with fluid, lower extremity swelling, easy bruising, excessive bleeding, pruritus or confusion.   she denies abdominal pain, nausea, vomiting, heartburn, reflux, difficulty swallowing, early satiety, bloating, diarrhea, constipation or straining with passing  stools.    HPI  History obtained from: patient  Review of Systems A complete review of systems is negative other than that noted above in the HPI.    Current Outpatient Medications on File Prior to Visit   Medication Sig Dispense Refill    amLODIPine (NORVASC) 5 mg tablet Take 1 tablet (5 mg total) by mouth daily 90 tablet 0    Blood Glucose Monitoring Suppl (ONETOUCH VERIO IQ SYSTEM) w/Device KIT Use to test blood sugars twice a day 1 kit 0    dicyclomine (BENTYL) 20 mg tablet Take 1 tablet (20 mg total) by mouth every 6 (six) hours as needed (every 6 hours) 60 tablet 1    escitalopram (LEXAPRO) 20 mg tablet Take 1 tablet (20 mg total) by mouth daily 90 tablet 1    Icosapent Ethyl (Vascepa) 1 g CAPS 2 capsules twice a day 360 capsule 0    Insulin Pen Needle 32G X 4 MM MISC Use once a week 30 each 1    Lactobacillus (PROBIOTIC ACIDOPHILUS PO) Take by mouth daily      levothyroxine 75 mcg tablet Take 1 tablet (75 mcg total) by mouth daily 90 tablet 3    Magnesium 300 MG CAPS Take 600 mg by mouth daily      metFORMIN (GLUCOPHAGE-XR) 500 mg 24 hr tablet Take 2 tablets (1,000 mg total) by mouth 2 (two) times a day with meals 360 tablet 1    methocarbamol (ROBAXIN) 750 mg tablet 1 tab PO HS. 90 tablet 0    metoprolol succinate (TOPROL-XL) 25 mg 24 hr tablet Take 1 tablet (25 mg total) by mouth 2 (two) times a day 180 tablet 1    Multiple Vitamins-Minerals (ZINC PO) Take by mouth      mupirocin (BACTROBAN) 2 % ointment Apply topically 3 (three) times a day 22 g 0    nitroglycerin (NITROSTAT) 0.4 mg SL tablet Place 1 tablet (0.4 mg total) under the tongue every 5 (five) minutes as needed for chest pain 90 tablet 0    OneTouch Delica Lancets 33G MISC Use to test blood sugars twice a day 200 each 6    OneTouch Verio test strip Use as instructed to test blood sugars twice a day 200 each 1    pantoprazole (PROTONIX) 40 mg tablet Take 1 tablet (40 mg total) by mouth daily before breakfast 90 tablet 1    semaglutide, 1 mg/dose,  (Ozempic, 1 MG/DOSE,) 4 mg/3 mL injection pen Inject 1 mg once a week 3 mL 5    traMADol (Ultram) 50 mg tablet Take 1 tablet (50 mg total) by mouth 2 (two) times a day as needed for moderate pain 1 tab BID prn for ongoing therapy DO NOT FILL BEFORE:  01/08/25 60 tablet 3    vitamin B-12 (VITAMIN B-12) 1,000 mcg tablet Take by mouth daily      Vitamin D, Cholecalciferol, 50 MCG (2000 UT) CAPS Take 4,000 Units by mouth daily       No current facility-administered medications on file prior to visit.      Current Outpatient Medications   Medication Sig Dispense Refill    amLODIPine (NORVASC) 5 mg tablet Take 1 tablet (5 mg total) by mouth daily 90 tablet 0    Blood Glucose Monitoring Suppl (ONETOUCH VERIO IQ SYSTEM) w/Device KIT Use to test blood sugars twice a day 1 kit 0    dicyclomine (BENTYL) 20 mg tablet Take 1 tablet (20 mg total) by mouth every 6 (six) hours as needed (every 6 hours) 60 tablet 1    escitalopram (LEXAPRO) 20 mg tablet Take 1 tablet (20 mg total) by mouth daily 90 tablet 1    Icosapent Ethyl (Vascepa) 1 g CAPS 2 capsules twice a day 360 capsule 0    Insulin Pen Needle 32G X 4 MM MISC Use once a week 30 each 1    Lactobacillus (PROBIOTIC ACIDOPHILUS PO) Take by mouth daily      levothyroxine 75 mcg tablet Take 1 tablet (75 mcg total) by mouth daily 90 tablet 3    Magnesium 300 MG CAPS Take 600 mg by mouth daily      metFORMIN (GLUCOPHAGE-XR) 500 mg 24 hr tablet Take 2 tablets (1,000 mg total) by mouth 2 (two) times a day with meals 360 tablet 1    methocarbamol (ROBAXIN) 750 mg tablet 1 tab PO HS. 90 tablet 0    metoprolol succinate (TOPROL-XL) 25 mg 24 hr tablet Take 1 tablet (25 mg total) by mouth 2 (two) times a day 180 tablet 1    Multiple Vitamins-Minerals (ZINC PO) Take by mouth      mupirocin (BACTROBAN) 2 % ointment Apply topically 3 (three) times a day 22 g 0    nitroglycerin (NITROSTAT) 0.4 mg SL tablet Place 1 tablet (0.4 mg total) under the tongue every 5 (five) minutes as needed for  "chest pain 90 tablet 0    OneTouch Delica Lancets 33G MISC Use to test blood sugars twice a day 200 each 6    OneTouch Verio test strip Use as instructed to test blood sugars twice a day 200 each 1    pantoprazole (PROTONIX) 40 mg tablet Take 1 tablet (40 mg total) by mouth daily before breakfast 90 tablet 1    semaglutide, 1 mg/dose, (Ozempic, 1 MG/DOSE,) 4 mg/3 mL injection pen Inject 1 mg once a week 3 mL 5    traMADol (Ultram) 50 mg tablet Take 1 tablet (50 mg total) by mouth 2 (two) times a day as needed for moderate pain 1 tab BID prn for ongoing therapy DO NOT FILL BEFORE:  01/08/25 60 tablet 3    vitamin B-12 (VITAMIN B-12) 1,000 mcg tablet Take by mouth daily      Vitamin D, Cholecalciferol, 50 MCG (2000 UT) CAPS Take 4,000 Units by mouth daily       No current facility-administered medications for this visit.     Objective   /82   Pulse 74   Ht 5' 2\" (1.575 m)   Wt 64.1 kg (141 lb 6.4 oz)   LMP  (LMP Unknown)   BMI 25.86 kg/m²     Physical Exam  Vitals reviewed.   Constitutional:       General: She is not in acute distress.     Appearance: Normal appearance. She is not ill-appearing.   HENT:      Head: Normocephalic and atraumatic.      Nose: Nose normal.      Mouth/Throat:      Mouth: Mucous membranes are moist.      Pharynx: Oropharynx is clear.   Eyes:      General: No scleral icterus.     Extraocular Movements: Extraocular movements intact.   Cardiovascular:      Rate and Rhythm: Normal rate and regular rhythm.      Heart sounds: No murmur heard.  Pulmonary:      Effort: Pulmonary effort is normal. No respiratory distress.      Breath sounds: Normal breath sounds.   Abdominal:      General: Abdomen is flat.      Palpations: Abdomen is soft. There is no shifting dullness, fluid wave, hepatomegaly or splenomegaly.      Tenderness: There is no abdominal tenderness.      Hernia: No hernia is present.   Genitourinary:     Comments: deferred  Musculoskeletal:         General: No swelling. Normal " range of motion.      Cervical back: Normal range of motion and neck supple. No tenderness.   Skin:     General: Skin is warm.      Coloration: Skin is not jaundiced.      Findings: No bruising or rash.   Neurological:      General: No focal deficit present.      Mental Status: She is alert and oriented to person, place, and time.   Psychiatric:         Mood and Affect: Mood normal.            Lab Results: I personally reviewed relevant lab results. LFTs: No new results in last 24 hours.     Radiology Results Review: I have reviewed radiology reports from January 16, 2025 including: Ultrasound(s).  Results for orders placed during the hospital encounter of 05/25/22    Colonoscopy    Impression  Normal colon.  The cecum and ascending colon appeared normal with no evidence of bleeding.  No AVMs were seen.  Small internal hemorrhoids.    RECOMMENDATION:  Repeat screening colonoscopy in 8 years.  Trend hemoglobin as outpatient  Follow-up outpatient    ATTENDING ATTESTATION:  I was present throughout the entire procedure from insertion to complete withdrawal of the scope. I performed all interventions myself or oversaw the fellow.    Dominic Branch MD      Administrative Statements   I have spent a total time of 23 minutes in caring for this patient on the day of the visit/encounter including Diagnostic results, Instructions for management, Impressions, and Counseling / Coordination of care.

## 2025-02-18 NOTE — ASSESSMENT & PLAN NOTE
No physical, laboratory radiologic evidence of advanced liver disease, cirrhosis or portal hypertension.  Elastography and ELF.  Takes minimal to no hepatic fibrosis, despite having significant steatosis.  Fib 4 score and NAFLD the fibrosis score rule out advanced fibrosis with a high negative predictive value.  At this point in time, I advised Coco to continue with her healthy lifestyle, avoid weight gain and continue to follow with her PCP to optimize cardiometabolic risk factors.  She can have yearly fib 4 and ELF studies done and return to see hepatology if there is any she suggested progression of hepatic fibrosis with a fib 4 score greater than 1.3 or ELF greater than 9.8.  Orders:    enhanced liver fibrosis (elf) score; Future     [NL] : warm [Transmitted Upper Airway Sounds] : transmitted upper airway sounds [[email or web address]] : no retractions/nasal flaring

## 2025-02-19 NOTE — PROGRESS NOTES
Patient ID: Raymundo White is a 54 y.o. female Date of Birth 1970       Chief Complaint   Patient presents with    New Patient Visit    Bunions     Right                Diagnosis:  1. Hallux limitus, right  -     XR foot 3+ vw right; Future; Expected date: 02/20/2025  -     bupivacaine (MARCAINE) 0.5 % injection 0.5 mL  -     dexamethasone (DECADRON) injection 4 mg  -     triamcinolone acetonide (KENALOG-40) 40 mg/mL injection 40 mg  -     Device Prior Authorization; Future  2. Type 2 diabetes mellitus with hyperglycemia, without long-term current use of insulin ([facility])    Initial pedal examination with socks and shoes removed bilaterally.  Today we discussed the biomechanics, etiology and treatment options of right great toe pain and swelling secondary to patient's hallux limitus.  Today I discussed treatment options with shoe gear modification with a rigid sole shoe, avoid anything flat or flexible, no barefoot, over-the-counter inserts, custom inserts, injection, surgical treatment.  Injection given please see procedure note.  X-ray ordered she will have taken prior to surgery schedule appointment if and when she decides to proceed.  Surgical care was discussed, patient will likely need a first MTPJ fusion, preop, IntraOp and postoperative course was reviewed, risk, benefits alternatives were discussed.  We will pre-CERT custom orthotics and she will follow-up for orthotic casting.  Today I did a comprehensive diabetic foot examination.  We reviewed proper diabetic footcare, shoe gear, daily foot inspection.  I personally viewed patient's medical records, pertinent blood work, A1c, endocrinology notes.  Patient understands and agrees with the plan.      Small joint arthrocentesis: R great MTP  Universal Protocol:  procedure performed by consultantConsent: Verbal consent obtained.  Risks and benefits: risks, benefits and alternatives were discussed  Consent given by: patient  Time out: Immediately prior to  "procedure a \"time out\" was called to verify the correct patient, procedure, equipment, support staff and site/side marked as required.  Patient understanding: patient states understanding of the procedure being performed  Patient identity confirmed: verbally with patient  Supporting Documentation  Indications: pain and joint swelling   Procedure Details  Location: great toe - R great MTP  Preparation: Patient was prepped and draped in the usual sterile fashion  Needle size: 25 G  Ultrasound guidance: no  Approach: dorsal    Patient tolerance: patient tolerated the procedure well with no immediate complications  Dressing:  Sterile dressing applied    1:1:1 cc each of 0.5% each of 0.5% plain Marcaine, Decadron 4 mg/mL and Kenalog 40 was injected to first MTPJ.           Subjective:   Raymundo presents today for evaluation care of pain in her right foot.  She has a bunion, she has swelling in her great toe and pain in the joint, the actual bunion does not bother her, it hurts a lot by the end of the day.  She is also a type II diabetic with an A1c of 5.8 on 11/25/2024, she is well-controlled with Glucophage and Ozempic, she follows with endocrinology.        The following portions of the patient's history were reviewed and updated as appropriate:     Past Medical History:   Diagnosis Date    Coronary artery disease     Diabetes mellitus (HCC)     Borderline    Disease of thyroid gland     DUB (dysfunctional uterine bleeding)     Fatty liver     Hashimoto's thyroiditis     Last Assessed:  8/19/14    Heart disease     History of stomach ulcers     Hypertension     Hypothyroidism     Irritable bowel syndrome     Last Assessed:  3/25/14    Liver disease     elevated enzymes    Myocardial infarction (HCC)     2017    GIANG (nonalcoholic steatohepatitis)     Ovarian cyst     left    Psychogenic polydipsia     Has had hyponatremia from drinking too much water in the past.       Past Surgical History:   Procedure Laterality Date "    ANGIOPLASTY      CARDIAC CATHETERIZATION       SECTION      X 2    CHOLECYSTECTOMY      COLONOSCOPY      CYSTOSCOPY N/A 2019    Procedure: CYSTOSCOPY;  Surgeon: Luther Wadsworth MD;  Location: [facility];  Service: Gynecology Oncology    HYSTERECTOMY      IR BIOPSY LIVER MASS  2020    OOPHORECTOMY Right     AL ESOPHAGOGASTRODUODENOSCOPY TRANSORAL DIAGNOSTIC N/A 2018    Procedure: ESOPHAGOGASTRODUODENOSCOPY (EGD);  Surgeon: Yoav Frias MD;  Location: [facility];  Service: Gastroenterology    AL LAPS TOTAL HYSTERECT 250 GM/< W/RMVL TUBE/OVARY N/A 2019    Procedure: TOTAL LAPAROSCOPIC HYSTERECTOMY, BILATERAL SALPINGECTOMY, LEFT OOPHORECTOMY;  Surgeon: Luther Wadsworth MD;  Location: [facility];  Service: Gynecology Oncology    AL RINSJ RPTD BICEPS/TRICEPS TDN DSTL W/WO TDN GRF Left 2023    Procedure: REPAIR TENDON BICEPS, distal;  Surgeon: Yvonne Modi;  Location:  MAIN OR;  Service: Orthopedics    SINUS SURGERY      TONSILLECTOMY      UPPER GASTROINTESTINAL ENDOSCOPY         Social History     Socioeconomic History    Marital status: /Civil Union     Spouse name: None    Number of children: None    Years of education: None    Highest education level: None   Occupational History    None   Tobacco Use    Smoking status: Former     Current packs/day: 0.00     Average packs/day: 0.5 packs/day for 27.1 years (13.5 ttl pk-yrs)     Types: Cigarettes     Start date:      Quit date: 2016     Years since quittin.0    Smokeless tobacco: Never    Tobacco comments:     ON AND OFF    Vaping Use    Vaping status: Never Used   Substance and Sexual Activity    Alcohol use: Not Currently     Comment: Stopped     Drug use: No    Sexual activity: Yes     Partners: Male     Birth control/protection: Other     Comment: Hysterectomy   Other Topics Concern    None   Social History Narrative    Feels safe at home     Social Drivers of Health     Financial Resource Strain: Not on file    Food Insecurity: Not on file   Transportation Needs: Not on file   Physical Activity: Not on file   Stress: Not on file   Social Connections: Not on file   Intimate Partner Violence: Not on file   Housing Stability: Not on file          Current Outpatient Medications:     amLODIPine (NORVASC) 5 mg tablet, Take 1 tablet (5 mg total) by mouth daily, Disp: 90 tablet, Rfl: 0    Blood Glucose Monitoring Suppl (ONETOUCH VERIO IQ SYSTEM) w/Device KIT, Use to test blood sugars twice a day, Disp: 1 kit, Rfl: 0    dicyclomine (BENTYL) 20 mg tablet, Take 1 tablet (20 mg total) by mouth every 6 (six) hours as needed (every 6 hours), Disp: 60 tablet, Rfl: 1    escitalopram (LEXAPRO) 20 mg tablet, Take 1 tablet (20 mg total) by mouth daily, Disp: 90 tablet, Rfl: 1    Icosapent Ethyl (Vascepa) 1 g CAPS, 2 capsules twice a day, Disp: 360 capsule, Rfl: 0    Insulin Pen Needle 32G X 4 MM MISC, Use once a week, Disp: 30 each, Rfl: 1    Lactobacillus (PROBIOTIC ACIDOPHILUS PO), Take by mouth daily, Disp: , Rfl:     levothyroxine 75 mcg tablet, Take 1 tablet (75 mcg total) by mouth daily, Disp: 90 tablet, Rfl: 3    Magnesium 300 MG CAPS, Take 600 mg by mouth daily, Disp: , Rfl:     metFORMIN (GLUCOPHAGE-XR) 500 mg 24 hr tablet, Take 2 tablets (1,000 mg total) by mouth 2 (two) times a day with meals, Disp: 360 tablet, Rfl: 1    methocarbamol (ROBAXIN) 750 mg tablet, 1 tab PO HS., Disp: 90 tablet, Rfl: 0    metoprolol succinate (TOPROL-XL) 25 mg 24 hr tablet, Take 1 tablet (25 mg total) by mouth 2 (two) times a day, Disp: 180 tablet, Rfl: 1    Multiple Vitamins-Minerals (ZINC PO), Take by mouth, Disp: , Rfl:     mupirocin (BACTROBAN) 2 % ointment, Apply topically 3 (three) times a day (Patient taking differently: Apply topically 3 (three) times a day), Disp: 22 g, Rfl: 0    nitroglycerin (NITROSTAT) 0.4 mg SL tablet, Place 1 tablet (0.4 mg total) under the tongue every 5 (five) minutes as needed for chest pain, Disp: 90 tablet, Rfl: 0    " OneTouch Delica Lancets 33G MISC, Use to test blood sugars twice a day, Disp: 200 each, Rfl: 6    OneTouch Verio test strip, Use as instructed to test blood sugars twice a day, Disp: 200 each, Rfl: 1    pantoprazole (PROTONIX) 40 mg tablet, Take 1 tablet (40 mg total) by mouth daily before breakfast, Disp: 90 tablet, Rfl: 1    semaglutide, 1 mg/dose, (Ozempic, 1 MG/DOSE,) 4 mg/3 mL injection pen, Inject 1 mg once a week, Disp: 3 mL, Rfl: 5    traMADol (Ultram) 50 mg tablet, Take 1 tablet (50 mg total) by mouth 2 (two) times a day as needed for moderate pain 1 tab BID prn for ongoing therapy DO NOT FILL BEFORE:  01/08/25, Disp: 60 tablet, Rfl: 3    vitamin B-12 (VITAMIN B-12) 1,000 mcg tablet, Take by mouth daily, Disp: , Rfl:     Vitamin D, Cholecalciferol, 50 MCG (2000 UT) CAPS, Take 4,000 Units by mouth daily, Disp: , Rfl:   No current facility-administered medications for this visit.    Allergies  Erythromycin, Metronidazole, Sulfa antibiotics, Amoxicillin-pot clavulanate, and Invokana [canagliflozin]    Family History   Problem Relation Age of Onset    Pancreatic cancer Mother     Cancer Mother         Pancreatic    Stroke Father     Hypertension Father     Diabetes type II Father     Diabetes Father     Lung disease Daughter         asthma tendencies    Diabetes Maternal Grandmother     Diabetes Paternal Grandmother     Heart disease Paternal Grandmother     Hypothyroidism Paternal Grandmother     Thyroid disease Paternal Grandmother     Diabetes type II Brother     No Known Problems Brother     No Known Problems Son     Liver cancer Maternal Aunt     Melanoma Maternal Aunt     No Known Problems Paternal Aunt     No Known Problems Paternal Aunt     No Known Problems Paternal Aunt     Hypothyroidism Paternal Uncle     Hyperlipidemia Neg Hx            Objective:  Ht 5' 2\" (1.575 m) Comment: verbal  Wt 64 kg (141 lb)   LMP  (LMP Unknown)   BMI 25.79 kg/m²     Review of Systems   Constitutional:  Negative for " chills and fever.   HENT:  Negative for ear pain and sore throat.    Eyes:  Negative for pain and visual disturbance.   Respiratory:  Negative for cough and shortness of breath.    Cardiovascular:  Negative for chest pain and palpitations.   Gastrointestinal:  Negative for abdominal pain and vomiting.   Genitourinary:  Negative for dysuria and hematuria.   Musculoskeletal:  Negative for arthralgias and back pain.        Right great toe joint pain   Skin:  Negative for color change and rash.   Neurological:  Negative for seizures and syncope.   All other systems reviewed and are negative.      Physical Exam  Constitutional:       Appearance: Normal appearance. She is well-developed and normal weight.   HENT:      Head: Normocephalic and atraumatic.      Nose: Nose normal.      Mouth/Throat:      Mouth: Mucous membranes are moist.      Pharynx: Oropharynx is clear.   Eyes:      Conjunctiva/sclera: Conjunctivae normal.      Pupils: Pupils are equal, round, and reactive to light.   Cardiovascular:      Pulses: no weak pulses.           Dorsalis pedis pulses are 2+ on the right side and 2+ on the left side.        Posterior tibial pulses are 2+ on the right side and 2+ on the left side.   Pulmonary:      Effort: Pulmonary effort is normal.   Musculoskeletal:      Cervical back: Normal range of motion.      Right lower leg: No edema.      Left lower leg: No edema.      Right foot: Decreased range of motion. Deformity and bunion present.      Left foot: Bunion present.   Feet:      Right foot:      Protective Sensation: 10 sites tested.  10 sites sensed.      Skin integrity: Skin integrity normal. No ulcer, skin breakdown, erythema, warmth, callus or dry skin.      Toenail Condition: Right toenails are normal.      Left foot:      Protective Sensation: 10 sites tested.  10 sites sensed.      Skin integrity: Skin integrity normal. No ulcer, skin breakdown, erythema, warmth, callus or dry skin.      Toenail Condition: Left  toenails are normal.      Comments: Right first MTPJ with hallux limitus, approximately 5 degrees of dorsiflexion noted, there is dorsal and medial spurring noted, mild crepitus is noted, there is edema, no erythema, no warmth noted.  Met primus cellulitis is noted, first MTPJ is rectus.  Skin:     General: Skin is warm and dry.      Capillary Refill: Capillary refill takes less than 2 seconds.   Neurological:      General: No focal deficit present.      Mental Status: She is alert and oriented to person, place, and time. Mental status is at baseline.   Psychiatric:         Mood and Affect: Mood normal.         Behavior: Behavior normal.         Thought Content: Thought content normal.         Judgment: Judgment normal.       Diabetic Foot Exam    Patient's shoes and socks removed.    Right Foot/Ankle   Right Foot Inspection  Skin Exam: skin normal and skin intact. No dry skin, no warmth, no callus, no erythema, no maceration, no abnormal color, no pre-ulcer, no ulcer and no callus.     Toe Exam: ROM and strength within normal limits.     Sensory   Vibration: intact  Proprioception: intact  Monofilament testing: intact    Vascular  Capillary refills: < 3 seconds  The right DP pulse is 2+. The right PT pulse is 2+.     Right Toe  - Comprehensive Exam  Hallux valgus: yes      Left Foot/Ankle  Left Foot Inspection  Skin Exam: skin normal and skin intact. No dry skin, no warmth, no erythema, no maceration, normal color, no pre-ulcer, no ulcer and no callus.     Toe Exam: ROM and strength within normal limits.     Sensory   Vibration: intact  Proprioception: intact  Monofilament testing: intact    Vascular  Capillary refills: < 3 seconds  The left DP pulse is 2+. The left PT pulse is 2+.     Left Toe  - Comprehensive Exam  Hallux valgus: yes      Assign Risk Category  Deformity present  No loss of protective sensation  No weak pulses  Risk: 0          No pertinent results found.      Karen Todd DPM, EMILY,  "FACFAS    Portions of the record may have been created with voice recognition software. Occasional wrong word or \"sound a like\" substitutions may have occurred due to the inherent limitations of voice recognition software. Read the chart carefully and recognize, using context, where substitutions have occurred.  "

## 2025-02-20 ENCOUNTER — OFFICE VISIT (OUTPATIENT)
Dept: PODIATRY | Facility: CLINIC | Age: 55
End: 2025-02-20
Payer: COMMERCIAL

## 2025-02-20 VITALS — HEIGHT: 62 IN | BODY MASS INDEX: 25.95 KG/M2 | WEIGHT: 141 LBS

## 2025-02-20 DIAGNOSIS — E11.65 TYPE 2 DIABETES MELLITUS WITH HYPERGLYCEMIA, WITHOUT LONG-TERM CURRENT USE OF INSULIN (HCC): ICD-10-CM

## 2025-02-20 DIAGNOSIS — M20.5X1 HALLUX LIMITUS, RIGHT: Primary | ICD-10-CM

## 2025-02-20 PROCEDURE — 99204 OFFICE O/P NEW MOD 45 MIN: CPT | Performed by: PODIATRIST

## 2025-02-20 PROCEDURE — 20600 DRAIN/INJ JOINT/BURSA W/O US: CPT | Performed by: PODIATRIST

## 2025-02-20 RX ORDER — DEXAMETHASONE SODIUM PHOSPHATE 4 MG/ML
4 INJECTION, SOLUTION INTRA-ARTICULAR; INTRALESIONAL; INTRAMUSCULAR; INTRAVENOUS; SOFT TISSUE ONCE
Status: COMPLETED | OUTPATIENT
Start: 2025-02-20 | End: 2025-02-20

## 2025-02-20 RX ORDER — BUPIVACAINE HYDROCHLORIDE 5 MG/ML
0.5 INJECTION, SOLUTION PERINEURAL ONCE
Status: COMPLETED | OUTPATIENT
Start: 2025-02-20 | End: 2025-02-20

## 2025-02-20 RX ORDER — TRIAMCINOLONE ACETONIDE 40 MG/ML
40 INJECTION, SUSPENSION INTRA-ARTICULAR; INTRAMUSCULAR ONCE
Status: COMPLETED | OUTPATIENT
Start: 2025-02-20 | End: 2025-02-20

## 2025-02-20 RX ADMIN — BUPIVACAINE HYDROCHLORIDE 0.5 ML: 5 INJECTION, SOLUTION PERINEURAL at 07:52

## 2025-02-20 RX ADMIN — DEXAMETHASONE SODIUM PHOSPHATE 4 MG: 4 INJECTION, SOLUTION INTRA-ARTICULAR; INTRALESIONAL; INTRAMUSCULAR; INTRAVENOUS; SOFT TISSUE at 07:53

## 2025-02-20 RX ADMIN — TRIAMCINOLONE ACETONIDE 40 MG: 40 INJECTION, SUSPENSION INTRA-ARTICULAR; INTRAMUSCULAR at 07:54

## 2025-03-02 DIAGNOSIS — E06.3 HYPOTHYROIDISM DUE TO HASHIMOTO'S THYROIDITIS: ICD-10-CM

## 2025-03-02 DIAGNOSIS — E78.2 COMBINED HYPERLIPIDEMIA: ICD-10-CM

## 2025-03-03 RX ORDER — LEVOTHYROXINE SODIUM 75 UG/1
75 TABLET ORAL DAILY
Qty: 90 TABLET | Refills: 1 | Status: SHIPPED | OUTPATIENT
Start: 2025-03-03

## 2025-03-04 RX ORDER — ICOSAPENT ETHYL 1 G/1
CAPSULE ORAL
Qty: 360 CAPSULE | Refills: 0 | Status: SHIPPED | OUTPATIENT
Start: 2025-03-04

## 2025-03-05 DIAGNOSIS — F32.A DEPRESSION, UNSPECIFIED DEPRESSION TYPE: ICD-10-CM

## 2025-03-05 DIAGNOSIS — R07.9 CHEST PAIN, UNSPECIFIED TYPE: ICD-10-CM

## 2025-03-05 RX ORDER — ESCITALOPRAM OXALATE 20 MG/1
20 TABLET ORAL DAILY
Qty: 90 TABLET | Refills: 1 | Status: SHIPPED | OUTPATIENT
Start: 2025-03-05

## 2025-03-06 RX ORDER — NITROGLYCERIN 0.4 MG/1
0.4 TABLET SUBLINGUAL
Qty: 100 TABLET | Refills: 1 | Status: SHIPPED | OUTPATIENT
Start: 2025-03-06

## 2025-03-06 NOTE — PROGRESS NOTES
Patient ID: Raymundo White is a 54 y.o. female Date of Birth 1970       Chief Complaint   Patient presents with    Foot Orthotics     Castings               Diagnosis:  1. Hallux limitus, right  2. Type 2 diabetes mellitus with hyperglycemia, without long-term current use of insulin ([facility])    Pedal examination with socks and shoes removed bilaterally.  Today we discussed the biomechanics, etiology and treatment options of  hallux limitus.  Today I discussed treatment options with shoe gear modification with a rigid sole shoe, avoid anything flat or flexible, no barefoot, over-the-counter inserts, custom inserts, injection, surgical treatment.  Gait and biomechanical exam was performed, custom orthotic casting was performed with plaster Cori casting, forefoot partially loaded, subtalar joint neutral, custom prescription was filled out for solo laboratory custom orthotics.  Patient has x-ray ordered from last visit she will have taken prior to surgery schedule appointment if and when she decides to proceed.  Surgical care was discussed, patient will likely need a first MTPJ fusion, preop, IntraOp and postoperative course was reviewed, risk, benefits alternatives were discussed.  Patient understands and agrees with the plan.          Subjective:   3/7/25 - Raymundo presents today for follow-up evaluation care of right foot pain from her hallux limitus, she states since her last visit at which time I administered an injection she is feeling much better.  Patient presents today for custom orthotic casting.    2/20/25 - Raymundo presents today for evaluation care of pain in her right foot.  She has a bunion, she has swelling in her great toe and pain in the joint, the actual bunion does not bother her, it hurts a lot by the end of the day.  She is also a type II diabetic with an A1c of 5.8 on 11/25/2024, she is well-controlled with Glucophage and Ozempic, she follows with endocrinology.        The following  portions of the patient's history were reviewed and updated as appropriate:     Past Medical History:   Diagnosis Date    Coronary artery disease     Diabetes mellitus (HCC)     Borderline    Disease of thyroid gland     DUB (dysfunctional uterine bleeding)     Fatty liver     Hashimoto's thyroiditis     Last Assessed:  14    Heart disease     History of stomach ulcers     Hypertension     Hypothyroidism     Irritable bowel syndrome     Last Assessed:  3/25/14    Liver disease     elevated enzymes    Myocardial infarction (HCC)     2017    GIANG (nonalcoholic steatohepatitis)     Ovarian cyst     left    Psychogenic polydipsia     Has had hyponatremia from drinking too much water in the past.       Past Surgical History:   Procedure Laterality Date    ANGIOPLASTY      CARDIAC CATHETERIZATION       SECTION      X 2    CHOLECYSTECTOMY      COLONOSCOPY      CYSTOSCOPY N/A 2019    Procedure: CYSTOSCOPY;  Surgeon: Luther Wadsworth MD;  Location: [facility];  Service: Gynecology Oncology    HYSTERECTOMY      IR BIOPSY LIVER MASS  2020    OOPHORECTOMY Right     IA ESOPHAGOGASTRODUODENOSCOPY TRANSORAL DIAGNOSTIC N/A 2018    Procedure: ESOPHAGOGASTRODUODENOSCOPY (EGD);  Surgeon: Yoav Frias MD;  Location: [facility];  Service: Gastroenterology    IA LAPS TOTAL HYSTERECT 250 GM/< W/RMVL TUBE/OVARY N/A 2019    Procedure: TOTAL LAPAROSCOPIC HYSTERECTOMY, BILATERAL SALPINGECTOMY, LEFT OOPHORECTOMY;  Surgeon: Luther Wadsworth MD;  Location: [facility];  Service: Gynecology Oncology    IA RINSJ RPTD BICEPS/TRICEPS TDN DSTL W/WO TDN GRF Left 2023    Procedure: REPAIR TENDON BICEPS, distal;  Surgeon: Yvonne Modi;  Location:  MAIN OR;  Service: Orthopedics    SINUS SURGERY      TONSILLECTOMY      UPPER GASTROINTESTINAL ENDOSCOPY         Social History     Socioeconomic History    Marital status: /Civil Union     Spouse name: None    Number of children: None    Years of education: None     Highest education level: None   Occupational History    None   Tobacco Use    Smoking status: Former     Current packs/day: 0.00     Average packs/day: 0.5 packs/day for 27.1 years (13.5 ttl pk-yrs)     Types: Cigarettes     Start date:      Quit date: 2016     Years since quittin.1    Smokeless tobacco: Never    Tobacco comments:     ON AND OFF    Vaping Use    Vaping status: Never Used   Substance and Sexual Activity    Alcohol use: Not Currently     Comment: Stopped     Drug use: No    Sexual activity: Yes     Partners: Male     Birth control/protection: Other     Comment: Hysterectomy   Other Topics Concern    None   Social History Narrative    Feels safe at home     Social Drivers of Health     Financial Resource Strain: Not on file   Food Insecurity: Not on file   Transportation Needs: Not on file   Physical Activity: Not on file   Stress: Not on file   Social Connections: Not on file   Intimate Partner Violence: Not on file   Housing Stability: Not on file          Current Outpatient Medications:     amLODIPine (NORVASC) 5 mg tablet, Take 1 tablet (5 mg total) by mouth daily, Disp: 90 tablet, Rfl: 0    Blood Glucose Monitoring Suppl (ONETOUCH VERIO IQ SYSTEM) w/Device KIT, Use to test blood sugars twice a day, Disp: 1 kit, Rfl: 0    dicyclomine (BENTYL) 20 mg tablet, Take 1 tablet (20 mg total) by mouth every 6 (six) hours as needed (every 6 hours), Disp: 60 tablet, Rfl: 1    escitalopram (LEXAPRO) 20 mg tablet, Take 1 tablet (20 mg total) by mouth daily, Disp: 90 tablet, Rfl: 1    Icosapent Ethyl (Vascepa) 1 g CAPS, 2 capsules twice a day, Disp: 360 capsule, Rfl: 0    Insulin Pen Needle 32G X 4 MM MISC, Use once a week, Disp: 30 each, Rfl: 1    Lactobacillus (PROBIOTIC ACIDOPHILUS PO), Take by mouth daily, Disp: , Rfl:     levothyroxine 75 mcg tablet, Take 1 tablet (75 mcg total) by mouth daily, Disp: 90 tablet, Rfl: 1    Magnesium 300 MG CAPS, Take 600 mg by mouth daily, Disp: , Rfl:      metFORMIN (GLUCOPHAGE-XR) 500 mg 24 hr tablet, Take 2 tablets (1,000 mg total) by mouth 2 (two) times a day with meals, Disp: 360 tablet, Rfl: 1    methocarbamol (ROBAXIN) 750 mg tablet, 1 tab PO HS., Disp: 90 tablet, Rfl: 0    metoprolol succinate (TOPROL-XL) 25 mg 24 hr tablet, Take 1 tablet (25 mg total) by mouth 2 (two) times a day, Disp: 180 tablet, Rfl: 1    Multiple Vitamins-Minerals (ZINC PO), Take by mouth, Disp: , Rfl:     mupirocin (BACTROBAN) 2 % ointment, Apply topically 3 (three) times a day (Patient taking differently: Apply topically 3 (three) times a day), Disp: 22 g, Rfl: 0    nitroglycerin (NITROSTAT) 0.4 mg SL tablet, Place 1 tablet (0.4 mg total) under the tongue every 5 (five) minutes as needed for chest pain, Disp: 100 tablet, Rfl: 1    OneTouch Delica Lancets 33G MISC, Use to test blood sugars twice a day, Disp: 200 each, Rfl: 6    OneTouch Verio test strip, Use as instructed to test blood sugars twice a day, Disp: 200 each, Rfl: 1    pantoprazole (PROTONIX) 40 mg tablet, Take 1 tablet (40 mg total) by mouth daily before breakfast, Disp: 90 tablet, Rfl: 1    semaglutide, 1 mg/dose, (Ozempic, 1 MG/DOSE,) 4 mg/3 mL injection pen, Inject 1 mg once a week, Disp: 3 mL, Rfl: 5    traMADol (Ultram) 50 mg tablet, Take 1 tablet (50 mg total) by mouth 2 (two) times a day as needed for moderate pain 1 tab BID prn for ongoing therapy DO NOT FILL BEFORE:  01/08/25, Disp: 60 tablet, Rfl: 3    vitamin B-12 (VITAMIN B-12) 1,000 mcg tablet, Take by mouth daily, Disp: , Rfl:     Vitamin D, Cholecalciferol, 50 MCG (2000 UT) CAPS, Take 4,000 Units by mouth daily, Disp: , Rfl:     Allergies  Erythromycin, Metronidazole, Sulfa antibiotics, Amoxicillin-pot clavulanate, and Invokana [canagliflozin]    Family History   Problem Relation Age of Onset    Pancreatic cancer Mother     Cancer Mother         Pancreatic    Stroke Father     Hypertension Father     Diabetes type II Father     Diabetes Father     Lung disease  "Daughter         asthma tendencies    Diabetes Maternal Grandmother     Diabetes Paternal Grandmother     Heart disease Paternal Grandmother     Hypothyroidism Paternal Grandmother     Thyroid disease Paternal Grandmother     Diabetes type II Brother     No Known Problems Brother     No Known Problems Son     Liver cancer Maternal Aunt     Melanoma Maternal Aunt     No Known Problems Paternal Aunt     No Known Problems Paternal Aunt     No Known Problems Paternal Aunt     Hypothyroidism Paternal Uncle     Hyperlipidemia Neg Hx            Objective:  Ht 5' 2\" (1.575 m) Comment: verbal  Wt 64 kg (141 lb)   LMP  (LMP Unknown)   BMI 25.79 kg/m²     Review of Systems   Constitutional:  Negative for chills and fever.   HENT:  Negative for ear pain and sore throat.    Eyes:  Negative for pain and visual disturbance.   Respiratory:  Negative for cough and shortness of breath.    Cardiovascular:  Negative for chest pain and palpitations.   Gastrointestinal:  Negative for abdominal pain and vomiting.   Genitourinary:  Negative for dysuria and hematuria.   Musculoskeletal:  Negative for arthralgias and back pain.        Right great toe joint pain   Skin:  Negative for color change and rash.   Neurological:  Negative for seizures and syncope.   All other systems reviewed and are negative.      Physical Exam  Constitutional:       Appearance: Normal appearance. She is well-developed and normal weight.   HENT:      Head: Normocephalic and atraumatic.      Nose: Nose normal.      Mouth/Throat:      Mouth: Mucous membranes are moist.      Pharynx: Oropharynx is clear.   Eyes:      Conjunctiva/sclera: Conjunctivae normal.      Pupils: Pupils are equal, round, and reactive to light.   Cardiovascular:      Pulses:           Dorsalis pedis pulses are 2+ on the right side and 2+ on the left side.        Posterior tibial pulses are 2+ on the right side and 2+ on the left side.   Pulmonary:      Effort: Pulmonary effort is normal. " "  Musculoskeletal:      Cervical back: Normal range of motion.      Right lower leg: No edema.      Left lower leg: No edema.      Right foot: Decreased range of motion. Deformity and bunion present.      Left foot: Bunion present.   Feet:      Right foot:      Protective Sensation: 10 sites tested.  10 sites sensed.      Skin integrity: Skin integrity normal. No ulcer, skin breakdown, erythema, warmth, callus or dry skin.      Toenail Condition: Right toenails are normal.      Left foot:      Protective Sensation: 10 sites tested.  10 sites sensed.      Skin integrity: Skin integrity normal. No ulcer, skin breakdown, erythema, warmth, callus or dry skin.      Toenail Condition: Left toenails are normal.      Comments: Right first MTPJ with hallux limitus, approximately 5 degrees of dorsiflexion noted, there is dorsal and medial spurring noted, mild crepitus is noted, there is edema, no erythema, no warmth noted.  Met primus cellulitis is noted, first MTPJ is rectus.  Skin:     General: Skin is warm and dry.      Capillary Refill: Capillary refill takes less than 2 seconds.   Neurological:      General: No focal deficit present.      Mental Status: She is alert and oriented to person, place, and time. Mental status is at baseline.   Psychiatric:         Mood and Affect: Mood normal.         Behavior: Behavior normal.         Thought Content: Thought content normal.         Judgment: Judgment normal.           No pertinent results found.      Karen Todd DPM, EMILY, FACFAS    Portions of the record may have been created with voice recognition software. Occasional wrong word or \"sound a like\" substitutions may have occurred due to the inherent limitations of voice recognition software. Read the chart carefully and recognize, using context, where substitutions have occurred.  "

## 2025-03-07 ENCOUNTER — OFFICE VISIT (OUTPATIENT)
Dept: PODIATRY | Facility: CLINIC | Age: 55
End: 2025-03-07
Payer: COMMERCIAL

## 2025-03-07 VITALS — WEIGHT: 141 LBS | HEIGHT: 62 IN | BODY MASS INDEX: 25.95 KG/M2

## 2025-03-07 DIAGNOSIS — M20.5X1 HALLUX LIMITUS, RIGHT: Primary | ICD-10-CM

## 2025-03-07 DIAGNOSIS — E11.65 TYPE 2 DIABETES MELLITUS WITH HYPERGLYCEMIA, WITHOUT LONG-TERM CURRENT USE OF INSULIN (HCC): ICD-10-CM

## 2025-03-07 PROCEDURE — 99213 OFFICE O/P EST LOW 20 MIN: CPT | Performed by: PODIATRIST

## 2025-03-19 ENCOUNTER — OFFICE VISIT (OUTPATIENT)
Dept: FAMILY MEDICINE CLINIC | Facility: CLINIC | Age: 55
End: 2025-03-19
Payer: COMMERCIAL

## 2025-03-19 VITALS
BODY MASS INDEX: 26.24 KG/M2 | WEIGHT: 142.6 LBS | HEART RATE: 90 BPM | HEIGHT: 62 IN | TEMPERATURE: 98 F | SYSTOLIC BLOOD PRESSURE: 120 MMHG | DIASTOLIC BLOOD PRESSURE: 76 MMHG | OXYGEN SATURATION: 93 %

## 2025-03-19 DIAGNOSIS — E06.3 HYPOTHYROIDISM DUE TO HASHIMOTO'S THYROIDITIS: ICD-10-CM

## 2025-03-19 DIAGNOSIS — K21.9 GASTROESOPHAGEAL REFLUX DISEASE WITHOUT ESOPHAGITIS: ICD-10-CM

## 2025-03-19 DIAGNOSIS — E66.3 OVERWEIGHT: ICD-10-CM

## 2025-03-19 DIAGNOSIS — G89.29 CHRONIC LOW BACK PAIN WITHOUT SCIATICA, UNSPECIFIED BACK PAIN LATERALITY: ICD-10-CM

## 2025-03-19 DIAGNOSIS — M54.50 CHRONIC LOW BACK PAIN WITHOUT SCIATICA, UNSPECIFIED BACK PAIN LATERALITY: ICD-10-CM

## 2025-03-19 DIAGNOSIS — Z14.8 ALPHA-1-ANTITRYPSIN DEFICIENCY CARRIER: ICD-10-CM

## 2025-03-19 DIAGNOSIS — Z23 ENCOUNTER FOR IMMUNIZATION: ICD-10-CM

## 2025-03-19 DIAGNOSIS — E11.65 TYPE 2 DIABETES MELLITUS WITH HYPERGLYCEMIA, WITHOUT LONG-TERM CURRENT USE OF INSULIN (HCC): Primary | ICD-10-CM

## 2025-03-19 DIAGNOSIS — E78.2 COMBINED HYPERLIPIDEMIA: ICD-10-CM

## 2025-03-19 DIAGNOSIS — K75.81 NASH (NONALCOHOLIC STEATOHEPATITIS): ICD-10-CM

## 2025-03-19 DIAGNOSIS — D50.8 IRON DEFICIENCY ANEMIA SECONDARY TO INADEQUATE DIETARY IRON INTAKE: ICD-10-CM

## 2025-03-19 DIAGNOSIS — Q24.5 ANOMALOUS CORONARY ARTERY ORIGIN: ICD-10-CM

## 2025-03-19 DIAGNOSIS — F32.A DEPRESSION, UNSPECIFIED DEPRESSION TYPE: ICD-10-CM

## 2025-03-19 PROCEDURE — 99214 OFFICE O/P EST MOD 30 MIN: CPT | Performed by: FAMILY MEDICINE

## 2025-03-19 PROCEDURE — 90715 TDAP VACCINE 7 YRS/> IM: CPT

## 2025-03-19 PROCEDURE — 90471 IMMUNIZATION ADMIN: CPT

## 2025-03-19 RX ORDER — CEFUROXIME AXETIL 500 MG/1
1 TABLET ORAL 2 TIMES DAILY
COMMUNITY
Start: 2025-03-14

## 2025-03-19 NOTE — ASSESSMENT & PLAN NOTE
Diabetes under excellent control.  Last A1c was 5.8% November 2024.  Doing well on Ozempic 1 mg weekly along with metformin 1500 mg.  Continue follow-up with endocrinologist as directed  Lab Results   Component Value Date    HGBA1C 5.8 (H) 11/25/2024     Orders:  •  Comprehensive metabolic panel; Future  •  Hemoglobin A1C; Future  •  Albumin / creatinine urine ratio; Future

## 2025-03-19 NOTE — PROGRESS NOTES
Name: Raymundo White      : 1970      MRN: [id number]  Encounter Provider: Sin Moura DO  Encounter Date: 3/19/2025   Encounter department: [facility]    Assessment & Plan  Type 2 diabetes mellitus with hyperglycemia, without long-term current use of insulin (HCC)  Diabetes under excellent control.  Last A1c was 5.8% 2024.  Doing well on Ozempic 1 mg weekly along with metformin 1500 mg.  Continue follow-up with endocrinologist as directed  Lab Results   Component Value Date    HGBA1C 5.8 (H) 2024     Orders:  •  Comprehensive metabolic panel; Future  •  Hemoglobin A1C; Future  •  Albumin / creatinine urine ratio; Future    Overweight  Patient continues to do well on Ozempic (dosage currently at 1 mg weekly).  BMI currently at 26.08.  Weight 142.  Will continue to monitor       GIANG (nonalcoholic steatohepatitis)  History of metabolic dysfunction associated liver disease, with fibrosis.  Has been followed by GI.  Overall doing well.  Continue to maintain ideal weight and healthy lifestyle.       Iron deficiency anemia secondary to inadequate dietary iron intake  Hemoglobin stable at 14.5.  Orders:  •  CBC and differential; Future    Chronic low back pain without sciatica, unspecified back pain laterality  Stable on tramadol 50 twice daily.  Being managed and prescribed by pain management       Combined hyperlipidemia  Continue to work on heart healthy diet and regular exercise program  Orders:  •  Lipid Panel with Direct LDL reflex; Future    Depression, unspecified depression type  Continue Lexapro 20       Anomalous coronary artery origin  Stable.  Continue follow-up with cardiology       Hypothyroidism due to Hashimoto's thyroiditis  Continue levothyroxine 75.  Being managed by endocrine  Orders:  •  TSH, 3rd generation with Free T4 reflex; Future    Alpha-1-antitrypsin deficiency carrier  History of alpha 1 antitrypsin deficiency carrier.  PFTs have been  normal.  Continue follow-up with pulmonologist as directed       Gastroesophageal reflux disease without esophagitis  Continue pantoprazole       Encounter for immunization    Orders:  •  TDAP VACCINE GREATER THAN OR EQUAL TO 6YO IM       Colonoscopy  (next due )  Current with breast cancer screening    Patient declines COVID booster  Patient had flu shot this season  Patient had Shingrix  Adacel booster given today  Will give Prevnar 20 next visit    6 months, fasting blood work prior (she will actually get labs drawn in May prior to her endocrine appointment).      History of Present Illness     Patient presents for recheck chronic medical problems today.  Overall she has been doing well.  She is compliant on prescribed medications.      Review of Systems   Respiratory: Negative.     Cardiovascular: Negative.    Gastrointestinal: Negative.    Genitourinary: Negative.      Past Medical History:   Diagnosis Date   • Coronary artery disease    • Diabetes mellitus (HCC)     Borderline   • Disease of thyroid gland    • DUB (dysfunctional uterine bleeding)    • Fatty liver    • Hashimoto's thyroiditis     Last Assessed:  14   • Heart disease    • History of stomach ulcers    • Hypertension    • Hypothyroidism    • Irritable bowel syndrome     Last Assessed:  3/25/14   • Liver disease     elevated enzymes   • Myocardial infarction (HCC)     2017   • GIANG (nonalcoholic steatohepatitis)    • Ovarian cyst     left   • Psychogenic polydipsia     Has had hyponatremia from drinking too much water in the past.     Past Surgical History:   Procedure Laterality Date   • ANGIOPLASTY     • CARDIAC CATHETERIZATION     •  SECTION      X 2   • CHOLECYSTECTOMY     • COLONOSCOPY     • CYSTOSCOPY N/A 2019    Procedure: CYSTOSCOPY;  Surgeon: Luther Wadsworth MD;  [facility];  Service: Gynecology Oncology   • HYSTERECTOMY     • IR BIOPSY LIVER MASS  2020   • OOPHORECTOMY Right    • MS  ESOPHAGOGASTRODUODENOSCOPY TRANSORAL DIAGNOSTIC N/A 2018    Procedure: ESOPHAGOGASTRODUODENOSCOPY (EGD);  Surgeon: Yoav Frias MD;  Location:  GI LAB;  Service: Gastroenterology   • CA LAPS TOTAL HYSTERECT 250 GM/< W/RMVL TUBE/OVARY N/A 2019    Procedure: TOTAL LAPAROSCOPIC HYSTERECTOMY, BILATERAL SALPINGECTOMY, LEFT OOPHORECTOMY;  Surgeon: Luther Wadsworth MD;  Location:  MAIN OR;  Service: Gynecology Oncology   • CA RINSJ RPTD BICEPS/TRICEPS TDN DSTL W/WO TDN GRF Left 2023    Procedure: REPAIR TENDON BICEPS, distal;  Surgeon: Yvonne Modi;  Location:  MAIN OR;  Service: Orthopedics   • SINUS SURGERY     • TONSILLECTOMY     • UPPER GASTROINTESTINAL ENDOSCOPY       Family History   Problem Relation Age of Onset   • Pancreatic cancer Mother    • Cancer Mother         Pancreatic   • Stroke Father    • Hypertension Father    • Diabetes type II Father    • Diabetes Father    • Lung disease Daughter         asthma tendencies   • Diabetes Maternal Grandmother    • Diabetes Paternal Grandmother    • Heart disease Paternal Grandmother    • Hypothyroidism Paternal Grandmother    • Thyroid disease Paternal Grandmother    • Diabetes type II Brother    • No Known Problems Brother    • No Known Problems Son    • Liver cancer Maternal Aunt    • Melanoma Maternal Aunt    • No Known Problems Paternal Aunt    • No Known Problems Paternal Aunt    • No Known Problems Paternal Aunt    • Hypothyroidism Paternal Uncle    • Hyperlipidemia Neg Hx      Social History     Tobacco Use   • Smoking status: Former     Current packs/day: 0.00     Average packs/day: 0.5 packs/day for 27.1 years (13.5 ttl pk-yrs)     Types: Cigarettes     Start date:      Quit date: 2016     Years since quittin.1   • Smokeless tobacco: Never   • Tobacco comments:     ON AND OFF    Vaping Use   • Vaping status: Never Used   Substance and Sexual Activity   • Alcohol use: Not Currently     Comment: Stopped    • Drug use: No   •  Sexual activity: Yes     Partners: Male     Birth control/protection: Other     Comment: Hysterectomy     Current Outpatient Medications on File Prior to Visit   Medication Sig   • amLODIPine (NORVASC) 5 mg tablet Take 1 tablet (5 mg total) by mouth daily   • Blood Glucose Monitoring Suppl (ONETOUCH VERIO IQ SYSTEM) w/Device KIT Use to test blood sugars twice a day   • cefuroxime (CEFTIN) 500 mg tablet Take 1 tablet by mouth 2 (two) times a day   • dicyclomine (BENTYL) 20 mg tablet Take 1 tablet (20 mg total) by mouth every 6 (six) hours as needed (every 6 hours)   • escitalopram (LEXAPRO) 20 mg tablet Take 1 tablet (20 mg total) by mouth daily   • Icosapent Ethyl (Vascepa) 1 g CAPS 2 capsules twice a day   • Insulin Pen Needle 32G X 4 MM MISC Use once a week   • Lactobacillus (PROBIOTIC ACIDOPHILUS PO) Take by mouth daily   • levothyroxine 75 mcg tablet Take 1 tablet (75 mcg total) by mouth daily   • Magnesium 300 MG CAPS Take 600 mg by mouth daily   • metFORMIN (GLUCOPHAGE-XR) 500 mg 24 hr tablet Take 2 tablets (1,000 mg total) by mouth 2 (two) times a day with meals   • methocarbamol (ROBAXIN) 750 mg tablet 1 tab PO HS.   • metoprolol succinate (TOPROL-XL) 25 mg 24 hr tablet Take 1 tablet (25 mg total) by mouth 2 (two) times a day   • Multiple Vitamins-Minerals (ZINC PO) Take by mouth   • mupirocin (BACTROBAN) 2 % ointment Apply topically 3 (three) times a day   • nitroglycerin (NITROSTAT) 0.4 mg SL tablet Place 1 tablet (0.4 mg total) under the tongue every 5 (five) minutes as needed for chest pain   • OneTouch Delica Lancets 33G MISC Use to test blood sugars twice a day   • OneTouch Verio test strip Use as instructed to test blood sugars twice a day   • pantoprazole (PROTONIX) 40 mg tablet Take 1 tablet (40 mg total) by mouth daily before breakfast   • semaglutide, 1 mg/dose, (Ozempic, 1 MG/DOSE,) 4 mg/3 mL injection pen Inject 1 mg once a week   • traMADol (Ultram) 50 mg tablet Take 1 tablet (50 mg total) by  "mouth 2 (two) times a day as needed for moderate pain 1 tab BID prn for ongoing therapy DO NOT FILL BEFORE:  01/08/25   • vitamin B-12 (VITAMIN B-12) 1,000 mcg tablet Take by mouth daily   • Vitamin D, Cholecalciferol, 50 MCG (2000 UT) CAPS Take 4,000 Units by mouth daily     Allergies   Allergen Reactions   • Erythromycin Chest Pain     Chest pain as child   • Metronidazole Other (See Comments)     SOB   • Sulfa Antibiotics Shortness Of Breath     Per pt, \"hives and fever\"   • Amoxicillin-Pot Clavulanate Rash     And yeast infection   • Invokana [Canagliflozin] Other (See Comments)     Yeast infection     Immunization History   Administered Date(s) Administered   • COVID-19 PFIZER VACCINE 0.3 ML IM 12/21/2020, 01/11/2021, 10/06/2021   • H1N1, All Formulations 11/05/2009   • Hep A, adult 06/05/2020, 07/28/2022   • INFLUENZA 11/01/2019, 11/30/2023   • Influenza, seasonal, injectable 09/26/2013   • Tdap 03/04/2015, 03/19/2025   • Zoster Vaccine Recombinant 01/30/2023, 09/11/2024     Objective   /76 (BP Location: Left arm, Patient Position: Sitting, Cuff Size: Large)   Pulse 90   Temp 98 °F (36.7 °C) (Temporal)   Ht 5' 2\" (1.575 m)   Wt 64.7 kg (142 lb 9.6 oz)   LMP  (LMP Unknown)   SpO2 93%   BMI 26.08 kg/m²     Physical Exam  Cardiovascular:      Rate and Rhythm: Normal rate and regular rhythm.      Heart sounds: Normal heart sounds.      Comments: Carotids: no bruits  Ext: no edema  Pulmonary:      Effort: Pulmonary effort is normal. No respiratory distress.      Breath sounds: No wheezing or rales.   Psychiatric:         Behavior: Behavior normal.         Thought Content: Thought content normal.         "

## 2025-03-19 NOTE — ASSESSMENT & PLAN NOTE
Continue levothyroxine 75.  Being managed by endocrine  Orders:  •  TSH, 3rd generation with Free T4 reflex; Future

## 2025-03-19 NOTE — ASSESSMENT & PLAN NOTE
Patient continues to do well on Ozempic (dosage currently at 1 mg weekly).  BMI currently at 26.08.  Weight 142.  Will continue to monitor

## 2025-03-19 NOTE — ASSESSMENT & PLAN NOTE
History of metabolic dysfunction associated liver disease, with fibrosis.  Has been followed by GI.  Overall doing well.  Continue to maintain ideal weight and healthy lifestyle.

## 2025-03-19 NOTE — ASSESSMENT & PLAN NOTE
History of alpha 1 antitrypsin deficiency carrier.  PFTs have been normal.  Continue follow-up with pulmonologist as directed

## 2025-03-19 NOTE — ASSESSMENT & PLAN NOTE
Continue to work on heart healthy diet and regular exercise program  Orders:  •  Lipid Panel with Direct LDL reflex; Future

## 2025-03-21 DIAGNOSIS — M47.816 LUMBAR SPONDYLOSIS: ICD-10-CM

## 2025-03-21 RX ORDER — TRAMADOL HYDROCHLORIDE 50 MG/1
TABLET ORAL
Qty: 60 TABLET | Refills: 0 | OUTPATIENT
Start: 2025-03-21

## 2025-03-31 ENCOUNTER — OFFICE VISIT (OUTPATIENT)
Dept: CARDIOLOGY CLINIC | Facility: CLINIC | Age: 55
End: 2025-03-31
Payer: COMMERCIAL

## 2025-03-31 VITALS
SYSTOLIC BLOOD PRESSURE: 110 MMHG | WEIGHT: 142 LBS | DIASTOLIC BLOOD PRESSURE: 70 MMHG | BODY MASS INDEX: 26.13 KG/M2 | HEIGHT: 62 IN | HEART RATE: 75 BPM

## 2025-03-31 DIAGNOSIS — I10 PRIMARY HYPERTENSION: ICD-10-CM

## 2025-03-31 DIAGNOSIS — R07.9 CHEST PAIN, UNSPECIFIED TYPE: ICD-10-CM

## 2025-03-31 DIAGNOSIS — E78.2 COMBINED HYPERLIPIDEMIA: ICD-10-CM

## 2025-03-31 DIAGNOSIS — Q24.5 ANOMALOUS CORONARY ARTERY ORIGIN: Primary | ICD-10-CM

## 2025-03-31 PROCEDURE — 99214 OFFICE O/P EST MOD 30 MIN: CPT | Performed by: INTERNAL MEDICINE

## 2025-03-31 PROCEDURE — 93000 ELECTROCARDIOGRAM COMPLETE: CPT | Performed by: INTERNAL MEDICINE

## 2025-03-31 NOTE — PROGRESS NOTES
"[facility] [address] [address]  Phone#  [phone]  Fax#  [phone]  [facility] Cardiology Office Follow-up Visit             NAME: Raymundo White  AGE: 54 y.o. SEX: female   : 1970   MRN: [id number]    DATE: 3/31/2025  TIME: 11:24 AM    Cardiology Problem list:  No specialty comments available.    Assessment & Plan  Anomalous coronary artery origin  She has had occasional atypical chest discomfort. Will monitor for now.        Chest pain, unspecified type    Orders:    POCT ECG    Primary hypertension  BP is controlled on current medical therapy. Continue with amlodipine and  metoprolol       Combined hyperlipidemia  .  and LDL 82. Continue with Vascepa.               HPI:    Raymundo White is a 54 y.o.-year-old female who presents to the cardiology clinic for follow up for the above-listed problems.       She has had some atypical chest discomfort. No dyspnea or exertional chest pain. She did take a SL nitro. No other symptoms.     Past history, family history, social history, current medications, vital signs, recent lab and imaging studies and  prior cardiology studies reviewed independently on this visit.    Allergies   Allergen Reactions    Erythromycin Chest Pain     Chest pain as child    Metronidazole Other (See Comments)     SOB    Sulfa Antibiotics Shortness Of Breath     Per pt, \"hives and fever\"    Amoxicillin-Pot Clavulanate Rash     And yeast infection    Invokana [Canagliflozin] Other (See Comments)     Yeast infection       Current Outpatient Medications:     amLODIPine (NORVASC) 5 mg tablet, Take 1 tablet (5 mg total) by mouth daily, Disp: 90 tablet, Rfl: 0    Blood Glucose Monitoring Suppl (ONETOUCH VERIO IQ SYSTEM) w/Device KIT, Use to test blood sugars twice a day, Disp: 1 kit, Rfl: 0    dicyclomine (BENTYL) 20 mg tablet, Take 1 tablet (20 mg total) by mouth every 6 (six) hours as needed (every 6 hours), " Disp: 60 tablet, Rfl: 1    escitalopram (LEXAPRO) 20 mg tablet, Take 1 tablet (20 mg total) by mouth daily, Disp: 90 tablet, Rfl: 1    Icosapent Ethyl (Vascepa) 1 g CAPS, 2 capsules twice a day, Disp: 360 capsule, Rfl: 0    Insulin Pen Needle 32G X 4 MM MISC, Use once a week, Disp: 30 each, Rfl: 1    Lactobacillus (PROBIOTIC ACIDOPHILUS PO), Take by mouth daily, Disp: , Rfl:     levothyroxine 75 mcg tablet, Take 1 tablet (75 mcg total) by mouth daily, Disp: 90 tablet, Rfl: 1    Magnesium 300 MG CAPS, Take 600 mg by mouth daily, Disp: , Rfl:     metFORMIN (GLUCOPHAGE-XR) 500 mg 24 hr tablet, Take 2 tablets (1,000 mg total) by mouth 2 (two) times a day with meals (Patient taking differently: Take 1,000 mg by mouth 2 (two) times a day with meals 1 tab ing the am and 2 tabs in the pm), Disp: 360 tablet, Rfl: 1    methocarbamol (ROBAXIN) 750 mg tablet, 1 tab PO HS., Disp: 90 tablet, Rfl: 0    metoprolol succinate (TOPROL-XL) 25 mg 24 hr tablet, Take 1 tablet (25 mg total) by mouth 2 (two) times a day, Disp: 180 tablet, Rfl: 1    Multiple Vitamins-Minerals (ZINC PO), Take by mouth, Disp: , Rfl:     mupirocin (BACTROBAN) 2 % ointment, Apply topically 3 (three) times a day, Disp: 22 g, Rfl: 0    nitroglycerin (NITROSTAT) 0.4 mg SL tablet, Place 1 tablet (0.4 mg total) under the tongue every 5 (five) minutes as needed for chest pain, Disp: 100 tablet, Rfl: 1    OneTouch Delica Lancets 33G MISC, Use to test blood sugars twice a day, Disp: 200 each, Rfl: 6    OneTouch Verio test strip, Use as instructed to test blood sugars twice a day, Disp: 200 each, Rfl: 1    pantoprazole (PROTONIX) 40 mg tablet, Take 1 tablet (40 mg total) by mouth daily before breakfast, Disp: 90 tablet, Rfl: 1    semaglutide, 1 mg/dose, (Ozempic, 1 MG/DOSE,) 4 mg/3 mL injection pen, Inject 1 mg once a week, Disp: 3 mL, Rfl: 5    traMADol (Ultram) 50 mg tablet, Take 1 tablet (50 mg total) by mouth 2 (two) times a day as needed for moderate pain 1 tab BID  prn for ongoing therapy DO NOT FILL BEFORE:  01/08/25, Disp: 60 tablet, Rfl: 3    vitamin B-12 (VITAMIN B-12) 1,000 mcg tablet, Take by mouth daily, Disp: , Rfl:     Vitamin D, Cholecalciferol, 50 MCG (2000 UT) CAPS, Take 4,000 Units by mouth daily, Disp: , Rfl:     Review of Systems   Constitutional:  Negative for chills and fever.   HENT:  Negative for ear pain and sore throat.    Eyes:  Negative for pain and visual disturbance.   Respiratory:  Negative for cough and shortness of breath.    Cardiovascular:  Negative for chest pain and palpitations.   Gastrointestinal:  Negative for abdominal pain and vomiting.   Genitourinary:  Negative for dysuria and hematuria.   Musculoskeletal:  Negative for arthralgias and back pain.   Skin:  Negative for color change and rash.   Neurological:  Negative for seizures and syncope.   All other systems reviewed and are negative.      Objective:     Vitals:    03/31/25 1106   BP: 110/70   Pulse: 75     Wt Readings from Last 3 Encounters:   03/31/25 64.4 kg (142 lb)   03/19/25 64.7 kg (142 lb 9.6 oz)   03/07/25 64 kg (141 lb)     Pulse Readings from Last 3 Encounters:   03/31/25 75   03/19/25 90   02/18/25 74     BP Readings from Last 3 Encounters:   03/31/25 110/70   03/19/25 120/76   02/18/25 110/82     Physical Exam  Vitals and nursing note reviewed.   Constitutional:       General: She is not in acute distress.     Appearance: She is well-developed.   HENT:      Head: Normocephalic and atraumatic.   Eyes:      Conjunctiva/sclera: Conjunctivae normal.   Cardiovascular:      Rate and Rhythm: Normal rate and regular rhythm.      Heart sounds: No murmur heard.  Pulmonary:      Effort: Pulmonary effort is normal. No respiratory distress.      Breath sounds: Normal breath sounds.   Abdominal:      Palpations: Abdomen is soft.      Tenderness: There is no abdominal tenderness.   Musculoskeletal:         General: No swelling.      Cervical back: Neck supple.   Skin:     General: Skin  "is warm and dry.      Capillary Refill: Capillary refill takes less than 2 seconds.   Neurological:      Mental Status: She is alert.   Psychiatric:         Mood and Affect: Mood normal.         Pertinent Laboratory/Diagnostic Studies:    Laboratory studies reviewed personally by Rodney Santana MD    BMP:   Lab Results   Component Value Date    SODIUM 137 11/25/2024    K 4.2 11/25/2024     11/25/2024    CO2 29 11/25/2024    BUN 13 11/25/2024    CREATININE 0.79 11/25/2024    GLUC 175 (H) 04/29/2020    CALCIUM 9.3 11/25/2024     CBC:  Lab Results   Component Value Date    WBC 9.36 11/25/2024    HGB 14.5 11/25/2024    HCT 43.4 11/25/2024    MCV 95 11/25/2024     11/25/2024     Lipid Profile:   Lab Results   Component Value Date    HDL 47 (L) 11/25/2024     Lab Results   Component Value Date    LDLCALC 82 11/25/2024     Lab Results   Component Value Date    TRIG 316 (H) 11/25/2024      Other labs:  Lab Results   Component Value Date    KUW5IAQEPABX 1.971 02/07/2025     Lab Results   Component Value Date    ALT 23 11/25/2024    AST 19 11/25/2024     Lab Results   Component Value Date    HGBA1C 5.8 (H) 11/25/2024         Imaging Studies:     Pertinent imaging studies and cardiac studies were independently reviewed on this visit and findings summarized.    I have spent a total time of 25  minutes in caring for this patient on the day of the visit/encounter including reviewing and entering of medical history, physical examination, diagnostic results, instructions for management, patient education, risk factor reduction, documenting in the medical record, sending communications to other providers, reviewing/ordering tests, medicine, procedures etc.    Rodney Santana MD, [facility]    Portions of the record may have been created with voice recognition software.  Occasional wrong word or \"sound alike\" substitutions may have occurred due to the inherent limitations of voice recognition software.  Read the chart carefully " and recognize, using context, where substitutions have occurred. Please reach out to me directly for any clarifications.

## 2025-04-07 ENCOUNTER — OFFICE VISIT (OUTPATIENT)
Dept: PAIN MEDICINE | Facility: CLINIC | Age: 55
End: 2025-04-07
Payer: COMMERCIAL

## 2025-04-07 VITALS
HEIGHT: 62 IN | HEART RATE: 84 BPM | BODY MASS INDEX: 25.95 KG/M2 | WEIGHT: 141 LBS | OXYGEN SATURATION: 94 % | TEMPERATURE: 98.5 F

## 2025-04-07 DIAGNOSIS — G89.29 CHRONIC LOW BACK PAIN WITHOUT SCIATICA, UNSPECIFIED BACK PAIN LATERALITY: ICD-10-CM

## 2025-04-07 DIAGNOSIS — G89.4 CHRONIC PAIN SYNDROME: Primary | ICD-10-CM

## 2025-04-07 DIAGNOSIS — M25.552 LEFT HIP PAIN: ICD-10-CM

## 2025-04-07 DIAGNOSIS — M79.18 MYOFASCIAL PAIN SYNDROME: ICD-10-CM

## 2025-04-07 DIAGNOSIS — M54.50 CHRONIC LOW BACK PAIN WITHOUT SCIATICA, UNSPECIFIED BACK PAIN LATERALITY: ICD-10-CM

## 2025-04-07 DIAGNOSIS — Z79.891 LONG-TERM CURRENT USE OF OPIATE ANALGESIC: ICD-10-CM

## 2025-04-07 DIAGNOSIS — M47.816 LUMBAR SPONDYLOSIS: ICD-10-CM

## 2025-04-07 PROCEDURE — 99214 OFFICE O/P EST MOD 30 MIN: CPT | Performed by: PHYSICIAN ASSISTANT

## 2025-04-07 RX ORDER — METHOCARBAMOL 750 MG/1
TABLET, FILM COATED ORAL
Qty: 90 TABLET | Refills: 1 | Status: SHIPPED | OUTPATIENT
Start: 2025-04-07

## 2025-04-07 RX ORDER — TRAMADOL HYDROCHLORIDE 50 MG/1
50 TABLET ORAL 2 TIMES DAILY PRN
Qty: 60 TABLET | Refills: 3 | Status: SHIPPED | OUTPATIENT
Start: 2025-04-07

## 2025-04-07 RX ORDER — METHYLPREDNISOLONE 4 MG/1
TABLET ORAL
Qty: 1 EACH | Refills: 0 | Status: SHIPPED | OUTPATIENT
Start: 2025-04-07

## 2025-04-07 NOTE — PROGRESS NOTES
Assessment:  1. Left hip pain    2. Lumbar spondylosis    3. Chronic low back pain without sciatica, unspecified back pain laterality    4. Chronic pain syndrome    5. Myofascial pain syndrome        Plan:  While the patient was in the office today, I did have a thorough conversation regarding their chronic pain syndrome, medication management, and treatment plan options.    The patient remains clinically stable and moderately controlled with her chronic pain state with the combination of tramadol and methocarbamol as needed.  Medications have been sent electronically to her pharmacy.    Medrol Dosepak to be taken as directed to calm down the increased left hip pain she is experiencing.  If this pain continues I would recommend an updated x-ray of the left hip.  Patient was advised not to take NSAIDs while taking the oral steroids.    [place] Prescription Drug Monitoring Program report was reviewed and was appropriate     A urine drug screen was collected at today's office visit as part of our medication management protocol. The point of care testing results were appropriate for what was being prescribed. The specimen will be sent for confirmatory testing. The drug screen is medically necessary because the patient is either dependent on opioid medication or is being considered for opioid medication therapy and the results could impact ongoing or future treatment. The drug screen is to evaluate for the presences or absence of prescribed, non-prescribed, and/or illicit drugs/substances.    There are risks associated with opioid medications, including dependence, addiction and tolerance. The patient understands and agrees to use these medications only as prescribed. Potential side effects of the medications include, but are not limited to, constipation, drowsiness, addiction, impaired judgment and risk of fatal overdose if not taken as prescribed. The patient was warned against driving while taking sedation  medications.  Sharing medications is a felony. At this point in time, the patient is showing no signs of addiction, abuse, diversion or suicidal ideation.    The patient will follow-up in 4 months for medication prescription refill and reevaluation. The patient was advised to contact the office should their symptoms worsen in the interim. The patient was agreeable and verbalized an understanding.        History of Present Illness:    The patient is a 54 y.o. female last seen on 12/17/2024 who presents for a follow up office visit in regards to chronic low back pain secondary to lumbar spondylosis and chronic bilateral hip pain.  The patient currently reports a significant increase in left-sided hip pain, radiating to the left groin and anterior thigh to the level of the knee.  Her current pain is rated a 5 out of 10 and described as a constant sharp, cramping and shooting pain.  This pain is made worse with the motion of sitting to standing.  It is also however affecting her sleep at night.  She believes it has something to do with wearing lead at work all day.  She states that they did order lighter weight lead apron's and she is hoping that that alleviates this pain.  Otherwise, the patient remains clinically stable on the current medication regimen of tramadol and methocarbamol.    Pain Contract Signed: 8/27/2024  Last Urine Drug Screen: 4/7/2025    I have personally reviewed and/or updated the patient's past medical history, past surgical history, family history, social history, current medications, allergies, and vital signs today.       Review of Systems:    Review of Systems   Respiratory:  Negative for shortness of breath.    Cardiovascular:  Negative for chest pain.   Gastrointestinal:  Negative for constipation, diarrhea, nausea and vomiting.   Musculoskeletal:  Positive for arthralgias. Negative for gait problem, joint swelling and myalgias.   Skin:  Negative for rash.   Neurological:  Negative for  dizziness, seizures and weakness.   Psychiatric/Behavioral:  Negative for dysphoric mood.    All other systems reviewed and are negative.        Past Medical History:   Diagnosis Date   • Coronary artery disease    • Diabetes mellitus (HCC)     Borderline   • Disease of thyroid gland    • DUB (dysfunctional uterine bleeding)    • Fatty liver    • Hashimoto's thyroiditis     Last Assessed:  14   • Heart disease    • History of stomach ulcers    • Hypertension    • Hypothyroidism    • Irritable bowel syndrome     Last Assessed:  3/25/14   • Liver disease     elevated enzymes   • Myocardial infarction (HCC)     2017   • GIANG (nonalcoholic steatohepatitis)    • Ovarian cyst     left   • Psychogenic polydipsia     Has had hyponatremia from drinking too much water in the past.       Past Surgical History:   Procedure Laterality Date   • ANGIOPLASTY     • CARDIAC CATHETERIZATION     •  SECTION      X 2   • CHOLECYSTECTOMY     • COLONOSCOPY     • CYSTOSCOPY N/A 2019    Procedure: CYSTOSCOPY;  Surgeon: Luther Wadsworth MD;  Location: [facility];  Service: Gynecology Oncology   • HYSTERECTOMY     • IR BIOPSY LIVER MASS  2020   • OOPHORECTOMY Right    • HI ESOPHAGOGASTRODUODENOSCOPY TRANSORAL DIAGNOSTIC N/A 2018    Procedure: ESOPHAGOGASTRODUODENOSCOPY (EGD);  Surgeon: Yoav Frias MD;  Location: [facility];  Service: Gastroenterology   • HI LAPS TOTAL HYSTERECT 250 GM/< W/RMVL TUBE/OVARY N/A 2019    Procedure: TOTAL LAPAROSCOPIC HYSTERECTOMY, BILATERAL SALPINGECTOMY, LEFT OOPHORECTOMY;  Surgeon: Luther Wadsworth MD;  Location: [facility];  Service: Gynecology Oncology   • HI RINSJ RPTD BICEPS/TRICEPS TDN DSTL W/WO TDN GRF Left 2023    Procedure: REPAIR TENDON BICEPS, distal;  Surgeon: Yvonne Modi;  Location:  MAIN OR;  Service: Orthopedics   • SINUS SURGERY     • TONSILLECTOMY     • UPPER GASTROINTESTINAL ENDOSCOPY         Family History   Problem Relation Age of Onset   • Pancreatic  cancer Mother    • Cancer Mother         Pancreatic   • Stroke Father    • Hypertension Father    • Diabetes type II Father    • Diabetes Father    • Lung disease Daughter         asthma tendencies   • Diabetes Maternal Grandmother    • Diabetes Paternal Grandmother    • Heart disease Paternal Grandmother    • Hypothyroidism Paternal Grandmother    • Thyroid disease Paternal Grandmother    • Diabetes type II Brother    • No Known Problems Brother    • No Known Problems Son    • Liver cancer Maternal Aunt    • Melanoma Maternal Aunt    • No Known Problems Paternal Aunt    • No Known Problems Paternal Aunt    • No Known Problems Paternal Aunt    • Hypothyroidism Paternal Uncle    • Hyperlipidemia Neg Hx        Social History     Occupational History   • Not on file   Tobacco Use   • Smoking status: Former     Current packs/day: 0.00     Average packs/day: 0.5 packs/day for 27.1 years (13.5 ttl pk-yrs)     Types: Cigarettes     Start date:      Quit date: 2016     Years since quittin.1   • Smokeless tobacco: Never   • Tobacco comments:     ON AND OFF    Vaping Use   • Vaping status: Never Used   Substance and Sexual Activity   • Alcohol use: Not Currently     Comment: Stopped    • Drug use: No   • Sexual activity: Yes     Partners: Male     Birth control/protection: Other     Comment: Hysterectomy         Current Outpatient Medications:   •  amLODIPine (NORVASC) 5 mg tablet, Take 1 tablet (5 mg total) by mouth daily, Disp: 90 tablet, Rfl: 0  •  dicyclomine (BENTYL) 20 mg tablet, Take 1 tablet (20 mg total) by mouth every 6 (six) hours as needed (every 6 hours), Disp: 60 tablet, Rfl: 1  •  escitalopram (LEXAPRO) 20 mg tablet, Take 1 tablet (20 mg total) by mouth daily, Disp: 90 tablet, Rfl: 1  •  Icosapent Ethyl (Vascepa) 1 g CAPS, 2 capsules twice a day, Disp: 360 capsule, Rfl: 0  •  Lactobacillus (PROBIOTIC ACIDOPHILUS PO), Take by mouth daily, Disp: , Rfl:   •  levothyroxine 75 mcg tablet, Take 1  tablet (75 mcg total) by mouth daily, Disp: 90 tablet, Rfl: 1  •  Magnesium 300 MG CAPS, Take 600 mg by mouth daily, Disp: , Rfl:   •  metFORMIN (GLUCOPHAGE-XR) 500 mg 24 hr tablet, Take 2 tablets (1,000 mg total) by mouth 2 (two) times a day with meals, Disp: 360 tablet, Rfl: 1  •  methocarbamol (ROBAXIN) 750 mg tablet, 1 tab PO HS., Disp: 90 tablet, Rfl: 1  •  methylPREDNISolone 4 MG tablet therapy pack, Use as directed on package, Disp: 1 each, Rfl: 0  •  metoprolol succinate (TOPROL-XL) 25 mg 24 hr tablet, Take 1 tablet (25 mg total) by mouth 2 (two) times a day, Disp: 180 tablet, Rfl: 1  •  Multiple Vitamins-Minerals (ZINC PO), Take by mouth, Disp: , Rfl:   •  mupirocin (BACTROBAN) 2 % ointment, Apply topically 3 (three) times a day, Disp: 22 g, Rfl: 0  •  nitroglycerin (NITROSTAT) 0.4 mg SL tablet, Place 1 tablet (0.4 mg total) under the tongue every 5 (five) minutes as needed for chest pain, Disp: 100 tablet, Rfl: 1  •  pantoprazole (PROTONIX) 40 mg tablet, Take 1 tablet (40 mg total) by mouth daily before breakfast, Disp: 90 tablet, Rfl: 1  •  semaglutide, 1 mg/dose, (Ozempic, 1 MG/DOSE,) 4 mg/3 mL injection pen, Inject 1 mg once a week, Disp: 3 mL, Rfl: 5  •  traMADol (Ultram) 50 mg tablet, Take 1 tablet (50 mg total) by mouth 2 (two) times a day as needed for moderate pain 1 tab BID prn for ongoing therapy DO NOT FILL BEFORE:  04/23/25, Disp: 60 tablet, Rfl: 3  •  vitamin B-12 (VITAMIN B-12) 1,000 mcg tablet, Take by mouth daily, Disp: , Rfl:   •  Vitamin D, Cholecalciferol, 50 MCG (2000 UT) CAPS, Take 4,000 Units by mouth daily, Disp: , Rfl:   •  Blood Glucose Monitoring Suppl (ONETOUCH VERIO IQ SYSTEM) w/Device KIT, Use to test blood sugars twice a day, Disp: 1 kit, Rfl: 0  •  Insulin Pen Needle 32G X 4 MM MISC, Use once a week, Disp: 30 each, Rfl: 1  •  OneTouch Delica Lancets 33G MISC, Use to test blood sugars twice a day, Disp: 200 each, Rfl: 6  •  OneTouch Verio test strip, Use as instructed to test  "blood sugars twice a day, Disp: 200 each, Rfl: 1    Allergies   Allergen Reactions   • Erythromycin Chest Pain     Chest pain as child   • Metronidazole Other (See Comments)     SOB   • Sulfa Antibiotics Shortness Of Breath     Per pt, \"hives and fever\"   • Amoxicillin-Pot Clavulanate Rash     And yeast infection   • Invokana [Canagliflozin] Other (See Comments)     Yeast infection       Physical Exam:    Pulse 84   Temp 98.5 °F (36.9 °C)   Ht 5' 2\" (1.575 m)   Wt 64 kg (141 lb) Comment: per pt  LMP  (LMP Unknown)   SpO2 94%   BMI 25.79 kg/m²     Constitutional:normal, well developed, well nourished, alert, in no distress and non-toxic and no overt pain behavior.  Eyes:anicteric  HEENT:grossly intact  Neck:supple, symmetric, trachea midline and no masses   Pulmonary:even and unlabored  Cardiovascular:No edema or pitting edema present  Skin:Normal without rashes or lesions and well hydrated  Psychiatric:Mood and affect appropriate  Neurologic:Cranial Nerves II-XII grossly intact  Musculoskeletal: Painful range of motion of the left hip.      Imaging  No orders to display         No orders of the defined types were placed in this encounter.      "

## 2025-04-14 DIAGNOSIS — M25.552 LEFT HIP PAIN: Primary | ICD-10-CM

## 2025-04-14 NOTE — PROGRESS NOTES
Patient ID: Raymundo White is a 54 y.o. female Date of Birth 1970       No chief complaint on file.            Diagnosis:  1. Type 2 diabetes mellitus with hyperglycemia, without long-term current use of insulin ([facility])  2. Hallux limitus, right    Pedal examination with socks and shoes removed bilaterally.  Today we discussed the biomechanics, etiology and treatment options of  hallux limitus.  Today I discussed treatment options with shoe gear modification with a rigid sole shoe, avoid anything flat or flexible, no barefoot, over-the-counter inserts, custom inserts, injection, surgical treatment.  Gait and biomechanical exam was performed, with and without custom orthotics.  Custom orthotics were fit to shoes, patient was explained break-in period and protocol.  Patient has x-ray ordered from last visit she will have taken prior to surgery schedule appointment if and when she decides to proceed.  Patient had injection on 2/20/2025, we have deferred an injection today, if she still continues with residual pain with use of orthotics at next visit we will inject.  Surgical care was discussed, patient will likely need a first MTPJ fusion, preop, IntraOp and postoperative course was reviewed, risk, benefits alternatives were discussed.  Patient understands and agrees with the plan and will follow-up in 4 to 6 weeks.          Subjective:   4/17/25 - Raymundo presents today to  custom orthotics to help for biomechanical support for her hallux limitus.  She states overall she is doing pretty well.    3/7/25 - Raymundo presents today for follow-up evaluation care of right foot pain from her hallux limitus, she states since her last visit at which time I administered an injection she is feeling much better.  Patient presents today for custom orthotic casting.    2/20/25 - Raymundo presents today for evaluation care of pain in her right foot.  She has a bunion, she has swelling in her great toe and pain in the  joint, the actual bunion does not bother her, it hurts a lot by the end of the day.  She is also a type II diabetic with an A1c of 5.8 on 2024, she is well-controlled with Glucophage and Ozempic, she follows with endocrinology.        The following portions of the patient's history were reviewed and updated as appropriate:     Past Medical History:   Diagnosis Date    Coronary artery disease     Diabetes mellitus (HCC)     Borderline    Disease of thyroid gland     DUB (dysfunctional uterine bleeding)     Fatty liver     Hashimoto's thyroiditis     Last Assessed:  14    Heart disease     History of stomach ulcers     Hypertension     Hypothyroidism     Irritable bowel syndrome     Last Assessed:  3/25/14    Liver disease     elevated enzymes    Myocardial infarction (HCC)     2017    GIANG (nonalcoholic steatohepatitis)     Ovarian cyst     left    Psychogenic polydipsia     Has had hyponatremia from drinking too much water in the past.       Past Surgical History:   Procedure Laterality Date    ANGIOPLASTY      CARDIAC CATHETERIZATION       SECTION      X 2    CHOLECYSTECTOMY      COLONOSCOPY      CYSTOSCOPY N/A 2019    Procedure: CYSTOSCOPY;  Surgeon: Luther Wadsworth MD;  Location: [facility];  Service: Gynecology Oncology    HYSTERECTOMY      IR BIOPSY LIVER MASS  2020    OOPHORECTOMY Right     AZ ESOPHAGOGASTRODUODENOSCOPY TRANSORAL DIAGNOSTIC N/A 2018    Procedure: ESOPHAGOGASTRODUODENOSCOPY (EGD);  Surgeon: Yoav Frias MD;  Location: [facility];  Service: Gastroenterology    AZ LAPS TOTAL HYSTERECT 250 GM/< W/RMVL TUBE/OVARY N/A 2019    Procedure: TOTAL LAPAROSCOPIC HYSTERECTOMY, BILATERAL SALPINGECTOMY, LEFT OOPHORECTOMY;  Surgeon: Luther Wadsworth MD;  Location: [facility];  Service: Gynecology Oncology    AZ RINSJ RPTD BICEPS/TRICEPS TDN DSTL W/WO TDN GRF Left 2023    Procedure: REPAIR TENDON BICEPS, distal;  Surgeon: Yvonne Modi;  Location:  MAIN OR;  Service:  Orthopedics    SINUS SURGERY      TONSILLECTOMY      UPPER GASTROINTESTINAL ENDOSCOPY         Social History     Socioeconomic History    Marital status: /Civil Union     Spouse name: Not on file    Number of children: Not on file    Years of education: Not on file    Highest education level: Not on file   Occupational History    Not on file   Tobacco Use    Smoking status: Former     Current packs/day: 0.00     Average packs/day: 0.5 packs/day for 27.1 years (13.5 ttl pk-yrs)     Types: Cigarettes     Start date:      Quit date: 2016     Years since quittin.2    Smokeless tobacco: Never    Tobacco comments:     ON AND OFF    Vaping Use    Vaping status: Never Used   Substance and Sexual Activity    Alcohol use: Not Currently     Comment: Stopped     Drug use: No    Sexual activity: Yes     Partners: Male     Birth control/protection: Other     Comment: Hysterectomy   Other Topics Concern    Not on file   Social History Narrative    Feels safe at home     Social Drivers of Health     Financial Resource Strain: Not on file   Food Insecurity: Not on file   Transportation Needs: Not on file   Physical Activity: Not on file   Stress: Not on file   Social Connections: Not on file   Intimate Partner Violence: Not on file   Housing Stability: Not on file          Current Outpatient Medications:     amLODIPine (NORVASC) 5 mg tablet, Take 1 tablet (5 mg total) by mouth daily, Disp: 90 tablet, Rfl: 0    Blood Glucose Monitoring Suppl (ONETOUCH VERIO IQ SYSTEM) w/Device KIT, Use to test blood sugars twice a day, Disp: 1 kit, Rfl: 0    dicyclomine (BENTYL) 20 mg tablet, Take 1 tablet (20 mg total) by mouth every 6 (six) hours as needed (every 6 hours), Disp: 60 tablet, Rfl: 1    escitalopram (LEXAPRO) 20 mg tablet, Take 1 tablet (20 mg total) by mouth daily, Disp: 90 tablet, Rfl: 1    Icosapent Ethyl (Vascepa) 1 g CAPS, 2 capsules twice a day, Disp: 360 capsule, Rfl: 0    Insulin Pen Needle 32G X 4 MM  MISC, Use once a week, Disp: 30 each, Rfl: 1    Lactobacillus (PROBIOTIC ACIDOPHILUS PO), Take by mouth daily, Disp: , Rfl:     levothyroxine 75 mcg tablet, Take 1 tablet (75 mcg total) by mouth daily, Disp: 90 tablet, Rfl: 1    Magnesium 300 MG CAPS, Take 600 mg by mouth daily, Disp: , Rfl:     metFORMIN (GLUCOPHAGE-XR) 500 mg 24 hr tablet, Take 2 tablets (1,000 mg total) by mouth 2 (two) times a day with meals, Disp: 360 tablet, Rfl: 1    methocarbamol (ROBAXIN) 750 mg tablet, 1 tab PO HS., Disp: 90 tablet, Rfl: 1    methylPREDNISolone 4 MG tablet therapy pack, Use as directed on package, Disp: 1 each, Rfl: 0    metoprolol succinate (TOPROL-XL) 25 mg 24 hr tablet, Take 1 tablet (25 mg total) by mouth 2 (two) times a day, Disp: 180 tablet, Rfl: 1    Multiple Vitamins-Minerals (ZINC PO), Take by mouth, Disp: , Rfl:     mupirocin (BACTROBAN) 2 % ointment, Apply topically 3 (three) times a day, Disp: 22 g, Rfl: 0    nitroglycerin (NITROSTAT) 0.4 mg SL tablet, Place 1 tablet (0.4 mg total) under the tongue every 5 (five) minutes as needed for chest pain, Disp: 100 tablet, Rfl: 1    OneTouch Delica Lancets 33G MISC, Use to test blood sugars twice a day, Disp: 200 each, Rfl: 6    OneTouch Verio test strip, Use as instructed to test blood sugars twice a day, Disp: 200 each, Rfl: 1    pantoprazole (PROTONIX) 40 mg tablet, Take 1 tablet (40 mg total) by mouth daily before breakfast, Disp: 90 tablet, Rfl: 1    semaglutide, 1 mg/dose, (Ozempic, 1 MG/DOSE,) 4 mg/3 mL injection pen, Inject 1 mg once a week, Disp: 3 mL, Rfl: 5    traMADol (Ultram) 50 mg tablet, Take 1 tablet (50 mg total) by mouth 2 (two) times a day as needed for moderate pain 1 tab BID prn for ongoing therapy DO NOT FILL BEFORE:  04/23/25, Disp: 60 tablet, Rfl: 3    vitamin B-12 (VITAMIN B-12) 1,000 mcg tablet, Take by mouth daily, Disp: , Rfl:     Vitamin D, Cholecalciferol, 50 MCG (2000 UT) CAPS, Take 4,000 Units by mouth daily, Disp: , Rfl:      Allergies  Erythromycin, Metronidazole, Sulfa antibiotics, Amoxicillin-pot clavulanate, and Invokana [canagliflozin]    Family History   Problem Relation Age of Onset    Pancreatic cancer Mother     Cancer Mother         Pancreatic    Stroke Father     Hypertension Father     Diabetes type II Father     Diabetes Father     Lung disease Daughter         asthma tendencies    Diabetes Maternal Grandmother     Diabetes Paternal Grandmother     Heart disease Paternal Grandmother     Hypothyroidism Paternal Grandmother     Thyroid disease Paternal Grandmother     Diabetes type II Brother     No Known Problems Brother     No Known Problems Son     Liver cancer Maternal Aunt     Melanoma Maternal Aunt     No Known Problems Paternal Aunt     No Known Problems Paternal Aunt     No Known Problems Paternal Aunt     Hypothyroidism Paternal Uncle     Hyperlipidemia Neg Hx            Objective:  LMP  (LMP Unknown)     Review of Systems   Constitutional:  Negative for chills and fever.   HENT:  Negative for ear pain and sore throat.    Eyes:  Negative for pain and visual disturbance.   Respiratory:  Negative for cough and shortness of breath.    Cardiovascular:  Negative for chest pain and palpitations.   Gastrointestinal:  Negative for abdominal pain and vomiting.   Genitourinary:  Negative for dysuria and hematuria.   Musculoskeletal:  Negative for arthralgias and back pain.        Right great toe joint pain   Skin:  Negative for color change and rash.   Neurological:  Negative for seizures and syncope.   All other systems reviewed and are negative.      Physical Exam  Constitutional:       Appearance: Normal appearance. She is well-developed and normal weight.   HENT:      Head: Normocephalic and atraumatic.      Nose: Nose normal.      Mouth/Throat:      Mouth: Mucous membranes are moist.      Pharynx: Oropharynx is clear.   Eyes:      Conjunctiva/sclera: Conjunctivae normal.      Pupils: Pupils are equal, round, and  "reactive to light.   Cardiovascular:      Pulses:           Dorsalis pedis pulses are 2+ on the right side and 2+ on the left side.        Posterior tibial pulses are 2+ on the right side and 2+ on the left side.   Pulmonary:      Effort: Pulmonary effort is normal.   Musculoskeletal:      Cervical back: Normal range of motion.      Right lower leg: No edema.      Left lower leg: No edema.      Right foot: Decreased range of motion. Deformity and bunion present.      Left foot: Bunion present.   Feet:      Right foot:      Protective Sensation: 10 sites tested.  10 sites sensed.      Skin integrity: Skin integrity normal. No ulcer, skin breakdown, erythema, warmth, callus or dry skin.      Toenail Condition: Right toenails are normal.      Left foot:      Protective Sensation: 10 sites tested.  10 sites sensed.      Skin integrity: Skin integrity normal. No ulcer, skin breakdown, erythema, warmth, callus or dry skin.      Toenail Condition: Left toenails are normal.      Comments: Right first MTPJ with hallux limitus, approximately 5 degrees of dorsiflexion noted, there is dorsal and medial spurring noted, mild crepitus is noted, there is edema, no erythema, no warmth noted.  Met primus cellulitis is noted, first MTPJ is rectus.  Skin:     General: Skin is warm and dry.      Capillary Refill: Capillary refill takes less than 2 seconds.   Neurological:      General: No focal deficit present.      Mental Status: She is alert and oriented to person, place, and time. Mental status is at baseline.   Psychiatric:         Mood and Affect: Mood normal.         Behavior: Behavior normal.         Thought Content: Thought content normal.         Judgment: Judgment normal.           No pertinent results found.      Karen Todd DPM, EMILY, FACFAS    Portions of the record may have been created with voice recognition software. Occasional wrong word or \"sound a like\" substitutions may have occurred due to the inherent " limitations of voice recognition software. Read the chart carefully and recognize, using context, where substitutions have occurred.

## 2025-04-15 ENCOUNTER — HOSPITAL ENCOUNTER (OUTPATIENT)
Dept: RADIOLOGY | Facility: HOSPITAL | Age: 55
Discharge: HOME | End: 2025-04-15
Payer: COMMERCIAL

## 2025-04-15 ENCOUNTER — RESULTS FOLLOW-UP (OUTPATIENT)
Dept: PAIN MEDICINE | Facility: CLINIC | Age: 55
End: 2025-04-15

## 2025-04-15 DIAGNOSIS — M25.552 LEFT HIP PAIN: ICD-10-CM

## 2025-04-15 PROCEDURE — 73502 X-RAY EXAM HIP UNI 2-3 VIEWS: CPT

## 2025-04-15 NOTE — TELEPHONE ENCOUNTER
----- Message from Karen Garcia PA-C sent at 4/15/2025  2:37 PM EDT -----  Please let patient know that her hip x-ray is normal.

## 2025-04-16 DIAGNOSIS — R07.89 OTHER CHEST PAIN: ICD-10-CM

## 2025-04-16 DIAGNOSIS — E11.65 TYPE 2 DIABETES MELLITUS WITH HYPERGLYCEMIA, WITHOUT LONG-TERM CURRENT USE OF INSULIN (HCC): ICD-10-CM

## 2025-04-17 ENCOUNTER — OFFICE VISIT (OUTPATIENT)
Dept: PODIATRY | Facility: CLINIC | Age: 55
End: 2025-04-17
Payer: COMMERCIAL

## 2025-04-17 DIAGNOSIS — E11.65 TYPE 2 DIABETES MELLITUS WITH HYPERGLYCEMIA, WITHOUT LONG-TERM CURRENT USE OF INSULIN (HCC): Primary | ICD-10-CM

## 2025-04-17 DIAGNOSIS — M20.5X1 HALLUX LIMITUS, RIGHT: ICD-10-CM

## 2025-04-17 PROCEDURE — L3000 FT INSERT UCB BERKELEY SHELL: HCPCS | Performed by: PODIATRIST

## 2025-04-17 PROCEDURE — 99213 OFFICE O/P EST LOW 20 MIN: CPT | Performed by: PODIATRIST

## 2025-04-17 RX ORDER — AMLODIPINE BESYLATE 5 MG/1
5 TABLET ORAL DAILY
Qty: 90 TABLET | Refills: 1 | Status: SHIPPED | OUTPATIENT
Start: 2025-04-17

## 2025-04-18 RX ORDER — METFORMIN HYDROCHLORIDE 500 MG/1
1000 TABLET, EXTENDED RELEASE ORAL 2 TIMES DAILY WITH MEALS
Qty: 360 TABLET | Refills: 1 | Status: SHIPPED | OUTPATIENT
Start: 2025-04-18

## 2025-05-17 DIAGNOSIS — K21.9 GERD WITHOUT ESOPHAGITIS: ICD-10-CM

## 2025-05-17 DIAGNOSIS — I10 ESSENTIAL HYPERTENSION: ICD-10-CM

## 2025-05-18 RX ORDER — PANTOPRAZOLE SODIUM 40 MG/1
40 TABLET, DELAYED RELEASE ORAL
Qty: 90 TABLET | Refills: 0 | Status: SHIPPED | OUTPATIENT
Start: 2025-05-18

## 2025-05-18 RX ORDER — METOPROLOL SUCCINATE 25 MG/1
25 TABLET, EXTENDED RELEASE ORAL 2 TIMES DAILY
Qty: 180 TABLET | Refills: 1 | Status: SHIPPED | OUTPATIENT
Start: 2025-05-18

## 2025-05-31 DIAGNOSIS — E78.2 COMBINED HYPERLIPIDEMIA: ICD-10-CM

## 2025-06-02 ENCOUNTER — LAB (OUTPATIENT)
Dept: LAB | Facility: HOSPITAL | Age: 55
End: 2025-06-02
Payer: COMMERCIAL

## 2025-06-02 ENCOUNTER — RESULTS FOLLOW-UP (OUTPATIENT)
Dept: FAMILY MEDICINE CLINIC | Facility: CLINIC | Age: 55
End: 2025-06-02

## 2025-06-02 DIAGNOSIS — E11.65 TYPE 2 DIABETES MELLITUS WITH HYPERGLYCEMIA, WITHOUT LONG-TERM CURRENT USE OF INSULIN (HCC): ICD-10-CM

## 2025-06-02 DIAGNOSIS — D50.8 IRON DEFICIENCY ANEMIA SECONDARY TO INADEQUATE DIETARY IRON INTAKE: ICD-10-CM

## 2025-06-02 DIAGNOSIS — E78.2 COMBINED HYPERLIPIDEMIA: ICD-10-CM

## 2025-06-02 DIAGNOSIS — E06.3 HYPOTHYROIDISM DUE TO HASHIMOTO'S THYROIDITIS: ICD-10-CM

## 2025-06-02 LAB
ALBUMIN SERPL BCG-MCNC: 4.4 G/DL (ref 3.5–5)
ALP SERPL-CCNC: 63 U/L (ref 34–104)
ALT SERPL W P-5'-P-CCNC: 20 U/L (ref 7–52)
ANION GAP SERPL CALCULATED.3IONS-SCNC: 8 MMOL/L (ref 4–13)
AST SERPL W P-5'-P-CCNC: 20 U/L (ref 13–39)
BASOPHILS # BLD AUTO: 0.11 THOUSANDS/ÂΜL (ref 0–0.1)
BASOPHILS NFR BLD AUTO: 1 % (ref 0–1)
BILIRUB SERPL-MCNC: 0.38 MG/DL (ref 0.2–1)
BUN SERPL-MCNC: 10 MG/DL (ref 5–25)
CALCIUM SERPL-MCNC: 9.3 MG/DL (ref 8.4–10.2)
CHLORIDE SERPL-SCNC: 102 MMOL/L (ref 96–108)
CHOLEST SERPL-MCNC: 182 MG/DL (ref ?–200)
CO2 SERPL-SCNC: 27 MMOL/L (ref 21–32)
CREAT SERPL-MCNC: 0.79 MG/DL (ref 0.6–1.3)
EOSINOPHIL # BLD AUTO: 0.26 THOUSAND/ÂΜL (ref 0–0.61)
EOSINOPHIL NFR BLD AUTO: 3 % (ref 0–6)
ERYTHROCYTE [DISTWIDTH] IN BLOOD BY AUTOMATED COUNT: 12.3 % (ref 11.6–15.1)
EST. AVERAGE GLUCOSE BLD GHB EST-MCNC: 123 MG/DL
GFR SERPL CREATININE-BSD FRML MDRD: 85 ML/MIN/1.73SQ M
GLUCOSE P FAST SERPL-MCNC: 102 MG/DL (ref 65–99)
HBA1C MFR BLD: 5.9 %
HCT VFR BLD AUTO: 39.1 % (ref 34.8–46.1)
HDLC SERPL-MCNC: 44 MG/DL
HGB BLD-MCNC: 13.5 G/DL (ref 11.5–15.4)
IMM GRANULOCYTES # BLD AUTO: 0.01 THOUSAND/UL (ref 0–0.2)
IMM GRANULOCYTES NFR BLD AUTO: 0 % (ref 0–2)
LDLC SERPL CALC-MCNC: 75 MG/DL (ref 0–100)
LYMPHOCYTES # BLD AUTO: 2.95 THOUSANDS/ÂΜL (ref 0.6–4.47)
LYMPHOCYTES NFR BLD AUTO: 38 % (ref 14–44)
MCH RBC QN AUTO: 32.8 PG (ref 26.8–34.3)
MCHC RBC AUTO-ENTMCNC: 34.5 G/DL (ref 31.4–37.4)
MCV RBC AUTO: 95 FL (ref 82–98)
MONOCYTES # BLD AUTO: 0.57 THOUSAND/ÂΜL (ref 0.17–1.22)
MONOCYTES NFR BLD AUTO: 7 % (ref 4–12)
NEUTROPHILS # BLD AUTO: 3.9 THOUSANDS/ÂΜL (ref 1.85–7.62)
NEUTS SEG NFR BLD AUTO: 51 % (ref 43–75)
NRBC BLD AUTO-RTO: 0 /100 WBCS
PLATELET # BLD AUTO: 259 THOUSANDS/UL (ref 149–390)
PMV BLD AUTO: 9.3 FL (ref 8.9–12.7)
POTASSIUM SERPL-SCNC: 4.7 MMOL/L (ref 3.5–5.3)
PROT SERPL-MCNC: 6.8 G/DL (ref 6.4–8.4)
RBC # BLD AUTO: 4.12 MILLION/UL (ref 3.81–5.12)
SODIUM SERPL-SCNC: 137 MMOL/L (ref 135–147)
TRIGL SERPL-MCNC: 317 MG/DL (ref ?–150)
TSH SERPL DL<=0.05 MIU/L-ACNC: 3.81 UIU/ML (ref 0.45–4.5)
WBC # BLD AUTO: 7.8 THOUSAND/UL (ref 4.31–10.16)

## 2025-06-02 PROCEDURE — 80053 COMPREHEN METABOLIC PANEL: CPT

## 2025-06-02 PROCEDURE — 85025 COMPLETE CBC W/AUTO DIFF WBC: CPT

## 2025-06-02 PROCEDURE — 84443 ASSAY THYROID STIM HORMONE: CPT

## 2025-06-02 PROCEDURE — 36415 COLL VENOUS BLD VENIPUNCTURE: CPT

## 2025-06-02 PROCEDURE — 80061 LIPID PANEL: CPT

## 2025-06-02 PROCEDURE — 83036 HEMOGLOBIN GLYCOSYLATED A1C: CPT

## 2025-06-03 ENCOUNTER — OFFICE VISIT (OUTPATIENT)
Dept: ENDOCRINOLOGY | Facility: HOSPITAL | Age: 55
End: 2025-06-03
Payer: COMMERCIAL

## 2025-06-03 ENCOUNTER — TELEPHONE (OUTPATIENT)
Age: 55
End: 2025-06-03

## 2025-06-03 VITALS
BODY MASS INDEX: 27.2 KG/M2 | SYSTOLIC BLOOD PRESSURE: 122 MMHG | HEIGHT: 62 IN | WEIGHT: 147.8 LBS | DIASTOLIC BLOOD PRESSURE: 70 MMHG | HEART RATE: 75 BPM | OXYGEN SATURATION: 97 %

## 2025-06-03 DIAGNOSIS — E11.65 TYPE 2 DIABETES MELLITUS WITH HYPERGLYCEMIA, WITHOUT LONG-TERM CURRENT USE OF INSULIN (HCC): Primary | ICD-10-CM

## 2025-06-03 DIAGNOSIS — E06.3 HYPOTHYROIDISM DUE TO HASHIMOTO'S THYROIDITIS: ICD-10-CM

## 2025-06-03 DIAGNOSIS — I10 PRIMARY HYPERTENSION: Chronic | ICD-10-CM

## 2025-06-03 DIAGNOSIS — E78.2 COMBINED HYPERLIPIDEMIA: ICD-10-CM

## 2025-06-03 PROCEDURE — 99214 OFFICE O/P EST MOD 30 MIN: CPT | Performed by: INTERNAL MEDICINE

## 2025-06-03 RX ORDER — ICOSAPENT ETHYL 1 G/1
CAPSULE ORAL
Qty: 360 CAPSULE | Refills: 3 | Status: SHIPPED | OUTPATIENT
Start: 2025-06-03

## 2025-06-03 RX ORDER — LEVOTHYROXINE SODIUM 88 UG/1
88 TABLET ORAL DAILY
Qty: 90 TABLET | Refills: 3 | Status: SHIPPED | OUTPATIENT
Start: 2025-06-03

## 2025-06-03 NOTE — ASSESSMENT & PLAN NOTE
Orders:    Comprehensive metabolic panel; Future    T4, free; Future    TSH, 3rd generation; Future    Hemoglobin A1C; Future

## 2025-06-03 NOTE — PATIENT INSTRUCTIONS
Hgba1c is 5.9%. this is excellent.     We can try going up to ozempic 2 mg once a week and then decrease metformin to 1 twice daily.     Continue to test sugars daily.     The thyroid is not as good. Increase levothyroxine to 88 mcg daily. Recheck thyroid blood work in 3 months.     The cholesterol still shows high triglycerides.     I await the urine test.     Follow up in 6 months with blood work.

## 2025-06-03 NOTE — TELEPHONE ENCOUNTER
PA for (Ozempic, 2 MG/DOSE,) 8 mg/ mL  NOT REQUIRED     Reason (screenshot if applicable)            Patient advised by          [x] MyChart Message  [] Phone call   []LMOM  []L/M to call office as no active Communication consent on file  []Unable to leave detailed message as VM not approved on Communication consent       Pharmacy advised by    []Fax  [x]Phone call. LEFT VM

## 2025-06-03 NOTE — ASSESSMENT & PLAN NOTE
Lab Results   Component Value Date    HGBA1C 5.9 (H) 06/02/2025       Orders:    semaglutide, 2 mg/dose, (Ozempic, 2 MG/DOSE,) 8 mg/ mL injection pen; Inject 0.75 mL (2 mg total) under the skin every 7 days    Comprehensive metabolic panel; Future    T4, free; Future    TSH, 3rd generation; Future    Hemoglobin A1C; Future

## 2025-06-03 NOTE — PROGRESS NOTES
Name: Raymundo White      : 1970      MRN: [id number]  Encounter Provider: Beata Georges MD  Encounter Date: 6/3/2025   [facility]    No chief complaint on file.  :  Assessment & Plan  Type 2 diabetes mellitus with hyperglycemia, without long-term current use of insulin (HCC)    Lab Results   Component Value Date    HGBA1C 5.9 (H) 2025       Orders:    semaglutide, 2 mg/dose, (Ozempic, 2 MG/DOSE,) 8 mg/ mL injection pen; Inject 0.75 mL (2 mg total) under the skin every 7 days    Comprehensive metabolic panel; Future    T4, free; Future    TSH, 3rd generation; Future    Hemoglobin A1C; Future    Hypothyroidism due to Hashimoto's thyroiditis    Orders:    levothyroxine 88 mcg tablet; Take 1 tablet (88 mcg total) by mouth daily    Comprehensive metabolic panel; Future    T4, free; Future    TSH, 3rd generation; Future    Hemoglobin A1C; Future    T4, free; Future    TSH, 3rd generation; Future    Primary hypertension    Orders:    Comprehensive metabolic panel; Future    T4, free; Future    TSH, 3rd generation; Future    Hemoglobin A1C; Future    Combined hyperlipidemia    Orders:    Comprehensive metabolic panel; Future    T4, free; Future    TSH, 3rd generation; Future    Hemoglobin A1C; Future      Assessment & Plan  1. Type 2 diabetes mellitus.  - Hemoglobin A1c level is stable at 5.9.  - No significant neuropathy, nephropathy, retinopathy, stroke, or claudication reported.  - Discussed increasing Ozempic dosage to 2 mg once weekly due to recent weight gain and hunger.  - Metformin dosage adjusted to 500 mg twice daily; continue daily blood glucose monitoring.    2. Hypothyroidism due to Hashimoto's thyroiditis.  - Thyroid function suboptimal with TSH level at 3.8.  - Patient reports increased fatigue and higher TSH levels.  - Discussed increasing levothyroxine dosage to 88 mcg daily.  - Repeat thyroid function test to be  conducted in 3 months.    3.  Hyperlipidemia with hypertriglyceridemia.  - Triglyceride levels are elevated.  - Discussed potential initiation of fenofibrate therapy with cardiologist.  - Advised to consult with liver GI specialist for further management.    4. Hypertension.  - Currently on amlodipine 5 mg and metoprolol 25 mg twice daily.  - Blood pressure monitoring and medication adherence discussed.  - No recent episodes of chest pain or shortness of breath reported.    Follow-up: The patient will follow up in 6 months with preceding hemoglobin A1c, CMP, TSH, and free T4.    A TSH and free T4 will be performed in 3 months.      Pertinent Medical History   Raymundo White is a 54-year-old female with type 2 diabetes, hypothyroidism, and hypertension diagnosed 7 years ago, here for a follow-up visit.    Diabetic complications include a heart attack, but she reports no neuropathy, nephropathy, retinopathy, stroke, or claudication.     She has hypothyroidism due to Hashimoto's thyroiditis. She is on levothyroxine 75 mcg daily. She has been requiring significant decreases in dosage as she has lost weight utilizing Ozempic.         History of Present Illness   History of Present Illness  Raymundo White is a 54-year-old female with type 2 diabetes, hypothyroidism, and hypertension diagnosed 7 years ago, here for a follow-up visit.    Diabetic complications include a heart attack, but she reports no neuropathy, nephropathy, retinopathy, stroke, or claudication.     She is currently on metformin, taking 1 tablet in the morning and 2 at night, along with Ozempic 1 mg weekly. She is considering an increase in her Ozempic dosage due to perceived weight gain. She reports no excessive urination or thirst but notes an increased appetite compared to her previous state of satiety. She does not experience any gastrointestinal symptoms such as abdominal pain, nausea, diarrhea, or constipation. She also reports no peripheral  neuropathy. Her last foot examination was conducted by Dr. Todd in 02/2025. She uses orthotics and sneakers for footwear. She reports no visual disturbances and had her last ophthalmology appointment in 07/2024. She monitors her blood glucose levels daily, with the highest recorded level being 235, possibly post-joint injection.    She has hypothyroidism due to Hashimoto's thyroiditis. She is on levothyroxine 75 mcg daily. She has been requiring significant decreases in dosage as she has lost weight utilizing Ozempic. She experiences hot flashes and sweating, which she attributes to menopause, but reports no cold intolerance. She also reports no palpitations or tremors. Her sleep pattern is normal, although she feels more fatigued than usual. She has observed an increase in her TSH levels from her usual low range to over 3. She reports no dry skin or hair loss.    She is on Vascepa 1 g twice daily for liver health and amlodipine 5 mg and metoprolol 25 mg twice daily for blood pressure management.    She was previously on prednisone for joint pain and received injections.      Home blood glucometer readings:   She is testing her blood sugars once daily typically in the evening.  Most of her blood sugars are anywhere from 80 to the mid 100s with rare readings into the 200s.  Some occasional hypoglycemia.    Current diabetic regimen:   She utilizes metformin  mg 1 in the morning and 2 in the evening and Ozempic 1 mg once a week for diabetes control.    She utilizes levothyroxine 75 mcg daily for hypothyroidism.    She utilizes metoprolol XL 25 mg twice a day and amlodipine 5 mg daily for hypertension.    She is utilizing Vascepa 1 g 2 capsules twice a day for hyperlipidemia with hypertriglyceridemia.      Review of Systems as per [facility]    Medical History Reviewed by provider this encounter:  Tobacco  Allergies  Meds  Problems  Med Hx  Surg Hx  Fam Hx     .  Medications Ordered Prior to Encounter[1]  "  Social History[2]     Medical History Reviewed by provider this encounter:  Tobacco  Allergies  Meds  Problems  Med Hx  Surg Hx  Fam Hx     .    Objective   /70   Pulse 75   Ht 5' 2\" (1.575 m)   Wt 67 kg (147 lb 12.8 oz)   LMP  (LMP Unknown)   SpO2 97%   BMI 27.03 kg/m²      Body mass index is 27.03 kg/m².  Wt Readings from Last 3 Encounters:   06/03/25 67 kg (147 lb 12.8 oz)   04/07/25 64 kg (141 lb)   03/31/25 64.4 kg (142 lb)     Physical Exam  Respiratory: Lungs were auscultated.  Extremities: Vibration slightly diminished. Monofilament intact. Good pulses present. Bunions noted on bilateral feet.  Physical Exam  Vitals and nursing note reviewed.   Constitutional:       General: She is not in acute distress.     Appearance: Normal appearance. She is well-developed.   HENT:      Head: Normocephalic and atraumatic.     Eyes:      Conjunctiva/sclera: Conjunctivae normal.      Comments: No lid lag, stare, proptosis, or periorbital edema.   Neck:      Vascular: No carotid bruit.      Comments: Thyroid normal in size.  No palpable thyroid nodules.  Cardiovascular:      Rate and Rhythm: Normal rate and regular rhythm.      Pulses: Normal pulses. no weak pulses.           Dorsalis pedis pulses are 2+ on the right side and 2+ on the left side.        Posterior tibial pulses are 2+ on the right side and 2+ on the left side.      Heart sounds: Normal heart sounds. No murmur heard.  Pulmonary:      Effort: Pulmonary effort is normal. No respiratory distress.      Breath sounds: Normal breath sounds. No wheezing.   Abdominal:      Palpations: Abdomen is soft.     Musculoskeletal:         General: Deformity present. No swelling.      Cervical back: Neck supple.      Right lower leg: No edema.      Left lower leg: No edema.      Comments: Bunion 1st MP joints bilaterally. Callus medial 1st toe bilaterally.  No ulcerations of the feet.  No tremor of the outstretched hands.   Feet:      Right foot:      Skin " integrity: Callus present. No ulcer, skin breakdown, erythema, warmth or dry skin.      Left foot:      Skin integrity: Callus present. No ulcer, skin breakdown, erythema, warmth or dry skin.   Lymphadenopathy:      Cervical: No cervical adenopathy.     Skin:     General: Skin is warm and dry.      Capillary Refill: Capillary refill takes less than 2 seconds.     Neurological:      Mental Status: She is alert and oriented to person, place, and time.      Comments: Patellar deep tendon reflexes normal.  Vibration sensation slightly diminished to the first toe DIP joint bilaterally. Monofilament sensation intact bilaterally.    Psychiatric:         Mood and Affect: Mood normal.     Patient's shoes and socks removed.    Right Foot/Ankle   Right Foot Inspection  Skin Exam: skin normal, skin intact, callus and callus. No dry skin, no warmth, no erythema, no maceration, no abnormal color, no pre-ulcer and no ulcer.     Toe Exam: right toe deformity. No swelling    Sensory   Vibration: diminished  Monofilament testing: intact    Vascular  Capillary refills: < 3 seconds  The right DP pulse is 2+. The right PT pulse is 2+.     Left Foot/Ankle  Left Foot Inspection  Skin Exam: skin normal, skin intact and callus. No dry skin, no warmth, no erythema, no maceration, normal color, no pre-ulcer and no ulcer.     Toe Exam: left toe deformity. No swelling.     Sensory   Vibration: diminished  Monofilament testing: intact    Vascular  Capillary refills: < 3 seconds  The left DP pulse is 2+. The left PT pulse is 2+.     Assign Risk Category  Deformity present  Loss of protective sensation  No weak pulses  Risk: 2    Last Eye Exam: 07/29/2024  Last Foot Exam: 02/20/2025  Health Maintenance   Topic Date Due    Diabetic Foot Exam  02/20/2026    Diabetic Eye Exam  07/29/2026       Results    Labs: I have reviewed pertinent labs including:   Lab Results   Component Value Date    HGBA1C 5.9 (H) 06/02/2025    HGBA1C 5.8 (H) 11/25/2024     HGBA1C 5.9 (H) 07/10/2024      Blood work performed on 6/2/2025 showed a TSH of 3.812.    CMP showed glucose of 102 fasting but was otherwise normal.    CBC is normal.    Total cholesterol 182, LDL cholesterol 75.    Lab Results   Component Value Date    CREATININE 0.79 06/02/2025    CREATININE 0.79 11/25/2024    CREATININE 0.77 07/10/2024    BUN 10 06/02/2025     09/02/2015    K 4.7 06/02/2025     06/02/2025    CO2 27 06/02/2025      eGFR   Date Value Ref Range Status   06/02/2025 85 ml/min/1.73sq m Final      Cholesterol   Date Value Ref Range Status   08/20/2015 210 mg/dL Final     Comment:     CHOLESTEROL:       Desirable           <200 mg/dl       Borderline High     200-239 mg/dl       High                   >239 mg/dl  ____________________________________       HDL   Date Value Ref Range Status   08/20/2015 40 mg/dL Final     Comment:     HDL:       High       >59 mg/dl       Low        <41 mg/dl  ______________________________       HDL, Direct   Date Value Ref Range Status   06/02/2025 44 (L) >=50 mg/dL Final     Triglycerides   Date Value Ref Range Status   06/02/2025 317 (H) See Comment mg/dL Final     Comment:     Triglyceride:     0-9Y            <75mg/dL     10Y-17Y         <90 mg/dL       >=18Y     Normal          <150 mg/dL     Borderline High 150-199 mg/dL     High            200-499 mg/dL        Very High       >499 mg/dL    Specimen collection should occur prior to Metamizole administration due to the potential for falsely depressed results.   08/20/2015 517 mg/dL Final     Comment:     TRIGLYCERIDE:       Normal                 <150 mg/dl       Borderline High       150-199 mg/dl       High                  200-499 mg/dl       Very High             >499 mg/dl  _______________________________________        ALT   Date Value Ref Range Status   06/02/2025 20 7 - 52 U/L Final     Comment:     Specimen collection should occur prior to Sulfasalazine administration due to the potential for  falsely depressed results.    09/02/2015 62 12 - 78 U/L Final     AST   Date Value Ref Range Status   06/02/2025 20 13 - 39 U/L Final   09/02/2015 37 5 - 45 U/L Final     Alkaline Phosphatase   Date Value Ref Range Status   06/02/2025 63 34 - 104 U/L Final   09/02/2015 78 46 - 116 U/L Final     Total Bilirubin   Date Value Ref Range Status   09/02/2015 0.38 0.20 - 1.00 mg/dL Final      Lab Results   Component Value Date    SOL7QEOUCBNC 3.812 06/02/2025      Lab Results   Component Value Date    FREET4 0.78 02/07/2025        Patient Instructions   Hgba1c is 5.9%. this is excellent.     We can try going up to ozempic 2 mg once a week and then decrease metformin to 1 twice daily.     Continue to test sugars daily.     The thyroid is not as good. Increase levothyroxine to 88 mcg daily. Recheck thyroid blood work in 3 months.     The cholesterol still shows high triglycerides.     I await the urine test.     Follow up in 6 months with blood work.     Discussed with the patient and all questioned fully answered. She will call me if any problems arise.           [1]   Current Outpatient Medications on File Prior to Visit   Medication Sig Dispense Refill    amLODIPine (NORVASC) 5 mg tablet Take 1 tablet (5 mg total) by mouth daily 90 tablet 1    Blood Glucose Monitoring Suppl (ONETOUCH VERIO IQ SYSTEM) w/Device KIT Use to test blood sugars twice a day 1 kit 0    dicyclomine (BENTYL) 20 mg tablet Take 1 tablet (20 mg total) by mouth every 6 (six) hours as needed (every 6 hours) 60 tablet 1    escitalopram (LEXAPRO) 20 mg tablet Take 1 tablet (20 mg total) by mouth daily 90 tablet 1    Icosapent Ethyl (Vascepa) 1 g CAPS 2 capsules twice a day 360 capsule 3    Insulin Pen Needle 32G X 4 MM MISC Use once a week 30 each 1    Lactobacillus (PROBIOTIC ACIDOPHILUS PO) Take by mouth in the morning.      Magnesium 300 MG CAPS Take 600 mg by mouth in the morning.      metFORMIN (GLUCOPHAGE-XR) 500 mg 24 hr tablet Take 2 tablets (1,000  mg total) by mouth 2 (two) times a day with meals (Patient taking differently: Take 500 mg by mouth 1 in am and 2 in pm) 360 tablet 1    methocarbamol (ROBAXIN) 750 mg tablet 1 tab PO HS. 90 tablet 1    metoprolol succinate (TOPROL-XL) 25 mg 24 hr tablet Take 1 tablet (25 mg total) by mouth in the morning and 1 tablet (25 mg total) before bedtime. 180 tablet 1    Multiple Vitamins-Minerals (ZINC PO) Take by mouth      mupirocin (BACTROBAN) 2 % ointment Apply topically 3 (three) times a day 22 g 0    nitroglycerin (NITROSTAT) 0.4 mg SL tablet Place 1 tablet (0.4 mg total) under the tongue every 5 (five) minutes as needed for chest pain 100 tablet 1    OneTouch Delica Lancets 33G MISC Use to test blood sugars twice a day 200 each 6    OneTouch Verio test strip Use as instructed to test blood sugars twice a day 200 each 1    pantoprazole (PROTONIX) 40 mg tablet Take 1 tablet (40 mg total) by mouth daily before breakfast 90 tablet 0    traMADol (Ultram) 50 mg tablet Take 1 tablet (50 mg total) by mouth 2 (two) times a day as needed for moderate pain 1 tab BID prn for ongoing therapy DO NOT FILL BEFORE:  25 60 tablet 3    vitamin B-12 (VITAMIN B-12) 1,000 mcg tablet Take by mouth in the morning.      Vitamin D, Cholecalciferol, 50 MCG (2000 UT) CAPS Take 4,000 Units by mouth in the morning.       No current facility-administered medications on file prior to visit.   [2]   Social History  Tobacco Use    Smoking status: Former     Current packs/day: 0.00     Average packs/day: 0.5 packs/day for 27.1 years (13.5 ttl pk-yrs)     Types: Cigarettes     Start date:      Quit date: 2016     Years since quittin.3    Smokeless tobacco: Never    Tobacco comments:     ON AND OFF    Vaping Use    Vaping status: Never Used   Substance and Sexual Activity    Alcohol use: Not Currently     Comment: Stopped     Drug use: No    Sexual activity: Yes     Partners: Male     Birth control/protection: Other     Comment:  Hysterectomy

## 2025-06-03 NOTE — TELEPHONE ENCOUNTER
PA for (Ozempic, 2 MG/DOSE,) 8 [facility]    via    [x]CMM-KEY:  [id number]  []Surescripts-Case ID #   []Availity-Auth ID # NDC #   []Faxed to plan   []Other website   []Phone call Case ID #     [x]PA sent as URGENT    All office notes, labs and other pertaining documents and studies sent. Clinical questions answered. Awaiting determination from insurance company.     Turnaround time for your insurance to make a decision on your Prior Authorization can take 7-21 business days.

## 2025-06-03 NOTE — ASSESSMENT & PLAN NOTE
Orders:    levothyroxine 88 mcg tablet; Take 1 tablet (88 mcg total) by mouth daily    Comprehensive metabolic panel; Future    T4, free; Future    TSH, 3rd generation; Future    Hemoglobin A1C; Future    T4, free; Future    TSH, 3rd generation; Future

## 2025-06-20 NOTE — RESULT ENCOUNTER NOTE
Elastography confirmed significant fatty liver, with significant fibrosis (F3)  Not quite cirrhosis, but we will need to monitor closely  [Negative] : Integumentary [FreeTextEntry5] : as per HPI

## 2025-06-21 NOTE — ASSESSMENT & PLAN NOTE
TSH has improved  Was 6 14, now 5 6  Will again increase levothyroxine to 112 mcg daily    Will recheck labs prior to next appointment  used

## 2025-07-28 ENCOUNTER — OFFICE VISIT (OUTPATIENT)
Dept: PAIN MEDICINE | Facility: CLINIC | Age: 55
End: 2025-07-28
Payer: COMMERCIAL

## 2025-07-28 VITALS
HEIGHT: 62 IN | TEMPERATURE: 97.9 F | OXYGEN SATURATION: 95 % | WEIGHT: 137 LBS | BODY MASS INDEX: 25.21 KG/M2 | HEART RATE: 90 BPM

## 2025-07-28 DIAGNOSIS — G89.4 CHRONIC PAIN SYNDROME: ICD-10-CM

## 2025-07-28 DIAGNOSIS — M70.62 TROCHANTERIC BURSITIS OF BOTH HIPS: ICD-10-CM

## 2025-07-28 DIAGNOSIS — M70.61 TROCHANTERIC BURSITIS OF BOTH HIPS: ICD-10-CM

## 2025-07-28 DIAGNOSIS — M47.816 LUMBAR SPONDYLOSIS: Primary | ICD-10-CM

## 2025-07-28 PROCEDURE — 99214 OFFICE O/P EST MOD 30 MIN: CPT | Performed by: PHYSICIAN ASSISTANT

## 2025-07-28 RX ORDER — TRAMADOL HYDROCHLORIDE 50 MG/1
50 TABLET ORAL 2 TIMES DAILY PRN
Qty: 60 TABLET | Refills: 3 | Status: SHIPPED | OUTPATIENT
Start: 2025-07-28

## (undated) DEVICE — DRESSING MEPILEX AG BORDER POST-OP 4 X 6 IN

## (undated) DEVICE — LAPAROSCOPIC TROCAR SLEEVE/SINGLE USE: Brand: KII® OPTICAL ACCESS SYSTEM

## (undated) DEVICE — PREMIUM DRY TRAY LF: Brand: MEDLINE INDUSTRIES, INC.

## (undated) DEVICE — MAYO STAND COVER: Brand: CONVERTORS

## (undated) DEVICE — GLOVE INDICATOR PI UNDERGLOVE SZ 8 BLUE

## (undated) DEVICE — 1820 FOAM BLOCK NEEDLE COUNTER: Brand: DEVON

## (undated) DEVICE — CUFF TOURNIQUET 18 X 4 IN QUICK CONNECT DISP 1 BLADDER

## (undated) DEVICE — SUT STRATAFIX SPIRAL 2-0 CT-1 30 CM SXPP1B410

## (undated) DEVICE — ACE WRAP 4 IN STERILE

## (undated) DEVICE — STERILE BETHLEHEM PLASTIC HAND: Brand: CARDINAL HEALTH

## (undated) DEVICE — CHLORHEXIDINE 4PCT 4 OZ

## (undated) DEVICE — ADHESIVE SKN CLSR HISTOACRYL FLEX 0.5ML LF

## (undated) DEVICE — PACK PBDS STERILE LAP LITHOTOMY RF

## (undated) DEVICE — 10FR FRAZIER SUCTION HANDLE: Brand: CARDINAL HEALTH

## (undated) DEVICE — TROCAR PORT ACCESS 5 X120MML W/BLDLS OPTICAL TIP AIRSEAL

## (undated) DEVICE — PAD CAST 4 IN COTTON NON STERILE

## (undated) DEVICE — COBAN 4 IN STERILE

## (undated) DEVICE — NEEDLE 25G X 1 1/2

## (undated) DEVICE — INTENDED FOR TISSUE SEPARATION, AND OTHER PROCEDURES THAT REQUIRE A SHARP SURGICAL BLADE TO PUNCTURE OR CUT.: Brand: BARD-PARKER ® CARBON RIB-BACK BLADES

## (undated) DEVICE — DRAPE C-ARM X-RAY

## (undated) DEVICE — SUT MONOCRYL 2-0 SH 27 IN Y417H

## (undated) DEVICE — PENCIL ELECTROSURG E-Z CLEAN -0035H

## (undated) DEVICE — KIT BIO-TENODESIS BIOABS

## (undated) DEVICE — 3M™ STERI-STRIP™ REINFORCED ADHESIVE SKIN CLOSURES, R1541, 1/4 IN X 3 IN (6 MM X 75 MM), 3 STRIPS/ENVELOPE: Brand: 3M™ STERI-STRIP™

## (undated) DEVICE — SUT MONOCRYL 3-0 PS-2 18 IN Y497G

## (undated) DEVICE — OCCLUDER COLPO-PNEUMO

## (undated) DEVICE — FLOSEAL HEMOSTATIC MATRIX, 10 ML: Brand: FLOSEAL

## (undated) DEVICE — GLOVE SRG BIOGEL 8

## (undated) DEVICE — ENSEAL LAPAROSCOPIC TISSUE SEALER G2 CURVED JAW FOR USE WITH G2 GENERATOR 5MM DIAMETER 35CM SHAFT LENGTH: Brand: ENSEAL

## (undated) DEVICE — NEPTUNE E-SEP SMOKE EVACUATION PENCIL, COATED, 70MM BLADE, PUSH BUTTON SWITCH: Brand: NEPTUNE E-SEP

## (undated) DEVICE — GROUNDING PAD UNIVERSAL SLW

## (undated) DEVICE — INTENDED FOR TISSUE SEPARATION, AND OTHER PROCEDURES THAT REQUIRE A SHARP SURGICAL BLADE TO PUNCTURE OR CUT.: Brand: BARD-PARKER SAFETY BLADES SIZE 11, STERILE

## (undated) DEVICE — SYRINGE 50ML LL

## (undated) DEVICE — TUBING SUCTION 5MM X 12 FT

## (undated) DEVICE — SYRINGE 10ML LL

## (undated) DEVICE — SUT VICRYL 0 CT-1 27 IN J260H

## (undated) DEVICE — ELECTRODE BLADE MOD  E-Z CLEAN 6.5IN -0014M

## (undated) DEVICE — DRESSING MEPILEX AG BORDER 4 X 4 IN

## (undated) DEVICE — IMPERVIOUS STOCKINETTE: Brand: DEROYAL

## (undated) DEVICE — INTENDED FOR TISSUE SEPARATION, AND OTHER PROCEDURES THAT REQUIRE A SHARP SURGICAL BLADE TO PUNCTURE OR CUT.: Brand: BARD-PARKER SAFETY BLADES SIZE 15, STERILE

## (undated) DEVICE — AIRSEAL TUBE SMOKE EVAC LUMENX3 FILTERED

## (undated) DEVICE — MEDI-VAC YANK SUCT HNDL W/TPRD BULBOUS TIP: Brand: CARDINAL HEALTH

## (undated) DEVICE — INTENT TO BE USED WITH SUTURE MATERIAL FOR TISSUE CLOSURE: Brand: RICHARD-ALLAN® NEEDLE 1/2 CIRCLE TAPER

## (undated) DEVICE — CHLORAPREP HI-LITE 26ML ORANGE

## (undated) DEVICE — VISUALIZATION SYSTEM: Brand: CLEARIFY

## (undated) DEVICE — UTERINE MANIPULATOR RUMI 5.1 X 6 CM

## (undated) DEVICE — U-DRAPE: Brand: CONVERTORS

## (undated) DEVICE — ELECTRODE LAP SPATULA CRV E-Z CLEAN 33CM -0018C

## (undated) DEVICE — 3M™ STERI-STRIP™ REINFORCED ADHESIVE SKIN CLOSURES, R1547, 1/2 IN X 4 IN (12 MM X 100 MM), 6 STRIPS/ENVELOPE: Brand: 3M™ STERI-STRIP™

## (undated) DEVICE — TRAY FOLEY 16FR URIMETER SILICONE SURESTEP

## (undated) DEVICE — LIGHT HANDLE COVER SLEEVE DISP BLUE STELLAR

## (undated) DEVICE — TROCAR: Brand: KII® SLEEVE

## (undated) DEVICE — GLOVE PI ULTRA TOUCH SZ.7.5

## (undated) DEVICE — GLOVE SRG BIOGEL 7.5

## (undated) DEVICE — ACE WRAP 2 IN UNSTERILE

## (undated) DEVICE — TELFA NON-ADHERENT ABSORBENT DRESSING: Brand: TELFA

## (undated) DEVICE — ADHESIVE SKIN HIGH VISCOSITY EXOFIN 1ML

## (undated) DEVICE — STERILE CYSTO PACK: Brand: CARDINAL HEALTH

## (undated) DEVICE — DISPOSABLE EQUIPMENT COVER: Brand: SMALL TOWEL DRAPE

## (undated) DEVICE — SUT MONOCRYL 4-0 PS-2 27 IN Y426H

## (undated) DEVICE — 3000CC GUARDIAN II: Brand: GUARDIAN